# Patient Record
Sex: MALE | Race: WHITE | NOT HISPANIC OR LATINO | URBAN - METROPOLITAN AREA
[De-identification: names, ages, dates, MRNs, and addresses within clinical notes are randomized per-mention and may not be internally consistent; named-entity substitution may affect disease eponyms.]

---

## 2019-06-01 ENCOUNTER — OUTPATIENT (OUTPATIENT)
Dept: OUTPATIENT SERVICES | Facility: HOSPITAL | Age: 74
LOS: 1 days | End: 2019-06-01
Payer: MEDICARE

## 2019-06-01 DIAGNOSIS — Z93.1 GASTROSTOMY STATUS: Chronic | ICD-10-CM

## 2019-06-01 PROCEDURE — G9001: CPT

## 2019-06-21 ENCOUNTER — INPATIENT (INPATIENT)
Facility: HOSPITAL | Age: 74
LOS: 11 days | Discharge: HOME | DRG: 871 | End: 2019-07-03
Attending: INTERNAL MEDICINE | Admitting: INTERNAL MEDICINE
Payer: COMMERCIAL

## 2019-06-21 VITALS
HEART RATE: 80 BPM | OXYGEN SATURATION: 98 % | SYSTOLIC BLOOD PRESSURE: 120 MMHG | DIASTOLIC BLOOD PRESSURE: 76 MMHG | RESPIRATION RATE: 16 BRPM

## 2019-06-21 DIAGNOSIS — S72.401A UNSPECIFIED FRACTURE OF LOWER END OF RIGHT FEMUR, INITIAL ENCOUNTER FOR CLOSED FRACTURE: ICD-10-CM

## 2019-06-21 DIAGNOSIS — Z93.1 GASTROSTOMY STATUS: Chronic | ICD-10-CM

## 2019-06-21 LAB
ALBUMIN SERPL ELPH-MCNC: 3.7 G/DL — SIGNIFICANT CHANGE UP (ref 3.3–5)
ALP SERPL-CCNC: 120 U/L — SIGNIFICANT CHANGE UP (ref 40–120)
ALT FLD-CCNC: 19 U/L — SIGNIFICANT CHANGE UP (ref 10–45)
ANION GAP SERPL CALC-SCNC: 15 MMOL/L — SIGNIFICANT CHANGE UP (ref 5–17)
AST SERPL-CCNC: 53 U/L — HIGH (ref 10–40)
BASOPHILS # BLD AUTO: 0 K/UL — SIGNIFICANT CHANGE UP (ref 0–0.2)
BASOPHILS NFR BLD AUTO: 0.2 % — SIGNIFICANT CHANGE UP (ref 0–2)
BILIRUB SERPL-MCNC: 0.9 MG/DL — SIGNIFICANT CHANGE UP (ref 0.2–1.2)
BUN SERPL-MCNC: 18 MG/DL — SIGNIFICANT CHANGE UP (ref 7–23)
CALCIUM SERPL-MCNC: 9.4 MG/DL — SIGNIFICANT CHANGE UP (ref 8.4–10.5)
CHLORIDE SERPL-SCNC: 105 MMOL/L — SIGNIFICANT CHANGE UP (ref 96–108)
CO2 SERPL-SCNC: 21 MMOL/L — LOW (ref 22–31)
CREAT SERPL-MCNC: 0.54 MG/DL — SIGNIFICANT CHANGE UP (ref 0.5–1.3)
EOSINOPHIL # BLD AUTO: 0 K/UL — SIGNIFICANT CHANGE UP (ref 0–0.5)
EOSINOPHIL NFR BLD AUTO: 0.4 % — SIGNIFICANT CHANGE UP (ref 0–6)
GLUCOSE SERPL-MCNC: 173 MG/DL — HIGH (ref 70–99)
HCT VFR BLD CALC: 43.2 % — SIGNIFICANT CHANGE UP (ref 39–50)
HGB BLD-MCNC: 14.7 G/DL — SIGNIFICANT CHANGE UP (ref 13–17)
LYMPHOCYTES # BLD AUTO: 1.4 K/UL — SIGNIFICANT CHANGE UP (ref 1–3.3)
LYMPHOCYTES # BLD AUTO: 11.6 % — LOW (ref 13–44)
MCHC RBC-ENTMCNC: 33.3 PG — SIGNIFICANT CHANGE UP (ref 27–34)
MCHC RBC-ENTMCNC: 34 GM/DL — SIGNIFICANT CHANGE UP (ref 32–36)
MCV RBC AUTO: 97.8 FL — SIGNIFICANT CHANGE UP (ref 80–100)
MONOCYTES # BLD AUTO: 1.2 K/UL — HIGH (ref 0–0.9)
MONOCYTES NFR BLD AUTO: 9.8 % — SIGNIFICANT CHANGE UP (ref 2–14)
NEUTROPHILS # BLD AUTO: 9.6 K/UL — HIGH (ref 1.8–7.4)
NEUTROPHILS NFR BLD AUTO: 78 % — HIGH (ref 43–77)
PLATELET # BLD AUTO: 55 K/UL — LOW (ref 150–400)
POTASSIUM SERPL-MCNC: 5.6 MMOL/L — HIGH (ref 3.5–5.3)
POTASSIUM SERPL-SCNC: 5.6 MMOL/L — HIGH (ref 3.5–5.3)
PROT SERPL-MCNC: 7.4 G/DL — SIGNIFICANT CHANGE UP (ref 6–8.3)
RBC # BLD: 4.42 M/UL — SIGNIFICANT CHANGE UP (ref 4.2–5.8)
RBC # FLD: 12.2 % — SIGNIFICANT CHANGE UP (ref 10.3–14.5)
SODIUM SERPL-SCNC: 141 MMOL/L — SIGNIFICANT CHANGE UP (ref 135–145)
WBC # BLD: 12.4 K/UL — HIGH (ref 3.8–10.5)
WBC # FLD AUTO: 12.4 K/UL — HIGH (ref 3.8–10.5)

## 2019-06-21 PROCEDURE — 70450 CT HEAD/BRAIN W/O DYE: CPT | Mod: 26

## 2019-06-21 PROCEDURE — 99285 EMERGENCY DEPT VISIT HI MDM: CPT

## 2019-06-21 PROCEDURE — 72170 X-RAY EXAM OF PELVIS: CPT | Mod: 26

## 2019-06-21 PROCEDURE — 73552 X-RAY EXAM OF FEMUR 2/>: CPT | Mod: 26,RT

## 2019-06-21 PROCEDURE — 71045 X-RAY EXAM CHEST 1 VIEW: CPT | Mod: 26

## 2019-06-21 PROCEDURE — 73562 X-RAY EXAM OF KNEE 3: CPT | Mod: 26,RT

## 2019-06-21 PROCEDURE — 49465 FLUORO EXAM OF G/COLON TUBE: CPT

## 2019-06-21 RX ORDER — ACETAMINOPHEN 500 MG
1000 TABLET ORAL ONCE
Refills: 0 | Status: COMPLETED | OUTPATIENT
Start: 2019-06-21 | End: 2019-06-21

## 2019-06-21 RX ADMIN — Medication 1000 MILLIGRAM(S): at 22:54

## 2019-06-21 NOTE — ED PROVIDER NOTE - PROGRESS NOTE DETAILS
Tomasa PGY1- Cullen Moraes replaced tube in ED, will confirm with Gastrograffin study and d/c home, also will obtain radiographs or R thigh/knee for crepitus/swelling Haverty PGY1- pt required straight cath for UA. given pt inability to position himself, required supine positioning for catheretization, which wife was very upset about. concerned that he would get pna. explained that this positioning is temporary but necessary. Tomasa PGY1- ortho saw pt in ED for comminuted distal R femur fx, not surgical candidate, but knee immob placed for pain control, they will follow

## 2019-06-21 NOTE — ED PROVIDER NOTE - OBJECTIVE STATEMENT
73 yoM, PMHx of dementia, arthritis, GERD, BIB EMS with wife with report of feeding tube being pulled out around 15:30. No recent illness. No fever. Wife is concerned for R thigh swelling which is new. No trauma or fall but EMS was helping change the diaper and a crack in that region was heard.  Per wife Dr. Cullen Moraes will come to ED to replace tube.

## 2019-06-21 NOTE — ED PROVIDER NOTE - PHYSICAL EXAMINATION
PHYSICAL EXAM:  GENERAL: bed ridden, no distress, tracks with eyes, not agitated   HEAD:  Atraumatic, Normocephalic  EYES: EOMI, PERRLA, conjunctiva and sclera clear  ENMT: No tonsillar erythema, exudates, or enlargement; Moist mucous membranes, edentulous   NECK: Supple, No JVD  HEART: Regular rate and rhythm; No murmurs, rubs, or gallops  RESPIRATORY: CTA B/L, No W/R/R  ABDOMEN: Soft, Nontender, stoma with no tube in place presently no drainage/erythema   NEURO: non verbal, severe dementia, moving extremities at baseline per wife, tracks with eyes,   EXTREMITIES:  R thigh will swelling compared tor L, + crepitus, no wounds or ecchymosis, good distal pulses, BUE normal/atraumatic, pelvis stable  SKIN: warm, dry, normal color, no rash

## 2019-06-21 NOTE — ED ADULT NURSE NOTE - PSH
History of hand surgery    PEG adjustment, replacement, or removal    Status post insertion of percutaneous endoscopic gastrostomy (PEG) tube  PEG replacement on 02/29/16

## 2019-06-21 NOTE — ED PROVIDER NOTE - CARE PLAN
Principal Discharge DX:	Closed fracture of distal end of right femur, unspecified fracture morphology, initial encounter  Secondary Diagnosis:	PEG (percutaneous endoscopic gastrostomy) adjustment/replacement/removal

## 2019-06-21 NOTE — ED ADULT NURSE NOTE - OBJECTIVE STATEMENT
1925 pt 73ym alert baseline bib ems per wife at bedside found feeding tube out/vss/gi md at bedside replacing the tubing, pt able to tolerate procedure pending x ray/gc

## 2019-06-21 NOTE — ED PROVIDER NOTE - CLINICAL SUMMARY MEDICAL DECISION MAKING FREE TEXT BOX
Tomasa PGY1- demented elderly gentleman here with wife, feeding tube for 7 years, pulled out approx 4 hours, will replace, confirm proper positioning and d/c home see  MD note

## 2019-06-21 NOTE — ED ADULT NURSE NOTE - PMH
Acute renal failure (ARF)  ARF 1.5 YEAR AGO  Arthritis    Dementia    Dyspepsia    Dysphagia  peg  GERD (gastroesophageal reflux disease)    Pneumonia

## 2019-06-21 NOTE — CONSULT NOTE ADULT - SUBJECTIVE AND OBJECTIVE BOX
Chief Complaint:  Patient is a 73y old  Male who presents with a chief complaint of peg falling out    Allergies:  benzodiazepines (Other)  Haldol (Other)  No Known Allergies      Medications:      PMHX/PSHX:  Dyspepsia  GERD (gastroesophageal reflux disease)  Arthritis  Dysphagia  Acute renal failure (ARF)  Pneumonia  Dementia  Status post insertion of percutaneous endoscopic gastrostomy (PEG) tube  PEG adjustment, replacement, or removal  History of hand surgery      Family history:  No pertinent family history in first degree relatives  No pertinent family history in first degree relatives      Social History: no etoh no cigs no ivda     ROS:     General:  No wt loss, fevers, chills, night sweats, fatigue,   Eyes:  Good vision, no reported pain  ENT:  No sore throat, pain, runny nose, dysphagia  CV:  No pain, palpitations, hypo/hypertension  Resp:  No dyspnea, cough, tachypnea, wheezing  GI:  No pain, No nausea, No vomiting, No diarrhea, No constipation, No weight loss, No fever, No pruritis, No rectal bleeding, No tarry stools, No dysphagia,  :  No pain, bleeding, incontinence, nocturia  Muscle:  No pain, weakness  Neuro:  No weakness, tingling, memory problems  Psych:  No fatigue, insomnia, mood problems, depression  Endocrine:  No polyuria, polydipsia, cold/heat intolerance  Heme:  No petechiae, ecchymosis, easy bruisability  Skin:  No rash, tattoos, scars, edema      PHYSICAL EXAM:   Vital Signs:  Vital Signs Last 24 Hrs  T(C): --  T(F): --  HR: 80 (21 Jun 2019 18:16) (80 - 80)  BP: 120/76 (21 Jun 2019 18:16) (120/76 - 120/76)  BP(mean): --  RR: 16 (21 Jun 2019 18:16) (16 - 16)  SpO2: 98% (21 Jun 2019 18:16) (98% - 98%)  Daily     Daily     GENERAL:  Appears stated age, well-groomed, well-nourished, no distress  HEENT:  NC/AT,  conjunctivae clear and pink, no thyromegaly, nodules, adenopathy, no JVD, sclera -anicteric  CHEST:  Full & symmetric excursion, no increased effort, breath sounds clear  HEART:  Regular rhythm, S1, S2, no murmur/rub/S3/S4, no abdominal bruit, no edema  ABDOMEN:  Soft, non-tender, non-distended, normoactive bowel sounds,  no masses ,no hepato-splenomegaly, no signs of chronic liver disease  EXTEREMITIES:  no cyanosis,clubbing or edema  SKIN:  No rash/erythema/ecchymoses/petechiae/wounds/abscess/warm/dry  NEURO:  Alert, oriented, no asterixis, no tremor, no encephalopathy    LABS:                    Imaging:

## 2019-06-21 NOTE — ED PROVIDER NOTE - ATTENDING CONTRIBUTION TO CARE
------------ATTENDING NOTE------------  pt w/ wife brought to ED for concerns of PEG tube displacement, easily replaced by GI on arrival (met pt in ED), complicated as physical exam w/ swelling to R distal femur (soft compartments, no open wounds, crepitus w/ axial loading, equal distal pulses) and radiographs w/ femur fx, limited as pt nonverbal and not appearing in pain/distress, will evaluate for additional injuries / acute processes, d/w Dr Base for admission (needing Ortho consult, optimize medical management, needs assessment, etc), low suspicion for PNA from CXR (no fevers, cough) -->  - Savage Felix MD   ------------------------------------------------

## 2019-06-22 DIAGNOSIS — R00.0 TACHYCARDIA, UNSPECIFIED: ICD-10-CM

## 2019-06-22 DIAGNOSIS — Z29.9 ENCOUNTER FOR PROPHYLACTIC MEASURES, UNSPECIFIED: ICD-10-CM

## 2019-06-22 DIAGNOSIS — S72.401A UNSPECIFIED FRACTURE OF LOWER END OF RIGHT FEMUR, INITIAL ENCOUNTER FOR CLOSED FRACTURE: ICD-10-CM

## 2019-06-22 DIAGNOSIS — J18.9 PNEUMONIA, UNSPECIFIED ORGANISM: ICD-10-CM

## 2019-06-22 DIAGNOSIS — F03.90 UNSPECIFIED DEMENTIA WITHOUT BEHAVIORAL DISTURBANCE: ICD-10-CM

## 2019-06-22 DIAGNOSIS — Z79.899 OTHER LONG TERM (CURRENT) DRUG THERAPY: ICD-10-CM

## 2019-06-22 DIAGNOSIS — Z43.1 ENCOUNTER FOR ATTENTION TO GASTROSTOMY: ICD-10-CM

## 2019-06-22 DIAGNOSIS — K21.9 GASTRO-ESOPHAGEAL REFLUX DISEASE WITHOUT ESOPHAGITIS: ICD-10-CM

## 2019-06-22 LAB
ALBUMIN SERPL ELPH-MCNC: 3.7 G/DL — SIGNIFICANT CHANGE UP (ref 3.3–5)
ALP SERPL-CCNC: 108 U/L — SIGNIFICANT CHANGE UP (ref 40–120)
ALT FLD-CCNC: 14 U/L — SIGNIFICANT CHANGE UP (ref 10–45)
ANION GAP SERPL CALC-SCNC: 14 MMOL/L — SIGNIFICANT CHANGE UP (ref 5–17)
ANION GAP SERPL CALC-SCNC: 15 MMOL/L — SIGNIFICANT CHANGE UP (ref 5–17)
APPEARANCE UR: CLEAR — SIGNIFICANT CHANGE UP
APTT BLD: 35.9 SEC — SIGNIFICANT CHANGE UP (ref 27.5–36.3)
AST SERPL-CCNC: 21 U/L — SIGNIFICANT CHANGE UP (ref 10–40)
BACTERIA # UR AUTO: NEGATIVE — SIGNIFICANT CHANGE UP
BASE EXCESS BLDV CALC-SCNC: -1.1 MMOL/L — SIGNIFICANT CHANGE UP (ref -2–2)
BASE EXCESS BLDV CALC-SCNC: -1.4 MMOL/L — SIGNIFICANT CHANGE UP (ref -2–2)
BASOPHILS # BLD AUTO: 0 K/UL — SIGNIFICANT CHANGE UP (ref 0–0.2)
BASOPHILS NFR BLD AUTO: 0.1 % — SIGNIFICANT CHANGE UP (ref 0–2)
BILIRUB SERPL-MCNC: 1.3 MG/DL — HIGH (ref 0.2–1.2)
BILIRUB UR-MCNC: NEGATIVE — SIGNIFICANT CHANGE UP
BLD GP AB SCN SERPL QL: NEGATIVE — SIGNIFICANT CHANGE UP
BUN SERPL-MCNC: 22 MG/DL — SIGNIFICANT CHANGE UP (ref 7–23)
BUN SERPL-MCNC: 27 MG/DL — HIGH (ref 7–23)
CA-I SERPL-SCNC: 1.11 MMOL/L — LOW (ref 1.12–1.3)
CA-I SERPL-SCNC: 1.19 MMOL/L — SIGNIFICANT CHANGE UP (ref 1.12–1.3)
CALCIUM SERPL-MCNC: 8.8 MG/DL — SIGNIFICANT CHANGE UP (ref 8.4–10.5)
CALCIUM SERPL-MCNC: 9.1 MG/DL — SIGNIFICANT CHANGE UP (ref 8.4–10.5)
CHLORIDE BLDV-SCNC: 113 MMOL/L — HIGH (ref 96–108)
CHLORIDE BLDV-SCNC: 117 MMOL/L — HIGH (ref 96–108)
CHLORIDE SERPL-SCNC: 106 MMOL/L — SIGNIFICANT CHANGE UP (ref 96–108)
CHLORIDE SERPL-SCNC: 108 MMOL/L — SIGNIFICANT CHANGE UP (ref 96–108)
CO2 BLDV-SCNC: 24 MMOL/L — SIGNIFICANT CHANGE UP (ref 22–30)
CO2 BLDV-SCNC: 25 MMOL/L — SIGNIFICANT CHANGE UP (ref 22–30)
CO2 SERPL-SCNC: 22 MMOL/L — SIGNIFICANT CHANGE UP (ref 22–31)
CO2 SERPL-SCNC: 22 MMOL/L — SIGNIFICANT CHANGE UP (ref 22–31)
COLOR SPEC: YELLOW — SIGNIFICANT CHANGE UP
CREAT SERPL-MCNC: 0.7 MG/DL — SIGNIFICANT CHANGE UP (ref 0.5–1.3)
CREAT SERPL-MCNC: 1.09 MG/DL — SIGNIFICANT CHANGE UP (ref 0.5–1.3)
CULTURE RESULTS: NO GROWTH — SIGNIFICANT CHANGE UP
DIFF PNL FLD: NEGATIVE — SIGNIFICANT CHANGE UP
EOSINOPHIL # BLD AUTO: 0 K/UL — SIGNIFICANT CHANGE UP (ref 0–0.5)
EOSINOPHIL NFR BLD AUTO: 0.4 % — SIGNIFICANT CHANGE UP (ref 0–6)
EPI CELLS # UR: 2 /HPF — SIGNIFICANT CHANGE UP
GAS PNL BLDV: 137 MMOL/L — SIGNIFICANT CHANGE UP (ref 135–145)
GAS PNL BLDV: 142 MMOL/L — SIGNIFICANT CHANGE UP (ref 135–145)
GAS PNL BLDV: SIGNIFICANT CHANGE UP
GLUCOSE BLDC GLUCOMTR-MCNC: 156 MG/DL — HIGH (ref 70–99)
GLUCOSE BLDC GLUCOMTR-MCNC: 178 MG/DL — HIGH (ref 70–99)
GLUCOSE BLDC GLUCOMTR-MCNC: 187 MG/DL — HIGH (ref 70–99)
GLUCOSE BLDC GLUCOMTR-MCNC: 215 MG/DL — HIGH (ref 70–99)
GLUCOSE BLDC GLUCOMTR-MCNC: 220 MG/DL — HIGH (ref 70–99)
GLUCOSE BLDV-MCNC: 176 MG/DL — HIGH (ref 70–99)
GLUCOSE BLDV-MCNC: 177 MG/DL — HIGH (ref 70–99)
GLUCOSE SERPL-MCNC: 177 MG/DL — HIGH (ref 70–99)
GLUCOSE SERPL-MCNC: 187 MG/DL — HIGH (ref 70–99)
GLUCOSE UR QL: NEGATIVE — SIGNIFICANT CHANGE UP
HCO3 BLDV-SCNC: 23 MMOL/L — SIGNIFICANT CHANGE UP (ref 21–29)
HCO3 BLDV-SCNC: 24 MMOL/L — SIGNIFICANT CHANGE UP (ref 21–29)
HCT VFR BLD CALC: 38.5 % — LOW (ref 39–50)
HCT VFR BLD CALC: 38.8 % — LOW (ref 39–50)
HCT VFR BLDA CALC: 37 % — LOW (ref 39–50)
HCT VFR BLDA CALC: 38 % — LOW (ref 39–50)
HGB BLD CALC-MCNC: 12 G/DL — LOW (ref 13–17)
HGB BLD CALC-MCNC: 12.4 G/DL — LOW (ref 13–17)
HGB BLD-MCNC: 13.1 G/DL — SIGNIFICANT CHANGE UP (ref 13–17)
HGB BLD-MCNC: 13.3 G/DL — SIGNIFICANT CHANGE UP (ref 13–17)
HYALINE CASTS # UR AUTO: 0 /LPF — SIGNIFICANT CHANGE UP (ref 0–7)
INR BLD: 1.07 RATIO — SIGNIFICANT CHANGE UP (ref 0.88–1.16)
KETONES UR-MCNC: NEGATIVE — SIGNIFICANT CHANGE UP
LACTATE BLDV-MCNC: 5.1 MMOL/L — CRITICAL HIGH (ref 0.7–2)
LACTATE BLDV-MCNC: 5.3 MMOL/L — CRITICAL HIGH (ref 0.7–2)
LACTATE SERPL-SCNC: 4 MMOL/L — CRITICAL HIGH (ref 0.7–2)
LACTATE SERPL-SCNC: 5.4 MMOL/L — CRITICAL HIGH (ref 0.7–2)
LEUKOCYTE ESTERASE UR-ACNC: ABNORMAL
LYMPHOCYTES # BLD AUTO: 1.7 K/UL — SIGNIFICANT CHANGE UP (ref 1–3.3)
LYMPHOCYTES # BLD AUTO: 13.8 % — SIGNIFICANT CHANGE UP (ref 13–44)
MAGNESIUM SERPL-MCNC: 2.1 MG/DL — SIGNIFICANT CHANGE UP (ref 1.6–2.6)
MAGNESIUM SERPL-MCNC: 2.1 MG/DL — SIGNIFICANT CHANGE UP (ref 1.6–2.6)
MCHC RBC-ENTMCNC: 33.3 PG — SIGNIFICANT CHANGE UP (ref 27–34)
MCHC RBC-ENTMCNC: 33.8 GM/DL — SIGNIFICANT CHANGE UP (ref 32–36)
MCHC RBC-ENTMCNC: 33.8 PG — SIGNIFICANT CHANGE UP (ref 27–34)
MCHC RBC-ENTMCNC: 34.5 GM/DL — SIGNIFICANT CHANGE UP (ref 32–36)
MCV RBC AUTO: 98.2 FL — SIGNIFICANT CHANGE UP (ref 80–100)
MCV RBC AUTO: 98.6 FL — SIGNIFICANT CHANGE UP (ref 80–100)
MONOCYTES # BLD AUTO: 1.6 K/UL — HIGH (ref 0–0.9)
MONOCYTES NFR BLD AUTO: 12.9 % — SIGNIFICANT CHANGE UP (ref 2–14)
NEUTROPHILS # BLD AUTO: 9.2 K/UL — HIGH (ref 1.8–7.4)
NEUTROPHILS NFR BLD AUTO: 72.8 % — SIGNIFICANT CHANGE UP (ref 43–77)
NITRITE UR-MCNC: NEGATIVE — SIGNIFICANT CHANGE UP
OTHER CELLS CSF MANUAL: 10 ML/DL — LOW (ref 18–22)
OTHER CELLS CSF MANUAL: 5 ML/DL — LOW (ref 18–22)
PCO2 BLDV: 40 MMHG — SIGNIFICANT CHANGE UP (ref 35–50)
PCO2 BLDV: 45 MMHG — SIGNIFICANT CHANGE UP (ref 35–50)
PH BLDV: 7.34 — LOW (ref 7.35–7.45)
PH BLDV: 7.39 — SIGNIFICANT CHANGE UP (ref 7.35–7.45)
PH UR: 7 — SIGNIFICANT CHANGE UP (ref 5–8)
PHOSPHATE SERPL-MCNC: 1.5 MG/DL — LOW (ref 2.5–4.5)
PHOSPHATE SERPL-MCNC: 2.6 MG/DL — SIGNIFICANT CHANGE UP (ref 2.5–4.5)
PLATELET # BLD AUTO: 178 K/UL — SIGNIFICANT CHANGE UP (ref 150–400)
PLATELET # BLD AUTO: 187 K/UL — SIGNIFICANT CHANGE UP (ref 150–400)
PO2 BLDV: 22 MMHG — LOW (ref 25–45)
PO2 BLDV: 33 MMHG — SIGNIFICANT CHANGE UP (ref 25–45)
POTASSIUM BLDV-SCNC: 3.9 MMOL/L — SIGNIFICANT CHANGE UP (ref 3.5–5.3)
POTASSIUM BLDV-SCNC: 3.9 MMOL/L — SIGNIFICANT CHANGE UP (ref 3.5–5.3)
POTASSIUM SERPL-MCNC: 4.1 MMOL/L — SIGNIFICANT CHANGE UP (ref 3.5–5.3)
POTASSIUM SERPL-MCNC: 4.4 MMOL/L — SIGNIFICANT CHANGE UP (ref 3.5–5.3)
POTASSIUM SERPL-SCNC: 4.1 MMOL/L — SIGNIFICANT CHANGE UP (ref 3.5–5.3)
POTASSIUM SERPL-SCNC: 4.4 MMOL/L — SIGNIFICANT CHANGE UP (ref 3.5–5.3)
PROT SERPL-MCNC: 6.9 G/DL — SIGNIFICANT CHANGE UP (ref 6–8.3)
PROT UR-MCNC: ABNORMAL
PROTHROM AB SERPL-ACNC: 12.2 SEC — SIGNIFICANT CHANGE UP (ref 10–12.9)
RBC # BLD: 3.93 M/UL — LOW (ref 4.2–5.8)
RBC # BLD: 3.94 M/UL — LOW (ref 4.2–5.8)
RBC # FLD: 12.2 % — SIGNIFICANT CHANGE UP (ref 10.3–14.5)
RBC # FLD: 12.3 % — SIGNIFICANT CHANGE UP (ref 10.3–14.5)
RBC CASTS # UR COMP ASSIST: 2 /HPF — SIGNIFICANT CHANGE UP (ref 0–4)
RH IG SCN BLD-IMP: POSITIVE — SIGNIFICANT CHANGE UP
SAO2 % BLDV: 29 % — LOW (ref 67–88)
SAO2 % BLDV: 60 % — LOW (ref 67–88)
SODIUM SERPL-SCNC: 142 MMOL/L — SIGNIFICANT CHANGE UP (ref 135–145)
SODIUM SERPL-SCNC: 145 MMOL/L — SIGNIFICANT CHANGE UP (ref 135–145)
SP GR SPEC: 1.02 — SIGNIFICANT CHANGE UP (ref 1.01–1.02)
SPECIMEN SOURCE: SIGNIFICANT CHANGE UP
UROBILINOGEN FLD QL: NEGATIVE — SIGNIFICANT CHANGE UP
WBC # BLD: 12.6 K/UL — HIGH (ref 3.8–10.5)
WBC # BLD: 9.5 K/UL — SIGNIFICANT CHANGE UP (ref 3.8–10.5)
WBC # FLD AUTO: 12.6 K/UL — HIGH (ref 3.8–10.5)
WBC # FLD AUTO: 9.5 K/UL — SIGNIFICANT CHANGE UP (ref 3.8–10.5)
WBC UR QL: 6 /HPF — HIGH (ref 0–5)

## 2019-06-22 PROCEDURE — 93010 ELECTROCARDIOGRAM REPORT: CPT

## 2019-06-22 PROCEDURE — 99223 1ST HOSP IP/OBS HIGH 75: CPT

## 2019-06-22 PROCEDURE — 71045 X-RAY EXAM CHEST 1 VIEW: CPT | Mod: 26

## 2019-06-22 PROCEDURE — 73562 X-RAY EXAM OF KNEE 3: CPT | Mod: 26,RT

## 2019-06-22 PROCEDURE — 93010 ELECTROCARDIOGRAM REPORT: CPT | Mod: 77

## 2019-06-22 RX ORDER — SODIUM CHLORIDE 9 MG/ML
1000 INJECTION INTRAMUSCULAR; INTRAVENOUS; SUBCUTANEOUS
Refills: 0 | Status: DISCONTINUED | OUTPATIENT
Start: 2019-06-22 | End: 2019-06-22

## 2019-06-22 RX ORDER — QUETIAPINE FUMARATE 200 MG/1
200 TABLET, FILM COATED ORAL DAILY
Refills: 0 | Status: DISCONTINUED | OUTPATIENT
Start: 2019-06-22 | End: 2019-06-27

## 2019-06-22 RX ORDER — ACETAMINOPHEN 500 MG
650 TABLET ORAL EVERY 6 HOURS
Refills: 0 | Status: DISCONTINUED | OUTPATIENT
Start: 2019-06-22 | End: 2019-06-24

## 2019-06-22 RX ORDER — VANCOMYCIN HCL 1 G
1000 VIAL (EA) INTRAVENOUS ONCE
Refills: 0 | Status: COMPLETED | OUTPATIENT
Start: 2019-06-22 | End: 2019-06-22

## 2019-06-22 RX ORDER — SODIUM CHLORIDE 9 MG/ML
1000 INJECTION INTRAMUSCULAR; INTRAVENOUS; SUBCUTANEOUS ONCE
Refills: 0 | Status: COMPLETED | OUTPATIENT
Start: 2019-06-22 | End: 2019-06-22

## 2019-06-22 RX ORDER — SUCRALFATE 1 G
1 TABLET ORAL
Refills: 0 | Status: DISCONTINUED | OUTPATIENT
Start: 2019-06-22 | End: 2019-07-03

## 2019-06-22 RX ORDER — HEPARIN SODIUM 5000 [USP'U]/ML
5000 INJECTION INTRAVENOUS; SUBCUTANEOUS EVERY 8 HOURS
Refills: 0 | Status: DISCONTINUED | OUTPATIENT
Start: 2019-06-22 | End: 2019-06-22

## 2019-06-22 RX ORDER — OLANZAPINE 15 MG/1
5 TABLET, FILM COATED ORAL DAILY
Refills: 0 | Status: DISCONTINUED | OUTPATIENT
Start: 2019-06-22 | End: 2019-07-03

## 2019-06-22 RX ORDER — PIPERACILLIN AND TAZOBACTAM 4; .5 G/20ML; G/20ML
3.38 INJECTION, POWDER, LYOPHILIZED, FOR SOLUTION INTRAVENOUS ONCE
Refills: 0 | Status: COMPLETED | OUTPATIENT
Start: 2019-06-22 | End: 2019-06-22

## 2019-06-22 RX ORDER — SODIUM CHLORIDE 9 MG/ML
1000 INJECTION INTRAMUSCULAR; INTRAVENOUS; SUBCUTANEOUS
Refills: 0 | Status: COMPLETED | OUTPATIENT
Start: 2019-06-22 | End: 2019-06-22

## 2019-06-22 RX ORDER — ACETAMINOPHEN 500 MG
650 TABLET ORAL EVERY 6 HOURS
Refills: 0 | Status: DISCONTINUED | OUTPATIENT
Start: 2019-06-22 | End: 2019-06-22

## 2019-06-22 RX ORDER — POTASSIUM PHOSPHATE, MONOBASIC POTASSIUM PHOSPHATE, DIBASIC 236; 224 MG/ML; MG/ML
15 INJECTION, SOLUTION INTRAVENOUS ONCE
Refills: 0 | Status: COMPLETED | OUTPATIENT
Start: 2019-06-22 | End: 2019-06-22

## 2019-06-22 RX ORDER — SODIUM CHLORIDE 9 MG/ML
1000 INJECTION, SOLUTION INTRAVENOUS
Refills: 0 | Status: DISCONTINUED | OUTPATIENT
Start: 2019-06-22 | End: 2019-06-27

## 2019-06-22 RX ORDER — HEPARIN SODIUM 5000 [USP'U]/ML
5000 INJECTION INTRAVENOUS; SUBCUTANEOUS EVERY 12 HOURS
Refills: 0 | Status: DISCONTINUED | OUTPATIENT
Start: 2019-06-22 | End: 2019-06-22

## 2019-06-22 RX ORDER — HEPARIN SODIUM 5000 [USP'U]/ML
5000 INJECTION INTRAVENOUS; SUBCUTANEOUS EVERY 8 HOURS
Refills: 0 | Status: DISCONTINUED | OUTPATIENT
Start: 2019-06-22 | End: 2019-07-03

## 2019-06-22 RX ORDER — SODIUM CHLORIDE 9 MG/ML
500 INJECTION INTRAMUSCULAR; INTRAVENOUS; SUBCUTANEOUS ONCE
Refills: 0 | Status: COMPLETED | OUTPATIENT
Start: 2019-06-22 | End: 2019-06-22

## 2019-06-22 RX ORDER — PIPERACILLIN AND TAZOBACTAM 4; .5 G/20ML; G/20ML
3.38 INJECTION, POWDER, LYOPHILIZED, FOR SOLUTION INTRAVENOUS EVERY 8 HOURS
Refills: 0 | Status: DISCONTINUED | OUTPATIENT
Start: 2019-06-22 | End: 2019-06-27

## 2019-06-22 RX ORDER — PANTOPRAZOLE SODIUM 20 MG/1
40 TABLET, DELAYED RELEASE ORAL DAILY
Refills: 0 | Status: DISCONTINUED | OUTPATIENT
Start: 2019-06-22 | End: 2019-07-03

## 2019-06-22 RX ORDER — VANCOMYCIN HCL 1 G
1000 VIAL (EA) INTRAVENOUS DAILY
Refills: 0 | Status: DISCONTINUED | OUTPATIENT
Start: 2019-06-22 | End: 2019-06-24

## 2019-06-22 RX ADMIN — Medication 650 MILLIGRAM(S): at 04:37

## 2019-06-22 RX ADMIN — Medication 650 MILLIGRAM(S): at 04:49

## 2019-06-22 RX ADMIN — Medication 1 GRAM(S): at 22:07

## 2019-06-22 RX ADMIN — PANTOPRAZOLE SODIUM 40 MILLIGRAM(S): 20 TABLET, DELAYED RELEASE ORAL at 09:03

## 2019-06-22 RX ADMIN — OLANZAPINE 5 MILLIGRAM(S): 15 TABLET, FILM COATED ORAL at 14:23

## 2019-06-22 RX ADMIN — HEPARIN SODIUM 5000 UNIT(S): 5000 INJECTION INTRAVENOUS; SUBCUTANEOUS at 14:21

## 2019-06-22 RX ADMIN — SODIUM CHLORIDE 60 MILLILITER(S): 9 INJECTION, SOLUTION INTRAVENOUS at 09:30

## 2019-06-22 RX ADMIN — SODIUM CHLORIDE 1000 MILLILITER(S): 9 INJECTION INTRAMUSCULAR; INTRAVENOUS; SUBCUTANEOUS at 14:19

## 2019-06-22 RX ADMIN — PIPERACILLIN AND TAZOBACTAM 200 GRAM(S): 4; .5 INJECTION, POWDER, LYOPHILIZED, FOR SOLUTION INTRAVENOUS at 14:19

## 2019-06-22 RX ADMIN — Medication 650 MILLIGRAM(S): at 21:15

## 2019-06-22 RX ADMIN — Medication 650 MILLIGRAM(S): at 22:41

## 2019-06-22 RX ADMIN — POTASSIUM PHOSPHATE, MONOBASIC POTASSIUM PHOSPHATE, DIBASIC 62.5 MILLIMOLE(S): 236; 224 INJECTION, SOLUTION INTRAVENOUS at 17:52

## 2019-06-22 RX ADMIN — QUETIAPINE FUMARATE 200 MILLIGRAM(S): 200 TABLET, FILM COATED ORAL at 22:07

## 2019-06-22 RX ADMIN — SODIUM CHLORIDE 1000 MILLILITER(S): 9 INJECTION INTRAMUSCULAR; INTRAVENOUS; SUBCUTANEOUS at 18:44

## 2019-06-22 RX ADMIN — Medication 250 MILLIGRAM(S): at 16:21

## 2019-06-22 RX ADMIN — SODIUM CHLORIDE 100 MILLILITER(S): 9 INJECTION INTRAMUSCULAR; INTRAVENOUS; SUBCUTANEOUS at 02:55

## 2019-06-22 RX ADMIN — SODIUM CHLORIDE 1000 MILLILITER(S): 9 INJECTION INTRAMUSCULAR; INTRAVENOUS; SUBCUTANEOUS at 16:21

## 2019-06-22 RX ADMIN — PIPERACILLIN AND TAZOBACTAM 25 GRAM(S): 4; .5 INJECTION, POWDER, LYOPHILIZED, FOR SOLUTION INTRAVENOUS at 22:57

## 2019-06-22 RX ADMIN — SODIUM CHLORIDE 500 MILLILITER(S): 9 INJECTION INTRAMUSCULAR; INTRAVENOUS; SUBCUTANEOUS at 01:44

## 2019-06-22 RX ADMIN — HEPARIN SODIUM 5000 UNIT(S): 5000 INJECTION INTRAVENOUS; SUBCUTANEOUS at 22:07

## 2019-06-22 NOTE — PROGRESS NOTE ADULT - ASSESSMENT
74 yo male  with PMH severe dementia () with PEG tube, arthritis, GERD present from home after PEG tube falling out and Right thigh swelling.       ·  Problem: Closed fracture of distal end of right femur, unspecified fracture morphology, initial encounter.   : Ortho team consulted and placed into knee immobilizer.   ortho f/u for further recs   Pain control prn.     ·  Problem: PEG (percutaneous endoscopic gastrostomy) adjustment/replacement/removal.  : GI team replaced PEG in ED  Prelim report of tube check  "Percutaneous tube overlies the stomach. Contrast   opacifies the tube, stomach, and proximal duodenum."   resume dfeeds   F/U GI recs.       ·  Problem: Tachycardia.  improved  Patient has not had intake of tube feeds or free water after PEG tube nor IV fluids  iv fluids         : Pneumonia.  Plan: CXR with reported Right mid lung opacity   s/p tx   Monitor symptoms.       ·  Problem: GERD (gastroesophageal reflux disease).    ppi      Problem: Dementia. Plan: Resume Seroquel       ·  Problem: Prophylactic measure.    : DVT SCDs for now as patient with thrombocytopenia       ·  Problem: Medication management.         discussed w/ wife at length 74 yo male  with PMH severe dementia () with PEG tube, arthritis, GERD present from home after PEG tube falling out and Right thigh swelling.       ·  Problem: Closed fracture of distal end of right femur, unspecified fracture morphology, initial encounter.   : Ortho team consulted and placed into knee immobilizer.   ortho f/u for further recs   Pain control prn.     ·  Problem: PEG (percutaneous endoscopic gastrostomy) adjustment/replacement/removal.  : GI team replaced PEG in ED  Prelim report of tube check  "Percutaneous tube overlies the stomach. Contrast   opacifies the tube, stomach, and proximal duodenum."   resume dfeeds   F/U GI recs.       ·  Problem: Tachycardia.  improved  Patient has not had intake of tube feeds or free water after PEG tube nor IV fluids  iv fluids         : Pneumonia.  Plan: CXR with reported Right mid lung opacity   s/p tx   Monitor symptoms.       ·  Problem: GERD (gastroesophageal reflux disease).    ppi      Problem: Dementia. Plan: Resume Seroquel       ·  Problem: Prophylactic measure.    : DVT SCD      ·  Problem: Medication management.         discussed w/ wife at length

## 2019-06-22 NOTE — CHART NOTE - NSCHARTNOTEFT_GEN_A_CORE
MAR RRT Note    Reason for RRT: Hypoxia and tachycardia  Time of RRT: 12:54    74 yo M PMHx severe dementia (AAOx0 s/p severe reaction to Haldol/Ativan) with PEG tube, arthritis, GERD present from home after PEG tube falling out. He was noted to have a comminuted/displaced distal femur fracture, which he was placed on knee immobilizer. RRT was called for concern of worsening tachycardia and hypoxia to 80s. He was placed on NRB and prior to RRT he was noted to have temperature of 103 and was given rectal tylenol. During RRT, his SpO2 improved to 100%, given concern of fever, vancomycin and zosyn were ordered. He was also given 1 L of NS MAR RRT Note    Reason for RRT: Hypoxia and tachycardia  Time of RRT: 12:54    72 yo M PMHx severe dementia (AAOx0 s/p severe reaction to Haldol/Ativan) with PEG tube, arthritis, GERD presented from home yesterday after PEG tube falling out. He was noted to have a comminuted/displaced distal femur fracture, which he was placed on knee immobilizer. RRT was called for concern of worsening tachycardia and hypoxia to 80s. He was placed on NRB and prior to RRT he was noted to have temperature of 103 and was given rectal tylenol. Given concern of fever, vancomycin and zosyn were ordered. He was also given 1 L of NS. As he was difficult to obtain access after multiple attempts, VBG was sent, notable for lactate of 5/1. CXR with L lower lobe opacity concerning for pneumonia. Additional access was obtained. His oxygenation improved with suctioning.     To do:  [ ] He will need a 2 hour reassessment for sepsis  [ ] Continue IV antibiotics, follow-up cultures  [ ] Please also obtain urine culture and urinalysis   [ ] Please follow-up sputum cultures  [ ] Please reassess for fever, if persistently elevated may need cooling blanket    Any Caceres MD  Internal Medicine, PGY-3  Pager [phone]

## 2019-06-22 NOTE — H&P ADULT - PROBLEM SELECTOR PLAN 3
Sinus tach on EKG  Patient has not had intake of tube feeds or free water after PEG tube nor IV fluids  Initiated IV fluid bolus in setting of tachycardia. Re-eval vitals s/p bolus  Continue maintenance fluids.

## 2019-06-22 NOTE — CONSULT NOTE ADULT - ASSESSMENT
Assessment  Sepsis due to LLL PNA with Hypoxia on 50% VM at this time, tachycardia and fever  PEG Dislodgment  Femur fracture  underlying severe dementia with dysphagia with contracturs     Plan  O2 to maintain sat > 88%, taper as tolerated  ABX as per ID  If clinically worsen will need CT chest  f/u cultures   ID f/u  Orhto f/u for femur fracture  PEG replaced - restart diet when able  aspiration precautions  d/w Wife bedside

## 2019-06-22 NOTE — CONSULT NOTE ADULT - SUBJECTIVE AND OBJECTIVE BOX
PULMONARY CONSULT  Location of Patient : [facility] [address] ([facility] [address])  Attending requesting Consult:Ken Hair  Chief Complaint : PEG dislogement  Reason For consult : PNA      Initial HPI on admission:  HPI:  73 year old male with severe dementia  with contractures bedbound, nonverbal dysphagia with PEG tube, arthritis, GERD present from home after PEG tube falling out and Right thigh swelling found to have Femur fracture.  Recent pna and finished course of levaquin after having cough and sob, where improved but now returned    wife states femur fracture not from fall but rather aides opened leg and fractured accidently.     currently pt has productive cough    PAST MEDICAL & SURGICAL HISTORY:  Dyspepsia  GERD (gastroesophageal reflux disease)  Arthritis  Dysphagia: peg  Acute renal failure (ARF): ARF 1.5 YEAR AGO  Pneumonia  Dementia  Status post insertion of percutaneous endoscopic gastrostomy (PEG) tube: PEG replacement on 16  PEG adjustment, replacement, or removal  History of hand surgery    Allergies    No Known Allergies    Intolerances    benzodiazepines (Other)  Haldol (Other)    FAMILY HISTORY:  FH: breast cancer: Mother    Social history: as per wife     Smoking: never     Drinking:never     Drug use:never    Review of Systems: Limited as pt non verbal      Medications:  MEDICATIONS  (STANDING):  dextrose 5% + sodium chloride 0.9%. 1000 milliLiter(s) (60 mL/Hr) IV Continuous <Continuous>  heparin  Injectable 5000 Unit(s) SubCutaneous every 8 hours  OLANZapine 5 milliGRAM(s) Oral daily  pantoprazole  Injectable 40 milliGRAM(s) IV Push daily  piperacillin/tazobactam IVPB.. 3.375 Gram(s) IV Intermittent every 8 hours  QUEtiapine 200 milliGRAM(s) Oral daily  sucralfate 1 Gram(s) Oral two times a day  vancomycin  IVPB 1000 milliGRAM(s) IV Intermittent daily    MEDICATIONS  (PRN):  acetaminophen  Suppository .. 650 milliGRAM(s) Rectal every 6 hours PRN Temp greater or equal to 38C (100.4F), Mild Pain (1 - 3), Moderate Pain (4 - 6)      Home Medications:  Last Order Reconciliation Date: Not Done  Levaquin 750 mg oral tablet: 1 tab(s) orally every 24 hours (19 @ 03:16)  Mylanta oral suspension: 10 milliliter(s) by gastrostomy tube 4 times a day, As Needed (19 @ 03:16)  OLANZapine 5 mg oral tablet: 1 tab(s) by gastrostomy tube once a day (19 @ 02:21)  omeprazole 40 mg oral delayed release capsule: 1 cap(s) by gastrostomy tube once a day (19 @ 03:16)  SEROquel 200 mg oral tablet: tab(s) by gastrostomy tube once a day (19 @ 03:16)  sucralfate 1 g oral tablet: 1 tab(s) by gastrostomy tube 2 times a day (19 @ 03:16)      LABS:                        13.3   9.5   )-----------( 187      ( 2019 13:23 )             38.5         145  |  108  |  27<H>  ----------------------------<  187<H>  4.1   |  22  |  1.09    Ca    9.1      2019 13:23  Phos  1.5       Mg     2.1         TPro  6.9  /  Alb  3.7  /  TBili  1.3<H>  /  DBili  x   /  AST  21  /  ALT  14  /  AlkPhos  108              PT/INR - ( 2019 06:33 )   PT: 12.2 sec;   INR: 1.07 ratio         PTT - ( 2019 06:33 )  PTT:35.9 sec  Urinalysis Basic - ( 2019 23:39 )    Color: Yellow / Appearance: Clear / S.024 / pH: x  Gluc: x / Ketone: Negative  / Bili: Negative / Urobili: Negative   Blood: x / Protein: 30 mg/dL / Nitrite: Negative   Leuk Esterase: Moderate / RBC: 2 /hpf / WBC 6 /HPF   Sq Epi: x / Non Sq Epi: 2 /hpf / Bacteria: Negative    POCT Blood Glucose.: 156 mg/dL (2019 12:53)      RADIOLOGY  CXR:    < from: Xray Chest 1 View-PORTABLE IMMEDIATE (19 @ 13:22) >  IMPRESSION:   Left lower lung opacity which may represent pneumonia in the correct   clinical context.    < end of copied text >      < from: Xray Femur 2 Views, Right (06.21.19 @ 20:40) >  IMPRESSION:     Acute comminuted distal right femur fracture involving the metadiaphysis and distal diaphysis with approximately one third shaft width's anterior displacement of the distal fragment. There is external rotation of the distal fragment. Evaluation for intra-articular extension is limited due   to obliquity on the radiographs.  No acute hip or displaced pelvic fractures.       < end of copied text >    CT:  < from: CT Head No Cont (19 @ 21:55) >  IMPRESSION:     No acute intracranial bleeding, mass effect,or shift.     Advanced central volume loss and extensive chronic microvascular ischemic   changes.      < end of copied text >    VITALS:  T(C): 37.9 (19 @ 15:37), Max: 39.4 (19 @ 12:39)  T(F): 100.3 (19 @ 15:37), Max: 103 (19 @ 12:39)  HR: 108 (19 @ 15:37) (80 - 167)  BP: 107/67 (19 @ 15:37) (107/67 - 137/77)  BP(mean): --  ABP: --  ABP(mean): --  RR: 20 (19 @ 15:37) (16 - 20)  SpO2: 90% (19 @ 15:37) (86% - 98%)  CVP(mm Hg): --  CVP(cm H2O): --    Ins and Outs     19 @ 07:01  -  19 @ 07:00  --------------------------------------------------------  IN: 600 mL / OUT: 0 mL / NET: 600 mL    19 @ 07:01  -  19 @ 17:55  --------------------------------------------------------  IN: 1800 mL / OUT: 0 mL / NET: 1800 mL        Height (cm): 167.64 (19 @ 00:20)  Weight (kg): 77.6 (19 @ 00:20)  BMI (kg/m2): 27.6 (19 @ 00:20)        I&O's Detail    2019 07:  -  2019 07:00  --------------------------------------------------------  IN:    IV PiggyBack: 100 mL    Sodium Chloride 0.9% IV Bolus: 500 mL  Total IN: 600 mL    OUT:  Total OUT: 0 mL    Total NET: 600 mL      2019 07:  -  2019 17:55  --------------------------------------------------------  IN:    IV PiggyBack: 600 mL    ns in tub fed  [id number]: 200 mL    Sodium Chloride 0.9% IV Bolus: 1000 mL  Total IN: 1800 mL    OUT:  Total OUT: 0 mL    Total NET: 1800 mL          Physical Examination:  GENERAL:               Alert, Not Oriented, No acute distress.  on VM 50%  HEENT:                    No JVD, dry MM, temporal waisting  PULM:                     Bilateral air entry, course b/l breath sounds, No Rales, Right sided Rhonchi, No Wheezing  CVS:                         S1, S2,  + Murmur  ABD:                        Soft, nondistended, nontender, normoactive bowel sounds, + peg + abd binder  EXT:                         No edema, nontender, No Cyanosis or Clubbing   NEURO:                  Alert, not oriented, not interactive, + contractures   PSYC:                      Calm, no Insight.

## 2019-06-22 NOTE — H&P ADULT - NSHPPHYSICALEXAM_GEN_ALL_CORE
Vital Signs Last 24 Hrs  T(C): 36.7 (22 Jun 2019 00:20), Max: 37.2 (21 Jun 2019 23:14)  T(F): 98 (22 Jun 2019 00:20), Max: 98.9 (21 Jun 2019 23:14)  HR: 133 (22 Jun 2019 00:20) (80 - 133)  BP: 121/67 (22 Jun 2019 00:20) (120/76 - 137/77)  BP(mean): --  RR: 20 (22 Jun 2019 00:20) (16 - 20)  SpO2: 96% (22 Jun 2019 00:20) (96% - 98%) Vital Signs Last 24 Hrs  T(C): 36.7 (22 Jun 2019 00:20), Max: 37.2 (21 Jun 2019 23:14)  T(F): 98 (22 Jun 2019 00:20), Max: 98.9 (21 Jun 2019 23:14)  HR: 133 (22 Jun 2019 00:20) (80 - 133)  BP: 121/67 (22 Jun 2019 00:20) (120/76 - 137/77)  BP(mean): --  RR: 20 (22 Jun 2019 00:20) (16 - 20)  SpO2: 96% (22 Jun 2019 00:20) (96% - 98%)      PHYSICAL EXAM:  GENERAL: NAD, well-groomed  HEAD:  Atraumatic, Normocephalic  EYES: EOMI, PERRLA, conjunctiva and sclera clear  ENMT: Dry mucous membranes  NECK: Supple, No JVD  NERVOUS SYSTEM:  Alert & Oriented X0, Unable to obtain full Neuro exam  CHEST/LUNG: Respirations nonlabored. Poor inspiratory effort: grossly CTAB, No appreciable rales, rhonchi, or wheezing  HEART: Tachycardia + S1 S2; No murmurs, rubs, or gallops  ABDOMEN: Soft, Nontender, +PEG tube no surrounding erythema ; Bowel sounds present  EXTREMITIES:  2+ radial and dorsalis pedis pulses. Right leg in immobilizer. No clubbing or cyanosis,   LYMPH: No lymphadenopathy noted  SKIN: Warm and dry No rashes or lesions

## 2019-06-22 NOTE — CONSULT NOTE ADULT - ASSESSMENT
A/P: This is a 73y Male with severe dementia, nonverbal, bedbound for the past 7 years, who presents to the [facility] ED for dislodged G tube and R distal thigh swelling s/p hearing a crack while changing diaper.  Patient found to have a comminuted/displaced distal femur fracture, no other fractures identified.    - Patient RLE placed in bulky rizvi knee immobilizer  - NWB RLE  - Pain control  - DVT ppx  - Patient is a poor surgical candidate due to baseline functional status  - Will discuss with attending surgeon Dr. Roman and advise if above plan changes A/P: This is a 73y Male with severe dementia, nonverbal, bedbound for the past 7 years, who presents to the [facility] ED for dislodged G tube and R distal thigh swelling s/p hearing a crack while changing diaper.  Patient found to have a comminuted/displaced distal femur fracture, no other fractures identified.    - Patient RLE placed in bulky rizvi knee immobilizer  - NWB RLE  - Pain control  - DVT ppx  - Patient is a poor surgical candidate due to baseline functional status  - FU with Dr. Roman in 1 week

## 2019-06-22 NOTE — PROGRESS NOTE ADULT - ASSESSMENT
peg tube    plan  s/p peg change  axr reviewed, PEG in correct place  feeds to be started as tolerated  daily peg care, monitor residuals    Advanced care planning was discussed with patient and family.  Advanced care planning forms were reviewed and discussed.  Risks, benefits and alternatives of gastroenterologic procedures were discussed in detail and all questions were answered.    30 minutes spent.

## 2019-06-22 NOTE — H&P ADULT - NSHPLABSRESULTS_GEN_ALL_CORE
Personally reviewed labs and noted in detail below. Mild leukocytosis.     Personally reviewed EKG Sinus Tach 139 bpm No ST segment changes    Reviewed imaging:    < from: Xray Femur 2 Views, Right (06.21.19 @ 20:40) >  ******PRELIMINARY REPORT******        INTERPRETATION:  Acute comminuted distal right femur fracture involving   the metadiaphysis with approximately one third shaft width's anterior   displacement of the distal fragment. There is external rotation of the   distal fragment. No acute hip or displaced pelvic fractures.  ******PRELIMINARY REPORT******    < end of copied text >    < from: Xray Chest 1 View AP/PA (06.21.19 @ 20:41) >  ******PRELIMINARY REPORT******        INTERPRETATION:  Small patchy opacity in the right mid lung, new compared   to 9/11/2016 radiograph.  ******PRELIMINARY REPORT******   < end of copied text >    < from: Xray Feeding Tube Check SI (06.21.19 @ 20:41) >  ******PRELIMINARY REPORT******        INTERPRETATION:  Percutaneous tube overlies the stomach. Contrast   opacifies the tube, stomach, and proximal duodenum.  ******PRELIMINARY REPORT******    < end of copied text >    < from: CT Head No Cont (06.21.19 @ 21:55) >  IMPRESSION:     No acute intracranial bleeding, mass effect,or shift.     Advanced central volume loss and extensive chronic microvascular ischemic   changes.  < end of copied text >

## 2019-06-22 NOTE — H&P ADULT - NSICDXPASTSURGICALHX_GEN_ALL_CORE_FT
PAST SURGICAL HISTORY:  History of hand surgery     PEG adjustment, replacement, or removal     Status post insertion of percutaneous endoscopic gastrostomy (PEG) tube PEG replacement on 02/29/16

## 2019-06-22 NOTE — H&P ADULT - PROBLEM SELECTOR PLAN 1
Spoke with Dr. Randolph of ED team: Ortho team consulted and placed into knee immobilizer.   Full Ortho consult pending- Primary day team to f/u  Pain control prn

## 2019-06-22 NOTE — H&P ADULT - PROBLEM SELECTOR PLAN 7
DVT ppx HSQ DVT SCDs for now as patient with thrombocytopenia (Appear chronic as it was 2016) Trend CBC

## 2019-06-22 NOTE — CONSULT NOTE ADULT - ASSESSMENT
74 yo M with PMH severe dementia (AAOx0 s/p severe reaction to Haldol/Ativan) with PEG tube, arthritis, GERD present from home after PEG tube falling out and Right thigh swelling.  Femur fracture  Fever, tachycardia, leukocytosis, AMS  CXR LLL ? pneumonia  UA negative  Overall, fever, leukocytosis, aspiration pneumonia, tachycardia, sepsis  - Vanco 1g q 24 (monitor trough)  - Zosyn 3.375g q 8  - F/U BCX, UCX  - If non-improving, check CT chest; consider CT Abd  - Aspiration precautions  - Ortho follow up for fracture  - Discussed with medicine NP    Roni Luis MD  Pager [phone]  After 5pm and on weekends call [phone]

## 2019-06-22 NOTE — H&P ADULT - ATTENDING COMMENTS
Dr. Ken Hair accepted patient's case from the ED and requested in house hospitalist team to complete admission. Patient was previously unknown to me. Patient was assigned to me by hospitalist in charge. My involvement in this case consisted only of the initial history, physical and management plan. Dr. Hair to assume care in AM of 6/22/19 and thereafter. Case discussed in detail with overnight medicine NP/PA Clarice [phone]

## 2019-06-22 NOTE — H&P ADULT - PROBLEM SELECTOR PLAN 2
GI team replaced PEG in ED  Prelim report of tube check  "Percutaneous tube overlies the stomach. Contrast   opacifies the tube, stomach, and proximal duodenum."   Contacted radiology and recommends lateral view of abdomen to confirm placement : order placed  NPO for now  F/U GI recs

## 2019-06-22 NOTE — CONSULT NOTE ADULT - COMMENTS
[  ] All other systems negative aside from above.  [X] ROS unobtainable because: Poor mental status, yelling intermittently

## 2019-06-22 NOTE — PROGRESS NOTE ADULT - SUBJECTIVE AND OBJECTIVE BOX
CHIEF COMPLAINT:Patient is a 73y old  Male who presents with a chief complaint of PEG tube fall out, Right thigh swelling (22 Jun 2019 02:50)    	        PAST MEDICAL & SURGICAL HISTORY:  Dyspepsia  GERD (gastroesophageal reflux disease)  Arthritis  Dysphagia: peg  Acute renal failure (ARF): ARF 1.5 YEAR AGO  Pneumonia  Dementia  Status post insertion of percutaneous endoscopic gastrostomy (PEG) tube: PEG replacement on 02/29/16  PEG adjustment, replacement, or removal  History of hand surgery          REVIEW OF SYSTEMS:  no apparent cp or sob  no abd pain   appears comfortable     Medications:  MEDICATIONS  (STANDING):  dextrose 5% + sodium chloride 0.9%. 1000 milliLiter(s) (60 mL/Hr) IV Continuous <Continuous>  heparin  Injectable 5000 Unit(s) SubCutaneous every 12 hours  OLANZapine 5 milliGRAM(s) Oral daily  pantoprazole  Injectable 40 milliGRAM(s) IV Push daily  QUEtiapine 200 milliGRAM(s) Oral daily  sucralfate 1 Gram(s) Oral two times a day    MEDICATIONS  (PRN):  acetaminophen  Suppository .. 650 milliGRAM(s) Rectal every 6 hours PRN Mild Pain (1 - 3), Moderate Pain (4 - 6)    	    PHYSICAL EXAM:  T(C): 36.7 (06-22-19 @ 04:49), Max: 37.2 (06-21-19 @ 23:14)  HR: 108 (06-22-19 @ 04:49) (80 - 133)  BP: 121/57 (06-22-19 @ 04:49) (120/58 - 137/77)  RR: 17 (06-22-19 @ 04:49) (16 - 20)  SpO2: 98% (06-22-19 @ 04:49) (96% - 98%)  Wt(kg): --  I&O's Summary    21 Jun 2019 07:01  -  22 Jun 2019 07:00  --------------------------------------------------------  IN: 600 mL / OUT: 0 mL / NET: 600 mL        Appearance: Normal	  HEENT:   Normal oral mucosa, PERRL, 	  Lymphatic: No lymphadenopathy  Cardiovascular: Normal S1 S2, No JVD,   Respiratory: Lungs clear to auscultation	  Gastrointestinal:  Soft, Non-tender, + BS	pegin place  Skin: No rashes, No ecchymoses, No cyanosis	  Neurologic: Non-focal  Extremities: r leg immobilized  Vascular: Peripheral pulses palpable     TELEMETRY: 	    ECG:  	  RADIOLOGY:  OTHER: 	  	  LABS:	 	    CARDIAC MARKERS:                                13.1   12.6  )-----------( 178      ( 22 Jun 2019 06:33 )             38.8     06-22    142  |  106  |  22  ----------------------------<  177<H>  4.4   |  22  |  0.70    Ca    8.8      22 Jun 2019 06:33  Phos  2.6     06-22  Mg     2.1     06-22    TPro  7.4  /  Alb  3.7  /  TBili  0.9  /  DBili  x   /  AST  53<H>  /  ALT  19  /  AlkPhos  120  06-21    proBNP:   Lipid Profile:   HgA1c:   TSH:

## 2019-06-22 NOTE — H&P ADULT - HISTORY OF PRESENT ILLNESS
74 yo man with PMH severe dementia (AAOx0 s/p severe reaction to Haldol/Ativan) with PEG tube, arthritis, GERD present from home after PEG tube falling out and Right thigh swelling. Per wife, states that aide was helping change diaper today and heard a crack in the Right leg. Notice swelling developing later on. Denies any recent fall or trauma. Wife states that patient recently had cough with productive quality and was started on Levaquin outpatient. Cough is improved. No perceived shortness of breath. No fevers, chills, or sweats. No appreciable abdominal pain perceived. No recent diarrhea or constipation. No change of urinary output. 74 yo man with PMH severe dementia (AAOx0 s/p severe reaction to Haldol/Ativan) with PEG tube, arthritis, GERD present from home after PEG tube falling out and Right thigh swelling. Per wife, states that aide was helping change patient's diaper today and heard a crack in the Right leg. Notice swelling developing later on. Denies any recent fall or trauma. Wife states that patient recently had cough with productive quality and was started on Levaquin outpatient. Cough is improved. No perceived shortness of breath. No fevers, chills, or sweats. No appreciable abdominal pain perceived. No recent diarrhea or constipation. No change of urinary output.

## 2019-06-22 NOTE — H&P ADULT - ASSESSMENT
72 yo man with PMH severe dementia (AAOx0 s/p severe reaction to Haldol/Ativan) with PEG tube, arthritis, GERD present from home after PEG tube falling out and Right thigh swelling.

## 2019-06-22 NOTE — CHART NOTE - NSCHARTNOTEFT_GEN_A_CORE
Patient seen and evaluated  Chief Complaint: sepsis evaluation     HPI/Reason for assessment:  74 yo M PMHx severe dementia (AAOx0 s/p severe reaction to Haldol/Ativan) with PEG tube, arthritis, GERD presented from home yesterday after PEG tube falling out. He was noted to have a comminuted/displaced distal femur fracture, which he was placed on knee immobilizer. RRT was called for concern of worsening tachycardia and hypoxia to 80s. He was placed on NRB and prior to RRT he was noted to have temperature of 103 and was given rectal tylenol. Given concern of fever, vancomycin and zosyn were ordered. He was also given 1 L of NS. As he was difficult to obtain access after multiple attempts, VBG was sent, notable for lactate of 5/1. CXR with L lower lobe opacity concerning for pneumonia. Additional access was obtained. His oxygenation improved with suctioning.     Revaluation due to concern for sepsis. Patient alert and awake at time of assessment.       Medications:   dextrose 5% + sodium chloride 0.9%. 1000 milliLiter(s) IV Continuous <Continuous>  heparin  Injectable 5000 Unit(s) SubCutaneous every 8 hours  OLANZapine 5 milliGRAM(s) Oral daily  pantoprazole  Injectable 40 milliGRAM(s) IV Push daily  piperacillin/tazobactam IVPB.. 3.375 Gram(s) IV Intermittent every 8 hours  QUEtiapine 200 milliGRAM(s) Oral daily  sucralfate 1 Gram(s) Oral two times a day  vancomycin  IVPB 1000 milliGRAM(s) IV Intermittent daily      Antibiotics:   piperacillin/tazobactam IVPB.. 3.375 Gram(s) IV Intermittent every 8 hours  vancomycin  IVPB 1000 milliGRAM(s) IV Intermittent daily      Allergies:  benzodiazepines (Other)  Haldol (Other)  No Known Allergies        Review of Systems:  Constitutional: [ ] Fever [ ] Chills [ ] Fatigue [ ] Weight change   HEENT: [ ] Blurred vision [ ] Eye Pain [ ] Headache [ ] Runny nose [ ] Sore Throat   Respiratory: [ ] Cough [ ] Wheezing [ ] Shortness of breath  Cardiovascular: [ ] Chest Pain [ ] Palpitations [ ] HICKEY [ ] PND [ ] Orthopnea  Gastrointestinal: [ ] Abdominal Pain [ ] Diarrhea [ ] Constipation [ ] Hemorrhoids [ ] Nausea [ ] Vomiting  Genitourinary: [ ] Nocturia [ ] Dysuria [ ] Incontinence  Extremities: [ ] Swelling [ ] Joint Pain  Neurologic: [ ] Focal deficit [ ] Paresthesias [ ] Syncope  Lymphatic: [ ] Swelling [ ] Lymphadenopathy   Skin: [ ] Rash [ ] Ecchymoses [ ] Wounds [ ] Lesions  [x]Unable to obtain 2/2 mental status, AxOx0 but awake, moans at times    PHYSICAL EXAM:   Appearance: [x] Normal [ ] NAD  Eyes: [ ] PERRL [ ] EOMI  HENT: [ ] Normal oral muscosa [x]NC/AT, wearing face mask, unable to assess oral mucosa  Cardiovascular: [x] S1 [x] S2 [ ] RRR [ ] No m/r/g [x]No edema [ ] JVP  Procedural Access Site: [ ] No hematoma [ ] Non-tender to palpation [ ] 2+ pulse [ ] No bruit [ ] No Ecchymosis  Respiratory: [ ] coarse BS on L  Gastrointestinal: [x] Soft [x] Non-tender [x] Non-distended [ ] BS+  Musculoskeletal: [ ] No clubbing [ ] No joint deformity   Neurologic: [ ] Non-focal  Lymphatic: [ ] No lymphadenopathy  Psychiatry: [x] AAOx0 [ ] Mood & affect appropriate  Skin: [ ] No rashes [ ] No ecchymoses [ ] No cyanosis    OBJECTIVE EVIDENCE:  T(C): 37.9 (06-22-19 @ 15:37), Max: 39.4 (06-22-19 @ 12:39)  HR: 108 (06-22-19 @ 15:37) (80 - 167)  BP: 107/67 (06-22-19 @ 15:37) (107/67 - 137/77)  RR: 20 (06-22-19 @ 15:37) (16 - 20)  SpO2: 90% (06-22-19 @ 15:37) (86% - 98%)  Wt(kg): --  Drug Dosing Weight  Height (cm): 167.64 (22 Jun 2019 00:20)  Weight (kg): 77.6 (22 Jun 2019 00:20)  BMI (kg/m2): 27.6 (22 Jun 2019 00:20)  BSA (m2): 1.87 (22 Jun 2019 00:20)  Daily Height in cm: 167.64 (22 Jun 2019 00:20)    Daily     Labs:                        13.3   9.5   )-----------( 187      ( 22 Jun 2019 13:23 )             38.5     06-22    145  |  108  |  27<H>  ----------------------------<  187<H>  4.1   |  22  |  1.09    Ca    9.1      22 Jun 2019 13:23  Phos  1.5     06-22  Mg     2.1     06-22    TPro  6.9  /  Alb  3.7  /  TBili  1.3<H>  /  DBili  x   /  AST  21  /  ALT  14  /  AlkPhos  108  06-22    Blood Gas Venous - Lactate: 5.3 mmoL/L <HH> (06-22 @ 15:10)  Blood Gas Venous - Lactate: 5.1 mmoL/L <HH> (06-22 @ 13:25)    PT/INR - ( 22 Jun 2019 06:33 )   PT: 12.2 sec;   INR: 1.07 ratio         PTT - ( 22 Jun 2019 06:33 )  PTT:35.9 sec        Microbiology:    DOES THIS PERSON MEET CRITERIA FOR SEPSIS? [ ]YES    [ ] NO                      If YES continue below    WERE BLOOD CULTURES DRAWN BEFORE ANTIBIOTICS?  [ ]YES     [ ] NO       TIME ORDERED:    WERE ANTIBIOTICS ORDRED? [ ]YES     [ ]NO                                                         TIME ORDERED:    WAS LACTATE ORDERED STAT [ ] YES      [ ]NO                                                      TIME ORDERED:        if lactate >2, please order repeat STAT lactate within 4 hours                     TIME ORDERED:    WAS 30CC/KG OF FLUID ADMINISTERED? []YES    []NO                                               if not, please clearly document clinical rationale here:    PLAN:       IF PATIENT MEETS FOLLOWING CRITERIA, PLEASE RE-ASSESS WITHIN 4 HOURS in RE-ASSESSMENT NOTE FOR SEVERE SEPSIS/SEPTIC SHOCK  •	New or increased oxygen requirement  •	Creatinine >2mg/dL  •	Bilirubin>2mg/dL  •	Platelet <100,00/mm3  •	INR >1.5, PTT>60  •	Lactate >2 Patient seen and evaluated  Chief Complaint: sepsis evaluation     HPI/Reason for assessment:  72 yo M PMHx severe dementia (AAOx0 s/p severe reaction to Haldol/Ativan) with PEG tube, arthritis, GERD presented from home yesterday after PEG tube falling out. He was noted to have a comminuted/displaced distal femur fracture, which he was placed on knee immobilizer. RRT was called for concern of worsening tachycardia and hypoxia to 80s. He was placed on NRB and prior to RRT he was noted to have temperature of 103 and was given rectal tylenol. Given concern of fever, vancomycin and zosyn were ordered. He was also given 1 L of NS. As he was difficult to obtain access after multiple attempts, VBG was sent, notable for lactate of 5/1. CXR with L lower lobe opacity concerning for pneumonia. Additional access was obtained. His oxygenation improved with suctioning.     Revaluation due to concern for sepsis. Patient alert and awake at time of assessment.       Medications:   dextrose 5% + sodium chloride 0.9%. 1000 milliLiter(s) IV Continuous <Continuous>  heparin  Injectable 5000 Unit(s) SubCutaneous every 8 hours  OLANZapine 5 milliGRAM(s) Oral daily  pantoprazole  Injectable 40 milliGRAM(s) IV Push daily  piperacillin/tazobactam IVPB.. 3.375 Gram(s) IV Intermittent every 8 hours  QUEtiapine 200 milliGRAM(s) Oral daily  sucralfate 1 Gram(s) Oral two times a day  vancomycin  IVPB 1000 milliGRAM(s) IV Intermittent daily      Antibiotics:   piperacillin/tazobactam IVPB.. 3.375 Gram(s) IV Intermittent every 8 hours  vancomycin  IVPB 1000 milliGRAM(s) IV Intermittent daily      Allergies:  benzodiazepines (Other)  Haldol (Other)  No Known Allergies        Review of Systems:  Constitutional: [ ] Fever [ ] Chills [ ] Fatigue [ ] Weight change   HEENT: [ ] Blurred vision [ ] Eye Pain [ ] Headache [ ] Runny nose [ ] Sore Throat   Respiratory: [ ] Cough [ ] Wheezing [ ] Shortness of breath  Cardiovascular: [ ] Chest Pain [ ] Palpitations [ ] HICKEY [ ] PND [ ] Orthopnea  Gastrointestinal: [ ] Abdominal Pain [ ] Diarrhea [ ] Constipation [ ] Hemorrhoids [ ] Nausea [ ] Vomiting  Genitourinary: [ ] Nocturia [ ] Dysuria [ ] Incontinence  Extremities: [ ] Swelling [ ] Joint Pain  Neurologic: [ ] Focal deficit [ ] Paresthesias [ ] Syncope  Lymphatic: [ ] Swelling [ ] Lymphadenopathy   Skin: [ ] Rash [ ] Ecchymoses [ ] Wounds [ ] Lesions  [x]Unable to obtain 2/2 mental status, AxOx0 but awake, moans at times    PHYSICAL EXAM:   Appearance: [x] Normal [ ] NAD  Eyes: [ ] PERRL [ ] EOMI  HENT: [ ] Normal oral muscosa [x]NC/AT, wearing face mask, unable to assess oral mucosa  Cardiovascular: [x] S1 [x] S2 [ ] RRR [ ] No m/r/g [x]No edema [ ] JVP  Procedural Access Site: [ ] No hematoma [ ] Non-tender to palpation [ ] 2+ pulse [ ] No bruit [ ] No Ecchymosis  Respiratory: [ ] coarse BS on L  Gastrointestinal: [x] Soft [x] Non-tender [x] Non-distended [ ] BS+  Musculoskeletal: [ ] No clubbing [ ] No joint deformity   Neurologic: [ ] Non-focal  Lymphatic: [ ] No lymphadenopathy  Psychiatry: [x] AAOx0 [ ] Mood & affect appropriate  Skin: [ ] No rashes [ ] No ecchymoses [ ] No cyanosis    Capillary refill improved, remains mildly delayed.    OBJECTIVE EVIDENCE:  T(C): 37.9 (06-22-19 @ 15:37), Max: 39.4 (06-22-19 @ 12:39)  HR: 108 (06-22-19 @ 15:37) (80 - 167)  BP: 107/67 (06-22-19 @ 15:37) (107/67 - 137/77)  RR: 20 (06-22-19 @ 15:37) (16 - 20)  SpO2: 90% (06-22-19 @ 15:37) (86% - 98%)  Wt(kg): --  Drug Dosing Weight  Height (cm): 167.64 (22 Jun 2019 00:20)  Weight (kg): 77.6 (22 Jun 2019 00:20)  BMI (kg/m2): 27.6 (22 Jun 2019 00:20)  BSA (m2): 1.87 (22 Jun 2019 00:20)  Daily Height in cm: 167.64 (22 Jun 2019 00:20)    Daily     Labs:                        13.3   9.5   )-----------( 187      ( 22 Jun 2019 13:23 )             38.5     06-22    145  |  108  |  27<H>  ----------------------------<  187<H>  4.1   |  22  |  1.09    Ca    9.1      22 Jun 2019 13:23  Phos  1.5     06-22  Mg     2.1     06-22    TPro  6.9  /  Alb  3.7  /  TBili  1.3<H>  /  DBili  x   /  AST  21  /  ALT  14  /  AlkPhos  108  06-22    Blood Gas Venous - Lactate: 5.3 mmoL/L <HH> (06-22 @ 15:10)  Blood Gas Venous - Lactate: 5.1 mmoL/L <HH> (06-22 @ 13:25)    PT/INR - ( 22 Jun 2019 06:33 )   PT: 12.2 sec;   INR: 1.07 ratio         PTT - ( 22 Jun 2019 06:33 )  PTT:35.9 sec        Microbiology:    DOES THIS PERSON MEET CRITERIA FOR SEPSIS? [x]YES    [ ] NO                      If YES continue below    WERE BLOOD CULTURES DRAWN BEFORE ANTIBIOTICS?  [ ]YES     [x] NO       TIME ORDERED:  - due to difficulty of obtaining access, both blood cultures and antibiotics were drawn and given at the same time during RRT    WERE ANTIBIOTICS ORDRED? [x]YES     [ ]NO                                                         TIME ORDERED:    WAS LACTATE ORDERED STAT [x] YES      [ ]NO                                                      TIME ORDERED:        if lactate >2, please order repeat STAT lactate within 4 hours                     TIME ORDERED:    WAS 30CC/KG OF FLUID ADMINISTERED? [x]YES    []NO - Received 2L of fluid                                              if not, please clearly document clinical rationale here:    PLAN:     72 yo M PMHx severe dementia (AAOx0 s/p severe reaction to Haldol/Ativan) with PEG tube, arthritis, GERD who presents yesterday with displaced PEG tube and femur fracture hospital course c/b severe sepsis and hypoxic respiratory failure 2/2 possible pneumonia.   - Please also obtain urinalysis and urine culture as part of infectious work-up  - Unclear if opacity in L lower lobe is resolving previous pneumonia or superimposed pneumonia  - Continue broad spectrum abx for now with vancomycin and zosyn  - Continue fluid resuscitation, lactate continues to uptrend (to 5.3), will need repeat lactate and additional fluids if remains elevated  - Continue suctioning of secretions and follow-up sputum culture (if able to be obtained)    Any Caceres MD  Internal Medicine, PGY-3  Pager [phone]      IF PATIENT MEETS FOLLOWING CRITERIA, PLEASE RE-ASSESS WITHIN 4 HOURS in RE-ASSESSMENT NOTE FOR SEVERE SEPSIS/SEPTIC SHOCK  •	New or increased oxygen requirement  •	Creatinine >2mg/dL  •	Bilirubin>2mg/dL  •	Platelet <100,00/mm3  •	INR >1.5, PTT>60  •	Lactate >2

## 2019-06-22 NOTE — CHART NOTE - NSCHARTNOTEFT_GEN_A_CORE
Pt s/p RRT. Followed up lactate is now 5.3. D/w Dr. Caceres (MAR). Will give another bolus NS x 1L and repeat lactate in 2 hours. ID consult appreciated. Continued Vanco/Zosyn. D/w Dr. Base. VS now stable.   Vital Signs Last 24 Hrs  T(C): 37.9 (22 Jun 2019 15:37), Max: 39.4 (22 Jun 2019 12:39)  T(F): 100.3 (22 Jun 2019 15:37), Max: 103 (22 Jun 2019 12:39)  HR: 108 (22 Jun 2019 15:37) (80 - 167)  BP: 107/67 (22 Jun 2019 15:37) (107/67 - 137/77)  BP(mean): --  RR: 20 (22 Jun 2019 15:37) (16 - 20)  SpO2: 90% (22 Jun 2019 15:37) (86% - 98%) Pt s/p RRT. Followed up lactate is now 5.3. D/w Dr. Caceres (MAR). Will give another bolus NS x 1L and repeat lactate in 2 hours. ID consult appreciated. Continued Vanco/Zosyn. D/w Dr. Base. VS now stable.   BP-107/67, , Temp 100.3.

## 2019-06-22 NOTE — PROGRESS NOTE ADULT - SUBJECTIVE AND OBJECTIVE BOX
INTERVAL HPI/OVERNIGHT EVENTS:    s/p PEG change  AXR reviewed, in correct place    MEDICATIONS  (STANDING):  dextrose 5% + sodium chloride 0.9%. 1000 milliLiter(s) (60 mL/Hr) IV Continuous <Continuous>  heparin  Injectable 5000 Unit(s) SubCutaneous every 8 hours  OLANZapine 5 milliGRAM(s) Oral daily  pantoprazole  Injectable 40 milliGRAM(s) IV Push daily  piperacillin/tazobactam IVPB. 3.375 Gram(s) IV Intermittent once  QUEtiapine 200 milliGRAM(s) Oral daily  sodium chloride 0.9% Bolus 1000 milliLiter(s) IV Bolus once  sucralfate 1 Gram(s) Oral two times a day  vancomycin  IVPB 1000 milliGRAM(s) IV Intermittent once    MEDICATIONS  (PRN):  acetaminophen  Suppository .. 650 milliGRAM(s) Rectal every 6 hours PRN Temp greater or equal to 38C (100.4F), Mild Pain (1 - 3), Moderate Pain (4 - 6)      Allergies    No Known Allergies    Intolerances    benzodiazepines (Other)  Haldol (Other)      Review of Systems:  unable to obtain     Vital Signs Last 24 Hrs  T(C): 39.4 (2019 12:53), Max: 39.4 (2019 12:39)  T(F): 103 (2019 12:53), Max: 103 (2019 12:39)  HR: 166 (2019 12:53) (80 - 167)  BP: 127/91 (2019 12:45) (120/58 - 137/77)  BP(mean): --  RR: 20 (2019 12:53) (16 - 20)  SpO2: 86% (2019 12:53) (86% - 98%)    PHYSICAL EXAM:    Constitutional: ill appearing  HEENT: EOMI, throat clear  Neck: No LAD, supple  Respiratory: CTA and P  Cardiovascular: S1 and S2, RRR, no M  Gastrointestinal: BS+, soft, NT/ND, neg HSM, + peg with abdominal binder c/d/i  Extremities: No peripheral edema, neg clubbing, cyanosis  Vascular: 2+ peripheral pulses  Neurological: A/O x 0, no focal deficits  Psychiatric: Normal mood, normal affect  Skin: No rashes      LABS:                        13.3   9.5   )-----------( 187      ( 2019 13:23 )             38.5     -    145  |  108  |  x   ----------------------------<  x   4.1   |  22  |  x     Ca    9.1      2019 13:23  Phos  2.6       Mg     2.1         TPro  x   /  Alb  x   /  TBili  1.3<H>  /  DBili  x   /  AST  x   /  ALT  x   /  AlkPhos  x       PT/INR - ( 2019 06:33 )   PT: 12.2 sec;   INR: 1.07 ratio         PTT - ( 2019 06:33 )  PTT:35.9 sec  Urinalysis Basic - ( 2019 23:39 )    Color: Yellow / Appearance: Clear / S.024 / pH: x  Gluc: x / Ketone: Negative  / Bili: Negative / Urobili: Negative   Blood: x / Protein: 30 mg/dL / Nitrite: Negative   Leuk Esterase: Moderate / RBC: 2 /hpf / WBC 6 /HPF   Sq Epi: x / Non Sq Epi: 2 /hpf / Bacteria: Negative        RADIOLOGY & ADDITIONAL TESTS:

## 2019-06-22 NOTE — CONSULT NOTE ADULT - SUBJECTIVE AND OBJECTIVE BOX
Orthopedic Surgery Consult Note    HPI:   74 yo man with PMH severe dementia (AAOx0, per wife this state is s/p severe reaction to Haldol/Ativan 7 years ago) with PEG tube, arthritis, GERD, presenting to [facility] ED from home for a dislodged PEG tube and R thigh swelling. Patient has a full time aide, and per wife, aide was helping change diaper today and heard a crack in the right leg while abducting his right leg. She then noticed swelling developing later on.  Wife denies any recent fall or trauma.  The patient has been bedbound for the past 7 years, nonverbal, noninteractive, though reacts to some stimuli.     Review of systems: As per HPI, otherwise negative.     PAST MEDICAL & SURGICAL HISTORY:  Dyspepsia  GERD (gastroesophageal reflux disease)  Arthritis  Dysphagia: peg  Acute renal failure (ARF): ARF 1.5 YEAR AGO  Pneumonia  Dementia  Status post insertion of percutaneous endoscopic gastrostomy (PEG) tube: PEG replacement on 02/29/16  PEG adjustment, replacement, or removal  History of hand surgery    Family History: FAMILY HISTORY:  FH: breast cancer: Mother      Medications: MEDICATIONS  (STANDING):  sodium chloride 0.9%. 1000 milliLiter(s) (100 mL/Hr) IV Continuous <Continuous>    MEDICATIONS  (PRN):      T(C): 36.7 (06-22-19 @ 00:20), Max: 37.2 (06-21-19 @ 23:14)  HR: 133 (06-22-19 @ 00:20) (80 - 133)  BP: 121/67 (06-22-19 @ 00:20) (120/76 - 137/77)  RR: 20 (06-22-19 @ 00:20) (16 - 20)  SpO2: 96% (06-22-19 @ 00:20) (96% - 98%)  Wt(kg): --                        14.7   12.4  )-----------( 55       ( 21 Jun 2019 21:49 )             43.2     06-21    141  |  105  |  18  ----------------------------<  173<H>  5.6<H>   |  21<L>  |  0.54    Ca    9.4      21 Jun 2019 21:49    TPro  7.4  /  Alb  3.7  /  TBili  0.9  /  DBili  x   /  AST  53<H>  /  ALT  19  /  AlkPhos  120  06-21      EXAM:  Gen: NAD, noninteractive  Resp: no increased WOB on RA  RLE:  Skin intact, moderate swelling about distal thigh, mild ecchymosis  Tender to palpation about distal femur  R knee bent to 90 degrees and abducted, resistant to attempted extension/adduction  Motor/sensory exam limited due to patient mental status  WWP distally  Does not appear tender to palpation over tibia/hip/ankle    Labs:                          14.7   12.4  )-----------( 55       ( 21 Jun 2019 21:49 )             43.2     06-21    141  |  105  |  18  ----------------------------<  173<H>  5.6<H>   |  21<L>  |  0.54    Ca    9.4      21 Jun 2019 21:49    TPro  7.4  /  Alb  3.7  /  TBili  0.9  /  DBili  x   /  AST  53<H>  /  ALT  19  /  AlkPhos  120  06-21        Imaging      < from: Xray Femur 2 Views, Right (06.21.19 @ 20:40) >  INTERPRETATION:  Acute comminuted distal right femur fracture involving   the metadiaphysis with approximately one third shaft width's anterior   displacement of the distal fragment. There is external rotation of the   distal fragment. No acute hip or displaced pelvic fractures.    < end of copied text >    < from: CT Head No Cont (06.21.19 @ 21:55) >  IMPRESSION:     No acute intracranial bleeding, mass effect,or shift.     Advanced central volume loss and extensive chronic microvascular ischemic   changes.    < end of copied text >

## 2019-06-22 NOTE — H&P ADULT - PROBLEM SELECTOR PLAN 4
Protonix IV CXR with reported Right mid lung opacity and currently on Day 6/7 of Levaquin via G-tube as outpatient  Continue Levaquin IV x1 to complete course inpatient pending G-tube confirmation  Monitor symptoms.

## 2019-06-22 NOTE — CHART NOTE - NSCHARTNOTEFT_GEN_A_CORE
Pt NPO. Awaiting xray results before tube feeds restarted. Discussed xray results s/p peg replacement with Dr. Greene (GI)  abd xray-  Percutaneous tube overlies the stomach. Contrast opacifies the tube,   stomach, and proximal duodenum.   -May use peg tube for meds/feeds as d/w GI.   Wife uses Jevity 4x a day 200cc bolus feeds.   Will restart feeds. Continue to monitor patient. Pt NPO. Awaiting xray results before tube feeds restarted. Discussed xray results s/p peg replacement with Dr. Greene (GI)  abd xray-  Percutaneous tube overlies the stomach. Contrast opacifies the tube,   stomach, and proximal duodenum.   -May use peg tube for meds/feeds as d/w GI.   Wife uses Jevity 4x a day 200cc bolus feeds.   Will restart feeds. Continue to monitor patient. D/w Dr. Hair.

## 2019-06-22 NOTE — CONSULT NOTE ADULT - SUBJECTIVE AND OBJECTIVE BOX
"HPI: 74 yo man with PMH severe dementia (AAOx0 s/p severe reaction to Haldol/Ativan) with PEG tube, arthritis, GERD present from home after PEG tube falling out and Right thigh swelling. Per wife, states that aide was helping change patient's diaper today and heard a crack in the Right leg. Notice swelling developing later on. Denies any recent fall or trauma. Wife states that patient recently had cough with productive quality and was started on Levaquin outpatient. Cough is improved. No perceived shortness of breath. No fevers, chills, or sweats. No appreciable abdominal pain perceived. No recent diarrhea or constipation. No change of urinary output. (2019 01:24)"    Above reviewed. Saw/spoke to patient. Family at bedside. Patient brought to hospital because patient was being moved and heard popping noise on moving patient. Patient found to have episode of fracture. PEG also dislodged at home. Patient was on ? levaquin as outpatient for cough. Here, patient was well but then earlier today had episode of high fever and tachycardia. RRT was called, and patient started on Vanco/Zosyn. ID called for further eval.    PAST MEDICAL & SURGICAL HISTORY:  Dyspepsia  GERD (gastroesophageal reflux disease)  Arthritis  Dysphagia: peg  Acute renal failure (ARF): ARF 1.5 YEAR AGO  Pneumonia  Dementia  Status post insertion of percutaneous endoscopic gastrostomy (PEG) tube: PEG replacement on 16  PEG adjustment, replacement, or removal  History of hand surgery    Allergies    No Known Allergies    Intolerances    benzodiazepines (Other)  Haldol (Other)    ANTIMICROBIALS:  vancomycin  IVPB 1000 once    OTHER MEDS:  acetaminophen  Suppository .. 650 milliGRAM(s) Rectal every 6 hours PRN  dextrose 5% + sodium chloride 0.9%. 1000 milliLiter(s) IV Continuous <Continuous>  heparin  Injectable 5000 Unit(s) SubCutaneous every 8 hours  OLANZapine 5 milliGRAM(s) Oral daily  pantoprazole  Injectable 40 milliGRAM(s) IV Push daily  potassium phosphate IVPB 15 milliMole(s) IV Intermittent once  QUEtiapine 200 milliGRAM(s) Oral daily  sodium chloride 0.9% Bolus 1000 milliLiter(s) IV Bolus once  sucralfate 1 Gram(s) Oral two times a day    SOCIAL HISTORY: No tobacco, no alcohol, no illicit drugs    FAMILY HISTORY:  FH: breast cancer: Mother    Drug Dosing Weight  Height (cm): 167.64 (2019 00:20)  Weight (kg): 77.6 (2019 00:20)  BMI (kg/m2): 27.6 (2019 00:20)  BSA (m2): 1.87 (2019 00:20)    PE:    Vital Signs Last 24 Hrs  T(C): 37.9 (2019 15:37), Max: 39.4 (2019 12:39)  T(F): 100.3 (2019 15:37), Max: 103 (2019 12:39)  HR: 108 (2019 15:37) (80 - 167)  BP: 107/67 (2019 15:37) (107/67 - 137/77)  RR: 20 (2019 15:37) (16 - 20)  SpO2: 90% (2019 15:37) (86% - 98%)    Gen: AOx0-1, patient poorly verbal, yells intermittently  CV: S1+S2 normal, tachycardic  Resp: Clear bilat, no resp distress, no crackles/wheezes  Abd: Soft, nontender, +BS, PEG  Ext: RLE brace in place  : No suprapubic tenderness  IV/Skin: No thrombophlebitis, no rash  Msk: No low back pain, no arthralgias, no joint swelling  Neuro: Cannot participate with exam    LABS:                        13.3   9.5   )-----------( 187      ( 2019 13:23 )             38.5     06-22    145  |  108  |  27<H>  ----------------------------<  187<H>  4.1   |  22  |  1.09    Ca    9.1      2019 13:23  Phos  1.5     06-22  Mg     2.1     06-22    TPro  6.9  /  Alb  3.7  /  TBili  1.3<H>  /  DBili  x   /  AST  21  /  ALT  14  /  AlkPhos  108  06-22    Urinalysis Basic - ( 2019 23:39 )    Color: Yellow / Appearance: Clear / S.024 / pH: x  Gluc: x / Ketone: Negative  / Bili: Negative / Urobili: Negative   Blood: x / Protein: 30 mg/dL / Nitrite: Negative   Leuk Esterase: Moderate / RBC: 2 /hpf / WBC 6 /HPF   Sq Epi: x / Non Sq Epi: 2 /hpf / Bacteria: Negative    MICROBIOLOGY:    No new available    RADIOLOGY:     XR:    IMPRESSION:     Acute comminuted distal right femur fracture involving the metadiaphysis   and distal diaphysis with approximately one third shaft width's anterior   displacement of the distal fragment. There is external rotation of the   distal fragment. Evaluation for intra-articular extension is limited due   to obliquity on the radiographs.  No acute hip or displaced pelvic   fractures.      XR:    FINDINGS:   Percutaneous tube overlies the stomach. Contrast opacifies the tube,   stomach, and proximal duodenum.   Bowel gas and stool are identified throughout the colon.  The visualized osseous structures are unremarkable for age.     IMPRESSION:     Percutaneous tube overlies the stomach. Contrast opacifies the tube,   stomach, and proximal duodenum.      CXR:    FINDINGS:   The heart size is normal.   Left lower lung opacity. There are no pleural effusions or pneumothorax.  Degenerative changes of the thoracic spine.    IMPRESSION:   Left lower lung opacity which may represent pneumonia in the correct   clinical context.

## 2019-06-22 NOTE — H&P ADULT - NSICDXPASTMEDICALHX_GEN_ALL_CORE_FT
PAST MEDICAL HISTORY:  Acute renal failure (ARF) ARF 1.5 YEAR AGO    Arthritis     Dementia     Dyspepsia     Dysphagia peg    GERD (gastroesophageal reflux disease)     Pneumonia

## 2019-06-23 LAB
ANION GAP SERPL CALC-SCNC: 11 MMOL/L — SIGNIFICANT CHANGE UP (ref 5–17)
BUN SERPL-MCNC: 24 MG/DL — HIGH (ref 7–23)
CALCIUM SERPL-MCNC: 8.3 MG/DL — LOW (ref 8.4–10.5)
CHLORIDE SERPL-SCNC: 113 MMOL/L — HIGH (ref 96–108)
CO2 SERPL-SCNC: 20 MMOL/L — LOW (ref 22–31)
CREAT SERPL-MCNC: 0.74 MG/DL — SIGNIFICANT CHANGE UP (ref 0.5–1.3)
GLUCOSE BLDC GLUCOMTR-MCNC: 130 MG/DL — HIGH (ref 70–99)
GLUCOSE SERPL-MCNC: 141 MG/DL — HIGH (ref 70–99)
HCT VFR BLD CALC: 26.9 % — LOW (ref 39–50)
HCT VFR BLD CALC: 27.8 % — LOW (ref 39–50)
HGB BLD-MCNC: 9 G/DL — LOW (ref 13–17)
HGB BLD-MCNC: 9.3 G/DL — LOW (ref 13–17)
LACTATE SERPL-SCNC: 2.3 MMOL/L — HIGH (ref 0.7–2)
LACTATE SERPL-SCNC: 2.7 MMOL/L — HIGH (ref 0.7–2)
MCHC RBC-ENTMCNC: 32.4 GM/DL — SIGNIFICANT CHANGE UP (ref 32–36)
MCHC RBC-ENTMCNC: 33.1 PG — SIGNIFICANT CHANGE UP (ref 27–34)
MCHC RBC-ENTMCNC: 34.7 GM/DL — SIGNIFICANT CHANGE UP (ref 32–36)
MCHC RBC-ENTMCNC: 35 PG — HIGH (ref 27–34)
MCV RBC AUTO: 101 FL — HIGH (ref 80–100)
MCV RBC AUTO: 102.2 FL — HIGH (ref 80–100)
PHOSPHATE SERPL-MCNC: 1.9 MG/DL — LOW (ref 2.5–4.5)
PLATELET # BLD AUTO: 122 K/UL — LOW (ref 150–400)
PLATELET # BLD AUTO: 132 K/UL — LOW (ref 150–400)
POTASSIUM SERPL-MCNC: 4.7 MMOL/L — SIGNIFICANT CHANGE UP (ref 3.5–5.3)
POTASSIUM SERPL-SCNC: 4.7 MMOL/L — SIGNIFICANT CHANGE UP (ref 3.5–5.3)
RBC # BLD: 2.67 M/UL — LOW (ref 4.2–5.8)
RBC # BLD: 2.72 M/UL — LOW (ref 4.2–5.8)
RBC # FLD: 12.4 % — SIGNIFICANT CHANGE UP (ref 10.3–14.5)
RBC # FLD: 13.4 % — SIGNIFICANT CHANGE UP (ref 10.3–14.5)
SODIUM SERPL-SCNC: 144 MMOL/L — SIGNIFICANT CHANGE UP (ref 135–145)
WBC # BLD: 18.22 K/UL — HIGH (ref 3.8–10.5)
WBC # BLD: 18.5 K/UL — HIGH (ref 3.8–10.5)
WBC # FLD AUTO: 18.22 K/UL — HIGH (ref 3.8–10.5)
WBC # FLD AUTO: 18.5 K/UL — HIGH (ref 3.8–10.5)

## 2019-06-23 PROCEDURE — 99232 SBSQ HOSP IP/OBS MODERATE 35: CPT

## 2019-06-23 RX ORDER — SODIUM CHLORIDE 9 MG/ML
500 INJECTION INTRAMUSCULAR; INTRAVENOUS; SUBCUTANEOUS ONCE
Refills: 0 | Status: COMPLETED | OUTPATIENT
Start: 2019-06-23 | End: 2019-06-23

## 2019-06-23 RX ORDER — SODIUM CHLORIDE 9 MG/ML
500 INJECTION INTRAMUSCULAR; INTRAVENOUS; SUBCUTANEOUS ONCE
Refills: 0 | Status: DISCONTINUED | OUTPATIENT
Start: 2019-06-23 | End: 2019-06-23

## 2019-06-23 RX ADMIN — HEPARIN SODIUM 5000 UNIT(S): 5000 INJECTION INTRAVENOUS; SUBCUTANEOUS at 21:50

## 2019-06-23 RX ADMIN — HEPARIN SODIUM 5000 UNIT(S): 5000 INJECTION INTRAVENOUS; SUBCUTANEOUS at 13:04

## 2019-06-23 RX ADMIN — Medication 250 MILLIGRAM(S): at 12:08

## 2019-06-23 RX ADMIN — QUETIAPINE FUMARATE 200 MILLIGRAM(S): 200 TABLET, FILM COATED ORAL at 20:34

## 2019-06-23 RX ADMIN — OLANZAPINE 5 MILLIGRAM(S): 15 TABLET, FILM COATED ORAL at 13:04

## 2019-06-23 RX ADMIN — Medication 1 GRAM(S): at 07:55

## 2019-06-23 RX ADMIN — SODIUM CHLORIDE 250 MILLILITER(S): 9 INJECTION INTRAMUSCULAR; INTRAVENOUS; SUBCUTANEOUS at 00:53

## 2019-06-23 RX ADMIN — Medication 650 MILLIGRAM(S): at 20:34

## 2019-06-23 RX ADMIN — PANTOPRAZOLE SODIUM 40 MILLIGRAM(S): 20 TABLET, DELAYED RELEASE ORAL at 12:08

## 2019-06-23 RX ADMIN — PIPERACILLIN AND TAZOBACTAM 25 GRAM(S): 4; .5 INJECTION, POWDER, LYOPHILIZED, FOR SOLUTION INTRAVENOUS at 14:28

## 2019-06-23 RX ADMIN — PIPERACILLIN AND TAZOBACTAM 25 GRAM(S): 4; .5 INJECTION, POWDER, LYOPHILIZED, FOR SOLUTION INTRAVENOUS at 06:21

## 2019-06-23 RX ADMIN — HEPARIN SODIUM 5000 UNIT(S): 5000 INJECTION INTRAVENOUS; SUBCUTANEOUS at 06:21

## 2019-06-23 RX ADMIN — Medication 650 MILLIGRAM(S): at 21:36

## 2019-06-23 RX ADMIN — PIPERACILLIN AND TAZOBACTAM 25 GRAM(S): 4; .5 INJECTION, POWDER, LYOPHILIZED, FOR SOLUTION INTRAVENOUS at 21:50

## 2019-06-23 RX ADMIN — Medication 1 GRAM(S): at 20:34

## 2019-06-23 NOTE — PROGRESS NOTE ADULT - SUBJECTIVE AND OBJECTIVE BOX
CC: F/U for Fever    Saw/spoke to patient. Fevers yesterday evening. No new complaints today but remains altered, ? at baseline. Wife at bedside.    Allergies  No Known Allergies    ANTIMICROBIALS:  piperacillin/tazobactam IVPB.. 3.375 every 8 hours  vancomycin  IVPB 1000 daily    PE:    Vital Signs Last 24 Hrs  T(C): 37.1 (2019 05:06), Max: 39.4 (2019 12:39)  T(F): 98.8 (2019 05:06), Max: 103 (2019 12:39)  HR: 89 (2019 05:06) (76 - 167)  BP: 105/66 (2019 05:06) (100/72 - 127/91)  RR: 18 (2019 05:06) (18 - 20)  SpO2: 97% (2019 05:06) (86% - 97%)    Gen: AOx1, NAD, poorly communciative  CV: S1+S2 normal, nontachycardic  Resp: Clear bilat, no resp distress, no crackles/wheezes  Abd: Soft, nontender, +BS, PEG  Ext: No LE edema, no wounds    LABS:                        13.3   9.5   )-----------( 187      ( 2019 13:23 )             38.5     -23    144  |  113<H>  |  24<H>  ----------------------------<  141<H>  4.7   |  20<L>  |  0.74    Ca    8.3<L>      2019 04:50  Phos  1.9       Mg     2.1         TPro  6.9  /  Alb  3.7  /  TBili  1.3<H>  /  DBili  x   /  AST  21  /  ALT  14  /  AlkPhos  108  -22    Urinalysis Basic - ( 2019 23:39 )    Color: Yellow / Appearance: Clear / S.024 / pH: x  Gluc: x / Ketone: Negative  / Bili: Negative / Urobili: Negative   Blood: x / Protein: 30 mg/dL / Nitrite: Negative   Leuk Esterase: Moderate / RBC: 2 /hpf / WBC 6 /HPF   Sq Epi: x / Non Sq Epi: 2 /hpf / Bacteria: Negative    MICROBIOLOGY:    .Urine  19   No growth     RADIOLOGY:     CXR:    IMPRESSION:   Left lower lung opacity which may represent pneumonia in the correct   clinical context.

## 2019-06-23 NOTE — PROGRESS NOTE ADULT - SUBJECTIVE AND OBJECTIVE BOX
Patient is a 73y old  Male who presents with a chief complaint of PEG tube fall out, Right thigh swelling (2019 08:04)    Coverage for Dr. Ken Hair   SUBJECTIVE / OVERNIGHT EVENTS: lethargic  Review of Systems  unobtainable     MEDICATIONS  (STANDING):  dextrose 5% + sodium chloride 0.9%. 1000 milliLiter(s) (60 mL/Hr) IV Continuous <Continuous>  heparin  Injectable 5000 Unit(s) SubCutaneous every 8 hours  OLANZapine 5 milliGRAM(s) Oral daily  pantoprazole  Injectable 40 milliGRAM(s) IV Push daily  piperacillin/tazobactam IVPB.. 3.375 Gram(s) IV Intermittent every 8 hours  QUEtiapine 200 milliGRAM(s) Oral daily  sucralfate 1 Gram(s) Oral two times a day  vancomycin  IVPB 1000 milliGRAM(s) IV Intermittent daily    MEDICATIONS  (PRN):  acetaminophen  Suppository .. 650 milliGRAM(s) Rectal every 6 hours PRN Temp greater or equal to 38C (100.4F), Mild Pain (1 - 3), Moderate Pain (4 - 6)      Vital Signs Last 24 Hrs  T(C): 36.6 (2019 12:03), Max: 39.4 (2019 12:39)  T(F): 97.8 (2019 12:03), Max: 103 (2019 12:39)  HR: 98 (2019 12:03) (76 - 167)  BP: 98/68 (2019 12:03) (98/68 - 127/91)  BP(mean): --  RR: 18 (2019 12:03) (18 - 20)  SpO2: 98% (2019 12:03) (86% - 98%)    PHYSICAL EXAM:  GENERAL: NAD  HEAD:  Atraumatic, Normocephalic  EYES: EOMI, PERRLA, conjunctiva and sclera clear  NECK: Supple, No JVD  CHEST/LUNG: Clear to auscultation bilaterally; No wheeze  HEART: Regular rate and rhythm; No murmurs, rubs, or gallops  ABDOMEN: Soft, Nontender, Nondistended; Bowel sounds present PEG  EXTREMITIES:  R leg in immobilizer  NEUROLOGY: non-focal  SKIN: No rashes or lesions    LABS:                        13.3   9.5   )-----------( 187      ( 2019 13:23 )             38.5     -    144  |  113<H>  |  24<H>  ----------------------------<  141<H>  4.7   |  20<L>  |  0.74    Ca    8.3<L>      2019 04:50  Phos  1.9       Mg     2.1         TPro  6.9  /  Alb  3.7  /  TBili  1.3<H>  /  DBili  x   /  AST  21  /  ALT  14  /  AlkPhos  108      PT/INR - ( 2019 06:33 )   PT: 12.2 sec;   INR: 1.07 ratio         PTT - ( 2019 06:33 )  PTT:35.9 sec      Urinalysis Basic - ( 2019 23:39 )    Color: Yellow / Appearance: Clear / S.024 / pH: x  Gluc: x / Ketone: Negative  / Bili: Negative / Urobili: Negative   Blood: x / Protein: 30 mg/dL / Nitrite: Negative   Leuk Esterase: Moderate / RBC: 2 /hpf / WBC 6 /HPF   Sq Epi: x / Non Sq Epi: 2 /hpf / Bacteria: Negative        Culture - Urine (collected 2019 01:26)  Source: .Urine  Final Report (2019 20:56):    No growth        RADIOLOGY & ADDITIONAL TESTS:    Imaging Personally Reviewed:    Consultant(s) Notes Reviewed:      Care Discussed with Consultants/Other Providers:

## 2019-06-23 NOTE — CONSULT NOTE ADULT - SUBJECTIVE AND OBJECTIVE BOX
CHIEF COMPLAINT:    HPI:    74 yo M PMHx severe dementia (AAOx0 s/p severe reaction to Haldol/Ativan) with PEG tube, arthritis, GERD presented from home yesterday after PEG tube falling out. He was noted to have a comminuted/displaced distal femur fracture, which he was placed on knee immobilizer. RRT was called for concern of worsening tachycardia and hypoxia to 80s. He was placed on NRB and prior to RRT he was noted to have temperature of 103 and was given rectal tylenol. Given concern of fever, vancomycin and zosyn were ordered. He was also given 1 L of NS. As he was difficult to obtain access after multiple attempts, VBG was sent, notable for lactate of 5/1. CXR with L lower lobe opacity concerning for pneumonia. Additional access was obtained. His oxygenation improved with suctioning.     Unable to obtain history from pt, resp status has improved      PAST MEDICAL & SURGICAL HISTORY:  Dyspepsia  GERD (gastroesophageal reflux disease)  Arthritis  Dysphagia: peg  Acute renal failure (ARF): ARF 1.5 YEAR AGO  Pneumonia  Dementia  Status post insertion of percutaneous endoscopic gastrostomy (PEG) tube: PEG replacement on 02/29/16  PEG adjustment, replacement, or removal  History of hand surgery          PREVIOUS DIAGNOSTIC TESTING:    [ ] Echocardiogram:  [ ]  Catheterization:  [ ] Stress Test:  	    MEDICATIONS:  MEDICATIONS  (STANDING):  dextrose 5% + sodium chloride 0.9%. 1000 milliLiter(s) (60 mL/Hr) IV Continuous <Continuous>  heparin  Injectable 5000 Unit(s) SubCutaneous every 8 hours  OLANZapine 5 milliGRAM(s) Oral daily  pantoprazole  Injectable 40 milliGRAM(s) IV Push daily  piperacillin/tazobactam IVPB.. 3.375 Gram(s) IV Intermittent every 8 hours  QUEtiapine 200 milliGRAM(s) Oral daily  sucralfate 1 Gram(s) Oral two times a day  vancomycin  IVPB 1000 milliGRAM(s) IV Intermittent daily      FAMILY HISTORY:  FH: breast cancer: Mother      SOCIAL HISTORY:    [ ] Non-smoker  [ ] Smoker  [ ] Alcohol    Allergies    No Known Allergies    Intolerances    benzodiazepines (Other)  Haldol (Other)  	    REVIEW OF SYSTEMS: unable    	    [ ] All others negative	  [ ] Unable to obtain    PHYSICAL EXAM:  T(C): 37.1 (06-23-19 @ 05:06), Max: 39.4 (06-22-19 @ 12:39)  HR: 89 (06-23-19 @ 05:06) (76 - 167)  BP: 105/66 (06-23-19 @ 05:06) (100/72 - 127/91)  RR: 18 (06-23-19 @ 05:06) (18 - 20)  SpO2: 97% (06-23-19 @ 05:06) (86% - 97%)  Wt(kg): --  I&O's Summary    22 Jun 2019 07:01  -  23 Jun 2019 07:00  --------------------------------------------------------  IN: 2700 mL / OUT: 0 mL / NET: 2700 mL        Appearance: Normal	  Psychiatry: A & O x 1  HEENT:   Normal oral mucosa, PERRL, EOMI	  Lymphatic: No lymphadenopathy  Cardiovascular: Normal S1 S2,RRR, + murmurs  Respiratory: coarse rhonchi	  Gastrointestinal:  Soft, Non-tender, + BS	  Skin: No rashes, No ecchymoses, No cyanosis	  Neurologic: Non-focal  Extremities: Normal range of motion, No clubbing, cyanosis or edema  Vascular: Peripheral pulses palpable 2+ bilaterally    TELEMETRY: 	    ECG:  	  RADIOLOGY:  OTHER: 	  	  LABS:	 	    CARDIAC MARKERS:                                  13.3   9.5   )-----------( 187      ( 22 Jun 2019 13:23 )             38.5     06-23    144  |  113<H>  |  24<H>  ----------------------------<  141<H>  4.7   |  20<L>  |  0.74    Ca    8.3<L>      23 Jun 2019 04:50  Phos  1.9     06-23  Mg     2.1     06-22    TPro  6.9  /  Alb  3.7  /  TBili  1.3<H>  /  DBili  x   /  AST  21  /  ALT  14  /  AlkPhos  108  06-22    PT/INR - ( 22 Jun 2019 06:33 )   PT: 12.2 sec;   INR: 1.07 ratio         PTT - ( 22 Jun 2019 06:33 )  PTT:35.9 sec  proBNP:   Lipid Profile:   HgA1c:   TSH:     ASSESSMENT/PLAN:

## 2019-06-23 NOTE — PROGRESS NOTE ADULT - ASSESSMENT
peg tube    plan  s/p peg change  axr reviewed, PEG in correct place  feeds started  daily peg care, monitor residuals

## 2019-06-23 NOTE — CONSULT NOTE ADULT - REASON FOR ADMISSION
PEG tube fall out, Right thigh swelling

## 2019-06-23 NOTE — PROGRESS NOTE ADULT - ASSESSMENT
74 yo M with PMH severe dementia (AAOx0 s/p severe reaction to Haldol/Ativan) with PEG tube, arthritis, GERD present from home after PEG tube falling out and Right thigh swelling.  Femur fracture  Fever, tachycardia, leukocytosis, AMS  CXR LLL ? pneumonia  UA negative  Still fevers overnight, improving this AM, leukocytosis improved, less tachy  Overall, fever, leukocytosis, aspiration pneumonia, tachycardia, sepsis  - Vanco 1g q 24 (monitor trough)  - Zosyn 3.375g q 8  - F/U BCX, UCX  - If non-improving, check CT chest; consider CT Abd  - Aspiration precautions  - Ortho follow up for fracture    Roni Luis MD  Pager [phone]  After 5pm and on weekends call [phone]

## 2019-06-23 NOTE — CONSULT NOTE ADULT - ASSESSMENT
1. Hypoxic Resp Failure  2. LLL PNA-Sepsis  3. Femur FX  4. Advanced dementia  5. Murmur  6. PEG dislodgement      -cont abx/O2 support per pulm  -low suspicion for CHF  -ECHO to eval murmur  -abx  -F/U cultures  -DVT ppx

## 2019-06-23 NOTE — PROGRESS NOTE ADULT - ASSESSMENT
74 yo male  with PMH severe dementia () with PEG tube, arthritis, GERD present from home after PEG tube falling out and Right thigh swelling.       ·  Problem: Closed fracture of distal end of right femur, unspecified fracture morphology, initial encounter.   : Ortho team consulted and placed into knee immobilizer.   ortho f/u for further recs   Pain control prn.     ·  Problem: PEG (percutaneous endoscopic gastrostomy) adjustment/replacement/removal.  : GI team replaced PEG in ED  Prelim report of tube check  "Percutaneous tube overlies the stomach. Contrast   opacifies the tube, stomach, and proximal duodenum."   resume dfeeds   F/U GI recs.       ·  Problem: Tachycardia.  improved  Patient has not had intake of tube feeds or free water after PEG tube nor IV fluids  iv fluids         : Pneumonia.  Plan: CXR with reported Right mid lung opacity   s/p tx   Monitor symptoms.       ·  Problem: GERD (gastroesophageal reflux disease).    ppi      Problem: Dementia. Plan: Resume Seroquel       ·  Problem: Prophylactic measure.    : DVT SCD      ·  Problem: Medication management.     continue present Rx  Bogdan Jackson MD pager [phone]

## 2019-06-23 NOTE — PROGRESS NOTE ADULT - SUBJECTIVE AND OBJECTIVE BOX
INTERVAL HPI/OVERNIGHT EVENTS:    pt seen and examined. Non- verbal  tolerating peg feeds as per RN  no leakage/drainage at peg site     MEDICATIONS  (STANDING):  dextrose 5% + sodium chloride 0.9%. 1000 milliLiter(s) (60 mL/Hr) IV Continuous <Continuous>  heparin  Injectable 5000 Unit(s) SubCutaneous every 8 hours  OLANZapine 5 milliGRAM(s) Oral daily  pantoprazole  Injectable 40 milliGRAM(s) IV Push daily  piperacillin/tazobactam IVPB.. 3.375 Gram(s) IV Intermittent every 8 hours  QUEtiapine 200 milliGRAM(s) Oral daily  sucralfate 1 Gram(s) Oral two times a day  vancomycin  IVPB 1000 milliGRAM(s) IV Intermittent daily    MEDICATIONS  (PRN):  acetaminophen  Suppository .. 650 milliGRAM(s) Rectal every 6 hours PRN Temp greater or equal to 38C (100.4F), Mild Pain (1 - 3), Moderate Pain (4 - 6)      Allergies    No Known Allergies    Intolerances    benzodiazepines (Other)  Haldol (Other)      Review of Systems: unable to obtain       Vital Signs Last 24 Hrs  T(C): 36.6 (2019 12:03), Max: 39.1 (2019 14:30)  T(F): 97.8 (2019 12:03), Max: 102.3 (2019 14:30)  HR: 98 (2019 12:03) (76 - 109)  BP: 98/68 (2019 12:03) (98/68 - 115/58)  BP(mean): --  RR: 18 (2019 12:03) (18 - 20)  SpO2: 98% (2019 12:03) (90% - 98%)    PHYSICAL EXAM:    Constitutional: ill appearing  HEENT: EOMI, throat clear  Neck: No LAD, supple  Respiratory: CTA and P  Cardiovascular: S1 and S2, RRR, no M  Gastrointestinal: BS+, soft, NT/ND, neg HSM, + peg with abdominal binder c/d/i  Extremities: No peripheral edema, neg clubbing, cyanosis  Vascular: 2+ peripheral pulses  Neurological: A/O x 0, no focal deficits  Psychiatric: Normal mood, normal affect  Skin: No rashes    LABS:                        13.3   9.5   )-----------( 187      ( 2019 13:23 )             38.5         144  |  113<H>  |  24<H>  ----------------------------<  141<H>  4.7   |  20<L>  |  0.74    Ca    8.3<L>      2019 04:50  Phos  1.9       Mg     2.1         TPro  6.9  /  Alb  3.7  /  TBili  1.3<H>  /  DBili  x   /  AST  21  /  ALT  14  /  AlkPhos  108      PT/INR - ( 2019 06:33 )   PT: 12.2 sec;   INR: 1.07 ratio         PTT - ( 2019 06:33 )  PTT:35.9 sec  Urinalysis Basic - ( 2019 23:39 )    Color: Yellow / Appearance: Clear / S.024 / pH: x  Gluc: x / Ketone: Negative  / Bili: Negative / Urobili: Negative   Blood: x / Protein: 30 mg/dL / Nitrite: Negative   Leuk Esterase: Moderate / RBC: 2 /hpf / WBC 6 /HPF   Sq Epi: x / Non Sq Epi: 2 /hpf / Bacteria: Negative        RADIOLOGY & ADDITIONAL TESTS:

## 2019-06-24 DIAGNOSIS — A41.9 SEPSIS, UNSPECIFIED ORGANISM: ICD-10-CM

## 2019-06-24 DIAGNOSIS — Z71.89 OTHER SPECIFIED COUNSELING: ICD-10-CM

## 2019-06-24 DIAGNOSIS — J69.0 PNEUMONITIS DUE TO INHALATION OF FOOD AND VOMIT: ICD-10-CM

## 2019-06-24 LAB
-  COAGULASE NEGATIVE STAPHYLOCOCCUS: SIGNIFICANT CHANGE UP
ANION GAP SERPL CALC-SCNC: 10 MMOL/L — SIGNIFICANT CHANGE UP (ref 5–17)
BUN SERPL-MCNC: 20 MG/DL — SIGNIFICANT CHANGE UP (ref 7–23)
CALCIUM SERPL-MCNC: 8.1 MG/DL — LOW (ref 8.4–10.5)
CHLORIDE SERPL-SCNC: 109 MMOL/L — HIGH (ref 96–108)
CO2 SERPL-SCNC: 24 MMOL/L — SIGNIFICANT CHANGE UP (ref 22–31)
CREAT SERPL-MCNC: 0.6 MG/DL — SIGNIFICANT CHANGE UP (ref 0.5–1.3)
CULTURE RESULTS: SIGNIFICANT CHANGE UP
GLUCOSE SERPL-MCNC: 210 MG/DL — HIGH (ref 70–99)
GRAM STN FLD: SIGNIFICANT CHANGE UP
HCT VFR BLD CALC: 23.4 % — LOW (ref 39–50)
HCV AB S/CO SERPL IA: 0.14 S/CO — SIGNIFICANT CHANGE UP (ref 0–0.99)
HCV AB SERPL-IMP: SIGNIFICANT CHANGE UP
HGB BLD-MCNC: 7.7 G/DL — LOW (ref 13–17)
LACTATE SERPL-SCNC: 2.1 MMOL/L — HIGH (ref 0.7–2)
MAGNESIUM SERPL-MCNC: 2 MG/DL — SIGNIFICANT CHANGE UP (ref 1.6–2.6)
MCHC RBC-ENTMCNC: 32.9 GM/DL — SIGNIFICANT CHANGE UP (ref 32–36)
MCHC RBC-ENTMCNC: 32.9 PG — SIGNIFICANT CHANGE UP (ref 27–34)
MCV RBC AUTO: 100 FL — SIGNIFICANT CHANGE UP (ref 80–100)
METHOD TYPE: SIGNIFICANT CHANGE UP
ORGANISM # SPEC MICROSCOPIC CNT: SIGNIFICANT CHANGE UP
ORGANISM # SPEC MICROSCOPIC CNT: SIGNIFICANT CHANGE UP
PHOSPHATE SERPL-MCNC: 1.1 MG/DL — LOW (ref 2.5–4.5)
PLATELET # BLD AUTO: 132 K/UL — LOW (ref 150–400)
POTASSIUM SERPL-MCNC: 3.8 MMOL/L — SIGNIFICANT CHANGE UP (ref 3.5–5.3)
POTASSIUM SERPL-SCNC: 3.8 MMOL/L — SIGNIFICANT CHANGE UP (ref 3.5–5.3)
RBC # BLD: 2.34 M/UL — LOW (ref 4.2–5.8)
RBC # FLD: 13.6 % — SIGNIFICANT CHANGE UP (ref 10.3–14.5)
SODIUM SERPL-SCNC: 143 MMOL/L — SIGNIFICANT CHANGE UP (ref 135–145)
SPECIMEN SOURCE: SIGNIFICANT CHANGE UP
VANCOMYCIN TROUGH SERPL-MCNC: 4.2 UG/ML — LOW (ref 10–20)
WBC # BLD: 13.16 K/UL — HIGH (ref 3.8–10.5)
WBC # FLD AUTO: 13.16 K/UL — HIGH (ref 3.8–10.5)

## 2019-06-24 PROCEDURE — 99232 SBSQ HOSP IP/OBS MODERATE 35: CPT

## 2019-06-24 PROCEDURE — 71260 CT THORAX DX C+: CPT | Mod: 26

## 2019-06-24 PROCEDURE — 74177 CT ABD & PELVIS W/CONTRAST: CPT | Mod: 26

## 2019-06-24 RX ORDER — ACETAMINOPHEN 500 MG
650 TABLET ORAL EVERY 6 HOURS
Refills: 0 | Status: DISCONTINUED | OUTPATIENT
Start: 2019-06-24 | End: 2019-06-27

## 2019-06-24 RX ORDER — POTASSIUM PHOSPHATE, MONOBASIC POTASSIUM PHOSPHATE, DIBASIC 236; 224 MG/ML; MG/ML
15 INJECTION, SOLUTION INTRAVENOUS ONCE
Refills: 0 | Status: COMPLETED | OUTPATIENT
Start: 2019-06-24 | End: 2019-06-24

## 2019-06-24 RX ORDER — IPRATROPIUM/ALBUTEROL SULFATE 18-103MCG
3 AEROSOL WITH ADAPTER (GRAM) INHALATION EVERY 6 HOURS
Refills: 0 | Status: DISCONTINUED | OUTPATIENT
Start: 2019-06-24 | End: 2019-07-03

## 2019-06-24 RX ORDER — POLYETHYLENE GLYCOL 3350 17 G/17G
17 POWDER, FOR SOLUTION ORAL
Refills: 0 | Status: DISCONTINUED | OUTPATIENT
Start: 2019-06-24 | End: 2019-07-03

## 2019-06-24 RX ORDER — VANCOMYCIN HCL 1 G
1250 VIAL (EA) INTRAVENOUS EVERY 24 HOURS
Refills: 0 | Status: DISCONTINUED | OUTPATIENT
Start: 2019-06-25 | End: 2019-06-25

## 2019-06-24 RX ORDER — LACTULOSE 10 G/15ML
10 SOLUTION ORAL ONCE
Refills: 0 | Status: COMPLETED | OUTPATIENT
Start: 2019-06-24 | End: 2019-06-24

## 2019-06-24 RX ADMIN — POLYETHYLENE GLYCOL 3350 17 GRAM(S): 17 POWDER, FOR SOLUTION ORAL at 16:42

## 2019-06-24 RX ADMIN — PIPERACILLIN AND TAZOBACTAM 25 GRAM(S): 4; .5 INJECTION, POWDER, LYOPHILIZED, FOR SOLUTION INTRAVENOUS at 23:09

## 2019-06-24 RX ADMIN — PIPERACILLIN AND TAZOBACTAM 25 GRAM(S): 4; .5 INJECTION, POWDER, LYOPHILIZED, FOR SOLUTION INTRAVENOUS at 14:36

## 2019-06-24 RX ADMIN — HEPARIN SODIUM 5000 UNIT(S): 5000 INJECTION INTRAVENOUS; SUBCUTANEOUS at 05:22

## 2019-06-24 RX ADMIN — HEPARIN SODIUM 5000 UNIT(S): 5000 INJECTION INTRAVENOUS; SUBCUTANEOUS at 21:53

## 2019-06-24 RX ADMIN — LACTULOSE 10 GRAM(S): 10 SOLUTION ORAL at 21:54

## 2019-06-24 RX ADMIN — Medication 650 MILLIGRAM(S): at 16:12

## 2019-06-24 RX ADMIN — PANTOPRAZOLE SODIUM 40 MILLIGRAM(S): 20 TABLET, DELAYED RELEASE ORAL at 11:46

## 2019-06-24 RX ADMIN — Medication 1 GRAM(S): at 21:52

## 2019-06-24 RX ADMIN — HEPARIN SODIUM 5000 UNIT(S): 5000 INJECTION INTRAVENOUS; SUBCUTANEOUS at 14:35

## 2019-06-24 RX ADMIN — QUETIAPINE FUMARATE 200 MILLIGRAM(S): 200 TABLET, FILM COATED ORAL at 21:53

## 2019-06-24 RX ADMIN — POTASSIUM PHOSPHATE, MONOBASIC POTASSIUM PHOSPHATE, DIBASIC 62.5 MILLIMOLE(S): 236; 224 INJECTION, SOLUTION INTRAVENOUS at 21:53

## 2019-06-24 RX ADMIN — Medication 650 MILLIGRAM(S): at 16:43

## 2019-06-24 RX ADMIN — PIPERACILLIN AND TAZOBACTAM 25 GRAM(S): 4; .5 INJECTION, POWDER, LYOPHILIZED, FOR SOLUTION INTRAVENOUS at 05:22

## 2019-06-24 RX ADMIN — OLANZAPINE 5 MILLIGRAM(S): 15 TABLET, FILM COATED ORAL at 12:57

## 2019-06-24 RX ADMIN — Medication 250 MILLIGRAM(S): at 11:47

## 2019-06-24 RX ADMIN — Medication 1 GRAM(S): at 08:04

## 2019-06-24 RX ADMIN — Medication 3 MILLILITER(S): at 16:42

## 2019-06-24 RX ADMIN — Medication 10 MILLIGRAM(S): at 16:12

## 2019-06-24 NOTE — PROGRESS NOTE ADULT - ASSESSMENT
74 yo M with PMH severe dementia (AAOx0 s/p severe reaction to Haldol/Ativan) with PEG tube, arthritis, GERD present from home after PEG tube falling out and Right thigh swelling.  Femur fracture  Fever, tachycardia, leukocytosis, AMS  CXR LLL ? pneumonia  UA negative  Fever curve improving, but low grade fever this afternoon; WBC remains elevated  BCX 1/4 CoNS, likely contam, repeat and monitor  Overall, fever, leukocytosis, aspiration pneumonia, tachycardia, sepsis  - Vanco 1250mg q 24 (monitor trough)  - Zosyn 3.375g q 8  - Repeat BCX x2  - With new fever, would check CT Chest, CT A/P  - Aspiration precautions    Roni Luis MD  Pager [phone]  After 5pm and on weekends call [phone]

## 2019-06-24 NOTE — PROGRESS NOTE ADULT - SUBJECTIVE AND OBJECTIVE BOX
Follow-up Pulm Progress Note    Non-verbal   Diaphoretic, tachycardic   38.2 in groin  Constipated per wife  Junky, non-productive cough   92% on RA    Medications:  MEDICATIONS  (STANDING):  bisacodyl Suppository 10 milliGRAM(s) Rectal once  dextrose 5% + sodium chloride 0.9%. 1000 milliLiter(s) (60 mL/Hr) IV Continuous <Continuous>  heparin  Injectable 5000 Unit(s) SubCutaneous every 8 hours  lactulose Syrup 10 Gram(s) Oral once  OLANZapine 5 milliGRAM(s) Oral daily  pantoprazole  Injectable 40 milliGRAM(s) IV Push daily  piperacillin/tazobactam IVPB.. 3.375 Gram(s) IV Intermittent every 8 hours  polyethylene glycol 3350 17 Gram(s) Oral two times a day  potassium phosphate IVPB 15 milliMole(s) IV Intermittent once  QUEtiapine 200 milliGRAM(s) Oral daily  sucralfate 1 Gram(s) Oral two times a day  vancomycin  IVPB 1000 milliGRAM(s) IV Intermittent daily    MEDICATIONS  (PRN):  acetaminophen  Suppository .. 650 milliGRAM(s) Rectal every 6 hours PRN Temp greater or equal to 38C (100.4F), Mild Pain (1 - 3), Moderate Pain (4 - 6)          Vital Signs Last 24 Hrs  T(C): 38.2 (24 Jun 2019 14:50), Max: 38.2 (24 Jun 2019 14:50)  T(F): 100.8 (24 Jun 2019 14:50), Max: 100.8 (24 Jun 2019 14:50)  HR: 115 (24 Jun 2019 14:48) (97 - 115)  BP: 105/71 (24 Jun 2019 14:48) (99/65 - 123/69)  BP(mean): --  RR: 18 (24 Jun 2019 14:48) (18 - 18)  SpO2: 94% (24 Jun 2019 14:48) (94% - 97%) on RA      VBG pH 7.34 06-22 @ 15:10    VBG pCO2 45 06-22 @ 15:10    VBG O2 sat 29 06-22 @ 15:10    VBG lactate 5.3 06-22 @ 15:10      06-23 @ 07:01  -  06-24 @ 07:00  --------------------------------------------------------  IN: 1600 mL / OUT: 0 mL / NET: 1600 mL          LABS:                        9.3    18.5  )-----------( 122      ( 23 Jun 2019 16:53 )             26.9     06-24    143  |  109<H>  |  20  ----------------------------<  210<H>  3.8   |  24  |  0.60    Ca    8.1<L>      24 Jun 2019 10:45  Phos  1.1     06-24  Mg     2.0     06-24            CAPILLARY BLOOD GLUCOSE      POCT Blood Glucose.: 130 mg/dL (23 Jun 2019 05:48)                        CULTURES: (if applicable)  Culture Results:   Growth in anaerobic bottle: Gram Positive Cocci in Clusters  "Due to technical problems, Proteus sp. will Not be reported as part of  the BCID panel until further notice"  ***Blood Panel PCR results on this specimen are available  approximately 3 hours after the Gram stain result.***  Gram stain, PCR, and/or culture results may not always  correspond due to difference in methodologies.  ************************************************************  This PCR assay was performed using [employer].  The following targets are tested for: Enterococcus,  vancomycin resistant enterococci, Listeria monocytogenes,  coagulase negative staphylococci, S. aureus,  methicillin resistant S. aureus, Streptococcus agalactiae  (Group B), S. pneumoniae, S. pyogenes (Group A),  Acinetobacter baumannii, Enterobacter cloacae, E. coli,  Klebsiella oxytoca, K. pneumoniae, Proteus sp.,  Serratia marcescens, Haemophilus influenzae,  Neisseria meningitidis, Pseudomonas aeruginosa, Candida  albicans, C. glabrata, C krusei, C parapsilosis,  C. tropicalis and the KPC resistance gene. (06-22 @ 22:14)  Culture Results:   No growth to date. (06-22 @ 15:14)  Culture Results:   No growth (06-22 @ 01:26)    Most recent blood culture -- 06-22 @ 22:14   Blood Culture PCR Blood Culture PCR .Blood 06-22 @ 22:14  Most recent blood culture -- 06-22 @ 15:14   -- -- .Blood 06-22 @ 15:14  Most recent blood culture -- 06-22 @ 01:26   -- -- .Urine 06-22 @ 01:26    Blood culture 06-22 @ 22:14  --    Growth in anaerobic bottle: Gram Positive Cocci in Clusters  PCR  Blood Culture PCR  Blood Culture PCR    Urine culture    -->      Physical Examination:  PULM: Rhonchi bilaterally   CVS: S1, S2 heard    RADIOLOGY REVIEWED  CXR: 6/22: LLL opacity

## 2019-06-24 NOTE — PROGRESS NOTE ADULT - SUBJECTIVE AND OBJECTIVE BOX
CHIEF COMPLAINT:Patient is a 73y old  Male who presents with a chief complaint of PEG tube fall out, Right thigh swelling (23 Jun 2019 13:13)    	        PAST MEDICAL & SURGICAL HISTORY:  Dyspepsia  GERD (gastroesophageal reflux disease)  Arthritis  Dysphagia: peg  Acute renal failure (ARF): ARF 1.5 YEAR AGO  Pneumonia  Dementia  Status post insertion of percutaneous endoscopic gastrostomy (PEG) tube: PEG replacement on 02/29/16  PEG adjustment, replacement, or removal  History of hand surgery          REVIEW OF SYSTEMS:  pt better  wife at bedside  no cp  or sob  no vomiting       Medications:  MEDICATIONS  (STANDING):  dextrose 5% + sodium chloride 0.9%. 1000 milliLiter(s) (60 mL/Hr) IV Continuous <Continuous>  heparin  Injectable 5000 Unit(s) SubCutaneous every 8 hours  OLANZapine 5 milliGRAM(s) Oral daily  pantoprazole  Injectable 40 milliGRAM(s) IV Push daily  piperacillin/tazobactam IVPB.. 3.375 Gram(s) IV Intermittent every 8 hours  QUEtiapine 200 milliGRAM(s) Oral daily  sucralfate 1 Gram(s) Oral two times a day  vancomycin  IVPB 1000 milliGRAM(s) IV Intermittent daily    MEDICATIONS  (PRN):  acetaminophen  Suppository .. 650 milliGRAM(s) Rectal every 6 hours PRN Temp greater or equal to 38C (100.4F), Mild Pain (1 - 3), Moderate Pain (4 - 6)    	    PHYSICAL EXAM:  T(C): 37 (06-24-19 @ 04:48), Max: 37.4 (06-23-19 @ 20:04)  HR: 102 (06-24-19 @ 04:48) (97 - 102)  BP: 99/65 (06-24-19 @ 04:48) (98/68 - 123/69)  RR: 18 (06-24-19 @ 04:48) (18 - 18)  SpO2: 97% (06-24-19 @ 04:48) (95% - 98%)  Wt(kg): --  I&O's Summary    23 Jun 2019 07:01  -  24 Jun 2019 07:00  --------------------------------------------------------  IN: 1600 mL / OUT: 0 mL / NET: 1600 mL    24 Jun 2019 07:01  -  24 Jun 2019 09:22  --------------------------------------------------------  IN: 250 mL / OUT: 0 mL / NET: 250 mL        Appearance: Normal	  HEENT:   Normal oral mucosa, PERRL,  Lymphatic: No lymphadenopathy  Cardiovascular: Normal S1 S2, No JVD, a  Respiratory: dec bs   Gastrointestinal:  Soft, Non-tender, + BS	peg  Skin: No rashes, No ecchymoses, No cyanosis	  Neurologic: Non-focal  Extremities: immobilizer in place     TELEMETRY: 	    ECG:  	  RADIOLOGY:  OTHER: 	  	  LABS:	 	    CARDIAC MARKERS:                                9.3    18.5  )-----------( 122      ( 23 Jun 2019 16:53 )             26.9     06-23    144  |  113<H>  |  24<H>  ----------------------------<  141<H>  4.7   |  20<L>  |  0.74    Ca    8.3<L>      23 Jun 2019 04:50  Phos  1.9     06-23  Mg     2.1     06-22    TPro  6.9  /  Alb  3.7  /  TBili  1.3<H>  /  DBili  x   /  AST  21  /  ALT  14  /  AlkPhos  108  06-22    proBNP:   Lipid Profile:   HgA1c:   TSH:

## 2019-06-24 NOTE — PROGRESS NOTE ADULT - ASSESSMENT
Sepsis due to LLL PNA  PEG Dislodgment  Femur fracture  underlying severe dementia with dysphagia with contracturs

## 2019-06-24 NOTE — PROVIDER CONTACT NOTE (CRITICAL VALUE NOTIFICATION) - RECOMMENDATIONS
Patient Aoxo non verbal came in with Closed right femur fracture and peg displaced got critical lab blood culture jasper on 6/22. Prilimanry result grwth on the anerobic bottle for gram + cocxi in [facility] and pcr results will be ready in 3 hours

## 2019-06-24 NOTE — CHART NOTE - NSCHARTNOTEFT_GEN_A_CORE
called by RN as pt has fever of 101.1 and pt's wife refusing CT abd/pelvis with oral contrast.  Pt seen and evaluated.  Pt seen with wife at bedside. Pt A & O X 0     PAST MEDICAL & SURGICAL HISTORY:  Dyspepsia  GERD (gastroesophageal reflux disease)  Arthritis  Dysphagia: peg  Acute renal failure (ARF): ARF 1.5 YEAR AGO  Pneumonia  Dementia  Status post insertion of percutaneous endoscopic gastrostomy (PEG) tube: PEG replacement on 02/29/16  PEG adjustment, replacement, or removal  History of hand surgery    Vital Signs Last 24 Hrs  T(C): 38.4 (24 Jun 2019 19:34), Max: 38.4 (24 Jun 2019 19:34)  T(F): 101.1 (24 Jun 2019 19:34), Max: 101.1 (24 Jun 2019 19:34)  HR: 95 (24 Jun 2019 19:34) (95 - 115)  BP: 103/75 (24 Jun 2019 19:34) (99/58 - 105/71)  BP(mean): --  RR: 18 (24 Jun 2019 19:34) (18 - 18)  SpO2: 93% (24 Jun 2019 19:34) (93% - 97%)    < from: Xray Chest 1 View-PORTABLE IMMEDIATE (06.22.19 @ 13:22) >    Left lower lung opacity which may represent pneumonia in the correct   clinical context.    < end of copied text >      Culture - Blood (collected 22 Jun 2019 22:14)  Source: .Blood  Gram Stain (24 Jun 2019 03:04):    Growth in anaerobic bottle: Gram Positive Cocci in Clusters  Preliminary Report (24 Jun 2019 03:05):    Growth in anaerobic bottle: Gram Positive Cocci in Clusters    "Due to technical problems, Proteus sp. will Not be reported as part of    the BCID panel until further notice"    ***Blood Panel PCR results on this specimen are available    approximately 3 hours after the Gram stain result.***    Gram stain, PCR, and/or culture results may not always    correspond due to difference in methodologies.    ************************************************************    This PCR assay was performed using [employer].    The following targets are tested for: Enterococcus,    vancomycin resistant enterococci, Listeria monocytogenes,    coagulase negative staphylococci, S. aureus,    methicillin resistant S. aureus, Streptococcus agalactiae    (Group B), S. pneumoniae, S. pyogenes (Group A),    Acinetobacter baumannii, Enterobacter cloacae, E. coli,    Klebsiella oxytoca, K. pneumoniae, Proteus sp.,    Serratia marcescens, Haemophilus influenzae,    Neisseria meningitidis, Pseudomonas aeruginosa, Candida    albicans, C. glabrata, C krusei, C parapsilosis,    C. tropicalis and the KPC resistance gene.  Organism: Blood Culture PCR (24 Jun 2019 06:01)  Organism: Blood Culture PCR (24 Jun 2019 06:01)    Culture - Blood (collected 22 Jun 2019 15:14)  Source: .Blood  Preliminary Report (23 Jun 2019 16:01):    No growth to date.    Culture - Urine (collected 22 Jun 2019 01:26)  Source: .Urine  Final Report (22 Jun 2019 20:56):    No growth    PE:  General: A & O X 0  CV: S1, S2, RRR  Pulm: CTA b/l  Abd: Soft, NT, ND, + BS X 4  Ext: no edema, + pedal pulses.         Event Summary:    74 yo M with PMH severe dementia (AAOx0 s/p severe reaction to Haldol/Ativan) with PEG tube, arthritis, GERD present from home after PEG tube falling out and Right Femur fracture now being treated for LLL pneumonia now with recurrent fevers    Fever   Leukocytosis 13.16 trending down, lactate 2.1  Likley secondary to pneumonia, ? aspiration  Continue vanco and zosyn  Repeat blood cultures from today 6/24  Antipyretic prn  Cooling measures   CT chest/abd/pelvis r/o source of infection, however wife refusing oral contrast as she is worried of aspiration. Explained to wife the need of oral contrast, however she declines CT abd with oral contrast as she is worried her  would aspirate the oral contrast. So will pursue with CT chest/abd/pelvis with IV contrast  F/u with primary team in AM    [place]  [id number]

## 2019-06-24 NOTE — PROVIDER CONTACT NOTE (CRITICAL VALUE NOTIFICATION) - ASSESSMENT
Patient Aoxo non verbal came in with Closed right femur fracture and peg displaced got critical lab blood culture jasper on 6/22. Prilimanry result grwth on the anerobic bottle for gram + cocxi in [facility] and pcr results will be ready in 3 hours

## 2019-06-24 NOTE — PROGRESS NOTE ADULT - PROBLEM SELECTOR PLAN 1
2nd aspiration PNA  -Still spiking temps on Vanco/Zosyn  -38.2 groin temp  -f/u CBC from today (still pending in core lab)  -Would check CT chest/abd/pelvis with contrast   -Re send blood cultures  -coag neg staph in blood culture likely contaminant  -ID f/u

## 2019-06-24 NOTE — CHART NOTE - NSCHARTNOTEFT_GEN_A_CORE
MEDICINE NP    SHARONA ALRKIN  73y Male    Patient is a 73y old  Male who presents with a chief complaint of PEG tube fall out, Right thigh swelling (23 Jun 2019 13:13)       > Event Summary:  Notified by RN, Patient with +BCX 6/22, GPC in clusters.  -Vital Signs :  T(C): 37  Max: 37.4 T(F): 98.6 Max: 99.4 HR: 102 BP: 99/65 RR: 18 SpO2: 97% (24 Jun 2019 04:48)     > HPI:  73 yoM, PMHx of Dementia, Arthritis, GERD, Dysphagia post PEG, ARF. BIB EMS with wife with report of feeding tube being pulled out, and Wife is concerned for R thigh swelling which is new. No trauma or fall.  Admitted with Rt. distal Femur Fx w/ knee immobilizer. no sx intervention; s/p PEG exchange in ED; and Sepsis / LLL PNA on Vancomycin and Zosyn IV.  Now with Bacteremia.  -C/w current ABX regimen   -Last BCX <48hrs, can repeat BCX as indicated  -Repeat Lactate and WBC downtrending, f/u AM Labs  -ID on board, f/u in AM  -Will endorse to day team      Culture - Blood (06.22.19 @ 22:14)    Gram Stain: Growth in anaerobic bottle: Gram Positive Cocci in Clusters    Specimen Source: .Blood    Culture Results:   Growth in anaerobic bottle: Gram Positive Cocci in Clusters    Culture - Blood (06.22.19 @ 15:14)    Specimen Source: .Blood    Culture Results: No growth to date.            ROMANA Verdin-BC  Medicine Department  #[id number] MEDICINE NP    TRAE SHARONA  73y Male    Patient is a 73y old  Male who presents with a chief complaint of PEG tube fall out, Right thigh swelling (23 Jun 2019 13:13)       > Event Summary:  Notified by RN, Patient with +BCX 6/22, GPC in clusters.  -Vital Signs :  T(C): 37  Max: 37.4 T(F): 98.6 Max: 99.4 HR: 102 BP: 99/65 RR: 18 SpO2: 97% (24 Jun 2019 04:48)     > HPI:  73 yoM, PMHx of Dementia, Arthritis, GERD, Dysphagia post PEG, ARF. BIB EMS with wife with report of feeding tube being pulled out, and Wife is concerned for R thigh swelling which is new. No trauma or fall.  Admitted with Rt. distal Femur Fx w/ knee immobilizer. no sx intervention; s/p PEG exchange in ED; and Sepsis / LLL PNA on Vancomycin and Zosyn IV.  Now with Bacteremia.  -C/w current ABX regimen   -Last BCX <48hrs, can repeat BCX as indicated  -Repeat Lactate and WBC downtrending, f/u AM Labs.  Can consider CT Chest/ A/P if not improving per ID.   -ID on board, f/u in AM  -Will endorse to day team      Culture - Blood (06.22.19 @ 22:14)    Gram Stain: Growth in anaerobic bottle: Gram Positive Cocci in Clusters    Specimen Source: .Blood    Culture Results:   Growth in anaerobic bottle: Gram Positive Cocci in Clusters    Culture - Blood (06.22.19 @ 15:14)    Specimen Source: .Blood    Culture Results: No growth to date.            ROMANA Verdin-BC  Medicine Department  #[id number]

## 2019-06-24 NOTE — PROGRESS NOTE ADULT - SUBJECTIVE AND OBJECTIVE BOX
CARDIOLOGY FOLLOW UP - Dr. Young    CC      PHYSICAL EXAM:  T(C): 38.2 (06-24-19 @ 14:50), Max: 38.2 (06-24-19 @ 14:50)  HR: 115 (06-24-19 @ 14:48) (97 - 115)  BP: 105/71 (06-24-19 @ 14:48) (99/65 - 123/69)  RR: 18 (06-24-19 @ 14:48) (18 - 18)  SpO2: 94% (06-24-19 @ 14:48) (94% - 97%)  Wt(kg): --  I&O's Summary    23 Jun 2019 07:01  -  24 Jun 2019 07:00  --------------------------------------------------------  IN: 1600 mL / OUT: 0 mL / NET: 1600 mL    24 Jun 2019 07:01  -  24 Jun 2019 15:18  --------------------------------------------------------  IN: 860 mL / OUT: 0 mL / NET: 860 mL        Appearance: Normal	  Cardiovascular: Normal S1 S2,RRR + murmur   Respiratory: Lungs clear to auscultation	  Gastrointestinal:  Soft, Non-tender, + BS	  Extremities: Normal range of motion, No clubbing, cyanosis or edema        MEDICATIONS  (STANDING):  ALBUTerol/ipratropium for Nebulization 3 milliLiter(s) Nebulizer every 6 hours  bisacodyl Suppository 10 milliGRAM(s) Rectal once  dextrose 5% + sodium chloride 0.9%. 1000 milliLiter(s) (60 mL/Hr) IV Continuous <Continuous>  heparin  Injectable 5000 Unit(s) SubCutaneous every 8 hours  lactulose Syrup 10 Gram(s) Oral once  OLANZapine 5 milliGRAM(s) Oral daily  pantoprazole  Injectable 40 milliGRAM(s) IV Push daily  piperacillin/tazobactam IVPB.. 3.375 Gram(s) IV Intermittent every 8 hours  polyethylene glycol 3350 17 Gram(s) Oral two times a day  potassium phosphate IVPB 15 milliMole(s) IV Intermittent once  QUEtiapine 200 milliGRAM(s) Oral daily  sucralfate 1 Gram(s) Oral two times a day      TELEMETRY: 	    ECG:  	  RADIOLOGY:   DIAGNOSTIC TESTING:  [ ] Echocardiogram:  [ ]  Catheterization:  [ ] Stress Test:    OTHER: 	    LABS:	 	                            9.3    18.5  )-----------( 122      ( 23 Jun 2019 16:53 )             26.9     06-24    143  |  109<H>  |  20  ----------------------------<  210<H>  3.8   |  24  |  0.60    Ca    8.1<L>      24 Jun 2019 10:45  Phos  1.1     06-24  Mg     2.0     06-24 CARDIOLOGY FOLLOW UP - Dr. Young    CC nonverbal  + bcx noted       PHYSICAL EXAM:  T(C): 38.2 (06-24-19 @ 14:50), Max: 38.2 (06-24-19 @ 14:50)  HR: 115 (06-24-19 @ 14:48) (97 - 115)  BP: 105/71 (06-24-19 @ 14:48) (99/65 - 123/69)  RR: 18 (06-24-19 @ 14:48) (18 - 18)  SpO2: 94% (06-24-19 @ 14:48) (94% - 97%)  Wt(kg): --  I&O's Summary    23 Jun 2019 07:01  -  24 Jun 2019 07:00  --------------------------------------------------------  IN: 1600 mL / OUT: 0 mL / NET: 1600 mL    24 Jun 2019 07:01  -  24 Jun 2019 15:18  --------------------------------------------------------  IN: 860 mL / OUT: 0 mL / NET: 860 mL        Appearance: Normal	  Cardiovascular: Normal S1 S2,RRR + murmur   Respiratory:  rhonchi 	  Gastrointestinal:  Soft, Non-tender, + BS	peg   Extremities: RLE immoblizer         MEDICATIONS  (STANDING):  ALBUTerol/ipratropium for Nebulization 3 milliLiter(s) Nebulizer every 6 hours  bisacodyl Suppository 10 milliGRAM(s) Rectal once  dextrose 5% + sodium chloride 0.9%. 1000 milliLiter(s) (60 mL/Hr) IV Continuous <Continuous>  heparin  Injectable 5000 Unit(s) SubCutaneous every 8 hours  lactulose Syrup 10 Gram(s) Oral once  OLANZapine 5 milliGRAM(s) Oral daily  pantoprazole  Injectable 40 milliGRAM(s) IV Push daily  piperacillin/tazobactam IVPB.. 3.375 Gram(s) IV Intermittent every 8 hours  polyethylene glycol 3350 17 Gram(s) Oral two times a day  potassium phosphate IVPB 15 milliMole(s) IV Intermittent once  QUEtiapine 200 milliGRAM(s) Oral daily  sucralfate 1 Gram(s) Oral two times a day      TELEMETRY: 	    ECG:  	  RADIOLOGY:   DIAGNOSTIC TESTING:  [ ] Echocardiogram:  [ ]  Catheterization:  [ ] Stress Test:    OTHER: 	    LABS:	 	                            9.3    18.5  )-----------( 122      ( 23 Jun 2019 16:53 )             26.9     06-24    143  |  109<H>  |  20  ----------------------------<  210<H>  3.8   |  24  |  0.60    Ca    8.1<L>      24 Jun 2019 10:45  Phos  1.1     06-24  Mg     2.0     06-24

## 2019-06-24 NOTE — PROGRESS NOTE ADULT - ASSESSMENT
peg tube    plan  s/p peg change  axr reviewed, PEG in correct place  feeds started  daily peg care, monitor residuals  Miralax 17 grams BID for constipation started 6/24

## 2019-06-24 NOTE — PROGRESS NOTE ADULT - ASSESSMENT
1. Hypoxic Resp Failure  2. LLL PNA-Sepsis  3. Femur FX  4. Advanced dementia  5. Murmur  6. PEG dislodgement      -cont abx/O2 support per pulm  -low suspicion for CHF  -ECHO to eval murmur  -abx  -F/U cultures  -DVT ppx 72 yo man with PMH severe dementia (AAOx0 s/p severe reaction to Haldol/Ativan) with PEG tube, arthritis, GERD presents with  PEG dislodgement, Femur FX, sepsis     1. Hypoxic Resp Failure  -likely in the setting of LLL PNA  -cont abx/O2 support per pulm  -low suspicion for CHF    2. Sepsis/ LLL Pna   -pending ct chest/ abd   -BC x1 positive   -ID, pulm f/u     3. Femur FX  - med f/u   -knee immobilizer, pain control, ortho f/u    4. Murmur  -ECHO to eval murmur    5.  PEG dislodgement  -gi f/u s/p peg change          -DVT ppx

## 2019-06-24 NOTE — PROGRESS NOTE ADULT - SUBJECTIVE AND OBJECTIVE BOX
INTERVAL HPI/OVERNIGHT EVENTS:    pt seen and examined. Non- verbal  tolerating peg feeds as per RN  no leakage/drainage at peg site     MEDICATIONS  (STANDING):  dextrose 5% + sodium chloride 0.9%. 1000 milliLiter(s) (60 mL/Hr) IV Continuous <Continuous>  heparin  Injectable 5000 Unit(s) SubCutaneous every 8 hours  OLANZapine 5 milliGRAM(s) Oral daily  pantoprazole  Injectable 40 milliGRAM(s) IV Push daily  piperacillin/tazobactam IVPB.. 3.375 Gram(s) IV Intermittent every 8 hours  polyethylene glycol 3350 17 Gram(s) Oral two times a day  QUEtiapine 200 milliGRAM(s) Oral daily  sucralfate 1 Gram(s) Oral two times a day  vancomycin  IVPB 1000 milliGRAM(s) IV Intermittent daily    MEDICATIONS  (PRN):  acetaminophen  Suppository .. 650 milliGRAM(s) Rectal every 6 hours PRN Temp greater or equal to 38C (100.4F), Mild Pain (1 - 3), Moderate Pain (4 - 6)      Allergies    No Known Allergies    Intolerances    benzodiazepines (Other)  Haldol (Other)      Review of Systems: unable to obtain     Vital Signs Last 24 Hrs  T(C): 37 (24 Jun 2019 04:48), Max: 37.4 (23 Jun 2019 20:04)  T(F): 98.6 (24 Jun 2019 04:48), Max: 99.4 (23 Jun 2019 20:04)  HR: 102 (24 Jun 2019 04:48) (97 - 102)  BP: 99/65 (24 Jun 2019 04:48) (99/65 - 123/69)  BP(mean): --  RR: 18 (24 Jun 2019 04:48) (18 - 18)  SpO2: 97% (24 Jun 2019 04:48) (95% - 97%)    PHYSICAL EXAM:    Constitutional: ill appearing  HEENT: EOMI, throat clear  Neck: No LAD, supple  Respiratory: CTA and P  Cardiovascular: S1 and S2, RRR, no M  Gastrointestinal: BS+, soft, NT/ND, neg HSM, + peg with abdominal binder c/d/i  Extremities: No peripheral edema, neg clubbing, cyanosis  Vascular: 2+ peripheral pulses  Neurological: A/O x 0, no focal deficits  Psychiatric: Normal mood, normal affect  Skin: No rashes      LABS:                        9.3    18.5  )-----------( 122      ( 23 Jun 2019 16:53 )             26.9     06-24    143  |  109<H>  |  20  ----------------------------<  210<H>  3.8   |  24  |  0.60    Ca    8.1<L>      24 Jun 2019 10:45  Phos  1.1     06-24  Mg     2.0     06-24    TPro  6.9  /  Alb  3.7  /  TBili  1.3<H>  /  DBili  x   /  AST  21  /  ALT  14  /  AlkPhos  108  06-22          RADIOLOGY & ADDITIONAL TESTS:

## 2019-06-24 NOTE — PROVIDER CONTACT NOTE (CRITICAL VALUE NOTIFICATION) - BACKGROUND
Patient Aoxo non verbal came in with Closed right femur fracture and peg displaced got critical lab blood culture jasper on 6/22. Prilimanry result grwth on the anerobic bottle for gram + cocxi in [facility] and pcr results will be ready in 3 hours

## 2019-06-24 NOTE — PROGRESS NOTE ADULT - SUBJECTIVE AND OBJECTIVE BOX
CC: F/U for Fever    Saw/spoke to patient. Fever. Patient unchanged, overall improved. Wife at bedside.    Allergies  No Known Allergies    ANTIMICROBIALS:  piperacillin/tazobactam IVPB.. 3.375 every 8 hours  vancomycin  IVPB 1000 daily    PE:    Vital Signs Last 24 Hrs  T(C): 38.2 (24 Jun 2019 14:50), Max: 38.2 (24 Jun 2019 14:50)  T(F): 100.8 (24 Jun 2019 14:50), Max: 100.8 (24 Jun 2019 14:50)  HR: 115 (24 Jun 2019 14:48) (97 - 115)  BP: 105/71 (24 Jun 2019 14:48) (99/65 - 123/69)  RR: 18 (24 Jun 2019 14:48) (18 - 18)  SpO2: 94% (24 Jun 2019 14:48) (94% - 97%)    Gen: AOx1, minimally verbal  CV: S1+S2 normal, tachycardic  Resp: Clear bilat, no resp distress, no crackles/wheezes  Abd: Soft, nontender, +BS, PEG  Ext: No LE edema, no wounds    LABS:                        9.3    18.5  )-----------( 122      ( 23 Jun 2019 16:53 )             26.9     06-24    143  |  109<H>  |  20  ----------------------------<  210<H>  3.8   |  24  |  0.60    Ca    8.1<L>      24 Jun 2019 10:45  Phos  1.1     06-24  Mg     2.0     06-24    MICROBIOLOGY:  Vancomycin Level, Trough: 4.2 ug/mL (06-24-19 @ 10:45)    .Blood  06-22-19   Growth in anaerobic bottle: Gram Positive Cocci in Clusters    .Blood  06-22-19   No growth to date.      .Urine  06-22-19   No growth     RADIOLOGY:    6/22 XR:    FINDINGS:   The heart size is normal.   Left lower lung opacity. There are no pleural effusions or pneumothorax.  Degenerative changes of the thoracic spine.    IMPRESSION:   Left lower lung opacity which may represent pneumonia in the correct   clinical context.

## 2019-06-24 NOTE — PROGRESS NOTE ADULT - ASSESSMENT
74 yo male  with PMH severe dementia () with PEG tube, arthritis, GERD present from home after PEG tube falling out and Right thigh swelling.       ·  Problem: Closed fracture of distal end of right femur, unspecified fracture morphology, initial encounter.   : Ortho team consulted and placed into knee immobilizer.   ortho f/u   Pain control prn.     ·  Problem: PEG (percutaneous endoscopic gastrostomy) adjustment/replacement/removal.  : GI team replaced PEG in ED  feeds to cont   F/U GI recs.       ·  Problem: Tachycardia.  improved         : Pneumonia./sepsis  iv abs  pulm f/u  id f/u         ·  Problem: GERD (gastroesophageal reflux disease).    ppi      Problem: Dementia.   c/w seroquel       ·  Problem: Prophylactic measure.    : DVT SCD      ·  Problem: Medication management.     continue present Rx    discussed w/ wife at length at bedside   Bogdan Jackson MD pager [phone] 72 yo male  with PMH severe dementia () with PEG tube, arthritis, GERD present from home after PEG tube falling out and Right thigh swelling.       ·  Problem: Closed fracture of distal end of right femur, unspecified fracture morphology, initial encounter.   : Ortho team consulted and placed into knee immobilizer.   ortho f/u   Pain control prn.     ·  Problem: PEG (percutaneous endoscopic gastrostomy) adjustment/replacement/removal.  : GI team replaced PEG in ED  feeds to cont   F/U GI recs.       ·  Problem: Tachycardia.  improved         : Pneumonia./sepsis  iv abs  pulm f/u  id f/u         ·  Problem: GERD (gastroesophageal reflux disease).    ppi      Problem: Dementia.   c/w seroquel       ·  Problem: Prophylactic measure.    : DVT SCD      ·  Problem: Medication management.     continue present Rx    discussed w/ wife at length at bedside

## 2019-06-25 LAB
ALBUMIN SERPL ELPH-MCNC: 2.5 G/DL — LOW (ref 3.3–5)
ALP SERPL-CCNC: 91 U/L — SIGNIFICANT CHANGE UP (ref 40–120)
ALT FLD-CCNC: 17 U/L — SIGNIFICANT CHANGE UP (ref 10–45)
ANION GAP SERPL CALC-SCNC: 11 MMOL/L — SIGNIFICANT CHANGE UP (ref 5–17)
AST SERPL-CCNC: 23 U/L — SIGNIFICANT CHANGE UP (ref 10–40)
BILIRUB SERPL-MCNC: 1 MG/DL — SIGNIFICANT CHANGE UP (ref 0.2–1.2)
BUN SERPL-MCNC: 14 MG/DL — SIGNIFICANT CHANGE UP (ref 7–23)
CALCIUM SERPL-MCNC: 7.8 MG/DL — LOW (ref 8.4–10.5)
CHLORIDE SERPL-SCNC: 106 MMOL/L — SIGNIFICANT CHANGE UP (ref 96–108)
CO2 SERPL-SCNC: 24 MMOL/L — SIGNIFICANT CHANGE UP (ref 22–31)
CREAT SERPL-MCNC: 0.56 MG/DL — SIGNIFICANT CHANGE UP (ref 0.5–1.3)
GLUCOSE SERPL-MCNC: 189 MG/DL — HIGH (ref 70–99)
GRAM STN FLD: SIGNIFICANT CHANGE UP
HCT VFR BLD CALC: 23.1 % — LOW (ref 39–50)
HGB BLD-MCNC: 7.7 G/DL — LOW (ref 13–17)
IRON SATN MFR SERPL: 16 UG/DL — LOW (ref 45–165)
IRON SATN MFR SERPL: 9 % — LOW (ref 16–55)
MCHC RBC-ENTMCNC: 32.8 PG — SIGNIFICANT CHANGE UP (ref 27–34)
MCHC RBC-ENTMCNC: 33.3 GM/DL — SIGNIFICANT CHANGE UP (ref 32–36)
MCV RBC AUTO: 98.3 FL — SIGNIFICANT CHANGE UP (ref 80–100)
PHOSPHATE SERPL-MCNC: 2.1 MG/DL — LOW (ref 2.5–4.5)
PLATELET # BLD AUTO: 143 K/UL — LOW (ref 150–400)
POTASSIUM SERPL-MCNC: 3.6 MMOL/L — SIGNIFICANT CHANGE UP (ref 3.5–5.3)
POTASSIUM SERPL-SCNC: 3.6 MMOL/L — SIGNIFICANT CHANGE UP (ref 3.5–5.3)
PROT SERPL-MCNC: 5.5 G/DL — LOW (ref 6–8.3)
RBC # BLD: 2.35 M/UL — LOW (ref 4.2–5.8)
RBC # FLD: 13.7 % — SIGNIFICANT CHANGE UP (ref 10.3–14.5)
SODIUM SERPL-SCNC: 141 MMOL/L — SIGNIFICANT CHANGE UP (ref 135–145)
SPECIMEN SOURCE: SIGNIFICANT CHANGE UP
TIBC SERPL-MCNC: 183 UG/DL — LOW (ref 220–430)
UIBC SERPL-MCNC: 167 UG/DL — SIGNIFICANT CHANGE UP (ref 110–370)
WBC # BLD: 10.07 K/UL — SIGNIFICANT CHANGE UP (ref 3.8–10.5)
WBC # FLD AUTO: 10.07 K/UL — SIGNIFICANT CHANGE UP (ref 3.8–10.5)

## 2019-06-25 PROCEDURE — 99232 SBSQ HOSP IP/OBS MODERATE 35: CPT

## 2019-06-25 RX ORDER — HYDROMORPHONE HYDROCHLORIDE 2 MG/ML
0.5 INJECTION INTRAMUSCULAR; INTRAVENOUS; SUBCUTANEOUS ONCE
Refills: 0 | Status: DISCONTINUED | OUTPATIENT
Start: 2019-06-25 | End: 2019-06-25

## 2019-06-25 RX ORDER — ACETAMINOPHEN 500 MG
650 TABLET ORAL EVERY 6 HOURS
Refills: 0 | Status: DISCONTINUED | OUTPATIENT
Start: 2019-06-25 | End: 2019-07-03

## 2019-06-25 RX ORDER — ACETAMINOPHEN 500 MG
1000 TABLET ORAL ONCE
Refills: 0 | Status: COMPLETED | OUTPATIENT
Start: 2019-06-25 | End: 2019-06-25

## 2019-06-25 RX ORDER — VANCOMYCIN HCL 1 G
1250 VIAL (EA) INTRAVENOUS EVERY 24 HOURS
Refills: 0 | Status: DISCONTINUED | OUTPATIENT
Start: 2019-06-26 | End: 2019-06-28

## 2019-06-25 RX ADMIN — Medication 3 MILLILITER(S): at 17:24

## 2019-06-25 RX ADMIN — PANTOPRAZOLE SODIUM 40 MILLIGRAM(S): 20 TABLET, DELAYED RELEASE ORAL at 13:00

## 2019-06-25 RX ADMIN — Medication 3 MILLILITER(S): at 06:02

## 2019-06-25 RX ADMIN — Medication 1 GRAM(S): at 07:38

## 2019-06-25 RX ADMIN — HYDROMORPHONE HYDROCHLORIDE 0.5 MILLIGRAM(S): 2 INJECTION INTRAMUSCULAR; INTRAVENOUS; SUBCUTANEOUS at 15:42

## 2019-06-25 RX ADMIN — PIPERACILLIN AND TAZOBACTAM 25 GRAM(S): 4; .5 INJECTION, POWDER, LYOPHILIZED, FOR SOLUTION INTRAVENOUS at 07:00

## 2019-06-25 RX ADMIN — PIPERACILLIN AND TAZOBACTAM 25 GRAM(S): 4; .5 INJECTION, POWDER, LYOPHILIZED, FOR SOLUTION INTRAVENOUS at 15:10

## 2019-06-25 RX ADMIN — Medication 1 GRAM(S): at 20:57

## 2019-06-25 RX ADMIN — Medication 3 MILLILITER(S): at 13:00

## 2019-06-25 RX ADMIN — Medication 3 MILLILITER(S): at 00:46

## 2019-06-25 RX ADMIN — HEPARIN SODIUM 5000 UNIT(S): 5000 INJECTION INTRAVENOUS; SUBCUTANEOUS at 13:00

## 2019-06-25 RX ADMIN — Medication 650 MILLIGRAM(S): at 13:10

## 2019-06-25 RX ADMIN — Medication 1000 MILLIGRAM(S): at 01:41

## 2019-06-25 RX ADMIN — HEPARIN SODIUM 5000 UNIT(S): 5000 INJECTION INTRAVENOUS; SUBCUTANEOUS at 06:01

## 2019-06-25 RX ADMIN — POLYETHYLENE GLYCOL 3350 17 GRAM(S): 17 POWDER, FOR SOLUTION ORAL at 17:24

## 2019-06-25 RX ADMIN — OLANZAPINE 5 MILLIGRAM(S): 15 TABLET, FILM COATED ORAL at 13:00

## 2019-06-25 RX ADMIN — Medication 650 MILLIGRAM(S): at 14:10

## 2019-06-25 RX ADMIN — HYDROMORPHONE HYDROCHLORIDE 0.5 MILLIGRAM(S): 2 INJECTION INTRAMUSCULAR; INTRAVENOUS; SUBCUTANEOUS at 15:44

## 2019-06-25 RX ADMIN — Medication 400 MILLIGRAM(S): at 00:46

## 2019-06-25 RX ADMIN — QUETIAPINE FUMARATE 200 MILLIGRAM(S): 200 TABLET, FILM COATED ORAL at 20:57

## 2019-06-25 RX ADMIN — PIPERACILLIN AND TAZOBACTAM 25 GRAM(S): 4; .5 INJECTION, POWDER, LYOPHILIZED, FOR SOLUTION INTRAVENOUS at 22:08

## 2019-06-25 RX ADMIN — HEPARIN SODIUM 5000 UNIT(S): 5000 INJECTION INTRAVENOUS; SUBCUTANEOUS at 22:08

## 2019-06-25 RX ADMIN — Medication 166.67 MILLIGRAM(S): at 13:00

## 2019-06-25 RX ADMIN — POLYETHYLENE GLYCOL 3350 17 GRAM(S): 17 POWDER, FOR SOLUTION ORAL at 06:01

## 2019-06-25 NOTE — PROVIDER CONTACT NOTE (CRITICAL VALUE NOTIFICATION) - SITUATION
Pt s/p RRT tachycardia and fever
Patient Aoxo non verbal came in with Closed right femur fracture and peg displaced got critical lab blood culture jasper on 6/22. Prilimanry result grwth on the anerobic bottle for gram + cocxi in [facility] and pcr results will be ready in 3 hours.
Pt RRT at this time for hypoxia and tachycardia
positive blood cultures from 6/24, preliminary growth gram positive cocci in clusters in aerobic bottle
pt lactate level 4.0

## 2019-06-25 NOTE — PROGRESS NOTE ADULT - ASSESSMENT
peg tube    plan  s/p peg change  axr reviewed, PEG in correct place  feeds started  daily peg care, monitor residuals  Miralax 17 grams BID for constipation started 6/24  dulcolax supp 10 mg daily added 6/25

## 2019-06-25 NOTE — CHART NOTE - NSCHARTNOTEFT_GEN_A_CORE
Critical value of blood cultures 6/24/18 with prelim results of G positive cocci in clusters in aerobic bottle. On Vancomycin and  Zosyn. ID  following. Will endorse to toma oliver.    Kathe Byrd NP, #[id number]

## 2019-06-25 NOTE — PROVIDER CONTACT NOTE (CRITICAL VALUE NOTIFICATION) - ACTION/TREATMENT ORDERED:
Redraw lactate in 2 hours.
DEBORAH Trejo aware, will talk to the MAR who was in charge of prior RRT
Dr. Caceres at bedside and aware, RRT in progress.
Patient Aoxo non verbal came in with Closed right femur fracture and peg displaced got critical lab blood culture jasper on 6/22. Prilimanry result grwth on the anerobic bottle for gram + cocxi in [facility] and pcr results will be ready in 3 hours. Patient is on Vancomycin and Zosyn as per NP will counitnue to monitor.
no new order at this time
no new order at this time

## 2019-06-25 NOTE — PROGRESS NOTE ADULT - SUBJECTIVE AND OBJECTIVE BOX
CHIEF COMPLAINT:Patient is a 73y old  Male who presents with a chief complaint of PEG tube fall out, Right thigh swelling (24 Jun 2019 15:18)    	        PAST MEDICAL & SURGICAL HISTORY:  Dyspepsia  GERD (gastroesophageal reflux disease)  Arthritis  Dysphagia: peg  Acute renal failure (ARF): ARF 1.5 YEAR AGO  Pneumonia  Dementia  Status post insertion of percutaneous endoscopic gastrostomy (PEG) tube: PEG replacement on 02/29/16  PEG adjustment, replacement, or removal  History of hand surgery          REVIEW OF SYSTEMS:  lethargic  arousable  no apparent cp  some cough  no vomiting     Medications:  MEDICATIONS  (STANDING):  ALBUTerol/ipratropium for Nebulization 3 milliLiter(s) Nebulizer every 6 hours  dextrose 5% + sodium chloride 0.9%. 1000 milliLiter(s) (60 mL/Hr) IV Continuous <Continuous>  heparin  Injectable 5000 Unit(s) SubCutaneous every 8 hours  OLANZapine 5 milliGRAM(s) Oral daily  pantoprazole  Injectable 40 milliGRAM(s) IV Push daily  piperacillin/tazobactam IVPB.. 3.375 Gram(s) IV Intermittent every 8 hours  polyethylene glycol 3350 17 Gram(s) Oral two times a day  QUEtiapine 200 milliGRAM(s) Oral daily  sucralfate 1 Gram(s) Oral two times a day  vancomycin  IVPB 1250 milliGRAM(s) IV Intermittent every 24 hours    MEDICATIONS  (PRN):  acetaminophen    Suspension .. 650 milliGRAM(s) Oral every 6 hours PRN Temp greater or equal to 38C (100.4F), Mild Pain (1 - 3), Moderate Pain (4 - 6)  acetaminophen  Suppository .. 650 milliGRAM(s) Rectal every 6 hours PRN Temp greater or equal to 38C (100.4F), Mild Pain (1 - 3), Moderate Pain (4 - 6)  bisacodyl Suppository 10 milliGRAM(s) Rectal daily PRN Constipation    	    PHYSICAL EXAM:  T(C): 37.6 (06-25-19 @ 07:40), Max: 38.8 (06-25-19 @ 00:18)  HR: 105 (06-25-19 @ 07:40) (92 - 115)  BP: 114/63 (06-25-19 @ 07:40) (99/58 - 144/60)  RR: 18 (06-25-19 @ 07:40) (18 - 18)  SpO2: 95% (06-25-19 @ 07:40) (92% - 97%)  Wt(kg): --  I&O's Summary    24 Jun 2019 07:01  -  25 Jun 2019 07:00  --------------------------------------------------------  IN: 1700 mL / OUT: 0 mL / NET: 1700 mL    25 Jun 2019 07:01  -  25 Jun 2019 13:06  --------------------------------------------------------  IN: 250 mL / OUT: 0 mL / NET: 250 mL          HEENT:   Normal oral mucosa,   Lymphatic: No lymphadenopathy  Cardiovascular: Normal S1 S2, No JVD,   Respiratory:b/l ronchi   Gastrointestinal:  Soft, Non-tender, + BS	  Skin: No rashes, No ecchymoses, No cyanosis	  Neurologic: Non-focal  Extremities: rlext immobilizer in place   Vascular: Peripheral pulses palpable     TELEMETRY: 	    ECG:  	  RADIOLOGY:  OTHER: 	  	  LABS:	 	    CARDIAC MARKERS:                                7.7    10.07 )-----------( 143      ( 25 Jun 2019 09:00 )             23.1     06-25    141  |  106  |  14  ----------------------------<  189<H>  3.6   |  24  |  0.56    Ca    7.8<L>      25 Jun 2019 11:37  Phos  2.1     06-25  Mg     2.0     06-24    TPro  5.5<L>  /  Alb  2.5<L>  /  TBili  1.0  /  DBili  x   /  AST  23  /  ALT  17  /  AlkPhos  91  06-25    proBNP:   Lipid Profile:   HgA1c:   TSH:

## 2019-06-25 NOTE — PROGRESS NOTE ADULT - ASSESSMENT
74 yo man with PMH severe dementia (AAOx0 s/p severe reaction to Haldol/Ativan) with PEG tube, arthritis, GERD present from home after PEG tube falling out and Right thigh swelling.     PEG replaced since admission.  Also noted to have right distal femur fracture folowed by ortho.  On IV abx now for pneumonia.    Hematology consulted for acute anemia noted over last 48 hours.  No report of melena or hematochezia. Had CT cap yesterday showing no evidence of RP bleed.      Acute anemia  --> Recommend Stool guaiac;  GI f/u to r/o GI bleed  -->iron studies, b12, folate levels to r/o deficiency  --> retic count to differentiate marrow suppression vs. acute blood loss or hemolysis  -->ldh, haptoglobin, FELIX to r/o hemolysis    Will follow with you.    Layo Eisenberg MD  Hematology/Oncology  Cell:  [phone]  Office Phone: [phone]  Office Fax:  [phone] [address] [place] [address]

## 2019-06-25 NOTE — PROGRESS NOTE ADULT - SUBJECTIVE AND OBJECTIVE BOX
CARDIOLOGY FOLLOW UP - Dr. Young    CC pt non-verbal  + temp. on iv abx         PHYSICAL EXAM:  T(C): 37.1 (06-25-19 @ 13:03), Max: 38.8 (06-25-19 @ 00:18)  HR: 103 (06-25-19 @ 13:03) (92 - 115)  BP: 136/76 (06-25-19 @ 13:03) (99/58 - 144/60)  RR: 20 (06-25-19 @ 13:03) (18 - 20)  SpO2: 98% (06-25-19 @ 13:03) (92% - 98%)  Wt(kg): --  I&O's Summary    24 Jun 2019 07:01  -  25 Jun 2019 07:00  --------------------------------------------------------  IN: 1700 mL / OUT: 0 mL / NET: 1700 mL    25 Jun 2019 07:01  -  25 Jun 2019 13:48  --------------------------------------------------------  IN: 250 mL / OUT: 0 mL / NET: 250 mL        Appearance: Normal	  Cardiovascular: Normal S1 S2,RRR  Respiratory: coarse   Gastrointestinal:  Soft, Non-tender, + BS	  Extremities: Normal range of motion, No clubbing, cyanosis or edema        MEDICATIONS  (STANDING):  ALBUTerol/ipratropium for Nebulization 3 milliLiter(s) Nebulizer every 6 hours  dextrose 5% + sodium chloride 0.9%. 1000 milliLiter(s) (60 mL/Hr) IV Continuous <Continuous>  heparin  Injectable 5000 Unit(s) SubCutaneous every 8 hours  OLANZapine 5 milliGRAM(s) Oral daily  pantoprazole  Injectable 40 milliGRAM(s) IV Push daily  piperacillin/tazobactam IVPB.. 3.375 Gram(s) IV Intermittent every 8 hours  polyethylene glycol 3350 17 Gram(s) Oral two times a day  QUEtiapine 200 milliGRAM(s) Oral daily  sucralfate 1 Gram(s) Oral two times a day  vancomycin  IVPB 1250 milliGRAM(s) IV Intermittent every 24 hours      TELEMETRY: 	    ECG:  	  RADIOLOGY:   < from: CT Chest w/ IV Cont (06.24.19 @ 21:27) >    EXAM:  CT ABDOMEN AND PELVIS IC                          EXAM:  CT CHEST IC                            PROCEDURE DATE:  06/24/2019      < from: CT Chest w/ IV Cont (06.24.19 @ 21:27) >  IMPRESSION:     Patchy opacities throughout the left lung concerning for pneumonia.   Mediastinal lymphadenopathy is likely reactive.    No intra-abdominal/pelvic source of infection is identified.        < end of copied text >      DIAGNOSTIC TESTING:  [ ] Echocardiogram:  [ ]  Catheterization:  [ ] Stress Test:    OTHER: 	    LABS:	 	                            7.7    10.07 )-----------( 143      ( 25 Jun 2019 09:00 )             23.1     06-25    141  |  106  |  14  ----------------------------<  189<H>  3.6   |  24  |  0.56    Ca    7.8<L>      25 Jun 2019 11:37  Phos  2.1     06-25  Mg     2.0     06-24    TPro  5.5<L>  /  Alb  2.5<L>  /  TBili  1.0  /  DBili  x   /  AST  23  /  ALT  17  /  AlkPhos  91  06-25

## 2019-06-25 NOTE — PROGRESS NOTE ADULT - SUBJECTIVE AND OBJECTIVE BOX
INTERVAL HPI/OVERNIGHT EVENTS:    pt seen and examined. wife at bedside. Non- verbal  tolerating peg feeds as per RN  no leakage/drainage from peg site   + bm yesterday    MEDICATIONS  (STANDING):  ALBUTerol/ipratropium for Nebulization 3 milliLiter(s) Nebulizer every 6 hours  dextrose 5% + sodium chloride 0.9%. 1000 milliLiter(s) (60 mL/Hr) IV Continuous <Continuous>  heparin  Injectable 5000 Unit(s) SubCutaneous every 8 hours  OLANZapine 5 milliGRAM(s) Oral daily  pantoprazole  Injectable 40 milliGRAM(s) IV Push daily  piperacillin/tazobactam IVPB.. 3.375 Gram(s) IV Intermittent every 8 hours  polyethylene glycol 3350 17 Gram(s) Oral two times a day  QUEtiapine 200 milliGRAM(s) Oral daily  sucralfate 1 Gram(s) Oral two times a day  vancomycin  IVPB 1250 milliGRAM(s) IV Intermittent every 24 hours    MEDICATIONS  (PRN):  acetaminophen    Suspension .. 650 milliGRAM(s) Oral every 6 hours PRN Temp greater or equal to 38C (100.4F), Mild Pain (1 - 3), Moderate Pain (4 - 6)  acetaminophen  Suppository .. 650 milliGRAM(s) Rectal every 6 hours PRN Temp greater or equal to 38C (100.4F), Mild Pain (1 - 3), Moderate Pain (4 - 6)  bisacodyl Suppository 10 milliGRAM(s) Rectal daily PRN Constipation      Allergies    No Known Allergies    Intolerances    benzodiazepines (Other)  Haldol (Other)    Review of Systems: unable to obtain     Vital Signs Last 24 Hrs  T(C): 37.1 (25 Jun 2019 13:03), Max: 38.8 (25 Jun 2019 00:18)  T(F): 98.8 (25 Jun 2019 13:03), Max: 101.9 (25 Jun 2019 00:18)  HR: 103 (25 Jun 2019 13:03) (92 - 115)  BP: 136/76 (25 Jun 2019 13:03) (99/58 - 144/60)  BP(mean): --  RR: 20 (25 Jun 2019 13:03) (18 - 20)  SpO2: 98% (25 Jun 2019 13:03) (92% - 98%)    PHYSICAL EXAM:    Constitutional: ill appearing  HEENT: EOMI, throat clear  Neck: No LAD, supple  Respiratory: CTA and P  Cardiovascular: S1 and S2, RRR, no M  Gastrointestinal: BS+, soft, NT/ND, neg HSM, + peg with abdominal binder c/d/i  Extremities: No peripheral edema, neg clubbing, cyanosis  Vascular: 2+ peripheral pulses  Neurological: A/O x 0, no focal deficits  Psychiatric: Normal mood, normal affect  Skin: No rashes    LABS:                        7.7    10.07 )-----------( 143      ( 25 Jun 2019 09:00 )             23.1     06-25    141  |  106  |  14  ----------------------------<  189<H>  3.6   |  24  |  0.56    Ca    7.8<L>      25 Jun 2019 11:37  Phos  2.1     06-25  Mg     2.0     06-24    TPro  5.5<L>  /  Alb  2.5<L>  /  TBili  1.0  /  DBili  x   /  AST  23  /  ALT  17  /  AlkPhos  91  06-25          RADIOLOGY & ADDITIONAL TESTS:

## 2019-06-25 NOTE — PROGRESS NOTE ADULT - SUBJECTIVE AND OBJECTIVE BOX
Follow-up Pulm Progress Note    Afebrile overnight  Eyes open, non-verbal   Still tachycardic. 120s  94% on 2L NC      Medications:  MEDICATIONS  (STANDING):  ALBUTerol/ipratropium for Nebulization 3 milliLiter(s) Nebulizer every 6 hours  dextrose 5% + sodium chloride 0.9%. 1000 milliLiter(s) (60 mL/Hr) IV Continuous <Continuous>  heparin  Injectable 5000 Unit(s) SubCutaneous every 8 hours  OLANZapine 5 milliGRAM(s) Oral daily  pantoprazole  Injectable 40 milliGRAM(s) IV Push daily  piperacillin/tazobactam IVPB.. 3.375 Gram(s) IV Intermittent every 8 hours  polyethylene glycol 3350 17 Gram(s) Oral two times a day  QUEtiapine 200 milliGRAM(s) Oral daily  sucralfate 1 Gram(s) Oral two times a day  vancomycin  IVPB 1250 milliGRAM(s) IV Intermittent every 24 hours    MEDICATIONS  (PRN):  acetaminophen    Suspension .. 650 milliGRAM(s) Oral every 6 hours PRN Temp greater or equal to 38C (100.4F), Mild Pain (1 - 3), Moderate Pain (4 - 6)  acetaminophen  Suppository .. 650 milliGRAM(s) Rectal every 6 hours PRN Temp greater or equal to 38C (100.4F), Mild Pain (1 - 3), Moderate Pain (4 - 6)  bisacodyl Suppository 10 milliGRAM(s) Rectal daily PRN Constipation          Vital Signs Last 24 Hrs  T(C): 37.1 (25 Jun 2019 13:03), Max: 38.8 (25 Jun 2019 00:18)  T(F): 98.8 (25 Jun 2019 13:03), Max: 101.9 (25 Jun 2019 00:18)  HR: 103 (25 Jun 2019 13:03) (92 - 115)  BP: 136/76 (25 Jun 2019 13:03) (99/58 - 144/60)  BP(mean): --  RR: 20 (25 Jun 2019 13:03) (18 - 20)  SpO2: 98% (25 Jun 2019 13:03) (92% - 98%) on 2L NC          06-24 @ 07:01  -  06-25 @ 07:00  --------------------------------------------------------  IN: 1700 mL / OUT: 0 mL / NET: 1700 mL          LABS:                        7.7    10.07 )-----------( 143      ( 25 Jun 2019 09:00 )             23.1     06-25    141  |  106  |  14  ----------------------------<  189<H>  3.6   |  24  |  0.56    Ca    7.8<L>      25 Jun 2019 11:37  Phos  2.1     06-25  Mg     2.0     06-24    TPro  5.5<L>  /  Alb  2.5<L>  /  TBili  1.0  /  DBili  x   /  AST  23  /  ALT  17  /  AlkPhos  91  06-25          CAPILLARY BLOOD GLUCOSE                            CULTURES: (if applicable)  Culture Results:   Growth in anaerobic bottle: Coag Negative Staphylococcus  Single set isolate, possible contaminant. Contact  Microbiology if susceptibility testing clinically  indicated.  "Due to technical problems, Proteus sp. will Not be reported as part of  theBCID panel until further notice"  ***Blood Panel PCR results on this specimen are available  approximately 3 hours after the Gram stain result.***  Gram stain, PCR, and/or culture results may not always  correspond due to difference in methodologies.  ************************************************************  This PCR assay was performed using [employer].  The following targets are tested for: Enterococcus,  vancomycin resistant enterococci, Listeria monocytogenes,  coagulase negative staphylococci, S. aureus,  methicillin resistant S. aureus, Streptococcus agalactiae  (Group B), S. pneumoniae, S. pyogenes (Group A),  Acinetobacter baumannii, Enterobacter cloacae, E. coli,  Klebsiella oxytoca, K. pneumoniae, Proteus sp.,  Serratia marcescens, Haemophilus influenzae,  Neisseria meningitidis, Pseudomonas aeruginosa, Candida  albicans, C. glabrata, C krusei, C parapsilosis,  C. tropicalis and the KPC resistance gene. (06-22 @ 22:14)  Culture Results:   No growth to date. (06-22 @ 15:14)  Culture Results:   No growth (06-22 @ 01:26)    Most recent blood culture -- 06-22 @ 22:14   Blood Culture PCR Blood Culture PCR .Blood 06-22 @ 22:14  Most recent blood culture -- 06-22 @ 15:14   -- -- .Blood 06-22 @ 15:14  Most recent blood culture -- 06-22 @ 01:26   -- -- .Urine 06-22 @ 01:26    Blood culture 06-22 @ 22:14  --    Growth in anaerobic bottle: Gram Positive Cocci in Clusters  PCR  Blood Culture PCR  Blood Culture PCR    Urine culture    -->      Physical Examination:  PULM: Rhonchi bilaterally   CVS: S1, S2 heard    RADIOLOGY REVIEWED  CT chest: < from: CT Chest w/ IV Cont (06.24.19 @ 21:27) >    CHEST:     LUNGS AND LARGE AIRWAYS: Patent central airways. Right basilar linear   type atelectasis. Patchy opacities throughout the left lung.  PLEURA: No pleural effusion.  VESSELS: Atherosclerotic changes.  HEART: Heart size is normal. No pericardial effusion.  MEDIASTINUM AND LENA: No axillary lymphadenopathy. An AP window lymph   node (2:52) measuring 1.3 x 1.1 cm. A left perihilar lymph node (3:83)   measuring 1.1 x 1.0 cm.  CHEST WALL AND LOWER NECK: Within normal limits.     < end of copied text >

## 2019-06-25 NOTE — PROGRESS NOTE ADULT - SUBJECTIVE AND OBJECTIVE BOX
CC: F/U for Fever    Saw/spoke to patient. No fevers, no chills. No new complaints. Unchanged.    Allergies  No Known Allergies    ANTIMICROBIALS:  piperacillin/tazobactam IVPB.. 3.375 every 8 hours  vancomycin  IVPB 1250 every 24 hours    PE:    Vital Signs Last 24 Hrs  T(C): 37.5 (25 Jun 2019 16:01), Max: 38.8 (25 Jun 2019 00:18)  T(F): 99.5 (25 Jun 2019 16:01), Max: 101.9 (25 Jun 2019 00:18)  HR: 105 (25 Jun 2019 16:01) (92 - 105)  BP: 104/62 (25 Jun 2019 16:01) (103/75 - 144/60)  RR: 18 (25 Jun 2019 16:01) (18 - 20)  SpO2: 94% (25 Jun 2019 16:01) (92% - 98%)    Gen: AOx0-1, nonverbal  CV: S1+S2 normal, nontachycardic  Resp: Clear bilat, no resp distress, no crackles/wheezes  Abd: Soft, nontender, +BS, PEG  Ext: No LE edema, no wounds    LABS:                        7.7    10.07 )-----------( 143      ( 25 Jun 2019 09:00 )             23.1     06-25    141  |  106  |  14  ----------------------------<  189<H>  3.6   |  24  |  0.56    Ca    7.8<L>      25 Jun 2019 11:37  Phos  2.1     06-25  Mg     2.0     06-24    TPro  5.5<L>  /  Alb  2.5<L>  /  TBili  1.0  /  DBili  x   /  AST  23  /  ALT  17  /  AlkPhos  91  06-25    MICROBIOLOGY:    .Blood  06-22-19   Growth in anaerobic bottle: Coag Negative Staphylococcus    Blood  06-22-19   No growth to date.      .Urine  06-22-19   No growth    RADIOLOGY:    6/24 CT:    IMPRESSION:     Patchy opacities throughout the left lung concerning for pneumonia.   Mediastinal lymphadenopathy is likely reactive.    No intra-abdominal/pelvic source of infection is identified.

## 2019-06-25 NOTE — PROVIDER CONTACT NOTE (CRITICAL VALUE NOTIFICATION) - TEST AND RESULT REPORTED:
Lactate 4.0
Lactate 5.1
Lactate 5.4
Prilimanry result grwth on the anerobic bottle for gram + cocxi in [facility] and pcr results will be ready in 3 hours.
positive blood cultures from 6/24, preliminary growth gram positive cocci in clusters in aerobic bottle
Lactate 5.3

## 2019-06-25 NOTE — PROGRESS NOTE ADULT - PROBLEM SELECTOR PLAN 1
2nd aspiration PNA  -Leukocytosis downtrending, afebrile   -No evidence of occult infection on CT C/A/P  -Still tachycardic ??pain related to femur fracture   -Would check CT chest/abd/pelvis with contrast   -f/u repeat blood cultures

## 2019-06-25 NOTE — PROGRESS NOTE ADULT - ASSESSMENT
72 yo male  with PMH severe dementia with PEG tube, arthritis, GERD present from home after PEG tube falling out and Right thigh swelling.       ·  Problem: Closed fracture of distal end of right femur, unspecified fracture morphology, initial encounter.   : Ortho team consulted and placed into knee immobilizer.   ortho f/u /discussed to return for any addit recs   Pain control prn.     ·  Problem: PEG (percutaneous endoscopic gastrostomy) adjustment/replacement/removal.  : GI team replaced PEG in ED  feeds to cont   F/U GI recs.   bowel regimen      ·  Problem: Tachycardia.  improved         : Pneumonia./sepsis.fevers   iv abs  pulm f/u noted  id f/u   cts noted         ·  Problem: GERD (gastroesophageal reflux disease).    ppi      Problem: Dementia.   supp care       ·  Problem: Prophylactic measure.    : DVT SCD      ·  Problem: Medication management.     continue present Rx    discussed w/ wife at length at bedside as well as daughter over phone

## 2019-06-25 NOTE — PROGRESS NOTE ADULT - ASSESSMENT
74 yo man with PMH severe dementia (AAOx0 s/p severe reaction to Haldol/Ativan) with PEG tube, arthritis, GERD presents with  PEG dislodgement, Femur FX, sepsis     1. Hypoxic Resp Failure  -likely in the setting of LLL PNA  -cont abx/O2 support per pulm  -low suspicion for CHF    2. Sepsis/ LLL Pna   -ct chest/ abd revealed pna left lung , mediastinal lymphadenopathy. no intra-abd / pelvic source of infection identified   -BC x1 positive , pending repeat bcx   -ID, pulm f/u     3. Femur FX  - med f/u   -knee immobilizer, pain control, ortho f/u    4. Murmur  -ECHO to eval murmur    5.  PEG dislodgement  -gi f/u s/p peg change          -DVT ppx

## 2019-06-25 NOTE — PROGRESS NOTE ADULT - ASSESSMENT
72 yo M with PMH severe dementia (AAOx0 s/p severe reaction to Haldol/Ativan) with PEG tube, arthritis, GERD present from home after PEG tube falling out and Right thigh swelling.  Femur fracture  Fever, tachycardia, leukocytosis, AMS  CXR LLL ? pneumonia  UA negative  BCX 1/4 CoNS, likely contam, F/U repeat and monitor  Fever overnight, but WBC decreasing today; likely slow improving asp pna  Overall, fever, leukocytosis, aspiration pneumonia, tachycardia, sepsis  - Vanco 1250mg q 24 (monitor trough)  - Zosyn 3.375g q 8  - F/U BCX  - Aspiration precautions    Roni Luis MD  Pager [phone]  After 5pm and on weekends call [phone]

## 2019-06-25 NOTE — PROGRESS NOTE ADULT - SUBJECTIVE AND OBJECTIVE BOX
72 yo man with PMH severe dementia (AAOx0 s/p severe reaction to Haldol/Ativan) with PEG tube, arthritis, GERD present from home after PEG tube falling out and Right thigh swelling.     PEG replaced since admission.  Also noted to have right distal femur fracture folowed by ortho.  On IV abx now for pneumonia.    Hematology consulted for acute anemia noted over last 48 hours.  No report of melena or hematochezia. Had CT cap yesterday showing no evidence of RP bleed.       Review of Systems:  Review of Systems: Unable to obtain full ROS as patient AAOx2	      Allergies and Intolerances:        Allergies:  	No Known Allergies:        Intolerances:  	Haldol: Drug, Other, restlessness, agitation        Patient History:    Past Medical, Past Surgical, and Family History:  PAST MEDICAL HISTORY:  Acute renal failure (ARF) ARF 1.5 YEAR AGO    Arthritis     Dementia     Dyspepsia     Dysphagia peg    GERD (gastroesophageal reflux disease)     Pneumonia.     PAST SURGICAL HISTORY:  History of hand surgery     PEG adjustment, replacement, or removal     Status post insertion of percutaneous endoscopic gastrostomy (PEG) tube PEG replacement on 02/29/16.     FAMILY HISTORY:  FH: breast cancer, Mother.     Social History:  Social History (marital status, living situation, occupation, tobacco use, alcohol and drug use, and sexual history):  No history of tobacco, alcohol or illicit drug use	     Tobacco Screening:  · Core Measure Site  	No	      acetaminophen    Suspension .. 650 milliGRAM(s) Oral every 6 hours PRN  acetaminophen  Suppository .. 650 milliGRAM(s) Rectal every 6 hours PRN  ALBUTerol/ipratropium for Nebulization 3 milliLiter(s) Nebulizer every 6 hours  bisacodyl Suppository 10 milliGRAM(s) Rectal daily PRN  dextrose 5% + sodium chloride 0.9%. 1000 milliLiter(s) IV Continuous <Continuous>  heparin  Injectable 5000 Unit(s) SubCutaneous every 8 hours  OLANZapine 5 milliGRAM(s) Oral daily  pantoprazole  Injectable 40 milliGRAM(s) IV Push daily  piperacillin/tazobactam IVPB.. 3.375 Gram(s) IV Intermittent every 8 hours  polyethylene glycol 3350 17 Gram(s) Oral two times a day  QUEtiapine 200 milliGRAM(s) Oral daily  sucralfate 1 Gram(s) Oral two times a day  vancomycin  IVPB 1250 milliGRAM(s) IV Intermittent every 24 hours      benzodiazepines (Other)  Haldol (Other)  No Known Allergies        T(C): 37.1 (06-25-19 @ 13:03), Max: 38.8 (06-25-19 @ 00:18)  HR: 103 (06-25-19 @ 13:03) (92 - 115)  BP: 136/76 (06-25-19 @ 13:03) (99/58 - 144/60)  RR: 20 (06-25-19 @ 13:03) (18 - 20)  SpO2: 98% (06-25-19 @ 13:03) (92% - 98%)  PHYSICAL EXAM    Gen:  laying in bed, nad  H:  anicteric, eomi  CV:  RRR, S1, S2  Lungs:  CTA b/l  Ab soft/nt/nd  Ext:  no edema;  no echymosis noted                          7.7    10.07 )-----------( 143      ( 25 Jun 2019 09:00 )             23.1                         7.7    13.16 )-----------( 132      ( 24 Jun 2019 15:07 )             23.4                         9.3    18.5  )-----------( 122      ( 23 Jun 2019 16:53 )             26.9       06-25    141  |  106  |  14  ----------------------------<  189<H>  3.6   |  24  |  0.56    Ca    7.8<L>      25 Jun 2019 11:37  Phos  2.1     06-25  Mg     2.0     06-24    TPro  5.5<L>  /  Alb  2.5<L>  /  TBili  1.0  /  DBili  x   /  AST  23  /  ALT  17  /  AlkPhos  91  06-25

## 2019-06-26 LAB
ANION GAP SERPL CALC-SCNC: 10 MMOL/L — SIGNIFICANT CHANGE UP (ref 5–17)
BLD GP AB SCN SERPL QL: NEGATIVE — SIGNIFICANT CHANGE UP
BUN SERPL-MCNC: 12 MG/DL — SIGNIFICANT CHANGE UP (ref 7–23)
CALCIUM SERPL-MCNC: 8.6 MG/DL — SIGNIFICANT CHANGE UP (ref 8.4–10.5)
CHLORIDE SERPL-SCNC: 104 MMOL/L — SIGNIFICANT CHANGE UP (ref 96–108)
CO2 SERPL-SCNC: 26 MMOL/L — SIGNIFICANT CHANGE UP (ref 22–31)
CREAT SERPL-MCNC: 0.56 MG/DL — SIGNIFICANT CHANGE UP (ref 0.5–1.3)
CULTURE RESULTS: SIGNIFICANT CHANGE UP
CULTURE RESULTS: SIGNIFICANT CHANGE UP
DAT POLY-SP REAG RBC QL: NEGATIVE — SIGNIFICANT CHANGE UP
FERRITIN SERPL-MCNC: 142 NG/ML — SIGNIFICANT CHANGE UP (ref 30–400)
FOLATE SERPL-MCNC: >20 NG/ML — SIGNIFICANT CHANGE UP
GLUCOSE SERPL-MCNC: 131 MG/DL — HIGH (ref 70–99)
HAPTOGLOB SERPL-MCNC: 263 MG/DL — HIGH (ref 34–200)
HCT VFR BLD CALC: 25.8 % — LOW (ref 39–50)
HGB BLD-MCNC: 8.3 G/DL — LOW (ref 13–17)
IRON SATN MFR SERPL: 10 % — LOW (ref 16–55)
IRON SATN MFR SERPL: 20 UG/DL — LOW (ref 45–165)
LDH SERPL L TO P-CCNC: 260 U/L — HIGH (ref 50–242)
MCHC RBC-ENTMCNC: 32.2 GM/DL — SIGNIFICANT CHANGE UP (ref 32–36)
MCHC RBC-ENTMCNC: 32.2 PG — SIGNIFICANT CHANGE UP (ref 27–34)
MCV RBC AUTO: 100 FL — SIGNIFICANT CHANGE UP (ref 80–100)
PLATELET # BLD AUTO: 179 K/UL — SIGNIFICANT CHANGE UP (ref 150–400)
POTASSIUM SERPL-MCNC: 3.6 MMOL/L — SIGNIFICANT CHANGE UP (ref 3.5–5.3)
POTASSIUM SERPL-SCNC: 3.6 MMOL/L — SIGNIFICANT CHANGE UP (ref 3.5–5.3)
RBC # BLD: 2.58 M/UL — LOW (ref 4.2–5.8)
RBC # BLD: 2.58 M/UL — LOW (ref 4.2–5.8)
RBC # FLD: 13.5 % — SIGNIFICANT CHANGE UP (ref 10.3–14.5)
RETICS #: 75.9 K/UL — SIGNIFICANT CHANGE UP (ref 25–125)
RETICS/RBC NFR: 2.9 % — HIGH (ref 0.5–2.5)
RH IG SCN BLD-IMP: POSITIVE — SIGNIFICANT CHANGE UP
SODIUM SERPL-SCNC: 140 MMOL/L — SIGNIFICANT CHANGE UP (ref 135–145)
SPECIMEN SOURCE: SIGNIFICANT CHANGE UP
TIBC SERPL-MCNC: 191 UG/DL — LOW (ref 220–430)
UIBC SERPL-MCNC: 171 UG/DL — SIGNIFICANT CHANGE UP (ref 110–370)
VIT B12 SERPL-MCNC: 1212 PG/ML — SIGNIFICANT CHANGE UP (ref 232–1245)
WBC # BLD: 10.49 K/UL — SIGNIFICANT CHANGE UP (ref 3.8–10.5)
WBC # FLD AUTO: 10.49 K/UL — SIGNIFICANT CHANGE UP (ref 3.8–10.5)

## 2019-06-26 PROCEDURE — 99232 SBSQ HOSP IP/OBS MODERATE 35: CPT

## 2019-06-26 RX ORDER — ACETAMINOPHEN 500 MG
1000 TABLET ORAL ONCE
Refills: 0 | Status: COMPLETED | OUTPATIENT
Start: 2019-06-26 | End: 2019-06-26

## 2019-06-26 RX ADMIN — PIPERACILLIN AND TAZOBACTAM 25 GRAM(S): 4; .5 INJECTION, POWDER, LYOPHILIZED, FOR SOLUTION INTRAVENOUS at 05:50

## 2019-06-26 RX ADMIN — PANTOPRAZOLE SODIUM 40 MILLIGRAM(S): 20 TABLET, DELAYED RELEASE ORAL at 11:41

## 2019-06-26 RX ADMIN — QUETIAPINE FUMARATE 200 MILLIGRAM(S): 200 TABLET, FILM COATED ORAL at 20:37

## 2019-06-26 RX ADMIN — Medication 3 MILLILITER(S): at 05:50

## 2019-06-26 RX ADMIN — Medication 3 MILLILITER(S): at 11:43

## 2019-06-26 RX ADMIN — Medication 650 MILLIGRAM(S): at 01:17

## 2019-06-26 RX ADMIN — HEPARIN SODIUM 5000 UNIT(S): 5000 INJECTION INTRAVENOUS; SUBCUTANEOUS at 13:31

## 2019-06-26 RX ADMIN — Medication 1 GRAM(S): at 08:50

## 2019-06-26 RX ADMIN — Medication 3 MILLILITER(S): at 17:15

## 2019-06-26 RX ADMIN — Medication 400 MILLIGRAM(S): at 21:56

## 2019-06-26 RX ADMIN — PIPERACILLIN AND TAZOBACTAM 25 GRAM(S): 4; .5 INJECTION, POWDER, LYOPHILIZED, FOR SOLUTION INTRAVENOUS at 13:32

## 2019-06-26 RX ADMIN — Medication 10 MILLIGRAM(S): at 11:41

## 2019-06-26 RX ADMIN — Medication 650 MILLIGRAM(S): at 02:08

## 2019-06-26 RX ADMIN — Medication 3 MILLILITER(S): at 00:32

## 2019-06-26 RX ADMIN — Medication 166.67 MILLIGRAM(S): at 11:40

## 2019-06-26 RX ADMIN — Medication 650 MILLIGRAM(S): at 18:55

## 2019-06-26 RX ADMIN — POLYETHYLENE GLYCOL 3350 17 GRAM(S): 17 POWDER, FOR SOLUTION ORAL at 17:14

## 2019-06-26 RX ADMIN — HEPARIN SODIUM 5000 UNIT(S): 5000 INJECTION INTRAVENOUS; SUBCUTANEOUS at 21:55

## 2019-06-26 RX ADMIN — POLYETHYLENE GLYCOL 3350 17 GRAM(S): 17 POWDER, FOR SOLUTION ORAL at 05:50

## 2019-06-26 RX ADMIN — Medication 650 MILLIGRAM(S): at 19:15

## 2019-06-26 RX ADMIN — Medication 1 GRAM(S): at 20:37

## 2019-06-26 RX ADMIN — HEPARIN SODIUM 5000 UNIT(S): 5000 INJECTION INTRAVENOUS; SUBCUTANEOUS at 05:50

## 2019-06-26 RX ADMIN — OLANZAPINE 5 MILLIGRAM(S): 15 TABLET, FILM COATED ORAL at 13:32

## 2019-06-26 RX ADMIN — Medication 1000 MILLIGRAM(S): at 22:30

## 2019-06-26 RX ADMIN — PIPERACILLIN AND TAZOBACTAM 25 GRAM(S): 4; .5 INJECTION, POWDER, LYOPHILIZED, FOR SOLUTION INTRAVENOUS at 21:58

## 2019-06-26 RX ADMIN — Medication 3 MILLILITER(S): at 23:32

## 2019-06-26 NOTE — DIETITIAN INITIAL EVALUATION ADULT. - OTHER INFO
Pt non-verbal, no family at bedside, pt with PEG feeds, admitted after tube fell out, replaced in ED. Spoke with RN, pt tolerating bolus feeds well with no acute GI distress. Pt noted with constipation previously, on bowel regimen, last BM 6/25. Pt admitted after PEG tube fell out. Per chart pt on bolus regimen of Jevity 1.2 200ml bolus 4x per day (8am, 1pm, 5pm  and 9pm). NKFA noted in chart.

## 2019-06-26 NOTE — DIETITIAN INITIAL EVALUATION ADULT. - DIET TYPE
Jevity 1.2 bolus feeds of 200ml q 4hrs (4 feeds per day 8am, 1pm, 5pm, 9pm--per home regimen)    Current regimen provides 800ml total volume, 960Kcal (12.4 Kcal/kg) and 44gm protein (.68gm protein/Kg IBW). Based on dosing weight.

## 2019-06-26 NOTE — DIETITIAN INITIAL EVALUATION ADULT. - ENTERAL
1)Consider increasing feeds to Jevity 1.2 @ 240ml bolus q4 hrs (4 feeds per day at 8am, 1pm, 5pm and 9pm. Regimen to provide 960ml total volume, 1152Kcal (~15Kcal/kg)  and 53gm protein (.82mg/kg) and 775ml free water. Based on dosing weight of 77.6Kg and protein per IBW

## 2019-06-26 NOTE — PROGRESS NOTE ADULT - ASSESSMENT
Sepsis due to LLL PNA  PEG Dislodgment  Femur fracture  underlying severe dementia with dysphagia with contracturs 12.4 18.69

## 2019-06-26 NOTE — PROGRESS NOTE ADULT - SUBJECTIVE AND OBJECTIVE BOX
CHIEF COMPLAINT:Patient is a 73y old  Male who presents with a chief complaint of PEG tube fall out, Right thigh swelling (25 Jun 2019 14:29)    	        PAST MEDICAL & SURGICAL HISTORY:  Dyspepsia  GERD (gastroesophageal reflux disease)  Arthritis  Dysphagia: peg  Acute renal failure   Pneumonia  Dementia  Status post insertion of percutaneous endoscopic gastrostomy (PEG) tube: PEG replacement on 02/29/16  PEG adjustment, replacement, or removal  History of hand surgery          REVIEW OF SYSTEMS:  more alert  no apparent cp  cough +   dec sob  no vomiting       Medications:  MEDICATIONS  (STANDING):  ALBUTerol/ipratropium for Nebulization 3 milliLiter(s) Nebulizer every 6 hours  bisacodyl Suppository 10 milliGRAM(s) Rectal once  dextrose 5% + sodium chloride 0.9%. 1000 milliLiter(s) (60 mL/Hr) IV Continuous <Continuous>  heparin  Injectable 5000 Unit(s) SubCutaneous every 8 hours  OLANZapine 5 milliGRAM(s) Oral daily  pantoprazole  Injectable 40 milliGRAM(s) IV Push daily  piperacillin/tazobactam IVPB.. 3.375 Gram(s) IV Intermittent every 8 hours  polyethylene glycol 3350 17 Gram(s) Oral two times a day  QUEtiapine 200 milliGRAM(s) Oral daily  sucralfate 1 Gram(s) Oral two times a day  vancomycin  IVPB 1250 milliGRAM(s) IV Intermittent every 24 hours    MEDICATIONS  (PRN):  acetaminophen    Suspension .. 650 milliGRAM(s) Oral every 6 hours PRN Temp greater or equal to 38C (100.4F), Mild Pain (1 - 3), Moderate Pain (4 - 6)  acetaminophen  Suppository .. 650 milliGRAM(s) Rectal every 6 hours PRN Temp greater or equal to 38C (100.4F), Mild Pain (1 - 3), Moderate Pain (4 - 6)  bisacodyl Suppository 10 milliGRAM(s) Rectal daily PRN Constipation    	    PHYSICAL EXAM:  T(C): 37.8 (06-26-19 @ 05:45), Max: 38.4 (06-26-19 @ 00:37)  HR: 106 (06-26-19 @ 05:45) (103 - 106)  BP: 112/70 (06-26-19 @ 05:45) (104/62 - 136/76)  RR: 18 (06-26-19 @ 05:45) (18 - 20)  SpO2: 97% (06-26-19 @ 05:45) (94% - 98%)  Wt(kg): --  I&O's Summary    25 Jun 2019 07:01  -  26 Jun 2019 07:00  --------------------------------------------------------  IN: 1240 mL / OUT: 1 mL / NET: 1239 mL    26 Jun 2019 07:01  -  26 Jun 2019 10:31  --------------------------------------------------------  IN: 200 mL / OUT: 0 mL / NET: 200 mL        HEENT:   Normal oral mucosa,  Lymphatic: No lymphadenopathy  Cardiovascular: Normal S1 S2, No JVD,  Respiratory:dec bs   Gastrointestinal:  Soft, Non-tender, + BS	peg     Neurologic: Non-focal  Extremities:r leg immobilizer     TELEMETRY: 	    ECG:  	  RADIOLOGY:  OTHER: 	  	  LABS:	 	    CARDIAC MARKERS:                                8.3    10.49 )-----------( 179      ( 26 Jun 2019 09:15 )             25.8     06-26    140  |  104  |  12  ----------------------------<  131<H>  3.6   |  26  |  0.56    Ca    8.6      26 Jun 2019 06:52  Phos  2.1     06-25  Mg     2.0     06-24    TPro  5.5<L>  /  Alb  2.5<L>  /  TBili  1.0  /  DBili  x   /  AST  23  /  ALT  17  /  AlkPhos  91  06-25    proBNP:   Lipid Profile:   HgA1c:   TSH:

## 2019-06-26 NOTE — DIETITIAN INITIAL EVALUATION ADULT. - PHYSICAL APPEARANCE
Ht: 66inches, Wt: 170.7lbs, BMI: 27.6kg/m2, IBW: 142lbs +/- 10%, %IBW: 120%  Edema: +1 right hand, +2 right leg, right/left foot, Skin: free of pressure injuries per nursing flow sheets/other (specify)/well nourished Weight history per RD notes noted as 9/13/2016: 141.5lbs, 8/22/2016: 137.6lbs 3/20/2016: 121lbs. Current dosing weight noted as 170.1lbs (6/22/2019). Pt noted with steady weight gain, will continue to monitor and clarify weight trend as able

## 2019-06-26 NOTE — PROGRESS NOTE ADULT - SUBJECTIVE AND OBJECTIVE BOX
Follow-up Pulm Progress Note    Non-verbal, awake and eyes open  93% on RA  +cough, less so  TMax 38.4    Medications:  MEDICATIONS  (STANDING):  ALBUTerol/ipratropium for Nebulization 3 milliLiter(s) Nebulizer every 6 hours  dextrose 5% + sodium chloride 0.9%. 1000 milliLiter(s) (60 mL/Hr) IV Continuous <Continuous>  heparin  Injectable 5000 Unit(s) SubCutaneous every 8 hours  OLANZapine 5 milliGRAM(s) Oral daily  pantoprazole  Injectable 40 milliGRAM(s) IV Push daily  piperacillin/tazobactam IVPB.. 3.375 Gram(s) IV Intermittent every 8 hours  polyethylene glycol 3350 17 Gram(s) Oral two times a day  QUEtiapine 200 milliGRAM(s) Oral daily  sucralfate 1 Gram(s) Oral two times a day  vancomycin  IVPB 1250 milliGRAM(s) IV Intermittent every 24 hours    MEDICATIONS  (PRN):  acetaminophen    Suspension .. 650 milliGRAM(s) Oral every 6 hours PRN Temp greater or equal to 38C (100.4F), Mild Pain (1 - 3), Moderate Pain (4 - 6)  acetaminophen  Suppository .. 650 milliGRAM(s) Rectal every 6 hours PRN Temp greater or equal to 38C (100.4F), Mild Pain (1 - 3), Moderate Pain (4 - 6)  bisacodyl Suppository 10 milliGRAM(s) Rectal daily PRN Constipation          Vital Signs Last 24 Hrs  T(C): 37.7 (26 Jun 2019 11:56), Max: 38.4 (26 Jun 2019 00:37)  T(F): 99.8 (26 Jun 2019 11:56), Max: 101.1 (26 Jun 2019 00:37)  HR: 111 (26 Jun 2019 11:56) (105 - 111)  BP: 101/59 (26 Jun 2019 11:56) (101/59 - 115/65)  BP(mean): --  RR: 18 (26 Jun 2019 11:56) (18 - 18)  SpO2: 94% (26 Jun 2019 11:56) (94% - 97%) on RA          06-25 @ 07:01  -  06-26 @ 07:00  --------------------------------------------------------  IN: 1240 mL / OUT: 1 mL / NET: 1239 mL          LABS:                        8.3    10.49 )-----------( 179      ( 26 Jun 2019 09:15 )             25.8     06-26    140  |  104  |  12  ----------------------------<  131<H>  3.6   |  26  |  0.56    Ca    8.6      26 Jun 2019 06:52  Phos  2.1     06-25    TPro  5.5<L>  /  Alb  2.5<L>  /  TBili  1.0  /  DBili  x   /  AST  23  /  ALT  17  /  AlkPhos  91  06-25          CAPILLARY BLOOD GLUCOSE                            CULTURES: (if applicable)  Culture Results:   Growth in aerobic bottle: Gram Positive Cocci in Clusters (06-24 @ 19:17)  Culture Results:   Growth in anaerobic bottle: Coag Negative Staphylococcus  Single set isolate, possible contaminant. Contact  Microbiology if susceptibility testing clinically  indicated.  "Due to technical problems, Proteus sp. will Not be reported as part of  theBCID panel until further notice"  ***Blood Panel PCR results on this specimen are available  approximately 3 hours after the Gram stain result.***  Gram stain, PCR, and/or culture results may not always  correspond due to difference in methodologies.  ************************************************************  This PCR assay was performed using [employer].  The following targets are tested for: Enterococcus,  vancomycin resistant enterococci, Listeria monocytogenes,  coagulase negative staphylococci, S. aureus,  methicillin resistant S. aureus, Streptococcus agalactiae  (Group B), S. pneumoniae, S. pyogenes (Group A),  Acinetobacter baumannii, Enterobacter cloacae, E. coli,  Klebsiella oxytoca, K. pneumoniae, Proteus sp.,  Serratia marcescens, Haemophilus influenzae,  Neisseria meningitidis, Pseudomonas aeruginosa, Candida  albicans, C. glabrata, C krusei, C parapsilosis,  C. tropicalis and the KPC resistance gene. (06-22 @ 22:14)  Culture Results:   No growth to date. (06-22 @ 15:14)  Culture Results:   No growth (06-22 @ 01:26)    Most recent blood culture -- 06-24 @ 19:17   -- -- .Blood 06-24 @ 19:17  Most recent blood culture -- 06-22 @ 22:14   Blood Culture PCR Blood Culture PCR .Blood 06-22 @ 22:14  Most recent blood culture -- 06-22 @ 15:14   -- -- .Blood 06-22 @ 15:14  Most recent blood culture -- 06-22 @ 01:26   -- -- .Urine 06-22 @ 01:26    Blood culture 06-24 @ 19:17  --    Growth in aerobic bottle: Gram Positive Cocci in Clusters  --  --  --    Urine culture    -->  Blood culture 06-22 @ 22:14  --    Growth in anaerobic bottle: Gram Positive Cocci in Clusters  PCR  Blood Culture PCR  Blood Culture PCR    Urine culture    -->      Physical Examination:  PULM: Clear to auscultation bilaterally, no significant sputum production  CVS: S1, S2 heard    RADIOLOGY REVIEWED  CT chest: < from: CT Chest w/ IV Cont (06.24.19 @ 21:27) >  CHEST:     LUNGS AND LARGE AIRWAYS: Patent central airways. Right basilar linear   type atelectasis. Patchy opacities throughout the left lung.  PLEURA: No pleural effusion.  VESSELS: Atherosclerotic changes.  HEART: Heart size is normal. No pericardial effusion.  MEDIASTINUM AND LENA: No axillary lymphadenopathy. An AP window lymph   node (2:52) measuring 1.3 x 1.1 cm. A left perihilar lymph node (3:83)   measuring 1.1 x 1.0 cm.  CHEST WALL AND LOWER NECK: Within normal limits.     < end of copied text >

## 2019-06-26 NOTE — PROGRESS NOTE ADULT - ASSESSMENT
74 yo M with PMH severe dementia (AAOx0 s/p severe reaction to Haldol/Ativan) with PEG tube, arthritis, GERD present from home after PEG tube falling out and Right thigh swelling.  Femur fracture  Fever, tachycardia, leukocytosis, AMS  CXR LLL ? pneumonia  UA negative  BCX 1/4 CoNS, likely contam; repeat BCX again positive--unclear significance, is on Vanco, no hardware to suggest foci  Still fever, mental status unchanged, poorly verbal  Overall, fever, leukocytosis, aspiration pneumonia, tachycardia, sepsis  - Vanco 1250mg q 24 (monitor trough)  - Zosyn 3.375g q 8  - F/U BCXs (repeat from today pending)  - Aspiration precautions    Roni Luis MD  Pager [phone]  After 5pm and on weekends call [phone]

## 2019-06-26 NOTE — PROGRESS NOTE ADULT - SUBJECTIVE AND OBJECTIVE BOX
INTERVAL HPI/OVERNIGHT EVENTS:    pt seen and examined. Non- verbal  tolerating peg feeds as per RN  no leakage/drainage from peg site   + bm yesterday    MEDICATIONS  (STANDING):  ALBUTerol/ipratropium for Nebulization 3 milliLiter(s) Nebulizer every 6 hours  dextrose 5% + sodium chloride 0.9%. 1000 milliLiter(s) (60 mL/Hr) IV Continuous <Continuous>  heparin  Injectable 5000 Unit(s) SubCutaneous every 8 hours  OLANZapine 5 milliGRAM(s) Oral daily  pantoprazole  Injectable 40 milliGRAM(s) IV Push daily  piperacillin/tazobactam IVPB.. 3.375 Gram(s) IV Intermittent every 8 hours  polyethylene glycol 3350 17 Gram(s) Oral two times a day  QUEtiapine 200 milliGRAM(s) Oral daily  sucralfate 1 Gram(s) Oral two times a day  vancomycin  IVPB 1250 milliGRAM(s) IV Intermittent every 24 hours    MEDICATIONS  (PRN):  acetaminophen    Suspension .. 650 milliGRAM(s) Oral every 6 hours PRN Temp greater or equal to 38C (100.4F), Mild Pain (1 - 3), Moderate Pain (4 - 6)  acetaminophen  Suppository .. 650 milliGRAM(s) Rectal every 6 hours PRN Temp greater or equal to 38C (100.4F), Mild Pain (1 - 3), Moderate Pain (4 - 6)  bisacodyl Suppository 10 milliGRAM(s) Rectal daily PRN Constipation      Allergies    No Known Allergies    Intolerances    benzodiazepines (Other)  Haldol (Other)    Review of Systems: unable to obtain     Vital Signs Last 24 Hrs  T(C): 37.7 (26 Jun 2019 11:56), Max: 38.4 (26 Jun 2019 00:37)  T(F): 99.8 (26 Jun 2019 11:56), Max: 101.1 (26 Jun 2019 00:37)  HR: 111 (26 Jun 2019 11:56) (103 - 111)  BP: 101/59 (26 Jun 2019 11:56) (101/59 - 136/76)  BP(mean): --  RR: 18 (26 Jun 2019 11:56) (18 - 20)  SpO2: 94% (26 Jun 2019 11:56) (94% - 98%)    PHYSICAL EXAM:    Constitutional: ill appearing  HEENT: EOMI, throat clear  Neck: No LAD, supple  Respiratory: CTA and P  Cardiovascular: S1 and S2, RRR, no M  Gastrointestinal: BS+, soft, NT/ND, neg HSM, + peg with abdominal binder c/d/i  Extremities: No peripheral edema, neg clubbing, cyanosis  Vascular: 2+ peripheral pulses  Neurological: A/O x 0, no focal deficits  Psychiatric: Normal mood, normal affect  Skin: No rashes    LABS:                        8.3    10.49 )-----------( 179      ( 26 Jun 2019 09:15 )             25.8     06-26    140  |  104  |  12  ----------------------------<  131<H>  3.6   |  26  |  0.56    Ca    8.6      26 Jun 2019 06:52  Phos  2.1     06-25    TPro  5.5<L>  /  Alb  2.5<L>  /  TBili  1.0  /  DBili  x   /  AST  23  /  ALT  17  /  AlkPhos  91  06-25          RADIOLOGY & ADDITIONAL TESTS:

## 2019-06-26 NOTE — PROGRESS NOTE ADULT - SUBJECTIVE AND OBJECTIVE BOX
CARDIOLOGY FOLLOW UP - Dr. Young    CC events noted, + temp and bcx     PHYSICAL EXAM:  T(C): 37.8 (06-26-19 @ 05:45), Max: 38.4 (06-26-19 @ 00:37)  HR: 106 (06-26-19 @ 05:45) (103 - 106)  BP: 112/70 (06-26-19 @ 05:45) (104/62 - 136/76)  RR: 18 (06-26-19 @ 05:45) (18 - 20)  SpO2: 97% (06-26-19 @ 05:45) (94% - 98%)  Wt(kg): --  I&O's Summary    25 Jun 2019 07:01  -  26 Jun 2019 07:00  --------------------------------------------------------  IN: 1240 mL / OUT: 1 mL / NET: 1239 mL    26 Jun 2019 07:01  -  26 Jun 2019 10:46  --------------------------------------------------------  IN: 200 mL / OUT: 0 mL / NET: 200 mL        Appearance: NAD 	  Cardiovascular: Normal S1 S2,RRR + murmur  Respiratory: diminished 	  Gastrointestinal:  Soft, Non-tender, + BS	  Extremities: Normal range of motion, No clubbing, cyanosis or edema        MEDICATIONS  (STANDING):  ALBUTerol/ipratropium for Nebulization 3 milliLiter(s) Nebulizer every 6 hours  bisacodyl Suppository 10 milliGRAM(s) Rectal once  dextrose 5% + sodium chloride 0.9%. 1000 milliLiter(s) (60 mL/Hr) IV Continuous <Continuous>  heparin  Injectable 5000 Unit(s) SubCutaneous every 8 hours  OLANZapine 5 milliGRAM(s) Oral daily  pantoprazole  Injectable 40 milliGRAM(s) IV Push daily  piperacillin/tazobactam IVPB.. 3.375 Gram(s) IV Intermittent every 8 hours  polyethylene glycol 3350 17 Gram(s) Oral two times a day  QUEtiapine 200 milliGRAM(s) Oral daily  sucralfate 1 Gram(s) Oral two times a day  vancomycin  IVPB 1250 milliGRAM(s) IV Intermittent every 24 hours      TELEMETRY: 	    ECG:  	  RADIOLOGY:   DIAGNOSTIC TESTING:  [ ] Echocardiogram:  [ ]  Catheterization:  [ ] Stress Test:    OTHER: 	    LABS:	 	                            8.3    10.49 )-----------( 179      ( 26 Jun 2019 09:15 )             25.8     06-26    140  |  104  |  12  ----------------------------<  131<H>  3.6   |  26  |  0.56    Ca    8.6      26 Jun 2019 06:52  Phos  2.1     06-25    TPro  5.5<L>  /  Alb  2.5<L>  /  TBili  1.0  /  DBili  x   /  AST  23  /  ALT  17  /  AlkPhos  91  06-25

## 2019-06-26 NOTE — PROGRESS NOTE ADULT - SUBJECTIVE AND OBJECTIVE BOX
Pt seen    MEDICATIONS  (STANDING):  ALBUTerol/ipratropium for Nebulization 3 milliLiter(s) Nebulizer every 6 hours  dextrose 5% + sodium chloride 0.9%. 1000 milliLiter(s) (60 mL/Hr) IV Continuous <Continuous>  heparin  Injectable 5000 Unit(s) SubCutaneous every 8 hours  OLANZapine 5 milliGRAM(s) Oral daily  pantoprazole  Injectable 40 milliGRAM(s) IV Push daily  piperacillin/tazobactam IVPB.. 3.375 Gram(s) IV Intermittent every 8 hours  polyethylene glycol 3350 17 Gram(s) Oral two times a day  QUEtiapine 200 milliGRAM(s) Oral daily  sucralfate 1 Gram(s) Oral two times a day  vancomycin  IVPB 1250 milliGRAM(s) IV Intermittent every 24 hours    MEDICATIONS  (PRN):  acetaminophen    Suspension .. 650 milliGRAM(s) Oral every 6 hours PRN Temp greater or equal to 38C (100.4F), Mild Pain (1 - 3), Moderate Pain (4 - 6)  acetaminophen  Suppository .. 650 milliGRAM(s) Rectal every 6 hours PRN Temp greater or equal to 38C (100.4F), Mild Pain (1 - 3), Moderate Pain (4 - 6)  bisacodyl Suppository 10 milliGRAM(s) Rectal daily PRN Constipation      ROS  UTO    Vital Signs Last 24 Hrs  T(C): 37.7 (26 Jun 2019 15:39), Max: 38.4 (26 Jun 2019 00:37)  T(F): 99.9 (26 Jun 2019 15:39), Max: 101.1 (26 Jun 2019 00:37)  HR: 104 (26 Jun 2019 15:39) (104 - 111)  BP: 113/75 (26 Jun 2019 15:39) (101/59 - 115/65)  BP(mean): --  RR: 18 (26 Jun 2019 15:39) (18 - 18)  SpO2: 95% (26 Jun 2019 15:39) (94% - 97%)    PE  NAD  confused, lethargic  abd benign  no edema  remainder of exam limited 2/2 participation and mental status                          8.3    10.49 )-----------( 179      ( 26 Jun 2019 09:15 )             25.8       06-26    140  |  104  |  12  ----------------------------<  131<H>  3.6   |  26  |  0.56    Ca    8.6      26 Jun 2019 06:52  Phos  2.1     06-25    TPro  5.5<L>  /  Alb  2.5<L>  /  TBili  1.0  /  DBili  x   /  AST  23  /  ALT  17  /  AlkPhos  91  06-25

## 2019-06-26 NOTE — PROGRESS NOTE ADULT - ASSESSMENT
74 yo man with PMH severe dementia (AAOx0 s/p severe reaction to Haldol/Ativan) with PEG tube, arthritis, GERD present from home after PEG tube falling out and Right thigh swelling.     PEG replaced since admission.  Also noted to have right distal femur fracture folowed by ortho.  On IV abx now for pneumonia.    Hematology consulted for acute anemia noted over last 48 hours.  No report of melena or hematochezia. Had CT cap yesterday showing no evidence of RP bleed.      Acute anemia  --> Recommend Stool guaiac;  GI f/u to r/o GI bleed  --> Fe, B12, folate adequate, hapto and FELIX neg  --> CBC stable and adequate, monitor for now    Plts nml today    Will follow, [phone]

## 2019-06-26 NOTE — PROGRESS NOTE ADULT - PROBLEM SELECTOR PLAN 1
2nd aspiration PNA  -Leukocytosis resolved, still low grade temps-38.4 overnight  -Blood cultures with GPC-clusters from 6/24. ID f/u   -No evidence of occult infection on CT C/A/P  -Still tachycardic ??pain related to femur fracture

## 2019-06-26 NOTE — PROGRESS NOTE ADULT - SUBJECTIVE AND OBJECTIVE BOX
CC: F/U for Fever    Saw/spoke to patient. Unchanged. Poorly verbal. No new events but still fever.    Allergies  No Known Allergies    ANTIMICROBIALS:  piperacillin/tazobactam IVPB.. 3.375 every 8 hours  vancomycin  IVPB 1250 every 24 hours    PE:    Vital Signs Last 24 Hrs  T(C): 37.7 (26 Jun 2019 15:39), Max: 38.4 (26 Jun 2019 00:37)  T(F): 99.9 (26 Jun 2019 15:39), Max: 101.1 (26 Jun 2019 00:37)  HR: 104 (26 Jun 2019 15:39) (104 - 111)  BP: 113/75 (26 Jun 2019 15:39) (101/59 - 115/65)  RR: 18 (26 Jun 2019 15:39) (18 - 18)  SpO2: 95% (26 Jun 2019 15:39) (94% - 97%)    Gen: AOx1, NAD, sleepy  CV: S1+S2 normal, nontachycardic  Resp: Clear bilat, no resp distress, no crackles/wheezes  Abd: Soft, nontender, +BS, peg  Ext: No LE edema, no wounds    LABS:                        8.3    10.49 )-----------( 179      ( 26 Jun 2019 09:15 )             25.8     06-26    140  |  104  |  12  ----------------------------<  131<H>  3.6   |  26  |  0.56    Ca    8.6      26 Jun 2019 06:52  Phos  2.1     06-25    TPro  5.5<L>  /  Alb  2.5<L>  /  TBili  1.0  /  DBili  x   /  AST  23  /  ALT  17  /  AlkPhos  91  06-25    MICROBIOLOGY:    .Blood  06-24-19   Growth in aerobic bottle: Gram Positive Cocci in Clusters  --    Growth in aerobic bottle: Gram Positive Cocci in Clusters    .Blood  06-22-19   Growth in anaerobic bottle: Coag Negative Staphylococcus    .Blood  06-22-19   No growth to date.     .Urine  06-22-19   No growth     RADIOLOGY:    6/24 CT:    IMPRESSION:     Patchy opacities throughout the left lung concerning for pneumonia.   Mediastinal lymphadenopathy is likely reactive.    No intra-abdominal/pelvic source of infection is identified.

## 2019-06-26 NOTE — PROGRESS NOTE ADULT - ASSESSMENT
74 yo male  with PMH severe dementia with PEG tube, arthritis, GERD present from home after PEG tube falling out and Right thigh swelling.       ·  Problem: Closed fracture of distal end of right femur, unspecified fracture morphology, initial encounter.   : Ortho team consulted and placed into knee immobilizer.   ortho f/u yesterday   discussed w/ pts wife   Pain control prn.     ·  Problem: PEG (percutaneous endoscopic gastrostomy) adjustment/replacement/removal.  : GI team replaced PEG in ED  feeds to cont   F/U GI recs.   bowel regimen      ·  Problem: Tachycardia.  improved         : Pneumonia./sepsis.fevers   iv abs  pulm f/u noted  id f/u noted  f/u culture /blood          ·  Problem: GERD (gastroesophageal reflux disease).    ppi      Problem: Dementia.   supp care       ·  Problem: Prophylactic measure.    : DVT SCD      ·  Problem: Medication management.     continue present Rx    discussed w/ wife at length at bedside

## 2019-06-26 NOTE — PROGRESS NOTE ADULT - ASSESSMENT
72 yo man with PMH severe dementia (AAOx0 s/p severe reaction to Haldol/Ativan) with PEG tube, arthritis, GERD presents with  PEG dislodgement, Femur FX, sepsis     1. Hypoxic Resp Failure  -likely in the setting of LLL PNA  -cont abx/O2 support per pulm  -low suspicion for CHF    2. Sepsis/ LLL Pna   -ct chest/ abd revealed pna left lung , mediastinal lymphadenopathy. no intra-abd / pelvic source of infection identified   - repeat bcx positive   -ID, pulm f/u     3. Femur FX  - med f/u   -knee immobilizer, pain control, ortho f/u    4. Murmur  -ECHO to eval murmur    5.  PEG dislodgement  -gi f/u s/p peg change          -DVT ppx

## 2019-06-26 NOTE — DIETITIAN INITIAL EVALUATION ADULT. - PROBLEM SELECTOR PLAN 4
CXR with reported Right mid lung opacity and currently on Day 6/7 of Levaquin via G-tube as outpatient  Continue Levaquin IV x1 to complete course inpatient pending G-tube confirmation  Monitor symptoms.

## 2019-06-27 LAB
ANION GAP SERPL CALC-SCNC: 13 MMOL/L — SIGNIFICANT CHANGE UP (ref 5–17)
BUN SERPL-MCNC: 15 MG/DL — SIGNIFICANT CHANGE UP (ref 7–23)
CALCIUM SERPL-MCNC: 8.6 MG/DL — SIGNIFICANT CHANGE UP (ref 8.4–10.5)
CHLORIDE SERPL-SCNC: 104 MMOL/L — SIGNIFICANT CHANGE UP (ref 96–108)
CO2 SERPL-SCNC: 25 MMOL/L — SIGNIFICANT CHANGE UP (ref 22–31)
CREAT SERPL-MCNC: 0.58 MG/DL — SIGNIFICANT CHANGE UP (ref 0.5–1.3)
CULTURE RESULTS: SIGNIFICANT CHANGE UP
GLUCOSE SERPL-MCNC: 149 MG/DL — HIGH (ref 70–99)
HCT VFR BLD CALC: 25.2 % — LOW (ref 39–50)
HGB BLD-MCNC: 8 G/DL — LOW (ref 13–17)
MAGNESIUM SERPL-MCNC: 2.1 MG/DL — SIGNIFICANT CHANGE UP (ref 1.6–2.6)
MCHC RBC-ENTMCNC: 31.7 GM/DL — LOW (ref 32–36)
MCHC RBC-ENTMCNC: 32.5 PG — SIGNIFICANT CHANGE UP (ref 27–34)
MCV RBC AUTO: 102.4 FL — HIGH (ref 80–100)
OB PNL STL: NEGATIVE — SIGNIFICANT CHANGE UP
PLATELET # BLD AUTO: 199 K/UL — SIGNIFICANT CHANGE UP (ref 150–400)
POTASSIUM SERPL-MCNC: 3.7 MMOL/L — SIGNIFICANT CHANGE UP (ref 3.5–5.3)
POTASSIUM SERPL-SCNC: 3.7 MMOL/L — SIGNIFICANT CHANGE UP (ref 3.5–5.3)
RBC # BLD: 2.46 M/UL — LOW (ref 4.2–5.8)
RBC # FLD: 13.5 % — SIGNIFICANT CHANGE UP (ref 10.3–14.5)
SODIUM SERPL-SCNC: 142 MMOL/L — SIGNIFICANT CHANGE UP (ref 135–145)
SPECIMEN SOURCE: SIGNIFICANT CHANGE UP
VANCOMYCIN TROUGH SERPL-MCNC: 6.2 UG/ML — LOW (ref 10–20)
WBC # BLD: 10.44 K/UL — SIGNIFICANT CHANGE UP (ref 3.8–10.5)
WBC # FLD AUTO: 10.44 K/UL — SIGNIFICANT CHANGE UP (ref 3.8–10.5)

## 2019-06-27 PROCEDURE — 99233 SBSQ HOSP IP/OBS HIGH 50: CPT

## 2019-06-27 PROCEDURE — 93010 ELECTROCARDIOGRAM REPORT: CPT

## 2019-06-27 RX ORDER — SODIUM CHLORIDE 9 MG/ML
250 INJECTION INTRAMUSCULAR; INTRAVENOUS; SUBCUTANEOUS ONCE
Refills: 0 | Status: COMPLETED | OUTPATIENT
Start: 2019-06-27 | End: 2019-06-27

## 2019-06-27 RX ORDER — QUETIAPINE FUMARATE 200 MG/1
100 TABLET, FILM COATED ORAL DAILY
Refills: 0 | Status: DISCONTINUED | OUTPATIENT
Start: 2019-06-27 | End: 2019-07-03

## 2019-06-27 RX ORDER — MEROPENEM 1 G/30ML
1000 INJECTION INTRAVENOUS EVERY 8 HOURS
Refills: 0 | Status: DISCONTINUED | OUTPATIENT
Start: 2019-06-27 | End: 2019-07-03

## 2019-06-27 RX ORDER — ACETAMINOPHEN 500 MG
1000 TABLET ORAL ONCE
Refills: 0 | Status: COMPLETED | OUTPATIENT
Start: 2019-06-27 | End: 2019-06-27

## 2019-06-27 RX ADMIN — MEROPENEM 100 MILLIGRAM(S): 1 INJECTION INTRAVENOUS at 12:38

## 2019-06-27 RX ADMIN — PIPERACILLIN AND TAZOBACTAM 25 GRAM(S): 4; .5 INJECTION, POWDER, LYOPHILIZED, FOR SOLUTION INTRAVENOUS at 05:01

## 2019-06-27 RX ADMIN — Medication 166.67 MILLIGRAM(S): at 11:17

## 2019-06-27 RX ADMIN — HEPARIN SODIUM 5000 UNIT(S): 5000 INJECTION INTRAVENOUS; SUBCUTANEOUS at 21:29

## 2019-06-27 RX ADMIN — Medication 1 GRAM(S): at 08:30

## 2019-06-27 RX ADMIN — Medication 3 MILLILITER(S): at 16:46

## 2019-06-27 RX ADMIN — Medication 650 MILLIGRAM(S): at 11:51

## 2019-06-27 RX ADMIN — Medication 3 MILLILITER(S): at 23:38

## 2019-06-27 RX ADMIN — Medication 1 GRAM(S): at 16:47

## 2019-06-27 RX ADMIN — Medication 3 MILLILITER(S): at 11:52

## 2019-06-27 RX ADMIN — QUETIAPINE FUMARATE 100 MILLIGRAM(S): 200 TABLET, FILM COATED ORAL at 15:46

## 2019-06-27 RX ADMIN — Medication 650 MILLIGRAM(S): at 16:00

## 2019-06-27 RX ADMIN — POLYETHYLENE GLYCOL 3350 17 GRAM(S): 17 POWDER, FOR SOLUTION ORAL at 05:01

## 2019-06-27 RX ADMIN — OLANZAPINE 5 MILLIGRAM(S): 15 TABLET, FILM COATED ORAL at 12:37

## 2019-06-27 RX ADMIN — SODIUM CHLORIDE 500 MILLILITER(S): 9 INJECTION INTRAMUSCULAR; INTRAVENOUS; SUBCUTANEOUS at 20:08

## 2019-06-27 RX ADMIN — MEROPENEM 100 MILLIGRAM(S): 1 INJECTION INTRAVENOUS at 21:27

## 2019-06-27 RX ADMIN — HEPARIN SODIUM 5000 UNIT(S): 5000 INJECTION INTRAVENOUS; SUBCUTANEOUS at 05:01

## 2019-06-27 RX ADMIN — Medication 650 MILLIGRAM(S): at 15:45

## 2019-06-27 RX ADMIN — HEPARIN SODIUM 5000 UNIT(S): 5000 INJECTION INTRAVENOUS; SUBCUTANEOUS at 12:38

## 2019-06-27 RX ADMIN — POLYETHYLENE GLYCOL 3350 17 GRAM(S): 17 POWDER, FOR SOLUTION ORAL at 16:46

## 2019-06-27 RX ADMIN — Medication 650 MILLIGRAM(S): at 10:09

## 2019-06-27 RX ADMIN — Medication 3 MILLILITER(S): at 05:01

## 2019-06-27 RX ADMIN — SODIUM CHLORIDE 1000 MILLILITER(S): 9 INJECTION INTRAMUSCULAR; INTRAVENOUS; SUBCUTANEOUS at 11:14

## 2019-06-27 RX ADMIN — PANTOPRAZOLE SODIUM 40 MILLIGRAM(S): 20 TABLET, DELAYED RELEASE ORAL at 11:52

## 2019-06-27 RX ADMIN — Medication 400 MILLIGRAM(S): at 20:33

## 2019-06-27 NOTE — PROGRESS NOTE ADULT - SUBJECTIVE AND OBJECTIVE BOX
CC: F/U for Fever    Saw/spoke to patient. Patient still with fevers. Mental status unchanged (still poor/nonverbal). No new events.    Allergies  No Known Allergies    ANTIMICROBIALS:  meropenem  IVPB 1000 every 8 hours  vancomycin  IVPB 1250 every 24 hours    PE:    Vital Signs Last 24 Hrs  T(C): 38.1 (27 Jun 2019 15:49), Max: 38.1 (27 Jun 2019 09:46)  T(F): 100.6 (27 Jun 2019 15:49), Max: 100.6 (27 Jun 2019 09:46)  HR: 115 (27 Jun 2019 15:49) (98 - 119)  BP: 133/77 (27 Jun 2019 15:49) (92/55 - 133/77)  RR: 25 (27 Jun 2019 15:49) (18 - 25)  SpO2: 93% (27 Jun 2019 15:49) (93% - 98%)    Gen: AOx0-1, poorly verbal, nonresponsive  CV: S1+S2 normal, nontachycardic  Resp: Clear bilat, no resp distress, no crackles/wheezes  Abd: Soft, nontender, +BS  Ext: No LE edema, no wounds    LABS:                        8.0    10.44 )-----------( 199      ( 27 Jun 2019 09:56 )             25.2     06-27    142  |  104  |  15  ----------------------------<  149<H>  3.7   |  25  |  0.58    Ca    8.6      27 Jun 2019 06:33  Mg     2.1     06-27    MICROBIOLOGY:  Vancomycin Level, Trough: 6.2 ug/mL (06-27-19 @ 10:30)    .Blood  06-24-19   Growth in aerobic bottle: Staphylococcus hominis "Susceptibilities not  performed"  --    Growth in aerobic bottle: Gram Positive Cocci in Clusters    .Blood  06-22-19   Growth in anaerobic bottle: Staphylococcus epidermidis "Susceptibilities  not performed"    .Blood  06-22-19   No growth at 5 days.      .Urine  06-22-19   No growth    (otherwise reviewed)    RADIOLOGY:    6/24 CT:    IMPRESSION:     Patchy opacities throughout the left lung concerning for pneumonia.   Mediastinal lymphadenopathy is likely reactive.    No intra-abdominal/pelvic source of infection is identified.

## 2019-06-27 NOTE — PROGRESS NOTE ADULT - ASSESSMENT
74 yo male  with PMH severe dementia with PEG tube, arthritis, GERD present from home after PEG tube falling out and Right thigh swelling.       ·  Problem: Closed fracture of distal end of right femur, unspecified fracture morphology, initial encounter.   : Ortho team consulted and placed into knee immobilizer.   ortho f/u noted  discussed w/ pts wife   Pain control prn.     ·  Problem: PEG (percutaneous endoscopic gastrostomy) adjustment/replacement/removal.  : GI team replaced PEG in ED  feeds to cont   F/U GI recs.   bowel regimen      ·  Problem: Tachycardia.  improved         : Pneumonia./sepsis.fevers   fever .low grade  iv abs  pulm f/u noted  id f/u noted  f/u culture /blood neg thus far          ·  Problem: GERD (gastroesophageal reflux disease).    ppi      Problem: Dementia.   supp care     anemia   likely multifactorial   f/u guauaics  heme g/i f/u noted       ·  Problem: Prophylactic measure.    : DVT SCD      ·  Problem: Medication management.     continue present Rx    discussed w/ wife at length over phone

## 2019-06-27 NOTE — PROGRESS NOTE ADULT - SUBJECTIVE AND OBJECTIVE BOX
CHIEF COMPLAINT:Patient is a 73y old  Male who presents with a chief complaint of PEG tube fall out, Right thigh swelling (27 Jun 2019 11:30)    	        PAST MEDICAL & SURGICAL HISTORY:  Dyspepsia  GERD (gastroesophageal reflux disease)  Arthritis  Dysphagia: peg  Acute renal failure (ARF): ARF 1.5 YEAR AGO  Pneumonia  Dementia  Status post insertion of percutaneous endoscopic gastrostomy (PEG) tube: PEG replacement on 02/29/16  PEG adjustment, replacement, or removal  History of hand surgery          REVIEW OF SYSTEMS:  sleepy   arousable  no apparent cp or sob  no vomiting       Medications:  MEDICATIONS  (STANDING):  ALBUTerol/ipratropium for Nebulization 3 milliLiter(s) Nebulizer every 6 hours  heparin  Injectable 5000 Unit(s) SubCutaneous every 8 hours  meropenem  IVPB 1000 milliGRAM(s) IV Intermittent every 8 hours  OLANZapine 5 milliGRAM(s) Oral daily  pantoprazole  Injectable 40 milliGRAM(s) IV Push daily  polyethylene glycol 3350 17 Gram(s) Oral two times a day  QUEtiapine 100 milliGRAM(s) Oral daily  sucralfate 1 Gram(s) Oral two times a day  vancomycin  IVPB 1250 milliGRAM(s) IV Intermittent every 24 hours    MEDICATIONS  (PRN):  acetaminophen    Suspension .. 650 milliGRAM(s) Oral every 6 hours PRN Temp greater or equal to 38C (100.4F), Mild Pain (1 - 3), Moderate Pain (4 - 6)  acetaminophen  Suppository .. 650 milliGRAM(s) Rectal every 6 hours PRN Temp greater or equal to 38C (100.4F), Mild Pain (1 - 3), Moderate Pain (4 - 6)  bisacodyl Suppository 10 milliGRAM(s) Rectal daily PRN Constipation    	    PHYSICAL EXAM:  T(C): 38.1 (06-27-19 @ 09:46), Max: 38.1 (06-27-19 @ 09:46)  HR: 119 (06-27-19 @ 09:46) (98 - 119)  BP: 92/55 (06-27-19 @ 09:46) (92/55 - 113/75)  RR: 18 (06-27-19 @ 09:46) (18 - 18)  SpO2: 94% (06-27-19 @ 09:46) (94% - 98%)  Wt(kg): --  I&O's Summary    26 Jun 2019 07:01  -  27 Jun 2019 07:00  --------------------------------------------------------  IN: 1420 mL / OUT: 0 mL / NET: 1420 mL        Appearance: Normal	  HEENT:   Normal oral mucosa, PERRL, EOMI	  Lymphatic: No lymphadenopathy  Cardiovascular: Normal S1 S2, No JVD,   Respiratory:dec bs     Gastrointestinal:  Soft, Non-tender, + BS	peg  Skin: No rashes, No ecchymoses, No cyanosis	  Neurologic: Non-focal  Extremities: dec rom   rlext immobilizer    TELEMETRY: 	    ECG:  	  RADIOLOGY:  OTHER: 	  	  LABS:	 	    CARDIAC MARKERS:                                8.0    10.44 )-----------( 199      ( 27 Jun 2019 09:56 )             25.2     06-27    142  |  104  |  15  ----------------------------<  149<H>  3.7   |  25  |  0.58    Ca    8.6      27 Jun 2019 06:33  Mg     2.1     06-27      proBNP:   Lipid Profile:   HgA1c:   TSH:

## 2019-06-27 NOTE — PROGRESS NOTE ADULT - SUBJECTIVE AND OBJECTIVE BOX
CARDIOLOGY FOLLOW UP - Dr. Young    CC no acute events   + fever     PHYSICAL EXAM:  T(C): 38.1 (06-27-19 @ 09:46), Max: 38.1 (06-27-19 @ 09:46)  HR: 119 (06-27-19 @ 09:46) (98 - 119)  BP: 92/55 (06-27-19 @ 09:46) (92/55 - 113/75)  RR: 18 (06-27-19 @ 09:46) (18 - 18)  SpO2: 94% (06-27-19 @ 09:46) (94% - 98%)  Wt(kg): --  I&O's Summary    26 Jun 2019 07:01  -  27 Jun 2019 07:00  --------------------------------------------------------  IN: 1420 mL / OUT: 0 mL / NET: 1420 mL        Appearance: NAD 	  Cardiovascular: Normal S1 S2,RRR, No JVD, No murmurs  Respiratory:diminished   Gastrointestinal:  Soft, Non-tender, + BS	  Extremities: Normal range of motion, No clubbing, cyanosis or edema        MEDICATIONS  (STANDING):  ALBUTerol/ipratropium for Nebulization 3 milliLiter(s) Nebulizer every 6 hours  dextrose 5% + sodium chloride 0.9%. 1000 milliLiter(s) (60 mL/Hr) IV Continuous <Continuous>  heparin  Injectable 5000 Unit(s) SubCutaneous every 8 hours  meropenem  IVPB 1000 milliGRAM(s) IV Intermittent every 8 hours  OLANZapine 5 milliGRAM(s) Oral daily  pantoprazole  Injectable 40 milliGRAM(s) IV Push daily  polyethylene glycol 3350 17 Gram(s) Oral two times a day  QUEtiapine 200 milliGRAM(s) Oral daily  sodium chloride 0.9% Bolus 250 milliLiter(s) IV Bolus once  sucralfate 1 Gram(s) Oral two times a day  vancomycin  IVPB 1250 milliGRAM(s) IV Intermittent every 24 hours      TELEMETRY: 	    ECG:  	  RADIOLOGY:   DIAGNOSTIC TESTING:  [ ] Echocardiogram:  [ ]  Catheterization:  [ ] Stress Test:    OTHER: 	    LABS:	 	                            8.0    10.44 )-----------( 199      ( 27 Jun 2019 09:56 )             25.2     06-27    142  |  104  |  15  ----------------------------<  149<H>  3.7   |  25  |  0.58    Ca    8.6      27 Jun 2019 06:33  Phos  2.1     06-25  Mg     2.1     06-27    TPro  5.5<L>  /  Alb  2.5<L>  /  TBili  1.0  /  DBili  x   /  AST  23  /  ALT  17  /  AlkPhos  91  06-25 CARDIOLOGY FOLLOW UP - Dr. Young    CC no acute events   + fever     PHYSICAL EXAM:  T(C): 38.1 (06-27-19 @ 09:46), Max: 38.1 (06-27-19 @ 09:46)  HR: 119 (06-27-19 @ 09:46) (98 - 119)  BP: 92/55 (06-27-19 @ 09:46) (92/55 - 113/75)  RR: 18 (06-27-19 @ 09:46) (18 - 18)  SpO2: 94% (06-27-19 @ 09:46) (94% - 98%)  Wt(kg): --  I&O's Summary    26 Jun 2019 07:01  -  27 Jun 2019 07:00  --------------------------------------------------------  IN: 1420 mL / OUT: 0 mL / NET: 1420 mL        Appearance: NAD 	  Cardiovascular: Normal S1 S2,RRR,+murmurs  Respiratory:diminished   Gastrointestinal:  Soft, Non-tender, + BS	  Extremities: Normal range of motion, No clubbing, cyanosis or edema        MEDICATIONS  (STANDING):  ALBUTerol/ipratropium for Nebulization 3 milliLiter(s) Nebulizer every 6 hours  dextrose 5% + sodium chloride 0.9%. 1000 milliLiter(s) (60 mL/Hr) IV Continuous <Continuous>  heparin  Injectable 5000 Unit(s) SubCutaneous every 8 hours  meropenem  IVPB 1000 milliGRAM(s) IV Intermittent every 8 hours  OLANZapine 5 milliGRAM(s) Oral daily  pantoprazole  Injectable 40 milliGRAM(s) IV Push daily  polyethylene glycol 3350 17 Gram(s) Oral two times a day  QUEtiapine 200 milliGRAM(s) Oral daily  sodium chloride 0.9% Bolus 250 milliLiter(s) IV Bolus once  sucralfate 1 Gram(s) Oral two times a day  vancomycin  IVPB 1250 milliGRAM(s) IV Intermittent every 24 hours      TELEMETRY: 	    ECG:  	  RADIOLOGY:   DIAGNOSTIC TESTING:  [ ] Echocardiogram:  [ ]  Catheterization:  [ ] Stress Test:    OTHER: 	    LABS:	 	                            8.0    10.44 )-----------( 199      ( 27 Jun 2019 09:56 )             25.2     06-27    142  |  104  |  15  ----------------------------<  149<H>  3.7   |  25  |  0.58    Ca    8.6      27 Jun 2019 06:33  Phos  2.1     06-25  Mg     2.1     06-27    TPro  5.5<L>  /  Alb  2.5<L>  /  TBili  1.0  /  DBili  x   /  AST  23  /  ALT  17  /  AlkPhos  91  06-25

## 2019-06-27 NOTE — PROVIDER CONTACT NOTE (OTHER) - BACKGROUND
Pt with severe dementia on PEG feeds,  + blood cx, awaiting results of 2nd blood cx. On zosyn & Vanco

## 2019-06-27 NOTE — PROGRESS NOTE ADULT - SUBJECTIVE AND OBJECTIVE BOX
Pt seen    MEDICATIONS  (STANDING):  ALBUTerol/ipratropium for Nebulization 3 milliLiter(s) Nebulizer every 6 hours  dextrose 5% + sodium chloride 0.9%. 1000 milliLiter(s) (60 mL/Hr) IV Continuous <Continuous>  heparin  Injectable 5000 Unit(s) SubCutaneous every 8 hours  meropenem  IVPB 1000 milliGRAM(s) IV Intermittent every 8 hours  OLANZapine 5 milliGRAM(s) Oral daily  pantoprazole  Injectable 40 milliGRAM(s) IV Push daily  polyethylene glycol 3350 17 Gram(s) Oral two times a day  QUEtiapine 200 milliGRAM(s) Oral daily  sucralfate 1 Gram(s) Oral two times a day  vancomycin  IVPB 1250 milliGRAM(s) IV Intermittent every 24 hours    MEDICATIONS  (PRN):  acetaminophen    Suspension .. 650 milliGRAM(s) Oral every 6 hours PRN Temp greater or equal to 38C (100.4F), Mild Pain (1 - 3), Moderate Pain (4 - 6)  acetaminophen  Suppository .. 650 milliGRAM(s) Rectal every 6 hours PRN Temp greater or equal to 38C (100.4F), Mild Pain (1 - 3), Moderate Pain (4 - 6)  bisacodyl Suppository 10 milliGRAM(s) Rectal daily PRN Constipation      ROS  UTO, not interactive    Vital Signs Last 24 Hrs  T(C): 38.1 (27 Jun 2019 09:46), Max: 38.1 (27 Jun 2019 09:46)  T(F): 100.6 (27 Jun 2019 09:46), Max: 100.6 (27 Jun 2019 09:46)  HR: 119 (27 Jun 2019 09:46) (98 - 119)  BP: 92/55 (27 Jun 2019 09:46) (92/55 - 113/75)  BP(mean): --  RR: 18 (27 Jun 2019 09:46) (18 - 18)  SpO2: 94% (27 Jun 2019 09:46) (94% - 98%)    PE  NAD  does not interact  RRR  CTAB ant chest  abd soft  no edema  remainder of exam limited 2/2 participation                          8.0    10.44 )-----------( 199      ( 27 Jun 2019 09:56 )             25.2       06-27    142  |  104  |  15  ----------------------------<  149<H>  3.7   |  25  |  0.58    Ca    8.6      27 Jun 2019 06:33  Phos  2.1     06-25  Mg     2.1     06-27    TPro  5.5<L>  /  Alb  2.5<L>  /  TBili  1.0  /  DBili  x   /  AST  23  /  ALT  17  /  AlkPhos  91  06-25

## 2019-06-27 NOTE — PROGRESS NOTE ADULT - PROBLEM SELECTOR PLAN 1
2nd aspiration PNA  -Leukocytosis resolved, still low grade temps-38.1  -Blood cultures with Staph hominis, likely contaminant.   -f/u repeat BC  -No evidence of occult infection on CT C/A/P  -Still tachycardic ??pain related to femur fracture  -Consider CTA chest to r/o PE if fever is persistent. High risk of VTE

## 2019-06-27 NOTE — PROGRESS NOTE ADULT - ASSESSMENT
74 yo man with PMH severe dementia (AAOx0 s/p severe reaction to Haldol/Ativan) with PEG tube, arthritis, GERD presents with  PEG dislodgement, Femur FX, sepsis     1. Hypoxic Resp Failure  -likely in the setting of LLL PNA  -cont abx/O2 support per pulm  -low suspicion for CHF    2. Sepsis/ LLL Pna   -ct chest/ abd revealed pna left lung , mediastinal lymphadenopathy. no intra-abd / pelvic source of infection identified   - repeat bcx positive   -ID, pulm f/u     3. Femur FX  - med f/u   -knee immobilizer, pain control, ortho f/u    4. Murmur  -pending ECHO to eval murmur    5.  PEG dislodgement  -gi f/u s/p peg change          -DVT ppx

## 2019-06-27 NOTE — PROGRESS NOTE ADULT - ASSESSMENT
74 yo M with PMH severe dementia (AAOx0 s/p severe reaction to Haldol/Ativan) with PEG tube, arthritis, GERD present from home after PEG tube falling out and Right thigh swelling.  Femur fracture  Fever, tachycardia, leukocytosis, AMS  CXR LLL ? pneumonia  UA negative  BCX 1/4 CoNS, likely contam; repeat BCX again positive--note that the species of bacteria are different, likely contam  Still fever, mental status unchanged, poorly verbal--change to Coral, most likely is due to persistent aspiration, but consider possibility of R organisms  Overall, fever, leukocytosis, aspiration pneumonia, tachycardia, sepsis  - Vanco 1250mg q 24 (monitor trough)  - Start Coral 1g q 8  - F/U BCXs  - Aspiration precautions  - Discussed with medicine NP    Roni Luis MD  Pager [phone]  After 5pm and on weekends call [phone]

## 2019-06-27 NOTE — PROGRESS NOTE ADULT - ASSESSMENT
74 yo man with PMH severe dementia (AAOx0 s/p severe reaction to Haldol/Ativan) with PEG tube, arthritis, GERD present from home after PEG tube falling out and Right thigh swelling.     PEG replaced since admission.  Also noted to have right distal femur fracture folowed by ortho.  On IV abx now for pneumonia.    Hematology consulted for acute anemia during this admission.  No report of melena or hematochezia. Had CT cap showing no evidence of RP bleed.  Haptoglobin and FELIX neg, no evidence of hemolysis either    Acute anemia -- acute fall but had remained stable so far otherwise  --> pls check FOBT  --> Fe, B12, folate adequate, hapto and FELIX neg  --> monitor for now    Plts nml         Will follow, [phone]

## 2019-06-28 ENCOUNTER — TRANSCRIPTION ENCOUNTER (OUTPATIENT)
Age: 74
End: 2019-06-28

## 2019-06-28 LAB
HCT VFR BLD CALC: 25 % — LOW (ref 39–50)
HGB BLD-MCNC: 8.2 G/DL — LOW (ref 13–17)
MCHC RBC-ENTMCNC: 32.8 GM/DL — SIGNIFICANT CHANGE UP (ref 32–36)
MCHC RBC-ENTMCNC: 32.9 PG — SIGNIFICANT CHANGE UP (ref 27–34)
MCV RBC AUTO: 100.4 FL — HIGH (ref 80–100)
PLATELET # BLD AUTO: 286 K/UL — SIGNIFICANT CHANGE UP (ref 150–400)
RBC # BLD: 2.49 M/UL — LOW (ref 4.2–5.8)
RBC # FLD: 13.8 % — SIGNIFICANT CHANGE UP (ref 10.3–14.5)
WBC # BLD: 11.8 K/UL — HIGH (ref 3.8–10.5)
WBC # FLD AUTO: 11.8 K/UL — HIGH (ref 3.8–10.5)

## 2019-06-28 PROCEDURE — 76700 US EXAM ABDOM COMPLETE: CPT | Mod: 26

## 2019-06-28 PROCEDURE — 99232 SBSQ HOSP IP/OBS MODERATE 35: CPT

## 2019-06-28 RX ADMIN — Medication 3 MILLILITER(S): at 13:15

## 2019-06-28 RX ADMIN — Medication 650 MILLIGRAM(S): at 20:56

## 2019-06-28 RX ADMIN — MEROPENEM 100 MILLIGRAM(S): 1 INJECTION INTRAVENOUS at 05:04

## 2019-06-28 RX ADMIN — Medication 650 MILLIGRAM(S): at 11:15

## 2019-06-28 RX ADMIN — HEPARIN SODIUM 5000 UNIT(S): 5000 INJECTION INTRAVENOUS; SUBCUTANEOUS at 21:43

## 2019-06-28 RX ADMIN — Medication 3 MILLILITER(S): at 05:02

## 2019-06-28 RX ADMIN — MEROPENEM 100 MILLIGRAM(S): 1 INJECTION INTRAVENOUS at 15:57

## 2019-06-28 RX ADMIN — MEROPENEM 100 MILLIGRAM(S): 1 INJECTION INTRAVENOUS at 21:44

## 2019-06-28 RX ADMIN — Medication 166.67 MILLIGRAM(S): at 13:15

## 2019-06-28 RX ADMIN — Medication 650 MILLIGRAM(S): at 21:26

## 2019-06-28 RX ADMIN — HEPARIN SODIUM 5000 UNIT(S): 5000 INJECTION INTRAVENOUS; SUBCUTANEOUS at 05:02

## 2019-06-28 RX ADMIN — Medication 650 MILLIGRAM(S): at 11:02

## 2019-06-28 RX ADMIN — QUETIAPINE FUMARATE 100 MILLIGRAM(S): 200 TABLET, FILM COATED ORAL at 21:44

## 2019-06-28 RX ADMIN — HEPARIN SODIUM 5000 UNIT(S): 5000 INJECTION INTRAVENOUS; SUBCUTANEOUS at 15:57

## 2019-06-28 RX ADMIN — POLYETHYLENE GLYCOL 3350 17 GRAM(S): 17 POWDER, FOR SOLUTION ORAL at 17:04

## 2019-06-28 RX ADMIN — OLANZAPINE 5 MILLIGRAM(S): 15 TABLET, FILM COATED ORAL at 17:05

## 2019-06-28 RX ADMIN — PANTOPRAZOLE SODIUM 40 MILLIGRAM(S): 20 TABLET, DELAYED RELEASE ORAL at 16:50

## 2019-06-28 RX ADMIN — Medication 1 GRAM(S): at 17:04

## 2019-06-28 RX ADMIN — Medication 3 MILLILITER(S): at 17:04

## 2019-06-28 RX ADMIN — POLYETHYLENE GLYCOL 3350 17 GRAM(S): 17 POWDER, FOR SOLUTION ORAL at 05:02

## 2019-06-28 RX ADMIN — Medication 1 GRAM(S): at 08:17

## 2019-06-28 NOTE — DISCHARGE NOTE PROVIDER - NSDCFUADDINST_GEN_ALL_CORE_FT
Follow up with Gastroenterology  Follow up with your Primary care doctor Follow up with your Gastroenterologist, Dr. Moraes, within 1 week.  Follow up with your Orthopedist, Dr. Roman, within 1 week.  Follow up with your Primary Care Doctor within 1 week.

## 2019-06-28 NOTE — PROGRESS NOTE ADULT - PROBLEM SELECTOR PLAN 1
2nd aspiration PNA  -Still with low grade temps  -Abx broadened to Meropenem yesterday, on Vanco   -Blood cultures with Staph hominis, likely contaminant.   -BC 6/26 negative  -No evidence of occult infection on CT C/A/P  -Still tachycardic and febrile, if he remains febrile on Meropenem would consider CTA chest to r/o PE. High risk of VTE  -f/u Abd US 2nd aspiration PNA  -Still with low grade temps  -Abx broadened to Meropenem yesterday, on Vanco   -Blood cultures with Staph hominis, likely contaminant.   -BC 6/26 negative  -No evidence of occult infection on CT C/A/P  -Still tachycardic and febrile, if he remains febrile on Meropenem would consider CTA chest to r/o PE. High risk of VTE  -Abd US normal 2nd aspiration PNA  -Still with low grade temps  -Abx broadened to Meropenem yesterday, on Vanco   -Blood cultures with Staph hominis, likely contaminant.   -BC 6/26 negative  -No evidence of occult infection on CT C/A/P  -Still tachycardic and febrile, if he remains febrile on Meropenem   -Abd US normal

## 2019-06-28 NOTE — DISCHARGE NOTE PROVIDER - NSDCCPCAREPLAN_GEN_ALL_CORE_FT
PRINCIPAL DISCHARGE DIAGNOSIS  Diagnosis: Sepsis due to pneumonia  Assessment and Plan of Treatment: Aspiration Pneumonia-      SECONDARY DISCHARGE DIAGNOSES  Diagnosis: Closed fracture of distal end of right femur, unspecified fracture morphology, initial encounter  Assessment and Plan of Treatment: Continue with Right knee immobilizer    Diagnosis: PEG (percutaneous endoscopic gastrostomy) adjustment/replacement/removal  Assessment and Plan of Treatment: Continue with tube feed PRINCIPAL DISCHARGE DIAGNOSIS  Diagnosis: Sepsis due to pneumonia  Assessment and Plan of Treatment: Aspiration. Received intravenous antibiotics      SECONDARY DISCHARGE DIAGNOSES  Diagnosis: Closed fracture of distal end of right femur, unspecified fracture morphology, initial encounter  Assessment and Plan of Treatment: Continue with Right knee immobilizer    Diagnosis: Ileus  Assessment and Plan of Treatment: Seen by Gastroenterology-----------    Diagnosis: PEG (percutaneous endoscopic gastrostomy) adjustment/replacement/removal  Assessment and Plan of Treatment: Continue with tube feed------------------ PRINCIPAL DISCHARGE DIAGNOSIS  Diagnosis: Sepsis due to pneumonia  Assessment and Plan of Treatment: Received Meropenem for 7 days  Call you Health care provider upon arrival home to make a one week follow up appointment.  If you develop fever, chills, malaise, or change in mental status call your Health Care Provider or go to the Emergency Department.  Nutrition is important, eat small frequent meals to help ensure you get adequate calories.  Do not stay in bed all day!  Increase your activity daily as tolerated.      SECONDARY DISCHARGE DIAGNOSES  Diagnosis: Closed fracture of distal end of right femur, unspecified fracture morphology, initial encounter  Assessment and Plan of Treatment: Continue with Right knee immobilizer  Follow up with Dr. Roman in 1 week    Diagnosis: PEG (percutaneous endoscopic gastrostomy) adjustment/replacement/removal  Assessment and Plan of Treatment: Continue Jevity 30ml /hr   Follow up with Dr. Moraes in 1 week PRINCIPAL DISCHARGE DIAGNOSIS  Diagnosis: Sepsis due to pneumonia  Assessment and Plan of Treatment: Received Meropenem for 7 days  Call you Health care provider upon arrival home to make a one week follow up appointment.  If you develop fever, chills, malaise, or change in mental status call your Health Care Provider or go to the Emergency Department.  Nutrition is important, eat small frequent meals to help ensure you get adequate calories.  Do not stay in bed all day!  Increase your activity daily as tolerated.      SECONDARY DISCHARGE DIAGNOSES  Diagnosis: Anemia  Assessment and Plan of Treatment: deu to chronic inflammation  Follow up with your Primary care doctor in 1 week    Diagnosis: Closed fracture of distal end of right femur, unspecified fracture morphology, initial encounter  Assessment and Plan of Treatment: Continue with Right knee immobilizer  Follow up with Dr. Roman in 1 week    Diagnosis: PEG (percutaneous endoscopic gastrostomy) adjustment/replacement/removal  Assessment and Plan of Treatment: Continue Jevity 30ml /hr   Follow up with Dr. Moraes in 1 week PRINCIPAL DISCHARGE DIAGNOSIS  Diagnosis: Sepsis due to pneumonia  Assessment and Plan of Treatment: Received Meropenem for 7 days  Call you Health care provider upon arrival home to make a one week follow up appointment.  If you develop fever, chills, malaise, or change in mental status call your Health Care Provider or go to the Emergency Department.  Nutrition is important, eat small frequent meals to help ensure you get adequate calories.  Do not stay in bed all day!  Increase your activity daily as tolerated.      SECONDARY DISCHARGE DIAGNOSES  Diagnosis: Anemia  Assessment and Plan of Treatment: deu to chronic inflammation.  Follow up with your Primary care doctor in 1 week    Diagnosis: Closed fracture of distal end of right femur, unspecified fracture morphology, initial encounter  Assessment and Plan of Treatment: Continue with Right knee immobilizer.  Take tylenol as needed for pain.  Follow up with Dr. Roman in 1 week.    Diagnosis: PEG (percutaneous endoscopic gastrostomy) adjustment/replacement/removal  Assessment and Plan of Treatment: Continue Jevity 30ml/hr 24 hours daily.  Follow up with Dr. Moraes in 1 week

## 2019-06-28 NOTE — PROGRESS NOTE ADULT - SUBJECTIVE AND OBJECTIVE BOX
Follow-up Pulm Progress Note    Non-verbal  94% on 2L NC  TMax 38.3 axillary       Medications:  MEDICATIONS  (STANDING):  ALBUTerol/ipratropium for Nebulization 3 milliLiter(s) Nebulizer every 6 hours  heparin  Injectable 5000 Unit(s) SubCutaneous every 8 hours  meropenem  IVPB 1000 milliGRAM(s) IV Intermittent every 8 hours  OLANZapine 5 milliGRAM(s) Oral daily  pantoprazole  Injectable 40 milliGRAM(s) IV Push daily  polyethylene glycol 3350 17 Gram(s) Oral two times a day  QUEtiapine 100 milliGRAM(s) Oral daily  sucralfate 1 Gram(s) Oral two times a day  vancomycin  IVPB 1250 milliGRAM(s) IV Intermittent every 24 hours    MEDICATIONS  (PRN):  acetaminophen  Suppository .. 650 milliGRAM(s) Rectal every 6 hours PRN Temp greater or equal to 38C (100.4F), Mild Pain (1 - 3), Moderate Pain (4 - 6)  bisacodyl Suppository 10 milliGRAM(s) Rectal daily PRN Constipation          Vital Signs Last 24 Hrs  T(C): 37.3 (28 Jun 2019 13:15), Max: 38.3 (27 Jun 2019 19:44)  T(F): 99.1 (28 Jun 2019 13:15), Max: 101 (27 Jun 2019 19:44)  HR: 96 (28 Jun 2019 13:15) (96 - 150)  BP: 104/69 (28 Jun 2019 13:15) (88/48 - 133/77)  BP(mean): --  RR: 18 (28 Jun 2019 13:15) (18 - 25)  SpO2: 94% (28 Jun 2019 13:15) (93% - 99%) on 2L NC          06-27 @ 07:01  -  06-28 @ 07:00  --------------------------------------------------------  IN: 1450 mL / OUT: 0 mL / NET: 1450 mL          LABS:                        8.2    11.80 )-----------( 286      ( 28 Jun 2019 08:35 )             25.0     06-27    142  |  104  |  15  ----------------------------<  149<H>  3.7   |  25  |  0.58    Ca    8.6      27 Jun 2019 06:33  Mg     2.1     06-27            CAPILLARY BLOOD GLUCOSE                            CULTURES: (if applicable)  Culture Results:   No growth to date. (06-26 @ 16:47)  Culture Results:   Growth in aerobic bottle: Staphylococcus hominis "Susceptibilities not  performed" (06-24 @ 19:17)  Culture Results:   Growth in anaerobic bottle: Staphylococcus epidermidis "Susceptibilities  not performed"  "Due to technical problems, Proteus sp. will Not be reported as part of  the BCID panel until further notice"  ***Blood Panel PCR results on this specimen areavailable  approximately 3 hours after the Gram stain result.***  Gram stain, PCR, and/or culture results may not always  correspond due to difference in methodologies.  ************************************************************  This PCR assay wasperformed using [employer].  The following targets are tested for: Enterococcus,  vancomycin resistant enterococci, Listeria monocytogenes,  coagulase negative staphylococci, S. aureus,  methicillin resistant S. aureus, Streptococcus agalactiae  (Group B), S. pneumoniae, S. pyogenes (Group A),  Acinetobacter baumannii, Enterobacter cloacae, E. coli,  Klebsiella oxytoca, K. pneumoniae, Proteus sp.,  Serratia marcescens, Haemophilus influenzae,  Neisseria meningitidis, Pseudomonas aeruginosa, Candida  albicans, C. glabrata, C krusei, C parapsilosis,  C. tropicalis and the KPC resistance gene. (06-22 @ 22:14)  Culture Results:   No growth at 5 days. (06-22 @ 15:14)  Culture Results:   No growth (06-22 @ 01:26)    Most recent blood culture -- 06-26 @ 16:47   -- -- .Blood 06-26 @ 16:47  Most recent blood culture -- 06-24 @ 19:17   -- -- .Blood 06-24 @ 19:17    Blood culture 06-24 @ 19:17  --    Growth in aerobic bottle: Gram Positive Cocci in Clusters  --  --  --    Urine culture    -->      Physical Examination:  PULM: Decreased BS bilaterally   CVS: S1, S2 heard    RADIOLOGY REVIEWED  CT chest: < from: CT Chest w/ IV Cont (06.24.19 @ 21:27) >  CHEST:     LUNGS AND LARGE AIRWAYS: Patent central airways. Right basilar linear   type atelectasis. Patchy opacities throughout the left lung.  PLEURA: No pleural effusion.  VESSELS: Atherosclerotic changes.  HEART: Heart size is normal. No pericardial effusion.  MEDIASTINUM AND LENA: No axillary lymphadenopathy. An AP window lymph   node (2:52) measuring 1.3 x 1.1 cm. A left perihilar lymph node (3:83)   measuring 1.1 x 1.0 cm.  CHEST WALL AND LOWER NECK: Within normal limits.     < end of copied text >

## 2019-06-28 NOTE — PROGRESS NOTE ADULT - SUBJECTIVE AND OBJECTIVE BOX
CARDIOLOGY FOLLOW UP - Dr. Young    CC events noted   nonverbal +  fever, hypotension       PHYSICAL EXAM:  T(C): 37.3 (06-28-19 @ 10:17), Max: 38.3 (06-27-19 @ 19:44)  HR: 104 (06-28-19 @ 10:17) (104 - 150)  BP: 117/61 (06-28-19 @ 10:17) (88/48 - 133/77)  RR: 18 (06-28-19 @ 10:17) (18 - 25)  SpO2: 95% (06-28-19 @ 10:17) (93% - 99%)  Wt(kg): --  I&O's Summary    27 Jun 2019 07:01  -  28 Jun 2019 07:00  --------------------------------------------------------  IN: 1450 mL / OUT: 0 mL / NET: 1450 mL    28 Jun 2019 07:01  -  28 Jun 2019 10:33  --------------------------------------------------------  IN: 0 mL / OUT: 0 mL / NET: 0 mL        Appearance: NAD 	  Cardiovascular: Normal S1 S2,RRR + murmur  Respiratory: rhonchi  Gastrointestinal:  Soft, Non-tender, + BS	  Extremities: Normal range of motion, No clubbing, cyanosis or edema        MEDICATIONS  (STANDING):  ALBUTerol/ipratropium for Nebulization 3 milliLiter(s) Nebulizer every 6 hours  heparin  Injectable 5000 Unit(s) SubCutaneous every 8 hours  meropenem  IVPB 1000 milliGRAM(s) IV Intermittent every 8 hours  OLANZapine 5 milliGRAM(s) Oral daily  pantoprazole  Injectable 40 milliGRAM(s) IV Push daily  polyethylene glycol 3350 17 Gram(s) Oral two times a day  QUEtiapine 100 milliGRAM(s) Oral daily  sucralfate 1 Gram(s) Oral two times a day  vancomycin  IVPB 1250 milliGRAM(s) IV Intermittent every 24 hours      TELEMETRY: 	    ECG:  	  RADIOLOGY:   DIAGNOSTIC TESTING:  [ ] Echocardiogram:  [ ]  Catheterization:  [ ] Stress Test:    OTHER: 	    LABS:	 	                            8.2    11.80 )-----------( 286      ( 28 Jun 2019 08:35 )             25.0     06-27    142  |  104  |  15  ----------------------------<  149<H>  3.7   |  25  |  0.58    Ca    8.6      27 Jun 2019 06:33  Mg     2.1     06-27

## 2019-06-28 NOTE — PROVIDER CONTACT NOTE (OTHER) - SITUATION
Pt found coughing & vomiting after wife bolus fed patient 200cc of Jevity 1.2.   (Pt receives bolus feeds of 200cc every 4hrs for total of 1200 daily. RN fed pt at 13:15 with residual of of10cc.)

## 2019-06-28 NOTE — PROGRESS NOTE ADULT - ASSESSMENT
74 yo M with PMH severe dementia (AAOx0 s/p severe reaction to Haldol/Ativan) with PEG tube, arthritis, GERD present from home after PEG tube falling out and Right thigh swelling.  Femur fracture  Fever, tachycardia, leukocytosis, AMS  CXR LLL ? pneumonia  UA negative  BCX 1/4 CoNS, likely contam; repeat BCX again positive--note that the species of bacteria are different, likely contam  Still fever, ? baseline mental status, WBC resolved--chronic aspiration? Continue rolando for now  Overall, fever, leukocytosis, aspiration pneumonia, tachycardia, sepsis  - Rolando 1g q 8  - Discontinue Vanco after dose today  - Aspiration precautions  - F/U BCXs    My colleagues will be covering this patient starting 6/29/19, please call [phone] with further questions/follow up.     Roni Luis MD  Pager [phone]  After 5pm and on weekends call [phone]

## 2019-06-28 NOTE — DISCHARGE NOTE PROVIDER - CARE PROVIDER_API CALL
Cullen Moraes ()  Internal Medicine  [address] [place] [address]  Phone: [phone]  Fax: [phone]  Follow Up Time:     Fabien Roman)  Orthopaedic Surgery  [address] [facility], [address]  [place] [address]  Phone: [phone]  Fax: [phone]  Follow Up Time:

## 2019-06-28 NOTE — CHART NOTE - NSCHARTNOTEFT_GEN_A_CORE
MEDICINE NP    Notified by RN patient with temperature 101. BP 91/62 manually, . Seen and examined patient at bedside. Patient is nonverbal, appears in pain. Wife at bedside.    VITAL SIGNS:  Vital Signs Last 24 Hrs  T(C): 38.2 (27 Jun 2019 21:23), Max: 38.3 (27 Jun 2019 19:44)  T(F): 100.7 (27 Jun 2019 21:23), Max: 101 (27 Jun 2019 19:44)  HR: 122 (27 Jun 2019 21:11) (106 - 150)  BP: 110/71 (27 Jun 2019 21:11) (88/48 - 133/77)  BP(mean): --  RR: 20 (27 Jun 2019 21:11) (18 - 25)  SpO2: 93% (27 Jun 2019 21:11) (93% - 99%)      LABORATORY:                          8.0    10.44 )-----------( 199      ( 27 Jun 2019 09:56 )             25.2       06-27    142  |  104  |  15  ----------------------------<  149<H>  3.7   |  25  |  0.58    Ca    8.6      27 Jun 2019 06:33  Mg     2.1     06-27    MICROBIOLOGY:   Culture - Blood (06.26.19 @ 16:47)    Specimen Source: .Blood    Culture Results: No growth to date.  Culture - Blood (06.24.19 @ 19:17)    Gram Stain:   Growth in aerobic bottle: Gram Positive Cocci in Clusters    Specimen Source: .Blood    Culture Results: Growth in aerobic bottle: Staphylococcus hominis "Susceptibilities not  performed"  Culture - Urine (06.22.19 @ 01:26)    Specimen Source: .Urine    Culture Results: No growth    RADIOLOGY:  6/24/19 CT chest: IMPRESSION:     Patchy opacities throughout the left lung concerning for pneumonia.   Mediastinal lymphadenopathy is likely reactive.    No intra-abdominal/pelvic source of infection is identified.    PHYSICAL EXAM:  Constitutional: nonverbal, mild distress 2/2 pain  Respiratory: clear lungs bilaterally. No wheezing, rhonchi, or crackles.  Cardiovascular: S1 S2. No murmurs.  Gastrointestinal: BS X4 active. soft. nontender.  Extremities/Vascular: +2 pulses bilaterally. No BLE edema.    ASSESSMENT/PLAN:   72 yo man with PMH severe dementia (AAOx0 s/p severe reaction to Haldol/Ativan) with PEG tube, arthritis, GERD present from home after PEG tube falling out and Right thigh swelling. Per wife, states that aide was helping change patient's diaper today and heard a crack in the Right leg. Notice swelling developing later on. Denies any recent fall or trauma. Wife states that patient recently had cough with productive quality and was started on Levaquin outpatient. Now p/w recurrent fever.    Recurrent fever in setting of LLL PNA  --Tylenol IV x1 for fever and pain  --c/w meropenem and vancomycin  --f/u BC. UC ordered  --per wife request, endorses Tylenol suppository more effective for pt than PEG form. D/cd.  --F/U primary team in     Yoon Gallegos, [facility]  [id number] MEDICINE NP    Notified by RN patient with temperature 101. BP 91/62 manually, . Seen and examined patient at bedside. Patient is nonverbal, appears in pain. Wife at bedside.    VITAL SIGNS:  Vital Signs Last 24 Hrs  T(C): 38.2 (27 Jun 2019 21:23), Max: 38.3 (27 Jun 2019 19:44)  T(F): 100.7 (27 Jun 2019 21:23), Max: 101 (27 Jun 2019 19:44)  HR: 122 (27 Jun 2019 21:11) (106 - 150)  BP: 110/71 (27 Jun 2019 21:11) (88/48 - 133/77)  BP(mean): --  RR: 20 (27 Jun 2019 21:11) (18 - 25)  SpO2: 93% (27 Jun 2019 21:11) (93% - 99%)      LABORATORY:                          8.0    10.44 )-----------( 199      ( 27 Jun 2019 09:56 )             25.2       06-27    142  |  104  |  15  ----------------------------<  149<H>  3.7   |  25  |  0.58    Ca    8.6      27 Jun 2019 06:33  Mg     2.1     06-27    MICROBIOLOGY:   Culture - Blood (06.26.19 @ 16:47)    Specimen Source: .Blood    Culture Results: No growth to date.  Culture - Blood (06.24.19 @ 19:17)    Gram Stain:   Growth in aerobic bottle: Gram Positive Cocci in Clusters    Specimen Source: .Blood    Culture Results: Growth in aerobic bottle: Staphylococcus hominis "Susceptibilities not  performed"  Culture - Urine (06.22.19 @ 01:26)    Specimen Source: .Urine    Culture Results: No growth    RADIOLOGY:  6/24/19 CT chest: IMPRESSION:     Patchy opacities throughout the left lung concerning for pneumonia.   Mediastinal lymphadenopathy is likely reactive.    No intra-abdominal/pelvic source of infection is identified.    PHYSICAL EXAM:  Constitutional: nonverbal, mild distress 2/2 pain  Respiratory: clear lungs bilaterally. No wheezing, rhonchi, or crackles.  Cardiovascular: S1 S2. No murmurs.  Gastrointestinal: BS X4 active. soft. nontender.  Extremities/Vascular: +2 pulses bilaterally. No BLE edema.    ASSESSMENT/PLAN:   72 yo man with PMH severe dementia (AAOx0 s/p severe reaction to Haldol/Ativan) with PEG tube, arthritis, GERD present from home after PEG tube falling out and Right thigh swelling. Per wife, states that aide was helping change patient's diaper today and heard a crack in the Right leg. Notice swelling developing later on. Denies any recent fall or trauma. Wife states that patient recently had cough with productive quality and was started on Levaquin outpatient. Now p/w recurrent fever.     Sinus Tachycardia 2/2 recurrent fever in setting of LLL PNA  --ST confirmed by EKG; patient with ST at baseline 100-140s.  --Tylenol IV x1 for fever and pain  --NS 250cc bolus x1   --c/w meropenem and vancomycin  --f/u BC. UC ordered  --per wife request, endorses Tylenol suppository more effective for pt than PEG form. D/cd.  --F/U primary team in     ROBYN Menard-BC  [id number]

## 2019-06-28 NOTE — PROGRESS NOTE ADULT - SUBJECTIVE AND OBJECTIVE BOX
CHIEF COMPLAINT:Patient is a 73y old  Male who presents with a chief complaint of PEG tube fall out, Right thigh swelling (27 Jun 2019 15:23)    	        PAST MEDICAL & SURGICAL HISTORY:  Dyspepsia  GERD (gastroesophageal reflux disease)  Arthritis  Dysphagia: peg  Acute renal failure (ARF): ARF 1.5 YEAR AGO  Pneumonia  Dementia  Status post insertion of percutaneous endoscopic gastrostomy (PEG) tube: PEG replacement on 02/29/16  PEG adjustment, replacement, or removal  History of hand surgery          REVIEW OF SYSTEMS:  awake  at baseline mental status   no apparent cp or sob  no vomiting    Medications:  MEDICATIONS  (STANDING):  ALBUTerol/ipratropium for Nebulization 3 milliLiter(s) Nebulizer every 6 hours  heparin  Injectable 5000 Unit(s) SubCutaneous every 8 hours  meropenem  IVPB 1000 milliGRAM(s) IV Intermittent every 8 hours  OLANZapine 5 milliGRAM(s) Oral daily  pantoprazole  Injectable 40 milliGRAM(s) IV Push daily  polyethylene glycol 3350 17 Gram(s) Oral two times a day  QUEtiapine 100 milliGRAM(s) Oral daily  sucralfate 1 Gram(s) Oral two times a day  vancomycin  IVPB 1250 milliGRAM(s) IV Intermittent every 24 hours    MEDICATIONS  (PRN):  acetaminophen  Suppository .. 650 milliGRAM(s) Rectal every 6 hours PRN Temp greater or equal to 38C (100.4F), Mild Pain (1 - 3), Moderate Pain (4 - 6)  bisacodyl Suppository 10 milliGRAM(s) Rectal daily PRN Constipation    	    PHYSICAL EXAM:  T(C): 37.8 (06-28-19 @ 04:42), Max: 38.3 (06-27-19 @ 19:44)  HR: 112 (06-28-19 @ 04:42) (108 - 150)  BP: 130/71 (06-28-19 @ 04:42) (88/48 - 133/77)  RR: 18 (06-28-19 @ 04:42) (18 - 25)  SpO2: 95% (06-28-19 @ 04:42) (93% - 99%)  Wt(kg): --  I&O's Summary    27 Jun 2019 07:01  -  28 Jun 2019 07:00  --------------------------------------------------------  IN: 1450 mL / OUT: 0 mL / NET: 1450 mL          HEENT:   Normal oral mucosa,   Lymphatic: No lymphadenopathy  Cardiovascular: Normal S1 S2, No JVD,   Respiratory: dec s   Gastrointestinal:  Soft, Non-tender, + BS	peg   Skin: No rashes, No ecchymoses, No cyanosis	  Neurologic: Non-focal  Extremities:r lext in immobilizer     TELEMETRY: 	    ECG:  	  RADIOLOGY:  OTHER: 	  	  LABS:	 	    CARDIAC MARKERS:                                8.2    11.80 )-----------( 286      ( 28 Jun 2019 08:35 )             25.0     06-27    142  |  104  |  15  ----------------------------<  149<H>  3.7   |  25  |  0.58    Ca    8.6      27 Jun 2019 06:33  Mg     2.1     06-27      proBNP:   Lipid Profile:   HgA1c:   TSH:

## 2019-06-28 NOTE — PROGRESS NOTE ADULT - ASSESSMENT
74 yo man with PMH severe dementia (AAOx0 s/p severe reaction to Haldol/Ativan) with PEG tube, arthritis, GERD presents with  PEG dislodgement, Femur FX, sepsis     1. Hypoxic Resp Failure  -likely in the setting of LLL PNA  -cont abx/O2 support per pulm  -low suspicion for CHF    2. Sepsis/ LLL Pna   -ct chest/ abd revealed pna left lung , mediastinal lymphadenopathy. no intra-abd / pelvic source of infection identified   - repeat bcx negative   -ID, pulm f/u     3. Femur FX  - med f/u   -knee immobilizer, pain control, ortho f/u    4. Murmur  -pending ECHO to eval murmur    5.  PEG dislodgement  -gi f/u s/p peg change    6. tachycardia   in the setting of pna  recommend r/o PE given fever is persistent. High risk of VTE.     -DVT ppx

## 2019-06-28 NOTE — PROGRESS NOTE ADULT - SUBJECTIVE AND OBJECTIVE BOX
CC: Fever    Saw/spoke to patient. Still low grade fever. No new complaints.    Allergies  No Known Allergies    ANTIMICROBIALS:  meropenem  IVPB 1000 every 8 hours  vancomycin  IVPB 1250 every 24 hours    PE:    Vital Signs Last 24 Hrs  T(C): 37.3 (28 Jun 2019 13:15), Max: 38.3 (27 Jun 2019 19:44)  T(F): 99.1 (28 Jun 2019 13:15), Max: 101 (27 Jun 2019 19:44)  HR: 96 (28 Jun 2019 13:15) (96 - 150)  BP: 104/69 (28 Jun 2019 13:15) (88/48 - 133/77)  RR: 18 (28 Jun 2019 13:15) (18 - 25)  SpO2: 94% (28 Jun 2019 13:15) (93% - 99%)    Gen: AOx0-1, NAD, nontoxic  CV: S1+S2 normal, nontachycardic  Resp: Clear bilat, no resp distress, no crackles/wheezes  Abd: Soft, nontender, +BS  Ext: No LE edema, no wounds    LABS:                        8.2    11.80 )-----------( 286      ( 28 Jun 2019 08:35 )             25.0     06-27    142  |  104  |  15  ----------------------------<  149<H>  3.7   |  25  |  0.58    Ca    8.6      27 Jun 2019 06:33  Mg     2.1     06-27    MICROBIOLOGY:    .Blood  06-26-19   No growth to date.    .Blood  06-24-19   Growth in aerobic bottle: Staphylococcus hominis "Susceptibilities not  performed"  --    Growth in aerobic bottle: Gram Positive Cocci in Clusters    .Blood  06-22-19   Growth in anaerobic bottle: Staphylococcus epidermidis "Susceptibilities    Blood  06-22-19   No growth at 5 days.     .Urine  06-22-19   No growth     (otherwise reviewed)    RADIOLOGY:    6/24 CT:    IMPRESSION:     Patchy opacities throughout the left lung concerning for pneumonia.   Mediastinal lymphadenopathy is likely reactive.    No intra-abdominal/pelvic source of infection is identified.

## 2019-06-28 NOTE — PROGRESS NOTE ADULT - SUBJECTIVE AND OBJECTIVE BOX
INTERVAL HPI/OVERNIGHT EVENTS:    + febrile to 101 overnight  pt seen and examined. Non- verbal  tolerating peg feeds as per RN  no leakage/drainage from peg site   + large brown bm this morning     MEDICATIONS  (STANDING):  ALBUTerol/ipratropium for Nebulization 3 milliLiter(s) Nebulizer every 6 hours  heparin  Injectable 5000 Unit(s) SubCutaneous every 8 hours  meropenem  IVPB 1000 milliGRAM(s) IV Intermittent every 8 hours  OLANZapine 5 milliGRAM(s) Oral daily  pantoprazole  Injectable 40 milliGRAM(s) IV Push daily  polyethylene glycol 3350 17 Gram(s) Oral two times a day  QUEtiapine 100 milliGRAM(s) Oral daily  sucralfate 1 Gram(s) Oral two times a day  vancomycin  IVPB 1250 milliGRAM(s) IV Intermittent every 24 hours    MEDICATIONS  (PRN):  acetaminophen  Suppository .. 650 milliGRAM(s) Rectal every 6 hours PRN Temp greater or equal to 38C (100.4F), Mild Pain (1 - 3), Moderate Pain (4 - 6)  bisacodyl Suppository 10 milliGRAM(s) Rectal daily PRN Constipation      Allergies    No Known Allergies    Intolerances    benzodiazepines (Other)  Haldol (Other)      Review of Systems: unable to obtain     Vital Signs Last 24 Hrs  T(C): 37.3 (28 Jun 2019 10:17), Max: 38.3 (27 Jun 2019 19:44)  T(F): 99.1 (28 Jun 2019 10:17), Max: 101 (27 Jun 2019 19:44)  HR: 104 (28 Jun 2019 10:17) (104 - 150)  BP: 117/61 (28 Jun 2019 10:17) (88/48 - 133/77)  BP(mean): --  RR: 18 (28 Jun 2019 10:17) (18 - 25)  SpO2: 95% (28 Jun 2019 10:17) (93% - 99%)    PHYSICAL EXAM:    Constitutional: ill appearing  HEENT: EOMI, throat clear  Neck: No LAD, supple  Respiratory: CTA and P  Cardiovascular: S1 and S2, RRR, no M  Gastrointestinal: BS+, soft, NT/ND, neg HSM, + peg with abdominal binder c/d/i  Extremities: No peripheral edema, neg clubbing, cyanosis  Vascular: 2+ peripheral pulses  Neurological: A/O x 0, no focal deficits  Psychiatric: Normal mood, normal affect  Skin: No rashes    LABS:                        8.2    11.80 )-----------( 286      ( 28 Jun 2019 08:35 )             25.0     06-27    142  |  104  |  15  ----------------------------<  149<H>  3.7   |  25  |  0.58    Ca    8.6      27 Jun 2019 06:33  Mg     2.1     06-27            RADIOLOGY & ADDITIONAL TESTS:

## 2019-06-28 NOTE — DISCHARGE NOTE PROVIDER - HOSPITAL COURSE
72 yo man with PMH severe dementia (AAOx0 s/p severe reaction to Haldol/Ativan) with PEG tube, arthritis, GERD present from home after PEG tube falling out and Right thigh swelling. Per wife, states that aide was helping change patient's diaper today and heard a crack in the Right leg. Notice swelling developing later on.     Closed fracture of distal end of right femur, unspecified fracture morphology, initial encounter.  Ortho team consulted and placed into knee immobilizer.     Ortho recommended NWB RLE. Pain control    Patient is a poor surgical candidate due to baseline functional status    Outpatien follow up with Dr. Roman 1 week after discharge.     GI team replaced PEG in ED. Restarted feeds.     Pt had RRT and stabilized for sepsis secondary to aspiration  Pneumonia.  ID consulted. Started Meropenem x 7 days. CT abd/pelvis/chest with no PE and opacities in the left upper lobe. Pt had some vomiting. Ileus was ruled out with Ct abdomen. No further vomiting. Tolerating feeds. Outpatient follow up with PMD, GI and Ortho. 72 yo man with PMH severe dementia (AAOx0 s/p severe reaction to Haldol/Ativan) with PEG tube, arthritis, GERD present from home after PEG tube falling out and Right thigh swelling. Per wife, states that aide was helping change patient's diaper today and heard a crack in the Right leg. Notice swelling developing later on.     Closed fracture of distal end of right femur, unspecified fracture morphology, initial encounter.      Ortho team consulted and placed into knee immobilizer.     Ortho recommended NWB RLE. Pain adequately controlled with Tylenol.  Patient is a poor surgical candidate due to baseline functional status. Outpatient follow up with Dr. Roman 1 week after discharge.     GI team replaced PEG in ED. Restarted feeds.    Pt had RRT and stabilized for sepsis secondary to aspiration  Pneumonia.  ID consulted. Given Meropenem x 7 days - now completed.  CT abd/pelvis/chest with no PE and opacities in the left upper lobe. Blood cultures x 2 bottle with s.epi and s.hominis - suspect procurement contaminant per ID - Repeat blood cultures no growth to date.  Pt had some vomiting. Ileus was ruled out with Ct abdomen.  Patient on bowel regimen for constipation.  No further vomiting.  Patient to remain on continuous feeds rather than bolus 2/2 aspiration risk - Jevity now at goal of 30mL/hr. Tolerating feeds.  Patient was hypoxic, now resolved - 93% on RA on the day of discharge.  Large hematoma in right thigh - HCT/HGB currently stable.  Outpatient follow up with PMD, GI and Ortho.

## 2019-06-28 NOTE — PROGRESS NOTE ADULT - ASSESSMENT
72 yo male  with PMH severe dementia with PEG tube, arthritis, GERD present from home after PEG tube falling out and Right thigh swelling.       ·  Problem: Closed fracture of distal end of right femur, unspecified fracture morphology, initial encounter.   : Ortho team consulted and placed into knee immobilizer.   ortho f/u noted  discussed w/ pts wife   Pain control prn.     ·  Problem: PEG (percutaneous endoscopic gastrostomy) adjustment/replacement/removal.  : GI team replaced PEG in ED  feeds to cont   F/U GI recs.   bowel regimen      ·  Problem: Tachycardia.  improved         : Pneumonia./sepsis.fevers   fever .low grade  iv abs noted   pulm f/u noted  id f/u noted  f/u culture /blood neg thus far  will check abd / ruq sono           ·  Problem: GERD (gastroesophageal reflux disease).    ppi      Problem: Dementia.   supp care     anemia   likely multifactorial   f/u guaics neg   no overt bleeding   likely accumulation of blood also in r thigh   heme g/i f/u noted       ·  Problem: Prophylactic measure.    : DVT SCD      ·  Problem: Medication management.     continue present Rx

## 2019-06-29 DIAGNOSIS — K56.7 ILEUS, UNSPECIFIED: ICD-10-CM

## 2019-06-29 LAB
ALBUMIN SERPL ELPH-MCNC: 2.5 G/DL — LOW (ref 3.3–5)
ALBUMIN SERPL ELPH-MCNC: 3 G/DL — LOW (ref 3.3–5)
ALP SERPL-CCNC: 101 U/L — SIGNIFICANT CHANGE UP (ref 40–120)
ALP SERPL-CCNC: 106 U/L — SIGNIFICANT CHANGE UP (ref 40–120)
ALT FLD-CCNC: 17 U/L — SIGNIFICANT CHANGE UP (ref 10–45)
ALT FLD-CCNC: 18 U/L — SIGNIFICANT CHANGE UP (ref 10–45)
ANION GAP SERPL CALC-SCNC: 13 MMOL/L — SIGNIFICANT CHANGE UP (ref 5–17)
ANION GAP SERPL CALC-SCNC: 14 MMOL/L — SIGNIFICANT CHANGE UP (ref 5–17)
AST SERPL-CCNC: 17 U/L — SIGNIFICANT CHANGE UP (ref 10–40)
AST SERPL-CCNC: 21 U/L — SIGNIFICANT CHANGE UP (ref 10–40)
BILIRUB SERPL-MCNC: 1.3 MG/DL — HIGH (ref 0.2–1.2)
BILIRUB SERPL-MCNC: 1.3 MG/DL — HIGH (ref 0.2–1.2)
BUN SERPL-MCNC: 20 MG/DL — SIGNIFICANT CHANGE UP (ref 7–23)
BUN SERPL-MCNC: 21 MG/DL — SIGNIFICANT CHANGE UP (ref 7–23)
CALCIUM SERPL-MCNC: 8.6 MG/DL — SIGNIFICANT CHANGE UP (ref 8.4–10.5)
CALCIUM SERPL-MCNC: 8.6 MG/DL — SIGNIFICANT CHANGE UP (ref 8.4–10.5)
CHLORIDE SERPL-SCNC: 102 MMOL/L — SIGNIFICANT CHANGE UP (ref 96–108)
CHLORIDE SERPL-SCNC: 103 MMOL/L — SIGNIFICANT CHANGE UP (ref 96–108)
CO2 SERPL-SCNC: 22 MMOL/L — SIGNIFICANT CHANGE UP (ref 22–31)
CO2 SERPL-SCNC: 25 MMOL/L — SIGNIFICANT CHANGE UP (ref 22–31)
CREAT SERPL-MCNC: 0.63 MG/DL — SIGNIFICANT CHANGE UP (ref 0.5–1.3)
CREAT SERPL-MCNC: 0.68 MG/DL — SIGNIFICANT CHANGE UP (ref 0.5–1.3)
CULTURE RESULTS: NO GROWTH — SIGNIFICANT CHANGE UP
D DIMER BLD IA.RAPID-MCNC: 2533 NG/ML DDU — HIGH
GAS PNL BLDA: SIGNIFICANT CHANGE UP
GLUCOSE BLDC GLUCOMTR-MCNC: 172 MG/DL — HIGH (ref 70–99)
GLUCOSE SERPL-MCNC: 151 MG/DL — HIGH (ref 70–99)
GLUCOSE SERPL-MCNC: 181 MG/DL — HIGH (ref 70–99)
HCT VFR BLD CALC: 24.7 % — LOW (ref 39–50)
HGB BLD-MCNC: 8.7 G/DL — LOW (ref 13–17)
MCHC RBC-ENTMCNC: 35.1 PG — HIGH (ref 27–34)
MCHC RBC-ENTMCNC: 35.2 GM/DL — SIGNIFICANT CHANGE UP (ref 32–36)
MCV RBC AUTO: 99.8 FL — SIGNIFICANT CHANGE UP (ref 80–100)
PLATELET # BLD AUTO: 328 K/UL — SIGNIFICANT CHANGE UP (ref 150–400)
POTASSIUM SERPL-MCNC: 3.8 MMOL/L — SIGNIFICANT CHANGE UP (ref 3.5–5.3)
POTASSIUM SERPL-MCNC: 3.9 MMOL/L — SIGNIFICANT CHANGE UP (ref 3.5–5.3)
POTASSIUM SERPL-SCNC: 3.8 MMOL/L — SIGNIFICANT CHANGE UP (ref 3.5–5.3)
POTASSIUM SERPL-SCNC: 3.9 MMOL/L — SIGNIFICANT CHANGE UP (ref 3.5–5.3)
PROCALCITONIN SERPL-MCNC: 55.58 NG/ML — HIGH (ref 0.02–0.1)
PROT SERPL-MCNC: 5.7 G/DL — LOW (ref 6–8.3)
PROT SERPL-MCNC: 6.3 G/DL — SIGNIFICANT CHANGE UP (ref 6–8.3)
RBC # BLD: 2.48 M/UL — LOW (ref 4.2–5.8)
RBC # FLD: 12.9 % — SIGNIFICANT CHANGE UP (ref 10.3–14.5)
SODIUM SERPL-SCNC: 139 MMOL/L — SIGNIFICANT CHANGE UP (ref 135–145)
SODIUM SERPL-SCNC: 140 MMOL/L — SIGNIFICANT CHANGE UP (ref 135–145)
SPECIMEN SOURCE: SIGNIFICANT CHANGE UP
WBC # BLD: 11.6 K/UL — HIGH (ref 3.8–10.5)
WBC # FLD AUTO: 11.6 K/UL — HIGH (ref 3.8–10.5)

## 2019-06-29 PROCEDURE — 71275 CT ANGIOGRAPHY CHEST: CPT | Mod: 26

## 2019-06-29 PROCEDURE — 99232 SBSQ HOSP IP/OBS MODERATE 35: CPT

## 2019-06-29 PROCEDURE — 93970 EXTREMITY STUDY: CPT | Mod: 26

## 2019-06-29 PROCEDURE — 74177 CT ABD & PELVIS W/CONTRAST: CPT | Mod: 26

## 2019-06-29 PROCEDURE — 71045 X-RAY EXAM CHEST 1 VIEW: CPT | Mod: 26

## 2019-06-29 PROCEDURE — 74018 RADEX ABDOMEN 1 VIEW: CPT | Mod: 26

## 2019-06-29 RX ORDER — ACETAMINOPHEN 500 MG
1000 TABLET ORAL ONCE
Refills: 0 | Status: COMPLETED | OUTPATIENT
Start: 2019-06-29 | End: 2019-06-29

## 2019-06-29 RX ORDER — IBUPROFEN 200 MG
600 TABLET ORAL ONCE
Refills: 0 | Status: COMPLETED | OUTPATIENT
Start: 2019-06-29 | End: 2019-06-29

## 2019-06-29 RX ADMIN — Medication 600 MILLIGRAM(S): at 02:03

## 2019-06-29 RX ADMIN — Medication 3 MILLILITER(S): at 00:00

## 2019-06-29 RX ADMIN — Medication 3 MILLILITER(S): at 06:11

## 2019-06-29 RX ADMIN — Medication 3 MILLILITER(S): at 17:48

## 2019-06-29 RX ADMIN — Medication 400 MILLIGRAM(S): at 06:11

## 2019-06-29 RX ADMIN — Medication 600 MILLIGRAM(S): at 01:33

## 2019-06-29 RX ADMIN — HEPARIN SODIUM 5000 UNIT(S): 5000 INJECTION INTRAVENOUS; SUBCUTANEOUS at 06:11

## 2019-06-29 RX ADMIN — HEPARIN SODIUM 5000 UNIT(S): 5000 INJECTION INTRAVENOUS; SUBCUTANEOUS at 13:58

## 2019-06-29 RX ADMIN — MEROPENEM 100 MILLIGRAM(S): 1 INJECTION INTRAVENOUS at 06:11

## 2019-06-29 RX ADMIN — Medication 650 MILLIGRAM(S): at 20:59

## 2019-06-29 RX ADMIN — MEROPENEM 100 MILLIGRAM(S): 1 INJECTION INTRAVENOUS at 21:14

## 2019-06-29 RX ADMIN — PANTOPRAZOLE SODIUM 40 MILLIGRAM(S): 20 TABLET, DELAYED RELEASE ORAL at 11:55

## 2019-06-29 RX ADMIN — MEROPENEM 100 MILLIGRAM(S): 1 INJECTION INTRAVENOUS at 13:59

## 2019-06-29 RX ADMIN — Medication 3 MILLILITER(S): at 11:55

## 2019-06-29 RX ADMIN — Medication 1000 MILLIGRAM(S): at 06:41

## 2019-06-29 RX ADMIN — HEPARIN SODIUM 5000 UNIT(S): 5000 INJECTION INTRAVENOUS; SUBCUTANEOUS at 21:14

## 2019-06-29 RX ADMIN — Medication 650 MILLIGRAM(S): at 20:29

## 2019-06-29 NOTE — PROGRESS NOTE ADULT - SUBJECTIVE AND OBJECTIVE BOX
Follow-up Pulm Progress Note    Episode of vomiting overnight  99% on 50% FM  97% on 4L NC  Non-verbal, sleeping but arousable     Medications:  MEDICATIONS  (STANDING):  ALBUTerol/ipratropium for Nebulization 3 milliLiter(s) Nebulizer every 6 hours  heparin  Injectable 5000 Unit(s) SubCutaneous every 8 hours  meropenem  IVPB 1000 milliGRAM(s) IV Intermittent every 8 hours  OLANZapine 5 milliGRAM(s) Oral daily  pantoprazole  Injectable 40 milliGRAM(s) IV Push daily  polyethylene glycol 3350 17 Gram(s) Oral two times a day  QUEtiapine 100 milliGRAM(s) Oral daily  sucralfate 1 Gram(s) Oral two times a day    MEDICATIONS  (PRN):  acetaminophen  Suppository .. 650 milliGRAM(s) Rectal every 6 hours PRN Temp greater or equal to 38C (100.4F), Mild Pain (1 - 3), Moderate Pain (4 - 6)  bisacodyl Suppository 10 milliGRAM(s) Rectal daily PRN Constipation          Vital Signs Last 24 Hrs  T(C): 36.7 (29 Jun 2019 10:41), Max: 38 (29 Jun 2019 01:10)  T(F): 98.1 (29 Jun 2019 10:41), Max: 100.4 (29 Jun 2019 01:10)  HR: 80 (29 Jun 2019 10:41) (80 - 122)  BP: 106/61 (29 Jun 2019 10:41) (96/57 - 124/60)  BP(mean): --  RR: 22 (29 Jun 2019 10:41) (18 - 22)  SpO2: 100% (29 Jun 2019 10:41) (93% - 100%) on 50% FM    ABG - ( 29 Jun 2019 03:04 )  pH, Arterial: 7.49  pH, Blood: x     /  pCO2: 36    /  pO2: 85    / HCO3: 26    / Base Excess: 3.4   /  SaO2: 98                    06-28 @ 07:01  -  06-29 @ 07:00  --------------------------------------------------------  IN: 240 mL / OUT: 0 mL / NET: 240 mL          LABS:                        8.7    11.6  )-----------( 328      ( 29 Jun 2019 03:31 )             24.7     06-29    140  |  102  |  21  ----------------------------<  151<H>  3.8   |  25  |  0.68    Ca    8.6      29 Jun 2019 07:11    TPro  6.3  /  Alb  3.0<L>  /  TBili  1.3<H>  /  DBili  x   /  AST  21  /  ALT  18  /  AlkPhos  106  06-29          CAPILLARY BLOOD GLUCOSE      POCT Blood Glucose.: 172 mg/dL (29 Jun 2019 03:44)        Procalcitonin, Serum: 55.58 ng/mL (06-29-19 @ 07:11)                  CULTURES: (if applicable)  Culture Results:   No growth to date. (06-26 @ 16:47)  Culture Results:   Growth in aerobic bottle: Staphylococcus hominis "Susceptibilities not  performed" (06-24 @ 19:17)  Culture Results:   Growth in anaerobic bottle: Staphylococcus epidermidis "Susceptibilities  not performed"  "Due to technical problems, Proteus sp. will Not be reported as part of  the BCID panel until further notice"  ***Blood Panel PCR results on this specimen areavailable  approximately 3 hours after the Gram stain result.***  Gram stain, PCR, and/or culture results may not always  correspond due to difference in methodologies.  ************************************************************  This PCR assay wasperformed using [employer].  The following targets are tested for: Enterococcus,  vancomycin resistant enterococci, Listeria monocytogenes,  coagulase negative staphylococci, S. aureus,  methicillin resistant S. aureus, Streptococcus agalactiae  (Group B), S. pneumoniae, S. pyogenes (Group A),  Acinetobacter baumannii, Enterobacter cloacae, E. coli,  Klebsiella oxytoca, K. pneumoniae, Proteus sp.,  Serratia marcescens, Haemophilus influenzae,  Neisseria meningitidis, Pseudomonas aeruginosa, Candida  albicans, C. glabrata, C krusei, C parapsilosis,  C. tropicalis and the KPC resistance gene. (06-22 @ 22:14)  Culture Results:   No growth at 5 days. (06-22 @ 15:14)    Most recent blood culture -- 06-26 @ 16:47   -- -- .Blood 06-26 @ 16:47  Most recent blood culture -- 06-24 @ 19:17   -- -- .Blood 06-24 @ 19:17    Blood culture 06-24 @ 19:17  --    Growth in aerobic bottle: Gram Positive Cocci in Clusters  --  --  --    Urine culture    -->      Physical Examination:  PULM: Rhonchi bilaterally   CVS: S1, S2 heard    RADIOLOGY REVIEWED  CTA chest: < from: CT Angio Chest w/ IV Cont (06.29.19 @ 07:56) >  CHEST:     LUNGS AND LARGE AIRWAYS: Patent central airways. Patchy left upper upper   lobe have increased when compared to previous exam. Patchy opacities in   both lower lobes represent atelectasis.  PLEURA: No pleural effusion.  VESSELS: No filling defects are noted within the main, right and left   main pulmonary arteries. Evaluationof the lobar, segmental and   subsegmental pulmonary artery branches is limited due to marked breathing   motion artifact.  HEART: Heart size is normal. No pericardial effusion.  MEDIASTINUM AND LENA: Few small lymph nodes are present in the   pretracheal space and the AP window.  CHEST WALL AND LOWER NECK: Within normal limits.    < end of copied text >

## 2019-06-29 NOTE — CHART NOTE - NSCHARTNOTEFT_GEN_A_CORE
MEDICINE PA    Notified by RN patient with temperature of 100.6'F. Seen and examined patient at bedside with presence of daughter. Patient making we Unable to assess ROS as pt non-verbal. As per RN, patient had large amount of BM today.     VITAL SIGNS:  T(C): 36.4 (06-30-19 @ 00:08), Max: 38.1 (06-29-19 @ 20:11)  HR: 107 (06-30-19 @ 00:08) (80 - 166)  BP: 114/56 (06-30-19 @ 00:08) (96/57 - 116/75)  RR: 24 (06-30-19 @ 00:08) (20 - 25)  SpO2: 99% (06-30-19 @ 00:08) (79% - 100%)  Wt(kg): --      LABORATORY:  Culture - Blood (06.26.19 @ 16:47)    Specimen Source: .Blood    Culture Results:   No growth to date.    Culture - Urine in AM (06.28.19 @ 10:45)    Specimen Source: .Urine    Culture Results:   No growth                          8.7    11.6  )-----------( 328      ( 29 Jun 2019 03:31 )             24.7       06-29    140  |  102  |  21  ----------------------------<  151<H>  3.8   |  25  |  0.68    Ca    8.6      29 Jun 2019 07:11    TPro  6.3  /  Alb  3.0<L>  /  TBili  1.3<H>  /  DBili  x   /  AST  21  /  ALT  18  /  AlkPhos  106  06-29        RADIOLOGY:  < from: Xray Abdomen 1 View PORTABLE -Urgent (06.29.19 @ 03:33) >    PROCEDURE DATE:  06/29/2019      ******PRELIMINARY REPORT******    ******PRELIMINARY REPORT******              INTERPRETATION:  new dilated loops of bowel within the right hemiabdomen.    CT of the abdomen and pelvis can be obstained for further evaluation.     < end of copied text >    < from: CT Abdomen and Pelvis w/ IV Cont (06.29.19 @ 08:04) >    IMPRESSION:     No pulmonary embolism within the main, right and left main pulmonary   arteries. Evaluation of the lobar, segmental and subsegmental pulmonary   artery branches is limited due to breathing motion artifact.    Opacities in the left upper lobe have increased when compared to previous   exam. Primary consideration is infection.    < end of copied text >  < from: Xray Chest 1 View- PORTABLE-Urgent (06.29.19 @ 01:42) >    INTERPRETATION:  CLINICAL INFORMATION: Fever.    EXAM: Frontal radiograph of the chest - done upright.    COMPARISON: Chest radiograph from 6/22/2019    IMPRESSION:  Patchy opacity in the left lower lung, consistent with pneumonia in the   proper clinical setting.     A 3 cm radiopaque foreign body overlies the left axilla. Correlate with   clinical exam.    Areas of linear atelectasis in the bilateral midlungs. No pleural   effusion or pneumothorax. The cardiac silhouette is unchanged. No acute   osseous findings.    Loop of air-filled colon seen interposed between the right hemidiaphragm   and the liver - this is a normal variant.    < end of copied text >      PHYSICAL EXAM:  Constitutional: AOx0, non-toxic appearance, appears sleepy  Neuro: PERRLA, grossly intact, no focal deficits  Respiratory: clear lungs bilaterally, tachypneic @ 26 without accessory muscle use, non-labored, +venti mask on   Cardiovascular: S1 S2, tachy   Gastrointestinal: soft, ND/NT, +BS in all quadrants. +PEG   Extremities/Vascular: +PP b/l LE, no BLE edema, warm to touch      ASSESSMENT/PLAN:   HPI:  74 yo man with PMH severe dementia (AAOx0 s/p severe reaction to Haldol/Ativan) with PEG tube, arthritis, GERD present from home after PEG tube falling out and Right thigh swelling. Per wife, states that aide was helping change patient's diaper today and heard a crack in the Right leg. Notice swelling developing later on. Denies any recent fall or trauma. Wife states that patient recently had cough with productive quality and was started on Levaquin outpatient. Cough is improved. No perceived shortness of breath. No fevers, chills, or sweats. No appreciable abdominal pain perceived. No recent diarrhea or constipation. No change of urinary output. (22 Jun 2019) Now with recurrent fever, likely 2/2 PNA.    # Fever       - Tylenol 650mg rectal and cooling measures PRN for pyrexia       - f/u BC on 6/29; awaiting for results       - CT C/A/P shows worsened PNA and ileus        - c/w current antimicrobial regimen       - ID on board, follow ID recs        - Continue close monitoring of clinical status and vital signs. HD stable.        - will endorse to primary team in AM    # Tachycardia, Tachypnea       - Baseline tachy, but worsened likely 2/2 fever       - DVT, PE ruled out        - O2 Sat improved to 98-99% with ventimask        - ABG w/ lytes and lactate ordered     RIN SamsonC MEDICINE PA    Notified by RN patient with temperature of 100.6'F. Seen and examined patient at bedside with presence of daughter. Patient making we Unable to assess ROS as pt non-verbal. As per RN, patient had large amount of BM today.     VITAL SIGNS:  T(C): 36.4 (06-30-19 @ 00:08), Max: 38.1 (06-29-19 @ 20:11)  HR: 107 (06-30-19 @ 00:08) (80 - 166)  BP: 114/56 (06-30-19 @ 00:08) (96/57 - 116/75)  RR: 24 (06-30-19 @ 00:08) (20 - 25)  SpO2: 99% (06-30-19 @ 00:08) (79% - 100%)  Wt(kg): --      LABORATORY:  Culture - Blood (06.26.19 @ 16:47)    Specimen Source: .Blood    Culture Results:   No growth to date.    Culture - Urine in AM (06.28.19 @ 10:45)    Specimen Source: .Urine    Culture Results:   No growth                          8.7    11.6  )-----------( 328      ( 29 Jun 2019 03:31 )             24.7       06-29    140  |  102  |  21  ----------------------------<  151<H>  3.8   |  25  |  0.68    Ca    8.6      29 Jun 2019 07:11    TPro  6.3  /  Alb  3.0<L>  /  TBili  1.3<H>  /  DBili  x   /  AST  21  /  ALT  18  /  AlkPhos  106  06-29        RADIOLOGY:  < from: Xray Abdomen 1 View PORTABLE -Urgent (06.29.19 @ 03:33) >    PROCEDURE DATE:  06/29/2019      ******PRELIMINARY REPORT******    ******PRELIMINARY REPORT******              INTERPRETATION:  new dilated loops of bowel within the right hemiabdomen.    CT of the abdomen and pelvis can be obstained for further evaluation.     < end of copied text >    < from: CT Abdomen and Pelvis w/ IV Cont (06.29.19 @ 08:04) >    IMPRESSION:     No pulmonary embolism within the main, right and left main pulmonary   arteries. Evaluation of the lobar, segmental and subsegmental pulmonary   artery branches is limited due to breathing motion artifact.    Opacities in the left upper lobe have increased when compared to previous   exam. Primary consideration is infection.    < end of copied text >  < from: Xray Chest 1 View- PORTABLE-Urgent (06.29.19 @ 01:42) >    INTERPRETATION:  CLINICAL INFORMATION: Fever.    EXAM: Frontal radiograph of the chest - done upright.    COMPARISON: Chest radiograph from 6/22/2019    IMPRESSION:  Patchy opacity in the left lower lung, consistent with pneumonia in the   proper clinical setting.     A 3 cm radiopaque foreign body overlies the left axilla. Correlate with   clinical exam.    Areas of linear atelectasis in the bilateral midlungs. No pleural   effusion or pneumothorax. The cardiac silhouette is unchanged. No acute   osseous findings.    Loop of air-filled colon seen interposed between the right hemidiaphragm   and the liver - this is a normal variant.    < end of copied text >      PHYSICAL EXAM:  Constitutional: AOx0, non-toxic appearance, appears sleepy  Neuro: PERRLA, grossly intact, no focal deficits  Respiratory: clear lungs bilaterally, tachypneic @ 26 without accessory muscle use, non-labored, +venti mask on   Cardiovascular: S1 S2, tachy   Gastrointestinal: soft, ND/NT, +BS in all quadrants. +PEG   Extremities/Vascular: +PP b/l LE, no BLE edema, warm to touch      ASSESSMENT/PLAN:   HPI:  74 yo man with PMH severe dementia (AAOx0 s/p severe reaction to Haldol/Ativan) with PEG tube, arthritis, GERD present from home after PEG tube falling out and Right thigh swelling. Per wife, states that aide was helping change patient's diaper today and heard a crack in the Right leg. Notice swelling developing later on. Denies any recent fall or trauma. Wife states that patient recently had cough with productive quality and was started on Levaquin outpatient. Cough is improved. No perceived shortness of breath. No fevers, chills, or sweats. No appreciable abdominal pain perceived. No recent diarrhea or constipation. No change of urinary output. (22 Jun 2019) Now with recurrent fever, likely 2/2 PNA.    # Fever       - Tylenol 650mg rectal and cooling measures PRN for pyrexia       - f/u BC on 6/29; awaiting for results       - CT C/A/P shows worsened PNA and ileus        - c/w current antimicrobial regimen       - ID on board, follow ID recs        - Continue close monitoring of clinical status and vital signs. HD stable.        - will endorse to primary team in AM    # Tachycardia, Tachypnea       - Baseline tachy, but worsened likely 2/2 fever       - DVT, PE ruled out        - O2 Sat improved to 98-99% with ventimask        - ABG w/ lytes and lactate ordered     Addendum @ 1:30 am       - ABG and lactate results noted. Pt occasionally use abd muscle. Abd soft and non-tender. +BS. O2 sat 97% w/ ventimask. Will give IV Tylenol r/o pain. Will continue to monitor closely overnight.    Stephanie Vee PA-C MEDICINE PA    Notified by RN patient with temperature of 100.6'F. Seen and examined patient at bedside with presence of daughter. Patient making we Unable to assess ROS as pt non-verbal. As per RN, patient had large amount of BM today.     VITAL SIGNS:  T(C): 36.4 (06-30-19 @ 00:08), Max: 38.1 (06-29-19 @ 20:11)  HR: 107 (06-30-19 @ 00:08) (80 - 166)  BP: 114/56 (06-30-19 @ 00:08) (96/57 - 116/75)  RR: 24 (06-30-19 @ 00:08) (20 - 25)  SpO2: 99% (06-30-19 @ 00:08) (79% - 100%)  Wt(kg): --      LABORATORY:  Culture - Blood (06.26.19 @ 16:47)    Specimen Source: .Blood    Culture Results:   No growth to date.    Culture - Urine in AM (06.28.19 @ 10:45)    Specimen Source: .Urine    Culture Results:   No growth                          8.7    11.6  )-----------( 328      ( 29 Jun 2019 03:31 )             24.7       06-29    140  |  102  |  21  ----------------------------<  151<H>  3.8   |  25  |  0.68    Ca    8.6      29 Jun 2019 07:11    TPro  6.3  /  Alb  3.0<L>  /  TBili  1.3<H>  /  DBili  x   /  AST  21  /  ALT  18  /  AlkPhos  106  06-29        RADIOLOGY:  < from: Xray Abdomen 1 View PORTABLE -Urgent (06.29.19 @ 03:33) >    PROCEDURE DATE:  06/29/2019      ******PRELIMINARY REPORT******    ******PRELIMINARY REPORT******              INTERPRETATION:  new dilated loops of bowel within the right hemiabdomen.    CT of the abdomen and pelvis can be obstained for further evaluation.     < end of copied text >    < from: CT Abdomen and Pelvis w/ IV Cont (06.29.19 @ 08:04) >    IMPRESSION:     No pulmonary embolism within the main, right and left main pulmonary   arteries. Evaluation of the lobar, segmental and subsegmental pulmonary   artery branches is limited due to breathing motion artifact.    Opacities in the left upper lobe have increased when compared to previous   exam. Primary consideration is infection.    < end of copied text >  < from: Xray Chest 1 View- PORTABLE-Urgent (06.29.19 @ 01:42) >    INTERPRETATION:  CLINICAL INFORMATION: Fever.    EXAM: Frontal radiograph of the chest - done upright.    COMPARISON: Chest radiograph from 6/22/2019    IMPRESSION:  Patchy opacity in the left lower lung, consistent with pneumonia in the   proper clinical setting.     A 3 cm radiopaque foreign body overlies the left axilla. Correlate with   clinical exam.    Areas of linear atelectasis in the bilateral midlungs. No pleural   effusion or pneumothorax. The cardiac silhouette is unchanged. No acute   osseous findings.    Loop of air-filled colon seen interposed between the right hemidiaphragm   and the liver - this is a normal variant.    < end of copied text >      PHYSICAL EXAM:  Constitutional: AOx0, non-toxic appearance, appears sleepy/lethargic  Neuro: PERRLA, grossly intact, no focal deficits  Respiratory: clear lungs bilaterally, tachypneic @ 26 without accessory muscle use, non-labored, +venti mask on   Cardiovascular: S1 S2, tachy   Gastrointestinal: soft, ND/NT, +BS in all quadrants. +PEG   Extremities/Vascular: +PP b/l LE, no BLE edema, warm to touch      ASSESSMENT/PLAN:   HPI:  72 yo man with PMH severe dementia (AAOx0 s/p severe reaction to Haldol/Ativan) with PEG tube, arthritis, GERD present from home after PEG tube falling out and Right thigh swelling. Per wife, states that aide was helping change patient's diaper today and heard a crack in the Right leg. Notice swelling developing later on. Denies any recent fall or trauma. Wife states that patient recently had cough with productive quality and was started on Levaquin outpatient. Cough is improved. No perceived shortness of breath. No fevers, chills, or sweats. No appreciable abdominal pain perceived. No recent diarrhea or constipation. No change of urinary output. (22 Jun 2019) Now with recurrent fever, likely 2/2 PNA.    # Fever       - Tylenol 650mg rectal and cooling measures PRN for pyrexia       - f/u BC on 6/29; awaiting for results       - CT C/A/P shows worsened PNA and ileus        - c/w current antimicrobial regimen       - ID on board, follow ID recs        - Continue close monitoring of clinical status and vital signs. HD stable.        - will endorse to primary team in AM    # Tachycardia, Tachypnea       - Baseline tachy, but worsened likely 2/2 fever       - DVT, PE ruled out        - O2 Sat improved to 98-99% with ventimask        - ABG w/ lytes and lactate ordered     Addendum @ 1:30 am       - ABG and lactate results noted. Pt occasionally use abd muscle. Abd soft and non-tender. +BS. O2 sat 97% w/ ventimask. Will give IV Tylenol r/o pain. Will continue to monitor closely overnight.    Stephanie Vee PA-C

## 2019-06-29 NOTE — PROGRESS NOTE ADULT - SUBJECTIVE AND OBJECTIVE BOX
INFECTIOUS DISEASES FOLLOW UP-- Kimberly Padilla  [phone]    This is a follow up note for this  73yMale with  Closed fracture of distal end of right femur      ROS:  CONSTITUTIONAL:  No fever,opens eyes but not speaking  wife at bedside reports that he vomited last night    Allergies    No Known Allergies    Intolerances    benzodiazepines (Other)  Haldol (Other)      ANTIBIOTICS/RELEVANT:  antimicrobials  meropenem  IVPB 1000 milliGRAM(s) IV Intermittent every 8 hours    immunologic:    OTHER:  acetaminophen  Suppository .. 650 milliGRAM(s) Rectal every 6 hours PRN  ALBUTerol/ipratropium for Nebulization 3 milliLiter(s) Nebulizer every 6 hours  bisacodyl Suppository 10 milliGRAM(s) Rectal daily PRN  heparin  Injectable 5000 Unit(s) SubCutaneous every 8 hours  OLANZapine 5 milliGRAM(s) Oral daily  pantoprazole  Injectable 40 milliGRAM(s) IV Push daily  polyethylene glycol 3350 17 Gram(s) Oral two times a day  QUEtiapine 100 milliGRAM(s) Oral daily  sucralfate 1 Gram(s) Oral two times a day      Objective:  Vital Signs Last 24 Hrs  T(C): 36.7 (29 Jun 2019 10:41), Max: 38 (29 Jun 2019 01:10)  T(F): 98.1 (29 Jun 2019 10:41), Max: 100.4 (29 Jun 2019 01:10)  HR: 80 (29 Jun 2019 10:41) (80 - 122)  BP: 106/61 (29 Jun 2019 10:41) (96/57 - 124/60)  BP(mean): --  RR: 22 (29 Jun 2019 10:41) (18 - 22)  SpO2: 100% (29 Jun 2019 10:41) (93% - 100%)    PHYSICAL EXAM:  Constitutional: sleepy, wearing face mask oxygen  Eyes:CANDY, anicteric  Ear/Nose/Throat: no oral lesions, 	  Respiratory: coarse BS anteriorly  Cardiovascular: S1S2 2/6 syst murmur left border  Gastrointestinal: distended and somewhat tender girdle holding PEG tube in place  Extremities: right lower leg in a brace, right thigh area with swelling and hematoma extending around to back of leg hip  No Lymphadenopathy  IV sites not inflammed.    LABS:                        8.7    11.6  )-----------( 328      ( 29 Jun 2019 03:31 )             24.7     06-29    140  |  102  |  21  ----------------------------<  151<H>  3.8   |  25  |  0.68    Ca    8.6      29 Jun 2019 07:11    TPro  6.3  /  Alb  3.0<L>  /  TBili  1.3<H>  /  DBili  x   /  AST  21  /  ALT  18  /  AlkPhos  106  06-29          MICROBIOLOGY:            RECENT CULTURES:  06-28 @ 10:45  .Urine  --  --  --    No growth  --  06-26 @ 16:47  .Blood  --  --  --    No growth to date.  --  06-24 @ 19:17  .Blood  --  --  --    Growth in aerobic bottle: Staphylococcus hominis "Susceptibilities not  performed"  --  06-22 @ 22:14  .Blood  Blood Culture PCR  Blood Culture PCR  PCR    Growth in anaerobic bottle: Staphylococcus epidermidis "Susceptibilities  not performed"  "Due to technical problems, Proteus sp. will Not be reported as part of  the BCID panel until further notice"  ***Blood Panel PCR results on this specimen areavailable  approximately 3 hours after the Gram stain result.***  Gram stain, PCR, and/or culture results may not always  correspond due to difference in methodologies.  ************************************************************  This PCR assay wasperformed using [employer].  The following targets are tested for: Enterococcus,  vancomycin resistant enterococci, Listeria monocytogenes,  coagulase negative staphylococci, S. aureus,  methicillin resistant S. aureus, Streptococcus agalactiae  (Group B), S. pneumoniae, S. pyogenes (Group A),  Acinetobacter baumannii, Enterobacter cloacae, E. coli,  Klebsiella oxytoca, K. pneumoniae, Proteus sp.,  Serratia marcescens, Haemophilus influenzae,  Neisseria meningitidis, Pseudomonas aeruginosa, Candida  albicans, C. glabrata, C krusei, C parapsilosis,  C. tropicalis and the KPC resistance gene.  --  06-22 @ 15:14  .Blood  --  --  --    No growth at 5 days.  --  pro-calcitonin 55  lactate    RADIOLOGY & ADDITIONAL STUDIES:    < from: CT Abdomen and Pelvis w/ IV Cont (06.29.19 @ 08:04) >  IMPRESSION:     No pulmonary embolism within the main, right and left main pulmonary   arteries. Evaluation of the lobar, segmental and subsegmental pulmonary   artery branches is limited due to breathing motion artifact.    Opacities in the left upper lobe have increased when compared to previous   exam. Primary consideration is infection.    < end of copied text >  < from: Xray Abdomen 1 View PORTABLE -Urgent (06.29.19 @ 03:33) >    INTERPRETATION:  new dilated loops of bowel within the right hemiabdomen.    CT of the abdomen and pelvis can be obstained for further evaluation.     d/w DESTINY Vee.    < end of copied text >

## 2019-06-29 NOTE — PROGRESS NOTE ADULT - SUBJECTIVE AND OBJECTIVE BOX
CHIEF COMPLAINT:Patient is a 73y old  Male who presents with a chief complaint of PEG tube fall out, Right thigh swelling (29 Jun 2019 07:29)    	        PAST MEDICAL & SURGICAL HISTORY:  Dyspepsia  GERD (gastroesophageal reflux disease)  Arthritis  Dysphagia: peg  Acute renal failure (ARF): ARF 1.5 YEAR AGO  Pneumonia  Dementia  Status post insertion of percutaneous endoscopic gastrostomy (PEG) tube: PEG replacement on 02/29/16  PEG adjustment, replacement, or removal  History of hand surgery          REVIEW OF SYSTEMS:  events noted  on f/m o2  lethargic       Medications:  MEDICATIONS  (STANDING):  ALBUTerol/ipratropium for Nebulization 3 milliLiter(s) Nebulizer every 6 hours  heparin  Injectable 5000 Unit(s) SubCutaneous every 8 hours  meropenem  IVPB 1000 milliGRAM(s) IV Intermittent every 8 hours  OLANZapine 5 milliGRAM(s) Oral daily  pantoprazole  Injectable 40 milliGRAM(s) IV Push daily  polyethylene glycol 3350 17 Gram(s) Oral two times a day  QUEtiapine 100 milliGRAM(s) Oral daily  sucralfate 1 Gram(s) Oral two times a day    MEDICATIONS  (PRN):  acetaminophen  Suppository .. 650 milliGRAM(s) Rectal every 6 hours PRN Temp greater or equal to 38C (100.4F), Mild Pain (1 - 3), Moderate Pain (4 - 6)  bisacodyl Suppository 10 milliGRAM(s) Rectal daily PRN Constipation    	    PHYSICAL EXAM:  T(C): 36.9 (06-29-19 @ 04:26), Max: 38 (06-29-19 @ 01:10)  HR: 90 (06-29-19 @ 04:26) (90 - 122)  BP: 105/72 (06-29-19 @ 04:26) (96/57 - 124/60)  RR: 22 (06-29-19 @ 04:26) (18 - 22)  SpO2: 99% (06-29-19 @ 04:26) (93% - 99%)  Wt(kg): --  I&O's Summary    28 Jun 2019 07:01  -  29 Jun 2019 07:00  --------------------------------------------------------  IN: 240 mL / OUT: 0 mL / NET: 240 mL        	  HEENT:   Normal oral mucosa,   Lymphatic: No lymphadenopathy  Cardiovascular: Normal S1 S2, No JVD, No murmurs,   Respiratory: dec bs    Gastrointestinal:  Soft,  mild distention   peg 	  Neurologic: Non-focal  Extremities: r leg immobilizer  Vascular: Peripheral pulses palpable     TELEMETRY: 	    ECG:  	  RADIOLOGY:  OTHER: 	  	  LABS:	 	    CARDIAC MARKERS:                                8.7    11.6  )-----------( 328      ( 29 Jun 2019 03:31 )             24.7     06-29    140  |  102  |  21  ----------------------------<  151<H>  3.8   |  25  |  0.68    Ca    8.6      29 Jun 2019 07:11    TPro  6.3  /  Alb  3.0<L>  /  TBili  1.3<H>  /  DBili  x   /  AST  21  /  ALT  18  /  AlkPhos  106  06-29    proBNP:   Lipid Profile:   HgA1c:   TSH:

## 2019-06-29 NOTE — PROGRESS NOTE ADULT - ASSESSMENT
74 yo M with PMH severe dementia (AAOx0 s/p severe reaction to Haldol/Ativan) with PEG tube, arthritis, GERD present from home after PEG tube falling out and Right thigh swelling.  Femur fracture  Fever, tachycardia, leukocytosis, AMS    1. abdominal distension and dilated loops of bowel on X-ray- r/o obstruction.  Patient with episodes of vomiting overnight and bilious drainage from PEG tube  Would ask Surgery to evaluate patient    2. large hematoma in right thigh- undergo ultrasound or CT of the area as this may require drainage  monitor HCT/HGB currently stable low 8 range    3. aspiration pneumonia from obstruction  Continue the Meropenem    4. blood cultures x 2 bottle with s.epi and s.hominis  may be a contaminant but also has a 2/6 systolic murmur  Would continue the Vancomycin  check trough level  check TTE  repeat blood cultures    Sam Padilla MD  [phone]  After 5pm/weekends [phone] 72 yo M with PMH severe dementia (AAOx0 s/p severe reaction to Haldol/Ativan) with PEG tube, arthritis, GERD present from home after PEG tube falling out and Right thigh swelling.  Femur fracture  Fever, tachycardia, leukocytosis, AMS    1. abdominal distension and dilated loops of bowel on X-ray- r/o obstruction.  Patient with episodes of vomiting overnight and bilious drainage from PEG tube  Would ask Surgery to evaluate patient    2. large hematoma in right thigh- undergo ultrasound or CT of the area as this may require drainage  monitor HCT/HGB currently stable low 8 range  orthopedics follow up- to consider drainage of hematoma  send material for culture  follow LE pulses    3. aspiration pneumonia from obstruction  Continue the Meropenem    4. blood cultures x 2 bottle with s.epi and s.hominis  may be a contaminant but also has a 2/6 systolic murmur  Would continue the Vancomycin  check trough level  check TTE  repeat blood cultures    Sam Padilla MD  [phone]  After 5pm/weekends [phone]

## 2019-06-29 NOTE — PROGRESS NOTE ADULT - SUBJECTIVE AND OBJECTIVE BOX
INTERVAL HPI/OVERNIGHT EVENTS:  a new overnight event likely aspiration worsening ileus on imaging and ct scan chest  Allergies    No Known Allergies    Intolerances    benzodiazepines (Other)  Haldol (Other)    General:  No wt loss, fevers, chills, night sweats, fatigue,   Eyes:  Good vision, no reported pain  ENT:  No sore throat, pain, runny nose, dysphagia  CV:  No pain, palpitations, hypo/hypertension  Resp:  No dyspnea, cough, tachypnea, wheezing  GI:  No pain, No nausea, No vomiting, No diarrhea, No constipation, No weight loss, No fever, No pruritis, No rectal bleeding, No tarry stools, No dysphagia,  :  No pain, bleeding, incontinence, nocturia  Muscle:  No pain, weakness  Neuro:  No weakness, tingling, memory problems  Psych:  No fatigue, insomnia, mood problems, depression  Endocrine:  No polyuria, polydipsia, cold/heat intolerance  Heme:  No petechiae, ecchymosis, easy bruisability  Skin:  No rash, tattoos, scars, edema      PHYSICAL EXAM:   Vital Signs:  Vital Signs Last 24 Hrs  T(C): 36.7 (29 Jun 2019 10:41), Max: 38 (29 Jun 2019 01:10)  T(F): 98.1 (29 Jun 2019 10:41), Max: 100.4 (29 Jun 2019 01:10)  HR: 80 (29 Jun 2019 10:41) (80 - 122)  BP: 106/61 (29 Jun 2019 10:41) (96/57 - 124/60)  BP(mean): --  RR: 22 (29 Jun 2019 10:41) (18 - 22)  SpO2: 100% (29 Jun 2019 10:41) (93% - 100%)  Daily     Daily I&O's Summary    28 Jun 2019 07:01  -  29 Jun 2019 07:00  --------------------------------------------------------  IN: 240 mL / OUT: 0 mL / NET: 240 mL        GENERAL:  Appears stated age, well-groomed, well-nourished, no distress  HEENT:  NC/AT,  conjunctivae clear and pink, no thyromegaly, nodules, adenopathy, no JVD, sclera -anicteric  CHEST:  Full & symmetric excursion, no increased effort, breath sounds clear  HEART:  Regular rhythm, S1, S2, no murmur/rub/S3/S4, no abdominal bruit, no edema  ABDOMEN:  Soft, non-tender, non-distended, normoactive bowel sounds,  no masses ,no hepato-splenomegaly, no signs of chronic liver disease  EXTEREMITIES:  no cyanosis,clubbing or edema  SKIN:  No rash/erythema/ecchymoses/petechiae/wounds/abscess/warm/dry  NEURO:  Alert, oriented, no asterixis, no tremor, no encephalopathy      LABS:                        8.7    11.6  )-----------( 328      ( 29 Jun 2019 03:31 )             24.7     06-29    140  |  102  |  21  ----------------------------<  151<H>  3.8   |  25  |  0.68    Ca    8.6      29 Jun 2019 07:11    TPro  6.3  /  Alb  3.0<L>  /  TBili  1.3<H>  /  DBili  x   /  AST  21  /  ALT  18  /  AlkPhos  106  06-29        amylase   lipase  RADIOLOGY & ADDITIONAL TESTS:

## 2019-06-29 NOTE — PROGRESS NOTE ADULT - ASSESSMENT
72 yo male  with PMH severe dementia with PEG tube, arthritis, GERD present from home after PEG tube falling out and Right thigh swelling.       ·  Problem: Closed fracture of distal end of right femur, unspecified fracture morphology, initial encounter.   : Ortho team consulted and placed into knee immobilizer.   ortho f/u noted  discussed w/ pts wife   Pain control prn.     ·  Problem: PEG (percutaneous endoscopic gastrostomy) adjustment/replacement/removal.  : GI team replaced PEG in ED  F/U GI recs.   npo  bowel regimen      ·  Problem: Tachycardia.    fvers recurent  likely aspir last pm  w/ vomitus         : Pneumonia./sepsis.fevers   fevers /recurrent   iv abs as per id  pulm f/u noted  id f/u note        ·  Problem: GERD (gastroesophageal reflux disease).    ppi      Problem: Dementia.   supp care     anemia   likely multifactorial   f/u guaics neg   no overt bleeding   likely accumulation of blood also in r thigh   heme g/i f/u noted       ·  Problem: Prophylactic measure.    : DVT SCD    cta today to r/p pe       ·  Pprognosis overall poor   discussed w/ wife at bedside     continue present Rx

## 2019-06-29 NOTE — CHART NOTE - NSCHARTNOTEFT_GEN_A_CORE
MEDICINE [facility]    Notified by RN patient with temperature of 100.5'F. Seen and examined patient at bedside. NAD. Unable to assess ROS due to mental status.     VITAL SIGNS:  T(C): 38 (06-29-19 @ 01:10), Max: 38 (06-29-19 @ 01:10)  HR: 122 (06-29-19 @ 01:10) (96 - 122)  BP: 116/75 (06-29-19 @ 01:10) (100/58 - 130/71)  RR: 20 (06-29-19 @ 01:10) (18 - 20)  SpO2: 93% (06-29-19 @ 01:10) (93% - 99%)  Wt(kg): --      LABORATORY:                          8.2    11.80 )-----------( 286      ( 28 Jun 2019 08:35 )             25.0       06-27    142  |  104  |  15  ----------------------------<  149<H>  3.7   |  25  |  0.58    Ca    8.6      27 Jun 2019 06:33  Mg     2.1     06-27      RADIOLOGY:  < from: Xray Chest 1 View- PORTABLE-Urgent (06.29.19 @ 01:42) >    EXAM:  XR CHEST PORTABLE URGENT 1V                            PROCEDURE DATE:  06/29/2019      ******PRELIMINARY REPORT******    ******PRELIMINARY REPORT******              INTERPRETATION:  worsening left mid lung opacity.   chronically dilated loops of bowel but question interval worsening.   Abdominal radiograph is recommended     < end of copied text >        PHYSICAL EXAM:  Constitutional: AOx0. NAD, non-toxic appearance  Neuro: PERRLA, grossly intact, no focal deficits  Respiratory: diminished BS bilaterally, left ?rhonchi, R side CTA, mouth breather, tachypneic, no accessory muscle use   Cardiovascular: S1 S2. No murmurs, tachy  Gastrointestinal: soft, ND, grimacing upon palpation LUQ/LLQ, +BS in all quadrants, +PEG  Extremities/Vascular: +PP b/l LE, no BLE edema, warm to touch      ASSESSMENT/PLAN:   HPI:  74 yo man with PMH severe dementia (AAOx0 s/p severe reaction to Haldol/Ativan) with PEG tube, arthritis, GERD present from home after PEG tube falling out and Right thigh swelling. Per wife, states that aide was helping change patient's diaper today and heard a crack in the Right leg. Notice swelling developing later on. Denies any recent fall or trauma. Wife states that patient recently had cough with productive quality and was started on Levaquin outpatient. Cough is improved. No perceived shortness of breath. No fevers, chills, or sweats. No appreciable abdominal pain perceived. No recent diarrhea or constipation. No change of urinary output. (22 Jun 2019 01:24) Now with recurrent fever.       # Fever       - Ibuprofen 600mg through PEG and cooling measures PRN for pyrexia       - BCx on 6/26; NGTD       - BCx x 2 ordered       - CXR ordered       - c/w current antimicrobial regimen       - ID on board, follow up ID recs        - c/w IV hydration with vitals check Q4hrs         - Continue close monitoring of clinical status and vital signs. HD stable.        - will Endorse to primary team in AM    # Tachypnea       - r/o PE       - ABG, D-dimer    # ? dilated loop       - AXR ordered       - GI eval in AM       - Feeding currently on hold for aspiration     Stephanie Vee PA-C MEDICINE [facility]    Notified by RN patient with temperature of 100.5'F. Seen and examined patient at bedside. NAD. Unable to assess ROS due to mental status.     VITAL SIGNS:  T(C): 38 (06-29-19 @ 01:10), Max: 38 (06-29-19 @ 01:10)  HR: 122 (06-29-19 @ 01:10) (96 - 122)  BP: 116/75 (06-29-19 @ 01:10) (100/58 - 130/71)  RR: 20 (06-29-19 @ 01:10) (18 - 20)  SpO2: 93% (06-29-19 @ 01:10) (93% - 99%)  Wt(kg): --      LABORATORY:                          8.2    11.80 )-----------( 286      ( 28 Jun 2019 08:35 )             25.0       06-27    142  |  104  |  15  ----------------------------<  149<H>  3.7   |  25  |  0.58    Ca    8.6      27 Jun 2019 06:33  Mg     2.1     06-27      < from: CT Chest w/ IV Cont (06.24.19 @ 21:27) >    IMPRESSION:     Patchy opacities throughout the left lung concerning for pneumonia.   Mediastinal lymphadenopathy is likely reactive.    No intra-abdominal/pelvic source of infection is identified.    < end of copied text >      RADIOLOGY:  < from: Xray Chest 1 View- PORTABLE-Urgent (06.29.19 @ 01:42) >    EXAM:  XR CHEST PORTABLE URGENT 1V                            PROCEDURE DATE:  06/29/2019      ******PRELIMINARY REPORT******    ******PRELIMINARY REPORT******              INTERPRETATION:  worsening left mid lung opacity.   chronically dilated loops of bowel but question interval worsening.   Abdominal radiograph is recommended     < end of copied text >        PHYSICAL EXAM:  Constitutional: AOx0. NAD, non-toxic appearance  Neuro: PERRLA, grossly intact, no focal deficits  Respiratory: diminished BS bilaterally, left ?rhonchi, R side CTA, mouth breather, tachypneic, no accessory muscle use   Cardiovascular: S1 S2. No murmurs, tachy  Gastrointestinal: soft, ND, grimacing upon palpation LUQ/LLQ, +BS in all quadrants, +PEG  Extremities/Vascular: +PP b/l LE, no BLE edema, warm to touch      ASSESSMENT/PLAN:   HPI:  72 yo man with PMH severe dementia (AAOx0 s/p severe reaction to Haldol/Ativan) with PEG tube, arthritis, GERD present from home after PEG tube falling out and Right thigh swelling. Per wife, states that aide was helping change patient's diaper today and heard a crack in the Right leg. Notice swelling developing later on. Denies any recent fall or trauma. Wife states that patient recently had cough with productive quality and was started on Levaquin outpatient. Cough is improved. No perceived shortness of breath. No fevers, chills, or sweats. No appreciable abdominal pain perceived. No recent diarrhea or constipation. No change of urinary output. (22 Jun 2019 01:24) Now with recurrent fever.       # Fever       - Ibuprofen 600mg through PEG and cooling measures PRN for pyrexia       - BCx on 6/26; NGTD       - BCx x 2 ordered       - CXR ordered       - c/w current antimicrobial regimen       - ID on board, follow up ID recs        - c/w IV hydration with vitals check Q4hrs         - Continue close monitoring of clinical status and vital signs. HD stable.        - will Endorse to primary team in AM    # Tachypnea / tachycardia        - Ventimask        - r/o PE       - stat ABG, D-dimer    Addendum @ 2:30 am       - RR improved upon re-assessment, currently RR @ 22, pending ABG, D-dimer result. NAD. HD stable       - Discussed w/ radiologist, CXR shows worsening LML PNA and ?interval worsening chronic dilated loops of bowel.        - Pt intermittently grimacing, ?+TTP in LUQ/LLQ    # ? dilated loop       - AXR ordered       - Feeding currently on hold for aspiration        - GI eval in AM    Stephanie Vee PA-C [facility]IC[facility]    Notified by RN patient with temperature of 100.5'F. Seen and examined patient at bedside. NAD. Unable to assess ROS due to mental status.     VITAL SIGNS:  T(C): 38 (06-29-19 @ 01:10), Max: 38 (06-29-19 @ 01:10)  HR: 122 (06-29-19 @ 01:10) (96 - 122)  BP: 116/75 (06-29-19 @ 01:10) (100/58 - 130/71)  RR: 20 (06-29-19 @ 01:10) (18 - 20)  SpO2: 93% (06-29-19 @ 01:10) (93% - 99%)  Wt(kg): --      LABORATORY:                          8.2    11.80 )-----------( 286      ( 28 Jun 2019 08:35 )             25.0       06-27    142  |  104  |  15  ----------------------------<  149<H>  3.7   |  25  |  0.58    Ca    8.6      27 Jun 2019 06:33  Mg     2.1     06-27      < from: CT Chest w/ IV Cont (06.24.19 @ 21:27) >    IMPRESSION:     Patchy opacities throughout the left lung concerning for pneumonia.   Mediastinal lymphadenopathy is likely reactive.    No intra-abdominal/pelvic source of infection is identified.    < end of copied text >      RADIOLOGY:  < from: Xray Chest 1 View- PORTABLE-Urgent (06.29.19 @ 01:42) >    EXAM:  XR CHEST PORTABLE URGENT 1V                            PROCEDURE DATE:  06/29/2019      ******PRELIMINARY REPORT******    ******PRELIMINARY REPORT******              INTERPRETATION:  worsening left mid lung opacity.   chronically dilated loops of bowel but question interval worsening.   Abdominal radiograph is recommended     < end of copied text >        PHYSICAL EXAM:  Constitutional: AOx0. NAD, non-toxic appearance  Neuro: PERRLA, grossly intact, no focal deficits  Respiratory: diminished BS bilaterally, left ?rhonchi, R side CTA, mouth breather, tachypneic, no accessory muscle use   Cardiovascular: S1 S2. No murmurs, tachy  Gastrointestinal: soft, ND, grimacing upon palpation LUQ/LLQ, +BS in all quadrants, +PEG  Extremities/Vascular: +PP b/l LE, no BLE edema, warm to touch      ASSESSMENT/PLAN:   HPI:  72 yo man with PMH severe dementia (AAOx0 s/p severe reaction to Haldol/Ativan) with PEG tube, arthritis, GERD present from home after PEG tube falling out and Right thigh swelling. Per wife, states that aide was helping change patient's diaper today and heard a crack in the Right leg. Notice swelling developing later on. Denies any recent fall or trauma. Wife states that patient recently had cough with productive quality and was started on Levaquin outpatient. Cough is improved. No perceived shortness of breath. No fevers, chills, or sweats. No appreciable abdominal pain perceived. No recent diarrhea or constipation. No change of urinary output. (22 Jun 2019 01:24) Now with recurrent fever.       # Fever       - Ibuprofen 600mg through PEG and cooling measures PRN for pyrexia       - BCx on 6/26; NGTD       - BCx x 2 ordered       - CXR ordered       - c/w current antimicrobial regimen       - ID on board, follow up ID recs        - Continue close monitoring of clinical status and vital signs. HD stable.        - will endorse to primary team in AM    # Tachypnea / tachycardia        - Ventimask        - r/o PE       - stat ABG w/ lytes, D-dimer    Addendum @ 2:30 am       - RR improved upon re-assessment, currently RR @ 22, pending ABG, D-dimer result. NAD. HD stable       - Discussed w/ radiologist, CXR shows worsening LML PNA and ?interval worsening chronic dilated loops of bowel.        - Pt intermittently grimacing, ?+TTP in LUQ/LLQ    # ? dilated loop       - AXR ordered       - Feeding currently on hold for aspiration        - GI eval in AM    Stephanie Vee PA-C MEDICINE [facility]    Notified by RN patient with temperature of 100.5'F. Seen and examined patient at bedside. NAD. Unable to assess ROS due to mental status.     VITAL SIGNS:  T(C): 38 (06-29-19 @ 01:10), Max: 38 (06-29-19 @ 01:10)  HR: 122 (06-29-19 @ 01:10) (96 - 122)  BP: 116/75 (06-29-19 @ 01:10) (100/58 - 130/71)  RR: 20 (06-29-19 @ 01:10) (18 - 20)  SpO2: 93% (06-29-19 @ 01:10) (93% - 99%)  Wt(kg): --      LABORATORY:                          8.2    11.80 )-----------( 286      ( 28 Jun 2019 08:35 )             25.0       06-27    142  |  104  |  15  ----------------------------<  149<H>  3.7   |  25  |  0.58    Ca    8.6      27 Jun 2019 06:33  Mg     2.1     06-27      < from: CT Chest w/ IV Cont (06.24.19 @ 21:27) >    IMPRESSION:     Patchy opacities throughout the left lung concerning for pneumonia.   Mediastinal lymphadenopathy is likely reactive.    No intra-abdominal/pelvic source of infection is identified.    < end of copied text >      RADIOLOGY:  < from: Xray Chest 1 View- PORTABLE-Urgent (06.29.19 @ 01:42) >    EXAM:  XR CHEST PORTABLE URGENT 1V                            PROCEDURE DATE:  06/29/2019      ******PRELIMINARY REPORT******    ******PRELIMINARY REPORT******              INTERPRETATION:  worsening left mid lung opacity.   chronically dilated loops of bowel but question interval worsening.   Abdominal radiograph is recommended     < end of copied text >        PHYSICAL EXAM:  Constitutional: AOx0. NAD, non-toxic appearance  Neuro: PERRLA, grossly intact, no focal deficits  Respiratory: diminished BS bilaterally, left ?rhonchi, R side CTA, mouth breather, tachypneic, RR@30, no accessory muscle use   Cardiovascular: S1 S2. No murmurs, tachy  Gastrointestinal: soft, ND, grimacing upon palpation LUQ/LLQ, +BS in all quadrants, +PEG  Extremities/Vascular: +PP b/l LE, no BLE edema, warm to touch      ASSESSMENT/PLAN:   HPI:  72 yo man with PMH severe dementia (AAOx0 s/p severe reaction to Haldol/Ativan) with PEG tube, arthritis, GERD present from home after PEG tube falling out and Right thigh swelling. Per wife, states that aide was helping change patient's diaper today and heard a crack in the Right leg. Notice swelling developing later on. Denies any recent fall or trauma. Wife states that patient recently had cough with productive quality and was started on Levaquin outpatient. Cough is improved. No perceived shortness of breath. No fevers, chills, or sweats. No appreciable abdominal pain perceived. No recent diarrhea or constipation. No change of urinary output. (22 Jun 2019 01:24) Now with recurrent fever.       # Fever       - Ibuprofen 600mg through PEG and cooling measures PRN for pyrexia       - BCx on 6/26; NGTD       - BCx x 2 ordered       - CXR ordered       - c/w current antimicrobial regimen       - ID on board, follow up ID recs        - Continue close monitoring of clinical status and vital signs. HD stable.        - will endorse to primary team in AM    # Tachypnea / tachycardia        - Ventimask        - r/o PE       - stat ABG w/ lytes, D-dimer    Addendum @ 2:30 am       - RR improved upon re-assessment, currently RR @ 22, pending ABG, D-dimer result. NAD. HD stable       - Discussed w/ radiologist, CXR shows worsening LML PNA and ?interval worsening chronic dilated loops of bowel.        - Pt intermittently grimacing, ?+TTP in LUQ/LLQ    # ? dilated loop       - AXR ordered       - Feeding currently on hold for aspiration        - GI eval in AM    Stephanie Vee PA-C MEDICINE [facility]    Notified by RN patient with temperature of 100.5'F. Seen and examined patient at bedside. NAD. Unable to assess ROS due to mental status.     VITAL SIGNS:  T(C): 38 (06-29-19 @ 01:10), Max: 38 (06-29-19 @ 01:10)  HR: 122 (06-29-19 @ 01:10) (96 - 122)  BP: 116/75 (06-29-19 @ 01:10) (100/58 - 130/71)  RR: 20 (06-29-19 @ 01:10) (18 - 20)  SpO2: 93% (06-29-19 @ 01:10) (93% - 99%)  Wt(kg): --      LABORATORY:                          8.2    11.80 )-----------( 286      ( 28 Jun 2019 08:35 )             25.0       06-27    142  |  104  |  15  ----------------------------<  149<H>  3.7   |  25  |  0.58    Ca    8.6      27 Jun 2019 06:33  Mg     2.1     06-27      < from: CT Chest w/ IV Cont (06.24.19 @ 21:27) >    IMPRESSION:     Patchy opacities throughout the left lung concerning for pneumonia.   Mediastinal lymphadenopathy is likely reactive.    No intra-abdominal/pelvic source of infection is identified.    < end of copied text >      RADIOLOGY:  < from: Xray Chest 1 View- PORTABLE-Urgent (06.29.19 @ 01:42) >    EXAM:  XR CHEST PORTABLE URGENT 1V                            PROCEDURE DATE:  06/29/2019      ******PRELIMINARY REPORT******    ******PRELIMINARY REPORT******              INTERPRETATION:  worsening left mid lung opacity.   chronically dilated loops of bowel but question interval worsening.   Abdominal radiograph is recommended     < end of copied text >        PHYSICAL EXAM:  Constitutional: AOx0. NAD, non-toxic appearance  Neuro: PERRLA, grossly intact, no focal deficits  Respiratory: diminished BS bilaterally, left ?rhonchi, R side CTA, mouth breather, tachypneic, RR@30, no accessory muscle use   Cardiovascular: S1 S2. No murmurs, tachy  Gastrointestinal: soft, ND, grimacing upon palpation LUQ/LLQ, +BS in all quadrants, +PEG  Extremities/Vascular: +PP b/l LE, no BLE edema, warm to touch      ASSESSMENT/PLAN:   HPI:  72 yo man with PMH severe dementia (AAOx0 s/p severe reaction to Haldol/Ativan) with PEG tube, arthritis, GERD present from home after PEG tube falling out and Right thigh swelling. Per wife, states that aide was helping change patient's diaper today and heard a crack in the Right leg. Notice swelling developing later on. Denies any recent fall or trauma. Wife states that patient recently had cough with productive quality and was started on Levaquin outpatient. Cough is improved. No perceived shortness of breath. No fevers, chills, or sweats. No appreciable abdominal pain perceived. No recent diarrhea or constipation. No change of urinary output. (22 Jun 2019 01:24) Now with recurrent fever.       # Fever       - Ibuprofen 600mg through PEG and cooling measures PRN for pyrexia       - BCx on 6/26; NGTD       - BCx x 2 ordered       - CXR ordered       - c/w current antimicrobial regimen       - ID on board, follow up ID recs        - Continue close monitoring of clinical status and vital signs. HD stable.        - will endorse to primary team in AM    # Tachypnea / tachycardia        - Ventimask        - r/o PE       - stat ABG w/ lytes, D-dimer    Addendum @ 2:30 am       - RR improved upon re-assessment, currently RR @ 22, pending ABG, D-dimer result. NAD. HD stable       - Discussed w/ radiologist, CXR shows worsening LML PNA and ?interval worsening chronic dilated loops of bowel.        - Pt intermittently grimacing, ?+TTP in LUQ/LLQ    # ? dilated loop       - AXR ordered       - Feeding currently on hold for aspiration        - GI eval in AM    Addendum @ 400 am       - D-dimer 2533 noted       - CTA w/ contrast ordered to r/o PE       - LE US to r/o DVT     Stephanie Vee PA-C MEDICINE PA    Notified by RN patient with temperature of 100.5'F. Seen and examined patient at bedside. NAD. Unable to assess ROS due to mental status.     VITAL SIGNS:  T(C): 38 (06-29-19 @ 01:10), Max: 38 (06-29-19 @ 01:10)  HR: 122 (06-29-19 @ 01:10) (96 - 122)  BP: 116/75 (06-29-19 @ 01:10) (100/58 - 130/71)  RR: 20 (06-29-19 @ 01:10) (18 - 20)  SpO2: 93% (06-29-19 @ 01:10) (93% - 99%)  Wt(kg): --      LABORATORY:                          8.2    11.80 )-----------( 286      ( 28 Jun 2019 08:35 )             25.0       06-27    142  |  104  |  15  ----------------------------<  149<H>  3.7   |  25  |  0.58    Ca    8.6      27 Jun 2019 06:33  Mg     2.1     06-27      < from: CT Chest w/ IV Cont (06.24.19 @ 21:27) >    IMPRESSION:     Patchy opacities throughout the left lung concerning for pneumonia.   Mediastinal lymphadenopathy is likely reactive.    No intra-abdominal/pelvic source of infection is identified.    < end of copied text >      RADIOLOGY:  < from: Xray Chest 1 View- PORTABLE-Urgent (06.29.19 @ 01:42) >    EXAM:  XR CHEST PORTABLE URGENT 1V                            PROCEDURE DATE:  06/29/2019      ******PRELIMINARY REPORT******    ******PRELIMINARY REPORT******              INTERPRETATION:  worsening left mid lung opacity.   chronically dilated loops of bowel but question interval worsening.   Abdominal radiograph is recommended     < end of copied text >    < from: Xray Abdomen 1 View PORTABLE -Urgent (06.29.19 @ 03:33) >      INTERPRETATION:  new dilated loops of bowel within the right hemiabdomen.    CT of the abdomen and pelvis can be obtained for further evaluation.     < end of copied text >        PHYSICAL EXAM:  Constitutional: AOx0. NAD, non-toxic appearance  Neuro: PERRLA, grossly intact, no focal deficits  Respiratory: diminished BS bilaterally, left ?rhonchi, R side CTA, mouth breather, tachypneic, RR@30, no accessory muscle use   Cardiovascular: S1 S2. No murmurs, tachy  Gastrointestinal: soft, ND, grimacing upon palpation LUQ/LLQ, +BS in all quadrants, +PEG  Extremities/Vascular: +PP b/l LE, no BLE edema, warm to touch      ASSESSMENT/PLAN:   HPI:  72 yo man with PMH severe dementia (AAOx0 s/p severe reaction to Haldol/Ativan) with PEG tube, arthritis, GERD present from home after PEG tube falling out and Right thigh swelling. Per wife, states that aide was helping change patient's diaper today and heard a crack in the Right leg. Notice swelling developing later on. Denies any recent fall or trauma. Wife states that patient recently had cough with productive quality and was started on Levaquin outpatient. Cough is improved. No perceived shortness of breath. No fevers, chills, or sweats. No appreciable abdominal pain perceived. No recent diarrhea or constipation. No change of urinary output. (22 Jun 2019 01:24) Now with recurrent fever.       # Fever       - Ibuprofen 600mg through PEG and cooling measures PRN for pyrexia       - BCx on 6/26; NGTD       - BCx x 2 ordered       - CXR ordered       - c/w current antimicrobial regimen       - ID on board, follow up ID recs        - Continue close monitoring of clinical status and vital signs. HD stable.        - will endorse to primary team in AM    # Tachypnea / tachycardia        - Ventimask        - r/o PE       - stat ABG w/ lytes, D-dimer    Addendum @ 2:30 am       - RR improved upon re-assessment, currently RR @ 22, pending ABG, D-dimer result. NAD. HD stable       - Discussed w/ radiologist, CXR shows worsening LML PNA and ?interval worsening chronic dilated loops of bowel.        - Pt intermittently grimacing, ?+TTP in LUQ/LLQ    # ? dilated loop       - AXR ordered       - Feeding currently on hold for aspiration        - GI eval in AM    Addendum @ 400 am       - D-dimer 2533 noted       - CTA w/ contrast ordered to r/o PE - protocoled       - LE US to r/o DVT    Addendum @ 5:20 am       - Received a call from radiologist re: new dilated loops of bowel on AXR requiring CT A/P w/ contrast for further evaluation (Pt already received contrast for CTA)       - CT A/P with contrast ordered       Stephanie Vee PA-C MEDICINE PA    Notified by RN patient with temperature of 100.5'F. Seen and examined patient at bedside. NAD. Unable to assess ROS due to mental status.     VITAL SIGNS:  T(C): 38 (06-29-19 @ 01:10), Max: 38 (06-29-19 @ 01:10)  HR: 122 (06-29-19 @ 01:10) (96 - 122)  BP: 116/75 (06-29-19 @ 01:10) (100/58 - 130/71)  RR: 20 (06-29-19 @ 01:10) (18 - 20)  SpO2: 93% (06-29-19 @ 01:10) (93% - 99%)  Wt(kg): --      LABORATORY:                          8.2    11.80 )-----------( 286      ( 28 Jun 2019 08:35 )             25.0       06-27    142  |  104  |  15  ----------------------------<  149<H>  3.7   |  25  |  0.58    Ca    8.6      27 Jun 2019 06:33  Mg     2.1     06-27      < from: CT Chest w/ IV Cont (06.24.19 @ 21:27) >    IMPRESSION:     Patchy opacities throughout the left lung concerning for pneumonia.   Mediastinal lymphadenopathy is likely reactive.    No intra-abdominal/pelvic source of infection is identified.    < end of copied text >      RADIOLOGY:  < from: Xray Chest 1 View- PORTABLE-Urgent (06.29.19 @ 01:42) >    EXAM:  XR CHEST PORTABLE URGENT 1V                            PROCEDURE DATE:  06/29/2019      ******PRELIMINARY REPORT******    ******PRELIMINARY REPORT******              INTERPRETATION:  worsening left mid lung opacity.   chronically dilated loops of bowel but question interval worsening.   Abdominal radiograph is recommended     < end of copied text >    < from: Xray Abdomen 1 View PORTABLE -Urgent (06.29.19 @ 03:33) >      INTERPRETATION:  new dilated loops of bowel within the right hemiabdomen.    CT of the abdomen and pelvis can be obtained for further evaluation.     < end of copied text >        PHYSICAL EXAM:  Constitutional: AOx0. NAD, non-toxic appearance  Neuro: PERRLA, grossly intact, no focal deficits  Respiratory: diminished BS bilaterally, left ?rhonchi, R side CTA, mouth breather, tachypneic, RR@30, no accessory muscle use   Cardiovascular: S1 S2. No murmurs, tachy  Gastrointestinal: soft, ND, grimacing upon palpation LUQ/LLQ, +BS in all quadrants, +PEG  Extremities/Vascular: +PP b/l LE, no BLE edema, warm to touch      ASSESSMENT/PLAN:   HPI:  72 yo man with PMH severe dementia (AAOx0 s/p severe reaction to Haldol/Ativan) with PEG tube, arthritis, GERD present from home after PEG tube falling out and Right thigh swelling. Per wife, states that aide was helping change patient's diaper today and heard a crack in the Right leg. Notice swelling developing later on. Denies any recent fall or trauma. Wife states that patient recently had cough with productive quality and was started on Levaquin outpatient. Cough is improved. No perceived shortness of breath. No fevers, chills, or sweats. No appreciable abdominal pain perceived. No recent diarrhea or constipation. No change of urinary output. (22 Jun 2019 01:24) Now with recurrent fever.       # Fever       - Ibuprofen 600mg through PEG and cooling measures PRN for pyrexia       - BCx on 6/26; NGTD       - BCx x 2 ordered       - CXR ordered       - c/w current antimicrobial regimen       - ID on board, follow up ID recs        - Continue close monitoring of clinical status and vital signs. HD stable.        - will endorse to primary team in AM    # Tachypnea / tachycardia        - Ventimask        - r/o PE       - stat ABG w/ lytes, D-dimer    Addendum @ 2:30 am       - RR improved upon re-assessment, currently RR @ 22, pending ABG, D-dimer result. NAD. HD stable       - Discussed w/ radiologist, CXR shows worsening LML PNA and ?interval worsening chronic dilated loops of bowel.        - Pt intermittently grimacing, ?+TTP in LUQ/LLQ    # ? dilated loop       - AXR ordered       - Feeding currently on hold for aspiration        - GI eval in AM    Addendum @ 400 am       - D-dimer 2533 noted       - CTA w/ contrast ordered to r/o PE - protocoled       - LE US to r/o DVT    Addendum @ 5:20 am       - Received a call from radiologist re: new dilated loops of bowel on AXR requiring CT A/P w/ contrast for further evaluation (Pt already received contrast for CTA)       - CT A/P with contrast ordered      Addendum @ 6:30 am       - Called Dr. Hair to update the events, unable to reach at this time. Left VM. Waiting callback. Pt HD stable. Will continue to closely monitor and endorse to primary team in AM.    Stephanie Vee PA-C

## 2019-06-29 NOTE — PROVIDER CONTACT NOTE (OTHER) - SITUATION
Pt has temp of 100.6 HR: 166 on pulse ox and is tachypneic with an O2 sat of 75% on 4 L nasal cannula

## 2019-06-29 NOTE — PROGRESS NOTE ADULT - SUBJECTIVE AND OBJECTIVE BOX
CARDIOLOGY FOLLOW UP - Dr. Young    CC events noted  + fever, elevated d-dimer, Tachypnea / tachycardia, remains on venti- mask         PHYSICAL EXAM:  T(C): 36.9 (06-29-19 @ 04:26), Max: 38 (06-29-19 @ 01:10)  HR: 90 (06-29-19 @ 04:26) (90 - 122)  BP: 105/72 (06-29-19 @ 04:26) (96/57 - 124/60)  RR: 22 (06-29-19 @ 04:26) (18 - 22)  SpO2: 99% (06-29-19 @ 04:26) (93% - 99%)  Wt(kg): --  I&O's Summary    28 Jun 2019 07:01  -  29 Jun 2019 07:00  --------------------------------------------------------  IN: 0 mL / OUT: 0 mL / NET: 0 mL        Appearance: NAD, on venti mask 	  Cardiovascular: Normal S1 S2,RRR + murmur  Respiratory: rhonchi  Gastrointestinal:  Soft, Non-tender, + BS	  Extremities: no edema        MEDICATIONS  (STANDING):  ALBUTerol/ipratropium for Nebulization 3 milliLiter(s) Nebulizer every 6 hours  heparin  Injectable 5000 Unit(s) SubCutaneous every 8 hours  meropenem  IVPB 1000 milliGRAM(s) IV Intermittent every 8 hours  OLANZapine 5 milliGRAM(s) Oral daily  pantoprazole  Injectable 40 milliGRAM(s) IV Push daily  polyethylene glycol 3350 17 Gram(s) Oral two times a day  QUEtiapine 100 milliGRAM(s) Oral daily  sucralfate 1 Gram(s) Oral two times a day      TELEMETRY: 	    ECG:  	  RADIOLOGY:    < from: Xray Chest 1 View- PORTABLE-Urgent (06.29.19 @ 01:42) >    PROCEDURE DATE:  06/29/2019      ******PRELIMINARY REPORT******    ******PRELIMINARY REPORT******              INTERPRETATION:  worsening left mid lung opacity.   chronically dilated loops of bowel but question interval worsening.   Abdominal radiograph is recommended     d/w PA Vee    -------------------------------------------------------------------------------------------------------------------------   < from: US Abdomen Complete (06.28.19 @ 13:42) >  IMPRESSION:     Normal abdominal ultrasound. Normal gallbladder. Pancreas obscured by   bowel gas.        -------------------------------------------------------------------------------------------------------------------------      DIAGNOSTIC TESTING:  [ ] Echocardiogram:  [ ]  Catheterization:  [ ] Stress Test:    OTHER: 	            LABS:	   	  D-Dimer Assay, Quantitative: 2533: D-Dimer result less than 230 ng/mL DDU correlates with the absence  of thrombosis in a patient with a low and moderate       pre-test probability of thrombosis.  1 DDU is approximately equal to  2 ng/mL FEU (previous units). ng/mL DDU (06.29.19 @ 03:31)                              8.7    11.6  )-----------( 328      ( 29 Jun 2019 03:31 )             24.7     06-29    139  |  103  |  20  ----------------------------<  181<H>  3.9   |  22  |  0.63    Ca    8.6      29 Jun 2019 03:31    TPro  5.7<L>  /  Alb  2.5<L>  /  TBili  1.3<H>  /  DBili  x   /  AST  17  /  ALT  17  /  AlkPhos  101  06-29

## 2019-06-29 NOTE — PROVIDER CONTACT NOTE (MEDICATION) - ACTION/TREATMENT ORDERED:
As per provider NP, order pending for tynenol As per provider DESTINY Vee, order pending for ibuprofen

## 2019-06-29 NOTE — PROGRESS NOTE ADULT - ASSESSMENT
peg tube    plan  s/p peg change  axr reviewed, PEG in correct place  feeds held for ileus  repeat axr  iv abs and ivf  daily peg care, monitor residuals  Miralax 17 grams BID for constipation started 6/24  dulcolax supp 10 mg daily added 6/25

## 2019-06-29 NOTE — PROGRESS NOTE ADULT - ASSESSMENT
72 yo man with PMH severe dementia (AAOx0 s/p severe reaction to Haldol/Ativan) with PEG tube, arthritis, GERD presents with  PEG dislodgement, Femur FX, sepsis     1. Hypoxic Resp Failure  -likely in the setting of LLL PNA  -cont abx/O2 support per pulm  -low suspicion for CHF  -d-dimer noted, f/u official CTA chest results, r/o PE    2. Sepsis/ LLL Pna   -ct chest/ abd revealed pna left lung , mediastinal lymphadenopathy. no intra-abd / pelvic source of infection identified   - f/u bcx negative   - f/u repeat chest xray , pending ct abd/pelvis   -ID, pulm f/u     3. Femur FX  - med f/u   -knee immobilizer, pain control, ortho f/u    4. Murmur  -pending ECHO to eval murmur    5.  PEG dislodgement  -gi f/u s/p peg change    6. tachycardia   likely in the setting of pna  f/u CTA chest r/o PE given fever is persistent. High risk of VTE.   pending dopplers LE r/o dvt    -DVT ppx

## 2019-06-29 NOTE — PROGRESS NOTE ADULT - PROBLEM SELECTOR PLAN 1
Worsened SHEILA PNA on CT chest overnight, likely 2nd worsened aspiration   -c/w Vanco/Meropenem per ID recs  -HOB>30 at all times  -Would change TF to con't, bolus feeds will increase aspiration risk   -Pending GI consult G--J tube conversion?   -Aspiration precautions  -Advanced dementia  -Poor overall prognosis

## 2019-06-30 LAB
ANION GAP SERPL CALC-SCNC: 9 MMOL/L — SIGNIFICANT CHANGE UP (ref 5–17)
BUN SERPL-MCNC: 20 MG/DL — SIGNIFICANT CHANGE UP (ref 7–23)
CALCIUM SERPL-MCNC: 8.5 MG/DL — SIGNIFICANT CHANGE UP (ref 8.4–10.5)
CHLORIDE SERPL-SCNC: 107 MMOL/L — SIGNIFICANT CHANGE UP (ref 96–108)
CO2 SERPL-SCNC: 27 MMOL/L — SIGNIFICANT CHANGE UP (ref 22–31)
CREAT SERPL-MCNC: 0.57 MG/DL — SIGNIFICANT CHANGE UP (ref 0.5–1.3)
GAS PNL BLDA: SIGNIFICANT CHANGE UP
GLUCOSE BLDC GLUCOMTR-MCNC: 120 MG/DL — HIGH (ref 70–99)
GLUCOSE SERPL-MCNC: 140 MG/DL — HIGH (ref 70–99)
HCT VFR BLD CALC: 24.9 % — LOW (ref 39–50)
HGB BLD-MCNC: 8.1 G/DL — LOW (ref 13–17)
MAGNESIUM SERPL-MCNC: 2.5 MG/DL — SIGNIFICANT CHANGE UP (ref 1.6–2.6)
MCHC RBC-ENTMCNC: 32.5 GM/DL — SIGNIFICANT CHANGE UP (ref 32–36)
MCHC RBC-ENTMCNC: 33.1 PG — SIGNIFICANT CHANGE UP (ref 27–34)
MCV RBC AUTO: 101.6 FL — HIGH (ref 80–100)
PHOSPHATE SERPL-MCNC: 2.6 MG/DL — SIGNIFICANT CHANGE UP (ref 2.5–4.5)
PLATELET # BLD AUTO: 407 K/UL — HIGH (ref 150–400)
POTASSIUM SERPL-MCNC: 4.1 MMOL/L — SIGNIFICANT CHANGE UP (ref 3.5–5.3)
POTASSIUM SERPL-SCNC: 4.1 MMOL/L — SIGNIFICANT CHANGE UP (ref 3.5–5.3)
RBC # BLD: 2.45 M/UL — LOW (ref 4.2–5.8)
RBC # FLD: 14.1 % — SIGNIFICANT CHANGE UP (ref 10.3–14.5)
SODIUM SERPL-SCNC: 143 MMOL/L — SIGNIFICANT CHANGE UP (ref 135–145)
WBC # BLD: 8.65 K/UL — SIGNIFICANT CHANGE UP (ref 3.8–10.5)
WBC # FLD AUTO: 8.65 K/UL — SIGNIFICANT CHANGE UP (ref 3.8–10.5)

## 2019-06-30 PROCEDURE — 74018 RADEX ABDOMEN 1 VIEW: CPT | Mod: 26

## 2019-06-30 RX ORDER — SODIUM CHLORIDE 9 MG/ML
1000 INJECTION, SOLUTION INTRAVENOUS
Refills: 0 | Status: DISCONTINUED | OUTPATIENT
Start: 2019-06-30 | End: 2019-06-30

## 2019-06-30 RX ORDER — ACETAMINOPHEN 500 MG
1000 TABLET ORAL ONCE
Refills: 0 | Status: COMPLETED | OUTPATIENT
Start: 2019-06-30 | End: 2019-06-30

## 2019-06-30 RX ADMIN — Medication 3 MILLILITER(S): at 00:11

## 2019-06-30 RX ADMIN — Medication 650 MILLIGRAM(S): at 21:57

## 2019-06-30 RX ADMIN — Medication 400 MILLIGRAM(S): at 01:57

## 2019-06-30 RX ADMIN — Medication 3 MILLILITER(S): at 19:04

## 2019-06-30 RX ADMIN — HEPARIN SODIUM 5000 UNIT(S): 5000 INJECTION INTRAVENOUS; SUBCUTANEOUS at 21:58

## 2019-06-30 RX ADMIN — Medication 3 MILLILITER(S): at 12:32

## 2019-06-30 RX ADMIN — Medication 3 MILLILITER(S): at 05:50

## 2019-06-30 RX ADMIN — QUETIAPINE FUMARATE 100 MILLIGRAM(S): 200 TABLET, FILM COATED ORAL at 21:58

## 2019-06-30 RX ADMIN — Medication 1 GRAM(S): at 19:05

## 2019-06-30 RX ADMIN — Medication 1000 MILLIGRAM(S): at 02:47

## 2019-06-30 RX ADMIN — MEROPENEM 100 MILLIGRAM(S): 1 INJECTION INTRAVENOUS at 05:49

## 2019-06-30 RX ADMIN — HEPARIN SODIUM 5000 UNIT(S): 5000 INJECTION INTRAVENOUS; SUBCUTANEOUS at 05:50

## 2019-06-30 RX ADMIN — PANTOPRAZOLE SODIUM 40 MILLIGRAM(S): 20 TABLET, DELAYED RELEASE ORAL at 12:32

## 2019-06-30 RX ADMIN — HEPARIN SODIUM 5000 UNIT(S): 5000 INJECTION INTRAVENOUS; SUBCUTANEOUS at 14:49

## 2019-06-30 RX ADMIN — MEROPENEM 100 MILLIGRAM(S): 1 INJECTION INTRAVENOUS at 14:49

## 2019-06-30 RX ADMIN — SODIUM CHLORIDE 60 MILLILITER(S): 9 INJECTION, SOLUTION INTRAVENOUS at 01:58

## 2019-06-30 RX ADMIN — MEROPENEM 100 MILLIGRAM(S): 1 INJECTION INTRAVENOUS at 22:58

## 2019-06-30 NOTE — PROGRESS NOTE ADULT - ASSESSMENT
74 yo man with PMH severe dementia (AAOx0 s/p severe reaction to Haldol/Ativan) with PEG tube, arthritis, GERD presents with  PEG dislodgement, Femur FX, sepsis     1. Hypoxic Resp Failure  -likely in the setting of LLL PNA  -cont abx/O2 support per pulm  -low suspicion for CHF  -d-dimer noted, no PE    2. Sepsis/ LLL Pna   -ct chest/ abd revealed pna left lung , mediastinal lymphadenopathy. no intra-abd / pelvic source of infection identified   - f/u bcx negative   - f/u repeat chest xray , pending ct abd/pelvis   -ID, pulm f/u     3. Femur FX  - med f/u   -knee immobilizer, pain control, ortho f/u    4. Murmur  -pending ECHO to eval murmur    5.  PEG dislodgement  -gi f/u s/p peg change    6. tachycardia   likely in the setting of pna  f/u CTA chest r/o PE given fever is persistent. High risk of VTE.   pending dopplers LE r/o dvt    -DVT ppx

## 2019-06-30 NOTE — PROGRESS NOTE ADULT - ASSESSMENT
peg tube    plan  s/p peg change  axr reviewed, PEG in correct place  feeds held for ileus now can start at 10cc/hr  repeat axr if distended  iv abs and ivf  daily peg care, monitor residuals  Miralax 17 grams BID for constipation started 6/24  dulcolax supp 10 mg daily added 6/25

## 2019-06-30 NOTE — PROGRESS NOTE ADULT - SUBJECTIVE AND OBJECTIVE BOX
s:  pt non verbal    acetaminophen  Suppository .. 650 milliGRAM(s) Rectal every 6 hours PRN  ALBUTerol/ipratropium for Nebulization 3 milliLiter(s) Nebulizer every 6 hours  bisacodyl Suppository 10 milliGRAM(s) Rectal daily PRN  dextrose 5% + lactated ringers. 1000 milliLiter(s) IV Continuous <Continuous>  heparin  Injectable 5000 Unit(s) SubCutaneous every 8 hours  meropenem  IVPB 1000 milliGRAM(s) IV Intermittent every 8 hours  OLANZapine 5 milliGRAM(s) Oral daily  pantoprazole  Injectable 40 milliGRAM(s) IV Push daily  polyethylene glycol 3350 17 Gram(s) Oral two times a day  QUEtiapine 100 milliGRAM(s) Oral daily  sucralfate 1 Gram(s) Oral two times a day      benzodiazepines (Other)  Haldol (Other)  No Known Allergies      ROS unable to obtain  T(C): 37.1 (06-30-19 @ 04:42), Max: 38.1 (06-29-19 @ 20:11)  HR: 96 (06-30-19 @ 04:42) (96 - 166)  BP: 106/70 (06-30-19 @ 04:42) (106/70 - 116/51)  RR: 24 (06-30-19 @ 04:42) (22 - 25)  SpO2: 100% (06-30-19 @ 04:42) (79% - 100%)  PHYSICAL EXAM    Gen:  laying in bed, nad  H:  anicteric, eomi  CV:  RRR, S1, S2  Lungs:  CTA b/l  Ab soft/nt/nd  Ext:  no edema                          8.1    8.65  )-----------( 407      ( 30 Jun 2019 09:18 )             24.9                         8.7    11.6  )-----------( 328      ( 29 Jun 2019 03:31 )             24.7                         8.2    11.80 )-----------( 286      ( 28 Jun 2019 08:35 )             25.0     06-30    143  |  107  |  20  ----------------------------<  140<H>  4.1   |  27  |  0.57    Ca    8.5      30 Jun 2019 06:52  Phos  2.6     06-30  Mg     2.5     06-30    TPro  6.3  /  Alb  3.0<L>  /  TBili  1.3<H>  /  DBili  x   /  AST  21  /  ALT  18  /  AlkPhos  106  06-29

## 2019-06-30 NOTE — PROGRESS NOTE ADULT - SUBJECTIVE AND OBJECTIVE BOX
CHIEF COMPLAINT:Patient is a 73y old  Male who presents with a chief complaint of PEG tube fall out, Right thigh swelling (29 Jun 2019 14:00)    	        PAST MEDICAL & SURGICAL HISTORY:  Dyspepsia  GERD (gastroesophageal reflux disease)  Arthritis  Dysphagia: peg  Acute renal failure (ARF): ARF 1.5 YEAR AGO  Pneumonia  Dementia  Status post insertion of percutaneous endoscopic gastrostomy (PEG) tube: PEG replacement on 02/29/16  PEG adjustment, replacement, or removal  History of hand surgery          REVIEW OF SYSTEMS:  CONSTITUTIONAL: No fever, weight loss, or fatigue  EYES: No eye pain, visual disturbances, or discharge  NECK: No pain or stiffness  RESPIRATORY: No cough, wheezing, chills or hemoptysis; No Shortness of Breath  CARDIOVASCULAR: No chest pain, palpitations, passing out, dizziness, or leg swelling  GASTROINTESTINAL: No abdominal or epigastric pain. No nausea, vomiting, or hematemesis; No diarrhea or constipation. No melena or hematochezia.  GENITOURINARY: No dysuria, frequency, hematuria, or incontinence  NEUROLOGICAL: No headaches, memory loss, loss of strength, numbness, or tremors  SKIN: No itching, burning, rashes, or lesions   LYMPH Nodes: No enlarged glands  ENDOCRINE: No heat or cold intolerance; No hair loss  MUSCULOSKELETAL: No joint pain or swelling; No muscle, back, or extremity pain    Medications:  MEDICATIONS  (STANDING):  ALBUTerol/ipratropium for Nebulization 3 milliLiter(s) Nebulizer every 6 hours  dextrose 5% + lactated ringers. 1000 milliLiter(s) (60 mL/Hr) IV Continuous <Continuous>  heparin  Injectable 5000 Unit(s) SubCutaneous every 8 hours  meropenem  IVPB 1000 milliGRAM(s) IV Intermittent every 8 hours  OLANZapine 5 milliGRAM(s) Oral daily  pantoprazole  Injectable 40 milliGRAM(s) IV Push daily  polyethylene glycol 3350 17 Gram(s) Oral two times a day  QUEtiapine 100 milliGRAM(s) Oral daily  sucralfate 1 Gram(s) Oral two times a day    MEDICATIONS  (PRN):  acetaminophen  Suppository .. 650 milliGRAM(s) Rectal every 6 hours PRN Temp greater or equal to 38C (100.4F), Mild Pain (1 - 3), Moderate Pain (4 - 6)  bisacodyl Suppository 10 milliGRAM(s) Rectal daily PRN Constipation    	    PHYSICAL EXAM:  T(C): 37.1 (06-30-19 @ 04:42), Max: 38.1 (06-29-19 @ 20:11)  HR: 96 (06-30-19 @ 04:42) (80 - 166)  BP: 106/70 (06-30-19 @ 04:42) (106/61 - 116/51)  RR: 24 (06-30-19 @ 04:42) (22 - 25)  SpO2: 100% (06-30-19 @ 04:42) (79% - 100%)  Wt(kg): --  I&O's Summary    29 Jun 2019 07:01  -  30 Jun 2019 07:00  --------------------------------------------------------  IN: 610 mL / OUT: 0 mL / NET: 610 mL        Appearance: Normal	  HEENT:   Normal oral mucosa, PERRL, EOMI	  Lymphatic: No lymphadenopathy  Cardiovascular: Normal S1 S2, No JVD, No murmurs, No edema  Respiratory: Lungs clear to auscultation	  Psychiatry: A & O x 3, Mood & affect appropriate  Gastrointestinal:  Soft, Non-tender, + BS	  Skin: No rashes, No ecchymoses, No cyanosis	  Neurologic: Non-focal  Extremities: Normal range of motion, No clubbing, cyanosis or edema  Vascular: Peripheral pulses palpable 2+ bilaterally    TELEMETRY: 	    ECG:  	  RADIOLOGY:  OTHER: 	  	  LABS:	 	    CARDIAC MARKERS:                                8.7    11.6  )-----------( 328      ( 29 Jun 2019 03:31 )             24.7     06-30    143  |  107  |  20  ----------------------------<  140<H>  4.1   |  27  |  0.57    Ca    8.5      30 Jun 2019 06:52  Phos  2.6     06-30  Mg     2.5     06-30    TPro  6.3  /  Alb  3.0<L>  /  TBili  1.3<H>  /  DBili  x   /  AST  21  /  ALT  18  /  AlkPhos  106  06-29    proBNP:   Lipid Profile:   HgA1c:   TSH: CHIEF COMPLAINT:Patient is a 73y old  Male who presents with a chief complaint of PEG tube fall out, Right thigh swelling (29 Jun 2019 14:00)    	        PAST MEDICAL & SURGICAL HISTORY:  Dyspepsia  GERD (gastroesophageal reflux disease)  Arthritis  Dysphagia: peg  Acute renal failure (ARF): ARF 1.5 YEAR AGO  Pneumonia  Dementia  Status post insertion of percutaneous endoscopic gastrostomy (PEG) tube: PEG replacement on 02/29/16  PEG adjustment, replacement, or removal  History of hand surgery      pt awake  nonverbal  no apparent cp or sob  no vomiting    Medications:  MEDICATIONS  (STANDING):  ALBUTerol/ipratropium for Nebulization 3 milliLiter(s) Nebulizer every 6 hours  dextrose 5% + lactated ringers. 1000 milliLiter(s) (60 mL/Hr) IV Continuous <Continuous>  heparin  Injectable 5000 Unit(s) SubCutaneous every 8 hours  meropenem  IVPB 1000 milliGRAM(s) IV Intermittent every 8 hours  OLANZapine 5 milliGRAM(s) Oral daily  pantoprazole  Injectable 40 milliGRAM(s) IV Push daily  polyethylene glycol 3350 17 Gram(s) Oral two times a day  QUEtiapine 100 milliGRAM(s) Oral daily  sucralfate 1 Gram(s) Oral two times a day    MEDICATIONS  (PRN):  acetaminophen  Suppository .. 650 milliGRAM(s) Rectal every 6 hours PRN Temp greater or equal to 38C (100.4F), Mild Pain (1 - 3), Moderate Pain (4 - 6)  bisacodyl Suppository 10 milliGRAM(s) Rectal daily PRN Constipation    	    PHYSICAL EXAM:  T(C): 37.1 (06-30-19 @ 04:42), Max: 38.1 (06-29-19 @ 20:11)  HR: 96 (06-30-19 @ 04:42) (80 - 166)  BP: 106/70 (06-30-19 @ 04:42) (106/61 - 116/51)  RR: 24 (06-30-19 @ 04:42) (22 - 25)  SpO2: 100% (06-30-19 @ 04:42) (79% - 100%)  Wt(kg): --  I&O's Summary    29 Jun 2019 07:01  -  30 Jun 2019 07:00  --------------------------------------------------------  IN: 610 mL / OUT: 0 mL / NET: 610 mL          HEENT:   Normal oral mucosa	  Lymphatic: No lymphadenopathy  Cardiovascular: Normal S1 S2, No JVD, No murmurs,   Respiratory: dec bs   Gastrointestinal:  Soft, Non-tender, + BS	peg  Extremities: r l ext immobilizer    TELEMETRY: 	    ECG:  	  RADIOLOGY:  OTHER: 	  	  LABS:	 	    CARDIAC MARKERS:                                8.7    11.6  )-----------( 328      ( 29 Jun 2019 03:31 )             24.7     06-30    143  |  107  |  20  ----------------------------<  140<H>  4.1   |  27  |  0.57    Ca    8.5      30 Jun 2019 06:52  Phos  2.6     06-30  Mg     2.5     06-30    TPro  6.3  /  Alb  3.0<L>  /  TBili  1.3<H>  /  DBili  x   /  AST  21  /  ALT  18  /  AlkPhos  106  06-29    proBNP:   Lipid Profile:   HgA1c:   TSH:

## 2019-06-30 NOTE — PROGRESS NOTE ADULT - ASSESSMENT
72 yo male  with PMH severe dementia with PEG tube, arthritis, GERD present from home after PEG tube falling out and Right thigh swelling.       ·  Problem: Closed fracture of distal end of right femur, unspecified fracture morphology, initial encounter.   : Ortho team consulted and placed into knee immobilizer.   ortho f/u noted  discussed w/ pts wife   Pain control prn.     ·  Problem: PEG (percutaneous endoscopic gastrostomy) adjustment/replacement/removal.  : GI team replaced PEG in ED  F/U GI recs.   npo  bowel regimen  resume feeds as per gi       ·  Problem: Tachycardia.    fevers improved   likely aspir l         : Pneumonia./sepsis.fevers   fevers /recurrent   iv abs as per id  pulm f/u noted  id f/u note        ·  Problem: GERD (gastroesophageal reflux disease).    ppi      Problem: Dementia.   supp care     anemia   likely multifactorial   f/u guaics neg   no overt bleeding   likely accumulation of blood also in r thigh contributing to anemia   heme g/i f/u noted       ·  Problem: Prophylactic measure.    : DVT SCD    cta today to r/p pe       ·  Pprognosis overall poor   discussed w/ wife at bedside     continue present Rx

## 2019-06-30 NOTE — PROGRESS NOTE ADULT - SUBJECTIVE AND OBJECTIVE BOX
INTERVAL HPI/OVERNIGHT EVENTS:  No new overnight event.  No N/V/D. axr and ct no ileus minimal outpt     Allergies    No Known Allergies    Intolerances    benzodiazepines (Other)  Haldol (Other)        General:  No wt loss, fevers, chills, night sweats, fatigue,   Eyes:  Good vision, no reported pain  ENT:  No sore throat, pain, runny nose, dysphagia  CV:  No pain, palpitations, hypo/hypertension  Resp:  No dyspnea, cough, tachypnea, wheezing  GI:  No pain, No nausea, No vomiting, No diarrhea, No constipation, No weight loss, No fever, No pruritis, No rectal bleeding, No tarry stools, No dysphagia,  :  No pain, bleeding, incontinence, nocturia  Muscle:  No pain, weakness  Neuro:  No weakness, tingling, memory problems  Psych:  No fatigue, insomnia, mood problems, depression  Endocrine:  No polyuria, polydipsia, cold/heat intolerance  Heme:  No petechiae, ecchymosis, easy bruisability  Skin:  No rash, tattoos, scars, edema      PHYSICAL EXAM:   Vital Signs:  Vital Signs Last 24 Hrs  T(C): 37.1 (30 Jun 2019 11:12), Max: 38.1 (29 Jun 2019 20:11)  T(F): 98.8 (30 Jun 2019 11:12), Max: 100.6 (29 Jun 2019 20:11)  HR: 104 (30 Jun 2019 11:12) (96 - 166)  BP: 115/70 (30 Jun 2019 11:12) (106/70 - 116/51)  BP(mean): --  RR: 22 (30 Jun 2019 11:12) (22 - 25)  SpO2: 99% (30 Jun 2019 11:12) (79% - 100%)  Daily     Daily I&O's Summary    29 Jun 2019 07:01  -  30 Jun 2019 07:00  --------------------------------------------------------  IN: 610 mL / OUT: 0 mL / NET: 610 mL    30 Jun 2019 07:01  -  30 Jun 2019 14:34  --------------------------------------------------------  IN: 320 mL / OUT: 0 mL / NET: 320 mL        GENERAL:  Appears stated age, well-groomed, well-nourished, no distress  HEENT:  NC/AT,  conjunctivae clear and pink, no thyromegaly, nodules, adenopathy, no JVD, sclera -anicteric  CHEST:  Full & symmetric excursion, no increased effort, breath sounds clear  HEART:  Regular rhythm, S1, S2, no murmur/rub/S3/S4, no abdominal bruit, no edema  ABDOMEN:  Soft, non-tender, non-distended, normoactive bowel sounds,  no masses ,no hepato-splenomegaly, no signs of chronic liver disease peg in place  EXTEREMITIES:  no cyanosis,clubbing or edema  SKIN:  No rash/erythema/ecchymoses/petechiae/wounds/abscess/warm/dry  NEURO:  Alert, oriented, no asterixis, no tremor, no encephalopathy      LABS:                        8.1    8.65  )-----------( 407      ( 30 Jun 2019 09:18 )             24.9     06-30    143  |  107  |  20  ----------------------------<  140<H>  4.1   |  27  |  0.57    Ca    8.5      30 Jun 2019 06:52  Phos  2.6     06-30  Mg     2.5     06-30    TPro  6.3  /  Alb  3.0<L>  /  TBili  1.3<H>  /  DBili  x   /  AST  21  /  ALT  18  /  AlkPhos  106  06-29        amylase   lipase  RADIOLOGY & ADDITIONAL TESTS:

## 2019-06-30 NOTE — PROGRESS NOTE ADULT - SUBJECTIVE AND OBJECTIVE BOX
CARDIOLOGY FOLLOW UP NOTE - DR. MULLEN    Subjective:    no chest pain, sob, palpitations    PHYSICAL EXAM:  T(C): 37.1 (06-30-19 @ 11:12), Max: 38.1 (06-29-19 @ 20:11)  HR: 104 (06-30-19 @ 11:12) (96 - 166)  BP: 115/70 (06-30-19 @ 11:12) (106/70 - 116/51)  RR: 22 (06-30-19 @ 11:12) (22 - 25)  SpO2: 99% (06-30-19 @ 11:12) (79% - 100%)  Wt(kg): --  I&O's Summary    29 Jun 2019 07:01  -  30 Jun 2019 07:00  --------------------------------------------------------  IN: 610 mL / OUT: 0 mL / NET: 610 mL      Daily     Daily     Appearance: Normal	  Cardiovascular: Normal S1 S2,RRR, No JVD, No murmurs  Respiratory: Lungs clear to auscultation	  Gastrointestinal:  Soft, Non-tender, + BS	  Extremities: Normal range of motion, No clubbing, cyanosis or edema      Home Medications:  Levaquin 750 mg oral tablet: 1 tab(s) orally every 24 hours (22 Jun 2019 03:16)  Mylanta oral suspension: 10 milliliter(s) by gastrostomy tube 4 times a day, As Needed (22 Jun 2019 03:16)  OLANZapine 5 mg oral tablet: 1 tab(s) by gastrostomy tube once a day (22 Jun 2019 02:21)  omeprazole 40 mg oral delayed release capsule: 1 cap(s) by gastrostomy tube once a day (22 Jun 2019 03:16)  SEROquel 200 mg oral tablet: tab(s) by gastrostomy tube once a day (22 Jun 2019 03:16)  sucralfate 1 g oral tablet: 1 tab(s) by gastrostomy tube 2 times a day (22 Jun 2019 03:16)      MEDICATIONS  (STANDING):  ALBUTerol/ipratropium for Nebulization 3 milliLiter(s) Nebulizer every 6 hours  dextrose 5% + lactated ringers. 1000 milliLiter(s) (60 mL/Hr) IV Continuous <Continuous>  heparin  Injectable 5000 Unit(s) SubCutaneous every 8 hours  meropenem  IVPB 1000 milliGRAM(s) IV Intermittent every 8 hours  OLANZapine 5 milliGRAM(s) Oral daily  pantoprazole  Injectable 40 milliGRAM(s) IV Push daily  polyethylene glycol 3350 17 Gram(s) Oral two times a day  QUEtiapine 100 milliGRAM(s) Oral daily  sucralfate 1 Gram(s) Oral two times a day      TELEMETRY: 	    ECG:  	  RADIOLOGY:   DIAGNOSTIC TESTING:  [ ] Echocardiogram:  [ ] Catheterization:  [ ] Stress Test:    OTHER: 	    LABS:	 	    CARDIAC MARKERS:                                8.1    8.65  )-----------( 407      ( 30 Jun 2019 09:18 )             24.9     06-30    143  |  107  |  20  ----------------------------<  140<H>  4.1   |  27  |  0.57    Ca    8.5      30 Jun 2019 06:52  Phos  2.6     06-30  Mg     2.5     06-30    TPro  6.3  /  Alb  3.0<L>  /  TBili  1.3<H>  /  DBili  x   /  AST  21  /  ALT  18  /  AlkPhos  106  06-29    proBNP:     Lipid Profile:   HgA1c:

## 2019-06-30 NOTE — CHART NOTE - NSCHARTNOTEFT_GEN_A_CORE
CT abdomen/pelvis done. Reviewed results with surgery. No ileus was seen on CT. Surgery will not consult at this time. Peg without any drainage. Abdomen soft/non-tender.   Discussed above with Dr. Moraes. Will restart feeds at this time. Jevity 1.2 @ 10cc/hr. Continue to monitor pt.   IMPRESSION:     No pulmonary embolism within the main, right and left main pulmonary   arteries. Evaluation of the lobar, segmental and subsegmental pulmonary   artery branches is limited due to breathing motion artifact.    Opacities in the left upper lobe have increased when compared to previous   exam. Primary consideration is infection. CT abdomen/pelvis done. Reviewed results with surgery. No ileus was seen on CT. Surgery will not consult at this time. Peg without any drainage. Abdomen soft/non-tender.   Discussed above with Dr. Moraes. Will restart feeds at this time. Jevity 1.2 @ 10cc/hr. Continue to monitor pt. D/w Dr. Hair.   IMPRESSION:     No pulmonary embolism within the main, right and left main pulmonary   arteries. Evaluation of the lobar, segmental and subsegmental pulmonary   artery branches is limited due to breathing motion artifact.    Opacities in the left upper lobe have increased when compared to previous   exam. Primary consideration is infection.

## 2019-06-30 NOTE — PROGRESS NOTE ADULT - ASSESSMENT
2 yo man with PMH severe dementia (AAOx0 s/p severe reaction to Haldol/Ativan) with PEG tube, arthritis, GERD present from home after PEG tube falling out and Right thigh swelling.     PEG replaced since admission.  Also noted to have right distal femur fracture folowed by ortho.  On IV abx now for pneumonia.    Hematology consulted for acute anemia during this admission.  No report of melena or hematochezia. Had CT cap showing no evidence of RP bleed.  Haptoglobin and FELIX neg, no evidence of hemolysis either    Acute anemia -- acute fall but had remained stable so far otherwise  --> FOBT negative  --> Fe, B12, folate adequate, hapto and FELIX neg  --> monitor for now  --> may have had blood loss into thigh   --> daily CBC    Layo Eisenberg MD  Hematology/Oncology  Cell:  [phone]  Office Phone: [phone]  Office Fax:  [phone] [address] [id number]

## 2019-07-01 LAB
ANION GAP SERPL CALC-SCNC: 12 MMOL/L — SIGNIFICANT CHANGE UP (ref 5–17)
BUN SERPL-MCNC: 17 MG/DL — SIGNIFICANT CHANGE UP (ref 7–23)
CALCIUM SERPL-MCNC: 8.6 MG/DL — SIGNIFICANT CHANGE UP (ref 8.4–10.5)
CHLORIDE SERPL-SCNC: 105 MMOL/L — SIGNIFICANT CHANGE UP (ref 96–108)
CO2 SERPL-SCNC: 25 MMOL/L — SIGNIFICANT CHANGE UP (ref 22–31)
CREAT SERPL-MCNC: 0.58 MG/DL — SIGNIFICANT CHANGE UP (ref 0.5–1.3)
CULTURE RESULTS: SIGNIFICANT CHANGE UP
GLUCOSE SERPL-MCNC: 118 MG/DL — HIGH (ref 70–99)
HCT VFR BLD CALC: 27.2 % — LOW (ref 39–50)
HGB BLD-MCNC: 8.5 G/DL — LOW (ref 13–17)
MCHC RBC-ENTMCNC: 31.3 GM/DL — LOW (ref 32–36)
MCHC RBC-ENTMCNC: 32.2 PG — SIGNIFICANT CHANGE UP (ref 27–34)
MCV RBC AUTO: 103 FL — HIGH (ref 80–100)
PLATELET # BLD AUTO: 460 K/UL — HIGH (ref 150–400)
POTASSIUM SERPL-MCNC: 3.6 MMOL/L — SIGNIFICANT CHANGE UP (ref 3.5–5.3)
POTASSIUM SERPL-SCNC: 3.6 MMOL/L — SIGNIFICANT CHANGE UP (ref 3.5–5.3)
RBC # BLD: 2.64 M/UL — LOW (ref 4.2–5.8)
RBC # FLD: 14.6 % — HIGH (ref 10.3–14.5)
SODIUM SERPL-SCNC: 142 MMOL/L — SIGNIFICANT CHANGE UP (ref 135–145)
SPECIMEN SOURCE: SIGNIFICANT CHANGE UP
WBC # BLD: 10.11 K/UL — SIGNIFICANT CHANGE UP (ref 3.8–10.5)
WBC # FLD AUTO: 10.11 K/UL — SIGNIFICANT CHANGE UP (ref 3.8–10.5)

## 2019-07-01 PROCEDURE — 99232 SBSQ HOSP IP/OBS MODERATE 35: CPT

## 2019-07-01 RX ADMIN — HEPARIN SODIUM 5000 UNIT(S): 5000 INJECTION INTRAVENOUS; SUBCUTANEOUS at 06:16

## 2019-07-01 RX ADMIN — OLANZAPINE 5 MILLIGRAM(S): 15 TABLET, FILM COATED ORAL at 14:12

## 2019-07-01 RX ADMIN — Medication 3 MILLILITER(S): at 00:06

## 2019-07-01 RX ADMIN — MEROPENEM 100 MILLIGRAM(S): 1 INJECTION INTRAVENOUS at 22:13

## 2019-07-01 RX ADMIN — POLYETHYLENE GLYCOL 3350 17 GRAM(S): 17 POWDER, FOR SOLUTION ORAL at 18:22

## 2019-07-01 RX ADMIN — Medication 650 MILLIGRAM(S): at 22:13

## 2019-07-01 RX ADMIN — QUETIAPINE FUMARATE 100 MILLIGRAM(S): 200 TABLET, FILM COATED ORAL at 22:14

## 2019-07-01 RX ADMIN — Medication 3 MILLILITER(S): at 11:20

## 2019-07-01 RX ADMIN — Medication 3 MILLILITER(S): at 18:22

## 2019-07-01 RX ADMIN — MEROPENEM 100 MILLIGRAM(S): 1 INJECTION INTRAVENOUS at 14:11

## 2019-07-01 RX ADMIN — Medication 1 GRAM(S): at 22:14

## 2019-07-01 RX ADMIN — PANTOPRAZOLE SODIUM 40 MILLIGRAM(S): 20 TABLET, DELAYED RELEASE ORAL at 11:20

## 2019-07-01 RX ADMIN — HEPARIN SODIUM 5000 UNIT(S): 5000 INJECTION INTRAVENOUS; SUBCUTANEOUS at 22:14

## 2019-07-01 RX ADMIN — MEROPENEM 100 MILLIGRAM(S): 1 INJECTION INTRAVENOUS at 06:16

## 2019-07-01 RX ADMIN — HEPARIN SODIUM 5000 UNIT(S): 5000 INJECTION INTRAVENOUS; SUBCUTANEOUS at 14:12

## 2019-07-01 RX ADMIN — Medication 1 GRAM(S): at 11:20

## 2019-07-01 RX ADMIN — Medication 650 MILLIGRAM(S): at 22:43

## 2019-07-01 RX ADMIN — Medication 3 MILLILITER(S): at 06:15

## 2019-07-01 NOTE — PROGRESS NOTE ADULT - SUBJECTIVE AND OBJECTIVE BOX
INTERVAL HPI/OVERNIGHT EVENTS:    pt seen and examined. Non- verbal  tolerating peg feeds as per RN  no leakage/drainage from peg site   +bm overnight    MEDICATIONS  (STANDING):  ALBUTerol/ipratropium for Nebulization 3 milliLiter(s) Nebulizer every 6 hours  heparin  Injectable 5000 Unit(s) SubCutaneous every 8 hours  meropenem  IVPB 1000 milliGRAM(s) IV Intermittent every 8 hours  OLANZapine 5 milliGRAM(s) Oral daily  pantoprazole  Injectable 40 milliGRAM(s) IV Push daily  polyethylene glycol 3350 17 Gram(s) Oral two times a day  QUEtiapine 100 milliGRAM(s) Oral daily  sucralfate 1 Gram(s) Oral two times a day    MEDICATIONS  (PRN):  acetaminophen  Suppository .. 650 milliGRAM(s) Rectal every 6 hours PRN Temp greater or equal to 38C (100.4F), Mild Pain (1 - 3), Moderate Pain (4 - 6)  bisacodyl Suppository 10 milliGRAM(s) Rectal daily PRN Constipation      Allergies    No Known Allergies    Intolerances    benzodiazepines (Other)  Haldol (Other)      Review of Systems: unable to obtain     Vital Signs Last 24 Hrs  T(C): 37.4 (01 Jul 2019 12:21), Max: 37.9 (30 Jun 2019 20:44)  T(F): 99.4 (01 Jul 2019 12:21), Max: 100.3 (30 Jun 2019 20:44)  HR: 111 (01 Jul 2019 12:21) (105 - 113)  BP: 111/68 (01 Jul 2019 12:21) (108/65 - 129/73)  BP(mean): --  RR: 18 (01 Jul 2019 12:21) (18 - 24)  SpO2: 96% (01 Jul 2019 12:21) (91% - 98%)    PHYSICAL EXAM:    Constitutional: ill appearing  HEENT: EOMI, throat clear  Neck: No LAD, supple  Respiratory: CTA and P  Cardiovascular: S1 and S2, RRR, no M  Gastrointestinal: BS+, soft, NT/ND, neg HSM, + peg with abdominal binder c/d/i  Extremities: No peripheral edema, neg clubbing, cyanosis  Vascular: 2+ peripheral pulses  Neurological: A/O x 0, no focal deficits  Psychiatric: Normal mood, normal affect  Skin: No rashes    LABS:                        8.5    10.11 )-----------( 460      ( 01 Jul 2019 09:37 )             27.2     07-01    142  |  105  |  17  ----------------------------<  118<H>  3.6   |  25  |  0.58    Ca    8.6      01 Jul 2019 07:01  Phos  2.6     06-30  Mg     2.5     06-30            RADIOLOGY & ADDITIONAL TESTS:

## 2019-07-01 NOTE — PROGRESS NOTE ADULT - ASSESSMENT
72 yo man with PMH severe dementia (AAOx0 s/p severe reaction to Haldol/Ativan) with PEG tube, arthritis, GERD presents with  PEG dislodgement, Femur FX, sepsis     1. Hypoxic Resp Failure  -likely in the setting of LLL PNA  -cont abx/O2 support per pulm  -low suspicion for CHF  -d-dimer noted, no PE    2. Sepsis/ LLL Pna   -ct chest/ abd revealed pna left lung , mediastinal lymphadenopathy. no intra-abd / pelvic source of infection identified   - repeat bcx negative   - CTA chest negative for PE, + increased opacities in the SHEILA   -ID, pulm f/u     3. Femur FX  - med f/u   -knee immobilizer, pain control, ortho f/u    4. Murmur  -pending ECHO to eval murmur    5.  PEG dislodgement  -gi f/u s/p peg change    6. tachycardia   likely in the setting of pna  LE dopplers, negative for dvt   CTA chest negative for PE, + increased opacities in the SHEILA     -DVT ppx 74 yo man with PMH severe dementia (AAOx0 s/p severe reaction to Haldol/Ativan) with PEG tube, arthritis, GERD presents with  PEG dislodgement, Femur FX, sepsis     1. Hypoxic Resp Failure  -likely in the setting of LLL PNA  -cont abx/O2 support per pulm  -d-dimer noted, no PE    2. Sepsis/ LLL Pna   -ct chest/ abd revealed pna left lung , mediastinal lymphadenopathy. no intra-abd / pelvic source of infection identified   - repeat bcx negative   -CTA chest negative for PE, + increased opacities in the SHEILA   -ID, pulm f/u     3. Femur FX  - med f/u   -knee immobilizer, pain control, ortho f/u    4. Murmur  -pending ECHO to eval murmur    5.  PEG dislodgement  -gi f/u s/p peg change    6. tachycardia   likely in the setting of pna  LE dopplers, negative for dvt   CTA chest negative for PE, + increased opacities in the SHEILA     -DVT ppx

## 2019-07-01 NOTE — PROGRESS NOTE ADULT - SUBJECTIVE AND OBJECTIVE BOX
Follow-up Pulm Progress Note    Awake, non-verbal   92% on 2L NC    Medications:  MEDICATIONS  (STANDING):  ALBUTerol/ipratropium for Nebulization 3 milliLiter(s) Nebulizer every 6 hours  heparin  Injectable 5000 Unit(s) SubCutaneous every 8 hours  meropenem  IVPB 1000 milliGRAM(s) IV Intermittent every 8 hours  OLANZapine 5 milliGRAM(s) Oral daily  pantoprazole  Injectable 40 milliGRAM(s) IV Push daily  polyethylene glycol 3350 17 Gram(s) Oral two times a day  QUEtiapine 100 milliGRAM(s) Oral daily  sucralfate 1 Gram(s) Oral two times a day    MEDICATIONS  (PRN):  acetaminophen  Suppository .. 650 milliGRAM(s) Rectal every 6 hours PRN Temp greater or equal to 38C (100.4F), Mild Pain (1 - 3), Moderate Pain (4 - 6)  bisacodyl Suppository 10 milliGRAM(s) Rectal daily PRN Constipation          Vital Signs Last 24 Hrs  T(C): 37.4 (01 Jul 2019 12:21), Max: 37.9 (30 Jun 2019 20:44)  T(F): 99.4 (01 Jul 2019 12:21), Max: 100.3 (30 Jun 2019 20:44)  HR: 111 (01 Jul 2019 12:21) (105 - 113)  BP: 111/68 (01 Jul 2019 12:21) (108/65 - 129/73)  BP(mean): --  RR: 18 (01 Jul 2019 12:21) (18 - 24)  SpO2: 96% (01 Jul 2019 12:21) (91% - 98%) on 2L NC    ABG - ( 30 Jun 2019 01:10 )  pH, Arterial: 7.47  pH, Blood: x     /  pCO2: 37    /  pO2: 59    / HCO3: 27    / Base Excess: 3.6   /  SaO2: 92                    06-30 @ 07:01  -  07-01 @ 07:00  --------------------------------------------------------  IN: 740 mL / OUT: 0 mL / NET: 740 mL          LABS:                        8.5    10.11 )-----------( 460      ( 01 Jul 2019 09:37 )             27.2     07-01    142  |  105  |  17  ----------------------------<  118<H>  3.6   |  25  |  0.58    Ca    8.6      01 Jul 2019 07:01  Phos  2.6     06-30  Mg     2.5     06-30            CAPILLARY BLOOD GLUCOSE      POCT Blood Glucose.: 120 mg/dL (30 Jun 2019 00:27)        Procalcitonin, Serum: 55.58 ng/mL (06-29-19 @ 07:11)                  CULTURES: (if applicable)  Culture Results:   No growth to date. (06-29 @ 16:42)  Culture Results:   No growth to date. (06-29 @ 10:06)  Culture Results:   No growth (06-28 @ 10:45)  Culture Results:   No growth to date. (06-26 @ 16:47)  Culture Results:   Growth in aerobic bottle: Staphylococcus hominis "Susceptibilities not  performed" (06-24 @ 19:17)    Most recent blood culture -- 06-29 @ 16:42   -- -- .Blood 06-29 @ 16:42  Most recent blood culture -- 06-29 @ 10:06   -- -- .Blood 06-29 @ 10:06  Most recent blood culture -- 06-28 @ 10:45   -- -- .Urine 06-28 @ 10:45  Most recent blood culture -- 06-26 @ 16:47   -- -- .Blood 06-26 @ 16:47        Physical Examination:  PULM: Rhonchi bilaterally   CVS: S1, S2 heard    RADIOLOGY REVIEWED  CT chest: < from: CT Angio Chest w/ IV Cont (06.29.19 @ 07:56) >  CHEST:     LUNGS AND LARGE AIRWAYS: Patent central airways. Patchy left upper upper   lobe have increased when compared to previous exam. Patchy opacities in   both lower lobes represent atelectasis.  PLEURA: No pleural effusion.  VESSELS: No filling defects are noted within the main, right and left   main pulmonary arteries. Evaluationof the lobar, segmental and   subsegmental pulmonary artery branches is limited due to marked breathing   motion artifact.  HEART: Heart size is normal. No pericardial effusion.  MEDIASTINUM AND LENA: Few small lymph nodes are present in the   pretracheal space and the AP window.  CHEST WALL AND LOWER NECK: Within normal limits.    < end of copied text >

## 2019-07-01 NOTE — PROGRESS NOTE ADULT - ASSESSMENT
72 yo male  with PMH severe dementia with PEG tube, arthritis, GERD present from home after PEG tube falling out and Right thigh swelling.       ·  Problem: Closed fracture of distal end of right femur, unspecified fracture morphology, initial encounter.   : Ortho team consulted and placed into knee immobilizer.   ortho f/u noted  Pain control prn.     ·  Problem: PEG (percutaneous endoscopic gastrostomy) adjustment/replacement/removal.  : GI team replaced PEG in ED  F/U GI recs.   npo  bowel regimen  resumed  feeds as per gi       ·  Problem: Tachycardia.    fevers   likely aspiration         : Pneumonia./sepsis.fevers   fevers /recurrent   iv abs as per id  pulm f/u noted  id f/u         ·  Problem: GERD (gastroesophageal reflux disease).    ppi      Problem: Dementia.   supp care     anemia   likely multifactorial   f/u guaics neg   no overt bleeding   likely accumulation of blood also in r thigh contributing to anemia   heme g/i f/u noted       ·  Problem: Prophylactic measure.    : DVT SCD        · prognosis overall poor       continue present Rx

## 2019-07-01 NOTE — PROGRESS NOTE ADULT - SUBJECTIVE AND OBJECTIVE BOX
CC: Patient is a 73y old  Male who presents with a chief complaint of PEG tube fall out, Right thigh swelling (01 Jul 2019 13:01)    ID following for fever    Interval History/ROS: Patient with Tm 100.3F overnight. Has no complaints. WBC WNL. Remains with RLE bandaged and braced. PEG in place. on NR.    Rest of ROS negative.    Allergies  No Known Allergies    ANTIMICROBIALS:  meropenem  IVPB 1000 every 8 hours    OTHER MEDS:  acetaminophen  Suppository .. 650 milliGRAM(s) Rectal every 6 hours PRN  ALBUTerol/ipratropium for Nebulization 3 milliLiter(s) Nebulizer every 6 hours  bisacodyl Suppository 10 milliGRAM(s) Rectal daily PRN  heparin  Injectable 5000 Unit(s) SubCutaneous every 8 hours  OLANZapine 5 milliGRAM(s) Oral daily  pantoprazole  Injectable 40 milliGRAM(s) IV Push daily  polyethylene glycol 3350 17 Gram(s) Oral two times a day  QUEtiapine 100 milliGRAM(s) Oral daily  sucralfate 1 Gram(s) Oral two times a day    PE:    Vital Signs Last 24 Hrs  T(C): 37.4 (01 Jul 2019 12:21), Max: 37.9 (30 Jun 2019 20:44)  T(F): 99.4 (01 Jul 2019 12:21), Max: 100.3 (30 Jun 2019 20:44)  HR: 111 (01 Jul 2019 12:21) (105 - 113)  BP: 111/68 (01 Jul 2019 12:21) (108/65 - 129/73)  BP(mean): --  RR: 18 (01 Jul 2019 12:21) (18 - 24)  SpO2: 96% (01 Jul 2019 12:21) (91% - 98%)    Gen: Awake, NAD  CV: S1+S2 normal, no murmurs  Resp: Clear bilat, no resp distress, on NR  Abd: Soft, nontender, +BS, PEG intact  Ext: RLE bandaged and braced  : No Fleming  IV/Skin: No thrombophlebitis  Neuro: no focal deficits    LABS:                          8.5    10.11 )-----------( 460      ( 01 Jul 2019 09:37 )             27.2       07-01    142  |  105  |  17  ----------------------------<  118<H>  3.6   |  25  |  0.58    Ca    8.6      01 Jul 2019 07:01  Phos  2.6     06-30  Mg     2.5     06-30            MICROBIOLOGY:  v  .Blood  06-29-19   No growth to date.  --  --      .Blood  06-29-19   No growth to date.  --  --      .Urine  06-28-19   No growth  --  --      .Blood  06-26-19   No growth to date.  --  --      .Blood  06-24-19   Growth in aerobic bottle: Staphylococcus hominis "Susceptibilities not  performed"  --    Growth in aerobic bottle: Gram Positive Cocci in Clusters      .Blood  06-22-19   Growth in anaerobic bottle: Staphylococcus epidermidis "Susceptibilities  not performed"  "Due to technical problems, Proteus sp. will Not be reported as part of  the BCID panel until further notice"  ***Blood Panel PCR results on this specimen areavailable  approximately 3 hours after the Gram stain result.***  Gram stain, PCR, and/or culture results may not always  correspond due to difference in methodologies.  ************************************************************  This PCR assay wasperformed using [employer].  The following targets are tested for: Enterococcus,  vancomycin resistant enterococci, Listeria monocytogenes,  coagulase negative staphylococci, S. aureus,  methicillin resistant S. aureus, Streptococcus agalactiae  (Group B), S. pneumoniae, S. pyogenes (Group A),  Acinetobacter baumannii, Enterobacter cloacae, E. coli,  Klebsiella oxytoca, K. pneumoniae, Proteus sp.,  Serratia marcescens, Haemophilus influenzae,  Neisseria meningitidis, Pseudomonas aeruginosa, Candida  albicans, C. glabrata, C krusei, C parapsilosis,  C. tropicalis and the KPC resistance gene.  --  Blood Culture PCR      .Blood  06-22-19   No growth at 5 days.  --  --      .Urine  06-22-19   No growth  --  --    RADIOLOGY:    < from: Xray Abdomen 1 View PORTABLE -Routine (06.30.19 @ 08:36) >  FINDINGS/  IMPRESSION:    Nonobstructive bowel gas pattern with a few air-filled distended loops of   small bowel large bowel.  Contrast seen in a distended rectum.  No acute osseous findings.    < end of copied text >

## 2019-07-01 NOTE — PROGRESS NOTE ADULT - ASSESSMENT
1. Anemia     -- H/H stable after initial fall  -- Iron studies c/w anemia of chronic inflammation  -- no obv bleed  -- B12/Folate Normal  -- No hemolysis  -- Transfuse PRN Hgb < 7    Colt Dimas MD  [phone]

## 2019-07-01 NOTE — PROGRESS NOTE ADULT - SUBJECTIVE AND OBJECTIVE BOX
CHIEF COMPLAINT:Patient is a 73y old  Male who presents with a chief complaint of PEG tube fall out, Right thigh swelling (30 Jun 2019 14:34)    	        PAST MEDICAL & SURGICAL HISTORY:  Dyspepsia  GERD (gastroesophageal reflux disease)  Arthritis  Dysphagia: peg  Acute renal failure (ARF): ARF 1.5 YEAR AGO  Pneumonia  Dementia  Status post insertion of percutaneous endoscopic gastrostomy (PEG) tube: PEG replacement on 02/29/16  PEG adjustment, replacement, or removal  History of hand surgery          REVIEW OF SYSTEMS:  more alert  no apparent cp   breathing stable   no vomiting  low grade temp last pm    Medications:  MEDICATIONS  (STANDING):  ALBUTerol/ipratropium for Nebulization 3 milliLiter(s) Nebulizer every 6 hours  heparin  Injectable 5000 Unit(s) SubCutaneous every 8 hours  meropenem  IVPB 1000 milliGRAM(s) IV Intermittent every 8 hours  OLANZapine 5 milliGRAM(s) Oral daily  pantoprazole  Injectable 40 milliGRAM(s) IV Push daily  polyethylene glycol 3350 17 Gram(s) Oral two times a day  QUEtiapine 100 milliGRAM(s) Oral daily  sucralfate 1 Gram(s) Oral two times a day    MEDICATIONS  (PRN):  acetaminophen  Suppository .. 650 milliGRAM(s) Rectal every 6 hours PRN Temp greater or equal to 38C (100.4F), Mild Pain (1 - 3), Moderate Pain (4 - 6)  bisacodyl Suppository 10 milliGRAM(s) Rectal daily PRN Constipation    	    PHYSICAL EXAM:  T(C): 36.5 (07-01-19 @ 04:38), Max: 37.9 (06-30-19 @ 20:44)  HR: 108 (07-01-19 @ 04:38) (104 - 113)  BP: 125/84 (07-01-19 @ 04:38) (108/65 - 129/73)  RR: 24 (07-01-19 @ 04:38) (22 - 24)  SpO2: 97% (07-01-19 @ 04:38) (91% - 99%)  Wt(kg): --  I&O's Summary    30 Jun 2019 07:01  -  01 Jul 2019 07:00  --------------------------------------------------------  IN: 740 mL / OUT: 0 mL / NET: 740 mL          HEENT:   Normal oral mucosa, 	  Lymphatic: No lymphadenopathy  Cardiovascular: Normal S1 S2, No JVD, No murmurs,  Respiratory: dec bs 	  Gastrointestinal:  Soft, Non-tender, + BS	peg  Neurologic: unable to fully assess  Extremities: r lext immobilizer  TELEMETRY: 	    ECG:  	  RADIOLOGY:  OTHER: 	  	  LABS:	 	    CARDIAC MARKERS:                                8.1    8.65  )-----------( 407      ( 30 Jun 2019 09:18 )             24.9     07-01    142  |  105  |  17  ----------------------------<  118<H>  3.6   |  25  |  0.58    Ca    8.6      01 Jul 2019 07:01  Phos  2.6     06-30  Mg     2.5     06-30      proBNP:   Lipid Profile:   HgA1c:   TSH:

## 2019-07-01 NOTE — PROGRESS NOTE ADULT - ASSESSMENT
72 yo M with PMH severe dementia (AAOx0 s/p severe reaction to Haldol/Ativan) with PEG tube, arthritis, GERD present from home after PEG tube falling out and Right thigh swelling.  Femur fracture  Fever, tachycardia, leukocytosis, AMS    1. abdominal distension and dilated loops of bowel on X-ray  CT with Gastrostomy tube in place  Continue to monitor  GI following     2. Large hematoma in right thigh - consider imaging of that area  Monitor HCT/HGB currently stable low 8 range  Orthopedics follow up- to consider drainage of hematoma  Send material for culture  Follow LE pulses    3. Aspiration pneumonia from obstruction  Continue the Meropenem    4. blood cultures x 2 bottle with s.epi and s.hominis  may be a contaminant but also has a 2/6 systolic murmur  Would continue the Vancomycin  check trough level  check TTE  repeat blood cultures no growth to date    Jw Alberts MD  Pager [phone]  After 5pm/weekends call [phone] 72 yo M with PMH severe dementia (AAOx0 s/p severe reaction to Haldol/Ativan) with PEG tube, arthritis, GERD present from home after PEG tube falling out and Right thigh swelling.  Femur fracture  Fever, tachycardia, leukocytosis, AMS    1. abdominal distension and dilated loops of bowel on X-ray  CT with Gastrostomy tube in place  Continue to monitor  GI following     2. Large hematoma in right thigh -   If febrile, consider imaging of that area  Monitor HCT/HGB currently stable low 8 range  Orthopedics follow up- to consider drainage of hematoma  and Send material for culture  Follow LE pulses    3. Aspiration pneumonia from obstruction  Continue the Meropenem    4. blood cultures x 2 bottle with s.epi and s.hominis  may be a contaminant but also has a 2/6 systolic murmur  Would continue the Vancomycin  check trough level  check TTE  repeat blood cultures no growth to date    Jw Alberts MD  Pager [phone]  After 5pm/weekends call [phone]

## 2019-07-01 NOTE — PROGRESS NOTE ADULT - SUBJECTIVE AND OBJECTIVE BOX
Pt is seen and examined  pt is   lying in bed   pt seems comfortable not interactive      PAST MEDICAL & SURGICAL HISTORY:  Dyspepsia  GERD (gastroesophageal reflux disease)  Arthritis  Dysphagia: peg  Acute renal failure (ARF): ARF 1.5 YEAR AGO  Pneumonia  Dementia  Status post insertion of percutaneous endoscopic gastrostomy (PEG) tube: PEG replacement on 02/29/16  PEG adjustment, replacement, or removal  History of hand surgery      ROS:  Negative except for:    MEDICATIONS  (STANDING):  ALBUTerol/ipratropium for Nebulization 3 milliLiter(s) Nebulizer every 6 hours  heparin  Injectable 5000 Unit(s) SubCutaneous every 8 hours  meropenem  IVPB 1000 milliGRAM(s) IV Intermittent every 8 hours  OLANZapine 5 milliGRAM(s) Oral daily  pantoprazole  Injectable 40 milliGRAM(s) IV Push daily  polyethylene glycol 3350 17 Gram(s) Oral two times a day  QUEtiapine 100 milliGRAM(s) Oral daily  sucralfate 1 Gram(s) Oral two times a day    MEDICATIONS  (PRN):  acetaminophen  Suppository .. 650 milliGRAM(s) Rectal every 6 hours PRN Temp greater or equal to 38C (100.4F), Mild Pain (1 - 3), Moderate Pain (4 - 6)  bisacodyl Suppository 10 milliGRAM(s) Rectal daily PRN Constipation      Allergies    No Known Allergies    Intolerances    benzodiazepines (Other)  Haldol (Other)      Vital Signs Last 24 Hrs  T(C): 36.5 (01 Jul 2019 04:38), Max: 37.9 (30 Jun 2019 20:44)  T(F): 97.7 (01 Jul 2019 04:38), Max: 100.3 (30 Jun 2019 20:44)  HR: 108 (01 Jul 2019 04:38) (104 - 113)  BP: 125/84 (01 Jul 2019 04:38) (108/65 - 129/73)  BP(mean): --  RR: 24 (01 Jul 2019 04:38) (22 - 24)  SpO2: 97% (01 Jul 2019 04:38) (91% - 99%)    PHYSICAL EXAM  General: Chronically Ill Male adult in NAD  HEENT: clear oropharynx, anicteric sclera, pink conjunctiva  Neck: supple  CV: normal S1/S2 with no murmur rubs or gallops  Lungs: scattered ronchi  Abdomen: soft non-tender non-distended + Bs, + PEG  Ext: no clubbing cyanosis or edema     LABS:                          8.5    10.11 )-----------( 460      ( 01 Jul 2019 09:37 )             27.2     Serial CBC's  07-01 @ 09:37  Hct-27.2 / Hgb-8.5 / Plat-460 / RBC-2.64 / WBC-10.11          Serial CBC's  06-30 @ 09:18  Hct-24.9 / Hgb-8.1 / Plat-407 / RBC-2.45 / WBC-8.65            07-01    142  |  105  |  17  ----------------------------<  118<H>  3.6   |  25  |  0.58    Ca    8.6      01 Jul 2019 07:01  Phos  2.6     06-30  Mg     2.5     06-30            WBC Count: 10.11 K/uL (07-01 @ 09:37)  Hemoglobin: 8.5 g/dL (07-01 @ 09:37)            RADIOLOGY & ADDITIONAL STUDIES:

## 2019-07-01 NOTE — PROGRESS NOTE ADULT - PROBLEM SELECTOR PLAN 1
Worsened SHEILA PNA on CT chest overnight, likely 2nd worsened aspiration   -Fevers improved, leukocytosis resolved  -c/w Meropenem per ID recs  -HOB>30 at all times  -Low rate TF  -Aspiration precautions  -Advanced dementia  -Poor overall prognosis

## 2019-07-01 NOTE — PROGRESS NOTE ADULT - SUBJECTIVE AND OBJECTIVE BOX
CARDIOLOGY FOLLOW UP - Dr. Young    CC no acute events        PHYSICAL EXAM:  T(C): 36.5 (07-01-19 @ 04:38), Max: 37.9 (06-30-19 @ 20:44)  HR: 108 (07-01-19 @ 04:38) (105 - 113)  BP: 125/84 (07-01-19 @ 04:38) (108/65 - 129/73)  RR: 24 (07-01-19 @ 04:38) (22 - 24)  SpO2: 97% (07-01-19 @ 04:38) (91% - 98%)  Wt(kg): --  I&O's Summary    30 Jun 2019 07:01  -  01 Jul 2019 07:00  --------------------------------------------------------  IN: 740 mL / OUT: 0 mL / NET: 740 mL    01 Jul 2019 07:01  -  01 Jul 2019 11:58  --------------------------------------------------------  IN: 0 mL / OUT: 0 mL / NET: 0 mL        Appearance: NAD 	  Cardiovascular: Normal S1 S2 RRR + murmur   Respiratory: rhonchi   Gastrointestinal:  Soft, Non-tender, + BS	  Extremities: Normal range of motion, No clubbing, cyanosis or edema        MEDICATIONS  (STANDING):  ALBUTerol/ipratropium for Nebulization 3 milliLiter(s) Nebulizer every 6 hours  heparin  Injectable 5000 Unit(s) SubCutaneous every 8 hours  meropenem  IVPB 1000 milliGRAM(s) IV Intermittent every 8 hours  OLANZapine 5 milliGRAM(s) Oral daily  pantoprazole  Injectable 40 milliGRAM(s) IV Push daily  polyethylene glycol 3350 17 Gram(s) Oral two times a day  QUEtiapine 100 milliGRAM(s) Oral daily  sucralfate 1 Gram(s) Oral two times a day      TELEMETRY: 	    ECG:  	  RADIOLOGY:     < from: CT Angio Chest w/ IV Cont (06.29.19 @ 07:56) >  EXAM:  CT ABDOMEN AND PELVIS IC                          EXAM:  CT ANGIO CHEST (W)AW IC                            PROCEDURE DATE:  06/29/2019        IMPRESSION:     No pulmonary embolism within the main, right and left main pulmonary   arteries. Evaluation of the lobar, segmental and subsegmental pulmonary   artery branches is limited due to breathing motion artifact.    Opacities in the left upper lobe have increased when compared to previous   exam. Primary consideration is infection.          < end of copied text >    DIAGNOSTIC TESTING:  [ ] Echocardiogram:  [ ]  Catheterization:  [ ] Stress Test:    OTHER: 	    < from: [facility] Duplex Lower Ext Vein Scan, Bilat (06.29.19 @ 09:44) >  IMPRESSION:     No evidence of deep venous thrombosis in either lower extremity.      < end of copied text >    LABS:	 	                            8.5    10.11 )-----------( 460      ( 01 Jul 2019 09:37 )             27.2     07-01    142  |  105  |  17  ----------------------------<  118<H>  3.6   |  25  |  0.58    Ca    8.6      01 Jul 2019 07:01  Phos  2.6     06-30  Mg     2.5     06-30

## 2019-07-02 LAB
ANION GAP SERPL CALC-SCNC: 9 MMOL/L — SIGNIFICANT CHANGE UP (ref 5–17)
BUN SERPL-MCNC: 18 MG/DL — SIGNIFICANT CHANGE UP (ref 7–23)
CALCIUM SERPL-MCNC: 8.6 MG/DL — SIGNIFICANT CHANGE UP (ref 8.4–10.5)
CHLORIDE SERPL-SCNC: 109 MMOL/L — HIGH (ref 96–108)
CO2 SERPL-SCNC: 27 MMOL/L — SIGNIFICANT CHANGE UP (ref 22–31)
CREAT SERPL-MCNC: 0.58 MG/DL — SIGNIFICANT CHANGE UP (ref 0.5–1.3)
GLUCOSE SERPL-MCNC: 123 MG/DL — HIGH (ref 70–99)
HCT VFR BLD CALC: 27.6 % — LOW (ref 39–50)
HGB BLD-MCNC: 8.5 G/DL — LOW (ref 13–17)
MAGNESIUM SERPL-MCNC: 2.4 MG/DL — SIGNIFICANT CHANGE UP (ref 1.6–2.6)
MCHC RBC-ENTMCNC: 30.8 GM/DL — LOW (ref 32–36)
MCHC RBC-ENTMCNC: 32.1 PG — SIGNIFICANT CHANGE UP (ref 27–34)
MCV RBC AUTO: 104.2 FL — HIGH (ref 80–100)
PLATELET # BLD AUTO: 469 K/UL — HIGH (ref 150–400)
POTASSIUM SERPL-MCNC: 3.9 MMOL/L — SIGNIFICANT CHANGE UP (ref 3.5–5.3)
POTASSIUM SERPL-SCNC: 3.9 MMOL/L — SIGNIFICANT CHANGE UP (ref 3.5–5.3)
RBC # BLD: 2.65 M/UL — LOW (ref 4.2–5.8)
RBC # FLD: 15.7 % — HIGH (ref 10.3–14.5)
SODIUM SERPL-SCNC: 145 MMOL/L — SIGNIFICANT CHANGE UP (ref 135–145)
WBC # BLD: 9.5 K/UL — SIGNIFICANT CHANGE UP (ref 3.8–10.5)
WBC # FLD AUTO: 9.5 K/UL — SIGNIFICANT CHANGE UP (ref 3.8–10.5)

## 2019-07-02 PROCEDURE — 93306 TTE W/DOPPLER COMPLETE: CPT | Mod: 26

## 2019-07-02 PROCEDURE — 99232 SBSQ HOSP IP/OBS MODERATE 35: CPT

## 2019-07-02 RX ORDER — ACETYLCYSTEINE 200 MG/ML
3 VIAL (ML) MISCELLANEOUS EVERY 6 HOURS
Refills: 0 | Status: DISCONTINUED | OUTPATIENT
Start: 2019-07-02 | End: 2019-07-03

## 2019-07-02 RX ADMIN — Medication 3 MILLILITER(S): at 01:36

## 2019-07-02 RX ADMIN — MEROPENEM 100 MILLIGRAM(S): 1 INJECTION INTRAVENOUS at 21:53

## 2019-07-02 RX ADMIN — POLYETHYLENE GLYCOL 3350 17 GRAM(S): 17 POWDER, FOR SOLUTION ORAL at 18:10

## 2019-07-02 RX ADMIN — OLANZAPINE 5 MILLIGRAM(S): 15 TABLET, FILM COATED ORAL at 12:26

## 2019-07-02 RX ADMIN — HEPARIN SODIUM 5000 UNIT(S): 5000 INJECTION INTRAVENOUS; SUBCUTANEOUS at 05:16

## 2019-07-02 RX ADMIN — Medication 1 GRAM(S): at 12:26

## 2019-07-02 RX ADMIN — PANTOPRAZOLE SODIUM 40 MILLIGRAM(S): 20 TABLET, DELAYED RELEASE ORAL at 12:26

## 2019-07-02 RX ADMIN — Medication 3 MILLILITER(S): at 18:11

## 2019-07-02 RX ADMIN — Medication 1 GRAM(S): at 21:53

## 2019-07-02 RX ADMIN — POLYETHYLENE GLYCOL 3350 17 GRAM(S): 17 POWDER, FOR SOLUTION ORAL at 05:16

## 2019-07-02 RX ADMIN — MEROPENEM 100 MILLIGRAM(S): 1 INJECTION INTRAVENOUS at 14:55

## 2019-07-02 RX ADMIN — MEROPENEM 100 MILLIGRAM(S): 1 INJECTION INTRAVENOUS at 05:16

## 2019-07-02 RX ADMIN — HEPARIN SODIUM 5000 UNIT(S): 5000 INJECTION INTRAVENOUS; SUBCUTANEOUS at 14:55

## 2019-07-02 RX ADMIN — Medication 3 MILLILITER(S): at 12:26

## 2019-07-02 RX ADMIN — HEPARIN SODIUM 5000 UNIT(S): 5000 INJECTION INTRAVENOUS; SUBCUTANEOUS at 21:53

## 2019-07-02 RX ADMIN — Medication 3 MILLILITER(S): at 05:17

## 2019-07-02 RX ADMIN — QUETIAPINE FUMARATE 100 MILLIGRAM(S): 200 TABLET, FILM COATED ORAL at 21:53

## 2019-07-02 NOTE — PROGRESS NOTE ADULT - PROBLEM SELECTOR PLAN 1
Worsened SHEILA PNA on CT chest, likely 2nd worsened aspiration   -Fevers improved, leukocytosis resolved  -Pending TTE to r/o IE  -c/w Meropenem per ID recs  -HOB>30 at all times  -Low rate TF  -Aspiration precautions  -Advanced dementia  -Poor overall prognosis SHEILA PNA on CT chest, likely 2nd worsened aspiration   -Fevers improved, leukocytosis resolved  -Pending TTE to r/o IE  -c/w Meropenem per ID recs  -HOB>30 at all times  -Low rate TF  -Aspiration precautions  -Advanced dementia  -Poor overall prognosis

## 2019-07-02 NOTE — PROGRESS NOTE ADULT - SUBJECTIVE AND OBJECTIVE BOX
Follow-up Pulm Progress Note    Awake, non-verbal  90% on 3L NC  Wet, junky cough     Medications:  MEDICATIONS  (STANDING):  ALBUTerol/ipratropium for Nebulization 3 milliLiter(s) Nebulizer every 6 hours  heparin  Injectable 5000 Unit(s) SubCutaneous every 8 hours  meropenem  IVPB 1000 milliGRAM(s) IV Intermittent every 8 hours  OLANZapine 5 milliGRAM(s) Oral daily  pantoprazole  Injectable 40 milliGRAM(s) IV Push daily  polyethylene glycol 3350 17 Gram(s) Oral two times a day  QUEtiapine 100 milliGRAM(s) Oral daily  sucralfate 1 Gram(s) Oral two times a day    MEDICATIONS  (PRN):  acetaminophen  Suppository .. 650 milliGRAM(s) Rectal every 6 hours PRN Temp greater or equal to 38C (100.4F), Mild Pain (1 - 3), Moderate Pain (4 - 6)  bisacodyl Suppository 10 milliGRAM(s) Rectal daily PRN Constipation          Vital Signs Last 24 Hrs  T(C): 36.9 (02 Jul 2019 09:58), Max: 37.6 (01 Jul 2019 20:03)  T(F): 98.4 (02 Jul 2019 09:58), Max: 99.7 (01 Jul 2019 20:03)  HR: 100 (02 Jul 2019 09:58) (100 - 109)  BP: 112/69 (02 Jul 2019 09:58) (107/65 - 117/66)  BP(mean): --  RR: 20 (02 Jul 2019 09:58) (18 - 22)  SpO2: 95% (02 Jul 2019 09:58) (92% - 95%) on 3L NC          07-01 @ 07:01  -  07-02 @ 07:00  --------------------------------------------------------  IN: 620 mL / OUT: 0 mL / NET: 620 mL          LABS:                        8.5    9.50  )-----------( 469      ( 02 Jul 2019 10:11 )             27.6     07-02    145  |  109<H>  |  18  ----------------------------<  123<H>  3.9   |  27  |  0.58    Ca    8.6      02 Jul 2019 07:37  Mg     2.4     07-02            CAPILLARY BLOOD GLUCOSE                            CULTURES: (if applicable)  Culture Results:   No growth to date. (06-29 @ 16:42)  Culture Results:   No growth to date. (06-29 @ 10:06)  Culture Results:   No growth (06-28 @ 10:45)  Culture Results:   No growth at 5 days. (06-26 @ 16:47)    Most recent blood culture -- 06-29 @ 16:42   -- -- .Blood 06-29 @ 16:42  Most recent blood culture -- 06-29 @ 10:06   -- -- .Blood 06-29 @ 10:06  Most recent blood culture -- 06-28 @ 10:45   -- -- .Urine 06-28 @ 10:45        Physical Examination:  PULM: Rhonchi improved   CVS: S1, S2 heard    RADIOLOGY REVIEWED  CT chest: < from: CT Angio Chest w/ IV Cont (06.29.19 @ 07:56) >    CHEST:     LUNGS AND LARGE AIRWAYS: Patent central airways. Patchy left upper upper   lobe have increased when compared to previous exam. Patchy opacities in   both lower lobes represent atelectasis.  PLEURA: No pleural effusion.  VESSELS: No filling defects are noted within the main, right and left   main pulmonary arteries. Evaluationof the lobar, segmental and   subsegmental pulmonary artery branches is limited due to marked breathing   motion artifact.  HEART: Heart size is normal. No pericardial effusion.  MEDIASTINUM AND LENA: Few small lymph nodes are present in the   pretracheal space and the AP window.  CHEST WALL AND LOWER NECK: Within normal limits.    < end of copied text >

## 2019-07-02 NOTE — PROGRESS NOTE ADULT - SUBJECTIVE AND OBJECTIVE BOX
CC: Patient is a 73y old  Male who presents with a chief complaint of PEG tube fall out, Right thigh swelling (02 Jul 2019 10:39)    ID following for fever    Interval History/ROS: Patient afebrile overnight. WBC WNL. Remains with RLE bandaged and braced. Hematoma to Right thigh. PEG in place.     Rest of ROS negative.    Allergies  No Known Allergies    ANTIMICROBIALS:  meropenem  IVPB 1000 every 8 hours    OTHER MEDS:  acetaminophen  Suppository .. 650 milliGRAM(s) Rectal every 6 hours PRN  ALBUTerol/ipratropium for Nebulization 3 milliLiter(s) Nebulizer every 6 hours  bisacodyl Suppository 10 milliGRAM(s) Rectal daily PRN  heparin  Injectable 5000 Unit(s) SubCutaneous every 8 hours  OLANZapine 5 milliGRAM(s) Oral daily  pantoprazole  Injectable 40 milliGRAM(s) IV Push daily  polyethylene glycol 3350 17 Gram(s) Oral two times a day  QUEtiapine 100 milliGRAM(s) Oral daily  sucralfate 1 Gram(s) Oral two times a day    PE:    Vital Signs Last 24 Hrs  T(C): 36.9 (02 Jul 2019 09:58), Max: 37.6 (01 Jul 2019 20:03)  T(F): 98.4 (02 Jul 2019 09:58), Max: 99.7 (01 Jul 2019 20:03)  HR: 100 (02 Jul 2019 09:58) (100 - 111)  BP: 112/69 (02 Jul 2019 09:58) (107/65 - 117/66)  BP(mean): --  RR: 20 (02 Jul 2019 09:58) (18 - 22)  SpO2: 95% (02 Jul 2019 09:58) (92% - 96%)    Gen: Awake, NAD  CV: S1+S2 normal, no murmurs  Resp: Clear bilat, no resp distress  Abd: Soft, nontender, +BS, PEG intact  Ext: RLE bandaged and braced  : No Fleming  IV/Skin: No thrombophlebitis  Neuro: no focal deficits    LABS:                          8.5    9.50  )-----------( 469      ( 02 Jul 2019 10:11 )             27.6       07-02    145  |  109<H>  |  18  ----------------------------<  123<H>  3.9   |  27  |  0.58    Ca    8.6      02 Jul 2019 07:37  Mg     2.4     07-02    MICROBIOLOGY:  v  .Blood  06-29-19   No growth to date.  --  --      .Blood  06-29-19   No growth to date.  --  --      .Urine  06-28-19   No growth  --  --      .Blood  06-26-19   No growth at 5 days.  --  --      .Blood  06-24-19   Growth in aerobic bottle: Staphylococcus hominis "Susceptibilities not  performed"  --    Growth in aerobic bottle: Gram Positive Cocci in Clusters      .Blood  06-22-19   Growth in anaerobic bottle: Staphylococcus epidermidis "Susceptibilities  not performed"  "Due to technical problems, Proteus sp. will Not be reported as part of  the BCID panel until further notice"  ***Blood Panel PCR results on this specimen areavailable  approximately 3 hours after the Gram stain result.***  Gram stain, PCR, and/or culture results may not always  correspond due to difference in methodologies.  ************************************************************  This PCR assay wasperformed using [employer].  The following targets are tested for: Enterococcus,  vancomycin resistant enterococci, Listeria monocytogenes,  coagulase negative staphylococci, S. aureus,  methicillin resistant S. aureus, Streptococcus agalactiae  (Group B), S. pneumoniae, S. pyogenes (Group A),  Acinetobacter baumannii, Enterobacter cloacae, E. coli,  Klebsiella oxytoca, K. pneumoniae, Proteus sp.,  Serratia marcescens, Haemophilus influenzae,  Neisseria meningitidis, Pseudomonas aeruginosa, Candida  albicans, C. glabrata, C krusei, C parapsilosis,  C. tropicalis and the KPC resistance gene.  --  Blood Culture PCR    .Blood  06-22-19   No growth at 5 days.  --  --    .Urine  06-22-19   No growth  --  --    RADIOLOGY:    < from: Xray Abdomen 1 View PORTABLE -Routine (06.30.19 @ 08:36) >  FINDINGS/  IMPRESSION:    Nonobstructive bowel gas pattern with a few air-filled distended loops of   small bowel large bowel.  Contrast seen in a distended rectum.  No acute osseous findings.      < end of copied text >

## 2019-07-02 NOTE — PROGRESS NOTE ADULT - ASSESSMENT
peg tube    plan  s/p peg change  axr reviewed, PEG in correct place  feeds held for ileus now can start at 10cc/hr, to be increased to goal of 30 cc/hour  repeat axr if distended  iv abs and ivf  daily peg care, monitor residuals  Miralax 17 grams BID for constipation started 6/24  dulcolax supp 10 mg daily added 6/25

## 2019-07-02 NOTE — PROGRESS NOTE ADULT - SUBJECTIVE AND OBJECTIVE BOX
CHIEF COMPLAINT:Patient is a 73y old  Male who presents with a chief complaint of PEG tube fall out, Right thigh swelling (02 Jul 2019 11:16)    	        PAST MEDICAL & SURGICAL HISTORY:  Dyspepsia  GERD (gastroesophageal reflux disease)  Arthritis  Dysphagia: peg  Acute renal failure (ARF): ARF 1.5 YEAR AGO  Pneumonia  Dementia  Status post insertion of percutaneous endoscopic gastrostomy (PEG) tube: PEG replacement on 02/29/16  PEG adjustment, replacement, or removal  History of hand surgery          REVIEW OF SYSTEMS:  no apparent cp or sob  mental status same at baseline   no fever     Medications:  MEDICATIONS  (STANDING):  ALBUTerol/ipratropium for Nebulization 3 milliLiter(s) Nebulizer every 6 hours  heparin  Injectable 5000 Unit(s) SubCutaneous every 8 hours  meropenem  IVPB 1000 milliGRAM(s) IV Intermittent every 8 hours  OLANZapine 5 milliGRAM(s) Oral daily  pantoprazole  Injectable 40 milliGRAM(s) IV Push daily  polyethylene glycol 3350 17 Gram(s) Oral two times a day  QUEtiapine 100 milliGRAM(s) Oral daily  sucralfate 1 Gram(s) Oral two times a day    MEDICATIONS  (PRN):  acetaminophen  Suppository .. 650 milliGRAM(s) Rectal every 6 hours PRN Temp greater or equal to 38C (100.4F), Mild Pain (1 - 3), Moderate Pain (4 - 6)  bisacodyl Suppository 10 milliGRAM(s) Rectal daily PRN Constipation    	    PHYSICAL EXAM:  T(C): 36.9 (07-02-19 @ 09:58), Max: 37.6 (07-01-19 @ 20:03)  HR: 100 (07-02-19 @ 09:58) (100 - 111)  BP: 112/69 (07-02-19 @ 09:58) (107/65 - 117/66)  RR: 20 (07-02-19 @ 09:58) (18 - 22)  SpO2: 95% (07-02-19 @ 09:58) (92% - 96%)  Wt(kg): --  I&O's Summary    01 Jul 2019 07:01  -  02 Jul 2019 07:00  --------------------------------------------------------  IN: 620 mL / OUT: 0 mL / NET: 620 mL        Appearance: Normal	  HEENT:   Normal oral mucosa,     Cardiovascular: Normal S1 S2, No JVD,  Respiratory: dec bs   Gastrointestinal:  Soft, Non-tender, + BS		  Neurologic: unable to assess  Extremities: dec rom   r lext immobilizer    TELEMETRY: 	    ECG:  	  RADIOLOGY:  OTHER: 	  	  LABS:	 	    CARDIAC MARKERS:                                8.5    9.50  )-----------( 469      ( 02 Jul 2019 10:11 )             27.6     07-02    145  |  109<H>  |  18  ----------------------------<  123<H>  3.9   |  27  |  0.58    Ca    8.6      02 Jul 2019 07:37  Mg     2.4     07-02      proBNP:   Lipid Profile:   HgA1c:   TSH:

## 2019-07-02 NOTE — PROGRESS NOTE ADULT - SUBJECTIVE AND OBJECTIVE BOX
INTERVAL HPI/OVERNIGHT EVENTS:    pt seen and examined. Non- verbal  tolerating peg feeds as per RN  no leakage/drainage from peg site     MEDICATIONS  (STANDING):  ALBUTerol/ipratropium for Nebulization 3 milliLiter(s) Nebulizer every 6 hours  heparin  Injectable 5000 Unit(s) SubCutaneous every 8 hours  meropenem  IVPB 1000 milliGRAM(s) IV Intermittent every 8 hours  OLANZapine 5 milliGRAM(s) Oral daily  pantoprazole  Injectable 40 milliGRAM(s) IV Push daily  polyethylene glycol 3350 17 Gram(s) Oral two times a day  QUEtiapine 100 milliGRAM(s) Oral daily  sucralfate 1 Gram(s) Oral two times a day    MEDICATIONS  (PRN):  acetaminophen  Suppository .. 650 milliGRAM(s) Rectal every 6 hours PRN Temp greater or equal to 38C (100.4F), Mild Pain (1 - 3), Moderate Pain (4 - 6)  bisacodyl Suppository 10 milliGRAM(s) Rectal daily PRN Constipation      Allergies    No Known Allergies    Intolerances    benzodiazepines (Other)  Haldol (Other)      Review of Systems: unable to obtain   Vital Signs Last 24 Hrs  T(C): 36.9 (02 Jul 2019 09:58), Max: 37.6 (01 Jul 2019 20:03)  T(F): 98.4 (02 Jul 2019 09:58), Max: 99.7 (01 Jul 2019 20:03)  HR: 100 (02 Jul 2019 09:58) (100 - 109)  BP: 112/69 (02 Jul 2019 09:58) (107/65 - 117/66)  BP(mean): --  RR: 20 (02 Jul 2019 09:58) (18 - 22)  SpO2: 95% (02 Jul 2019 09:58) (92% - 95%)    PHYSICAL EXAM:    Constitutional: ill appearing  HEENT: EOMI, throat clear  Neck: No LAD, supple  Respiratory: CTA and P  Cardiovascular: S1 and S2, RRR, no M  Gastrointestinal: BS+, soft, NT/ND, neg HSM, + peg with abdominal binder c/d/i  Extremities: No peripheral edema, neg clubbing, cyanosis  Vascular: 2+ peripheral pulses  Neurological: A/O x 0, no focal deficits  Psychiatric: Normal mood, normal affect  Skin: No rashes        LABS:                        8.5    9.50  )-----------( 469      ( 02 Jul 2019 10:11 )             27.6     07-02    145  |  109<H>  |  18  ----------------------------<  123<H>  3.9   |  27  |  0.58    Ca    8.6      02 Jul 2019 07:37  Mg     2.4     07-02            RADIOLOGY & ADDITIONAL TESTS:

## 2019-07-02 NOTE — PROGRESS NOTE ADULT - ASSESSMENT
72 yo male  with PMH severe dementia with PEG tube, arthritis, GERD present from home after PEG tube falling out and Right thigh swelling.       ·  Problem: Closed fracture of distal end of right femur, unspecified fracture morphology, initial encounter.   : Ortho team consulted and placed into knee immobilizer.   ortho f/u noted  Pain control prn.     ·  Problem: PEG (percutaneous endoscopic gastrostomy) adjustment/replacement/removal.  : GI team replaced PEG in ED  F/U GI recs.   npo  bowel regimen  resumed  feeds as per gi       ·  Problem: Tachycardia.    fevers resolved  likely aspiration         : Pneumonia./sepsis.fevers   fevers /recurrent   iv abs as per id til tomorrow  pulm f/u noted  id f/u         ·  Problem: GERD (gastroesophageal reflux disease).    ppi      Problem: Dementia.   supp care     anemia   likely multifactorial   f/u guaics neg   no overt bleeding   likely accumulation of blood also in r thigh contributing to anemia   heme g/i f/u noted       ·  Problem: Prophylactic measure.    : DVT SCD        d/c planning in am on feeds w/ pump as per gi         continue present Rx

## 2019-07-02 NOTE — PROGRESS NOTE ADULT - ASSESSMENT
74 yo man with PMH severe dementia (AAOx0 s/p severe reaction to Haldol/Ativan) with PEG tube, arthritis, GERD presents with  PEG dislodgement, Femur FX, sepsis     1. Hypoxic Resp Failure  -likely in the setting of LLL PNA  -cont abx/O2 support per pulm  -d-dimer noted, no PE    2. Sepsis/ LLL Pna   -ct chest/ abd revealed pna left lung , mediastinal lymphadenopathy. no intra-abd / pelvic source of infection identified   - repeat bcx negative   -CTA chest negative for PE, + increased opacities in the SHEILA   -ID, pulm f/u     3. Femur FX  - med f/u   -knee immobilizer, pain control, ortho f/u    4. Murmur  -pending ECHO to eval murmur    5.  PEG dislodgement  -gi f/u s/p peg change    6. tachycardia   likely in the setting of pna  LE dopplers, negative for dvt   CTA chest negative for PE, + increased opacities in the SHEILA     -DVT ppx

## 2019-07-02 NOTE — PROGRESS NOTE ADULT - ASSESSMENT
74 yo M with PMH severe dementia (AAOx0 s/p severe reaction to Haldol/Ativan) with PEG tube, arthritis, GERD present from home after PEG tube falling out and Right thigh swelling.  Femur fracture  Fever, tachycardia, leukocytosis, AMS    1. abdominal distension and dilated loops of bowel on X-ray  CT with Gastrostomy tube in place  Continue to monitor  GI following     2. Large hematoma in right thigh -   Monitor HCT/HGB currently stable     3. Aspiration pneumonia from obstruction  Continue the Meropenem to complete 7-days on 7/3/2019    4. Blood cultures x 2 bottle with s.epi and s.hominis  may be a contaminant but also has a 2/6 systolic murmur  TTE  repeat blood cultures no growth to date    Jw Alberts MD  Pager [phone]  After 5pm/weekends call [phone]

## 2019-07-02 NOTE — PROGRESS NOTE ADULT - SUBJECTIVE AND OBJECTIVE BOX
CARDIOLOGY FOLLOW UP - Dr. Young    CC no acute events       PHYSICAL EXAM:  T(C): 36.9 (07-02-19 @ 09:58), Max: 37.6 (07-01-19 @ 20:03)  HR: 100 (07-02-19 @ 09:58) (100 - 111)  BP: 112/69 (07-02-19 @ 09:58) (107/65 - 117/66)  RR: 20 (07-02-19 @ 09:58) (18 - 22)  SpO2: 95% (07-02-19 @ 09:58) (92% - 96%)  Wt(kg): --  I&O's Summary    01 Jul 2019 07:01  -  02 Jul 2019 07:00  --------------------------------------------------------  IN: 620 mL / OUT: 0 mL / NET: 620 mL        Appearance: NAD 	  Cardiovascular: Normal S1 S2,RRR + murmur  Respiratory: rhonchi   Gastrointestinal:  Soft, Non-tender, + BS	  Extremities: No r edema        MEDICATIONS  (STANDING):  ALBUTerol/ipratropium for Nebulization 3 milliLiter(s) Nebulizer every 6 hours  heparin  Injectable 5000 Unit(s) SubCutaneous every 8 hours  meropenem  IVPB 1000 milliGRAM(s) IV Intermittent every 8 hours  OLANZapine 5 milliGRAM(s) Oral daily  pantoprazole  Injectable 40 milliGRAM(s) IV Push daily  polyethylene glycol 3350 17 Gram(s) Oral two times a day  QUEtiapine 100 milliGRAM(s) Oral daily  sucralfate 1 Gram(s) Oral two times a day      TELEMETRY: 	    ECG:  	  RADIOLOGY:   DIAGNOSTIC TESTING:  [ ] Echocardiogram:  [ ]  Catheterization:  [ ] Stress Test:    OTHER: 	    LABS:	 	                            8.5    9.50  )-----------( 469      ( 02 Jul 2019 10:11 )             27.6     07-02    145  |  109<H>  |  18  ----------------------------<  123<H>  3.9   |  27  |  0.58    Ca    8.6      02 Jul 2019 07:37  Mg     2.4     07-02

## 2019-07-03 ENCOUNTER — TRANSCRIPTION ENCOUNTER (OUTPATIENT)
Age: 74
End: 2019-07-03

## 2019-07-03 VITALS — TEMPERATURE: 101 F

## 2019-07-03 LAB
ANION GAP SERPL CALC-SCNC: 11 MMOL/L — SIGNIFICANT CHANGE UP (ref 5–17)
BUN SERPL-MCNC: 17 MG/DL — SIGNIFICANT CHANGE UP (ref 7–23)
CALCIUM SERPL-MCNC: 8.9 MG/DL — SIGNIFICANT CHANGE UP (ref 8.4–10.5)
CHLORIDE SERPL-SCNC: 107 MMOL/L — SIGNIFICANT CHANGE UP (ref 96–108)
CO2 SERPL-SCNC: 26 MMOL/L — SIGNIFICANT CHANGE UP (ref 22–31)
CREAT SERPL-MCNC: 0.52 MG/DL — SIGNIFICANT CHANGE UP (ref 0.5–1.3)
GLUCOSE SERPL-MCNC: 147 MG/DL — HIGH (ref 70–99)
HCT VFR BLD CALC: 27.6 % — LOW (ref 39–50)
HGB BLD-MCNC: 9.5 G/DL — LOW (ref 13–17)
MCHC RBC-ENTMCNC: 34.4 GM/DL — SIGNIFICANT CHANGE UP (ref 32–36)
MCHC RBC-ENTMCNC: 35.3 PG — HIGH (ref 27–34)
MCV RBC AUTO: 103 FL — HIGH (ref 80–100)
PLATELET # BLD AUTO: 506 K/UL — HIGH (ref 150–400)
POTASSIUM SERPL-MCNC: 4.3 MMOL/L — SIGNIFICANT CHANGE UP (ref 3.5–5.3)
POTASSIUM SERPL-SCNC: 4.3 MMOL/L — SIGNIFICANT CHANGE UP (ref 3.5–5.3)
RBC # BLD: 2.69 M/UL — LOW (ref 4.2–5.8)
RBC # FLD: 14.5 % — SIGNIFICANT CHANGE UP (ref 10.3–14.5)
SODIUM SERPL-SCNC: 144 MMOL/L — SIGNIFICANT CHANGE UP (ref 135–145)
WBC # BLD: 9.3 K/UL — SIGNIFICANT CHANGE UP (ref 3.8–10.5)
WBC # FLD AUTO: 9.3 K/UL — SIGNIFICANT CHANGE UP (ref 3.8–10.5)

## 2019-07-03 PROCEDURE — 85045 AUTOMATED RETICULOCYTE COUNT: CPT

## 2019-07-03 PROCEDURE — 93970 EXTREMITY STUDY: CPT

## 2019-07-03 PROCEDURE — 86803 HEPATITIS C AB TEST: CPT

## 2019-07-03 PROCEDURE — 82330 ASSAY OF CALCIUM: CPT

## 2019-07-03 PROCEDURE — 82746 ASSAY OF FOLIC ACID SERUM: CPT

## 2019-07-03 PROCEDURE — 82947 ASSAY GLUCOSE BLOOD QUANT: CPT

## 2019-07-03 PROCEDURE — 94640 AIRWAY INHALATION TREATMENT: CPT

## 2019-07-03 PROCEDURE — 73562 X-RAY EXAM OF KNEE 3: CPT

## 2019-07-03 PROCEDURE — 86901 BLOOD TYPING SEROLOGIC RH(D): CPT

## 2019-07-03 PROCEDURE — 82435 ASSAY OF BLOOD CHLORIDE: CPT

## 2019-07-03 PROCEDURE — 93005 ELECTROCARDIOGRAM TRACING: CPT

## 2019-07-03 PROCEDURE — 93306 TTE W/DOPPLER COMPLETE: CPT

## 2019-07-03 PROCEDURE — 74018 RADEX ABDOMEN 1 VIEW: CPT

## 2019-07-03 PROCEDURE — 85027 COMPLETE CBC AUTOMATED: CPT

## 2019-07-03 PROCEDURE — 85379 FIBRIN DEGRADATION QUANT: CPT

## 2019-07-03 PROCEDURE — 87150 DNA/RNA AMPLIFIED PROBE: CPT

## 2019-07-03 PROCEDURE — 82803 BLOOD GASES ANY COMBINATION: CPT

## 2019-07-03 PROCEDURE — 36600 WITHDRAWAL OF ARTERIAL BLOOD: CPT

## 2019-07-03 PROCEDURE — 83010 ASSAY OF HAPTOGLOBIN QUANT: CPT

## 2019-07-03 PROCEDURE — 71045 X-RAY EXAM CHEST 1 VIEW: CPT

## 2019-07-03 PROCEDURE — 82728 ASSAY OF FERRITIN: CPT

## 2019-07-03 PROCEDURE — 86880 COOMBS TEST DIRECT: CPT

## 2019-07-03 PROCEDURE — 85610 PROTHROMBIN TIME: CPT

## 2019-07-03 PROCEDURE — 83605 ASSAY OF LACTIC ACID: CPT

## 2019-07-03 PROCEDURE — 83540 ASSAY OF IRON: CPT

## 2019-07-03 PROCEDURE — 49465 FLUORO EXAM OF G/COLON TUBE: CPT

## 2019-07-03 PROCEDURE — 83615 LACTATE (LD) (LDH) ENZYME: CPT

## 2019-07-03 PROCEDURE — 86900 BLOOD TYPING SEROLOGIC ABO: CPT

## 2019-07-03 PROCEDURE — 71275 CT ANGIOGRAPHY CHEST: CPT

## 2019-07-03 PROCEDURE — 80053 COMPREHEN METABOLIC PANEL: CPT

## 2019-07-03 PROCEDURE — 86850 RBC ANTIBODY SCREEN: CPT

## 2019-07-03 PROCEDURE — 76700 US EXAM ABDOM COMPLETE: CPT

## 2019-07-03 PROCEDURE — 73552 X-RAY EXAM OF FEMUR 2/>: CPT

## 2019-07-03 PROCEDURE — 85730 THROMBOPLASTIN TIME PARTIAL: CPT

## 2019-07-03 PROCEDURE — 84145 PROCALCITONIN (PCT): CPT

## 2019-07-03 PROCEDURE — 80202 ASSAY OF VANCOMYCIN: CPT

## 2019-07-03 PROCEDURE — 81001 URINALYSIS AUTO W/SCOPE: CPT

## 2019-07-03 PROCEDURE — 82272 OCCULT BLD FECES 1-3 TESTS: CPT

## 2019-07-03 PROCEDURE — 72170 X-RAY EXAM OF PELVIS: CPT

## 2019-07-03 PROCEDURE — 71260 CT THORAX DX C+: CPT

## 2019-07-03 PROCEDURE — 70450 CT HEAD/BRAIN W/O DYE: CPT

## 2019-07-03 PROCEDURE — 82607 VITAMIN B-12: CPT

## 2019-07-03 PROCEDURE — 83550 IRON BINDING TEST: CPT

## 2019-07-03 PROCEDURE — 74177 CT ABD & PELVIS W/CONTRAST: CPT

## 2019-07-03 PROCEDURE — 84100 ASSAY OF PHOSPHORUS: CPT

## 2019-07-03 PROCEDURE — 83735 ASSAY OF MAGNESIUM: CPT

## 2019-07-03 PROCEDURE — 99232 SBSQ HOSP IP/OBS MODERATE 35: CPT

## 2019-07-03 PROCEDURE — 82962 GLUCOSE BLOOD TEST: CPT

## 2019-07-03 PROCEDURE — 85014 HEMATOCRIT: CPT

## 2019-07-03 PROCEDURE — 84132 ASSAY OF SERUM POTASSIUM: CPT

## 2019-07-03 PROCEDURE — 80048 BASIC METABOLIC PNL TOTAL CA: CPT

## 2019-07-03 PROCEDURE — 99285 EMERGENCY DEPT VISIT HI MDM: CPT

## 2019-07-03 PROCEDURE — 87040 BLOOD CULTURE FOR BACTERIA: CPT

## 2019-07-03 PROCEDURE — 87086 URINE CULTURE/COLONY COUNT: CPT

## 2019-07-03 PROCEDURE — 84295 ASSAY OF SERUM SODIUM: CPT

## 2019-07-03 RX ORDER — QUETIAPINE FUMARATE 200 MG/1
1 TABLET, FILM COATED ORAL
Qty: 0 | Refills: 0 | DISCHARGE
Start: 2019-07-03

## 2019-07-03 RX ORDER — POLYETHYLENE GLYCOL 3350 17 G/17G
17 POWDER, FOR SOLUTION ORAL
Qty: 0 | Refills: 0 | DISCHARGE
Start: 2019-07-03

## 2019-07-03 RX ORDER — OLANZAPINE 15 MG/1
1 TABLET, FILM COATED ORAL
Qty: 0 | Refills: 0 | DISCHARGE
Start: 2019-07-03

## 2019-07-03 RX ADMIN — HEPARIN SODIUM 5000 UNIT(S): 5000 INJECTION INTRAVENOUS; SUBCUTANEOUS at 06:22

## 2019-07-03 RX ADMIN — Medication 3 MILLILITER(S): at 02:47

## 2019-07-03 RX ADMIN — Medication 3 MILLILITER(S): at 08:14

## 2019-07-03 RX ADMIN — MEROPENEM 100 MILLIGRAM(S): 1 INJECTION INTRAVENOUS at 06:21

## 2019-07-03 RX ADMIN — OLANZAPINE 5 MILLIGRAM(S): 15 TABLET, FILM COATED ORAL at 12:01

## 2019-07-03 RX ADMIN — Medication 1 GRAM(S): at 11:59

## 2019-07-03 RX ADMIN — HEPARIN SODIUM 5000 UNIT(S): 5000 INJECTION INTRAVENOUS; SUBCUTANEOUS at 15:01

## 2019-07-03 RX ADMIN — POLYETHYLENE GLYCOL 3350 17 GRAM(S): 17 POWDER, FOR SOLUTION ORAL at 06:22

## 2019-07-03 RX ADMIN — PANTOPRAZOLE SODIUM 40 MILLIGRAM(S): 20 TABLET, DELAYED RELEASE ORAL at 12:02

## 2019-07-03 RX ADMIN — Medication 3 MILLILITER(S): at 02:48

## 2019-07-03 RX ADMIN — Medication 10 MILLIGRAM(S): at 07:38

## 2019-07-03 RX ADMIN — Medication 3 MILLILITER(S): at 08:15

## 2019-07-03 RX ADMIN — MEROPENEM 100 MILLIGRAM(S): 1 INJECTION INTRAVENOUS at 15:00

## 2019-07-03 RX ADMIN — Medication 3 MILLILITER(S): at 15:01

## 2019-07-03 NOTE — PROGRESS NOTE ADULT - SUBJECTIVE AND OBJECTIVE BOX
INTERVAL HPI/OVERNIGHT EVENTS:    pt seen and examined. Non- verbal  tolerating peg feeds as per RN  no leakage/drainage from peg site   feeds now running at 30 cc/hour     MEDICATIONS  (STANDING):  acetylcysteine 20%  Inhalation 3 milliLiter(s) Inhalation every 6 hours  ALBUTerol/ipratropium for Nebulization 3 milliLiter(s) Nebulizer every 6 hours  heparin  Injectable 5000 Unit(s) SubCutaneous every 8 hours  meropenem  IVPB 1000 milliGRAM(s) IV Intermittent every 8 hours  OLANZapine 5 milliGRAM(s) Oral daily  pantoprazole  Injectable 40 milliGRAM(s) IV Push daily  polyethylene glycol 3350 17 Gram(s) Oral two times a day  QUEtiapine 100 milliGRAM(s) Oral daily  sucralfate 1 Gram(s) Oral two times a day    MEDICATIONS  (PRN):  acetaminophen  Suppository .. 650 milliGRAM(s) Rectal every 6 hours PRN Temp greater or equal to 38C (100.4F), Mild Pain (1 - 3), Moderate Pain (4 - 6)  bisacodyl Suppository 10 milliGRAM(s) Rectal daily PRN Constipation      Allergies    No Known Allergies    Intolerances    benzodiazepines (Other)  Haldol (Other)      Review of Systems:  unable to obtain       Vital Signs Last 24 Hrs  T(C): 37.3 (03 Jul 2019 06:14), Max: 37.3 (03 Jul 2019 06:14)  T(F): 99.1 (03 Jul 2019 06:14), Max: 99.1 (03 Jul 2019 06:14)  HR: 114 (03 Jul 2019 06:14) (100 - 114)  BP: 109/50 (03 Jul 2019 06:14) (109/50 - 122/77)  BP(mean): --  RR: 20 (03 Jul 2019 06:14) (20 - 20)  SpO2: 93% (03 Jul 2019 06:14) (84% - 94%)    PHYSICAL EXAM:    Constitutional: ill appearing  HEENT: EOMI, throat clear  Neck: No LAD, supple  Respiratory: CTA and P  Cardiovascular: S1 and S2, RRR, no M  Gastrointestinal: BS+, soft, NT/ND, neg HSM, + peg with abdominal binder c/d/i  Extremities: No peripheral edema, neg clubbing, cyanosis  Vascular: 2+ peripheral pulses  Neurological: A/O x 0, no focal deficits  Psychiatric: Normal mood, normal affect  Skin: No rashes        LABS:                        9.5    9.3   )-----------( 506      ( 03 Jul 2019 06:52 )             27.6     07-03    144  |  107  |  17  ----------------------------<  147<H>  4.3   |  26  |  0.52    Ca    8.9      03 Jul 2019 06:52  Mg     2.4     07-02            RADIOLOGY & ADDITIONAL TESTS:

## 2019-07-03 NOTE — PROGRESS NOTE ADULT - PROBLEM SELECTOR PLAN 2
-Vanco/Meropenem per ID recs  -HOB>30  -Aspiration precautions  -Advanced dementia
-Vanco/Zosyn per ID recs  -HOB>30  -Aspiration precautions  -Advanced dementia
2nd aspiration PNA  -Still with low grade temps, fever curve improving   -Blood cultures with Staph hominis, likely contaminant.   -BC 6/26 negative  -No evidence of occult infection on CT A/P  -Abd US normal  -No central PE
Dilated bowel loops   -Having BM  -Low rate TF
Dilated bowel loops   -Having BM  -Low rate TF, advancing as tolerated
Dilated bowel loops   -Having BM  -Low rate TF, advancing as tolerated
-Vanco/Zosyn per ID recs  -HOB>30  -Aspiration precautions  -Advanced dementia

## 2019-07-03 NOTE — PROGRESS NOTE ADULT - SUBJECTIVE AND OBJECTIVE BOX
CHIEF COMPLAINT:Patient is a 73y old  Male who presents with a chief complaint of PEG tube fall out, Right thigh swelling (03 Jul 2019 10:13)    	        PAST MEDICAL & SURGICAL HISTORY:  Dyspepsia  GERD (gastroesophageal reflux disease)  Arthritis  Dysphagia: peg  Acute renal failure (ARF): ARF 1.5 YEAR AGO  Pneumonia  Dementia  Status post insertion of percutaneous endoscopic gastrostomy (PEG) tube: PEG replacement on 02/29/16  PEG adjustment, replacement, or removal  History of hand surgery          REVIEW OF SYSTEMS:  no new events   Medications:  MEDICATIONS  (STANDING):  acetylcysteine 20%  Inhalation 3 milliLiter(s) Inhalation every 6 hours  ALBUTerol/ipratropium for Nebulization 3 milliLiter(s) Nebulizer every 6 hours  heparin  Injectable 5000 Unit(s) SubCutaneous every 8 hours  meropenem  IVPB 1000 milliGRAM(s) IV Intermittent every 8 hours  OLANZapine 5 milliGRAM(s) Oral daily  pantoprazole  Injectable 40 milliGRAM(s) IV Push daily  polyethylene glycol 3350 17 Gram(s) Oral two times a day  QUEtiapine 100 milliGRAM(s) Oral daily  sucralfate 1 Gram(s) Oral two times a day    MEDICATIONS  (PRN):  acetaminophen  Suppository .. 650 milliGRAM(s) Rectal every 6 hours PRN Temp greater or equal to 38C (100.4F), Mild Pain (1 - 3), Moderate Pain (4 - 6)  bisacodyl Suppository 10 milliGRAM(s) Rectal daily PRN Constipation    	    PHYSICAL EXAM:  T(C): 37.3 (07-03-19 @ 06:14), Max: 37.3 (07-03-19 @ 06:14)  HR: 114 (07-03-19 @ 06:14) (100 - 114)  BP: 109/50 (07-03-19 @ 06:14) (109/50 - 122/77)  RR: 20 (07-03-19 @ 06:14) (20 - 20)  SpO2: 93% (07-03-19 @ 06:14) (84% - 94%)  Wt(kg): --  I&O's Summary    02 Jul 2019 07:01  -  03 Jul 2019 07:00  --------------------------------------------------------  IN: 800 mL / OUT: 0 mL / NET: 800 mL    03 Jul 2019 07:01  -  03 Jul 2019 11:01  --------------------------------------------------------  IN: 0 mL / OUT: 0 mL / NET: 0 mL        Appearance: Normal	  HEENT:   Normal oral mucosa,   Lymphatic: No lymphadenopathy  Cardiovascular: Normal S1 S2, No JVD,   Respiratory: dec bs   Gastrointestinal:  Soft, Non-tender, + BS	peg  Skin: No rashes, No ecchymoses, No cyanosis	  Neurologic: Non-focal  Extremities: r lext immobilizer       TELEMETRY: 	    ECG:  	  RADIOLOGY:  OTHER: 	  	  LABS:	 	    CARDIAC MARKERS:                                9.5    9.3   )-----------( 506      ( 03 Jul 2019 06:52 )             27.6     07-03    144  |  107  |  17  ----------------------------<  147<H>  4.3   |  26  |  0.52    Ca    8.9      03 Jul 2019 06:52  Mg     2.4     07-02      proBNP:   Lipid Profile:   HgA1c:   TSH:

## 2019-07-03 NOTE — PROGRESS NOTE ADULT - ASSESSMENT
peg tube    plan  s/p peg change  axr reviewed, PEG in correct place  feed running at 30 cc/hour  repeat axr if distended  iv abs and ivf  daily peg care, monitor residuals  Miralax 17 grams BID for constipation started 6/24  dulcolax supp 10 mg daily added 6/25

## 2019-07-03 NOTE — CHART NOTE - NSCHARTNOTESELECT_GEN_ALL_CORE
Fever/Event Note
-  Hypoxia on Room Air/Event Note
Event Note
Event Note/positive bcx
MAR RRT Note
MAR Sepsis Reassessment
fever 101/Event Note

## 2019-07-03 NOTE — PROGRESS NOTE ADULT - PROBLEM SELECTOR PROBLEM 1
Aspiration pneumonia
Sepsis

## 2019-07-03 NOTE — PROGRESS NOTE ADULT - SUBJECTIVE AND OBJECTIVE BOX
Follow-up Pulm Progress Note    Awake, non-verbal   93% on RA    Medications:  MEDICATIONS  (STANDING):  acetylcysteine 20%  Inhalation 3 milliLiter(s) Inhalation every 6 hours  ALBUTerol/ipratropium for Nebulization 3 milliLiter(s) Nebulizer every 6 hours  heparin  Injectable 5000 Unit(s) SubCutaneous every 8 hours  meropenem  IVPB 1000 milliGRAM(s) IV Intermittent every 8 hours  OLANZapine 5 milliGRAM(s) Oral daily  pantoprazole  Injectable 40 milliGRAM(s) IV Push daily  polyethylene glycol 3350 17 Gram(s) Oral two times a day  QUEtiapine 100 milliGRAM(s) Oral daily  sucralfate 1 Gram(s) Oral two times a day    MEDICATIONS  (PRN):  acetaminophen  Suppository .. 650 milliGRAM(s) Rectal every 6 hours PRN Temp greater or equal to 38C (100.4F), Mild Pain (1 - 3), Moderate Pain (4 - 6)  bisacodyl Suppository 10 milliGRAM(s) Rectal daily PRN Constipation          Vital Signs Last 24 Hrs  T(C): 37.3 (03 Jul 2019 06:14), Max: 37.3 (03 Jul 2019 06:14)  T(F): 99.1 (03 Jul 2019 06:14), Max: 99.1 (03 Jul 2019 06:14)  HR: 114 (03 Jul 2019 06:14) (100 - 114)  BP: 109/50 (03 Jul 2019 06:14) (109/50 - 122/77)  BP(mean): --  RR: 20 (03 Jul 2019 06:14) (20 - 20)   SpO2: 93% (03 Jul 2019 06:14) (84% - 94%) on RA          07-02 @ 07:01  -  07-03 @ 07:00  --------------------------------------------------------  IN: 800 mL / OUT: 0 mL / NET: 800 mL          LABS:                        9.5    9.3   )-----------( 506      ( 03 Jul 2019 06:52 )             27.6     07-03    144  |  107  |  17  ----------------------------<  147<H>  4.3   |  26  |  0.52    Ca    8.9      03 Jul 2019 06:52  Mg     2.4     07-02            CAPILLARY BLOOD GLUCOSE                            CULTURES: (if applicable)  Culture Results:   No growth to date. (06-29 @ 16:42)  Culture Results:   No growth to date. (06-29 @ 10:06)  Culture Results:   No growth (06-28 @ 10:45)  Culture Results:   No growth at 5 days. (06-26 @ 16:47)    Most recent blood culture -- 06-29 @ 16:42   -- -- .Blood 06-29 @ 16:42  Most recent blood culture -- 06-29 @ 10:06   -- -- .Blood 06-29 @ 10:06        Physical Examination:  PULM: Rhonchi much improved   CVS: S1, S2 heard    RADIOLOGY REVIEWED  CT chest: < from: CT Angio Chest w/ IV Cont (06.29.19 @ 07:56) >  CHEST:     LUNGS AND LARGE AIRWAYS: Patent central airways. Patchy left upper upper   lobe have increased when compared to previous exam. Patchy opacities in   both lower lobes represent atelectasis.  PLEURA: No pleural effusion.  VESSELS: No filling defects are noted within the main, right and left   main pulmonary arteries. Evaluationof the lobar, segmental and   subsegmental pulmonary artery branches is limited due to marked breathing   motion artifact.  HEART: Heart size is normal. No pericardial effusion.  MEDIASTINUM AND LENA: Few small lymph nodes are present in the   pretracheal space and the AP window.  CHEST WALL AND LOWER NECK: Within normal limits.    < end of copied text >

## 2019-07-03 NOTE — CHART NOTE - NSCHARTNOTEFT_GEN_A_CORE
MEDICINE PA NOTE     EVENT SUMMARY   Notified by RN for axillary temperature of 100 and rectal of 100.5 D/w Dr. Hair; recommended MEDICINE PA NOTE     EVENT SUMMARY   Notified by RN for axillary temperature of 100 and rectal of 100.5 D/w Dr. Laxmi earl to discharge pt home with home O2. Rn made aware. D/c home with home O2    Ligia CODY  #[id number]

## 2019-07-03 NOTE — PROGRESS NOTE ADULT - SUBJECTIVE AND OBJECTIVE BOX
CARDIOLOGY FOLLOW UP - Dr. Young    CC no acute events       PHYSICAL EXAM:  T(C): 37.3 (07-03-19 @ 06:14), Max: 37.3 (07-03-19 @ 06:14)  HR: 114 (07-03-19 @ 06:14) (100 - 114)  BP: 109/50 (07-03-19 @ 06:14) (109/50 - 122/77)  RR: 20 (07-03-19 @ 06:14) (20 - 20)  SpO2: 93% (07-03-19 @ 06:14) (84% - 94%)  Wt(kg): --  I&O's Summary    02 Jul 2019 07:01  -  03 Jul 2019 07:00  --------------------------------------------------------  IN: 800 mL / OUT: 0 mL / NET: 800 mL    03 Jul 2019 07:01  -  03 Jul 2019 13:05  --------------------------------------------------------  IN: 0 mL / OUT: 0 mL / NET: 0 mL        Appearance: NAD 	  Cardiovascular: Normal S1 S2,RRR,+ murmur  Respiratory: Lungs clear to auscultation	  Gastrointestinal:  Soft, Non-tender, + BS	  Extremities: Normal range of motion, No clubbing, cyanosis or edema        MEDICATIONS  (STANDING):  ALBUTerol/ipratropium for Nebulization 3 milliLiter(s) Nebulizer every 6 hours  heparin  Injectable 5000 Unit(s) SubCutaneous every 8 hours  meropenem  IVPB 1000 milliGRAM(s) IV Intermittent every 8 hours  OLANZapine 5 milliGRAM(s) Oral daily  pantoprazole  Injectable 40 milliGRAM(s) IV Push daily  polyethylene glycol 3350 17 Gram(s) Oral two times a day  QUEtiapine 100 milliGRAM(s) Oral daily  sucralfate 1 Gram(s) Oral two times a day      TELEMETRY: 	    ECG:  	  RADIOLOGY:   DIAGNOSTIC TESTING:  [ ] Echocardiogram:  < from: Transthoracic Echocardiogram (07.02.19 @ 15:44) >  EF (Visual Estimate): 65-70 %  Doppler Peak Velocity (m/sec): AoV=2.3  ------------------------------------------------------------------------  Observations:  Mitral Valve: Normal mitral valve. Minimal mitral  regurgitation.  Aortic Valve/Aorta: Calcified trileaflet aortic valve with  decreased opening. Peak transaorticvalve gradient equals  21 mm Hg, mean transaortic valve gradient equals 12 mm Hg,  estimated aortic valve area equals 1.3 sqcm (by continuity  equation), aortic valve velocity time integral equals 42  cm, consistent with moderate aortic stenosis. Minimal  aortic regurgitation.  Peak left ventricular outflow tract  gradient equals 6 mm Hg, mean gradient is equal to 3 mm Hg,  LVOT velocity time integral equals 22 cm.  Aortic Root: 3 cm.  LVOT diameter: 1.8 cm.  Left Atrium: LA volume index = 12 cc/m2.  Left Ventricle: Hyperdynamic left ventricular systolic  function. Normal left ventricular internal dimensions and  wall thicknesses. Normal diastolic function  Right Heart: Normal right atrium. Normal right ventricular  size and function. Normal tricuspid valve. Mild tricuspid  regurgitation. Pulmonic valve not well visualized. Mild  pulmonic regurgitation.  Pericardium/Pleura: Normal pericardium with no pericardial  effusion.  Hemodynamic: Estimated right atrial pressure is 8 mm Hg.  Estimated right ventricular systolic pressure equals 33 mm  Hg, assuming right atrial pressure equals 8 mm Hg,  consistent with normal pulmonary pressures.  ------------------------------------------------------------------------  Conclusions:  1. Calcified trileaflet aortic valve with decreased  opening. Peak transaortic valve gradient equals 21 mm Hg,  mean transaortic valve gradient equals 12 mm Hg, estimated  aortic valve area equals 1.3 sqcm (by continuity equation),  aortic valve velocity time integral equals 42 cm,  consistent with moderate aortic stenosis. Minimal aortic  regurgitation.  2. Normal left ventricular internal dimensions and wall  thicknesses.  3. Hyperdynamic left ventricular systolic function.  4. Normal right ventricular size and function.  5. Estimated right ventricular systolic pressure equals 33  mm Hg, assuming right atrial pressure equals 8 mm Hg,  consistent with normal pulmonary pressures.  *** No previous Echo exam.  ------------------------------------------------------------------------  Confirmed on  7/2/2019 - 19:44:47 by Tj Albright M.D.  ---    < end of copied text >    [ ]  Catheterization:  [ ] Stress Test:    OTHER: 	    LABS:	 	                            9.5    9.3   )-----------( 506      ( 03 Jul 2019 06:52 )             27.6     07-03    144  |  107  |  17  ----------------------------<  147<H>  4.3   |  26  |  0.52    Ca    8.9      03 Jul 2019 06:52  Mg     2.4     07-02

## 2019-07-03 NOTE — PROVIDER CONTACT NOTE (OTHER) - ACTION/TREATMENT ORDERED:
NP made aware. Tylenol given with comfort measures in place. Will continue to monitor.
As per provider DESTINY Vee, place pt on venturi mask on FiO2 of 50%. Reassess VS after admin of Tylenol.
NP made aware
Take rectal temp, notify provider of results
Tylenol 1gm x1 dose, EKG, and continue to monitor patient and follow up with vital signs
as per NP Tylenol will be ordered, cooling measures applied
as per NP give PRN Tylenol order, cooling measures also applied
as per NP, apply cooling measures, repeat temp after pt returns from CT
as per PA give PRN order of acetaminophen
As per provider DESTINY Vee, CTA pending. Order pending for venturi mask for O2 delivery of FiO2 of 40%, continue to monitor pt.

## 2019-07-03 NOTE — PROGRESS NOTE ADULT - ASSESSMENT
74 yo man with PMH severe dementia (AAOx0 s/p severe reaction to Haldol/Ativan) with PEG tube, arthritis, GERD presents with  PEG dislodgement, Femur FX, sepsis     1. Hypoxic Resp Failure  -likely in the setting of LLL PNA  -cont abx/O2 support per pulm  -d-dimer noted, no PE    2. Sepsis/ LLL Pna   -ct chest/ abd revealed pna left lung , mediastinal lymphadenopathy. no intra-abd / pelvic source of infection identified   - repeat bcx negative   -CTA chest negative for PE, + increased opacities in the SHEILA   -ID, pulm f/u     3. Femur FX  - med f/u   -knee immobilizer, pain control, ortho f/u    4. Mod AS   -ECHO revealed mod AS, hyperdynamic LV fx EF 65- 70 %  - medically manage    5.  PEG dislodgement  -gi f/u s/p peg change    6. tachycardia   likely in the setting of pna  LE dopplers, negative for dvt   CTA chest negative for PE, + increased opacities in the SHIELA     -DVT ppx

## 2019-07-03 NOTE — PROGRESS NOTE ADULT - ASSESSMENT
72 yo male  with PMH severe dementia with PEG tube, arthritis, GERD present from home after PEG tube falling out and Right thigh swelling.       ·  Problem: Closed fracture of distal end of right femur, unspecified fracture morphology, initial encounter.   : Ortho team consulted and placed into knee immobilizer.   ortho f/u noted  Pain control prn.     ·  Problem: PEG (percutaneous endoscopic gastrostomy) adjustment/replacement/removal.  : GI team replaced PEG in ED  F/U GI recs.   npo  bowel regimen  resumed  feeds as per gi       ·  Problem: Tachycardia.    fevers resolved  likely aspiration         : Pneumonia./sepsis.fevers   fevers /recurrent   abs over today   pulm f/u noted  id f/u         ·  Problem: GERD (gastroesophageal reflux disease).    ppi      Problem: Dementia.   supp care     anemia   likely multifactorial   f/u guaics neg   no overt bleeding   likely accumulation of blood also in r thigh contributing to anemia   heme g/i f/u noted       ·  Problem: Prophylactic measure.    : DVT SCD          d/c planning home   discussed w/ wife

## 2019-07-03 NOTE — DISCHARGE NOTE NURSING/CASE MANAGEMENT/SOCIAL WORK - NSDCDPATPORTLINK_GEN_ALL_CORE
You can access the [facility], offered by [facility], by registering with the following website: [email or web address]

## 2019-07-03 NOTE — PROGRESS NOTE ADULT - ASSESSMENT
74 yo M with PMH severe dementia (AAOx0 s/p severe reaction to Haldol/Ativan) with PEG tube, arthritis, GERD present from home after PEG tube falling out and Right thigh swelling.  Femur fracture  Fever, tachycardia, leukocytosis, AMS    1. abdominal distension and dilated loops of bowel on X-ray  CT with Gastrostomy tube in place  XR with Nonobstructive bowel gas pattern with a few air-filled distended loops of small bowel large bowel  Continue to monitor    2. Large hematoma in right thigh -   Monitor HCT/HGB currently stable     3. Aspiration pneumonia from obstruction  Continue the Meropenem to complete today a 7-day course    4. Blood cultures x 2 bottle with s.epi and s.hominis  suspect procurement contaminant   Repeat blood cultures no growth to date    Please call with questions.    Jw Alberts MD  Pager [phone]  After 5pm/weekends call [phone]

## 2019-07-03 NOTE — CHART NOTE - NSCHARTNOTEFT_GEN_A_CORE
Called by RN and informed that pulse ox measurement by EMS is 83% on Room Air with .  Patient has been off O2 since midday and was not hypoxic on earlier measurements.  Patient now on 3lpm with O2 sat 93% and HR 95 - patient otherwise clinically unchanged.  Spoke with [facility], O2 is being delivery is scheduled between 4pm and 8pm.  Spoke with Pulmonary NP, no objection to discharge today with O2.  Will await confirmation of O2 delivery before discharge as d/w Dr. Hair.  EMS informed - requested to be called once O2 delivery has been confirmed.  RN and Unit secretary informed of the same.      Melanie Ballesteros NP  [phone] Called by RN and informed that pulse ox measurement by EMS is 83% on Room Air with .  Patient has been off O2 since midday and was not hypoxic on earlier measurements.  Patient now on 3lpm with O2 sat 93% and HR 95 - patient otherwise clinically unchanged.  Spoke with [facility], O2 is being delivery is scheduled between 4pm and 8pm.  Spoke with Pulmonary NP, no objection to discharge today with O2.  Will await confirmation of O2 delivery before discharge as d/w Dr. Hair.  EMS informed - requested to be called once O2 delivery has been confirmed.  RN and Unit secretary informed of the same.      Melanie Ballesteros NP  [phone]

## 2019-07-03 NOTE — PROVIDER CONTACT NOTE (OTHER) - RECOMMENDATIONS
Give tylenol. Provide comfort measures
Continue plan of care
Hold remainder of food for the day & monitor residual. suction prn. Possible Chest x-ray
Place pt on venturi mask, administer 650mg of tynenol and reassess VSS
Pt received Tube feedings at 1800, spouse refused pt to lower HOB at this time. Will give tylenol IV
cooling measures
cooling measures
cooling measures applied
cooling measures applied
Maintain HOB at 45 degrees, increase pt O2 saturation by different mode of O2 delivery. Continue to monitor pt, reassess VSS.

## 2019-07-03 NOTE — PROGRESS NOTE ADULT - PROVIDER SPECIALTY LIST ADULT
Cardiology
Gastroenterology
Heme/Onc
Infectious Disease
Internal Medicine
Pulmonology
Cardiology
Gastroenterology
Infectious Disease
Internal Medicine
Internal Medicine
Gastroenterology
Infectious Disease

## 2019-07-03 NOTE — PROGRESS NOTE ADULT - REASON FOR ADMISSION
PEG tube fall out, Right thigh swelling

## 2019-07-03 NOTE — PROVIDER CONTACT NOTE (OTHER) - ASSESSMENT
Patient alert, nonverbal
Pt alert and oriented x 0, Pt on tube feeds. Skin warm to touch.
Pt is tachypneic and using accessory muscles, T: 100.6 HR: 166 RR: 25 O2: 75%
Pt was suctioned. Wife called GI-Dr. Moraes & was instructed to hold all food for the remainder of the evening until GI evaluates patient in AM.
pt AOx0, in no acute distress, /65, , on 2L 02, nonverbal indicators of pain not present
pt non verbal in no acute distress, /60, HR 92, nonverbal indicators of pain not present
pt non verbal, in no acute distress, nonverbal indicators of pain not present
pt nonverbal, in no acute distress, nonverbal indicators of pain or discomfort not present
Pt is tachypneic with O2 sat of 94 on 2L nasal cannula. Pt does not present as cyanotic, BP: 100/71 T: 97.4 HR: 94

## 2019-07-03 NOTE — PROGRESS NOTE ADULT - SUBJECTIVE AND OBJECTIVE BOX
CC: Patient is a 73y old  Male who presents with a chief complaint of PEG tube fall out, Right thigh swelling (03 Jul 2019 11:01)    ID following for fever    Interval History/ROS: Patient remains on NC. No fevers, Normal WBC.    Rest of ROS negative.    Allergies  No Known Allergies    ANTIMICROBIALS:  meropenem  IVPB 1000 every 8 hours    OTHER MEDS:  acetaminophen  Suppository .. 650 milliGRAM(s) Rectal every 6 hours PRN  acetylcysteine 20%  Inhalation 3 milliLiter(s) Inhalation every 6 hours  ALBUTerol/ipratropium for Nebulization 3 milliLiter(s) Nebulizer every 6 hours  bisacodyl Suppository 10 milliGRAM(s) Rectal daily PRN  heparin  Injectable 5000 Unit(s) SubCutaneous every 8 hours  OLANZapine 5 milliGRAM(s) Oral daily  pantoprazole  Injectable 40 milliGRAM(s) IV Push daily  polyethylene glycol 3350 17 Gram(s) Oral two times a day  QUEtiapine 100 milliGRAM(s) Oral daily  sucralfate 1 Gram(s) Oral two times a day    PE:    Vital Signs Last 24 Hrs  T(C): 37.3 (03 Jul 2019 06:14), Max: 37.3 (03 Jul 2019 06:14)  T(F): 99.1 (03 Jul 2019 06:14), Max: 99.1 (03 Jul 2019 06:14)  HR: 114 (03 Jul 2019 06:14) (100 - 114)  BP: 109/50 (03 Jul 2019 06:14) (109/50 - 122/77)  BP(mean): --  RR: 20 (03 Jul 2019 06:14) (20 - 20)  SpO2: 93% (03 Jul 2019 06:14) (84% - 94%)    Gen: Awake, NAD  CV: S1+S2 normal, no murmurs  Resp: Clear bilat, no resp distress  Abd: Soft, nontender, +BS, PEG intact  Ext: RLE bandaged and braced, has hematoma  : No Fleming  IV/Skin: No thrombophlebitis  Neuro: no focal deficits      LABS:                          9.5    9.3   )-----------( 506      ( 03 Jul 2019 06:52 )             27.6       07-03    144  |  107  |  17  ----------------------------<  147<H>  4.3   |  26  |  0.52    Ca    8.9      03 Jul 2019 06:52  Mg     2.4     07-02    MICROBIOLOGY:  v  .Blood  06-29-19   No growth to date.  --  --      .Blood  06-29-19   No growth to date.  --  --      .Urine  06-28-19   No growth  --  --      .Blood  06-26-19   No growth at 5 days.  --  --      .Blood  06-24-19   Growth in aerobic bottle: Staphylococcus hominis "Susceptibilities not  performed"  --    Growth in aerobic bottle: Gram Positive Cocci in Clusters      .Blood  06-22-19   Growth in anaerobic bottle: Staphylococcus epidermidis "Susceptibilities  not performed"  "Due to technical problems, Proteus sp. will Not be reported as part of  the BCID panel until further notice"  ***Blood Panel PCR results on this specimen areavailable  approximately 3 hours after the Gram stain result.***  Gram stain, PCR, and/or culture results may not always  correspond due to difference in methodologies.  ************************************************************  This PCR assay wasperformed using [employer].  The following targets are tested for: Enterococcus,  vancomycin resistant enterococci, Listeria monocytogenes,  coagulase negative staphylococci, S. aureus,  methicillin resistant S. aureus, Streptococcus agalactiae  (Group B), S. pneumoniae, S. pyogenes (Group A),  Acinetobacter baumannii, Enterobacter cloacae, E. coli,  Klebsiella oxytoca, K. pneumoniae, Proteus sp.,  Serratia marcescens, Haemophilus influenzae,  Neisseria meningitidis, Pseudomonas aeruginosa, Candida  albicans, C. glabrata, C krusei, C parapsilosis,  C. tropicalis and the KPC resistance gene.  --  Blood Culture PCR    .Blood  06-22-19   No growth at 5 days.  --  --      .Urine  06-22-19   No growth  --  --    RADIOLOGY:    < from: Xray Abdomen 1 View PORTABLE -Routine (06.30.19 @ 08:36) >  FINDINGS/  IMPRESSION:    Nonobstructive bowel gas pattern with a few air-filled distended loops of   small bowel large bowel.  Contrast seen in a distended rectum.  No acute osseous findings.    < end of copied text >

## 2019-07-03 NOTE — PROGRESS NOTE ADULT - ATTENDING COMMENTS
Jw Alberts MD  Pager [phone]  After 5pm/weekends call [phone]
Agree with above NP note.  cv stable  await echo
Agree with above NP note.  cv stable  await echo for lv fxn, valve eval   cont abx per med
Agree with above NP note.  cv stable  await echo for lv fxn, valve eval   cont abx per med
Agree with above NP note.  cv stable  await echo to eval lv/valve function
Agree with above NP note.  cv stable  echo mod AS, nl LVEF
Agree with above NP note.  cv stable  iv abx  f/u blood cx  await echo
Agree with above NP note.  cv stable  await echo for lv fxn, valve eval   cont abx per med
Agree with above NP note.  cv stable  await echo to eval lv/valve function
Agree with above NP note.  cv stable  iv abx  f/u blood cx  await echo
Advanced care planning was discussed with patient and family.  Advanced care planning forms were reviewed and discussed.  Risks, benefits and alternatives of gastroenterologic procedures were discussed in detail and all questions were answered.    30 minutes spent.
Hypoxia likely secondary to continued shunt from atel/pna
overall poor prognosis

## 2019-07-03 NOTE — PROGRESS NOTE ADULT - PROBLEM SELECTOR PROBLEM 2
Aspiration pneumonia
Ileus
Sepsis
Aspiration pneumonia

## 2019-07-04 LAB
CULTURE RESULTS: SIGNIFICANT CHANGE UP
CULTURE RESULTS: SIGNIFICANT CHANGE UP
SPECIMEN SOURCE: SIGNIFICANT CHANGE UP
SPECIMEN SOURCE: SIGNIFICANT CHANGE UP

## 2019-09-21 NOTE — PROVIDER CONTACT NOTE (OTHER) - BACKGROUND
[Dear  ___] : Dear  [unfilled], [Consult Letter:] : I had the pleasure of evaluating your patient, [unfilled]. [Consult Closing:] : Thank you very much for allowing me to participate in the care of this patient.  If you have any questions, please do not hesitate to contact me. [Please see my note below.] : Please see my note below. Wife returned to bedside & fed 5pm dose unobserved. Stated that she never checked for residual volume & was never told to do so by doctors for the "last 7 years" [Sincerely,] : Sincerely, [FreeTextEntry2] : Mark Cerda MD\kelley Sorenson

## 2023-11-18 ENCOUNTER — INPATIENT (INPATIENT)
Facility: HOSPITAL | Age: 78
LOS: 25 days | Discharge: HOME HEALTH | DRG: 871 | End: 2023-12-14
Attending: INTERNAL MEDICINE | Admitting: INTERNAL MEDICINE
Payer: MEDICARE

## 2023-11-18 VITALS
HEART RATE: 108 BPM | OXYGEN SATURATION: 96 % | RESPIRATION RATE: 20 BRPM | SYSTOLIC BLOOD PRESSURE: 105 MMHG | TEMPERATURE: 99 F | DIASTOLIC BLOOD PRESSURE: 69 MMHG | WEIGHT: 110.01 LBS | HEIGHT: 67 IN

## 2023-11-18 DIAGNOSIS — Z93.1 GASTROSTOMY STATUS: Chronic | ICD-10-CM

## 2023-11-18 LAB
ALBUMIN SERPL ELPH-MCNC: 2.5 G/DL — LOW (ref 3.3–5)
ALBUMIN SERPL ELPH-MCNC: 2.5 G/DL — LOW (ref 3.3–5)
ALP SERPL-CCNC: 128 U/L — HIGH (ref 40–120)
ALP SERPL-CCNC: 128 U/L — HIGH (ref 40–120)
ALT FLD-CCNC: 23 U/L — SIGNIFICANT CHANGE UP (ref 10–45)
ALT FLD-CCNC: 23 U/L — SIGNIFICANT CHANGE UP (ref 10–45)
ANION GAP SERPL CALC-SCNC: 10 MMOL/L — SIGNIFICANT CHANGE UP (ref 5–17)
ANION GAP SERPL CALC-SCNC: 10 MMOL/L — SIGNIFICANT CHANGE UP (ref 5–17)
APPEARANCE UR: CLEAR — SIGNIFICANT CHANGE UP
APPEARANCE UR: CLEAR — SIGNIFICANT CHANGE UP
AST SERPL-CCNC: 57 U/L — HIGH (ref 10–40)
AST SERPL-CCNC: 57 U/L — HIGH (ref 10–40)
BACTERIA # UR AUTO: NEGATIVE /HPF — SIGNIFICANT CHANGE UP
BACTERIA # UR AUTO: NEGATIVE /HPF — SIGNIFICANT CHANGE UP
BASOPHILS # BLD AUTO: 0.03 K/UL — SIGNIFICANT CHANGE UP (ref 0–0.2)
BASOPHILS # BLD AUTO: 0.03 K/UL — SIGNIFICANT CHANGE UP (ref 0–0.2)
BASOPHILS NFR BLD AUTO: 0.2 % — SIGNIFICANT CHANGE UP (ref 0–2)
BASOPHILS NFR BLD AUTO: 0.2 % — SIGNIFICANT CHANGE UP (ref 0–2)
BILIRUB SERPL-MCNC: 0.8 MG/DL — SIGNIFICANT CHANGE UP (ref 0.2–1.2)
BILIRUB SERPL-MCNC: 0.8 MG/DL — SIGNIFICANT CHANGE UP (ref 0.2–1.2)
BILIRUB UR-MCNC: NEGATIVE — SIGNIFICANT CHANGE UP
BILIRUB UR-MCNC: NEGATIVE — SIGNIFICANT CHANGE UP
BUN SERPL-MCNC: 28 MG/DL — HIGH (ref 7–23)
BUN SERPL-MCNC: 28 MG/DL — HIGH (ref 7–23)
CALCIUM SERPL-MCNC: 8.7 MG/DL — SIGNIFICANT CHANGE UP (ref 8.4–10.5)
CALCIUM SERPL-MCNC: 8.7 MG/DL — SIGNIFICANT CHANGE UP (ref 8.4–10.5)
CAST: 1 /LPF — SIGNIFICANT CHANGE UP (ref 0–4)
CAST: 1 /LPF — SIGNIFICANT CHANGE UP (ref 0–4)
CHLORIDE SERPL-SCNC: 102 MMOL/L — SIGNIFICANT CHANGE UP (ref 96–108)
CHLORIDE SERPL-SCNC: 102 MMOL/L — SIGNIFICANT CHANGE UP (ref 96–108)
CO2 SERPL-SCNC: 25 MMOL/L — SIGNIFICANT CHANGE UP (ref 22–31)
CO2 SERPL-SCNC: 25 MMOL/L — SIGNIFICANT CHANGE UP (ref 22–31)
COLOR SPEC: SIGNIFICANT CHANGE UP
COLOR SPEC: SIGNIFICANT CHANGE UP
CREAT SERPL-MCNC: 0.39 MG/DL — LOW (ref 0.5–1.3)
CREAT SERPL-MCNC: 0.39 MG/DL — LOW (ref 0.5–1.3)
CRP SERPL-MCNC: 158 MG/L — HIGH (ref 0–4)
CRP SERPL-MCNC: 158 MG/L — HIGH (ref 0–4)
DIFF PNL FLD: NEGATIVE — SIGNIFICANT CHANGE UP
DIFF PNL FLD: NEGATIVE — SIGNIFICANT CHANGE UP
EGFR: 113 ML/MIN/1.73M2 — SIGNIFICANT CHANGE UP
EGFR: 113 ML/MIN/1.73M2 — SIGNIFICANT CHANGE UP
EOSINOPHIL # BLD AUTO: 0.04 K/UL — SIGNIFICANT CHANGE UP (ref 0–0.5)
EOSINOPHIL # BLD AUTO: 0.04 K/UL — SIGNIFICANT CHANGE UP (ref 0–0.5)
EOSINOPHIL NFR BLD AUTO: 0.3 % — SIGNIFICANT CHANGE UP (ref 0–6)
EOSINOPHIL NFR BLD AUTO: 0.3 % — SIGNIFICANT CHANGE UP (ref 0–6)
GLUCOSE SERPL-MCNC: 127 MG/DL — HIGH (ref 70–99)
GLUCOSE SERPL-MCNC: 127 MG/DL — HIGH (ref 70–99)
GLUCOSE UR QL: NEGATIVE MG/DL — SIGNIFICANT CHANGE UP
GLUCOSE UR QL: NEGATIVE MG/DL — SIGNIFICANT CHANGE UP
HCT VFR BLD CALC: 35.3 % — LOW (ref 39–50)
HCT VFR BLD CALC: 35.3 % — LOW (ref 39–50)
HGB BLD-MCNC: 11.2 G/DL — LOW (ref 13–17)
HGB BLD-MCNC: 11.2 G/DL — LOW (ref 13–17)
IMM GRANULOCYTES NFR BLD AUTO: 0.7 % — SIGNIFICANT CHANGE UP (ref 0–0.9)
IMM GRANULOCYTES NFR BLD AUTO: 0.7 % — SIGNIFICANT CHANGE UP (ref 0–0.9)
KETONES UR-MCNC: ABNORMAL MG/DL
KETONES UR-MCNC: ABNORMAL MG/DL
LEUKOCYTE ESTERASE UR-ACNC: ABNORMAL
LEUKOCYTE ESTERASE UR-ACNC: ABNORMAL
LYMPHOCYTES # BLD AUTO: 1.35 K/UL — SIGNIFICANT CHANGE UP (ref 1–3.3)
LYMPHOCYTES # BLD AUTO: 1.35 K/UL — SIGNIFICANT CHANGE UP (ref 1–3.3)
LYMPHOCYTES # BLD AUTO: 10 % — LOW (ref 13–44)
LYMPHOCYTES # BLD AUTO: 10 % — LOW (ref 13–44)
MCHC RBC-ENTMCNC: 31.5 PG — SIGNIFICANT CHANGE UP (ref 27–34)
MCHC RBC-ENTMCNC: 31.5 PG — SIGNIFICANT CHANGE UP (ref 27–34)
MCHC RBC-ENTMCNC: 31.7 GM/DL — LOW (ref 32–36)
MCHC RBC-ENTMCNC: 31.7 GM/DL — LOW (ref 32–36)
MCV RBC AUTO: 99.2 FL — SIGNIFICANT CHANGE UP (ref 80–100)
MCV RBC AUTO: 99.2 FL — SIGNIFICANT CHANGE UP (ref 80–100)
MONOCYTES # BLD AUTO: 1.01 K/UL — HIGH (ref 0–0.9)
MONOCYTES # BLD AUTO: 1.01 K/UL — HIGH (ref 0–0.9)
MONOCYTES NFR BLD AUTO: 7.4 % — SIGNIFICANT CHANGE UP (ref 2–14)
MONOCYTES NFR BLD AUTO: 7.4 % — SIGNIFICANT CHANGE UP (ref 2–14)
NEUTROPHILS # BLD AUTO: 11.03 K/UL — HIGH (ref 1.8–7.4)
NEUTROPHILS # BLD AUTO: 11.03 K/UL — HIGH (ref 1.8–7.4)
NEUTROPHILS NFR BLD AUTO: 81.4 % — HIGH (ref 43–77)
NEUTROPHILS NFR BLD AUTO: 81.4 % — HIGH (ref 43–77)
NITRITE UR-MCNC: NEGATIVE — SIGNIFICANT CHANGE UP
NITRITE UR-MCNC: NEGATIVE — SIGNIFICANT CHANGE UP
NRBC # BLD: 0 /100 WBCS — SIGNIFICANT CHANGE UP (ref 0–0)
NRBC # BLD: 0 /100 WBCS — SIGNIFICANT CHANGE UP (ref 0–0)
PH UR: 6.5 — SIGNIFICANT CHANGE UP (ref 5–8)
PH UR: 6.5 — SIGNIFICANT CHANGE UP (ref 5–8)
PLATELET # BLD AUTO: 232 K/UL — SIGNIFICANT CHANGE UP (ref 150–400)
PLATELET # BLD AUTO: 232 K/UL — SIGNIFICANT CHANGE UP (ref 150–400)
POTASSIUM SERPL-MCNC: 5.7 MMOL/L — HIGH (ref 3.5–5.3)
POTASSIUM SERPL-MCNC: 5.7 MMOL/L — HIGH (ref 3.5–5.3)
POTASSIUM SERPL-SCNC: 5.7 MMOL/L — HIGH (ref 3.5–5.3)
POTASSIUM SERPL-SCNC: 5.7 MMOL/L — HIGH (ref 3.5–5.3)
PROT SERPL-MCNC: 7.2 G/DL — SIGNIFICANT CHANGE UP (ref 6–8.3)
PROT SERPL-MCNC: 7.2 G/DL — SIGNIFICANT CHANGE UP (ref 6–8.3)
PROT UR-MCNC: SIGNIFICANT CHANGE UP MG/DL
PROT UR-MCNC: SIGNIFICANT CHANGE UP MG/DL
RAPID RVP RESULT: SIGNIFICANT CHANGE UP
RAPID RVP RESULT: SIGNIFICANT CHANGE UP
RBC # BLD: 3.56 M/UL — LOW (ref 4.2–5.8)
RBC # BLD: 3.56 M/UL — LOW (ref 4.2–5.8)
RBC # FLD: 13.2 % — SIGNIFICANT CHANGE UP (ref 10.3–14.5)
RBC # FLD: 13.2 % — SIGNIFICANT CHANGE UP (ref 10.3–14.5)
RBC CASTS # UR COMP ASSIST: 6 /HPF — HIGH (ref 0–4)
RBC CASTS # UR COMP ASSIST: 6 /HPF — HIGH (ref 0–4)
REVIEW: SIGNIFICANT CHANGE UP
REVIEW: SIGNIFICANT CHANGE UP
SARS-COV-2 RNA SPEC QL NAA+PROBE: SIGNIFICANT CHANGE UP
SARS-COV-2 RNA SPEC QL NAA+PROBE: SIGNIFICANT CHANGE UP
SODIUM SERPL-SCNC: 137 MMOL/L — SIGNIFICANT CHANGE UP (ref 135–145)
SODIUM SERPL-SCNC: 137 MMOL/L — SIGNIFICANT CHANGE UP (ref 135–145)
SP GR SPEC: 1.02 — SIGNIFICANT CHANGE UP (ref 1–1.03)
SP GR SPEC: 1.02 — SIGNIFICANT CHANGE UP (ref 1–1.03)
SQUAMOUS # UR AUTO: 2 /HPF — SIGNIFICANT CHANGE UP (ref 0–5)
SQUAMOUS # UR AUTO: 2 /HPF — SIGNIFICANT CHANGE UP (ref 0–5)
UROBILINOGEN FLD QL: 0.2 MG/DL — SIGNIFICANT CHANGE UP (ref 0.2–1)
UROBILINOGEN FLD QL: 0.2 MG/DL — SIGNIFICANT CHANGE UP (ref 0.2–1)
WBC # BLD: 13.56 K/UL — HIGH (ref 3.8–10.5)
WBC # BLD: 13.56 K/UL — HIGH (ref 3.8–10.5)
WBC # FLD AUTO: 13.56 K/UL — HIGH (ref 3.8–10.5)
WBC # FLD AUTO: 13.56 K/UL — HIGH (ref 3.8–10.5)
WBC UR QL: 3 /HPF — SIGNIFICANT CHANGE UP (ref 0–5)
WBC UR QL: 3 /HPF — SIGNIFICANT CHANGE UP (ref 0–5)

## 2023-11-18 PROCEDURE — 99285 EMERGENCY DEPT VISIT HI MDM: CPT

## 2023-11-18 PROCEDURE — 74177 CT ABD & PELVIS W/CONTRAST: CPT | Mod: 26,MA

## 2023-11-18 RX ORDER — PIPERACILLIN AND TAZOBACTAM 4; .5 G/20ML; G/20ML
3.38 INJECTION, POWDER, LYOPHILIZED, FOR SOLUTION INTRAVENOUS ONCE
Refills: 0 | Status: COMPLETED | OUTPATIENT
Start: 2023-11-18 | End: 2023-11-18

## 2023-11-18 RX ORDER — SODIUM CHLORIDE 9 MG/ML
500 INJECTION INTRAMUSCULAR; INTRAVENOUS; SUBCUTANEOUS ONCE
Refills: 0 | Status: COMPLETED | OUTPATIENT
Start: 2023-11-18 | End: 2023-11-18

## 2023-11-18 RX ORDER — VANCOMYCIN HCL 1 G
1000 VIAL (EA) INTRAVENOUS ONCE
Refills: 0 | Status: COMPLETED | OUTPATIENT
Start: 2023-11-18 | End: 2023-11-18

## 2023-11-18 RX ORDER — ACETAMINOPHEN 500 MG
750 TABLET ORAL ONCE
Refills: 0 | Status: COMPLETED | OUTPATIENT
Start: 2023-11-18 | End: 2023-11-18

## 2023-11-18 RX ORDER — QUETIAPINE FUMARATE 200 MG/1
100 TABLET, FILM COATED ORAL ONCE
Refills: 0 | Status: COMPLETED | OUTPATIENT
Start: 2023-11-18 | End: 2023-11-18

## 2023-11-18 RX ADMIN — Medication 250 MILLIGRAM(S): at 22:32

## 2023-11-18 RX ADMIN — Medication 300 MILLIGRAM(S): at 22:04

## 2023-11-18 RX ADMIN — PIPERACILLIN AND TAZOBACTAM 200 GRAM(S): 4; .5 INJECTION, POWDER, LYOPHILIZED, FOR SOLUTION INTRAVENOUS at 19:51

## 2023-11-18 RX ADMIN — QUETIAPINE FUMARATE 100 MILLIGRAM(S): 200 TABLET, FILM COATED ORAL at 22:33

## 2023-11-18 NOTE — ED PROVIDER NOTE - CARE PLAN
Principal Discharge DX:	OM (osteomyelitis)  Secondary Diagnosis:	Pneumonia  Secondary Diagnosis:	Fever   1

## 2023-11-18 NOTE — ED PROVIDER NOTE - CLINICAL SUMMARY MEDICAL DECISION MAKING FREE TEXT BOX
77 yo M hx of dementia nonverbal presents with large sacral decubitus ulcer with fevers - pt febrile rectally in ER, tachycardic. Will do sepsis labs, CT AP, surg cs, abx and tba. Will also evaluate alternative sources of infection - UA, CXR, RVP.

## 2023-11-18 NOTE — ED ADULT NURSE REASSESSMENT NOTE - NS ED NURSE REASSESS COMMENT FT1
Pt incontinent x2. Pt wife states she uses a condom catheter for him at night and he always wears a diaper. RN offered a condom catheter now, to which wife agreed. Condom catheter placed on pt, connected to drainage bag to gravity. Pt's wife instructed to call RN for any assistance needed.

## 2023-11-18 NOTE — ED PROVIDER NOTE - PHYSICAL EXAMINATION
GENERAL: no acute distress, non-toxic appearing  HEAD: normocephalic, atraumatic  HEENT: normal conjunctiva, oral mucosa moist, neck supple  CARDIAC: regular rate and rhythm, normal S1 and S2,  no appreciable murmurs  PULM: clear to ascultation bilaterally, no crackles, rales, rhonchi, or wheezing  GI: abdomen nondistended, soft, no guarding or rebound tenderness  - R buttock: large sacral unstageable decubitus ulcer with surrounding erythema  - L buttock: ~2 inch stage 2 ulcer   NEURO: alert and oriented x 3, normal speech, PERRLA, EOMI, no focal motor or sensory deficits, nonantalgic gait  MSK: no visible deformities, no peripheral edema, calf tenderness/redness/swelling  SKIN: no visible rashes, dry, well-perfused  PSYCH: appropriate mood and affect

## 2023-11-18 NOTE — ED PROVIDER NOTE - NS ED MD TWO NIGHTS YN
Time called:  9:12 PM    Telemetry tech name:   Kacy    Current Location of the Patient:  Emergency Department    Inpatient Room # Assigned:  306    Patient Information Verfified:  Yes    Correct Telemetry Box Verfified:  Yes    Cardiac Rhythm Verified:  Yes     Yes

## 2023-11-18 NOTE — ED ADULT NURSE REASSESSMENT NOTE - NS ED NURSE REASSESS COMMENT FT1
As per MD order, pt ok to receive meds through peg tube. Pt seated upright, wife at bedside assisting RN administer medication. Wife states "I give him meds through here every night, can I help you?" Pt received a total of 55 mL of fluid and medication thru peg tube. Pt tolerated medication administration. Pt remains in upright position. Pt repositioned and turned for comfort. Pt resting comfortably in stretcher with appropriate side rails raised. Pt pending dispo

## 2023-11-18 NOTE — ED PROVIDER NOTE - PROGRESS NOTE DETAILS
Discussed with radiology for read. imaging sent to the rads attending.  -Bismark Larson PGY-2 Attending Masom:  called radiology, they state attending is aware, awaiting final read. Attending Yifan:  I attempted to teams contactd the attending Attending Yifan:  called via teams and left a message. Attending Yifan:  I attempted to teams contact the attending via teams message Attending Yifan:  pt had ct resulted, possible osteomyelitis, pna, pt satting well trx w/ vanc/zosyn, TBA

## 2023-11-18 NOTE — ED PROVIDER NOTE - ATTENDING CONTRIBUTION TO CARE
Patient is brought in by his wife for fever with noted ulcer on his buttock.  He has been on amoxicillin as well as doxycycline.  The patient has a unstageable ulcer which may extend to the muscular layer in the buttock on the right side.  There is a small ulcer on the left side.  Code sepsis initiated.  IV fluids.  IV antibiotics.  CT scan to evaluate the depth of the ulcer there is an abscess.  CBC CMP reassess the patient.

## 2023-11-18 NOTE — ED ADULT NURSE NOTE - OBJECTIVE STATEMENT
79 y/o male BIBA with c/o fevers. Patient bedbound and non verbal at baseline. PMH multiple strokes, PEG tube. As per wife patient has chronic buttok wounds. Wife has been changing the dressings at home. Today she noticed fever and gave 650mg of rectal tylenol around 2:30pm today. Patient has yeast appearing rash to the groin and an unstagable wound to right buttock with slough. No foul odor noticed. Patient febrile rectally. Patient straight catheterized as per orders. 2 RN's at the bedside. Sterility maintained. Patient tolerated well. 50cc of dark yellow urine noted.

## 2023-11-18 NOTE — ED PROVIDER NOTE - OBJECTIVE STATEMENT
79 yo M PMHx of severe dementia, PEG tube presents with ulcer that has worsened over last month. Patient was seen by house calls doctor 2 weeks ago, placed on antibiotics but started mounting fevers in the last few days. It was noticed by his GI doctor (Dr. Moraes) that his ulcer was getting worse, so he sent him in. Patient is nonverbal at baseline. Wife has noticed no new cough, runny nose.

## 2023-11-19 DIAGNOSIS — M86.9 OSTEOMYELITIS, UNSPECIFIED: ICD-10-CM

## 2023-11-19 LAB
ALBUMIN SERPL ELPH-MCNC: 2.5 G/DL — LOW (ref 3.3–5)
ALBUMIN SERPL ELPH-MCNC: 2.5 G/DL — LOW (ref 3.3–5)
ALP SERPL-CCNC: 124 U/L — HIGH (ref 40–120)
ALP SERPL-CCNC: 124 U/L — HIGH (ref 40–120)
ALT FLD-CCNC: 17 U/L — SIGNIFICANT CHANGE UP (ref 10–45)
ALT FLD-CCNC: 17 U/L — SIGNIFICANT CHANGE UP (ref 10–45)
AST SERPL-CCNC: 18 U/L — SIGNIFICANT CHANGE UP (ref 10–40)
AST SERPL-CCNC: 18 U/L — SIGNIFICANT CHANGE UP (ref 10–40)
BILIRUB SERPL-MCNC: 0.8 MG/DL — SIGNIFICANT CHANGE UP (ref 0.2–1.2)
BILIRUB SERPL-MCNC: 0.8 MG/DL — SIGNIFICANT CHANGE UP (ref 0.2–1.2)
BUN SERPL-MCNC: 20 MG/DL — SIGNIFICANT CHANGE UP (ref 7–23)
BUN SERPL-MCNC: 20 MG/DL — SIGNIFICANT CHANGE UP (ref 7–23)
CALCIUM SERPL-MCNC: 8.3 MG/DL — LOW (ref 8.4–10.5)
CALCIUM SERPL-MCNC: 8.3 MG/DL — LOW (ref 8.4–10.5)
CHLORIDE SERPL-SCNC: 105 MMOL/L — SIGNIFICANT CHANGE UP (ref 96–108)
CHLORIDE SERPL-SCNC: 105 MMOL/L — SIGNIFICANT CHANGE UP (ref 96–108)
CO2 SERPL-SCNC: 27 MMOL/L — SIGNIFICANT CHANGE UP (ref 22–31)
CO2 SERPL-SCNC: 27 MMOL/L — SIGNIFICANT CHANGE UP (ref 22–31)
CREAT SERPL-MCNC: 0.46 MG/DL — LOW (ref 0.5–1.3)
CREAT SERPL-MCNC: 0.46 MG/DL — LOW (ref 0.5–1.3)
CULTURE RESULTS: NO GROWTH — SIGNIFICANT CHANGE UP
CULTURE RESULTS: NO GROWTH — SIGNIFICANT CHANGE UP
EGFR: 108 ML/MIN/1.73M2 — SIGNIFICANT CHANGE UP
EGFR: 108 ML/MIN/1.73M2 — SIGNIFICANT CHANGE UP
GLUCOSE SERPL-MCNC: 141 MG/DL — HIGH (ref 70–99)
GLUCOSE SERPL-MCNC: 141 MG/DL — HIGH (ref 70–99)
HCT VFR BLD CALC: 34.8 % — LOW (ref 39–50)
HCT VFR BLD CALC: 34.8 % — LOW (ref 39–50)
HGB BLD-MCNC: 11.2 G/DL — LOW (ref 13–17)
HGB BLD-MCNC: 11.2 G/DL — LOW (ref 13–17)
MCHC RBC-ENTMCNC: 31.5 PG — SIGNIFICANT CHANGE UP (ref 27–34)
MCHC RBC-ENTMCNC: 31.5 PG — SIGNIFICANT CHANGE UP (ref 27–34)
MCHC RBC-ENTMCNC: 32.2 GM/DL — SIGNIFICANT CHANGE UP (ref 32–36)
MCHC RBC-ENTMCNC: 32.2 GM/DL — SIGNIFICANT CHANGE UP (ref 32–36)
MCV RBC AUTO: 97.8 FL — SIGNIFICANT CHANGE UP (ref 80–100)
MCV RBC AUTO: 97.8 FL — SIGNIFICANT CHANGE UP (ref 80–100)
NRBC # BLD: 0 /100 WBCS — SIGNIFICANT CHANGE UP (ref 0–0)
NRBC # BLD: 0 /100 WBCS — SIGNIFICANT CHANGE UP (ref 0–0)
PLATELET # BLD AUTO: 236 K/UL — SIGNIFICANT CHANGE UP (ref 150–400)
PLATELET # BLD AUTO: 236 K/UL — SIGNIFICANT CHANGE UP (ref 150–400)
POTASSIUM SERPL-MCNC: 3.9 MMOL/L — SIGNIFICANT CHANGE UP (ref 3.5–5.3)
POTASSIUM SERPL-MCNC: 3.9 MMOL/L — SIGNIFICANT CHANGE UP (ref 3.5–5.3)
POTASSIUM SERPL-SCNC: 3.9 MMOL/L — SIGNIFICANT CHANGE UP (ref 3.5–5.3)
POTASSIUM SERPL-SCNC: 3.9 MMOL/L — SIGNIFICANT CHANGE UP (ref 3.5–5.3)
PROT SERPL-MCNC: 6.5 G/DL — SIGNIFICANT CHANGE UP (ref 6–8.3)
PROT SERPL-MCNC: 6.5 G/DL — SIGNIFICANT CHANGE UP (ref 6–8.3)
RBC # BLD: 3.56 M/UL — LOW (ref 4.2–5.8)
RBC # BLD: 3.56 M/UL — LOW (ref 4.2–5.8)
RBC # FLD: 12.8 % — SIGNIFICANT CHANGE UP (ref 10.3–14.5)
RBC # FLD: 12.8 % — SIGNIFICANT CHANGE UP (ref 10.3–14.5)
SODIUM SERPL-SCNC: 140 MMOL/L — SIGNIFICANT CHANGE UP (ref 135–145)
SODIUM SERPL-SCNC: 140 MMOL/L — SIGNIFICANT CHANGE UP (ref 135–145)
SPECIMEN SOURCE: SIGNIFICANT CHANGE UP
SPECIMEN SOURCE: SIGNIFICANT CHANGE UP
WBC # BLD: 6.33 K/UL — SIGNIFICANT CHANGE UP (ref 3.8–10.5)
WBC # BLD: 6.33 K/UL — SIGNIFICANT CHANGE UP (ref 3.8–10.5)
WBC # FLD AUTO: 6.33 K/UL — SIGNIFICANT CHANGE UP (ref 3.8–10.5)
WBC # FLD AUTO: 6.33 K/UL — SIGNIFICANT CHANGE UP (ref 3.8–10.5)

## 2023-11-19 RX ORDER — PIPERACILLIN AND TAZOBACTAM 4; .5 G/20ML; G/20ML
3.38 INJECTION, POWDER, LYOPHILIZED, FOR SOLUTION INTRAVENOUS ONCE
Refills: 0 | Status: COMPLETED | OUTPATIENT
Start: 2023-11-20 | End: 2023-11-19

## 2023-11-19 RX ORDER — QUETIAPINE FUMARATE 200 MG/1
100 TABLET, FILM COATED ORAL DAILY
Refills: 0 | Status: DISCONTINUED | OUTPATIENT
Start: 2023-11-19 | End: 2023-11-20

## 2023-11-19 RX ORDER — VANCOMYCIN HCL 1 G
500 VIAL (EA) INTRAVENOUS EVERY 12 HOURS
Refills: 0 | Status: DISCONTINUED | OUTPATIENT
Start: 2023-11-19 | End: 2023-11-20

## 2023-11-19 RX ORDER — SUCRALFATE 1 G
1 TABLET ORAL
Qty: 0 | Refills: 0 | DISCHARGE

## 2023-11-19 RX ORDER — OLANZAPINE 15 MG/1
5 TABLET, FILM COATED ORAL DAILY
Refills: 0 | Status: DISCONTINUED | OUTPATIENT
Start: 2023-11-19 | End: 2023-11-20

## 2023-11-19 RX ORDER — PANTOPRAZOLE SODIUM 20 MG/1
40 TABLET, DELAYED RELEASE ORAL
Refills: 0 | Status: DISCONTINUED | OUTPATIENT
Start: 2023-11-19 | End: 2023-12-04

## 2023-11-19 RX ORDER — SODIUM HYPOCHLORITE 0.125 %
0 SOLUTION, NON-ORAL MISCELLANEOUS
Refills: 0 | DISCHARGE

## 2023-11-19 RX ORDER — PIPERACILLIN AND TAZOBACTAM 4; .5 G/20ML; G/20ML
3.38 INJECTION, POWDER, LYOPHILIZED, FOR SOLUTION INTRAVENOUS ONCE
Refills: 0 | Status: COMPLETED | OUTPATIENT
Start: 2023-11-19 | End: 2023-11-19

## 2023-11-19 RX ORDER — ENOXAPARIN SODIUM 100 MG/ML
40 INJECTION SUBCUTANEOUS EVERY 24 HOURS
Refills: 0 | Status: DISCONTINUED | OUTPATIENT
Start: 2023-11-19 | End: 2023-12-04

## 2023-11-19 RX ORDER — PIPERACILLIN AND TAZOBACTAM 4; .5 G/20ML; G/20ML
3.38 INJECTION, POWDER, LYOPHILIZED, FOR SOLUTION INTRAVENOUS EVERY 8 HOURS
Refills: 0 | Status: DISCONTINUED | OUTPATIENT
Start: 2023-11-20 | End: 2023-11-26

## 2023-11-19 RX ORDER — ACETAMINOPHEN 500 MG
750 TABLET ORAL EVERY 6 HOURS
Refills: 0 | Status: DISCONTINUED | OUTPATIENT
Start: 2023-11-19 | End: 2023-11-28

## 2023-11-19 RX ORDER — OMEPRAZOLE 10 MG/1
40 CAPSULE, DELAYED RELEASE ORAL
Qty: 0 | Refills: 0 | DISCHARGE

## 2023-11-19 RX ORDER — ACETAMINOPHEN 500 MG
750 TABLET ORAL EVERY 6 HOURS
Refills: 0 | Status: DISCONTINUED | OUTPATIENT
Start: 2023-11-19 | End: 2023-11-19

## 2023-11-19 RX ORDER — SUCRALFATE 1 G
1 TABLET ORAL
Refills: 0 | Status: DISCONTINUED | OUTPATIENT
Start: 2023-11-19 | End: 2023-12-03

## 2023-11-19 RX ADMIN — PIPERACILLIN AND TAZOBACTAM 200 GRAM(S): 4; .5 INJECTION, POWDER, LYOPHILIZED, FOR SOLUTION INTRAVENOUS at 07:53

## 2023-11-19 RX ADMIN — PIPERACILLIN AND TAZOBACTAM 25 GRAM(S): 4; .5 INJECTION, POWDER, LYOPHILIZED, FOR SOLUTION INTRAVENOUS at 11:53

## 2023-11-19 RX ADMIN — Medication 1 GRAM(S): at 23:48

## 2023-11-19 RX ADMIN — Medication 1 GRAM(S): at 18:08

## 2023-11-19 RX ADMIN — Medication 750 MILLIGRAM(S): at 22:21

## 2023-11-19 RX ADMIN — Medication 100 MILLIGRAM(S): at 17:24

## 2023-11-19 RX ADMIN — QUETIAPINE FUMARATE 100 MILLIGRAM(S): 200 TABLET, FILM COATED ORAL at 22:21

## 2023-11-19 RX ADMIN — ENOXAPARIN SODIUM 40 MILLIGRAM(S): 100 INJECTION SUBCUTANEOUS at 23:46

## 2023-11-19 RX ADMIN — Medication 750 MILLIGRAM(S): at 23:21

## 2023-11-19 RX ADMIN — PIPERACILLIN AND TAZOBACTAM 25 GRAM(S): 4; .5 INJECTION, POWDER, LYOPHILIZED, FOR SOLUTION INTRAVENOUS at 23:45

## 2023-11-19 RX ADMIN — SODIUM CHLORIDE 500 MILLILITER(S): 9 INJECTION INTRAMUSCULAR; INTRAVENOUS; SUBCUTANEOUS at 00:54

## 2023-11-19 RX ADMIN — PIPERACILLIN AND TAZOBACTAM 25 GRAM(S): 4; .5 INJECTION, POWDER, LYOPHILIZED, FOR SOLUTION INTRAVENOUS at 19:26

## 2023-11-19 NOTE — PATIENT PROFILE ADULT - NSPROPTRIGHTSUPPORTPERSON_GEN_A_NUR
This is an ongoing problem. Patient and I discussed her depression last week on Tuesday. She is here for follow-up. She is taking Prozac 10 mg one by mouth daily.  
information could not be obtained

## 2023-11-19 NOTE — PATIENT PROFILE ADULT - FALL HARM RISK - FACTORS
Impaired gait/Other medical problems/Poor balance/Weakness/Other Impaired gait/Other medical problems/Weakness/Other

## 2023-11-19 NOTE — H&P ADULT - ASSESSMENT
79 yo male h/o dementia, non verbal. contracted, s/p peg. here with sepsis likely 2/2 sacral decub and aspiration pna    sepsis  sacral decub  pna  cont empiric abx zosyn/vanco  f/u cult  wound care eval    dementia  cont home meds  supportive care    dysphagia s/p peg  cont home peg feeds    dvt ppx      d/w wife bedside    Advanced care planning was discussed with patient and family.  Advanced care planning forms were reviewed and discussed as appropriate.  Differential diagnosis and plan of care discussed with patient after the evaluation.   Pain assessed and judicious use of narcotics when appropriate was discussed.  Importance of Fall prevention discussed.  Counseling on Smoking and Alcohol cessation was offered when appropriate.  Counseling on Diet, exercise, and medication compliance was done.       Approx 75 minutes spent.

## 2023-11-19 NOTE — PATIENT PROFILE ADULT - FALL HARM RISK - HARM RISK INTERVENTIONS

## 2023-11-19 NOTE — PATIENT PROFILE ADULT - STATED REASON FOR ADMISSION
pt febrile at home, wife concerned pt's buttocks wound is infected. Sepsis + Worsening pressure ulcer

## 2023-11-19 NOTE — H&P ADULT - NSHPPHYSICALEXAM_GEN_ALL_CORE
Vital Signs Last 24 Hrs  T(C): 36.9 (19 Nov 2023 06:53), Max: 38.1 (18 Nov 2023 18:35)  T(F): 98.4 (19 Nov 2023 06:53), Max: 100.6 (18 Nov 2023 18:35)  HR: 106 (19 Nov 2023 06:53) (92 - 108)  BP: 103/53 (19 Nov 2023 06:53) (103/53 - 118/72)  BP(mean): 72 (19 Nov 2023 04:51) (72 - 72)  RR: 18 (19 Nov 2023 06:53) (16 - 23)  SpO2: 97% (19 Nov 2023 06:53) (94% - 97%)    Parameters below as of 19 Nov 2023 06:53  Patient On (Oxygen Delivery Method): nasal cannula  O2 Flow (L/min): 5        PHYSICAL EXAM:  GENERAL: NAD, well-developed.   HEAD:  Atraumatic, Normocephalic  EYES: EOMI, PERRLA, conjunctiva and sclera clear  NECK: Supple, No JVD  CHEST/LUNG: Clear to auscultation bilaterally; No wheeze  HEART: Regular rate and rhythm; No murmurs, rubs, or gallops  ABDOMEN: Soft, Nontender, Nondistended; Bowel sounds present +peg  EXTREMITIES:  2+ Peripheral Pulses, contracted  PSYCH: AAOx0  NEUROLOGY: non-verbal  SKIN: No rashes or lesions

## 2023-11-20 LAB
ALBUMIN SERPL ELPH-MCNC: 2.3 G/DL — LOW (ref 3.3–5)
ALBUMIN SERPL ELPH-MCNC: 2.3 G/DL — LOW (ref 3.3–5)
ALBUMIN SERPL ELPH-MCNC: 2.6 G/DL — LOW (ref 3.3–5)
ALBUMIN SERPL ELPH-MCNC: 2.6 G/DL — LOW (ref 3.3–5)
ALP SERPL-CCNC: 134 U/L — HIGH (ref 40–120)
ALP SERPL-CCNC: 134 U/L — HIGH (ref 40–120)
ALP SERPL-CCNC: 140 U/L — HIGH (ref 40–120)
ALP SERPL-CCNC: 140 U/L — HIGH (ref 40–120)
ALT FLD-CCNC: 22 U/L — SIGNIFICANT CHANGE UP (ref 10–45)
ALT FLD-CCNC: 22 U/L — SIGNIFICANT CHANGE UP (ref 10–45)
ALT FLD-CCNC: 23 U/L — SIGNIFICANT CHANGE UP (ref 10–45)
ALT FLD-CCNC: 23 U/L — SIGNIFICANT CHANGE UP (ref 10–45)
ANION GAP SERPL CALC-SCNC: 10 MMOL/L — SIGNIFICANT CHANGE UP (ref 5–17)
AST SERPL-CCNC: 24 U/L — SIGNIFICANT CHANGE UP (ref 10–40)
AST SERPL-CCNC: 24 U/L — SIGNIFICANT CHANGE UP (ref 10–40)
AST SERPL-CCNC: 28 U/L — SIGNIFICANT CHANGE UP (ref 10–40)
AST SERPL-CCNC: 28 U/L — SIGNIFICANT CHANGE UP (ref 10–40)
BASOPHILS # BLD AUTO: 0.02 K/UL — SIGNIFICANT CHANGE UP (ref 0–0.2)
BASOPHILS # BLD AUTO: 0.02 K/UL — SIGNIFICANT CHANGE UP (ref 0–0.2)
BASOPHILS NFR BLD AUTO: 0.3 % — SIGNIFICANT CHANGE UP (ref 0–2)
BASOPHILS NFR BLD AUTO: 0.3 % — SIGNIFICANT CHANGE UP (ref 0–2)
BILIRUB SERPL-MCNC: 0.5 MG/DL — SIGNIFICANT CHANGE UP (ref 0.2–1.2)
BLD GP AB SCN SERPL QL: NEGATIVE — SIGNIFICANT CHANGE UP
BLD GP AB SCN SERPL QL: NEGATIVE — SIGNIFICANT CHANGE UP
BUN SERPL-MCNC: 16 MG/DL — SIGNIFICANT CHANGE UP (ref 7–23)
BUN SERPL-MCNC: 16 MG/DL — SIGNIFICANT CHANGE UP (ref 7–23)
BUN SERPL-MCNC: 18 MG/DL — SIGNIFICANT CHANGE UP (ref 7–23)
BUN SERPL-MCNC: 18 MG/DL — SIGNIFICANT CHANGE UP (ref 7–23)
CALCIUM SERPL-MCNC: 7.9 MG/DL — LOW (ref 8.4–10.5)
CALCIUM SERPL-MCNC: 7.9 MG/DL — LOW (ref 8.4–10.5)
CALCIUM SERPL-MCNC: 8.4 MG/DL — SIGNIFICANT CHANGE UP (ref 8.4–10.5)
CALCIUM SERPL-MCNC: 8.4 MG/DL — SIGNIFICANT CHANGE UP (ref 8.4–10.5)
CHLORIDE SERPL-SCNC: 107 MMOL/L — SIGNIFICANT CHANGE UP (ref 96–108)
CHLORIDE SERPL-SCNC: 107 MMOL/L — SIGNIFICANT CHANGE UP (ref 96–108)
CHLORIDE SERPL-SCNC: 109 MMOL/L — HIGH (ref 96–108)
CHLORIDE SERPL-SCNC: 109 MMOL/L — HIGH (ref 96–108)
CO2 SERPL-SCNC: 23 MMOL/L — SIGNIFICANT CHANGE UP (ref 22–31)
CO2 SERPL-SCNC: 23 MMOL/L — SIGNIFICANT CHANGE UP (ref 22–31)
CO2 SERPL-SCNC: 24 MMOL/L — SIGNIFICANT CHANGE UP (ref 22–31)
CO2 SERPL-SCNC: 24 MMOL/L — SIGNIFICANT CHANGE UP (ref 22–31)
CREAT SERPL-MCNC: 0.39 MG/DL — LOW (ref 0.5–1.3)
CREAT SERPL-MCNC: 0.39 MG/DL — LOW (ref 0.5–1.3)
CREAT SERPL-MCNC: 0.54 MG/DL — SIGNIFICANT CHANGE UP (ref 0.5–1.3)
CREAT SERPL-MCNC: 0.54 MG/DL — SIGNIFICANT CHANGE UP (ref 0.5–1.3)
EGFR: 103 ML/MIN/1.73M2 — SIGNIFICANT CHANGE UP
EGFR: 103 ML/MIN/1.73M2 — SIGNIFICANT CHANGE UP
EGFR: 113 ML/MIN/1.73M2 — SIGNIFICANT CHANGE UP
EGFR: 113 ML/MIN/1.73M2 — SIGNIFICANT CHANGE UP
EOSINOPHIL # BLD AUTO: 0.12 K/UL — SIGNIFICANT CHANGE UP (ref 0–0.5)
EOSINOPHIL # BLD AUTO: 0.12 K/UL — SIGNIFICANT CHANGE UP (ref 0–0.5)
EOSINOPHIL NFR BLD AUTO: 1.8 % — SIGNIFICANT CHANGE UP (ref 0–6)
EOSINOPHIL NFR BLD AUTO: 1.8 % — SIGNIFICANT CHANGE UP (ref 0–6)
GAS PNL BLDA: SIGNIFICANT CHANGE UP
GAS PNL BLDA: SIGNIFICANT CHANGE UP
GLUCOSE BLDC GLUCOMTR-MCNC: 127 MG/DL — HIGH (ref 70–99)
GLUCOSE BLDC GLUCOMTR-MCNC: 127 MG/DL — HIGH (ref 70–99)
GLUCOSE SERPL-MCNC: 118 MG/DL — HIGH (ref 70–99)
GLUCOSE SERPL-MCNC: 118 MG/DL — HIGH (ref 70–99)
GLUCOSE SERPL-MCNC: 144 MG/DL — HIGH (ref 70–99)
GLUCOSE SERPL-MCNC: 144 MG/DL — HIGH (ref 70–99)
HCT VFR BLD CALC: 32.6 % — LOW (ref 39–50)
HCT VFR BLD CALC: 32.6 % — LOW (ref 39–50)
HCT VFR BLD CALC: 34.7 % — LOW (ref 39–50)
HCT VFR BLD CALC: 34.7 % — LOW (ref 39–50)
HGB BLD-MCNC: 10.8 G/DL — LOW (ref 13–17)
HGB BLD-MCNC: 10.8 G/DL — LOW (ref 13–17)
HGB BLD-MCNC: 11.2 G/DL — LOW (ref 13–17)
HGB BLD-MCNC: 11.2 G/DL — LOW (ref 13–17)
IMM GRANULOCYTES NFR BLD AUTO: 0.2 % — SIGNIFICANT CHANGE UP (ref 0–0.9)
IMM GRANULOCYTES NFR BLD AUTO: 0.2 % — SIGNIFICANT CHANGE UP (ref 0–0.9)
LYMPHOCYTES # BLD AUTO: 1.38 K/UL — SIGNIFICANT CHANGE UP (ref 1–3.3)
LYMPHOCYTES # BLD AUTO: 1.38 K/UL — SIGNIFICANT CHANGE UP (ref 1–3.3)
LYMPHOCYTES # BLD AUTO: 21.1 % — SIGNIFICANT CHANGE UP (ref 13–44)
LYMPHOCYTES # BLD AUTO: 21.1 % — SIGNIFICANT CHANGE UP (ref 13–44)
MAGNESIUM SERPL-MCNC: 2.2 MG/DL — SIGNIFICANT CHANGE UP (ref 1.6–2.6)
MAGNESIUM SERPL-MCNC: 2.2 MG/DL — SIGNIFICANT CHANGE UP (ref 1.6–2.6)
MCHC RBC-ENTMCNC: 31.5 PG — SIGNIFICANT CHANGE UP (ref 27–34)
MCHC RBC-ENTMCNC: 31.5 PG — SIGNIFICANT CHANGE UP (ref 27–34)
MCHC RBC-ENTMCNC: 31.6 PG — SIGNIFICANT CHANGE UP (ref 27–34)
MCHC RBC-ENTMCNC: 31.6 PG — SIGNIFICANT CHANGE UP (ref 27–34)
MCHC RBC-ENTMCNC: 32.3 GM/DL — SIGNIFICANT CHANGE UP (ref 32–36)
MCHC RBC-ENTMCNC: 32.3 GM/DL — SIGNIFICANT CHANGE UP (ref 32–36)
MCHC RBC-ENTMCNC: 33.1 GM/DL — SIGNIFICANT CHANGE UP (ref 32–36)
MCHC RBC-ENTMCNC: 33.1 GM/DL — SIGNIFICANT CHANGE UP (ref 32–36)
MCV RBC AUTO: 95.3 FL — SIGNIFICANT CHANGE UP (ref 80–100)
MCV RBC AUTO: 95.3 FL — SIGNIFICANT CHANGE UP (ref 80–100)
MCV RBC AUTO: 97.5 FL — SIGNIFICANT CHANGE UP (ref 80–100)
MCV RBC AUTO: 97.5 FL — SIGNIFICANT CHANGE UP (ref 80–100)
MONOCYTES # BLD AUTO: 0.77 K/UL — SIGNIFICANT CHANGE UP (ref 0–0.9)
MONOCYTES # BLD AUTO: 0.77 K/UL — SIGNIFICANT CHANGE UP (ref 0–0.9)
MONOCYTES NFR BLD AUTO: 11.8 % — SIGNIFICANT CHANGE UP (ref 2–14)
MONOCYTES NFR BLD AUTO: 11.8 % — SIGNIFICANT CHANGE UP (ref 2–14)
NEUTROPHILS # BLD AUTO: 4.23 K/UL — SIGNIFICANT CHANGE UP (ref 1.8–7.4)
NEUTROPHILS # BLD AUTO: 4.23 K/UL — SIGNIFICANT CHANGE UP (ref 1.8–7.4)
NEUTROPHILS NFR BLD AUTO: 64.8 % — SIGNIFICANT CHANGE UP (ref 43–77)
NEUTROPHILS NFR BLD AUTO: 64.8 % — SIGNIFICANT CHANGE UP (ref 43–77)
NRBC # BLD: 0 /100 WBCS — SIGNIFICANT CHANGE UP (ref 0–0)
PHOSPHATE SERPL-MCNC: 2.6 MG/DL — SIGNIFICANT CHANGE UP (ref 2.5–4.5)
PHOSPHATE SERPL-MCNC: 2.6 MG/DL — SIGNIFICANT CHANGE UP (ref 2.5–4.5)
PLATELET # BLD AUTO: 241 K/UL — SIGNIFICANT CHANGE UP (ref 150–400)
PLATELET # BLD AUTO: 241 K/UL — SIGNIFICANT CHANGE UP (ref 150–400)
PLATELET # BLD AUTO: 242 K/UL — SIGNIFICANT CHANGE UP (ref 150–400)
PLATELET # BLD AUTO: 242 K/UL — SIGNIFICANT CHANGE UP (ref 150–400)
POTASSIUM SERPL-MCNC: 3.3 MMOL/L — LOW (ref 3.5–5.3)
POTASSIUM SERPL-MCNC: 3.3 MMOL/L — LOW (ref 3.5–5.3)
POTASSIUM SERPL-MCNC: 3.6 MMOL/L — SIGNIFICANT CHANGE UP (ref 3.5–5.3)
POTASSIUM SERPL-MCNC: 3.6 MMOL/L — SIGNIFICANT CHANGE UP (ref 3.5–5.3)
POTASSIUM SERPL-SCNC: 3.3 MMOL/L — LOW (ref 3.5–5.3)
POTASSIUM SERPL-SCNC: 3.3 MMOL/L — LOW (ref 3.5–5.3)
POTASSIUM SERPL-SCNC: 3.6 MMOL/L — SIGNIFICANT CHANGE UP (ref 3.5–5.3)
POTASSIUM SERPL-SCNC: 3.6 MMOL/L — SIGNIFICANT CHANGE UP (ref 3.5–5.3)
PROT SERPL-MCNC: 6.1 G/DL — SIGNIFICANT CHANGE UP (ref 6–8.3)
PROT SERPL-MCNC: 6.1 G/DL — SIGNIFICANT CHANGE UP (ref 6–8.3)
PROT SERPL-MCNC: 6.6 G/DL — SIGNIFICANT CHANGE UP (ref 6–8.3)
PROT SERPL-MCNC: 6.6 G/DL — SIGNIFICANT CHANGE UP (ref 6–8.3)
RBC # BLD: 3.42 M/UL — LOW (ref 4.2–5.8)
RBC # BLD: 3.42 M/UL — LOW (ref 4.2–5.8)
RBC # BLD: 3.56 M/UL — LOW (ref 4.2–5.8)
RBC # BLD: 3.56 M/UL — LOW (ref 4.2–5.8)
RBC # FLD: 13.1 % — SIGNIFICANT CHANGE UP (ref 10.3–14.5)
RH IG SCN BLD-IMP: POSITIVE — SIGNIFICANT CHANGE UP
RH IG SCN BLD-IMP: POSITIVE — SIGNIFICANT CHANGE UP
SODIUM SERPL-SCNC: 140 MMOL/L — SIGNIFICANT CHANGE UP (ref 135–145)
SODIUM SERPL-SCNC: 140 MMOL/L — SIGNIFICANT CHANGE UP (ref 135–145)
SODIUM SERPL-SCNC: 143 MMOL/L — SIGNIFICANT CHANGE UP (ref 135–145)
SODIUM SERPL-SCNC: 143 MMOL/L — SIGNIFICANT CHANGE UP (ref 135–145)
WBC # BLD: 5.39 K/UL — SIGNIFICANT CHANGE UP (ref 3.8–10.5)
WBC # BLD: 5.39 K/UL — SIGNIFICANT CHANGE UP (ref 3.8–10.5)
WBC # BLD: 6.53 K/UL — SIGNIFICANT CHANGE UP (ref 3.8–10.5)
WBC # BLD: 6.53 K/UL — SIGNIFICANT CHANGE UP (ref 3.8–10.5)
WBC # FLD AUTO: 5.39 K/UL — SIGNIFICANT CHANGE UP (ref 3.8–10.5)
WBC # FLD AUTO: 5.39 K/UL — SIGNIFICANT CHANGE UP (ref 3.8–10.5)
WBC # FLD AUTO: 6.53 K/UL — SIGNIFICANT CHANGE UP (ref 3.8–10.5)
WBC # FLD AUTO: 6.53 K/UL — SIGNIFICANT CHANGE UP (ref 3.8–10.5)

## 2023-11-20 PROCEDURE — 99222 1ST HOSP IP/OBS MODERATE 55: CPT

## 2023-11-20 RX ORDER — SODIUM HYPOCHLORITE 0.125 %
1 SOLUTION, NON-ORAL MISCELLANEOUS
Refills: 0 | Status: DISCONTINUED | OUTPATIENT
Start: 2023-11-20 | End: 2023-12-06

## 2023-11-20 RX ORDER — SODIUM CHLORIDE 9 MG/ML
3 INJECTION INTRAMUSCULAR; INTRAVENOUS; SUBCUTANEOUS ONCE
Refills: 0 | Status: COMPLETED | OUTPATIENT
Start: 2023-11-20 | End: 2023-11-20

## 2023-11-20 RX ORDER — SODIUM HYPOCHLORITE 0.125 %
1 SOLUTION, NON-ORAL MISCELLANEOUS DAILY
Refills: 0 | Status: DISCONTINUED | OUTPATIENT
Start: 2023-11-20 | End: 2023-11-21

## 2023-11-20 RX ORDER — VANCOMYCIN HCL 1 G
500 VIAL (EA) INTRAVENOUS EVERY 12 HOURS
Refills: 0 | Status: DISCONTINUED | OUTPATIENT
Start: 2023-11-21 | End: 2023-11-22

## 2023-11-20 RX ORDER — VANCOMYCIN HCL 1 G
500 VIAL (EA) INTRAVENOUS ONCE
Refills: 0 | Status: COMPLETED | OUTPATIENT
Start: 2023-11-20 | End: 2023-11-20

## 2023-11-20 RX ORDER — CHLORHEXIDINE GLUCONATE 213 G/1000ML
1 SOLUTION TOPICAL
Refills: 0 | Status: DISCONTINUED | OUTPATIENT
Start: 2023-11-20 | End: 2023-12-14

## 2023-11-20 RX ORDER — QUETIAPINE FUMARATE 200 MG/1
100 TABLET, FILM COATED ORAL
Refills: 0 | Status: DISCONTINUED | OUTPATIENT
Start: 2023-11-20 | End: 2023-12-04

## 2023-11-20 RX ORDER — NYSTATIN CREAM 100000 [USP'U]/G
1 CREAM TOPICAL ONCE
Refills: 0 | Status: COMPLETED | OUTPATIENT
Start: 2023-11-20 | End: 2023-11-20

## 2023-11-20 RX ORDER — OLANZAPINE 15 MG/1
5 TABLET, FILM COATED ORAL
Refills: 0 | Status: DISCONTINUED | OUTPATIENT
Start: 2023-11-20 | End: 2023-12-04

## 2023-11-20 RX ORDER — VANCOMYCIN HCL 1 G
VIAL (EA) INTRAVENOUS
Refills: 0 | Status: DISCONTINUED | OUTPATIENT
Start: 2023-11-20 | End: 2023-11-22

## 2023-11-20 RX ORDER — POLYETHYLENE GLYCOL 3350 17 G/17G
17 POWDER, FOR SOLUTION ORAL DAILY
Refills: 0 | Status: COMPLETED | OUTPATIENT
Start: 2023-11-20 | End: 2023-11-23

## 2023-11-20 RX ORDER — SENNA PLUS 8.6 MG/1
2 TABLET ORAL AT BEDTIME
Refills: 0 | Status: COMPLETED | OUTPATIENT
Start: 2023-11-20 | End: 2023-11-22

## 2023-11-20 RX ADMIN — Medication 1 GRAM(S): at 19:00

## 2023-11-20 RX ADMIN — QUETIAPINE FUMARATE 100 MILLIGRAM(S): 200 TABLET, FILM COATED ORAL at 20:54

## 2023-11-20 RX ADMIN — Medication 100 MILLIGRAM(S): at 23:58

## 2023-11-20 RX ADMIN — ENOXAPARIN SODIUM 40 MILLIGRAM(S): 100 INJECTION SUBCUTANEOUS at 23:59

## 2023-11-20 RX ADMIN — Medication 750 MILLIGRAM(S): at 13:46

## 2023-11-20 RX ADMIN — PIPERACILLIN AND TAZOBACTAM 25 GRAM(S): 4; .5 INJECTION, POWDER, LYOPHILIZED, FOR SOLUTION INTRAVENOUS at 13:48

## 2023-11-20 RX ADMIN — Medication 100 MILLIGRAM(S): at 05:04

## 2023-11-20 RX ADMIN — Medication 1 GRAM(S): at 05:05

## 2023-11-20 RX ADMIN — Medication 750 MILLIGRAM(S): at 14:45

## 2023-11-20 RX ADMIN — SENNA PLUS 2 TABLET(S): 8.6 TABLET ORAL at 21:00

## 2023-11-20 RX ADMIN — PANTOPRAZOLE SODIUM 40 MILLIGRAM(S): 20 TABLET, DELAYED RELEASE ORAL at 05:04

## 2023-11-20 RX ADMIN — SODIUM CHLORIDE 3 MILLILITER(S): 9 INJECTION INTRAMUSCULAR; INTRAVENOUS; SUBCUTANEOUS at 23:59

## 2023-11-20 RX ADMIN — NYSTATIN CREAM 1 APPLICATION(S): 100000 CREAM TOPICAL at 21:12

## 2023-11-20 RX ADMIN — PIPERACILLIN AND TAZOBACTAM 25 GRAM(S): 4; .5 INJECTION, POWDER, LYOPHILIZED, FOR SOLUTION INTRAVENOUS at 05:04

## 2023-11-20 RX ADMIN — Medication 1 APPLICATION(S): at 19:38

## 2023-11-20 RX ADMIN — Medication 1 GRAM(S): at 13:47

## 2023-11-20 RX ADMIN — Medication 600 MILLIGRAM(S): at 21:06

## 2023-11-20 RX ADMIN — PIPERACILLIN AND TAZOBACTAM 25 GRAM(S): 4; .5 INJECTION, POWDER, LYOPHILIZED, FOR SOLUTION INTRAVENOUS at 21:00

## 2023-11-20 NOTE — CONSULT NOTE ADULT - SUBJECTIVE AND OBJECTIVE BOX
Patient is a 78y old  Male who presents with a chief complaint of Osteomyelitis    HPI:  77 yo M PMHx of severe dementia, PEG tube presents with ulcer that has worsened over last month. Patient was seen by house calls doctor 2 weeks ago, placed on antibiotics but started mounting fevers in the last few days. It was noticed by his GI doctor (Dr. Moraes) that his ulcer was getting worse, so he sent him in. Patient is nonverbal at baseline. Wife has noticed no new cough, runny nose. (19 Nov 2023 13:02)    ER: no documented fevers; WBC 13.5, ; Was started on Vancomycin and Zosyn      CT abdomen/Pelvis    Extensive decubitus ulcer involving the right posterior gluteal and   pelvic region with extension to bone. There is bony erosion of the   ischium compatible with osteomyelitis.    Mild bladder wall thickening and a few foci of intraluminal air,   correlate for recent instrumentation and/or infection.    Left lower lobe consolidation along with tree-in-bud/nodular opacities in   the left upper lobe concerning for pneumonia.    Ni significant prior culture data on chart review    prior hospital charts reviewed [ x ]  primary team notes reviewed [  x]  other consultant notes reviewed [ x ]    PAST MEDICAL & SURGICAL HISTORY:  Dementia      Pneumonia      Acute renal failure (ARF)  ARF 1.5 YEAR AGO      Dysphagia  peg      Arthritis      GERD (gastroesophageal reflux disease)      Dyspepsia      History of hand surgery      PEG adjustment, replacement, or removal      Status post insertion of percutaneous endoscopic gastrostomy (PEG) tube  PEG replacement on 02/29/16          Allergies  No Known Allergies    ANTIMICROBIALS (past 90 days)  MEDICATIONS  (STANDING):  piperacillin/tazobactam IVPB.   200 mL/Hr IV Intermittent (11-19-23 @ 07:53)    piperacillin/tazobactam IVPB.-   25 mL/Hr IV Intermittent (11-19-23 @ 11:53)    piperacillin/tazobactam IVPB.-   25 mL/Hr IV Intermittent (11-19-23 @ 23:45)    piperacillin/tazobactam IVPB.-   25 mL/Hr IV Intermittent (11-19-23 @ 19:26)    piperacillin/tazobactam IVPB..   25 mL/Hr IV Intermittent (11-20-23 @ 05:04)    piperacillin/tazobactam IVPB...   200 mL/Hr IV Intermittent (11-18-23 @ 19:51)    vancomycin  IVPB   100 mL/Hr IV Intermittent (11-20-23 @ 05:04)   100 mL/Hr IV Intermittent (11-19-23 @ 17:24)    vancomycin  IVPB.   250 mL/Hr IV Intermittent (11-18-23 @ 22:32)        piperacillin/tazobactam IVPB.. 3.375 every 8 hours  vancomycin  IVPB 500 every 12 hours    MEDICATIONS  (STANDING):  acetaminophen   Oral Liquid .. 750 every 6 hours PRN  enoxaparin Injectable 40 every 24 hours  OLANZapine 5 daily  pantoprazole    Tablet 40 before breakfast  QUEtiapine 100 daily  sucralfate 1 four times a day    SOCIAL HISTORY:  non verbal, bed bound, at home with wife    FAMILY HISTORY:  FH: breast cancer  Mother      REVIEW OF SYSTEMS  [  ] ROS unobtainable because:    [  ] All other systems negative except as noted below:	    Constitutional:  [ ] fever [ ] chills  [ ] weight loss  [ ] weakness  Skin:  [ ] rash [ ] phlebitis	  Eyes: [ ] icterus [ ] pain  [ ] discharge	  ENMT: [ ] sore throat  [ ] thrush [ ] ulcers [ ] exudates  Respiratory: [ ] dyspnea [ ] hemoptysis [ ] cough [ ] sputum	  Cardiovascular:  [ ] chest pain [ ] palpitations [ ] edema	  Gastrointestinal:  [ ] nausea [ ] vomiting [ ] diarrhea [ ] constipation [ ] pain	  Genitourinary:  [ ] dysuria [ ] frequency [ ] hematuria [ ] discharge [ ] flank pain  [ ] incontinence  Musculoskeletal:  [ ] myalgias [ ] arthralgias [ ] arthritis  [ ] back pain  Neurological:  [ ] headache [ ] seizures  [ ] confusion/altered mental status  Psychiatric:  [ ] anxiety [ ] depression	  Hematology/Lymphatics:  [ ] lymphadenopathy  Endocrine:  [ ] adrenal [ ] thyroid  Allergic/Immunologic:	 [ ] transplant [ ] seasonal    Vital Signs Last 24 Hrs  T(F): 98 (11-20-23 @ 12:28), Max: 100.6 (11-18-23 @ 18:35)  Vital Signs Last 24 Hrs  HR: 85 (11-20-23 @ 12:28) (78 - 110)  BP: 101/61 (11-20-23 @ 12:28) (97/59 - 112/66)  RR: 18 (11-20-23 @ 12:28)  SpO2: 95% (11-20-23 @ 12:28) (94% - 100%)  Wt(kg): --    PHYSICAL EXAM:  Constitutional: non-toxic, no distress  HEAD/EYES: anicteric, no conjunctival injection  ENT:  supple, no thrush  Cardiovascular:   normal S1, S2, no murmur, no edema  Respiratory:  clear BS bilaterally, no wheezes, no rales  GI:  soft, non-tender, normal bowel sounds  :  no cosby, no CVA tenderness  Musculoskeletal:  no synovitis, normal ROM  Neurologic: awake and alert, normal strength, no focal findings  Skin:  no rash, no erythema, no phlebitis  Heme/Onc: no lymphadenopathy   Psychiatric:  awake, alert, appropriate mood                            11.2   5.39  )-----------( 242      ( 20 Nov 2023 07:21 )             34.7   11-20    140  |  107  |  18  ----------------------------<  144<H>  3.3<L>   |  23  |  0.54    Ca    8.4      20 Nov 2023 07:21    TPro  6.6  /  Alb  2.6<L>  /  TBili  0.5  /  DBili  x   /  AST  24  /  ALT  23  /  AlkPhos  134<H>  11-20    Urinalysis Basic - ( 20 Nov 2023 07:21 )    Color: x / Appearance: x / SG: x / pH: x  Gluc: 144 mg/dL / Ketone: x  / Bili: x / Urobili: x   Blood: x / Protein: x / Nitrite: x   Leuk Esterase: x / RBC: x / WBC x   Sq Epi: x / Non Sq Epi: x / Bacteria: x    MICROBIOLOGY:  Culture - Urine (collected 18 Nov 2023 18:40)  Source: Clean Catch Clean Catch (Midstream)  Final Report (19 Nov 2023 16:45):    No growth    Culture - Blood (collected 18 Nov 2023 18:15)  Source: .Blood Blood-Peripheral  Preliminary Report (19 Nov 2023 23:02):    No growth at 24 hours    Culture - Blood (collected 18 Nov 2023 18:10)  Source: .Blood Blood-Peripheral  Preliminary Report (19 Nov 2023 23:02):    No growth at 24 hours              Rapid RVP Result: NotDetec (11-18 @ 19:20)      RADIOLOGY:  imaging below personally reviewed and agree with findings    < from: CT Abdomen and Pelvis w/ IV Cont (11.18.23 @ 19:41) >    ACC: [id number] EXAM:  CT ABDOMEN AND PELVIS IC   ORDERED BY: BENITO   JENNIFERISIDRO     PROCEDURE DATE:  11/18/2023          INTERPRETATION:  CLINICAL INFORMATION: Evaluate sacral ulcer.    COMPARISON: CT abdomen and pelvis 6/29/2019.    CONTRAST/COMPLICATIONS:  IV Contrast: Omnipaque 350  90 cc administered   10 cc discarded  Oral Contrast: NONE  Complications: None reported at time of study completion    PROCEDURE:  CT of the Abdomen and Pelvis was performed.  Sagittal and coronal reformats were performed.    FINDINGS:  LOWER CHEST: Aortic valvular calcifications. Left lower lobe   consolidation. Tree-in-bud/nodular opacities in the left upper lobe.    LIVER: Within normal limits.  BILE DUCTS: Normal caliber.  GALLBLADDER: Cholelithiasis.  SPLEEN: Within normal limits.  PANCREAS: Atrophic.  ADRENALS: Within normal limits.  KIDNEYS/URETERS: Subcentimeter foci in the right kidney which are too   small to characterize. No hydronephrosis.    BLADDER: Mild bladder wall thickening with a few fociof free air in the   bladder lumen, evaluate for recent instrumentation and/or infection.  REPRODUCTIVE ORGANS: Normal size prostate.    BOWEL: No bowel obstruction. Appendix is not visualized. Percutaneous   gastrostomy tube in the stomach.  PERITONEUM: Small volume ascites.  VESSELS: Atherosclerotic changes.  RETROPERITONEUM/LYMPH NODES: No lymphadenopathy.  ABDOMINAL WALL: Extensive decubitus ulcer involving the right posterior   pelvis and gluteal region with extension down to bone and erosion of the   ischium (3-157).  BONES: Erosion of the right ischium secondary to extensive decubitus   ulcer. Degenerative changes.    IMPRESSION:    Extensive decubitus ulcer involving the right posterior gluteal and   pelvic region with extension to bone. There is bony erosion of the   ischium compatible with osteomyelitis.    Mild bladder wall thickening and a few foci of intraluminal air,   correlate for recent instrumentation and/or infection.    Left lower lobe consolidation along with tree-in-bud/nodular opacities in   the left upper lobe concerning for pneumonia.      < end of copied text >   Patient is a 78y old  Male who presents with a chief complaint of Osteomyelitis    HPI:  77 yo M PMHx of severe dementia, s/p PEG tube, GERD, arthritis presents with ulcer that has worsened over last month. Patient was seen by wound doctor 2 weeks ago, placed on antibiotics ( Doxycycline and Cefdinir) but started mounting fevers in the last few days. Patient is nonverbal at baseline and has been bedbound for last 12 years. Wife is the care provider. Other than fevers and worsening wound ROS other wise negative. He is able to bring up phlegm with help and has not been coughing more than usual. Has oxygen at home but has not needed it.     ER: no documented fevers; WBC 13.5, ; Was started on Vancomycin and Zosyn      CT abdomen/Pelvis    Extensive decubitus ulcer involving the right posterior gluteal and   pelvic region with extension to bone. There is bony erosion of the   ischium compatible with osteomyelitis.    Mild bladder wall thickening and a few foci of intraluminal air,   correlate for recent instrumentation and/or infection.    Left lower lobe consolidation along with tree-in-bud/nodular opacities in   the left upper lobe concerning for pneumonia.    No significant prior culture data on chart review    prior hospital charts reviewed [ x ]  primary team notes reviewed [  x]  other consultant notes reviewed [ x ]    PAST MEDICAL & SURGICAL HISTORY:  Dementia      Pneumonia      Acute renal failure (ARF)  ARF 1.5 YEAR AGO      Dysphagia  peg      Arthritis      GERD (gastroesophageal reflux disease)      Dyspepsia      History of hand surgery      PEG adjustment, replacement, or removal      Status post insertion of percutaneous endoscopic gastrostomy (PEG) tube  PEG replacement on 02/29/16        Allergies  No Known Allergies    ANTIMICROBIALS (past 90 days)  MEDICATIONS  (STANDING):  piperacillin/tazobactam IVPB.   200 mL/Hr IV Intermittent (11-19-23 @ 07:53)    piperacillin/tazobactam IVPB.-   25 mL/Hr IV Intermittent (11-19-23 @ 11:53)    piperacillin/tazobactam IVPB.-   25 mL/Hr IV Intermittent (11-19-23 @ 23:45)    piperacillin/tazobactam IVPB.-   25 mL/Hr IV Intermittent (11-19-23 @ 19:26)    piperacillin/tazobactam IVPB..   25 mL/Hr IV Intermittent (11-20-23 @ 05:04)    piperacillin/tazobactam IVPB...   200 mL/Hr IV Intermittent (11-18-23 @ 19:51)    vancomycin  IVPB   100 mL/Hr IV Intermittent (11-20-23 @ 05:04)   100 mL/Hr IV Intermittent (11-19-23 @ 17:24)    vancomycin  IVPB.   250 mL/Hr IV Intermittent (11-18-23 @ 22:32)        piperacillin/tazobactam IVPB.. 3.375 every 8 hours  vancomycin  IVPB 500 every 12 hours    MEDICATIONS  (STANDING):  acetaminophen   Oral Liquid .. 750 every 6 hours PRN  enoxaparin Injectable 40 every 24 hours  OLANZapine 5 daily  pantoprazole    Tablet 40 before breakfast  QUEtiapine 100 daily  sucralfate 1 four times a day    SOCIAL HISTORY:  non verbal, bed bound, at home with wife    FAMILY HISTORY:  FH: breast cancer  Mother      REVIEW OF SYSTEMS  [  ] ROS unobtainable because:    [ x ] All other systems negative except as noted below:	    Constitutional:  [ ] fever [ ] chills  [ ] weight loss  [ ] weakness  Skin:  [ ] rash [ ] phlebitis	  Eyes: [ ] icterus [ ] pain  [ ] discharge	  ENMT: [ ] sore throat  [ ] thrush [ ] ulcers [ ] exudates  Respiratory: [ ] dyspnea [ ] hemoptysis [ ] cough [ ] sputum	  Cardiovascular:  [ ] chest pain [ ] palpitations [ ] edema	  Gastrointestinal:  [ ] nausea [ ] vomiting [ ] diarrhea [ ] constipation [ ] pain	  Genitourinary:  [ ] dysuria [ ] frequency [ ] hematuria [ ] discharge [ ] flank pain  [ ] incontinence  Musculoskeletal:  [ ] myalgias [ ] arthralgias [ ] arthritis  [ ] back pain  Neurological:  [ ] headache [ ] seizures  [ ] confusion/altered mental status  Psychiatric:  [ ] anxiety [ ] depression	  Hematology/Lymphatics:  [ ] lymphadenopathy  Endocrine:  [ ] adrenal [ ] thyroid  Allergic/Immunologic:	 [ ] transplant [ ] seasonal    Vital Signs Last 24 Hrs  T(F): 98 (11-20-23 @ 12:28), Max: 100.6 (11-18-23 @ 18:35)  Vital Signs Last 24 Hrs  HR: 85 (11-20-23 @ 12:28) (78 - 110)  BP: 101/61 (11-20-23 @ 12:28) (97/59 - 112/66)  RR: 18 (11-20-23 @ 12:28)  SpO2: 95% (11-20-23 @ 12:28) (94% - 100%)  Wt(kg): --    PHYSICAL EXAM:    General: Patient in NAD  HEENT: NCAT, EOMI, PERRL, no oral lesions  CV: S1+S2, no m/r/g appreciated   Lungs: No respiratory distress, CTAB  Abd: Soft, nontender, PEG in place, no gross infection at peg site  : No suprapubic tenderness  Neuro: non verbal , contracted  Ext: No cyanosis, no edema  Skin: No rash, no phlebitis, stage IV sacral decubitus with no gross infection                              11.2   5.39  )-----------( 242      ( 20 Nov 2023 07:21 )             34.7   11-20    140  |  107  |  18  ----------------------------<  144<H>  3.3<L>   |  23  |  0.54    Ca    8.4      20 Nov 2023 07:21    TPro  6.6  /  Alb  2.6<L>  /  TBili  0.5  /  DBili  x   /  AST  24  /  ALT  23  /  AlkPhos  134<H>  11-20    Urinalysis Basic - ( 20 Nov 2023 07:21 )    Color: x / Appearance: x / SG: x / pH: x  Gluc: 144 mg/dL / Ketone: x  / Bili: x / Urobili: x   Blood: x / Protein: x / Nitrite: x   Leuk Esterase: x / RBC: x / WBC x   Sq Epi: x / Non Sq Epi: x / Bacteria: x    MICROBIOLOGY:  Culture - Urine (collected 18 Nov 2023 18:40)  Source: Clean Catch Clean Catch (Midstream)  Final Report (19 Nov 2023 16:45):    No growth    Culture - Blood (collected 18 Nov 2023 18:15)  Source: .Blood Blood-Peripheral  Preliminary Report (19 Nov 2023 23:02):    No growth at 24 hours    Culture - Blood (collected 18 Nov 2023 18:10)  Source: .Blood Blood-Peripheral  Preliminary Report (19 Nov 2023 23:02):    No growth at 24 hours              Rapid RVP Result: NotDetec (11-18 @ 19:20)      RADIOLOGY:  imaging below personally reviewed and agree with findings    < from: CT Abdomen and Pelvis w/ IV Cont (11.18.23 @ 19:41) >    ACC: [id number] EXAM:  CT ABDOMEN AND PELVIS IC   ORDERED BY: BENITO   JENNIFERISIDRO     PROCEDURE DATE:  11/18/2023          INTERPRETATION:  CLINICAL INFORMATION: Evaluate sacral ulcer.    COMPARISON: CT abdomen and pelvis 6/29/2019.    CONTRAST/COMPLICATIONS:  IV Contrast: Omnipaque 350  90 cc administered   10 cc discarded  Oral Contrast: NONE  Complications: None reported at time of study completion    PROCEDURE:  CT of the Abdomen and Pelvis was performed.  Sagittal and coronal reformats were performed.    FINDINGS:  LOWER CHEST: Aortic valvular calcifications. Left lower lobe   consolidation. Tree-in-bud/nodular opacities in the left upper lobe.    LIVER: Within normal limits.  BILE DUCTS: Normal caliber.  GALLBLADDER: Cholelithiasis.  SPLEEN: Within normal limits.  PANCREAS: Atrophic.  ADRENALS: Within normal limits.  KIDNEYS/URETERS: Subcentimeter foci in the right kidney which are too   small to characterize. No hydronephrosis.    BLADDER: Mild bladder wall thickening with a few fociof free air in the   bladder lumen, evaluate for recent instrumentation and/or infection.  REPRODUCTIVE ORGANS: Normal size prostate.    BOWEL: No bowel obstruction. Appendix is not visualized. Percutaneous   gastrostomy tube in the stomach.  PERITONEUM: Small volume ascites.  VESSELS: Atherosclerotic changes.  RETROPERITONEUM/LYMPH NODES: No lymphadenopathy.  ABDOMINAL WALL: Extensive decubitus ulcer involving the right posterior   pelvis and gluteal region with extension down to bone and erosion of the   ischium (3-157).  BONES: Erosion of the right ischium secondary to extensive decubitus   ulcer. Degenerative changes.    IMPRESSION:    Extensive decubitus ulcer involving the right posterior gluteal and   pelvic region with extension to bone. There is bony erosion of the   ischium compatible with osteomyelitis.    Mild bladder wall thickening and a few foci of intraluminal air,   correlate for recent instrumentation and/or infection.    Left lower lobe consolidation along with tree-in-bud/nodular opacities in   the left upper lobe concerning for pneumonia.      < end of copied text >

## 2023-11-20 NOTE — CONSULT NOTE ADULT - ATTENDING COMMENTS
77 yo M PMHx of severe dementia, s/p PEG tube, GERD, arthritis presents with ulcer that has worsened over last month  No fever, has leukocytosis  Brought in for ? fevers and worsening wound; chronically bed bound  UA neg, UCX neg  RVP neg  CT with sacral decubitus ulcers, OM erosion, pneumonia, blader wall thickening  BCX NGTD  Possible pneumonia  Overall, Abnormal finding on imaging, pneumonia  - Zosyn 3.375g q 8  - DC Vanco  - Wound care eval to sacral wound  - Uncertain benefit to prolonged treatment course for sacral wound  - Check MRSA PCR  - O2 status improving    Roni Luis MD  Contact on TEAMS messaging from 9am - 5pm  From 5pm-9am, on weekends, or if no response call [phone]    I was physically present for the key portions of the evaluation and management service provided. I saw and examined the patient. I agree with the above history, physical, and plan except for any discrepancies which I have documented in “Attending Statements.” Please refer to “Attending Statements” for final plan.

## 2023-11-20 NOTE — RAPID RESPONSE TEAM SUMMARY - NSOTHERINTERVENTIONSRRT_GEN_ALL_CORE
Goals of care were discussed with family and primary team at bedside, family would like to make patient DNR and discussing DNI.

## 2023-11-20 NOTE — PROGRESS NOTE ADULT - SUBJECTIVE AND OBJECTIVE BOX
SHARONA LARKIN  78y Male  MRN:[id number]    Patient is a 78y old  Male who presents with a chief complaint of sepsis      HPI:  77 yo M PMHx of severe dementia, PEG tube presents with ulcer that has worsened over last month. Patient was seen by house calls doctor 2 weeks ago, placed on antibiotics but started mounting fevers in the last few days. It was noticed by his GI doctor (Dr. Moraes) that his ulcer was getting worse, so he sent him in. Patient is nonverbal at baseline. Wife has noticed no new cough, runny nose. (19 Nov 2023 13:02)      Patient seen and evaluated at bedside. No acute events overnight except as noted.    Interval HPI: no acute events o/n     PAST MEDICAL & SURGICAL HISTORY:  Dementia      Pneumonia      Acute renal failure (ARF)  ARF 1.5 YEAR AGO      Dysphagia  peg      Arthritis      GERD (gastroesophageal reflux disease)      Dyspepsia      History of hand surgery      PEG adjustment, replacement, or removal      Status post insertion of percutaneous endoscopic gastrostomy (PEG) tube  PEG replacement on 02/29/16          REVIEW OF SYSTEMS:  as per hpi     VITALS:  Vital Signs Last 24 Hrs  T(C): 36.6 (20 Nov 2023 08:31), Max: 37.4 (19 Nov 2023 20:44)  T(F): 97.9 (20 Nov 2023 08:31), Max: 99.3 (19 Nov 2023 20:44)  HR: 78 (20 Nov 2023 08:31) (78 - 110)  BP: 112/66 (20 Nov 2023 08:31) (97/59 - 112/66)  BP(mean): --  RR: 18 (20 Nov 2023 08:31) (18 - 18)  SpO2: 100% (20 Nov 2023 08:31) (94% - 100%)    Parameters below as of 20 Nov 2023 08:31  Patient On (Oxygen Delivery Method): nasal cannula  O2 Flow (L/min): 3    CAPILLARY BLOOD GLUCOSE        I&O's Summary    19 Nov 2023 07:01  -  20 Nov 2023 07:00  --------------------------------------------------------  IN: 980 mL / OUT: 0 mL / NET: 980 mL        PHYSICAL EXAM:  GENERAL: NAD,    HEAD:  Atraumatic, Normocephalic  EYES: EOMI, PERRLA, conjunctiva and sclera clear  NECK: Supple, No JVD  CHEST/LUNG: Clear to auscultation bilaterally; No wheeze  HEART: S1, S2; No murmurs, rubs, or gallops  ABDOMEN: Soft, Nontender, Nondistended; Bowel sounds present +peg  EXTREMITIES:  2+ Peripheral Pulses, contracted   PSYCH: Normal affect  NEUROLOGY: non verbal  SKIN: No rashes or lesions    Consultant(s) Notes Reviewed:  [x ] YES  [ ] NO  Care Discussed with Consultants/Other Providers [ x] YES  [ ] NO    MEDS:  MEDICATIONS  (STANDING):  enoxaparin Injectable 40 milliGRAM(s) SubCutaneous every 24 hours  OLANZapine 5 milliGRAM(s) Oral daily  pantoprazole    Tablet 40 milliGRAM(s) Oral before breakfast  piperacillin/tazobactam IVPB.. 3.375 Gram(s) IV Intermittent every 8 hours  QUEtiapine 100 milliGRAM(s) Oral daily  sucralfate 1 Gram(s) Oral four times a day  vancomycin  IVPB 500 milliGRAM(s) IV Intermittent every 12 hours    MEDICATIONS  (PRN):  acetaminophen   Oral Liquid .. 750 milliGRAM(s) Oral every 6 hours PRN Mild Pain (1 - 3)    ALLERGIES:  Haldol (Other)  No Known Allergies  benzodiazepines (Other)      LABS:                        11.2   5.39  )-----------( 242      ( 20 Nov 2023 07:21 )             34.7     11-20    140  |  107  |  18  ----------------------------<  144<H>  3.3<L>   |  23  |  0.54    Ca    8.4      20 Nov 2023 07:21    TPro  6.6  /  Alb  2.6<L>  /  TBili  0.5  /  DBili  x   /  AST  24  /  ALT  23  /  AlkPhos  134<H>  11-20          LIVER FUNCTIONS - ( 20 Nov 2023 07:21 )  Alb: 2.6 g/dL / Pro: 6.6 g/dL / ALK PHOS: 134 U/L / ALT: 23 U/L / AST: 24 U/L / GGT: x           Urinalysis Basic - ( 20 Nov 2023 07:21 )    Color: x / Appearance: x / SG: x / pH: x  Gluc: 144 mg/dL / Ketone: x  / Bili: x / Urobili: x   Blood: x / Protein: x / Nitrite: x   Leuk Esterase: x / RBC: x / WBC x   Sq Epi: x / Non Sq Epi: x / Bacteria: x       cultures: Culture Results:   No growth (11-18 @ 18:40)  Culture Results:   No growth at 24 hours (11-18 @ 18:15)  Culture Results:   No growth at 24 hours (11-18 @ 18:10)     < from: CT Abdomen and Pelvis w/ IV Cont (11.18.23 @ 19:41) >    IMPRESSION:    Extensive decubitus ulcer involving the right posterior gluteal and   pelvic region with extension to bone. There is bony erosion of the   ischium compatible with osteomyelitis.    Mild bladder wall thickening and a few foci of intraluminal air,   correlate for recent instrumentation and/or infection.    Left lower lobe consolidation along with tree-in-bud/nodular opacities in   the left upper lobe concerning for pneumonia.    --- End of Report ---        < end of copied text >

## 2023-11-20 NOTE — DIETITIAN INITIAL EVALUATION ADULT - PERTINENT LABORATORY DATA
11-20    140  |  107  |  18  ----------------------------<  144<H>  3.3<L>   |  23  |  0.54    Ca    8.4      20 Nov 2023 07:21    TPro  6.6  /  Alb  2.6<L>  /  TBili  0.5  /  DBili  x   /  AST  24  /  ALT  23  /  AlkPhos  134<H>  11-20

## 2023-11-20 NOTE — CONSULT NOTE ADULT - ASSESSMENT
sacral decub  PEG tube  sepsis    CT noted  id consult pending   wound care f/u  f/u cultures   cont tube feeds as tolerated   aspiration precautions   will follow

## 2023-11-20 NOTE — DIETITIAN INITIAL EVALUATION ADULT - PERTINENT MEDS FT
MEDICATIONS  (STANDING):  enoxaparin Injectable 40 milliGRAM(s) SubCutaneous every 24 hours  OLANZapine 5 milliGRAM(s) Oral daily  pantoprazole    Tablet 40 milliGRAM(s) Oral before breakfast  piperacillin/tazobactam IVPB.. 3.375 Gram(s) IV Intermittent every 8 hours  QUEtiapine 100 milliGRAM(s) Oral daily  sucralfate 1 Gram(s) Oral four times a day  vancomycin  IVPB 500 milliGRAM(s) IV Intermittent every 12 hours    MEDICATIONS  (PRN):  acetaminophen   Oral Liquid .. 750 milliGRAM(s) Oral every 6 hours PRN Mild Pain (1 - 3)

## 2023-11-20 NOTE — DIETITIAN INITIAL EVALUATION ADULT - NS FNS DIET ORDER
Diet, NPO with Tube Feed:   Tube Feeding Modality: Gastrostomy  Jevity 1.5 David (JEVITY1.5RTH)  Total Volume for 24 Hours (mL): 960  Bolus  Total Volume of Bolus (mL):  240  Total # of Feeds: 4  Tube Feed Frequency: Every 6 hours   Tube Feed Start Time: 15:00  Bolus Feed Rate (mL per Hour): 240   Bolus Feed Duration (in Hours): 24 (11-19-23 @ 14:34)

## 2023-11-20 NOTE — DIETITIAN INITIAL EVALUATION ADULT - REASON INDICATOR FOR ASSESSMENT
Nutrition Consult for Tube Feeding / pressure injury stage 2 or > / MST score 2 or >.   Source: Pt's wife at bedside, Electronic Medical Record, team. Pt with severe dementia/inappropriate to interview.   Chart reviewed, events noted.

## 2023-11-20 NOTE — DIETITIAN INITIAL EVALUATION ADULT - ENERGY INTAKE
In house, pt ordered for Jevity 1.5 @ 240 ml 4x/day. Current EN regimen provides: 960 ml formula, 1440 kcal/day (21 kcal/kg), and 61 g pro/day (0.9 g pro/kg) based on IBW (67 kg). See recommendations below.

## 2023-11-20 NOTE — DIETITIAN INITIAL EVALUATION ADULT - PHYSCIAL ASSESSMENT
Unable to perform Nutrition Focused Physical Assessment as pt asleep at time of visit; visual findings noted.

## 2023-11-20 NOTE — RAPID RESPONSE TEAM SUMMARY - NSADDTLFINDINGSRRT_GEN_ALL_CORE
13-Jun-2023 03:30
13-Jun-2023 08:34
Patient satting high 80s to low 90s on NRB, upgraded to high flow nasal cannula with improvement in oxygen to 100%. Patients EKR revealed afib of 106. Feeds were discontinued. MRSA swab, VBG, CBC, BMP, Mg, phos and blood cultures were sent. Vancomycin was restarted.  Primary team to follow up labs, discuss bolus vs continuous feeds given aspiration risk, nutrition consult and de-escalation of antibiotics. His blood pressure remained stable, last BP taken was 121/77. 
13-Jun-2023 03:39

## 2023-11-20 NOTE — DIETITIAN INITIAL EVALUATION ADULT - NSFNSGIIOFT_GEN_A_CORE
11-19-23 @ 07:01  -  11-20-23 @ 07:00  --------------------------------------------------------  OUT:  Total OUT: 0 mL    Total NET: 780 mL    No current N/V/D/C reported; per chart, last BM 11/17. No current bowel regimen in place.

## 2023-11-20 NOTE — CONSULT NOTE ADULT - SUBJECTIVE AND OBJECTIVE BOX
Wound SURGERY CONSULT NOTE    HPI:  77 yo M PMHx of severe dementia, PEG tube presents with ulcer that has worsened over last month. Patient was seen by house calls doctor 2 weeks ago, placed on antibiotics but started mounting fevers in the last few days. It was noticed by his GI doctor (Dr. Moraes) that his ulcer was getting worse, so he sent him in. Patient is nonverbal at baseline. Wife has noticed no new cough, runny nose. (19 Nov 2023 13:02)      Wound consult requested by team to assist w/ management of  right ischium pressure injury. Spouse at bedside, daughter on phone. Patient is contracted and unable to c/o pain, drainage, odor, or increased edema. Offloading and pericare initiated upon admission as pt Increasingly sedentary 2/2 to illness. Pt is Incontinent of urine & stool.   No h/o bites, scratches, falls, trauma. All questions asked and answered to family's understanding and satisfaction.    Current Diet: Diet, NPO with Tube Feed:   Tube Feeding Modality: Gastrostomy  Jevity 1.5 David (JEVITY1.5RTH)  Total Volume for 24 Hours (mL): 960  Bolus  Total Volume of Bolus (mL):  240  Total # of Feeds: 4  Tube Feed Frequency: Every 6 hours   Tube Feed Start Time: 15:00  Bolus Feed Rate (mL per Hour): 240   Bolus Feed Duration (in Hours): 24 (11-19-23 @ 14:34)      PAST MEDICAL & SURGICAL HISTORY:  Dementia      Pneumonia      Acute renal failure (ARF)  ARF 1.5 YEAR AGO      Dysphagia  peg      Arthritis      GERD (gastroesophageal reflux disease)      Dyspepsia      History of hand surgery      PEG adjustment, replacement, or removal      Status post insertion of percutaneous endoscopic gastrostomy (PEG) tube  PEG replacement on 02/29/16    REVIEW OF SYSTEMS: Pt unable to offer      MEDICATIONS  (STANDING):  chlorhexidine 2% Cloths 1 Application(s) Topical <User Schedule>  enoxaparin Injectable 40 milliGRAM(s) SubCutaneous every 24 hours  OLANZapine 5 milliGRAM(s) Oral daily  pantoprazole    Tablet 40 milliGRAM(s) Oral before breakfast  piperacillin/tazobactam IVPB.. 3.375 Gram(s) IV Intermittent every 8 hours  QUEtiapine 100 milliGRAM(s) Oral daily  sucralfate 1 Gram(s) Oral four times a day    MEDICATIONS  (PRN):  acetaminophen   Oral Liquid .. 750 milliGRAM(s) Oral every 6 hours PRN Mild Pain (1 - 3)      Allergies    No Known Allergies    Intolerances    Haldol (Other)  benzodiazepines (Other)      SOCIAL HISTORY:      FAMILY HISTORY:  FH: breast cancer  Mother    PHYSICAL EXAM:  Vital Signs Last 24 Hrs  T(C): 36.7 (20 Nov 2023 12:28), Max: 37.4 (19 Nov 2023 20:44)  T(F): 98 (20 Nov 2023 12:28), Max: 99.3 (19 Nov 2023 20:44)  HR: 85 (20 Nov 2023 12:28) (78 - 110)  BP: 101/61 (20 Nov 2023 12:28) (97/59 - 112/66)  BP(mean): --  RR: 18 (20 Nov 2023 12:28) (18 - 18)  SpO2: 95% (20 Nov 2023 12:28) (94% - 100%)    Parameters below as of 20 Nov 2023 12:28  Patient On (Oxygen Delivery Method): nasal cannula  O2 Flow (L/min): 3      NAD, cachectic/ thin, frail,  Versa Care P500      HEENT:  Sclera clear, mucosa moist, throat clear  Respiratory: NC 3 liters, nonlabored w/ equal chest rise  Gastrointestinal:  (+)PEG  : (+) condom cath  Neurology:  nonverbal, can not follow commands  Psych: difficult to assess  Musculoskeletal:  limited stiff / contracted  Vascular: BLLE equally warm/  no cyanosis, clubbing, edema nor acute ischemia                BLLE DP pulses palpable    Skin:  thin, dry, pale, frail           Right buttock Full thickness tissue loss with exposed muscle and palpable bone: 50% brown/tan slough 7cm x 5.5cm x 1.5cm tunneling at 9:00 O'clock - 12:00  5cm draining brown/tan purulent fluid. Periwound 2.5cm circumferentially erythema intact soft tissue  no induration, no fluctuance, no crepitus, blistering, warmth, induration, fluctuance, nor crepitus.            Left hip 4cm x 2cm blanchable erythema            Left sacral region 4cm x 3cm   Purplish localized area of discolored skin without epithelial lifting     LABS/ CULTURES/ RADIOLOGY:                        11.2   5.39  )-----------( 242      ( 20 Nov 2023 07:21 )             34.7       140  |  107  |  18  ----------------------------<  144      [11-20-23 @ 07:21]  3.3   |  23  |  0.54        Ca     8.4     [11-20-23 @ 07:21]    TPro  6.6  /  Alb  2.6  /  TBili  0.5  /  DBili  x   /  AST  24  /  ALT  23  /  AlkPhos  134  [11-20-23 @ 07:21]                                   Wound SURGERY CONSULT NOTE    HPI:  77 yo M PMHx of severe dementia, PEG tube presents with ulcer that has worsened over last month. Patient was seen by house calls doctor 2 weeks ago, placed on antibiotics but started mounting fevers in the last few days. It was noticed by his GI doctor (Dr. Moraes) that his ulcer was getting worse, so he sent him in. Patient is nonverbal at baseline. Wife has noticed no new cough, runny nose. (19 Nov 2023 13:02)      Wound consult requested by team to assist w/ management of  right ischium pressure injury. Spouse at bedside, daughter on phone. Patient is contracted and unable to c/o pain, drainage, odor, or increased edema. Offloading and pericare initiated upon admission as pt Increasingly sedentary 2/2 to illness. Pt is Incontinent of urine & stool.   No h/o bites, scratches, falls, trauma. All questions asked and answered to family's understanding and satisfaction.    Current Diet: Diet, NPO with Tube Feed:   Tube Feeding Modality: Gastrostomy  Jevity 1.5 David (JEVITY1.5RTH)  Total Volume for 24 Hours (mL): 960  Bolus  Total Volume of Bolus (mL):  240  Total # of Feeds: 4  Tube Feed Frequency: Every 6 hours   Tube Feed Start Time: 15:00  Bolus Feed Rate (mL per Hour): 240   Bolus Feed Duration (in Hours): 24 (11-19-23 @ 14:34)      PAST MEDICAL & SURGICAL HISTORY:  Dementia      Pneumonia      Acute renal failure (ARF)  ARF 1.5 YEAR AGO      Dysphagia  peg      Arthritis      GERD (gastroesophageal reflux disease)      Dyspepsia      History of hand surgery      PEG adjustment, replacement, or removal      Status post insertion of percutaneous endoscopic gastrostomy (PEG) tube  PEG replacement on 02/29/16    REVIEW OF SYSTEMS: Pt unable to offer      MEDICATIONS  (STANDING):  chlorhexidine 2% Cloths 1 Application(s) Topical <User Schedule>  enoxaparin Injectable 40 milliGRAM(s) SubCutaneous every 24 hours  OLANZapine 5 milliGRAM(s) Oral daily  pantoprazole    Tablet 40 milliGRAM(s) Oral before breakfast  piperacillin/tazobactam IVPB.. 3.375 Gram(s) IV Intermittent every 8 hours  QUEtiapine 100 milliGRAM(s) Oral daily  sucralfate 1 Gram(s) Oral four times a day    MEDICATIONS  (PRN):  acetaminophen   Oral Liquid .. 750 milliGRAM(s) Oral every 6 hours PRN Mild Pain (1 - 3)      Allergies    No Known Allergies    Intolerances    Haldol (Other)  benzodiazepines (Other)      SOCIAL HISTORY:      FAMILY HISTORY:  FH: breast cancer  Mother    PHYSICAL EXAM:  Vital Signs Last 24 Hrs  T(C): 36.7 (20 Nov 2023 12:28), Max: 37.4 (19 Nov 2023 20:44)  T(F): 98 (20 Nov 2023 12:28), Max: 99.3 (19 Nov 2023 20:44)  HR: 85 (20 Nov 2023 12:28) (78 - 110)  BP: 101/61 (20 Nov 2023 12:28) (97/59 - 112/66)  BP(mean): --  RR: 18 (20 Nov 2023 12:28) (18 - 18)  SpO2: 95% (20 Nov 2023 12:28) (94% - 100%)    Parameters below as of 20 Nov 2023 12:28  Patient On (Oxygen Delivery Method): nasal cannula  O2 Flow (L/min): 3      NAD, cachectic/ thin, frail,  Versa Care P500      HEENT:  Sclera clear, mucosa moist, throat clear  Respiratory: NC 3 liters, nonlabored w/ equal chest rise  Gastrointestinal:  (+)PEG, fungal dermatitis around PEG (seen by Ostomy RN--stoma crusting)  : (+) condom cath  Neurology:  nonverbal, can not follow commands  Psych: difficult to assess  Musculoskeletal:  limited stiff / contracted  Vascular: BLLE equally warm/  no cyanosis, clubbing, edema nor acute ischemia                BLLE DP pulses palpable    Skin:  thin, dry, pale, frail           Right buttock Full thickness tissue loss with exposed muscle and palpable bone: 50% brown/tan slough 7cm x 5.5cm x 1.5cm tunneling at 9:00 O'clock - 12:00  5cm draining brown/tan purulent fluid. Periwound 2.5cm circumferentially erythema intact soft tissue  no induration, no fluctuance, no crepitus, blistering, warmth, induration, fluctuance, nor crepitus.            Left hip 4cm x 2cm blanchable erythema            Left sacral region 4cm x 3cm   Purplish localized area of discolored skin without epithelial lifting             Left ischium 2x2cm purple maroon discoloration of skin w/o epitheial lifting    LABS/ CULTURES/ RADIOLOGY:                        11.2   5.39  )-----------( 242      ( 20 Nov 2023 07:21 )             34.7       140  |  107  |  18  ----------------------------<  144      [11-20-23 @ 07:21]  3.3   |  23  |  0.54        Ca     8.4     [11-20-23 @ 07:21]    TPro  6.6  /  Alb  2.6  /  TBili  0.5  /  DBili  x   /  AST  24  /  ALT  23  /  AlkPhos  134  [11-20-23 @ 07:21]

## 2023-11-20 NOTE — DIETITIAN NUTRITION RISK NOTIFICATION - TREATMENT: THE FOLLOWING DIET HAS BEEN RECOMMENDED
Diet, NPO with Tube Feed:   Tube Feeding Modality: Gastrostomy  Jevity 1.5 David (JEVITY1.5RTH)  Total Volume for 24 Hours (mL): 960  Bolus  Total Volume of Bolus (mL):  240  Total # of Feeds: 4  Tube Feed Frequency: Every 6 hours   Tube Feed Start Time: 15:00  Bolus Feed Rate (mL per Hour): 240   Bolus Feed Duration (in Hours): 24 (11-19-23 @ 14:34) [Active]

## 2023-11-20 NOTE — PROGRESS NOTE ADULT - ASSESSMENT
79 yo male h/o dementia, non verbal. contracted, s/p peg. here with sepsis likely 2/2 sacral decub and aspiration pna    sepsis  sacral decub  pna - possible aspiration   cont empiric abx zosyn/vanco  f/u cult  wound care eval f/u  id consult pending     dementia  cont home meds  supportive care    dysphagia s/p peg  cont home peg feeds    nutrition eval for tube feeds    dvt ppx      d/w wife bedside    Advanced care planning was discussed with patient and family.  Advanced care planning forms were reviewed and discussed as appropriate.  Differential diagnosis and plan of care discussed with patient after the evaluation.   Pain assessed and judicious use of narcotics when appropriate was discussed.  Importance of Fall prevention discussed.  Counseling on Smoking and Alcohol cessation was offered when appropriate.  Counseling on Diet, exercise, and medication compliance was done.       Approx 75 minutes spent.

## 2023-11-20 NOTE — DIETITIAN INITIAL EVALUATION ADULT - ADD RECOMMEND
1) Recommend Multivitamin and vitamin C daily to promote wound healing pending no medical contraindications.  2) Continue to monitor EN tolerance, weight, labs, skin, GI status, and diet.  3) Nutrition care plan to remain consistent with pt GOC.  4) Malnutrition sticker placed in chart.

## 2023-11-20 NOTE — DIETITIAN INITIAL EVALUATION ADULT - ORAL INTAKE PTA/DIET HISTORY
- Information obtained from pt's wife at bedside, some discrepancies noted from obtained information - Pt's wife first reports pt receiving bolus feeds PTA of Jevity 1.5, reports pt receiving 160 ml Jevity 1.5 q6hrs (providing 640 ml formula, 960 kcal/day, 40 g pro/day); Pt's wife next reports continuous feeds likely ~50 mlr/hr of Jevity 1.5 x 24 hrs (provides 1200 ml formula, 1800 kcal/day, 76 g pro/day). - ? discrepancies of regimen PTA.   - No known food allergies or intolerances noted per patient profile.   - No micronutrient supplementation at home per outpatient medications list.

## 2023-11-20 NOTE — DIETITIAN INITIAL EVALUATION ADULT - OTHER INFO
- UBW: ~135 pounds per pt's wife.   - Dosing wt: 110 pounds (11/18)  	- Possible 25 lb (18%) wt loss x unknown time frame indicated.   - Wt hx per chart: No new wts available per chart at this time.  - Wt hx per [facility] in pounds: 171 (6/22/2019).   - RD to continue to monitor weight trends as able.   - Nutritionally Pertinent Meds in-house: Abx.   - Nutritionally Pertinent Labs: low K; high BG.

## 2023-11-20 NOTE — CHART NOTE - NSCHARTNOTEFT_GEN_A_CORE
Medicine ACP Event Note  Date of Service: 11-20-23 @ 22:36    Subjective: Notified by RN patient's oxygen dropped to low 80's on nonrebreather mask. Patient seen and examined at bedside. RRT was called.  Patient is non verbal. All other VSS. Family members at bedside. Family members do not want intubation or cpr.       Objective Findings:  T(C): 37.3 (11-20-23 @ 16:23), Max: 37.3 (11-20-23 @ 00:12)  HR: 93 (11-20-23 @ 20:32) (78 - 108)  BP: 111/74 (11-20-23 @ 20:32) (98/58 - 112/66)  RR: 18 (11-20-23 @ 20:32) (18 - 18)  SpO2: 100% (11-20-23 @ 20:32) (92% - 100%)  Wt(kg): --  Daily     Daily       Physical Exam:  Gen: NAD; Patient contracted in bed  HEENT: EOMI, Normocephalic/ atraumatic  CV: RRR, normal S1 + S2, no m/r/g  Lungs:  Normal respiratory effort; clear to auscultation bilaterally  Abd: Has PEG, soft, non-tender; bowel sounds present  Ext: No edema; warm and well perfused    Telemetry:     Laboratory Data:                        10.8   6.53  )-----------( 241      ( 20 Nov 2023 22:01 )             32.6     11-20    143  |  109<H>  |  16  ----------------------------<  118<H>  3.6   |  24  |  0.39<L>    Ca    7.9<L>      20 Nov 2023 22:01  Phos  2.6     11-20  Mg     2.2     11-20    TPro  6.1  /  Alb  2.3<L>  /  TBili  0.5  /  DBili  x   /  AST  28  /  ALT  22  /  AlkPhos  140<H>  11-20              Inpatient Medications:  MEDICATIONS  (STANDING):  chlorhexidine 2% Cloths 1 Application(s) Topical <User Schedule>  Dakins Solution - 1/2 Strength 1 Application(s) Topical daily  Dakins Solution - 1/4 Strength 1 Application(s) Topical two times a day  enoxaparin Injectable 40 milliGRAM(s) SubCutaneous every 24 hours  guaiFENesin  milliGRAM(s) Oral every 12 hours  OLANZapine 5 milliGRAM(s) Oral <User Schedule>  pantoprazole    Tablet 40 milliGRAM(s) Oral before breakfast  piperacillin/tazobactam IVPB.. 3.375 Gram(s) IV Intermittent every 8 hours  polyethylene glycol 3350 17 Gram(s) Oral daily  QUEtiapine 100 milliGRAM(s) Oral <User Schedule>  senna 2 Tablet(s) Oral at bedtime  sodium chloride 0.9% for Nebulization 3 milliLiter(s) Nebulizer once  sucralfate 1 Gram(s) Oral four times a day  vancomycin  IVPB 500 milliGRAM(s) IV Intermittent once  vancomycin  IVPB          Assessment/Plan of Care: 79 yo male h/o dementia, non verbal. contracted, s/p peg. here with sepsis likely 2/2 sacral decub and aspiration pna.    # Hypoxia  - RRT called ( see RRT note)  - STAT labs sent, will follow up   - EKG STAT  - Restarted vancomycin  - BC x2 STAT  - Type & Screen STAT  - ABG STAT  - Transitioned patient to High Flow by RT  - Discussed plan with patient's wife and daughter who understands and agrees   - Molst form signed by wife, Patient now DNR/DNI  - Notified Internist Dr. To who agrees with plan  - Will stop tube feeds overnight as per Internist, likely causing asp pna  - Consider MICU consult if necessary   - Will monitor patient closely overnight  - Will endorse to day team in AM           Jamari Cutler PA-C  Medicine ACP

## 2023-11-20 NOTE — CONSULT NOTE ADULT - SUBJECTIVE AND OBJECTIVE BOX
[facility]  Nathaniel Barron PA-C  [address] [place] [address]  [phone]      Chief Complaint:  Patient is a 78y old  Male who presents with a chief complaint of Osteomyelitis     (20 Nov 2023 11:10)      HPI: 79 yo M PMHx of severe dementia, PEG tube presents with ulcer that has worsened over last month. Patient was seen by house calls doctor 2 weeks ago, placed on antibiotics but started mounting fevers in the last few days. It was noticed by his GI doctor (Dr. Moraes) that his ulcer was getting worse, so he sent him in. Patient is nonverbal at baseline. Wife has noticed no new cough, runny nose.    Allergies:  Haldol (Other)  No Known Allergies  benzodiazepines (Other)      Medications:  acetaminophen   Oral Liquid .. 750 milliGRAM(s) Oral every 6 hours PRN  enoxaparin Injectable 40 milliGRAM(s) SubCutaneous every 24 hours  OLANZapine 5 milliGRAM(s) Oral daily  pantoprazole    Tablet 40 milliGRAM(s) Oral before breakfast  piperacillin/tazobactam IVPB.. 3.375 Gram(s) IV Intermittent every 8 hours  QUEtiapine 100 milliGRAM(s) Oral daily  sucralfate 1 Gram(s) Oral four times a day  vancomycin  IVPB 500 milliGRAM(s) IV Intermittent every 12 hours      PMHX/PSHX:  Dementia    Pneumonia    Acute renal failure (ARF)    Dysphagia    Arthritis    GERD (gastroesophageal reflux disease)    Dyspepsia    History of hand surgery    PEG adjustment, replacement, or removal    Status post insertion of percutaneous endoscopic gastrostomy (PEG) tube        Family history:  No pertinent family history in first degree relatives    No pertinent family history in first degree relatives    FH: breast cancer        Social History:     ROS:     unable to obtain       PHYSICAL EXAM:   Vital Signs:  Vital Signs Last 24 Hrs  T(C): 36.6 (20 Nov 2023 08:31), Max: 37.4 (19 Nov 2023 20:44)  T(F): 97.9 (20 Nov 2023 08:31), Max: 99.3 (19 Nov 2023 20:44)  HR: 78 (20 Nov 2023 08:31) (78 - 110)  BP: 112/66 (20 Nov 2023 08:31) (97/59 - 112/66)  BP(mean): --  RR: 18 (20 Nov 2023 08:31) (18 - 18)  SpO2: 100% (20 Nov 2023 08:31) (94% - 100%)    Parameters below as of 20 Nov 2023 08:31  Patient On (Oxygen Delivery Method): nasal cannula  O2 Flow (L/min): 3    Daily     Daily     nad    confused  frail  non toxic  soft, nt, +PEG  no edema      LABS:                        11.2   5.39  )-----------( 242      ( 20 Nov 2023 07:21 )             34.7     11-20    140  |  107  |  18  ----------------------------<  144<H>  3.3<L>   |  23  |  0.54    Ca    8.4      20 Nov 2023 07:21    TPro  6.6  /  Alb  2.6<L>  /  TBili  0.5  /  DBili  x   /  AST  24  /  ALT  23  /  AlkPhos  134<H>  11-20    LIVER FUNCTIONS - ( 20 Nov 2023 07:21 )  Alb: 2.6 g/dL / Pro: 6.6 g/dL / ALK PHOS: 134 U/L / ALT: 23 U/L / AST: 24 U/L / GGT: x             Urinalysis Basic - ( 20 Nov 2023 07:21 )    Color: x / Appearance: x / SG: x / pH: x  Gluc: 144 mg/dL / Ketone: x  / Bili: x / Urobili: x   Blood: x / Protein: x / Nitrite: x   Leuk Esterase: x / RBC: x / WBC x   Sq Epi: x / Non Sq Epi: x / Bacteria: x          Imaging:

## 2023-11-20 NOTE — CONSULT NOTE ADULT - ASSESSMENT
Patient to be seen today. 77 yo M PMHx of severe dementia, s/p PEG tube, GERD, arthritis presents with ulcer that has worsened over last month. Patient was seen by wound doctor 2 weeks ago, placed on antibiotics ( Doxycycline and Cefdinir) but started mounting fevers in the last few days. Patient is nonverbal at baseline and has been bedbound for last 12 years. Wife is the care provider. Other than fevers and worsening wound ROS other wise negative. He is able to bring up phlegm with help and has not been coughing more than usual. Has oxygen at home but has not needed it.     ER: no documented fevers; WBC 13.5, ; Was started on Vancomycin and Zosyn    CT abdomen/Pelvis    Extensive decubitus ulcer involving the right posterior gluteal and   pelvic region with extension to bone. There is bony erosion of the   ischium compatible with osteomyelitis.    Mild bladder wall thickening and a few foci of intraluminal air,   correlate for recent instrumentation and/or infection.    Left lower lobe consolidation along with tree-in-bud/nodular opacities in   the left upper lobe concerning for pneumonia.    No significant prior culture data on chart review    Bcx 11/18 X2 NGTD    UA Negative, Ucx NG    # Left lower Lobe pneumonia  # Stage IV sacral decubitus ulcer with ? underlying osteomyelitis  - afebrile and overall stable vitals  - Possible aspiration pneumonia; will discuss antibiotic deescalation  - Check MRSA PCR  - Wound evaluation; obtain cultures if concern for infection    Incomplete note        All recommendations are tentative pending Attending Attestation.    Micha Dumont MD, PGY-4  ID Fellow  [employer] Teams Preferred  After 5pm/weekends call [phone]     77 yo M PMHx of severe dementia, s/p PEG tube, GERD, arthritis presents with ulcer that has worsened over last month. Patient was seen by wound doctor 2 weeks ago, placed on antibiotics ( Doxycycline and Cefdinir) but started mounting fevers in the last few days. Patient is nonverbal at baseline and has been bedbound for last 12 years. Wife is the care provider. Other than fevers and worsening wound ROS other wise negative. He is able to bring up phlegm with help and has not been coughing more than usual. Has oxygen at home but has not needed it.     ER: no documented fevers; WBC 13.5, ; Was started on Vancomycin and Zosyn    CT abdomen/Pelvis    Extensive decubitus ulcer involving the right posterior gluteal and   pelvic region with extension to bone. There is bony erosion of the   ischium compatible with osteomyelitis.    Mild bladder wall thickening and a few foci of intraluminal air,   correlate for recent instrumentation and/or infection.    Left lower lobe consolidation along with tree-in-bud/nodular opacities in   the left upper lobe concerning for pneumonia.    No significant prior culture data on chart review    Bcx 11/18 X2 NGTD    UA Negative, Ucx NG    # Left lower Lobe pneumonia  # Stage IV sacral decubitus ulcer with ? underlying osteomyelitis  - afebrile and overall stable vitals  - risk of aspiration pneumonia/CT consistent with pneumonia; minimal respiratory symptoms  - Check MRSA PCR  - Wound evaluation; obtain cultures if concern for infection  - Stop vancomycin, Continue Zosyn for now  - Antibiotic duration/treatement of sacral osteo will depend on further management of sacral decubitus ulcer    Discussed with attending and Primary service    All recommendations are tentative pending Attending Attestation.    Micha Dumont MD, PGY-4  ID Fellow  [employer] Teams Preferred  After 5pm/weekends call [phone]

## 2023-11-20 NOTE — DIETITIAN INITIAL EVALUATION ADULT - OTHER CALCULATIONS
Fluid needs deferred to team.  Estimated needs using IBW in setting of BMI <19 with consideration for Malnutrition factor, unstageable pressure injury.

## 2023-11-20 NOTE — DIETITIAN INITIAL EVALUATION ADULT - SIGNS/SYMPTOMS
BMI <19, severe muscle/fat wasting observed unstageable pressure injury/ulcer BMI <19, inadequate energy intake x admission, severe muscle/fat wasting observed

## 2023-11-20 NOTE — DIETITIAN INITIAL EVALUATION ADULT - ENTERAL
? EN regimen PTA, recommendations for both bolus and continuous EN regimens below PRN.  - Recommend providing Jevity 1.5 @ 240 ml 5x/day. Regimen at goal will provide 1200 ml formula, 1800 kcal/day (26 kcal/kg), 76 g pro/day (1.1 g pro/kg) based on IBW (67 kg).   	-- If CONTINUOUS EN Regimen Warranted: Recommend providing Jevity 1.5 @ goal rate 50 ml/hr x 24 hrs. Regimen at goal will provide 1200 ml formula, 1800 kcal/day (26 kcal/kg), 76 g pro/day (1.1 g pro/kg) based on IBW (67 kg).

## 2023-11-20 NOTE — CONSULT NOTE ADULT - ASSESSMENT
A/P:79 yo M PMHx of severe dementia, PEG tube presents with ulcer that has worsened over last month. Patient was seen by house calls doctor 2 weeks ago, placed on antibiotics but started mounting fevers in the last few days. It was noticed by his GI doctor (Dr. Moraes) that his ulcer was getting worse, so he sent him in.    Wound Consult requested to assist w/ management of  - Stage 4 pressure injury right buttock  - Deep tissue pressure injury sacral region  - Blanchable erythema Left hip    Recommendations:   -Stage 4 pressure injury      Pack with Dakins BID and PRN soiling      Envella bed  -Deep tissue injury paint with Cavilon and offload/ protect with foam  - left hip       paint with Cavilon and off load/ protect with foam  -   Continue turning and positioning w/ offloading assistive devices as per protocol  -   Buttocks/ Sacrum Lili/  BID Cavilon and prn soiling   -   Continue w/ attends under pads and Pericare w/ condom cath maintenance as per protocol  This pressure injury is community acquired YES  At risk for altered tissue perfusion YES  Impaired perfusion of peripheral tissue YES  Moisturize intact skin w/ SWEEN cream BID  Nutrition Consult for optimization in pt w/ Increased nutritional needs  -   Encourage high quality protein, carline/ MVI & Vit C to promote wound healing  Pt will need Group 2 mattress on hospital bed upon discharge home  Care as per medicine, will follow w/ you and remain available as requested  Upon discharge f/u as outpatient at Wound Center [address] [place] [address] [phone]  Seen w/ attng &  D/w  RN  Thank you for this consult  Radha CONTRERAS-BC, CWON, [facility] [phone]  Nights/ Weekends/ Holidays please call:  General Surgery Consult pager ([phone]) for emergencies     A/P:77 yo M PMHx of severe dementia, PEG tube presents with ulcer that has worsened over last month. Patient was seen by house calls doctor 2 weeks ago, placed on antibiotics but started mounting fevers in the last few days. It was noticed by his GI doctor (Dr. Moraes) that his ulcer was getting worse, so he sent him in.    Wound Consult requested to assist w/ management of  - Stage 4 pressure injury right buttock  - Deep tissue pressure injury sacral region  - Deep tissue pressure injury of left ischium  - Blanchable erythema Left hip    Recommendations:   -Stage 4 pressure injury      Pack with Dakins BID and PRN soiling      Envella bed  -Deep tissue injury paint with Cavilon and offload/ protect with foam  - left hip       paint with Cavilon and off load/ protect with foam  -   Continue turning and positioning w/ offloading assistive devices as per protocol  -   Buttocks/ Sacrum Lili/  BID Cavilon and prn soiling   -   Continue w/ attends under pads and Pericare w/ condom cath maintenance as per protocol  This pressure injury is community acquired YES  At risk for altered tissue perfusion YES  Impaired perfusion of peripheral tissue YES  Moisturize intact skin w/ SWEEN cream BID  Nutrition Consult for optimization in pt w/ Increased nutritional needs  -   Encourage high quality protein, carline/ MVI & Vit C to promote wound healing  Pt will need Group 2 mattress on hospital bed upon discharge home  Care as per medicine, will follow w/ you and remain available as requested  Upon discharge f/u as outpatient at Wound [facility] [address] [facility] [phone]  Seen w/ attng &  D/w  RN  Thank you for this consult  Radha CONTRERAS-BC, CWON, [facility] [phone]  Nights/ Weekends/ Holidays please call:  General Surgery Consult pager ([phone]) for emergencies

## 2023-11-21 DIAGNOSIS — J96.01 ACUTE RESPIRATORY FAILURE WITH HYPOXIA: ICD-10-CM

## 2023-11-21 DIAGNOSIS — F03.90 UNSPECIFIED DEMENTIA, UNSPECIFIED SEVERITY, WITHOUT BEHAVIORAL DISTURBANCE, PSYCHOTIC DISTURBANCE, MOOD DISTURBANCE, AND ANXIETY: ICD-10-CM

## 2023-11-21 DIAGNOSIS — J18.9 PNEUMONIA, UNSPECIFIED ORGANISM: ICD-10-CM

## 2023-11-21 DIAGNOSIS — L89.159 PRESSURE ULCER OF SACRAL REGION, UNSPECIFIED STAGE: ICD-10-CM

## 2023-11-21 DIAGNOSIS — R13.10 DYSPHAGIA, UNSPECIFIED: ICD-10-CM

## 2023-11-21 LAB
HCT VFR BLD CALC: 34 % — LOW (ref 39–50)
HCT VFR BLD CALC: 34 % — LOW (ref 39–50)
HGB BLD-MCNC: 11.2 G/DL — LOW (ref 13–17)
HGB BLD-MCNC: 11.2 G/DL — LOW (ref 13–17)
MCHC RBC-ENTMCNC: 31.4 PG — SIGNIFICANT CHANGE UP (ref 27–34)
MCHC RBC-ENTMCNC: 31.4 PG — SIGNIFICANT CHANGE UP (ref 27–34)
MCHC RBC-ENTMCNC: 32.9 GM/DL — SIGNIFICANT CHANGE UP (ref 32–36)
MCHC RBC-ENTMCNC: 32.9 GM/DL — SIGNIFICANT CHANGE UP (ref 32–36)
MCV RBC AUTO: 95.2 FL — SIGNIFICANT CHANGE UP (ref 80–100)
MCV RBC AUTO: 95.2 FL — SIGNIFICANT CHANGE UP (ref 80–100)
MRSA PCR RESULT.: SIGNIFICANT CHANGE UP
MRSA PCR RESULT.: SIGNIFICANT CHANGE UP
NRBC # BLD: 0 /100 WBCS — SIGNIFICANT CHANGE UP (ref 0–0)
NRBC # BLD: 0 /100 WBCS — SIGNIFICANT CHANGE UP (ref 0–0)
PLATELET # BLD AUTO: 244 K/UL — SIGNIFICANT CHANGE UP (ref 150–400)
PLATELET # BLD AUTO: 244 K/UL — SIGNIFICANT CHANGE UP (ref 150–400)
RBC # BLD: 3.57 M/UL — LOW (ref 4.2–5.8)
RBC # BLD: 3.57 M/UL — LOW (ref 4.2–5.8)
RBC # FLD: 13.1 % — SIGNIFICANT CHANGE UP (ref 10.3–14.5)
RBC # FLD: 13.1 % — SIGNIFICANT CHANGE UP (ref 10.3–14.5)
S AUREUS DNA NOSE QL NAA+PROBE: SIGNIFICANT CHANGE UP
S AUREUS DNA NOSE QL NAA+PROBE: SIGNIFICANT CHANGE UP
WBC # BLD: 6.31 K/UL — SIGNIFICANT CHANGE UP (ref 3.8–10.5)
WBC # BLD: 6.31 K/UL — SIGNIFICANT CHANGE UP (ref 3.8–10.5)
WBC # FLD AUTO: 6.31 K/UL — SIGNIFICANT CHANGE UP (ref 3.8–10.5)
WBC # FLD AUTO: 6.31 K/UL — SIGNIFICANT CHANGE UP (ref 3.8–10.5)

## 2023-11-21 PROCEDURE — 93970 EXTREMITY STUDY: CPT | Mod: 26

## 2023-11-21 PROCEDURE — 99232 SBSQ HOSP IP/OBS MODERATE 35: CPT

## 2023-11-21 RX ORDER — ACETAMINOPHEN 500 MG
750 TABLET ORAL ONCE
Refills: 0 | Status: COMPLETED | OUTPATIENT
Start: 2023-11-21 | End: 2023-11-28

## 2023-11-21 RX ORDER — SODIUM CHLORIDE 9 MG/ML
250 INJECTION INTRAMUSCULAR; INTRAVENOUS; SUBCUTANEOUS ONCE
Refills: 0 | Status: COMPLETED | OUTPATIENT
Start: 2023-11-21 | End: 2023-11-21

## 2023-11-21 RX ORDER — ACETYLCYSTEINE 200 MG/ML
3 VIAL (ML) MISCELLANEOUS EVERY 6 HOURS
Refills: 0 | Status: COMPLETED | OUTPATIENT
Start: 2023-11-21 | End: 2023-11-24

## 2023-11-21 RX ORDER — ACETAMINOPHEN 500 MG
750 TABLET ORAL ONCE
Refills: 0 | Status: COMPLETED | OUTPATIENT
Start: 2023-11-21 | End: 2023-11-21

## 2023-11-21 RX ORDER — IPRATROPIUM/ALBUTEROL SULFATE 18-103MCG
3 AEROSOL WITH ADAPTER (GRAM) INHALATION ONCE
Refills: 0 | Status: COMPLETED | OUTPATIENT
Start: 2023-11-21 | End: 2023-11-21

## 2023-11-21 RX ORDER — IPRATROPIUM/ALBUTEROL SULFATE 18-103MCG
3 AEROSOL WITH ADAPTER (GRAM) INHALATION EVERY 6 HOURS
Refills: 0 | Status: DISCONTINUED | OUTPATIENT
Start: 2023-11-21 | End: 2023-11-22

## 2023-11-21 RX ADMIN — Medication 600 MILLIGRAM(S): at 06:08

## 2023-11-21 RX ADMIN — PIPERACILLIN AND TAZOBACTAM 25 GRAM(S): 4; .5 INJECTION, POWDER, LYOPHILIZED, FOR SOLUTION INTRAVENOUS at 06:07

## 2023-11-21 RX ADMIN — PIPERACILLIN AND TAZOBACTAM 25 GRAM(S): 4; .5 INJECTION, POWDER, LYOPHILIZED, FOR SOLUTION INTRAVENOUS at 14:46

## 2023-11-21 RX ADMIN — Medication 1 GRAM(S): at 06:08

## 2023-11-21 RX ADMIN — PIPERACILLIN AND TAZOBACTAM 25 GRAM(S): 4; .5 INJECTION, POWDER, LYOPHILIZED, FOR SOLUTION INTRAVENOUS at 21:46

## 2023-11-21 RX ADMIN — Medication 1 APPLICATION(S): at 06:09

## 2023-11-21 RX ADMIN — ENOXAPARIN SODIUM 40 MILLIGRAM(S): 100 INJECTION SUBCUTANEOUS at 23:51

## 2023-11-21 RX ADMIN — Medication 1 GRAM(S): at 17:22

## 2023-11-21 RX ADMIN — Medication 3 MILLILITER(S): at 23:29

## 2023-11-21 RX ADMIN — Medication 1 APPLICATION(S): at 17:23

## 2023-11-21 RX ADMIN — SODIUM CHLORIDE 250 MILLILITER(S): 9 INJECTION INTRAMUSCULAR; INTRAVENOUS; SUBCUTANEOUS at 23:51

## 2023-11-21 RX ADMIN — Medication 100 MILLIGRAM(S): at 12:15

## 2023-11-21 RX ADMIN — Medication 1 GRAM(S): at 12:14

## 2023-11-21 RX ADMIN — CHLORHEXIDINE GLUCONATE 1 APPLICATION(S): 213 SOLUTION TOPICAL at 06:12

## 2023-11-21 RX ADMIN — Medication 600 MILLIGRAM(S): at 17:22

## 2023-11-21 RX ADMIN — Medication 300 MILLIGRAM(S): at 04:58

## 2023-11-21 RX ADMIN — Medication 3 MILLILITER(S): at 04:35

## 2023-11-21 RX ADMIN — Medication 750 MILLIGRAM(S): at 23:21

## 2023-11-21 RX ADMIN — Medication 3 MILLILITER(S): at 17:23

## 2023-11-21 RX ADMIN — Medication 1 GRAM(S): at 23:25

## 2023-11-21 RX ADMIN — QUETIAPINE FUMARATE 100 MILLIGRAM(S): 200 TABLET, FILM COATED ORAL at 21:46

## 2023-11-21 RX ADMIN — Medication 3 MILLILITER(S): at 17:22

## 2023-11-21 RX ADMIN — PANTOPRAZOLE SODIUM 40 MILLIGRAM(S): 20 TABLET, DELAYED RELEASE ORAL at 06:09

## 2023-11-21 RX ADMIN — SENNA PLUS 2 TABLET(S): 8.6 TABLET ORAL at 21:45

## 2023-11-21 NOTE — PROGRESS NOTE ADULT - ASSESSMENT
sacral decub  PEG tube  sepsis    CT noted  ID following  wound care f/u  f/u cultures   cont tube feeds as tolerated   aspiration precautions   will follow

## 2023-11-21 NOTE — PROGRESS NOTE ADULT - ASSESSMENT
79 yo male h/o dementia, non verbal. contracted, s/p peg. here with sepsis likely 2/2 sacral decub and aspiration pna    sepsis  sacral decub  pna - possible aspiration   cont empiric abx zosyn/vanco  f/u cult  wound care eval f/u  id consult noted  pulm consulted  cont supp o2 via high flow. taper down as able     dementia  cont home meds  supportive care    dysphagia s/p peg  feeds as per gi/nutrition          dvt ppx      d/w wife bedside  DNR      Advanced care planning was discussed with patient and family.  Advanced care planning forms were reviewed and discussed as appropriate.  Differential diagnosis and plan of care discussed with patient after the evaluation.   Pain assessed and judicious use of narcotics when appropriate was discussed.  Importance of Fall prevention discussed.  Counseling on Smoking and Alcohol cessation was offered when appropriate.  Counseling on Diet, exercise, and medication compliance was done.       Approx 75 minutes spent.

## 2023-11-21 NOTE — CONSULT NOTE ADULT - ASSESSMENT
79 y/o M with PMH of dementia (primarily bedbound - has been nonverbal for the past year per wife at bedside), dysphagia s/p PEG, sacral ulcer. Presents with fevers, recommended by GI Dr. Moraes to come to ER as sacral decubiti reportedly getting worse. Afebrile on triage with O2 sats 96% on room air. Labs notable for leukocytosis. RVP/COVID negative. CT A/P with extensive decubitus ulcer involving the right posterior gluteal and pelvic region with extension to bone, bony erosion of the ischium compatible with osteomyelitis. Also with LLL consolidation. Course c/b RRT 11/20 for hypoxia, now requiring HFNC. Pulmonary called to consult for hypoxia.

## 2023-11-21 NOTE — PROVIDER CONTACT NOTE (OTHER) - ACTION/TREATMENT ORDERED:
As per MD Jamari Cutler, RRT called for hypoxia. Labs drawn, ABG, cultures, EKG done, Vanco started again, and pt on high flow nasal cannula now satting 94% w no distress noted.

## 2023-11-21 NOTE — PROGRESS NOTE ADULT - SUBJECTIVE AND OBJECTIVE BOX
CC: F/U for Aspiration    Saw/spoke to patient. Episode fevers, no chills. No new complaints. RRT with hypoxia yesterday.    Allergies  No Known Allergies    ANTIMICROBIALS:  piperacillin/tazobactam IVPB.. 3.375 every 8 hours  vancomycin  IVPB 500 every 12 hours  vancomycin  IVPB      PE:    Vital Signs Last 24 Hrs  T(C): 37.3 (21 Nov 2023 09:30), Max: 38.1 (21 Nov 2023 04:09)  T(F): 99.1 (21 Nov 2023 09:30), Max: 100.6 (21 Nov 2023 04:09)  HR: 87 (21 Nov 2023 10:00) (80 - 120)  BP: 118/75 (21 Nov 2023 08:19) (101/65 - 122/83)  RR: 18 (21 Nov 2023 11:11) (18 - 20)  SpO2: 97% (21 Nov 2023 11:11) (87% - 100%)    Gen: AOx0-1, NAD, non-toxic  CV: Nontachycardic  Resp: Breathing comfortably, bipap  Abd: Soft, nontender  IV/Skin: No thrombophlebitis    LABS:                        11.2   6.31  )-----------( 244      ( 21 Nov 2023 06:34 )             34.0     11-20    143  |  109<H>  |  16  ----------------------------<  118<H>  3.6   |  24  |  0.39<L>    Ca    7.9<L>      20 Nov 2023 22:01  Phos  2.6     11-20  Mg     2.2     11-20    TPro  6.1  /  Alb  2.3<L>  /  TBili  0.5  /  DBili  x   /  AST  28  /  ALT  22  /  AlkPhos  140<H>  11-20    Urinalysis Basic - ( 20 Nov 2023 22:01 )    Color: x / Appearance: x / SG: x / pH: x  Gluc: 118 mg/dL / Ketone: x  / Bili: x / Urobili: x   Blood: x / Protein: x / Nitrite: x   Leuk Esterase: x / RBC: x / WBC x   Sq Epi: x / Non Sq Epi: x / Bacteria: x    MICROBIOLOGY:    Clean Catch Clean Catch (Midstream)  11-18-23   No growth  --  --    .Blood Blood-Peripheral  11-18-23   No growth at 48 Hours  --  --    .Blood Blood-Peripheral  11-18-23   No growth at 48 Hours  --  --    Rapid RVP Result: NotDetec (11-18 @ 19:20)    (otherwise reviewed)    RADIOLOGY:    11/18 CT:      IMPRESSION:    Extensive decubitus ulcer involving the right posterior gluteal and   pelvic region with extension to bone. There is bony erosion of the   ischium compatible with osteomyelitis.    Mild bladder wall thickening and a few foci of intraluminal air,   correlate for recent instrumentation and/or infection.    Left lower lobe consolidation along with tree-in-bud/nodular opacities in   the left upper lobe concerning for pneumonia.

## 2023-11-21 NOTE — PROVIDER CONTACT NOTE (OTHER) - ACTION/TREATMENT ORDERED:
Respiratory coming to assess O2 settings, NP Sauveur to assess pt at bedside. Respiratory at bedside to assess O2 settings, Respiratory placed pt back on bipap, O2 sat @ 100%. NP Sauveur to assess pt at bedside.

## 2023-11-21 NOTE — PROVIDER CONTACT NOTE (OTHER) - ACTION/TREATMENT ORDERED:
As per MD Jamari Cutler, respiratory to bedside, pt put on bi-pap, neb treatment given as ord & IV tylenol given as ord. Vitals reassessed @ 0630 on 11/21 & provider made aware.

## 2023-11-21 NOTE — CONSULT NOTE ADULT - NS ATTEND AMEND GEN_ALL_CORE FT
Pt seen and examined with ACP.  Assessment and plan reviewed and discussed.  Agree with above.    Status of wounds and treatment recommendations d/w  pt's wife at bedside and daughter (via wife's phone).  All questions answered.   Wife and daughter expressed understanding.     I spent 55  minutes face to face w/ this pt of which more than 50% of the time was spent counseling & coordinating care of this pt.
pt seen and examined:: contracted non verbal pt on peg tube:  yesterday became hypoxic and ended up on bipap and then high flow and now recently changed to bipap:  he also has infected sacral ulcers:  : he is on bipap at his time:  can try to switch to high flow as tolerated:  he is not acidotic:   his overall condition looks poor:  he has peg tube already: overall pneumonia on ct chest with resp failure: pretty hypoxic hence cta was ordered: :  dw np

## 2023-11-21 NOTE — CONSULT NOTE ADULT - PROBLEM SELECTOR RECOMMENDATION 2
CT A/P with LLL consolidation & SHEILA TIB opacities, likely PNA  -F/u CTA chest to further assess lung parenchyma  -ABX per ID. CT A/P with LLL consolidation & SHEILA TIB opacities, likely PNA  -F/u CTA chest to further assess lung parenchyma  -RVP negative  -F/u MRSA PCR  -F/u BC  -Check urine legionella  -ABX per ID.

## 2023-11-21 NOTE — PROGRESS NOTE ADULT - SUBJECTIVE AND OBJECTIVE BOX
SHARONA LARKIN  78y Male  MRN:[id number]    Patient is a 78y old  Male who presents with a chief complaint of sepsis      HPI:  77 yo M PMHx of severe dementia, PEG tube presents with ulcer that has worsened over last month. Patient was seen by house calls doctor 2 weeks ago, placed on antibiotics but started mounting fevers in the last few days. It was noticed by his GI doctor (Dr. Moraes) that his ulcer was getting worse, so he sent him in. Patient is nonverbal at baseline. Wife has noticed no new cough, runny nose. (19 Nov 2023 13:02)      Patient seen and evaluated at bedside. No acute events overnight except as noted.    Interval HPI: RRT o/n for hypoxia. +fever. now on high flow     PAST MEDICAL & SURGICAL HISTORY:  Dementia      Pneumonia      Acute renal failure (ARF)  ARF 1.5 YEAR AGO      Dysphagia  peg      Arthritis      GERD (gastroesophageal reflux disease)      Dyspepsia      History of hand surgery      PEG adjustment, replacement, or removal      Status post insertion of percutaneous endoscopic gastrostomy (PEG) tube  PEG replacement on 02/29/16          REVIEW OF SYSTEMS:  as per hpi     VITALS:   Vital Signs Last 24 Hrs  T(C): 37.3 (21 Nov 2023 09:30), Max: 38.1 (21 Nov 2023 04:09)  T(F): 99.1 (21 Nov 2023 09:30), Max: 100.6 (21 Nov 2023 04:09)  HR: 87 (21 Nov 2023 10:00) (80 - 120)  BP: 118/75 (21 Nov 2023 08:19) (101/61 - 122/83)  BP(mean): --  RR: 18 (21 Nov 2023 11:11) (18 - 20)  SpO2: 97% (21 Nov 2023 11:11) (87% - 100%)    Parameters below as of 21 Nov 2023 11:11  Patient On (Oxygen Delivery Method): mask, nonrebreather          PHYSICAL EXAM:  GENERAL: NAD,    HEAD:  Atraumatic, Normocephalic  EYES: EOMI, PERRLA, conjunctiva and sclera clear  NECK: Supple, No JVD  CHEST/LUNG: Clear to auscultation bilaterally; No wheeze  HEART: S1, S2; No murmurs, rubs, or gallops  ABDOMEN: Soft, Nontender, Nondistended; Bowel sounds present +peg  EXTREMITIES:  2+ Peripheral Pulses, contracted   PSYCH: Normal affect  NEUROLOGY: non verbal  SKIN: No rashes or lesions    Consultant(s) Notes Reviewed:  [x ] YES  [ ] NO  Care Discussed with Consultants/Other Providers [ x] YES  [ ] NO    MEDS:   MEDICATIONS  (STANDING):  chlorhexidine 2% Cloths 1 Application(s) Topical <User Schedule>  Dakins Solution - 1/2 Strength 1 Application(s) Topical daily  Dakins Solution - 1/4 Strength 1 Application(s) Topical two times a day  enoxaparin Injectable 40 milliGRAM(s) SubCutaneous every 24 hours  guaiFENesin  milliGRAM(s) Oral every 12 hours  OLANZapine 5 milliGRAM(s) Oral <User Schedule>  pantoprazole    Tablet 40 milliGRAM(s) Oral before breakfast  piperacillin/tazobactam IVPB.. 3.375 Gram(s) IV Intermittent every 8 hours  polyethylene glycol 3350 17 Gram(s) Oral daily  QUEtiapine 100 milliGRAM(s) Oral <User Schedule>  senna 2 Tablet(s) Oral at bedtime  sucralfate 1 Gram(s) Oral four times a day  vancomycin  IVPB 500 milliGRAM(s) IV Intermittent every 12 hours  vancomycin  IVPB        MEDICATIONS  (PRN):  acetaminophen   Oral Liquid .. 750 milliGRAM(s) Oral every 6 hours PRN Mild Pain (1 - 3)      ALLERGIES:  Haldol (Other)  No Known Allergies  benzodiazepines (Other)      LABS:                                               11.2   6.31  )-----------( 244      ( 21 Nov 2023 06:34 )             34.0   11-20    143  |  109<H>  |  16  ----------------------------<  118<H>  3.6   |  24  |  0.39<L>    Ca    7.9<L>      20 Nov 2023 22:01  Phos  2.6     11-20  Mg     2.2     11-20    TPro  6.1  /  Alb  2.3<L>  /  TBili  0.5  /  DBili  x   /  AST  28  /  ALT  22  /  AlkPhos  140<H>  11-20       < from: CT Abdomen and Pelvis w/ IV Cont (11.18.23 @ 19:41) >    IMPRESSION:    Extensive decubitus ulcer involving the right posterior gluteal and   pelvic region with extension to bone. There is bony erosion of the   ischium compatible with osteomyelitis.    Mild bladder wall thickening and a few foci of intraluminal air,   correlate for recent instrumentation and/or infection.    Left lower lobe consolidation along with tree-in-bud/nodular opacities in   the left upper lobe concerning for pneumonia.    --- End of Report ---        < end of copied text >

## 2023-11-21 NOTE — PROGRESS NOTE ADULT - ASSESSMENT
79 yo M PMHx of severe dementia, s/p PEG tube, GERD, arthritis presents with ulcer that has worsened over last month  No fever, has leukocytosis  Brought in for ? fevers and worsening wound; chronically bed bound  UA neg, UCX neg  RVP neg  CT with sacral decubitus ulcers, OM erosion, pneumonia, blader wall thickening  BCX NGTD  Possible pneumonia  Had RRT 11/20, vancomycin readded; suspect due to re-aspiration but unclear  Overall, Abnormal finding on imaging, pneumonia  - Zosyn 3.375g q 8  - Vanco 500mg q 12 (monitor levels)--likely discontinue shortly  - Check sputum culture if producing sputum  - Wound care eval to sacral wound  - Uncertain benefit to prolonged treatment course for sacral wound  - O2 status improving    Roni Luis MD  Contact on TEAMS messaging from 9am - 5pm  From 5pm-9am, on weekends, or if no response call [phone]

## 2023-11-21 NOTE — PROGRESS NOTE ADULT - SUBJECTIVE AND OBJECTIVE BOX
[facility]  Nathaniel Barron PA-C  [address] [place] [address]  [phone]      INTERVAL HPI/OVERNIGHT EVENTS:  pt seen and examined, events noted  s/p RRT for hypoxia, on HFNC  febrile ON    MEDICATIONS  (STANDING):  chlorhexidine 2% Cloths 1 Application(s) Topical <User Schedule>  Dakins Solution - 1/2 Strength 1 Application(s) Topical daily  Dakins Solution - 1/4 Strength 1 Application(s) Topical two times a day  enoxaparin Injectable 40 milliGRAM(s) SubCutaneous every 24 hours  guaiFENesin  milliGRAM(s) Oral every 12 hours  OLANZapine 5 milliGRAM(s) Oral <User Schedule>  pantoprazole    Tablet 40 milliGRAM(s) Oral before breakfast  piperacillin/tazobactam IVPB.. 3.375 Gram(s) IV Intermittent every 8 hours  polyethylene glycol 3350 17 Gram(s) Oral daily  QUEtiapine 100 milliGRAM(s) Oral <User Schedule>  senna 2 Tablet(s) Oral at bedtime  sucralfate 1 Gram(s) Oral four times a day  vancomycin  IVPB 500 milliGRAM(s) IV Intermittent every 12 hours  vancomycin  IVPB        MEDICATIONS  (PRN):  acetaminophen   Oral Liquid .. 750 milliGRAM(s) Oral every 6 hours PRN Mild Pain (1 - 3)      Allergies    No Known Allergies    Intolerances    Haldol (Other)  benzodiazepines (Other)      ROS:   unable to obtain       PHYSICAL EXAM:   Vital Signs:  Vital Signs Last 24 Hrs  T(C): 37.3 (21 Nov 2023 09:30), Max: 38.1 (21 Nov 2023 04:09)  T(F): 99.1 (21 Nov 2023 09:30), Max: 100.6 (21 Nov 2023 04:09)  HR: 87 (21 Nov 2023 10:00) (80 - 120)  BP: 118/75 (21 Nov 2023 08:19) (101/61 - 122/83)  BP(mean): --  RR: 18 (21 Nov 2023 10:00) (18 - 20)  SpO2: 98% (21 Nov 2023 10:00) (87% - 100%)    Parameters below as of 21 Nov 2023 10:00  Patient On (Oxygen Delivery Method): nasal cannula, high flow  O2 Flow (L/min): 50  O2 Concentration (%): 80  Daily     Daily     nad    confused  frail  non toxic  soft, nt, +PEG  no edema        LABS:                        11.2   6.31  )-----------( 244      ( 21 Nov 2023 06:34 )             34.0   11-20    143  |  109<H>  |  16  ----------------------------<  118<H>  3.6   |  24  |  0.39<L>    Ca    7.9<L>      20 Nov 2023 22:01  Phos  2.6     11-20  Mg     2.2     11-20    TPro  6.1  /  Alb  2.3<L>  /  TBili  0.5  /  DBili  x   /  AST  28  /  ALT  22  /  AlkPhos  140<H>  11-20      Urinalysis Basic - ( 20 Nov 2023 22:01 )    Color: x / Appearance: x / SG: x / pH: x  Gluc: 118 mg/dL / Ketone: x  / Bili: x / Urobili: x   Blood: x / Protein: x / Nitrite: x   Leuk Esterase: x / RBC: x / WBC x   Sq Epi: x / Non Sq Epi: x / Bacteria: x        RADIOLOGY & ADDITIONAL TESTS:

## 2023-11-22 LAB
ALBUMIN SERPL ELPH-MCNC: 2.5 G/DL — LOW (ref 3.3–5)
ALBUMIN SERPL ELPH-MCNC: 2.5 G/DL — LOW (ref 3.3–5)
ALP SERPL-CCNC: 134 U/L — HIGH (ref 40–120)
ALP SERPL-CCNC: 134 U/L — HIGH (ref 40–120)
ALT FLD-CCNC: 21 U/L — SIGNIFICANT CHANGE UP (ref 10–45)
ALT FLD-CCNC: 21 U/L — SIGNIFICANT CHANGE UP (ref 10–45)
ANION GAP SERPL CALC-SCNC: 9 MMOL/L — SIGNIFICANT CHANGE UP (ref 5–17)
ANION GAP SERPL CALC-SCNC: 9 MMOL/L — SIGNIFICANT CHANGE UP (ref 5–17)
AST SERPL-CCNC: 19 U/L — SIGNIFICANT CHANGE UP (ref 10–40)
AST SERPL-CCNC: 19 U/L — SIGNIFICANT CHANGE UP (ref 10–40)
BASE EXCESS BLDA CALC-SCNC: 1.5 MMOL/L — SIGNIFICANT CHANGE UP (ref -2–3)
BASE EXCESS BLDA CALC-SCNC: 1.5 MMOL/L — SIGNIFICANT CHANGE UP (ref -2–3)
BILIRUB SERPL-MCNC: 1 MG/DL — SIGNIFICANT CHANGE UP (ref 0.2–1.2)
BILIRUB SERPL-MCNC: 1 MG/DL — SIGNIFICANT CHANGE UP (ref 0.2–1.2)
BUN SERPL-MCNC: 11 MG/DL — SIGNIFICANT CHANGE UP (ref 7–23)
BUN SERPL-MCNC: 11 MG/DL — SIGNIFICANT CHANGE UP (ref 7–23)
CALCIUM SERPL-MCNC: 8.2 MG/DL — LOW (ref 8.4–10.5)
CALCIUM SERPL-MCNC: 8.2 MG/DL — LOW (ref 8.4–10.5)
CHLORIDE SERPL-SCNC: 106 MMOL/L — SIGNIFICANT CHANGE UP (ref 96–108)
CHLORIDE SERPL-SCNC: 106 MMOL/L — SIGNIFICANT CHANGE UP (ref 96–108)
CO2 BLDA-SCNC: 26 MMOL/L — HIGH (ref 19–24)
CO2 BLDA-SCNC: 26 MMOL/L — HIGH (ref 19–24)
CO2 SERPL-SCNC: 23 MMOL/L — SIGNIFICANT CHANGE UP (ref 22–31)
CO2 SERPL-SCNC: 23 MMOL/L — SIGNIFICANT CHANGE UP (ref 22–31)
CREAT SERPL-MCNC: 0.43 MG/DL — LOW (ref 0.5–1.3)
CREAT SERPL-MCNC: 0.43 MG/DL — LOW (ref 0.5–1.3)
EGFR: 110 ML/MIN/1.73M2 — SIGNIFICANT CHANGE UP
EGFR: 110 ML/MIN/1.73M2 — SIGNIFICANT CHANGE UP
GLUCOSE SERPL-MCNC: 121 MG/DL — HIGH (ref 70–99)
GLUCOSE SERPL-MCNC: 121 MG/DL — HIGH (ref 70–99)
HCO3 BLDA-SCNC: 25 MMOL/L — SIGNIFICANT CHANGE UP (ref 21–28)
HCO3 BLDA-SCNC: 25 MMOL/L — SIGNIFICANT CHANGE UP (ref 21–28)
HCT VFR BLD CALC: 38.5 % — LOW (ref 39–50)
HCT VFR BLD CALC: 38.5 % — LOW (ref 39–50)
HGB BLD-MCNC: 11.9 G/DL — LOW (ref 13–17)
HGB BLD-MCNC: 11.9 G/DL — LOW (ref 13–17)
HOROWITZ INDEX BLDA+IHG-RTO: 100 — SIGNIFICANT CHANGE UP
HOROWITZ INDEX BLDA+IHG-RTO: 100 — SIGNIFICANT CHANGE UP
LEGIONELLA AG UR QL: NEGATIVE — SIGNIFICANT CHANGE UP
LEGIONELLA AG UR QL: NEGATIVE — SIGNIFICANT CHANGE UP
MCHC RBC-ENTMCNC: 30.1 PG — SIGNIFICANT CHANGE UP (ref 27–34)
MCHC RBC-ENTMCNC: 30.1 PG — SIGNIFICANT CHANGE UP (ref 27–34)
MCHC RBC-ENTMCNC: 30.9 GM/DL — LOW (ref 32–36)
MCHC RBC-ENTMCNC: 30.9 GM/DL — LOW (ref 32–36)
MCV RBC AUTO: 97.5 FL — SIGNIFICANT CHANGE UP (ref 80–100)
MCV RBC AUTO: 97.5 FL — SIGNIFICANT CHANGE UP (ref 80–100)
NRBC # BLD: 0 /100 WBCS — SIGNIFICANT CHANGE UP (ref 0–0)
NRBC # BLD: 0 /100 WBCS — SIGNIFICANT CHANGE UP (ref 0–0)
PCO2 BLDA: 34 MMHG — LOW (ref 35–48)
PCO2 BLDA: 34 MMHG — LOW (ref 35–48)
PH BLDA: 7.47 — HIGH (ref 7.35–7.45)
PH BLDA: 7.47 — HIGH (ref 7.35–7.45)
PLATELET # BLD AUTO: 251 K/UL — SIGNIFICANT CHANGE UP (ref 150–400)
PLATELET # BLD AUTO: 251 K/UL — SIGNIFICANT CHANGE UP (ref 150–400)
PO2 BLDA: 102 MMHG — SIGNIFICANT CHANGE UP (ref 83–108)
PO2 BLDA: 102 MMHG — SIGNIFICANT CHANGE UP (ref 83–108)
POTASSIUM SERPL-MCNC: 3.6 MMOL/L — SIGNIFICANT CHANGE UP (ref 3.5–5.3)
POTASSIUM SERPL-MCNC: 3.6 MMOL/L — SIGNIFICANT CHANGE UP (ref 3.5–5.3)
POTASSIUM SERPL-SCNC: 3.6 MMOL/L — SIGNIFICANT CHANGE UP (ref 3.5–5.3)
POTASSIUM SERPL-SCNC: 3.6 MMOL/L — SIGNIFICANT CHANGE UP (ref 3.5–5.3)
PROT SERPL-MCNC: 6.3 G/DL — SIGNIFICANT CHANGE UP (ref 6–8.3)
PROT SERPL-MCNC: 6.3 G/DL — SIGNIFICANT CHANGE UP (ref 6–8.3)
RBC # BLD: 3.95 M/UL — LOW (ref 4.2–5.8)
RBC # BLD: 3.95 M/UL — LOW (ref 4.2–5.8)
RBC # FLD: 13.1 % — SIGNIFICANT CHANGE UP (ref 10.3–14.5)
RBC # FLD: 13.1 % — SIGNIFICANT CHANGE UP (ref 10.3–14.5)
SAO2 % BLDA: 99.5 % — HIGH (ref 94–98)
SAO2 % BLDA: 99.5 % — HIGH (ref 94–98)
SODIUM SERPL-SCNC: 138 MMOL/L — SIGNIFICANT CHANGE UP (ref 135–145)
SODIUM SERPL-SCNC: 138 MMOL/L — SIGNIFICANT CHANGE UP (ref 135–145)
VANCOMYCIN TROUGH SERPL-MCNC: 7.7 UG/ML — LOW (ref 10–20)
VANCOMYCIN TROUGH SERPL-MCNC: 7.7 UG/ML — LOW (ref 10–20)
WBC # BLD: 8.19 K/UL — SIGNIFICANT CHANGE UP (ref 3.8–10.5)
WBC # BLD: 8.19 K/UL — SIGNIFICANT CHANGE UP (ref 3.8–10.5)
WBC # FLD AUTO: 8.19 K/UL — SIGNIFICANT CHANGE UP (ref 3.8–10.5)
WBC # FLD AUTO: 8.19 K/UL — SIGNIFICANT CHANGE UP (ref 3.8–10.5)

## 2023-11-22 PROCEDURE — 99232 SBSQ HOSP IP/OBS MODERATE 35: CPT

## 2023-11-22 RX ORDER — IPRATROPIUM BROMIDE 0.2 MG/ML
500 SOLUTION, NON-ORAL INHALATION EVERY 6 HOURS
Refills: 0 | Status: DISCONTINUED | OUTPATIENT
Start: 2023-11-22 | End: 2023-12-14

## 2023-11-22 RX ORDER — LEVALBUTEROL 1.25 MG/.5ML
0.63 SOLUTION, CONCENTRATE RESPIRATORY (INHALATION) EVERY 6 HOURS
Refills: 0 | Status: DISCONTINUED | OUTPATIENT
Start: 2023-11-22 | End: 2023-12-14

## 2023-11-22 RX ADMIN — Medication 600 MILLIGRAM(S): at 05:44

## 2023-11-22 RX ADMIN — LEVALBUTEROL 0.63 MILLIGRAM(S): 1.25 SOLUTION, CONCENTRATE RESPIRATORY (INHALATION) at 17:34

## 2023-11-22 RX ADMIN — LEVALBUTEROL 0.63 MILLIGRAM(S): 1.25 SOLUTION, CONCENTRATE RESPIRATORY (INHALATION) at 12:14

## 2023-11-22 RX ADMIN — Medication 3 MILLILITER(S): at 17:33

## 2023-11-22 RX ADMIN — Medication 500 MICROGRAM(S): at 17:34

## 2023-11-22 RX ADMIN — Medication 1 GRAM(S): at 23:11

## 2023-11-22 RX ADMIN — QUETIAPINE FUMARATE 100 MILLIGRAM(S): 200 TABLET, FILM COATED ORAL at 21:30

## 2023-11-22 RX ADMIN — LEVALBUTEROL 0.63 MILLIGRAM(S): 1.25 SOLUTION, CONCENTRATE RESPIRATORY (INHALATION) at 23:11

## 2023-11-22 RX ADMIN — Medication 1 GRAM(S): at 05:44

## 2023-11-22 RX ADMIN — SENNA PLUS 2 TABLET(S): 8.6 TABLET ORAL at 21:30

## 2023-11-22 RX ADMIN — Medication 1 APPLICATION(S): at 05:46

## 2023-11-22 RX ADMIN — OLANZAPINE 5 MILLIGRAM(S): 15 TABLET, FILM COATED ORAL at 21:32

## 2023-11-22 RX ADMIN — Medication 3 MILLILITER(S): at 05:45

## 2023-11-22 RX ADMIN — CHLORHEXIDINE GLUCONATE 1 APPLICATION(S): 213 SOLUTION TOPICAL at 05:46

## 2023-11-22 RX ADMIN — PANTOPRAZOLE SODIUM 40 MILLIGRAM(S): 20 TABLET, DELAYED RELEASE ORAL at 05:44

## 2023-11-22 RX ADMIN — Medication 500 MICROGRAM(S): at 23:10

## 2023-11-22 RX ADMIN — PIPERACILLIN AND TAZOBACTAM 25 GRAM(S): 4; .5 INJECTION, POWDER, LYOPHILIZED, FOR SOLUTION INTRAVENOUS at 13:15

## 2023-11-22 RX ADMIN — POLYETHYLENE GLYCOL 3350 17 GRAM(S): 17 POWDER, FOR SOLUTION ORAL at 12:09

## 2023-11-22 RX ADMIN — Medication 100 MILLIGRAM(S): at 02:43

## 2023-11-22 RX ADMIN — Medication 500 MICROGRAM(S): at 06:28

## 2023-11-22 RX ADMIN — Medication 600 MILLIGRAM(S): at 17:34

## 2023-11-22 RX ADMIN — PIPERACILLIN AND TAZOBACTAM 25 GRAM(S): 4; .5 INJECTION, POWDER, LYOPHILIZED, FOR SOLUTION INTRAVENOUS at 21:31

## 2023-11-22 RX ADMIN — Medication 750 MILLIGRAM(S): at 00:21

## 2023-11-22 RX ADMIN — PIPERACILLIN AND TAZOBACTAM 25 GRAM(S): 4; .5 INJECTION, POWDER, LYOPHILIZED, FOR SOLUTION INTRAVENOUS at 05:45

## 2023-11-22 RX ADMIN — Medication 1 APPLICATION(S): at 17:35

## 2023-11-22 RX ADMIN — Medication 500 MICROGRAM(S): at 12:14

## 2023-11-22 RX ADMIN — ENOXAPARIN SODIUM 40 MILLIGRAM(S): 100 INJECTION SUBCUTANEOUS at 23:11

## 2023-11-22 RX ADMIN — Medication 1 GRAM(S): at 17:33

## 2023-11-22 RX ADMIN — Medication 1 GRAM(S): at 12:09

## 2023-11-22 NOTE — PROGRESS NOTE ADULT - SUBJECTIVE AND OBJECTIVE BOX
[facility]  Nathaniel Barron PA-C  [address] [place] [address]  [phone]      INTERVAL HPI/OVERNIGHT EVENTS:  pt seen and examined, events noted  remains on HFNC    MEDICATIONS  (STANDING):  chlorhexidine 2% Cloths 1 Application(s) Topical <User Schedule>  Dakins Solution - 1/2 Strength 1 Application(s) Topical daily  Dakins Solution - 1/4 Strength 1 Application(s) Topical two times a day  enoxaparin Injectable 40 milliGRAM(s) SubCutaneous every 24 hours  guaiFENesin  milliGRAM(s) Oral every 12 hours  OLANZapine 5 milliGRAM(s) Oral <User Schedule>  pantoprazole    Tablet 40 milliGRAM(s) Oral before breakfast  piperacillin/tazobactam IVPB.. 3.375 Gram(s) IV Intermittent every 8 hours  polyethylene glycol 3350 17 Gram(s) Oral daily  QUEtiapine 100 milliGRAM(s) Oral <User Schedule>  senna 2 Tablet(s) Oral at bedtime  sucralfate 1 Gram(s) Oral four times a day  vancomycin  IVPB 500 milliGRAM(s) IV Intermittent every 12 hours  vancomycin  IVPB        MEDICATIONS  (PRN):  acetaminophen   Oral Liquid .. 750 milliGRAM(s) Oral every 6 hours PRN Mild Pain (1 - 3)      Allergies    No Known Allergies    Intolerances    Haldol (Other)  benzodiazepines (Other)      ROS:   unable to obtain       PHYSICAL EXAM:   Vital Signs Last 24 Hrs  T(C): 37.1 (22 Nov 2023 11:58), Max: 38.4 (21 Nov 2023 23:00)  T(F): 98.7 (22 Nov 2023 11:58), Max: 101.2 (21 Nov 2023 23:00)  HR: 102 (22 Nov 2023 11:58) (96 - 127)  BP: 126/82 (22 Nov 2023 11:58) (95/63 - 126/82)  BP(mean): --  RR: 22 (22 Nov 2023 11:58) (18 - 30)  SpO2: 100% (22 Nov 2023 11:58) (93% - 100%)    Parameters below as of 22 Nov 2023 09:27  Patient On (Oxygen Delivery Method): mask, nonrebreather        Daily     Daily     nad    confused  frail  non toxic  soft, nt, +PEG  no edema        LABS:                        11.9   8.19  )-----------( 251      ( 22 Nov 2023 07:21 )             38.5   11-22    138  |  106  |  11  ----------------------------<  121<H>  3.6   |  23  |  0.43<L>    Ca    8.2<L>      22 Nov 2023 07:16  Phos  2.6     11-20  Mg     2.2     11-20    TPro  6.3  /  Alb  2.5<L>  /  TBili  1.0  /  DBili  x   /  AST  19  /  ALT  21  /  AlkPhos  134<H>  11-22      Urinalysis Basic - ( 20 Nov 2023 22:01 )    Color: x / Appearance: x / SG: x / pH: x  Gluc: 118 mg/dL / Ketone: x  / Bili: x / Urobili: x   Blood: x / Protein: x / Nitrite: x   Leuk Esterase: x / RBC: x / WBC x   Sq Epi: x / Non Sq Epi: x / Bacteria: x        RADIOLOGY & ADDITIONAL TESTS:

## 2023-11-22 NOTE — PROGRESS NOTE ADULT - ASSESSMENT
79 yo M PMHx of severe dementia, s/p PEG tube, GERD, arthritis presents with ulcer that has worsened over last month  No fever, has leukocytosis  Brought in for ? fevers and worsening wound; chronically bed bound  UA neg, UCX neg  RVP neg  CT with sacral decubitus ulcers, OM erosion, pneumonia, blader wall thickening  BCX NGTD  Possible pneumonia  Had RRT 11/20, vancomycin readded; suspect due to re-aspiration but unclear  Further fevers overnight noted, patient generally unchanged, still hypoxic  Overall, Abnormal finding on imaging, pneumonia  - Zosyn 3.375g q 8  - DC Vanco  - Check sputum culture if producing sputum  - Wound care eval to sacral wound  - Uncertain benefit to prolonged treatment course for sacral wound  - O2 status improving    Roni Luis MD  Contact on TEAMS messaging from 9am - 5pm  From 5pm-9am, on weekends, or if no response call [phone]

## 2023-11-22 NOTE — PROGRESS NOTE ADULT - SUBJECTIVE AND OBJECTIVE BOX
SHARONA LARKIN  78y Male  MRN:[id number]    Patient is a 78y old  Male who presents with a chief complaint of sepsis      HPI:  79 yo M PMHx of severe dementia, PEG tube presents with ulcer that has worsened over last month. Patient was seen by house calls doctor 2 weeks ago, placed on antibiotics but started mounting fevers in the last few days. It was noticed by his GI doctor (Dr. Moraes) that his ulcer was getting worse, so he sent him in. Patient is nonverbal at baseline. Wife has noticed no new cough, runny nose. (19 Nov 2023 13:02)      Patient seen and evaluated at bedside. No acute events overnight except as noted.    Interval HPI: remains on high flow.     PAST MEDICAL & SURGICAL HISTORY:  Dementia      Pneumonia      Acute renal failure (ARF)  ARF 1.5 YEAR AGO      Dysphagia  peg      Arthritis      GERD (gastroesophageal reflux disease)      Dyspepsia      History of hand surgery      PEG adjustment, replacement, or removal      Status post insertion of percutaneous endoscopic gastrostomy (PEG) tube  PEG replacement on 02/29/16          REVIEW OF SYSTEMS:  as per hpi     VITALS:   Vital Signs Last 24 Hrs  T(C): 36.7 (22 Nov 2023 09:01), Max: 38.4 (21 Nov 2023 23:00)  T(F): 98 (22 Nov 2023 09:01), Max: 101.2 (21 Nov 2023 23:00)  HR: 98 (22 Nov 2023 10:50) (96 - 127)  BP: 109/73 (22 Nov 2023 09:01) (95/63 - 113/71)  BP(mean): --  RR: 30 (22 Nov 2023 09:27) (18 - 30)  SpO2: 100% (22 Nov 2023 10:50) (93% - 100%)    Parameters below as of 22 Nov 2023 09:27  Patient On (Oxygen Delivery Method): mask, nonrebreather          PHYSICAL EXAM:  GENERAL: NAD,    HEAD:  Atraumatic, Normocephalic  EYES: EOMI, PERRLA, conjunctiva and sclera clear  NECK: Supple, No JVD  CHEST/LUNG: Clear to auscultation bilaterally; No wheeze  HEART: S1, S2; No murmurs, rubs, or gallops  ABDOMEN: Soft, Nontender, Nondistended; Bowel sounds present +peg  EXTREMITIES:  2+ Peripheral Pulses, contracted   PSYCH: Normal affect  NEUROLOGY: non verbal  SKIN: No rashes or lesions    Consultant(s) Notes Reviewed:  [x ] YES  [ ] NO  Care Discussed with Consultants/Other Providers [ x] YES  [ ] NO    MEDS:   MEDICATIONS  (STANDING):  acetaminophen   Oral Liquid .. 750 milliGRAM(s) Enteral Tube once  acetylcysteine 20%  Inhalation 3 milliLiter(s) Inhalation every 6 hours  chlorhexidine 2% Cloths 1 Application(s) Topical <User Schedule>  Dakins Solution - 1/4 Strength 1 Application(s) Topical two times a day  enoxaparin Injectable 40 milliGRAM(s) SubCutaneous every 24 hours  guaiFENesin  milliGRAM(s) Oral every 12 hours  ipratropium    for Nebulization 500 MICROGram(s) Nebulizer every 6 hours  levalbuterol Inhalation 0.63 milliGRAM(s) Inhalation every 6 hours  OLANZapine 5 milliGRAM(s) Oral <User Schedule>  pantoprazole    Tablet 40 milliGRAM(s) Oral before breakfast  piperacillin/tazobactam IVPB.. 3.375 Gram(s) IV Intermittent every 8 hours  polyethylene glycol 3350 17 Gram(s) Oral daily  QUEtiapine 100 milliGRAM(s) Oral <User Schedule>  senna 2 Tablet(s) Oral at bedtime  sucralfate 1 Gram(s) Oral four times a day    MEDICATIONS  (PRN):  acetaminophen   Oral Liquid .. 750 milliGRAM(s) Oral every 6 hours PRN Mild Pain (1 - 3)      ALLERGIES:  Haldol (Other)  No Known Allergies  benzodiazepines (Other)      LABS:                                                        11.9   8.19  )-----------( 251      ( 22 Nov 2023 07:21 )             38.5   11-22    138  |  106  |  11  ----------------------------<  121<H>  3.6   |  23  |  0.43<L>    Ca    8.2<L>      22 Nov 2023 07:16  Phos  2.6     11-20  Mg     2.2     11-20    TPro  6.3  /  Alb  2.5<L>  /  TBili  1.0  /  DBili  x   /  AST  19  /  ALT  21  /  AlkPhos  134<H>  11-22         < from: CT Abdomen and Pelvis w/ IV Cont (11.18.23 @ 19:41) >    IMPRESSION:    Extensive decubitus ulcer involving the right posterior gluteal and   pelvic region with extension to bone. There is bony erosion of the   ischium compatible with osteomyelitis.    Mild bladder wall thickening and a few foci of intraluminal air,   correlate for recent instrumentation and/or infection.    Left lower lobe consolidation along with tree-in-bud/nodular opacities in   the left upper lobe concerning for pneumonia.    --- End of Report ---        < end of copied text >

## 2023-11-22 NOTE — PROGRESS NOTE ADULT - ASSESSMENT
77 y/o M with PMH of dementia (primarily bedbound - has been nonverbal for the past year per wife at bedside), dysphagia s/p PEG, sacral ulcer. Presents with fevers, recommended by GI Dr. Moraes to come to ER as sacral decubiti reportedly getting worse. Afebrile on triage with O2 sats 96% on room air. Labs notable for leukocytosis. RVP/COVID negative. CT A/P with extensive decubitus ulcer involving the right posterior gluteal and pelvic region with extension to bone, bony erosion of the ischium compatible with osteomyelitis. Also with LLL consolidation. Course c/b RRT 11/20 for hypoxia, now requiring HFNC. Pulmonary called to consult for hypoxia.

## 2023-11-22 NOTE — PROGRESS NOTE ADULT - ASSESSMENT
79 yo male h/o dementia, non verbal. contracted, s/p peg. here with sepsis likely 2/2 sacral decub and aspiration pna    sepsis  sacral decub  pna - possible aspiration   cont abx as per id  f/u cult  wound care f/u  id consult noted  pulm consulted  cont supp o2 via high flow. taper down as able   f/u CTA r/o PE    dementia  cont home meds  supportive care    dysphagia s/p peg  feeds as per gi/nutrition          dvt ppx      d/w wife bedside  DNR      Advanced care planning was discussed with patient and family.  Advanced care planning forms were reviewed and discussed as appropriate.  Differential diagnosis and plan of care discussed with patient after the evaluation.   Pain assessed and judicious use of narcotics when appropriate was discussed.  Importance of Fall prevention discussed.  Counseling on Smoking and Alcohol cessation was offered when appropriate.  Counseling on Diet, exercise, and medication compliance was done.       Approx 75 minutes spent.

## 2023-11-22 NOTE — PROGRESS NOTE ADULT - PROBLEM SELECTOR PLAN 1
S/p RRT 11/20 for hypoxia requiring HFNC  -Increase in O2 requirements 11/21 from HFNC 50L/80% to 100% Bipap. Now on HFNC 50L/100%  -Suspect aspiration event, family at bedside reports multiple aspiration events in the Past. Lower suspicion for PE, but favor ruling out given bedbound status.  -Check CTA chest to r/o PE, evaluate lung parenchyma when patient able to tolerate transport to CT. Can transport on 100% NRB  -CT A/P with LLL consolidation, SHEILA tree in bud opacities likely PNA. Continue ABX per ID  -LE duplex negative for DVT but limited study due to patient being contracted   -Duoneb q6h  -Mucomyst 20% q6h x3 days (give with Duoneb)  -Chest PT q6h  -ABG with no CO2 retention. Can continue with Bipap qHS and PRN for increased WOB. Keep sats >90% with O2 PRN when off Bipap (currently HFNC 50L/100%). Wean O2 as tolerated S/p RRT 11/20 for hypoxia requiring HFNC  -Increase in O2 requirements 11/21 from HFNC 50L/80% to 100% Bipap. Now on HFNC 50L/100%  -Suspect aspiration event, family at bedside reports multiple aspiration events in the Past. Lower suspicion for PE, but favor ruling out given bedbound status.  -Check CTA chest to r/o PE, evaluate lung parenchyma when patient able to tolerate transport to CT. Can transport on 100% NRB  -CT A/P with LLL consolidation, SHEILA tree in bud opacities likely PNA. Continue ABX per ID  -LE duplex negative for DVT but limited study due to patient being contracted   -Duoneb q6h  -Mucomyst 20% q6h x3 days (give with Duoneb)  -Chest PT q6h  -ABG with no CO2 retention. Can continue with Bipap qHS and PRN for increased WOB. Keep sats >90% with O2 PRN when off Bipap (currently HFNC 50L/100%). Wean O2 as tolerated  -D/w RN - tolerated transport to CT on 100% NRB, maintained sats with no respiratory distress. Scan unable to be completed due to IV malfunction. F/u CTA once new IV placed.

## 2023-11-22 NOTE — PROGRESS NOTE ADULT - SUBJECTIVE AND OBJECTIVE BOX
Date of Service: 11-22-23 @ 11:23    Patient is a 78y old  Male who presents with a chief complaint of Osteomyelitis     (20 Nov 2023 11:10)    Any change in ROS:   O2 sats 97% on HFNC 50L/100%  Lethargic  Daughter at bedside    MEDICATIONS  (STANDING):  acetaminophen   Oral Liquid .. 750 milliGRAM(s) Enteral Tube once  acetylcysteine 20%  Inhalation 3 milliLiter(s) Inhalation every 6 hours  chlorhexidine 2% Cloths 1 Application(s) Topical <User Schedule>  Dakins Solution - 1/4 Strength 1 Application(s) Topical two times a day  enoxaparin Injectable 40 milliGRAM(s) SubCutaneous every 24 hours  guaiFENesin  milliGRAM(s) Oral every 12 hours  ipratropium    for Nebulization 500 MICROGram(s) Nebulizer every 6 hours  levalbuterol Inhalation 0.63 milliGRAM(s) Inhalation every 6 hours  OLANZapine 5 milliGRAM(s) Oral <User Schedule>  pantoprazole    Tablet 40 milliGRAM(s) Oral before breakfast  piperacillin/tazobactam IVPB.. 3.375 Gram(s) IV Intermittent every 8 hours  polyethylene glycol 3350 17 Gram(s) Oral daily  QUEtiapine 100 milliGRAM(s) Oral <User Schedule>  senna 2 Tablet(s) Oral at bedtime  sucralfate 1 Gram(s) Oral four times a day    MEDICATIONS  (PRN):  acetaminophen   Oral Liquid .. 750 milliGRAM(s) Oral every 6 hours PRN Mild Pain (1 - 3)    Vital Signs Last 24 Hrs  T(C): 36.7 (22 Nov 2023 09:01), Max: 38.4 (21 Nov 2023 23:00)  T(F): 98 (22 Nov 2023 09:01), Max: 101.2 (21 Nov 2023 23:00)  HR: 99 (22 Nov 2023 09:01) (96 - 127)  BP: 109/73 (22 Nov 2023 09:01) (95/63 - 113/71)  BP(mean): --  RR: 30 (22 Nov 2023 09:27) (18 - 30)  SpO2: 95% (22 Nov 2023 09:27) (93% - 100%)    Parameters below as of 22 Nov 2023 09:27  Patient On (Oxygen Delivery Method): mask, nonrebreather        I&O's Summary    21 Nov 2023 07:01  -  22 Nov 2023 07:00  --------------------------------------------------------  IN: 550 mL / OUT: 830 mL / NET: -280 mL          Physical Exam:   GENERAL: NAD  HEENT: CANDY  ENMT: No tonsillar erythema, exudates, or enlargement  NECK: Supple, No JVD  CHEST/LUNG: b/l rhonchi  CVS: Regular rate and rhythm  GI: Soft, Nontender, Nondistended, +PEG  NERVOUS SYSTEM:  Lethargic   EXTREMITIES:  2+ Peripheral Pulses, No clubbing, cyanosis, or edema  SKIN: No rashes or lesions  PSYCH: Appropriate    Labs:  ABG - ( 22 Nov 2023 05:57 )  pH, Arterial: 7.47  pH, Blood: x     /  pCO2: 34    /  pO2: 102   / HCO3: 25    / Base Excess: 1.5   /  SaO2: 99.5            28                            11.9   8.19  )-----------( 251      ( 22 Nov 2023 07:21 )             38.5                         11.2   6.31  )-----------( 244      ( 21 Nov 2023 06:34 )             34.0                         10.8   6.53  )-----------( 241      ( 20 Nov 2023 22:01 )             32.6                         11.2   5.39  )-----------( 242      ( 20 Nov 2023 07:21 )             34.7                         11.2   6.33  )-----------( 236      ( 19 Nov 2023 09:10 )             34.8                         11.2   13.56 )-----------( 232      ( 18 Nov 2023 18:40 )             35.3     11-22    138  |  106  |  11  ----------------------------<  121<H>  3.6   |  23  |  0.43<L>  11-20    143  |  109<H>  |  16  ----------------------------<  118<H>  3.6   |  24  |  0.39<L>  11-20    140  |  107  |  18  ----------------------------<  144<H>  3.3<L>   |  23  |  0.54  11-19    140  |  105  |  20  ----------------------------<  141<H>  3.9   |  27  |  0.46<L>  11-18    137  |  102  |  28<H>  ----------------------------<  127<H>  5.7<H>   |  25  |  0.39<L>    Ca    8.2<L>      22 Nov 2023 07:16  Ca    7.9<L>      20 Nov 2023 22:01  Phos  2.6     11-20  Mg     2.2     11-20    TPro  6.3  /  Alb  2.5<L>  /  TBili  1.0  /  DBili  x   /  AST  19  /  ALT  21  /  AlkPhos  134<H>  11-22  TPro  6.1  /  Alb  2.3<L>  /  TBili  0.5  /  DBili  x   /  AST  28  /  ALT  22  /  AlkPhos  140<H>  11-20  TPro  6.6  /  Alb  2.6<L>  /  TBili  0.5  /  DBili  x   /  AST  24  /  ALT  23  /  AlkPhos  134<H>  11-20  TPro  6.5  /  Alb  2.5<L>  /  TBili  0.8  /  DBili  x   /  AST  18  /  ALT  17  /  AlkPhos  124<H>  11-19  TPro  7.2  /  Alb  2.5<L>  /  TBili  0.8  /  DBili  x   /  AST  57<H>  /  ALT  23  /  AlkPhos  128<H>  11-18      LIVER FUNCTIONS - ( 22 Nov 2023 07:16 )  Alb: 2.5 g/dL / Pro: 6.3 g/dL / ALK PHOS: 134 U/L / ALT: 21 U/L / AST: 19 U/L / GGT: x             Urinalysis Basic - ( 22 Nov 2023 07:16 )    Color: x / Appearance: x / SG: x / pH: x  Gluc: 121 mg/dL / Ketone: x  / Bili: x / Urobili: x   Blood: x / Protein: x / Nitrite: x   Leuk Esterase: x / RBC: x / WBC x   Sq Epi: x / Non Sq Epi: x / Bacteria: x    RECENT CULTURES:  11-20 @ 22:02 .Blood Blood-Peripheral     No growth at 24 hours    11-18 @ 18:40 Clean Catch Clean Catch (Midstream)       No growth    11-18 @ 18:15 .Blood Blood-Peripheral        No growth at 72 Hours    11-18 @ 18:10 .Blood Blood-Peripheral       No growth at 72 Hours    Studies  < from: CT Abdomen and Pelvis w/ IV Cont (11.18.23 @ 19:41) >    FINDINGS:  LOWER CHEST: Aortic valvular calcifications. Left lower lobe   consolidation. Tree-in-bud/nodular opacities in the left upper lobe.    LIVER: Within normal limits.  BILE DUCTS: Normal caliber.  GALLBLADDER: Cholelithiasis.  SPLEEN: Within normal limits.  PANCREAS: Atrophic.  ADRENALS: Within normal limits.  KIDNEYS/URETERS: Subcentimeter foci in the right kidney which are too   small to characterize. No hydronephrosis.    BLADDER: Mild bladder wall thickening with a few fociof free air in the   bladder lumen, evaluate for recent instrumentation and/or infection.  REPRODUCTIVE ORGANS: Normal size prostate.    BOWEL: No bowel obstruction. Appendix is not visualized. Percutaneous   gastrostomy tube in the stomach.  PERITONEUM: Small volume ascites.  VESSELS: Atherosclerotic changes.  RETROPERITONEUM/LYMPH NODES: No lymphadenopathy.  ABDOMINAL WALL: Extensive decubitus ulcer involving the right posterior   pelvis and gluteal region with extension down to bone and erosion of the   ischium (3-157).  BONES: Erosion of the right ischium secondary to extensive decubitus   ulcer. Degenerative changes.    IMPRESSION:    Extensive decubitus ulcer involving the right posterior gluteal and   pelvic region with extension to bone. There is bony erosion of the   ischium compatible with osteomyelitis.    Mild bladder wall thickening and a few foci of intraluminal air,   correlate for recent instrumentation and/or infection.    Left lower lobe consolidation along with tree-in-bud/nodular opacities in   the left upper lobe concerning for pneumonia.    --- End of Report ---    < end of copied text >    < from: [facility] Duplex Lower Ext Vein Scan, Bilat (11.21.23 @ 17:25) >    FINDINGS:    RIGHT:  Normal compressibility of the RIGHT common femoral, femoral and popliteal   veins.  Doppler examination shows normal spontaneous and phasic flow.  The patient's legs are contracted; the RIGHT calf veins could not be   visualized.    LEFT:  Normal compressibility of the LEFT common femoral, femoral and popliteal   veins.  Doppler examination shows normal spontaneous and phasic flow.  The patient's legs are contracted; the LEFT calf veins could not be   visualized.    IMPRESSION:  No evidence of above-the-knee deep venous thrombosis in either lower   extremity.  The patient's legs are contracted; the calf veins could not be visualized.        < end of copied text >               Date of Service: 11-22-23 @ 11:23    Patient is a 78y old  Male who presents with a chief complaint of Osteomyelitis     (20 Nov 2023 11:10)    Any change in ROS:   O2 sats 97% on HFNC 50L/100%  Lethargic  Daughter at bedside    D/w RN - tolerated transport to CT on 100% NRB, maintained sats with no respiratory distress. Scan unable to be completed due to IV malfunction.     MEDICATIONS  (STANDING):  acetaminophen   Oral Liquid .. 750 milliGRAM(s) Enteral Tube once  acetylcysteine 20%  Inhalation 3 milliLiter(s) Inhalation every 6 hours  chlorhexidine 2% Cloths 1 Application(s) Topical <User Schedule>  Dakins Solution - 1/4 Strength 1 Application(s) Topical two times a day  enoxaparin Injectable 40 milliGRAM(s) SubCutaneous every 24 hours  guaiFENesin  milliGRAM(s) Oral every 12 hours  ipratropium    for Nebulization 500 MICROGram(s) Nebulizer every 6 hours  levalbuterol Inhalation 0.63 milliGRAM(s) Inhalation every 6 hours  OLANZapine 5 milliGRAM(s) Oral <User Schedule>  pantoprazole    Tablet 40 milliGRAM(s) Oral before breakfast  piperacillin/tazobactam IVPB.. 3.375 Gram(s) IV Intermittent every 8 hours  polyethylene glycol 3350 17 Gram(s) Oral daily  QUEtiapine 100 milliGRAM(s) Oral <User Schedule>  senna 2 Tablet(s) Oral at bedtime  sucralfate 1 Gram(s) Oral four times a day    MEDICATIONS  (PRN):  acetaminophen   Oral Liquid .. 750 milliGRAM(s) Oral every 6 hours PRN Mild Pain (1 - 3)    Vital Signs Last 24 Hrs  T(C): 36.7 (22 Nov 2023 09:01), Max: 38.4 (21 Nov 2023 23:00)  T(F): 98 (22 Nov 2023 09:01), Max: 101.2 (21 Nov 2023 23:00)  HR: 99 (22 Nov 2023 09:01) (96 - 127)  BP: 109/73 (22 Nov 2023 09:01) (95/63 - 113/71)  BP(mean): --  RR: 30 (22 Nov 2023 09:27) (18 - 30)  SpO2: 95% (22 Nov 2023 09:27) (93% - 100%)    Parameters below as of 22 Nov 2023 09:27  Patient On (Oxygen Delivery Method): mask, nonrebreather        I&O's Summary    21 Nov 2023 07:01  -  22 Nov 2023 07:00  --------------------------------------------------------  IN: 550 mL / OUT: 830 mL / NET: -280 mL          Physical Exam:   GENERAL: NAD  HEENT: CANDY  ENMT: No tonsillar erythema, exudates, or enlargement  NECK: Supple, No JVD  CHEST/LUNG: b/l rhonchi  CVS: Regular rate and rhythm  GI: Soft, Nontender, Nondistended, +PEG  NERVOUS SYSTEM:  Lethargic   EXTREMITIES:  2+ Peripheral Pulses, No clubbing, cyanosis, or edema  SKIN: No rashes or lesions  PSYCH: Appropriate    Labs:  ABG - ( 22 Nov 2023 05:57 )  pH, Arterial: 7.47  pH, Blood: x     /  pCO2: 34    /  pO2: 102   / HCO3: 25    / Base Excess: 1.5   /  SaO2: 99.5            28                            11.9   8.19  )-----------( 251      ( 22 Nov 2023 07:21 )             38.5                         11.2   6.31  )-----------( 244      ( 21 Nov 2023 06:34 )             34.0                         10.8   6.53  )-----------( 241      ( 20 Nov 2023 22:01 )             32.6                         11.2   5.39  )-----------( 242      ( 20 Nov 2023 07:21 )             34.7                         11.2   6.33  )-----------( 236      ( 19 Nov 2023 09:10 )             34.8                         11.2   13.56 )-----------( 232      ( 18 Nov 2023 18:40 )             35.3     11-22    138  |  106  |  11  ----------------------------<  121<H>  3.6   |  23  |  0.43<L>  11-20    143  |  109<H>  |  16  ----------------------------<  118<H>  3.6   |  24  |  0.39<L>  11-20    140  |  107  |  18  ----------------------------<  144<H>  3.3<L>   |  23  |  0.54  11-19    140  |  105  |  20  ----------------------------<  141<H>  3.9   |  27  |  0.46<L>  11-18    137  |  102  |  28<H>  ----------------------------<  127<H>  5.7<H>   |  25  |  0.39<L>    Ca    8.2<L>      22 Nov 2023 07:16  Ca    7.9<L>      20 Nov 2023 22:01  Phos  2.6     11-20  Mg     2.2     11-20    TPro  6.3  /  Alb  2.5<L>  /  TBili  1.0  /  DBili  x   /  AST  19  /  ALT  21  /  AlkPhos  134<H>  11-22  TPro  6.1  /  Alb  2.3<L>  /  TBili  0.5  /  DBili  x   /  AST  28  /  ALT  22  /  AlkPhos  140<H>  11-20  TPro  6.6  /  Alb  2.6<L>  /  TBili  0.5  /  DBili  x   /  AST  24  /  ALT  23  /  AlkPhos  134<H>  11-20  TPro  6.5  /  Alb  2.5<L>  /  TBili  0.8  /  DBili  x   /  AST  18  /  ALT  17  /  AlkPhos  124<H>  11-19  TPro  7.2  /  Alb  2.5<L>  /  TBili  0.8  /  DBili  x   /  AST  57<H>  /  ALT  23  /  AlkPhos  128<H>  11-18      LIVER FUNCTIONS - ( 22 Nov 2023 07:16 )  Alb: 2.5 g/dL / Pro: 6.3 g/dL / ALK PHOS: 134 U/L / ALT: 21 U/L / AST: 19 U/L / GGT: x             Urinalysis Basic - ( 22 Nov 2023 07:16 )    Color: x / Appearance: x / SG: x / pH: x  Gluc: 121 mg/dL / Ketone: x  / Bili: x / Urobili: x   Blood: x / Protein: x / Nitrite: x   Leuk Esterase: x / RBC: x / WBC x   Sq Epi: x / Non Sq Epi: x / Bacteria: x    RECENT CULTURES:  11-20 @ 22:02 .Blood Blood-Peripheral     No growth at 24 hours    11-18 @ 18:40 Clean Catch Clean Catch (Midstream)       No growth    11-18 @ 18:15 .Blood Blood-Peripheral        No growth at 72 Hours    11-18 @ 18:10 .Blood Blood-Peripheral       No growth at 72 Hours    Studies  < from: CT Abdomen and Pelvis w/ IV Cont (11.18.23 @ 19:41) >    FINDINGS:  LOWER CHEST: Aortic valvular calcifications. Left lower lobe   consolidation. Tree-in-bud/nodular opacities in the left upper lobe.    LIVER: Within normal limits.  BILE DUCTS: Normal caliber.  GALLBLADDER: Cholelithiasis.  SPLEEN: Within normal limits.  PANCREAS: Atrophic.  ADRENALS: Within normal limits.  KIDNEYS/URETERS: Subcentimeter foci in the right kidney which are too   small to characterize. No hydronephrosis.    BLADDER: Mild bladder wall thickening with a few fociof free air in the   bladder lumen, evaluate for recent instrumentation and/or infection.  REPRODUCTIVE ORGANS: Normal size prostate.    BOWEL: No bowel obstruction. Appendix is not visualized. Percutaneous   gastrostomy tube in the stomach.  PERITONEUM: Small volume ascites.  VESSELS: Atherosclerotic changes.  RETROPERITONEUM/LYMPH NODES: No lymphadenopathy.  ABDOMINAL WALL: Extensive decubitus ulcer involving the right posterior   pelvis and gluteal region with extension down to bone and erosion of the   ischium (3-157).  BONES: Erosion of the right ischium secondary to extensive decubitus   ulcer. Degenerative changes.    IMPRESSION:    Extensive decubitus ulcer involving the right posterior gluteal and   pelvic region with extension to bone. There is bony erosion of the   ischium compatible with osteomyelitis.    Mild bladder wall thickening and a few foci of intraluminal air,   correlate for recent instrumentation and/or infection.    Left lower lobe consolidation along with tree-in-bud/nodular opacities in   the left upper lobe concerning for pneumonia.    --- End of Report ---    < end of copied text >    < from: [facility] Duplex Lower Ext Vein Scan, Bilat (11.21.23 @ 17:25) >    FINDINGS:    RIGHT:  Normal compressibility of the RIGHT common femoral, femoral and popliteal   veins.  Doppler examination shows normal spontaneous and phasic flow.  The patient's legs are contracted; the RIGHT calf veins could not be   visualized.    LEFT:  Normal compressibility of the LEFT common femoral, femoral and popliteal   veins.  Doppler examination shows normal spontaneous and phasic flow.  The patient's legs are contracted; the LEFT calf veins could not be   visualized.    IMPRESSION:  No evidence of above-the-knee deep venous thrombosis in either lower   extremity.  The patient's legs are contracted; the calf veins could not be visualized.        < end of copied text >

## 2023-11-22 NOTE — PROGRESS NOTE ADULT - PROBLEM SELECTOR PLAN 2
CT A/P with LLL consolidation & SHEILA TIB opacities  -F/u CTA chest to further assess lung parenchyma  -RVP negative  -MRSA PCR negative  -BC with NGTD  -Urine legionella negative  -ABX per ID.

## 2023-11-22 NOTE — PROGRESS NOTE ADULT - SUBJECTIVE AND OBJECTIVE BOX
CC: F/U for Fever    Saw/spoke to patient. Patient had fever overnight. Attempted to have CT done but IV was nonfunctioning.    Allergies  No Known Allergies    ANTIMICROBIALS:  piperacillin/tazobactam IVPB.. 3.375 every 8 hours  vancomycin  IVPB 500 every 12 hours  vancomycin  IVPB      PE:    Vital Signs Last 24 Hrs  T(C): 36.7 (22 Nov 2023 09:01), Max: 38.4 (21 Nov 2023 23:00)  T(F): 98 (22 Nov 2023 09:01), Max: 101.2 (21 Nov 2023 23:00)  HR: 99 (22 Nov 2023 09:01) (96 - 127)  BP: 109/73 (22 Nov 2023 09:01) (95/63 - 113/71)  RR: 30 (22 Nov 2023 09:27) (18 - 30)  SpO2: 95% (22 Nov 2023 09:27) (93% - 100%)    Gen: AOx3, NAD, non-toxic  CV: Nontachycardic  Resp: Breathing comfortably, facemask O2  Abd: Soft, nontender  IV/Skin: No thrombophlebitis    LABS:                        11.9   8.19  )-----------( 251      ( 22 Nov 2023 07:21 )             38.5     11-22    138  |  106  |  11  ----------------------------<  121<H>  3.6   |  23  |  0.43<L>    Ca    8.2<L>      22 Nov 2023 07:16  Phos  2.6     11-20  Mg     2.2     11-20    TPro  6.3  /  Alb  2.5<L>  /  TBili  1.0  /  DBili  x   /  AST  19  /  ALT  21  /  AlkPhos  134<H>  11-22    Urinalysis Basic - ( 22 Nov 2023 07:16 )    Color: x / Appearance: x / SG: x / pH: x  Gluc: 121 mg/dL / Ketone: x  / Bili: x / Urobili: x   Blood: x / Protein: x / Nitrite: x   Leuk Esterase: x / RBC: x / WBC x   Sq Epi: x / Non Sq Epi: x / Bacteria: x    MICROBIOLOGY:  Vancomycin Level, Trough: 7.7 ug/mL (11-22-23 @ 09:05)    .Blood Blood-Peripheral  11-20-23   No growth at 24 hours  --  --    Clean Catch Clean Catch (Midstream)  11-18-23   No growth  --  --    .Blood Blood-Peripheral  11-18-23   No growth at 72 Hours  --  --    .Blood Blood-Peripheral  11-18-23   No growth at 72 Hours  --  --    Rapid RVP Result: NotDetec (11-18 @ 19:20)    RADIOLOGY:    11/21 US:    IMPRESSION:  No evidence of above-the-knee deep venous thrombosis in either lower   extremity.  The patient's legs are contracted; the calf veins could not be visualized

## 2023-11-22 NOTE — CHART NOTE - NSCHARTNOTEFT_GEN_A_CORE
Notified by RN that pt 's HR is 120's. Pt seen at the bedside. T 101.2, BP 95/63. Blood cultures were done on 11/20. Tylenol,  ml bolus with improvement. Continue antibiotics. Pt is due to have CTA. Will follow up. Pt's wife at the bedside.     Kathe Byrd NP, #[id number]

## 2023-11-22 NOTE — PROGRESS NOTE ADULT - PROBLEM SELECTOR PLAN 3
CT A/P with extensive decubitus ulcer involving the right posterior gluteal and pelvic region with extension to bone, bony erosion of the ischium compatible with osteomyelitis  -ABX per ID.

## 2023-11-23 LAB
CULTURE RESULTS: SIGNIFICANT CHANGE UP
D DIMER BLD IA.RAPID-MCNC: 748 NG/ML DDU — HIGH
D DIMER BLD IA.RAPID-MCNC: 748 NG/ML DDU — HIGH
ERYTHROCYTE [SEDIMENTATION RATE] IN BLOOD: 62 MM/HR — HIGH (ref 0–20)
ERYTHROCYTE [SEDIMENTATION RATE] IN BLOOD: 62 MM/HR — HIGH (ref 0–20)
SPECIMEN SOURCE: SIGNIFICANT CHANGE UP

## 2023-11-23 PROCEDURE — 99232 SBSQ HOSP IP/OBS MODERATE 35: CPT

## 2023-11-23 PROCEDURE — 93010 ELECTROCARDIOGRAM REPORT: CPT

## 2023-11-23 RX ADMIN — Medication 3 MILLILITER(S): at 05:55

## 2023-11-23 RX ADMIN — PANTOPRAZOLE SODIUM 40 MILLIGRAM(S): 20 TABLET, DELAYED RELEASE ORAL at 05:54

## 2023-11-23 RX ADMIN — Medication 3 MILLILITER(S): at 11:47

## 2023-11-23 RX ADMIN — PIPERACILLIN AND TAZOBACTAM 25 GRAM(S): 4; .5 INJECTION, POWDER, LYOPHILIZED, FOR SOLUTION INTRAVENOUS at 05:51

## 2023-11-23 RX ADMIN — PIPERACILLIN AND TAZOBACTAM 25 GRAM(S): 4; .5 INJECTION, POWDER, LYOPHILIZED, FOR SOLUTION INTRAVENOUS at 22:00

## 2023-11-23 RX ADMIN — Medication 1 GRAM(S): at 17:24

## 2023-11-23 RX ADMIN — LEVALBUTEROL 0.63 MILLIGRAM(S): 1.25 SOLUTION, CONCENTRATE RESPIRATORY (INHALATION) at 17:25

## 2023-11-23 RX ADMIN — Medication 500 MICROGRAM(S): at 11:48

## 2023-11-23 RX ADMIN — Medication 1 GRAM(S): at 11:47

## 2023-11-23 RX ADMIN — OLANZAPINE 5 MILLIGRAM(S): 15 TABLET, FILM COATED ORAL at 21:59

## 2023-11-23 RX ADMIN — Medication 1 APPLICATION(S): at 17:26

## 2023-11-23 RX ADMIN — Medication 1 APPLICATION(S): at 05:53

## 2023-11-23 RX ADMIN — LEVALBUTEROL 0.63 MILLIGRAM(S): 1.25 SOLUTION, CONCENTRATE RESPIRATORY (INHALATION) at 12:24

## 2023-11-23 RX ADMIN — Medication 1 GRAM(S): at 05:54

## 2023-11-23 RX ADMIN — Medication 3 MILLILITER(S): at 17:25

## 2023-11-23 RX ADMIN — PIPERACILLIN AND TAZOBACTAM 25 GRAM(S): 4; .5 INJECTION, POWDER, LYOPHILIZED, FOR SOLUTION INTRAVENOUS at 13:55

## 2023-11-23 RX ADMIN — Medication 500 MICROGRAM(S): at 05:55

## 2023-11-23 RX ADMIN — Medication 750 MILLIGRAM(S): at 16:25

## 2023-11-23 RX ADMIN — QUETIAPINE FUMARATE 100 MILLIGRAM(S): 200 TABLET, FILM COATED ORAL at 21:59

## 2023-11-23 RX ADMIN — POLYETHYLENE GLYCOL 3350 17 GRAM(S): 17 POWDER, FOR SOLUTION ORAL at 11:48

## 2023-11-23 RX ADMIN — Medication 500 MICROGRAM(S): at 17:25

## 2023-11-23 RX ADMIN — CHLORHEXIDINE GLUCONATE 1 APPLICATION(S): 213 SOLUTION TOPICAL at 06:30

## 2023-11-23 NOTE — PROGRESS NOTE ADULT - ASSESSMENT
77 yo male h/o dementia, non verbal. contracted, s/p peg. here with sepsis likely 2/2 sacral decub and aspiration pna    sepsis  respiratory failure   sacral decub  pna - possible aspiration   cont abx as per id  f/u cult  wound care f/u  id consult noted  pulm consulted  cont supp o2 via high flow. taper down as able   f/u CTA r/o PE - now cancelled as resp status improved     dementia  cont home meds  supportive care    dysphagia s/p peg  feeds as per gi/nutrition          dvt ppx      d/w wife bedside  DNR      Advanced care planning was discussed with patient and family.  Advanced care planning forms were reviewed and discussed as appropriate.  Differential diagnosis and plan of care discussed with patient after the evaluation.   Pain assessed and judicious use of narcotics when appropriate was discussed.  Importance of Fall prevention discussed.  Counseling on Smoking and Alcohol cessation was offered when appropriate.  Counseling on Diet, exercise, and medication compliance was done.       Approx 75 minutes spent.

## 2023-11-23 NOTE — PROGRESS NOTE ADULT - ASSESSMENT
79 yo M PMHx of severe dementia, s/p PEG tube, GERD, arthritis presents with ulcer that has worsened over last month  No fever, has leukocytosis  Brought in for ? fevers and worsening wound; chronically bed bound  UA neg, UCX neg  RVP neg  CT with sacral decubitus ulcers, OM erosion, pneumonia, blader wall thickening  BCX NGTD  Possible pneumonia  Had RRT 11/20, vancomycin readded; suspect due to re-aspiration but unclear  Further fevers overnight noted, patient generally unchanged, still hypoxic, HFNC  Overall, Abnormal finding on imaging, pneumonia  - Zosyn 3.375g q 8  - If not improving would re-attempt to Check CT (with contrast if able to tolerate)  - Check sputum culture if producing sputum  - Wound care eval to sacral wound  - Uncertain benefit to prolonged treatment course for sacral wound  - O2 status improving    Guarded/GOC per team    Roni Luis MD  Contact on TEAMS messaging from 9am - 5pm  From 5pm-9am, on weekends, or if no response call [phone]

## 2023-11-23 NOTE — PROGRESS NOTE ADULT - SUBJECTIVE AND OBJECTIVE BOX
[facility]  Nathaniel Barron PA-C  [address] [place] [address]  [phone]      INTERVAL HPI/OVERNIGHT EVENTS:  pt seen and examined, no new events    MEDICATIONS  (STANDING):  chlorhexidine 2% Cloths 1 Application(s) Topical <User Schedule>  Dakins Solution - 1/2 Strength 1 Application(s) Topical daily  Dakins Solution - 1/4 Strength 1 Application(s) Topical two times a day  enoxaparin Injectable 40 milliGRAM(s) SubCutaneous every 24 hours  guaiFENesin  milliGRAM(s) Oral every 12 hours  OLANZapine 5 milliGRAM(s) Oral <User Schedule>  pantoprazole    Tablet 40 milliGRAM(s) Oral before breakfast  piperacillin/tazobactam IVPB.. 3.375 Gram(s) IV Intermittent every 8 hours  polyethylene glycol 3350 17 Gram(s) Oral daily  QUEtiapine 100 milliGRAM(s) Oral <User Schedule>  senna 2 Tablet(s) Oral at bedtime  sucralfate 1 Gram(s) Oral four times a day  vancomycin  IVPB 500 milliGRAM(s) IV Intermittent every 12 hours  vancomycin  IVPB        MEDICATIONS  (PRN):  acetaminophen   Oral Liquid .. 750 milliGRAM(s) Oral every 6 hours PRN Mild Pain (1 - 3)      Allergies    No Known Allergies    Intolerances    Haldol (Other)  benzodiazepines (Other)      ROS:   unable to obtain       PHYSICAL EXAM:   Vital Signs Last 24 Hrs  T(C): 36.9 (23 Nov 2023 04:21), Max: 37.2 (22 Nov 2023 21:44)  T(F): 98.4 (23 Nov 2023 04:21), Max: 99 (22 Nov 2023 21:44)  HR: 104 (23 Nov 2023 04:21) (96 - 112)  BP: 111/76 (23 Nov 2023 04:21) (111/76 - 128/74)  BP(mean): --  RR: 22 (23 Nov 2023 04:21) (20 - 22)  SpO2: 100% (23 Nov 2023 04:21) (100% - 100%)    Parameters below as of 23 Nov 2023 04:21  Patient On (Oxygen Delivery Method): nasal cannula, high flow          Daily     Daily     nad  confused  frail  non toxic  soft, nt, +PEG  no edema        LABS:                                   11.9   8.19  )-----------( 251      ( 22 Nov 2023 07:21 )             38.5   11-22    138  |  106  |  11  ----------------------------<  121<H>  3.6   |  23  |  0.43<L>    Ca    8.2<L>      22 Nov 2023 07:16    TPro  6.3  /  Alb  2.5<L>  /  TBili  1.0  /  DBili  x   /  AST  19  /  ALT  21  /  AlkPhos  134<H>  11-22      Urinalysis Basic - ( 20 Nov 2023 22:01 )    Color: x / Appearance: x / SG: x / pH: x  Gluc: 118 mg/dL / Ketone: x  / Bili: x / Urobili: x   Blood: x / Protein: x / Nitrite: x   Leuk Esterase: x / RBC: x / WBC x   Sq Epi: x / Non Sq Epi: x / Bacteria: x        RADIOLOGY & ADDITIONAL TESTS:

## 2023-11-23 NOTE — PROGRESS NOTE ADULT - SUBJECTIVE AND OBJECTIVE BOX
CC: F/U for PNA    Saw/spoke to patient. No fevers, no chills. No new complaints.    Allergies  No Known Allergies    ANTIMICROBIALS:  piperacillin/tazobactam IVPB.. 3.375 every 8 hours    PE:    Vital Signs Last 24 Hrs  T(C): 36.9 (23 Nov 2023 04:21), Max: 37.2 (22 Nov 2023 21:44)  T(F): 98.4 (23 Nov 2023 04:21), Max: 99 (22 Nov 2023 21:44)  HR: 104 (23 Nov 2023 04:21) (96 - 112)  BP: 111/76 (23 Nov 2023 04:21) (111/76 - 128/74)  RR: 22 (23 Nov 2023 04:21) (20 - 22)  SpO2: 100% (23 Nov 2023 04:21) (100% - 100%)    Gen: AOx0-1, fatigued  CV: Nontachycardic  Resp: Breathing comfortably, HFNC  Abd: Soft, nontender  IV/Skin: No thrombophlebitis    LABS:                        11.9   8.19  )-----------( 251      ( 22 Nov 2023 07:21 )             38.5     11-22    138  |  106  |  11  ----------------------------<  121<H>  3.6   |  23  |  0.43<L>    Ca    8.2<L>      22 Nov 2023 07:16    TPro  6.3  /  Alb  2.5<L>  /  TBili  1.0  /  DBili  x   /  AST  19  /  ALT  21  /  AlkPhos  134<H>  11-22    Urinalysis Basic - ( 22 Nov 2023 07:16 )    Color: x / Appearance: x / SG: x / pH: x  Gluc: 121 mg/dL / Ketone: x  / Bili: x / Urobili: x   Blood: x / Protein: x / Nitrite: x   Leuk Esterase: x / RBC: x / WBC x   Sq Epi: x / Non Sq Epi: x / Bacteria: x    MICROBIOLOGY:    .Blood Blood-Peripheral  11-20-23   No growth at 48 Hours  --  --    Clean Catch Clean Catch (Midstream)  11-18-23   No growth  --  --    .Blood Blood-Peripheral  11-18-23   No growth at 4 days  --  --    .Blood Blood-Peripheral  11-18-23   No growth at 4 days  --  --    Rapid RVP Result: NotDetec (11-18 @ 19:20)    RADIOLOGY:    11/18 CT    IMPRESSION:    Extensive decubitus ulcer involving the right posterior gluteal and   pelvic region with extension to bone. There is bony erosion of the   ischium compatible with osteomyelitis.    Mild bladder wall thickening and a few foci of intraluminal air,   correlate for recent instrumentation and/or infection.    Left lower lobe consolidation along with tree-in-bud/nodular opacities in   the left upper lobe concerning for pneumonia.

## 2023-11-23 NOTE — PROVIDER CONTACT NOTE (OTHER) - ACTION/TREATMENT ORDERED:
DESTINY Bernal made aware, no interventions at this time DESTINY Bernal made aware, ordered EKG-reading sinus tachy DESTINY Bernal made aware, ordered EKG-reading sinus tachy, PA also ordered IVPB tylenol and dulcolax

## 2023-11-23 NOTE — PROGRESS NOTE ADULT - SUBJECTIVE AND OBJECTIVE BOX
Date of Service: 11-23-23 @ 13:27    Patient is a 78y old  Male who presents with a chief complaint of Osteomyelitis     (20 Nov 2023 11:10)      Any change in ROS:  wife at bedside:  on 45%:  on 45 L;  he is alert and awake:     MEDICATIONS  (STANDING):  acetaminophen   Oral Liquid .. 750 milliGRAM(s) Enteral Tube once  acetylcysteine 20%  Inhalation 3 milliLiter(s) Inhalation every 6 hours  chlorhexidine 2% Cloths 1 Application(s) Topical <User Schedule>  Dakins Solution - 1/4 Strength 1 Application(s) Topical two times a day  enoxaparin Injectable 40 milliGRAM(s) SubCutaneous every 24 hours  ipratropium    for Nebulization 500 MICROGram(s) Nebulizer every 6 hours  levalbuterol Inhalation 0.63 milliGRAM(s) Inhalation every 6 hours  OLANZapine 5 milliGRAM(s) Oral <User Schedule>  pantoprazole    Tablet 40 milliGRAM(s) Oral before breakfast  piperacillin/tazobactam IVPB.. 3.375 Gram(s) IV Intermittent every 8 hours  QUEtiapine 100 milliGRAM(s) Oral <User Schedule>  sucralfate 1 Gram(s) Oral four times a day    MEDICATIONS  (PRN):  acetaminophen   Oral Liquid .. 750 milliGRAM(s) Oral every 6 hours PRN Mild Pain (1 - 3)    Vital Signs Last 24 Hrs  T(C): 36.7 (23 Nov 2023 12:50), Max: 37.2 (22 Nov 2023 21:44)  T(F): 98.1 (23 Nov 2023 12:50), Max: 99 (22 Nov 2023 21:44)  HR: 105 (23 Nov 2023 12:50) (96 - 112)  BP: 113/82 (23 Nov 2023 12:50) (111/76 - 128/74)  BP(mean): --  RR: 20 (23 Nov 2023 12:50) (20 - 22)  SpO2: 100% (23 Nov 2023 12:50) (95% - 100%)    Parameters below as of 23 Nov 2023 04:21  Patient On (Oxygen Delivery Method): nasal cannula, high flow        I&O's Summary    22 Nov 2023 07:01  -  23 Nov 2023 07:00  --------------------------------------------------------  IN: 0 mL / OUT: 1700 mL / NET: -1700 mL          Physical Exam:   GENERAL: contracted  HEENT: CANDY/   Atraumatic, Normocephalic  ENMT: No tonsillar erythema, exudates, or enlargement; Moist mucous membranes, Good dentition, No lesions  NECK: Supple, No JVD, Normal thyroid  CHEST/LUNG: Clear to auscultaion, ; No rales, rhonchi, wheezing, or rubs  CVS: Regular rate and rhythm; No murmurs, rubs, or gallops  GI: : Soft, Nontender, Nondistended; Bowel sounds present  NERVOUS SYSTEM:  Alert & awake  EXTREMITIES: - edema  LYMPH: No lymphadenopathy noted  SKIN: No rashes or lesions  ENDOCRINOLOGY: No Thyromegaly  PSYCH: caplm   Labs:  ABG - ( 22 Nov 2023 05:57 )  pH, Arterial: 7.47  pH, Blood: x     /  pCO2: 34    /  pO2: 102   / HCO3: 25    / Base Excess: 1.5   /  SaO2: 99.5            28                            11.9   8.19  )-----------( 251      ( 22 Nov 2023 07:21 )             38.5                         11.2   6.31  )-----------( 244      ( 21 Nov 2023 06:34 )             34.0                         10.8   6.53  )-----------( 241      ( 20 Nov 2023 22:01 )             32.6                         11.2   5.39  )-----------( 242      ( 20 Nov 2023 07:21 )             34.7     11-22    138  |  106  |  11  ----------------------------<  121<H>  3.6   |  23  |  0.43<L>  11-20    143  |  109<H>  |  16  ----------------------------<  118<H>  3.6   |  24  |  0.39<L>  11-20    140  |  107  |  18  ----------------------------<  144<H>  3.3<L>   |  23  |  0.54    Ca    8.2<L>      22 Nov 2023 07:16    TPro  6.3  /  Alb  2.5<L>  /  TBili  1.0  /  DBili  x   /  AST  19  /  ALT  21  /  AlkPhos  134<H>  11-22  TPro  6.1  /  Alb  2.3<L>  /  TBili  0.5  /  DBili  x   /  AST  28  /  ALT  22  /  AlkPhos  140<H>  11-20  TPro  6.6  /  Alb  2.6<L>  /  TBili  0.5  /  DBili  x   /  AST  24  /  ALT  23  /  AlkPhos  134<H>  11-20    CAPILLARY BLOOD GLUCOSE          LIVER FUNCTIONS - ( 22 Nov 2023 07:16 )  Alb: 2.5 g/dL / Pro: 6.3 g/dL / ALK PHOS: 134 U/L / ALT: 21 U/L / AST: 19 U/L / GGT: x             Urinalysis Basic - ( 22 Nov 2023 07:16 )    Color: x / Appearance: x / SG: x / pH: x  Gluc: 121 mg/dL / Ketone: x  / Bili: x / Urobili: x   Blood: x / Protein: x / Nitrite: x   Leuk Esterase: x / RBC: x / WBC x   Sq Epi: x / Non Sq Epi: x / Bacteria: x            RECENT CULTURES:  11-20 @ 22:02 .Blood Blood-Peripheral       < from: [facility] Duplex Lower Ext Vein Scan, Bilat (11.21.23 @ 17:25) >  RIGHT:  Normal compressibility of the RIGHT common femoral, femoral and popliteal   veins.  Doppler examination shows normal spontaneous and phasic flow.  The patient's legs are contracted; the RIGHT calf veins could not be   visualized.    LEFT:  Normal compressibility of the LEFT common femoral, femoral and popliteal   veins.  Doppler examination shows normal spontaneous and phasic flow.  The patient's legs are contracted; the LEFT calf veins could not be   visualized.    IMPRESSION:  No evidence of above-the-knee deep venous thrombosis in either lower   extremity.  The patient's legs are contracted; the calf veins could not be visualized.        --- End of Report ---            AMRIT RIDDLE MD; Attending Radiologist  This document has been electronically signed. Nov 21 2023  5:56PM    < end of copied text >           No growth at 48 Hours    11-18 @ 18:40 Clean Catch Clean Catch (Midstream)                No growth    11-18 @ 18:15 .Blood Blood-Peripheral                No growth at 4 days    11-18 @ 18:10 .Blood Blood-Peripheral                No growth at 4 days          RESPIRATORY CULTURES:          Studies  Chest X-RAY  CT SCAN Chest   Venous Dopplers: LE:   CT Abdomen  Others

## 2023-11-23 NOTE — PROGRESS NOTE ADULT - PROBLEM SELECTOR PLAN 1
S/p RRT 11/20 for hypoxia requiring HFNC  -Increase in O2 requirements 11/21 from HFNC 50L/80% to 100% Bipap. Now on HFNC 50L/100%  -Suspect aspiration event, family at bedside reports multiple aspiration events in the Past. Lower suspicion for PE, but favor ruling out given bedbound status.  -Check CTA chest to r/o PE, evaluate lung parenchyma when patient able to tolerate transport to CT. Can transport on 100% NRB  -CT A/P with LLL consolidation, SHEILA tree in bud opacities likely PNA. Continue ABX per ID  -LE duplex negative for DVT but limited study due to patient being contracted   -Duoneb q6h  -Mucomyst 20% q6h x3 days (give with Duoneb)  -Chest PT q6h  -ABG with no CO2 retention. Can continue with Bipap qHS and PRN for increased WOB. Keep sats >90% with O2 PRN when off Bipap (currently HFNC 50L/100%). Wean O2 as tolerated  -D/w RN - tolerated transport to CT on 100% NRB, maintained sats with no respiratory distress. Scan unable to be completed due to IV malfunction. F/u CTA once new IV placed.  -ct chest still not done: it is cancelled:  [id number] his oxygenation has improved significantly:  will check d dimer today: if high  will do cta reattempt

## 2023-11-23 NOTE — PROGRESS NOTE ADULT - SUBJECTIVE AND OBJECTIVE BOX
SHARONA LARKIN  78y Male  MRN:[id number]    Patient is a 78y old  Male who presents with a chief complaint of sepsis      HPI:  79 yo M PMHx of severe dementia, PEG tube presents with ulcer that has worsened over last month. Patient was seen by house calls doctor 2 weeks ago, placed on antibiotics but started mounting fevers in the last few days. It was noticed by his GI doctor (Dr. Moraes) that his ulcer was getting worse, so he sent him in. Patient is nonverbal at baseline. Wife has noticed no new cough, runny nose. (19 Nov 2023 13:02)      Patient seen and evaluated at bedside. No acute events overnight except as noted.    Interval HPI: no acute events o/n     PAST MEDICAL & SURGICAL HISTORY:  Dementia      Pneumonia      Acute renal failure (ARF)  ARF 1.5 YEAR AGO      Dysphagia  peg      Arthritis      GERD (gastroesophageal reflux disease)      Dyspepsia      History of hand surgery      PEG adjustment, replacement, or removal      Status post insertion of percutaneous endoscopic gastrostomy (PEG) tube  PEG replacement on 02/29/16          REVIEW OF SYSTEMS:  as per hpi     VITALS:   Vital Signs Last 24 Hrs  T(C): 36.7 (23 Nov 2023 12:50), Max: 37.2 (22 Nov 2023 21:44)  T(F): 98.1 (23 Nov 2023 12:50), Max: 99 (22 Nov 2023 21:44)  HR: 105 (23 Nov 2023 12:50) (96 - 112)  BP: 113/82 (23 Nov 2023 12:50) (111/76 - 128/74)  BP(mean): --  RR: 20 (23 Nov 2023 12:50) (20 - 22)  SpO2: 100% (23 Nov 2023 12:50) (95% - 100%)    Parameters below as of 23 Nov 2023 04:21  Patient On (Oxygen Delivery Method): nasal cannula, high flow            PHYSICAL EXAM:  GENERAL: NAD,    HEAD:  Atraumatic, Normocephalic  EYES: EOMI, PERRLA, conjunctiva and sclera clear  NECK: Supple, No JVD  CHEST/LUNG: Clear to auscultation bilaterally; No wheeze  HEART: S1, S2; No murmurs, rubs, or gallops  ABDOMEN: Soft, Nontender, Nondistended; Bowel sounds present +peg  EXTREMITIES:  2+ Peripheral Pulses, contracted   PSYCH: Normal affect  NEUROLOGY: non verbal  SKIN: No rashes or lesions    Consultant(s) Notes Reviewed:  [x ] YES  [ ] NO  Care Discussed with Consultants/Other Providers [ x] YES  [ ] NO    MEDS:   MEDICATIONS  (STANDING):  acetaminophen   Oral Liquid .. 750 milliGRAM(s) Enteral Tube once  acetylcysteine 20%  Inhalation 3 milliLiter(s) Inhalation every 6 hours  chlorhexidine 2% Cloths 1 Application(s) Topical <User Schedule>  Dakins Solution - 1/4 Strength 1 Application(s) Topical two times a day  enoxaparin Injectable 40 milliGRAM(s) SubCutaneous every 24 hours  ipratropium    for Nebulization 500 MICROGram(s) Nebulizer every 6 hours  levalbuterol Inhalation 0.63 milliGRAM(s) Inhalation every 6 hours  OLANZapine 5 milliGRAM(s) Oral <User Schedule>  pantoprazole    Tablet 40 milliGRAM(s) Oral before breakfast  piperacillin/tazobactam IVPB.. 3.375 Gram(s) IV Intermittent every 8 hours  QUEtiapine 100 milliGRAM(s) Oral <User Schedule>  sucralfate 1 Gram(s) Oral four times a day    MEDICATIONS  (PRN):  acetaminophen   Oral Liquid .. 750 milliGRAM(s) Oral every 6 hours PRN Mild Pain (1 - 3)        ALLERGIES:  Haldol (Other)  No Known Allergies  benzodiazepines (Other)      LABS:                                                                  11.9 8.19  )-----------( 251      ( 22 Nov 2023 07:21 )             38.5   11-22    138  |  106  |  11  ----------------------------<  121<H>  3.6   |  23  |  0.43<L>    Ca    8.2<L>      22 Nov 2023 07:16    TPro  6.3  /  Alb  2.5<L>  /  TBili  1.0  /  DBili  x   /  AST  19  /  ALT  21  /  AlkPhos  134<H>  11-22         < from: CT Abdomen and Pelvis w/ IV Cont (11.18.23 @ 19:41) >    IMPRESSION:    Extensive decubitus ulcer involving the right posterior gluteal and   pelvic region with extension to bone. There is bony erosion of the   ischium compatible with osteomyelitis.    Mild bladder wall thickening and a few foci of intraluminal air,   correlate for recent instrumentation and/or infection.    Left lower lobe consolidation along with tree-in-bud/nodular opacities in   the left upper lobe concerning for pneumonia.    --- End of Report ---        < end of copied text >

## 2023-11-24 LAB
ANION GAP SERPL CALC-SCNC: 7 MMOL/L — SIGNIFICANT CHANGE UP (ref 5–17)
ANION GAP SERPL CALC-SCNC: 7 MMOL/L — SIGNIFICANT CHANGE UP (ref 5–17)
BUN SERPL-MCNC: 14 MG/DL — SIGNIFICANT CHANGE UP (ref 7–23)
BUN SERPL-MCNC: 14 MG/DL — SIGNIFICANT CHANGE UP (ref 7–23)
CALCIUM SERPL-MCNC: 8.5 MG/DL — SIGNIFICANT CHANGE UP (ref 8.4–10.5)
CALCIUM SERPL-MCNC: 8.5 MG/DL — SIGNIFICANT CHANGE UP (ref 8.4–10.5)
CHLORIDE SERPL-SCNC: 108 MMOL/L — SIGNIFICANT CHANGE UP (ref 96–108)
CHLORIDE SERPL-SCNC: 108 MMOL/L — SIGNIFICANT CHANGE UP (ref 96–108)
CO2 SERPL-SCNC: 27 MMOL/L — SIGNIFICANT CHANGE UP (ref 22–31)
CO2 SERPL-SCNC: 27 MMOL/L — SIGNIFICANT CHANGE UP (ref 22–31)
CREAT SERPL-MCNC: 0.41 MG/DL — LOW (ref 0.5–1.3)
CREAT SERPL-MCNC: 0.41 MG/DL — LOW (ref 0.5–1.3)
EGFR: 112 ML/MIN/1.73M2 — SIGNIFICANT CHANGE UP
EGFR: 112 ML/MIN/1.73M2 — SIGNIFICANT CHANGE UP
GLUCOSE SERPL-MCNC: 145 MG/DL — HIGH (ref 70–99)
GLUCOSE SERPL-MCNC: 145 MG/DL — HIGH (ref 70–99)
HCT VFR BLD CALC: 36.8 % — LOW (ref 39–50)
HCT VFR BLD CALC: 36.8 % — LOW (ref 39–50)
HGB BLD-MCNC: 11.6 G/DL — LOW (ref 13–17)
HGB BLD-MCNC: 11.6 G/DL — LOW (ref 13–17)
MCHC RBC-ENTMCNC: 31.2 PG — SIGNIFICANT CHANGE UP (ref 27–34)
MCHC RBC-ENTMCNC: 31.2 PG — SIGNIFICANT CHANGE UP (ref 27–34)
MCHC RBC-ENTMCNC: 31.5 GM/DL — LOW (ref 32–36)
MCHC RBC-ENTMCNC: 31.5 GM/DL — LOW (ref 32–36)
MCV RBC AUTO: 98.9 FL — SIGNIFICANT CHANGE UP (ref 80–100)
MCV RBC AUTO: 98.9 FL — SIGNIFICANT CHANGE UP (ref 80–100)
NRBC # BLD: 0 /100 WBCS — SIGNIFICANT CHANGE UP (ref 0–0)
NRBC # BLD: 0 /100 WBCS — SIGNIFICANT CHANGE UP (ref 0–0)
PLATELET # BLD AUTO: 236 K/UL — SIGNIFICANT CHANGE UP (ref 150–400)
PLATELET # BLD AUTO: 236 K/UL — SIGNIFICANT CHANGE UP (ref 150–400)
POTASSIUM SERPL-MCNC: 3.3 MMOL/L — LOW (ref 3.5–5.3)
POTASSIUM SERPL-MCNC: 3.3 MMOL/L — LOW (ref 3.5–5.3)
POTASSIUM SERPL-SCNC: 3.3 MMOL/L — LOW (ref 3.5–5.3)
POTASSIUM SERPL-SCNC: 3.3 MMOL/L — LOW (ref 3.5–5.3)
RBC # BLD: 3.72 M/UL — LOW (ref 4.2–5.8)
RBC # BLD: 3.72 M/UL — LOW (ref 4.2–5.8)
RBC # FLD: 13.4 % — SIGNIFICANT CHANGE UP (ref 10.3–14.5)
RBC # FLD: 13.4 % — SIGNIFICANT CHANGE UP (ref 10.3–14.5)
SODIUM SERPL-SCNC: 142 MMOL/L — SIGNIFICANT CHANGE UP (ref 135–145)
SODIUM SERPL-SCNC: 142 MMOL/L — SIGNIFICANT CHANGE UP (ref 135–145)
WBC # BLD: 7.7 K/UL — SIGNIFICANT CHANGE UP (ref 3.8–10.5)
WBC # BLD: 7.7 K/UL — SIGNIFICANT CHANGE UP (ref 3.8–10.5)
WBC # FLD AUTO: 7.7 K/UL — SIGNIFICANT CHANGE UP (ref 3.8–10.5)
WBC # FLD AUTO: 7.7 K/UL — SIGNIFICANT CHANGE UP (ref 3.8–10.5)

## 2023-11-24 PROCEDURE — 93010 ELECTROCARDIOGRAM REPORT: CPT

## 2023-11-24 PROCEDURE — 99232 SBSQ HOSP IP/OBS MODERATE 35: CPT

## 2023-11-24 RX ORDER — POTASSIUM CHLORIDE 20 MEQ
10 PACKET (EA) ORAL
Refills: 0 | Status: COMPLETED | OUTPATIENT
Start: 2023-11-24 | End: 2023-11-25

## 2023-11-24 RX ORDER — MORPHINE SULFATE 50 MG/1
1 CAPSULE, EXTENDED RELEASE ORAL ONCE
Refills: 0 | Status: DISCONTINUED | OUTPATIENT
Start: 2023-11-24 | End: 2023-11-24

## 2023-11-24 RX ORDER — ACETAMINOPHEN 500 MG
750 TABLET ORAL ONCE
Refills: 0 | Status: COMPLETED | OUTPATIENT
Start: 2023-11-24 | End: 2023-11-24

## 2023-11-24 RX ORDER — ACETYLCYSTEINE 200 MG/ML
3 VIAL (ML) MISCELLANEOUS EVERY 6 HOURS
Refills: 0 | Status: COMPLETED | OUTPATIENT
Start: 2023-11-24 | End: 2023-11-27

## 2023-11-24 RX ADMIN — Medication 500 MICROGRAM(S): at 17:09

## 2023-11-24 RX ADMIN — PIPERACILLIN AND TAZOBACTAM 25 GRAM(S): 4; .5 INJECTION, POWDER, LYOPHILIZED, FOR SOLUTION INTRAVENOUS at 13:28

## 2023-11-24 RX ADMIN — Medication 3 MILLILITER(S): at 05:58

## 2023-11-24 RX ADMIN — Medication 1 GRAM(S): at 00:27

## 2023-11-24 RX ADMIN — Medication 300 MILLIGRAM(S): at 01:05

## 2023-11-24 RX ADMIN — ENOXAPARIN SODIUM 40 MILLIGRAM(S): 100 INJECTION SUBCUTANEOUS at 21:27

## 2023-11-24 RX ADMIN — LEVALBUTEROL 0.63 MILLIGRAM(S): 1.25 SOLUTION, CONCENTRATE RESPIRATORY (INHALATION) at 23:40

## 2023-11-24 RX ADMIN — Medication 1 APPLICATION(S): at 17:09

## 2023-11-24 RX ADMIN — Medication 3 MILLILITER(S): at 17:08

## 2023-11-24 RX ADMIN — MORPHINE SULFATE 1 MILLIGRAM(S): 50 CAPSULE, EXTENDED RELEASE ORAL at 22:39

## 2023-11-24 RX ADMIN — LEVALBUTEROL 0.63 MILLIGRAM(S): 1.25 SOLUTION, CONCENTRATE RESPIRATORY (INHALATION) at 17:09

## 2023-11-24 RX ADMIN — Medication 3 MILLILITER(S): at 00:28

## 2023-11-24 RX ADMIN — LEVALBUTEROL 0.63 MILLIGRAM(S): 1.25 SOLUTION, CONCENTRATE RESPIRATORY (INHALATION) at 05:58

## 2023-11-24 RX ADMIN — Medication 1 GRAM(S): at 05:59

## 2023-11-24 RX ADMIN — QUETIAPINE FUMARATE 100 MILLIGRAM(S): 200 TABLET, FILM COATED ORAL at 20:46

## 2023-11-24 RX ADMIN — Medication 500 MICROGRAM(S): at 00:28

## 2023-11-24 RX ADMIN — Medication 750 MILLIGRAM(S): at 14:46

## 2023-11-24 RX ADMIN — Medication 1 GRAM(S): at 23:35

## 2023-11-24 RX ADMIN — Medication 1 GRAM(S): at 11:22

## 2023-11-24 RX ADMIN — Medication 10 MILLIGRAM(S): at 00:27

## 2023-11-24 RX ADMIN — Medication 1 APPLICATION(S): at 05:59

## 2023-11-24 RX ADMIN — ENOXAPARIN SODIUM 40 MILLIGRAM(S): 100 INJECTION SUBCUTANEOUS at 00:27

## 2023-11-24 RX ADMIN — PIPERACILLIN AND TAZOBACTAM 25 GRAM(S): 4; .5 INJECTION, POWDER, LYOPHILIZED, FOR SOLUTION INTRAVENOUS at 21:28

## 2023-11-24 RX ADMIN — Medication 3 MILLILITER(S): at 11:22

## 2023-11-24 RX ADMIN — PIPERACILLIN AND TAZOBACTAM 25 GRAM(S): 4; .5 INJECTION, POWDER, LYOPHILIZED, FOR SOLUTION INTRAVENOUS at 05:58

## 2023-11-24 RX ADMIN — PANTOPRAZOLE SODIUM 40 MILLIGRAM(S): 20 TABLET, DELAYED RELEASE ORAL at 05:59

## 2023-11-24 RX ADMIN — Medication 500 MICROGRAM(S): at 05:58

## 2023-11-24 RX ADMIN — Medication 3 MILLILITER(S): at 23:26

## 2023-11-24 RX ADMIN — Medication 500 MICROGRAM(S): at 11:22

## 2023-11-24 RX ADMIN — Medication 1 GRAM(S): at 17:08

## 2023-11-24 RX ADMIN — Medication 750 MILLIGRAM(S): at 02:00

## 2023-11-24 RX ADMIN — LEVALBUTEROL 0.63 MILLIGRAM(S): 1.25 SOLUTION, CONCENTRATE RESPIRATORY (INHALATION) at 00:45

## 2023-11-24 RX ADMIN — MORPHINE SULFATE 1 MILLIGRAM(S): 50 CAPSULE, EXTENDED RELEASE ORAL at 23:00

## 2023-11-24 RX ADMIN — Medication 750 MILLIGRAM(S): at 20:51

## 2023-11-24 RX ADMIN — LEVALBUTEROL 0.63 MILLIGRAM(S): 1.25 SOLUTION, CONCENTRATE RESPIRATORY (INHALATION) at 11:22

## 2023-11-24 RX ADMIN — Medication 750 MILLIGRAM(S): at 15:46

## 2023-11-24 NOTE — PROGRESS NOTE ADULT - SUBJECTIVE AND OBJECTIVE BOX
Date of Service: 11-24-23 @ 12:07    Patient is a 78y old  Male who presents with a chief complaint of Osteomyelitis     (20 Nov 2023 11:10)      Any change in ROS:   Lethargic, unable to participate in ROS  Breathing nonlabored  Seen with RT at bedside - lowering HFNC to 45/45%, reports was on 70% early this AM    MEDICATIONS  (STANDING):  acetaminophen   Oral Liquid .. 750 milliGRAM(s) Enteral Tube once  chlorhexidine 2% Cloths 1 Application(s) Topical <User Schedule>  Dakins Solution - 1/4 Strength 1 Application(s) Topical two times a day  enoxaparin Injectable 40 milliGRAM(s) SubCutaneous every 24 hours  ipratropium    for Nebulization 500 MICROGram(s) Nebulizer every 6 hours  levalbuterol Inhalation 0.63 milliGRAM(s) Inhalation every 6 hours  OLANZapine 5 milliGRAM(s) Oral <User Schedule>  pantoprazole    Tablet 40 milliGRAM(s) Oral before breakfast  piperacillin/tazobactam IVPB.. 3.375 Gram(s) IV Intermittent every 8 hours  QUEtiapine 100 milliGRAM(s) Oral <User Schedule>  sucralfate 1 Gram(s) Oral four times a day    MEDICATIONS  (PRN):  acetaminophen   Oral Liquid .. 750 milliGRAM(s) Oral every 6 hours PRN Mild Pain (1 - 3)    Vital Signs Last 24 Hrs  T(C): 36.8 (24 Nov 2023 06:00), Max: 37.6 (23 Nov 2023 15:58)  T(F): 98.3 (24 Nov 2023 06:00), Max: 99.6 (23 Nov 2023 15:58)  HR: 90 (24 Nov 2023 09:58) (89 - 128)  BP: 102/62 (24 Nov 2023 06:00) (102/62 - 113/82)  BP(mean): --  RR: 20 (24 Nov 2023 09:58) (20 - 20)  SpO2: 99% (24 Nov 2023 09:58) (93% - 100%)    Parameters below as of 24 Nov 2023 09:58  Patient On (Oxygen Delivery Method): nasal cannula, high flow  O2 Flow (L/min): 60  O2 Concentration (%): 70    I&O's Summary    23 Nov 2023 07:01 - 24 Nov 2023 07:00  --------------------------------------------------------  IN: 0 mL / OUT: 0 mL / NET: 0 mL          Physical Exam:   GENERAL: NAD  HEENT: CANDY  ENMT: No tonsillar erythema, exudates, or enlargemen  NECK: Supple, No JVD  CHEST/LUNG: b/l rhonchi  CVS: Regular rate and rhythm  GI: : Soft, Nontender, Nondistended  NERVOUS SYSTEM:  Lethargic   EXTREMITIES:  2+ Peripheral Pulses, No clubbing, cyanosis, or edema  SKIN: No rashes or lesions  PSYCH: Appropriate    Labs:  28                            11.6   7.70  )-----------( 236      ( 24 Nov 2023 07:00 )             36.8                         11.9   8.19  )-----------( 251      ( 22 Nov 2023 07:21 )             38.5                         11.2   6.31  )-----------( 244      ( 21 Nov 2023 06:34 )             34.0                         10.8   6.53  )-----------( 241      ( 20 Nov 2023 22:01 )             32.6     11-24    142  |  108  |  14  ----------------------------<  145<H>  3.3<L>   |  27  |  0.41<L>  11-22    138  |  106  |  11  ----------------------------<  121<H>  3.6   |  23  |  0.43<L>  11-20    143  |  109<H>  |  16  ----------------------------<  118<H>  3.6   |  24  |  0.39<L>    Ca    8.5      24 Nov 2023 06:59    TPro  6.3  /  Alb  2.5<L>  /  TBili  1.0  /  DBili  x   /  AST  19  /  ALT  21  /  AlkPhos  134<H>  11-22  TPro  6.1  /  Alb  2.3<L>  /  TBili  0.5  /  DBili  x   /  AST  28  /  ALT  22  /  AlkPhos  140<H>  11-20    Urinalysis Basic - ( 24 Nov 2023 06:59 )    Color: x / Appearance: x / SG: x / pH: x  Gluc: 145 mg/dL / Ketone: x  / Bili: x / Urobili: x   Blood: x / Protein: x / Nitrite: x   Leuk Esterase: x / RBC: x / WBC x   Sq Epi: x / Non Sq Epi: x / Bacteria: x      D-Dimer Assay, Quantitative: 748 ng/mL DDU (11-23 @ 13:52)        RECENT CULTURES:  11-20 @ 22:02 .Blood Blood-Peripheral       No growth at 72 Hours    11-18 @ 18:40 Clean Catch Clean Catch (Midstream)       No growth    11-18 @ 18:15 .Blood Blood-Peripheral     No growth at 5 days    11-18 @ 18:10 .Blood Blood-Peripheral       No growth at 5 days    Studies  < from: CT Abdomen and Pelvis w/ IV Cont (11.18.23 @ 19:41) >    FINDINGS:  LOWER CHEST: Aortic valvular calcifications. Left lower lobe   consolidation. Tree-in-bud/nodular opacities in the left upper lobe.    LIVER: Within normal limits.  BILE DUCTS: Normal caliber.  GALLBLADDER: Cholelithiasis.  SPLEEN: Within normal limits.  PANCREAS: Atrophic.  ADRENALS: Within normal limits.  KIDNEYS/URETERS: Subcentimeter foci in the right kidney which are too   small to characterize. No hydronephrosis.    BLADDER: Mild bladder wall thickening with a few fociof free air in the   bladder lumen, evaluate for recent instrumentation and/or infection.  REPRODUCTIVE ORGANS: Normal size prostate.    BOWEL: No bowel obstruction. Appendix is not visualized. Percutaneous   gastrostomy tube in the stomach.  PERITONEUM: Small volume ascites.  VESSELS: Atherosclerotic changes.  RETROPERITONEUM/LYMPH NODES: No lymphadenopathy.  ABDOMINAL WALL: Extensive decubitus ulcer involving the right posterior   pelvis and gluteal region with extension down to bone and erosion of the   ischium (3-157).  BONES: Erosion of the right ischium secondary to extensive decubitus   ulcer. Degenerative changes.    IMPRESSION:    Extensive decubitus ulcer involving the right posterior gluteal and   pelvic region with extension to bone. There is bony erosion of the   ischium compatible with osteomyelitis.    Mild bladder wall thickening and a few foci of intraluminal air,   correlate for recent instrumentation and/or infection.    Left lower lobe consolidation along with tree-in-bud/nodular opacities in   the left upper lobe concerning for pneumonia.    --- End of Report ---    < end of copied text >    < from: [facility] Duplex Lower Ext Vein Scan, Bilat (11.21.23 @ 17:25) >    FINDINGS:    RIGHT:  Normal compressibility of the RIGHT common femoral, femoral and popliteal   veins.  Doppler examination shows normal spontaneous and phasic flow.  The patient's legs are contracted; the RIGHT calf veins could not be   visualized.    LEFT:  Normal compressibility of the LEFT common femoral, femoral and popliteal   veins.  Doppler examination shows normal spontaneous and phasic flow.  The patient's legs are contracted; the LEFT calf veins could not be   visualized.    IMPRESSION:  No evidence of above-the-knee deep venous thrombosis in either lower   extremity.  The patient's legs are contracted; the calf veins could not be visualized.

## 2023-11-24 NOTE — PROVIDER CONTACT NOTE (OTHER) - ACTION/TREATMENT ORDERED:
As per MD Roscoe Gracia, EKG performed, 0.5mg IV push morphine adm as ord, and IVPB potassium ord to be given @ 0100, 0200, and 0300 on 11/25 to replete low potassium. nursing care ongoing.

## 2023-11-24 NOTE — PROGRESS NOTE ADULT - SUBJECTIVE AND OBJECTIVE BOX
CC: F/U for PNA    Saw/spoke to patient. More awake today. Still on HFNC.    Allergies  No Known Allergies    ANTIMICROBIALS:  piperacillin/tazobactam IVPB.. 3.375 every 8 hours    PE:    Vital Signs Last 24 Hrs  T(C): 36.3 (24 Nov 2023 11:51), Max: 37.6 (23 Nov 2023 15:58)  T(F): 97.3 (24 Nov 2023 11:51), Max: 99.6 (23 Nov 2023 15:58)  HR: 86 (24 Nov 2023 11:51) (86 - 128)  BP: 112/72 (24 Nov 2023 11:51) (102/62 - 112/72)  RR: 20 (24 Nov 2023 11:51) (20 - 20)  SpO2: 100% (24 Nov 2023 11:51) (93% - 100%)    Gen: AOx3, NAD, non-toxic  CV: Nontachycardic  Resp: Breathing comfortably, RA  Abd: Soft, nontender  IV/Skin: No thrombophlebitis    LABS:                        11.6   7.70  )-----------( 236      ( 24 Nov 2023 07:00 )             36.8     11-24    142  |  108  |  14  ----------------------------<  145<H>  3.3<L>   |  27  |  0.41<L>    Ca    8.5      24 Nov 2023 06:59    Urinalysis Basic - ( 24 Nov 2023 06:59 )    Color: x / Appearance: x / SG: x / pH: x  Gluc: 145 mg/dL / Ketone: x  / Bili: x / Urobili: x   Blood: x / Protein: x / Nitrite: x   Leuk Esterase: x / RBC: x / WBC x   Sq Epi: x / Non Sq Epi: x / Bacteria: x    MICROBIOLOGY:    .Blood Blood-Peripheral  11-20-23   No growth at 72 Hours  --  --    Clean Catch Clean Catch (Midstream)  11-18-23   No growth  --  --    .Blood Blood-Peripheral  11-18-23   No growth at 5 days  --  --    .Blood Blood-Peripheral  11-18-23   No growth at 5 days  --  --    Rapid RVP Result: NotDetec (11-18 @ 19:20)    RADIOLOGY:    11/18 CT:    IMPRESSION:    Extensive decubitus ulcer involving the right posterior gluteal and   pelvic region with extension to bone. There is bony erosion of the   ischium compatible with osteomyelitis.    Mild bladder wall thickening and a few foci of intraluminal air,   correlate for recent instrumentation and/or infection.    Left lower lobe consolidation along with tree-in-bud/nodular opacities in   the left upper lobe concerning for pneumonia.

## 2023-11-24 NOTE — PROGRESS NOTE ADULT - PROBLEM SELECTOR PLAN 1
S/p RRT 11/20 for hypoxia requiring HFNC  -Increase in O2 requirements 11/21 from HFNC 50L/80% to 100% Bipap. Weaned from Bipap to HFNC 50L/100%  -Suspect aspiration event, family at bedside reports multiple aspiration events in the Past. Lower suspicion for PE, but favor ruling out given bedbound status.  -CT A/P with LLL consolidation, SHEILA tree in bud opacities likely PNA. Continue ABX per ID  -LE duplex negative for DVT but limited study due to patient being contracted   -Duoneb q6h  -Mucomyst 20% q6h x3 more days (give with Duoneb)  -Chest PT q6h  -ABG with no CO2 retention  -Was tolerating HFNC 45L/45% yesterday. Increasing O2 requirements overnight per flowsheet to 60L/100%, wore Bipap overnight. No clear desaturations noted in chart. Weaned back to 45L/45% this AM by RT and patient appearing to be tolerating so far  -Suggest continue HFNC 45L/45%, wean O2 as tolerated. Bipap qHS PRN for increased WOB. Keep sats >90%  -D-dimer noted. F/u CTA chest. Can transport to CT on 100% NRB.

## 2023-11-24 NOTE — CHART NOTE - NSCHARTNOTEFT_GEN_A_CORE
Nutrition Follow Up Note  Patient seen for: nutrition follow-up     Chart reviewed, events noted.     Source: [] Patient       [x] EMR        [x] RN        [x] Family at bedside -wife at bedside     [] Other:    -If unable to interview patient: [] Trach/Vent/BiPAP  [] Disoriented/confused/inappropriate to interview    Diet Order:   Diet, NPO with Tube Feed:   Tube Feeding Modality: Gastrostomy  Jevity 1.5 David (JEVITY1.5RTH)  Total Volume for 24 Hours (mL): 1200  Continuous  Starting Tube Feed Rate {mL per Hour}: 30  Increase Tube Feed Rate by (mL): 10     Every 6 hours  Until Goal Tube Feed Rate (mL per Hour): 50  Tube Feed Duration (in Hours): 24  Tube Feed Start Time: 14:00  Supplement Feeding Modality:  Gastrostomy (23)    - Is current order appropriate/adequate? [x] Yes  []  No:     - Nutrition-related concerns:  -RRT  for hypoxia, suspected to be due to re-aspiration but unclear per chart. On antibiotics.   -pt previously on bolus feeds for Jevity 1.5 @ 240ml 5x daily was tolerating well, pt now switched to continuous feeds on  per wife request, wife reports pt on continuous feeds at home.   -tube feeds observed running at goal rate of 50ml/hr, RN denies any issues, tolerating well so far.   - on high flow nasal cannula     GI:  Last BM ___.   Bowel Regimen? [] Yes   [x] No      Weights:   49.9Kg ()--dosing  no new weights to assess    Nutritionally Pertinent MEDICATIONS  (STANDING):  pantoprazole    Tablet  piperacillin/tazobactam IVPB..  sucralfate    Pertinent Labs:  @ 06:59: Na 142, BUN 14, Cr 0.41<L>, <H>, K+ 3.3<L>, Phos --, Mg --, Alk Phos --, ALT/SGPT --, AST/SGOT --, HbA1c --      Skin per nursing documentation: right buttocks unstageable, left buttocks stage 2   Edema: none     Estimated Needs:   [x] no change since previous assessment  [] recalculated: based on IBW of 67.1 kg  Calories: 25-30Kcal/k-2013Kcal   Protein: 1.1-1.3gm/k.81-87.23gm   fluids deferred to team      Previous Nutrition Diagnosis:   1) Underweight  Malnutrition (Severe, chronic)  2) Increased Nutrient Needs  Nutrition Diagnosis is: [x] ongoing  [] resolved [] not applicable     New Nutrition Diagnosis: [x] Not applicable    Nutrition Care Plan:  [x] In Progress  [] Achieved  [] Not applicable    Nutrition Interventions:     Education Provided:       [] Yes:  [x] No:        Recommendations:         [x] Continue Jevity 1.5 @ goal rate 50 ml/hr x 24 hrs. Regimen at goal will provide 1200 ml formula, 1800 kcal/day (26 kcal/kg), 76 g pro/day (1.1 g pro/kg) based on IBW (67 kg).     [x] Recommend addition of multivitamin, ascorbic acid as feasible to help with wound healing       RD remains available upon request and will follow up per protocol  Chitra Jalloh RD, CDN, Available on Teams Nutrition Follow Up Note  Patient seen for: nutrition follow-up     Chart reviewed, events noted.     Source: [] Patient       [x] EMR        [x] RN        [x] Family at bedside -wife at bedside     [] Other:    -If unable to interview patient: [] Trach/Vent/BiPAP  [] Disoriented/confused/inappropriate to interview    Diet Order:   Diet, NPO with Tube Feed:   Tube Feeding Modality: Gastrostomy  Jevity 1.5 David (JEVITY1.5RTH)  Total Volume for 24 Hours (mL): 1200  Continuous  Starting Tube Feed Rate {mL per Hour}: 30  Increase Tube Feed Rate by (mL): 10     Every 6 hours  Until Goal Tube Feed Rate (mL per Hour): 50  Tube Feed Duration (in Hours): 24  Tube Feed Start Time: 14:00  Supplement Feeding Modality:  Gastrostomy (23)    - Is current order appropriate/adequate? [x] Yes  []  No:     - Nutrition-related concerns:  -RRT  for hypoxia, suspected to be due to re-aspiration but unclear per chart. On antibiotics.   -pt previously on bolus feeds for Jevity 1.5 @ 240ml 5x daily was tolerating well, pt now switched to continuous feeds on  per wife request, wife reports pt on continuous feeds at home.   -tube feeds observed running at goal rate of 50ml/hr, RN denies any issues, tolerating well so far.   - on high flow nasal cannula currently, required Bipap overnight     GI:  Last BM ___.   Bowel Regimen? [] Yes   [x] No      Weights:   49.9Kg ()--dosing  no new weights to assess    Nutritionally Pertinent MEDICATIONS  (STANDING):  pantoprazole    Tablet  piperacillin/tazobactam IVPB..  sucralfate    Pertinent Labs:  @ 06:59: Na 142, BUN 14, Cr 0.41<L>, <H>, K+ 3.3<L>, Phos --, Mg --, Alk Phos --, ALT/SGPT --, AST/SGOT --, HbA1c --      Skin per nursing documentation: right buttocks unstageable, left buttocks stage 2   Edema: none     Estimated Needs:   [x] no change since previous assessment  [] recalculated: based on IBW of 67.1 kg  Calories: 25-30Kcal/k-2013Kcal   Protein: 1.1-1.3gm/k.81-87.23gm   fluids deferred to team      Previous Nutrition Diagnosis:   1) Underweight  Malnutrition (Severe, chronic)  2) Increased Nutrient Needs  Nutrition Diagnosis is: [x] ongoing  [] resolved [] not applicable     New Nutrition Diagnosis: [x] Not applicable    Nutrition Care Plan:  [x] In Progress  [] Achieved  [] Not applicable    Nutrition Interventions:     Education Provided:       [] Yes:  [x] No:        Recommendations:         [x] Continue Jevity 1.5 @ goal rate 50 ml/hr x 24 hrs. Regimen at goal will provide 1200 ml formula, 1800 kcal/day (26 kcal/kg), 76 g pro/day (1.1 g pro/kg) based on IBW (67 kg).     [x] Recommend addition of multivitamin, ascorbic acid as feasible to help with wound healing       RD remains available upon request and will follow up per protocol  Chitra Jalloh RD, CDN, Available on Teams

## 2023-11-24 NOTE — PROGRESS NOTE ADULT - SUBJECTIVE AND OBJECTIVE BOX
[facility]  Nathaniel Barron PA-C  [address] [place] [address]  [phone]      INTERVAL HPI/OVERNIGHT EVENTS:  pt seen and examined, no new events  tube feeds changed to continuous     MEDICATIONS  (STANDING):  chlorhexidine 2% Cloths 1 Application(s) Topical <User Schedule>  Dakins Solution - 1/2 Strength 1 Application(s) Topical daily  Dakins Solution - 1/4 Strength 1 Application(s) Topical two times a day  enoxaparin Injectable 40 milliGRAM(s) SubCutaneous every 24 hours  guaiFENesin  milliGRAM(s) Oral every 12 hours  OLANZapine 5 milliGRAM(s) Oral <User Schedule>  pantoprazole    Tablet 40 milliGRAM(s) Oral before breakfast  piperacillin/tazobactam IVPB.. 3.375 Gram(s) IV Intermittent every 8 hours  polyethylene glycol 3350 17 Gram(s) Oral daily  QUEtiapine 100 milliGRAM(s) Oral <User Schedule>  senna 2 Tablet(s) Oral at bedtime  sucralfate 1 Gram(s) Oral four times a day  vancomycin  IVPB 500 milliGRAM(s) IV Intermittent every 12 hours  vancomycin  IVPB        MEDICATIONS  (PRN):  acetaminophen   Oral Liquid .. 750 milliGRAM(s) Oral every 6 hours PRN Mild Pain (1 - 3)      Allergies    No Known Allergies    Intolerances    Haldol (Other)  benzodiazepines (Other)      ROS:   unable to obtain       PHYSICAL EXAM:   Vital Signs Last 24 Hrs  T(C): 36.8 (24 Nov 2023 06:00), Max: 37.6 (23 Nov 2023 15:58)  T(F): 98.3 (24 Nov 2023 06:00), Max: 99.6 (23 Nov 2023 15:58)  HR: 90 (24 Nov 2023 09:58) (89 - 128)  BP: 102/62 (24 Nov 2023 06:00) (102/62 - 113/82)  BP(mean): --  RR: 20 (24 Nov 2023 09:58) (20 - 20)  SpO2: 99% (24 Nov 2023 09:58) (93% - 100%)    Parameters below as of 24 Nov 2023 09:58  Patient On (Oxygen Delivery Method): nasal cannula, high flow  O2 Flow (L/min): 60  O2 Concentration (%): 70      Daily     Daily     nad  confused  frail  non toxic  soft, nt, +PEG  no edema        LABS:                        11.6   7.70  )-----------( 236      ( 24 Nov 2023 07:00 )             36.8   11-24    142  |  108  |  14  ----------------------------<  145<H>  3.3<L>   |  27  |  0.41<L>    Ca    8.5      24 Nov 2023 06:59      Urinalysis Basic - ( 20 Nov 2023 22:01 )    Color: x / Appearance: x / SG: x / pH: x  Gluc: 118 mg/dL / Ketone: x  / Bili: x / Urobili: x   Blood: x / Protein: x / Nitrite: x   Leuk Esterase: x / RBC: x / WBC x   Sq Epi: x / Non Sq Epi: x / Bacteria: x        RADIOLOGY & ADDITIONAL TESTS:

## 2023-11-24 NOTE — PROGRESS NOTE ADULT - ASSESSMENT
79 yo M PMHx of severe dementia, s/p PEG tube, GERD, arthritis presents with ulcer that has worsened over last month  No fever, has leukocytosis  Brought in for ? fevers and worsening wound; chronically bed bound  UA neg, UCX neg  RVP neg  CT with sacral decubitus ulcers, OM erosion, pneumonia, blader wall thickening  BCX NGTD  Possible pneumonia  Had RRT 11/20, vancomycin readded; suspect due to re-aspiration but unclear  Further fevers overnight noted, patient generally unchanged, still hypoxic, HFNC  Overall, Abnormal finding on imaging, pneumonia  - Zosyn 3.375g q 8, 7 days then discontinue  - If not improving would re-attempt to Check CT (with contrast if able to tolerate)  - Check sputum culture if producing sputum  - Wound care eval to sacral wound  - Uncertain benefit to prolonged treatment course for sacral wound  - O2 status improving    Guarded/GOC per team    Roni Luis MD  Contact on TEAMS messaging from 9am - 5pm  From 5pm-9am, on weekends, or if no response call [phone]

## 2023-11-24 NOTE — PROGRESS NOTE ADULT - SUBJECTIVE AND OBJECTIVE BOX
SHARONA LARKIN  78y Male  MRN:[id number]    Patient is a 78y old  Male who presents with a chief complaint of sepsis      HPI:  79 yo M PMHx of severe dementia, PEG tube presents with ulcer that has worsened over last month. Patient was seen by house calls doctor 2 weeks ago, placed on antibiotics but started mounting fevers in the last few days. It was noticed by his GI doctor (Dr. Moraes) that his ulcer was getting worse, so he sent him in. Patient is nonverbal at baseline. Wife has noticed no new cough, runny nose. (19 Nov 2023 13:02)      Patient seen and evaluated at bedside. No acute events overnight except as noted.    Interval HPI: no acute events o/n     PAST MEDICAL & SURGICAL HISTORY:  Dementia      Pneumonia      Acute renal failure (ARF)  ARF 1.5 YEAR AGO      Dysphagia  peg      Arthritis      GERD (gastroesophageal reflux disease)      Dyspepsia      History of hand surgery      PEG adjustment, replacement, or removal      Status post insertion of percutaneous endoscopic gastrostomy (PEG) tube  PEG replacement on 02/29/16          REVIEW OF SYSTEMS:  as per hpi     VITALS:   Vital Signs Last 24 Hrs  T(C): 36.3 (24 Nov 2023 11:51), Max: 37.6 (23 Nov 2023 15:58)  T(F): 97.3 (24 Nov 2023 11:51), Max: 99.6 (23 Nov 2023 15:58)  HR: 86 (24 Nov 2023 11:51) (86 - 128)  BP: 112/72 (24 Nov 2023 11:51) (102/62 - 112/72)  BP(mean): --  RR: 20 (24 Nov 2023 11:51) (20 - 20)  SpO2: 100% (24 Nov 2023 11:51) (93% - 100%)    Parameters below as of 24 Nov 2023 09:58  Patient On (Oxygen Delivery Method): nasal cannula, high flow  O2 Flow (L/min): 60  O2 Concentration (%): 70    PHYSICAL EXAM:  GENERAL: NAD,  comfortable   HEAD:  Atraumatic, Normocephalic  EYES: EOMI, PERRLA, conjunctiva and sclera clear  NECK: Supple, No JVD  CHEST/LUNG: Clear to auscultation bilaterally; No wheeze  HEART: S1, S2; No murmurs, rubs, or gallops  ABDOMEN: Soft, Nontender, Nondistended; Bowel sounds present +peg  EXTREMITIES:  2+ Peripheral Pulses, contracted   PSYCH: Normal affect  NEUROLOGY: non verbal  SKIN: No rashes or lesions    Consultant(s) Notes Reviewed:  [x ] YES  [ ] NO  Care Discussed with Consultants/Other Providers [ x] YES  [ ] NO    MEDS:   MEDICATIONS  (STANDING):  acetaminophen   Oral Liquid .. 750 milliGRAM(s) Enteral Tube once  acetylcysteine 20%  Inhalation 3 milliLiter(s) Inhalation every 6 hours  chlorhexidine 2% Cloths 1 Application(s) Topical <User Schedule>  Dakins Solution - 1/4 Strength 1 Application(s) Topical two times a day  enoxaparin Injectable 40 milliGRAM(s) SubCutaneous every 24 hours  ipratropium    for Nebulization 500 MICROGram(s) Nebulizer every 6 hours  levalbuterol Inhalation 0.63 milliGRAM(s) Inhalation every 6 hours  OLANZapine 5 milliGRAM(s) Oral <User Schedule>  pantoprazole    Tablet 40 milliGRAM(s) Oral before breakfast  piperacillin/tazobactam IVPB.. 3.375 Gram(s) IV Intermittent every 8 hours  QUEtiapine 100 milliGRAM(s) Oral <User Schedule>  sucralfate 1 Gram(s) Oral four times a day    MEDICATIONS  (PRN):  acetaminophen   Oral Liquid .. 750 milliGRAM(s) Oral every 6 hours PRN Mild Pain (1 - 3)        ALLERGIES:  Haldol (Other)  No Known Allergies  benzodiazepines (Other)      LABS:                                                          11.6   7.70  )-----------( 236      ( 24 Nov 2023 07:00 )             36.8   11-24    142  |  108  |  14  ----------------------------<  145<H>  3.3<L>   |  27  |  0.41<L>    Ca    8.5      24 Nov 2023 06:59           < from: CT Abdomen and Pelvis w/ IV Cont (11.18.23 @ 19:41) >    IMPRESSION:    Extensive decubitus ulcer involving the right posterior gluteal and   pelvic region with extension to bone. There is bony erosion of the   ischium compatible with osteomyelitis.    Mild bladder wall thickening and a few foci of intraluminal air,   correlate for recent instrumentation and/or infection.    Left lower lobe consolidation along with tree-in-bud/nodular opacities in   the left upper lobe concerning for pneumonia.    --- End of Report ---        < end of copied text >

## 2023-11-24 NOTE — PROGRESS NOTE ADULT - ASSESSMENT
79 yo male h/o dementia, non verbal. contracted, s/p peg. here with sepsis likely 2/2 sacral decub and aspiration pna    sepsis  respiratory failure   sacral decub  pna - possible aspiration   cont abx as per id  f/u cult  wound care f/u  id consult noted  pulm consulted  cont supp o2 via high flow. taper down as able   f/u CTA chest as ordered    dementia  cont home meds  supportive care    dysphagia s/p peg  feeds as per gi/nutrition          dvt ppx      d/w wife bedside  DNR      Advanced care planning was discussed with patient and family.  Advanced care planning forms were reviewed and discussed as appropriate.  Differential diagnosis and plan of care discussed with patient after the evaluation.   Pain assessed and judicious use of narcotics when appropriate was discussed.  Importance of Fall prevention discussed.  Counseling on Smoking and Alcohol cessation was offered when appropriate.  Counseling on Diet, exercise, and medication compliance was done.       Approx 75 minutes spent.

## 2023-11-25 PROCEDURE — 71045 X-RAY EXAM CHEST 1 VIEW: CPT | Mod: 26

## 2023-11-25 RX ORDER — SENNA PLUS 8.6 MG/1
10 TABLET ORAL AT BEDTIME
Refills: 0 | Status: DISCONTINUED | OUTPATIENT
Start: 2023-11-25 | End: 2023-12-04

## 2023-11-25 RX ORDER — POTASSIUM CHLORIDE 20 MEQ
40 PACKET (EA) ORAL ONCE
Refills: 0 | Status: COMPLETED | OUTPATIENT
Start: 2023-11-25 | End: 2023-11-26

## 2023-11-25 RX ORDER — SODIUM CHLORIDE 9 MG/ML
250 INJECTION INTRAMUSCULAR; INTRAVENOUS; SUBCUTANEOUS ONCE
Refills: 0 | Status: COMPLETED | OUTPATIENT
Start: 2023-11-25 | End: 2023-11-25

## 2023-11-25 RX ADMIN — Medication 1 GRAM(S): at 17:38

## 2023-11-25 RX ADMIN — PIPERACILLIN AND TAZOBACTAM 25 GRAM(S): 4; .5 INJECTION, POWDER, LYOPHILIZED, FOR SOLUTION INTRAVENOUS at 05:05

## 2023-11-25 RX ADMIN — SODIUM CHLORIDE 250 MILLILITER(S): 9 INJECTION INTRAMUSCULAR; INTRAVENOUS; SUBCUTANEOUS at 01:24

## 2023-11-25 RX ADMIN — Medication 1 GRAM(S): at 05:06

## 2023-11-25 RX ADMIN — Medication 3 MILLILITER(S): at 05:05

## 2023-11-25 RX ADMIN — QUETIAPINE FUMARATE 100 MILLIGRAM(S): 200 TABLET, FILM COATED ORAL at 21:29

## 2023-11-25 RX ADMIN — LEVALBUTEROL 0.63 MILLIGRAM(S): 1.25 SOLUTION, CONCENTRATE RESPIRATORY (INHALATION) at 11:21

## 2023-11-25 RX ADMIN — Medication 100 MILLIEQUIVALENT(S): at 01:24

## 2023-11-25 RX ADMIN — Medication 750 MILLIGRAM(S): at 11:27

## 2023-11-25 RX ADMIN — Medication 500 MICROGRAM(S): at 11:26

## 2023-11-25 RX ADMIN — CHLORHEXIDINE GLUCONATE 1 APPLICATION(S): 213 SOLUTION TOPICAL at 05:07

## 2023-11-25 RX ADMIN — PANTOPRAZOLE SODIUM 40 MILLIGRAM(S): 20 TABLET, DELAYED RELEASE ORAL at 05:06

## 2023-11-25 RX ADMIN — Medication 3 MILLILITER(S): at 11:21

## 2023-11-25 RX ADMIN — Medication 100 MILLIEQUIVALENT(S): at 02:30

## 2023-11-25 RX ADMIN — Medication 1 GRAM(S): at 11:21

## 2023-11-25 RX ADMIN — ENOXAPARIN SODIUM 40 MILLIGRAM(S): 100 INJECTION SUBCUTANEOUS at 21:30

## 2023-11-25 RX ADMIN — SENNA PLUS 10 MILLILITER(S): 8.6 TABLET ORAL at 21:28

## 2023-11-25 RX ADMIN — Medication 500 MICROGRAM(S): at 05:06

## 2023-11-25 RX ADMIN — Medication 1 APPLICATION(S): at 05:08

## 2023-11-25 RX ADMIN — Medication 750 MILLIGRAM(S): at 12:27

## 2023-11-25 RX ADMIN — Medication 500 MICROGRAM(S): at 17:38

## 2023-11-25 RX ADMIN — Medication 100 MILLIEQUIVALENT(S): at 03:35

## 2023-11-25 RX ADMIN — LEVALBUTEROL 0.63 MILLIGRAM(S): 1.25 SOLUTION, CONCENTRATE RESPIRATORY (INHALATION) at 05:05

## 2023-11-25 RX ADMIN — PIPERACILLIN AND TAZOBACTAM 25 GRAM(S): 4; .5 INJECTION, POWDER, LYOPHILIZED, FOR SOLUTION INTRAVENOUS at 21:29

## 2023-11-25 RX ADMIN — Medication 3 MILLILITER(S): at 17:39

## 2023-11-25 RX ADMIN — LEVALBUTEROL 0.63 MILLIGRAM(S): 1.25 SOLUTION, CONCENTRATE RESPIRATORY (INHALATION) at 17:39

## 2023-11-25 RX ADMIN — Medication 10 MILLIGRAM(S): at 13:48

## 2023-11-25 RX ADMIN — Medication 1 APPLICATION(S): at 05:07

## 2023-11-25 RX ADMIN — PIPERACILLIN AND TAZOBACTAM 25 GRAM(S): 4; .5 INJECTION, POWDER, LYOPHILIZED, FOR SOLUTION INTRAVENOUS at 13:49

## 2023-11-25 RX ADMIN — Medication 750 MILLIGRAM(S): at 05:06

## 2023-11-25 NOTE — PROGRESS NOTE ADULT - PROBLEM SELECTOR PLAN 1
S/p RRT 11/20 for hypoxia requiring HFNC  -Increase in O2 requirements 11/21 from HFNC 50L/80% to 100% Bipap. Weaned from Bipap to HFNC 50L/100%  -Suspect aspiration event, family at bedside reports multiple aspiration events in the Past. Lower suspicion for PE, but favor ruling out given bedbound status.  -CT A/P with LLL consolidation, SHEILA tree in bud opacities likely PNA. Continue ABX per ID  -LE duplex negative for DVT but limited study due to patient being contracted   -Duoneb q6h  -Mucomyst 20% q6h x3 more days (give with Duoneb)  -Chest PT q6h  -ABG with no CO2 retention  -Was tolerating HFNC 45L/45% yesterday. Increasing O2 requirements overnight per flowsheet to 60L/100%, wore Bipap overnight. No clear desaturations noted in chart. Weaned back to 45L/45% this AM by RT and patient appearing to be tolerating so far  -Suggest continue HFNC 45L/45%, wean O2 as tolerated. Bipap qHS PRN for increased WOB. Keep sats >90%  -D-dimer noted. F/u CTA chest. Can transport to CT on 100% NRB.  -however ct chest could not be done  ; on 100% high flow:  would do chest xra  today : dopplers are negative

## 2023-11-25 NOTE — PROGRESS NOTE ADULT - SUBJECTIVE AND OBJECTIVE BOX
SHARONA LARKIN  78y Male  MRN:[id number]    Patient is a 78y old  Male who presents with a chief complaint of sepsis      HPI:  79 yo M PMHx of severe dementia, PEG tube presents with ulcer that has worsened over last month. Patient was seen by house calls doctor 2 weeks ago, placed on antibiotics but started mounting fevers in the last few days. It was noticed by his GI doctor (Dr. Moraes) that his ulcer was getting worse, so he sent him in. Patient is nonverbal at baseline. Wife has noticed no new cough, runny nose. (19 Nov 2023 13:02)      Patient seen and evaluated at bedside. No acute events overnight except as noted.    Interval HPI: no acute events o/n     PAST MEDICAL & SURGICAL HISTORY:  Dementia      Pneumonia      Acute renal failure (ARF)  ARF 1.5 YEAR AGO      Dysphagia  peg      Arthritis      GERD (gastroesophageal reflux disease)      Dyspepsia      History of hand surgery      PEG adjustment, replacement, or removal      Status post insertion of percutaneous endoscopic gastrostomy (PEG) tube  PEG replacement on 02/29/16          REVIEW OF SYSTEMS:  as per hpi     VITALS:   Vital Signs Last 24 Hrs  T(C): 36.6 (25 Nov 2023 16:55), Max: 37.6 (25 Nov 2023 04:50)  T(F): 97.9 (25 Nov 2023 16:55), Max: 99.6 (25 Nov 2023 04:50)  HR: 90 (25 Nov 2023 18:15) (88 - 132)  BP: 106/69 (25 Nov 2023 16:55) (97/63 - 144/66)  BP(mean): --  RR: 20 (25 Nov 2023 16:55) (20 - 24)  SpO2: 99% (25 Nov 2023 18:15) (91% - 100%)    Parameters below as of 25 Nov 2023 16:55  Patient On (Oxygen Delivery Method): nasal cannula, high flow        PHYSICAL EXAM:  GENERAL: NAD,  comfortable   HEAD:  Atraumatic, Normocephalic  EYES: EOMI, PERRLA, conjunctiva and sclera clear  NECK: Supple, No JVD  CHEST/LUNG: Clear to auscultation bilaterally; No wheeze  HEART: S1, S2; No murmurs, rubs, or gallops  ABDOMEN: Soft, Nontender, Nondistended; Bowel sounds present +peg  EXTREMITIES:  2+ Peripheral Pulses, contracted   PSYCH: Normal affect  NEUROLOGY: non verbal  SKIN: No rashes or lesions    Consultant(s) Notes Reviewed:  [x ] YES  [ ] NO  Care Discussed with Consultants/Other Providers [ x] YES  [ ] NO    MEDS:   MEDICATIONS  (STANDING):  acetaminophen   Oral Liquid .. 750 milliGRAM(s) Enteral Tube once  acetylcysteine 20%  Inhalation 3 milliLiter(s) Inhalation every 6 hours  chlorhexidine 2% Cloths 1 Application(s) Topical <User Schedule>  Dakins Solution - 1/4 Strength 1 Application(s) Topical two times a day  enoxaparin Injectable 40 milliGRAM(s) SubCutaneous every 24 hours  ipratropium    for Nebulization 500 MICROGram(s) Nebulizer every 6 hours  levalbuterol Inhalation 0.63 milliGRAM(s) Inhalation every 6 hours  OLANZapine 5 milliGRAM(s) Oral <User Schedule>  pantoprazole    Tablet 40 milliGRAM(s) Oral before breakfast  piperacillin/tazobactam IVPB.. 3.375 Gram(s) IV Intermittent every 8 hours  potassium chloride   Solution 40 milliEquivalent(s) Enteral Tube once  QUEtiapine 100 milliGRAM(s) Oral <User Schedule>  senna Syrup 10 milliLiter(s) Oral at bedtime  sucralfate 1 Gram(s) Oral four times a day    MEDICATIONS  (PRN):  acetaminophen   Oral Liquid .. 750 milliGRAM(s) Oral every 6 hours PRN Mild Pain (1 - 3)        ALLERGIES:  Haldol (Other)  No Known Allergies  benzodiazepines (Other)      LABS:                                                      11.6   7.70  )-----------( 236      ( 24 Nov 2023 07:00 )             36.8   11-24    142  |  108  |  14  ----------------------------<  145<H>  3.3<L>   |  27  |  0.41<L>    Ca    8.5      24 Nov 2023 06:59               < from: CT Abdomen and Pelvis w/ IV Cont (11.18.23 @ 19:41) >    IMPRESSION:    Extensive decubitus ulcer involving the right posterior gluteal and   pelvic region with extension to bone. There is bony erosion of the   ischium compatible with osteomyelitis.    Mild bladder wall thickening and a few foci of intraluminal air,   correlate for recent instrumentation and/or infection.    Left lower lobe consolidation along with tree-in-bud/nodular opacities in   the left upper lobe concerning for pneumonia.    --- End of Report ---        < end of copied text >

## 2023-11-25 NOTE — PROGRESS NOTE ADULT - SUBJECTIVE AND OBJECTIVE BOX
Date of Service: 11-25-23 @ 13:35    Patient is a 78y old  Male who presents with a chief complaint of Osteomyelitis     (20 Nov 2023 11:10)      Any change in ROS: wife at bedside:  events noted:  pt ended up on 100% high  flow;  though charted as 90%:  he is awake and alert:  wife at bedside:       MEDICATIONS  (STANDING):  acetaminophen   Oral Liquid .. 750 milliGRAM(s) Enteral Tube once  acetylcysteine 20%  Inhalation 3 milliLiter(s) Inhalation every 6 hours  bisacodyl Suppository 10 milliGRAM(s) Rectal once  chlorhexidine 2% Cloths 1 Application(s) Topical <User Schedule>  Dakins Solution - 1/4 Strength 1 Application(s) Topical two times a day  enoxaparin Injectable 40 milliGRAM(s) SubCutaneous every 24 hours  ipratropium    for Nebulization 500 MICROGram(s) Nebulizer every 6 hours  levalbuterol Inhalation 0.63 milliGRAM(s) Inhalation every 6 hours  OLANZapine 5 milliGRAM(s) Oral <User Schedule>  pantoprazole    Tablet 40 milliGRAM(s) Oral before breakfast  piperacillin/tazobactam IVPB.. 3.375 Gram(s) IV Intermittent every 8 hours  QUEtiapine 100 milliGRAM(s) Oral <User Schedule>  senna Syrup 10 milliLiter(s) Oral at bedtime  sucralfate 1 Gram(s) Oral four times a day    MEDICATIONS  (PRN):  acetaminophen   Oral Liquid .. 750 milliGRAM(s) Oral every 6 hours PRN Mild Pain (1 - 3)    Vital Signs Last 24 Hrs  T(C): 36.5 (25 Nov 2023 12:05), Max: 37.6 (25 Nov 2023 04:50)  T(F): 97.7 (25 Nov 2023 12:05), Max: 99.6 (25 Nov 2023 04:50)  HR: 88 (25 Nov 2023 12:05) (88 - 140)  BP: 99/59 (25 Nov 2023 12:05) (95/57 - 144/66)  BP(mean): --  RR: 20 (25 Nov 2023 12:05) (20 - 24)  SpO2: 97% (25 Nov 2023 12:05) (91% - 100%)    Parameters below as of 25 Nov 2023 12:05  Patient On (Oxygen Delivery Method): nasal cannula, high flow        I&O's Summary    24 Nov 2023 07:01  -  25 Nov 2023 07:00  --------------------------------------------------------  IN: 1350 mL / OUT: 500 mL / NET: 850 mL          Physical Exam:   GENERAL: NAD, well-groomed, well-developed  HEENT: CANDY/   Atraumatic, Normocephalic  ENMT: No tonsillar erythema, exudates, or enlargement; Moist mucous membranes, Good dentition, No lesions  NECK: Supple, No JVD, Normal thyroid  CHEST/LUNG: Clear to auscultaion- difficult to examine  CVS: Regular rate and rhythm; No murmurs, rubs, or gallops  GI: : Soft, Nontender, Nondistended; Bowel sounds present  NERVOUS SYSTEM:  Alert & awake:  on 100% high flow   EXTREMITIES:  -edema : contracted   LYMPH: No lymphadenopathy noted  SKIN: No rashes or lesions  ENDOCRINOLOGY: No Thyromegaly  PSYCH: calm     Labs:  28                            11.6   7.70  )-----------( 236      ( 24 Nov 2023 07:00 )             36.8                         11.9   8.19  )-----------( 251      ( 22 Nov 2023 07:21 )             38.5     11-24    142  |  108  |  14  ----------------------------<  145<H>  3.3<L>   |  27  |  0.41<L>  11-22    138  |  106  |  11  ----------------------------<  121<H>  3.6   |  23  |  0.43<L>    Ca    8.5      24 Nov 2023 06:59    TPro  6.3  /  Alb  2.5<L>  /  TBili  1.0  /  DBili  x   /  AST  19  /  ALT  21  /  AlkPhos  134<H>  11-22    CAPILLARY BLOOD GLUCOSE              Urinalysis Basic - ( 24 Nov 2023 06:59 )    Color: x / Appearance: x / SG: x / pH: x  Gluc: 145 mg/dL / Ketone: x  / Bili: x / Urobili: x   Blood: x / Protein: x / Nitrite: x   Leuk Esterase: x / RBC: x / WBC x   Sq Epi: x / Non Sq Epi: x / Bacteria: x      D-Dimer Assay, Quantitative: 748 ng/mL DDU (11-23 @ 13:52)        RECENT CULTURES:  11-20 @ 22:02 .Blood Blood-Peripheral            rad< from: [facility] Duplex Lower Ext Vein Scan, Bilat (11.21.23 @ 17:25) >    TECHNIQUE: Duplex sonography of the BILATERAL LOWER extremity veins with   color and spectral Doppler, with and without compression.    FINDINGS:    RIGHT:  Normal compressibility of the RIGHT common femoral, femoral and popliteal   veins.  Doppler examination shows normal spontaneous and phasic flow.  The patient's legs are contracted; the RIGHT calf veins could not be   visualized.    LEFT:  Normal compressibility of the LEFT common femoral, femoral and popliteal   veins.  Doppler examination shows normal spontaneous and phasic flow.  The patient's legs are contracted; the LEFT calf veins could not be   visualized.    IMPRESSION:  No evidence of above-the-knee deep venous thrombosis in either lower   extremity.  The patient's legs are contracted; the calf veins could not be visualized.        --- End of Report ---            AMRIT RIDDLE MD; Attending Radiologist  This document has been electronically signed. Nov 21 2023  5:56PM    < end of copied text >      No growth at 4 days    11-18 @ 18:40 Clean Catch Clean Catch (Midstream)                No growth    11-18 @ 18:15 .Blood Blood-Peripheral                No growth at 5 days    11-18 @ 18:10 .Blood Blood-Peripheral                No growth at 5 days          RESPIRATORY CULTURES:          Studies  Chest X-RAY  CT SCAN Chest   Venous Dopplers: LE:   CT Abdomen  Others

## 2023-11-25 NOTE — PROGRESS NOTE ADULT - PROBLEM SELECTOR PLAN 2
CT A/P with LLL consolidation & SHEILA TIB opacities  -F/u CTA chest to further assess lung parenchyma  -RVP negative  -MRSA PCR negative  -BC with NGTD  -Urine legionella negative  -ABX per ID : on zosyn -11/19----

## 2023-11-25 NOTE — PROGRESS NOTE ADULT - SUBJECTIVE AND OBJECTIVE BOX
[facility]  Nathaniel Barron PA-C  [address] [place] [address]  [phone]      INTERVAL HPI/OVERNIGHT EVENTS:  pt seen and examined, no new events  remains on HFNC    MEDICATIONS  (STANDING):  chlorhexidine 2% Cloths 1 Application(s) Topical <User Schedule>  Dakins Solution - 1/2 Strength 1 Application(s) Topical daily  Dakins Solution - 1/4 Strength 1 Application(s) Topical two times a day  enoxaparin Injectable 40 milliGRAM(s) SubCutaneous every 24 hours  guaiFENesin  milliGRAM(s) Oral every 12 hours  OLANZapine 5 milliGRAM(s) Oral <User Schedule>  pantoprazole    Tablet 40 milliGRAM(s) Oral before breakfast  piperacillin/tazobactam IVPB.. 3.375 Gram(s) IV Intermittent every 8 hours  polyethylene glycol 3350 17 Gram(s) Oral daily  QUEtiapine 100 milliGRAM(s) Oral <User Schedule>  senna 2 Tablet(s) Oral at bedtime  sucralfate 1 Gram(s) Oral four times a day  vancomycin  IVPB 500 milliGRAM(s) IV Intermittent every 12 hours  vancomycin  IVPB        MEDICATIONS  (PRN):  acetaminophen   Oral Liquid .. 750 milliGRAM(s) Oral every 6 hours PRN Mild Pain (1 - 3)      Allergies    No Known Allergies    Intolerances    Haldol (Other)  benzodiazepines (Other)      ROS:   unable to obtain       PHYSICAL EXAM:   Vital Signs Last 24 Hrs  T(C): 36.6 (25 Nov 2023 08:08), Max: 37.6 (25 Nov 2023 04:50)  T(F): 97.8 (25 Nov 2023 08:08), Max: 99.6 (25 Nov 2023 04:50)  HR: 121 (25 Nov 2023 08:08) (86 - 140)  BP: 113/68 (25 Nov 2023 08:08) (95/57 - 144/66)  BP(mean): --  RR: 20 (25 Nov 2023 08:08) (20 - 24)  SpO2: 96% (25 Nov 2023 08:08) (91% - 100%)    Parameters below as of 25 Nov 2023 08:08  Patient On (Oxygen Delivery Method): nasal cannula, high flow        Daily     Daily     nad  confused  frail  non toxic  soft, nt, +PEG  no edema        LABS:                                   11.6   7.70  )-----------( 236      ( 24 Nov 2023 07:00 )             36.8   11-24    142  |  108  |  14  ----------------------------<  145<H>  3.3<L>   |  27  |  0.41<L>    Ca    8.5      24 Nov 2023 06:59      Urinalysis Basic - ( 20 Nov 2023 22:01 )    Color: x / Appearance: x / SG: x / pH: x  Gluc: 118 mg/dL / Ketone: x  / Bili: x / Urobili: x   Blood: x / Protein: x / Nitrite: x   Leuk Esterase: x / RBC: x / WBC x   Sq Epi: x / Non Sq Epi: x / Bacteria: x        RADIOLOGY & ADDITIONAL TESTS:

## 2023-11-25 NOTE — CHART NOTE - NSCHARTNOTEFT_GEN_A_CORE
EVENT: TEAMS by RN that patient's "L pupil is dilated and not really reacting to light". no other symptoms  pt seen, chart reviewed  pt's spouse in recliner chair at bedside, verbalized she has not noticed any changes in pt. Spoke with RN she confirmed having the patient in the past as late as last night; she acknowledged she did not assess the pupils last night so she is unsure of pt's baseline.  Pt with hx of severe dementia, nonverbal, contracted, admitted for sepsis likely due to aspiration PNA    On eye exam  pt keeps his eyes closed, cannot follow command to spontaneously open (likely baseline with severe dementia)  his pupils are round and reacts b/l to light though sluggish. L pupil is 4mm, and R 3mm- baseline is unknown. Pt lacks capacity of participate in partial or full neuro exam  I suspect that given his hx, his pupils are at baseline. will monitor and have am team f/u.    Vital Signs Last 24 Hrs  T(C): 36.6 (25 Nov 2023 16:55), Max: 37.6 (25 Nov 2023 04:50)  T(F): 97.9 (25 Nov 2023 16:55), Max: 99.6 (25 Nov 2023 04:50)  HR: 90 (25 Nov 2023 18:15) (88 - 132)  BP: 106/69 (25 Nov 2023 16:55) (99/59 - 144/66)  BP(mean): --  RR: 20 (25 Nov 2023 16:55) (20 - 24)  SpO2: 99% (25 Nov 2023 18:15) (95% - 100%)    Parameters below as of 25 Nov 2023 16:55  Patient On (Oxygen Delivery Method): nasal cannula, high flow EVENT: TEAMS by RN that patient's "L pupil is dilated and not really reacting to light can you come take a look at him" no other symptoms  pt seen, chart reviewed  pt's spouse in recliner chair at bedside, verbalized she has not noticed any changes in pt. Spoke with RN she confirmed having the patient in the past as late as last night; she acknowledged she did not assess the pupils last night so she is unsure of pt's baseline.  Pt with hx of severe dementia, nonverbal, contracted, admitted for sepsis likely due to aspiration PNA    On eye exam  pt keeps his eyes closed, cannot follow command to spontaneously open (likely baseline with severe dementia)  his pupils are round and reacts b/l to light though sluggish. L pupil is 4mm, and R 3mm- baseline is unknown. Pt lacks capacity of participate in partial or full neuro exam  I suspect that given his hx, his pupils are at baseline given that another RN on the unit confirmed that when she had the patient previously the pupils "were uneven". Primary informed undersigned that her colleague did relay this information to her.  No need for further work-up.     Vital Signs Last 24 Hrs  T(C): 36.6 (25 Nov 2023 16:55), Max: 37.6 (25 Nov 2023 04:50)  T(F): 97.9 (25 Nov 2023 16:55), Max: 99.6 (25 Nov 2023 04:50)  HR: 90 (25 Nov 2023 18:15) (88 - 132)  BP: 106/69 (25 Nov 2023 16:55) (99/59 - 144/66)  BP(mean): --  RR: 20 (25 Nov 2023 16:55) (20 - 24)  SpO2: 99% (25 Nov 2023 18:15) (95% - 100%)    Parameters below as of 25 Nov 2023 16:55  Patient On (Oxygen Delivery Method): nasal cannula, high flow

## 2023-11-26 DIAGNOSIS — R00.0 TACHYCARDIA, UNSPECIFIED: ICD-10-CM

## 2023-11-26 LAB
ANION GAP SERPL CALC-SCNC: 8 MMOL/L — SIGNIFICANT CHANGE UP (ref 5–17)
ANION GAP SERPL CALC-SCNC: 8 MMOL/L — SIGNIFICANT CHANGE UP (ref 5–17)
BUN SERPL-MCNC: 18 MG/DL — SIGNIFICANT CHANGE UP (ref 7–23)
BUN SERPL-MCNC: 18 MG/DL — SIGNIFICANT CHANGE UP (ref 7–23)
CALCIUM SERPL-MCNC: 8.3 MG/DL — LOW (ref 8.4–10.5)
CALCIUM SERPL-MCNC: 8.3 MG/DL — LOW (ref 8.4–10.5)
CHLORIDE SERPL-SCNC: 109 MMOL/L — HIGH (ref 96–108)
CHLORIDE SERPL-SCNC: 109 MMOL/L — HIGH (ref 96–108)
CO2 SERPL-SCNC: 24 MMOL/L — SIGNIFICANT CHANGE UP (ref 22–31)
CO2 SERPL-SCNC: 24 MMOL/L — SIGNIFICANT CHANGE UP (ref 22–31)
CREAT SERPL-MCNC: 0.46 MG/DL — LOW (ref 0.5–1.3)
CREAT SERPL-MCNC: 0.46 MG/DL — LOW (ref 0.5–1.3)
CULTURE RESULTS: SIGNIFICANT CHANGE UP
EGFR: 108 ML/MIN/1.73M2 — SIGNIFICANT CHANGE UP
EGFR: 108 ML/MIN/1.73M2 — SIGNIFICANT CHANGE UP
GLUCOSE BLDC GLUCOMTR-MCNC: 160 MG/DL — HIGH (ref 70–99)
GLUCOSE BLDC GLUCOMTR-MCNC: 160 MG/DL — HIGH (ref 70–99)
GLUCOSE SERPL-MCNC: 162 MG/DL — HIGH (ref 70–99)
GLUCOSE SERPL-MCNC: 162 MG/DL — HIGH (ref 70–99)
HCT VFR BLD CALC: 33.2 % — LOW (ref 39–50)
HCT VFR BLD CALC: 33.2 % — LOW (ref 39–50)
HGB BLD-MCNC: 10.6 G/DL — LOW (ref 13–17)
HGB BLD-MCNC: 10.6 G/DL — LOW (ref 13–17)
MAGNESIUM SERPL-MCNC: 2.1 MG/DL — SIGNIFICANT CHANGE UP (ref 1.6–2.6)
MAGNESIUM SERPL-MCNC: 2.1 MG/DL — SIGNIFICANT CHANGE UP (ref 1.6–2.6)
MCHC RBC-ENTMCNC: 31.3 PG — SIGNIFICANT CHANGE UP (ref 27–34)
MCHC RBC-ENTMCNC: 31.3 PG — SIGNIFICANT CHANGE UP (ref 27–34)
MCHC RBC-ENTMCNC: 31.9 GM/DL — LOW (ref 32–36)
MCHC RBC-ENTMCNC: 31.9 GM/DL — LOW (ref 32–36)
MCV RBC AUTO: 97.9 FL — SIGNIFICANT CHANGE UP (ref 80–100)
MCV RBC AUTO: 97.9 FL — SIGNIFICANT CHANGE UP (ref 80–100)
NRBC # BLD: 0 /100 WBCS — SIGNIFICANT CHANGE UP (ref 0–0)
NRBC # BLD: 0 /100 WBCS — SIGNIFICANT CHANGE UP (ref 0–0)
PLATELET # BLD AUTO: 202 K/UL — SIGNIFICANT CHANGE UP (ref 150–400)
PLATELET # BLD AUTO: 202 K/UL — SIGNIFICANT CHANGE UP (ref 150–400)
POTASSIUM SERPL-MCNC: 4.3 MMOL/L — SIGNIFICANT CHANGE UP (ref 3.5–5.3)
POTASSIUM SERPL-MCNC: 4.3 MMOL/L — SIGNIFICANT CHANGE UP (ref 3.5–5.3)
POTASSIUM SERPL-SCNC: 4.3 MMOL/L — SIGNIFICANT CHANGE UP (ref 3.5–5.3)
POTASSIUM SERPL-SCNC: 4.3 MMOL/L — SIGNIFICANT CHANGE UP (ref 3.5–5.3)
RBC # BLD: 3.39 M/UL — LOW (ref 4.2–5.8)
RBC # BLD: 3.39 M/UL — LOW (ref 4.2–5.8)
RBC # FLD: 14.1 % — SIGNIFICANT CHANGE UP (ref 10.3–14.5)
RBC # FLD: 14.1 % — SIGNIFICANT CHANGE UP (ref 10.3–14.5)
SODIUM SERPL-SCNC: 141 MMOL/L — SIGNIFICANT CHANGE UP (ref 135–145)
SODIUM SERPL-SCNC: 141 MMOL/L — SIGNIFICANT CHANGE UP (ref 135–145)
SPECIMEN SOURCE: SIGNIFICANT CHANGE UP
WBC # BLD: 16.79 K/UL — HIGH (ref 3.8–10.5)
WBC # BLD: 16.79 K/UL — HIGH (ref 3.8–10.5)
WBC # FLD AUTO: 16.79 K/UL — HIGH (ref 3.8–10.5)
WBC # FLD AUTO: 16.79 K/UL — HIGH (ref 3.8–10.5)

## 2023-11-26 PROCEDURE — 99232 SBSQ HOSP IP/OBS MODERATE 35: CPT

## 2023-11-26 RX ORDER — KETOROLAC TROMETHAMINE 30 MG/ML
15 SYRINGE (ML) INJECTION ONCE
Refills: 0 | Status: DISCONTINUED | OUTPATIENT
Start: 2023-11-26 | End: 2023-11-26

## 2023-11-26 RX ORDER — MEROPENEM 1 G/30ML
1000 INJECTION INTRAVENOUS EVERY 8 HOURS
Refills: 0 | Status: COMPLETED | OUTPATIENT
Start: 2023-11-26 | End: 2023-12-03

## 2023-11-26 RX ORDER — VANCOMYCIN HCL 1 G
500 VIAL (EA) INTRAVENOUS ONCE
Refills: 0 | Status: COMPLETED | OUTPATIENT
Start: 2023-11-26 | End: 2023-11-26

## 2023-11-26 RX ADMIN — Medication 750 MILLIGRAM(S): at 05:16

## 2023-11-26 RX ADMIN — Medication 1 GRAM(S): at 00:37

## 2023-11-26 RX ADMIN — SENNA PLUS 10 MILLILITER(S): 8.6 TABLET ORAL at 21:24

## 2023-11-26 RX ADMIN — Medication 750 MILLIGRAM(S): at 22:24

## 2023-11-26 RX ADMIN — LEVALBUTEROL 0.63 MILLIGRAM(S): 1.25 SOLUTION, CONCENTRATE RESPIRATORY (INHALATION) at 17:52

## 2023-11-26 RX ADMIN — LEVALBUTEROL 0.63 MILLIGRAM(S): 1.25 SOLUTION, CONCENTRATE RESPIRATORY (INHALATION) at 23:38

## 2023-11-26 RX ADMIN — Medication 3 MILLILITER(S): at 23:38

## 2023-11-26 RX ADMIN — Medication 1 GRAM(S): at 17:48

## 2023-11-26 RX ADMIN — Medication 1 APPLICATION(S): at 06:56

## 2023-11-26 RX ADMIN — MEROPENEM 100 MILLIGRAM(S): 1 INJECTION INTRAVENOUS at 15:49

## 2023-11-26 RX ADMIN — Medication 3 MILLILITER(S): at 06:58

## 2023-11-26 RX ADMIN — ENOXAPARIN SODIUM 40 MILLIGRAM(S): 100 INJECTION SUBCUTANEOUS at 21:28

## 2023-11-26 RX ADMIN — LEVALBUTEROL 0.63 MILLIGRAM(S): 1.25 SOLUTION, CONCENTRATE RESPIRATORY (INHALATION) at 00:35

## 2023-11-26 RX ADMIN — LEVALBUTEROL 0.63 MILLIGRAM(S): 1.25 SOLUTION, CONCENTRATE RESPIRATORY (INHALATION) at 06:59

## 2023-11-26 RX ADMIN — PANTOPRAZOLE SODIUM 40 MILLIGRAM(S): 20 TABLET, DELAYED RELEASE ORAL at 06:58

## 2023-11-26 RX ADMIN — Medication 500 MICROGRAM(S): at 11:29

## 2023-11-26 RX ADMIN — Medication 1 GRAM(S): at 11:28

## 2023-11-26 RX ADMIN — Medication 500 MICROGRAM(S): at 17:51

## 2023-11-26 RX ADMIN — Medication 100 MILLIGRAM(S): at 06:10

## 2023-11-26 RX ADMIN — MEROPENEM 100 MILLIGRAM(S): 1 INJECTION INTRAVENOUS at 21:25

## 2023-11-26 RX ADMIN — Medication 3 MILLILITER(S): at 11:29

## 2023-11-26 RX ADMIN — CHLORHEXIDINE GLUCONATE 1 APPLICATION(S): 213 SOLUTION TOPICAL at 06:56

## 2023-11-26 RX ADMIN — Medication 1 GRAM(S): at 06:57

## 2023-11-26 RX ADMIN — Medication 1 GRAM(S): at 23:38

## 2023-11-26 RX ADMIN — Medication 3 MILLILITER(S): at 00:34

## 2023-11-26 RX ADMIN — Medication 15 MILLIGRAM(S): at 10:11

## 2023-11-26 RX ADMIN — PIPERACILLIN AND TAZOBACTAM 25 GRAM(S): 4; .5 INJECTION, POWDER, LYOPHILIZED, FOR SOLUTION INTRAVENOUS at 06:59

## 2023-11-26 RX ADMIN — Medication 500 MICROGRAM(S): at 23:38

## 2023-11-26 RX ADMIN — Medication 15 MILLIGRAM(S): at 11:11

## 2023-11-26 RX ADMIN — Medication 500 MICROGRAM(S): at 00:37

## 2023-11-26 RX ADMIN — Medication 500 MICROGRAM(S): at 06:58

## 2023-11-26 RX ADMIN — Medication 1 APPLICATION(S): at 17:53

## 2023-11-26 RX ADMIN — Medication 3 MILLILITER(S): at 17:52

## 2023-11-26 RX ADMIN — QUETIAPINE FUMARATE 100 MILLIGRAM(S): 200 TABLET, FILM COATED ORAL at 21:25

## 2023-11-26 RX ADMIN — Medication 40 MILLIEQUIVALENT(S): at 00:34

## 2023-11-26 RX ADMIN — LEVALBUTEROL 0.63 MILLIGRAM(S): 1.25 SOLUTION, CONCENTRATE RESPIRATORY (INHALATION) at 11:30

## 2023-11-26 RX ADMIN — Medication 750 MILLIGRAM(S): at 21:24

## 2023-11-26 NOTE — PROGRESS NOTE ADULT - SUBJECTIVE AND OBJECTIVE BOX
Date of Service: 11-26-23 @ 13:33    Patient is a 78y old  Male who presents with a chief complaint of Osteomyelitis     (20 Nov 2023 11:10)      Any change in ROS: on 905 high low:  he is sleepy today  ; wife is not at bedside:       MEDICATIONS  (STANDING):  acetaminophen   Oral Liquid .. 750 milliGRAM(s) Enteral Tube once  acetylcysteine 20%  Inhalation 3 milliLiter(s) Inhalation every 6 hours  chlorhexidine 2% Cloths 1 Application(s) Topical <User Schedule>  Dakins Solution - 1/4 Strength 1 Application(s) Topical two times a day  enoxaparin Injectable 40 milliGRAM(s) SubCutaneous every 24 hours  ipratropium    for Nebulization 500 MICROGram(s) Nebulizer every 6 hours  levalbuterol Inhalation 0.63 milliGRAM(s) Inhalation every 6 hours  OLANZapine 5 milliGRAM(s) Oral <User Schedule>  pantoprazole    Tablet 40 milliGRAM(s) Oral before breakfast  piperacillin/tazobactam IVPB.. 3.375 Gram(s) IV Intermittent every 8 hours  QUEtiapine 100 milliGRAM(s) Oral <User Schedule>  senna Syrup 10 milliLiter(s) Oral at bedtime  sucralfate 1 Gram(s) Oral four times a day    MEDICATIONS  (PRN):  acetaminophen   Oral Liquid .. 750 milliGRAM(s) Oral every 6 hours PRN Mild Pain (1 - 3)    Vital Signs Last 24 Hrs  T(C): 36.7 (26 Nov 2023 11:30), Max: 39.1 (26 Nov 2023 05:28)  T(F): 98 (26 Nov 2023 11:30), Max: 102.3 (26 Nov 2023 05:28)  HR: 86 (26 Nov 2023 11:30) (86 - 166)  BP: 107/66 (26 Nov 2023 09:29) (95/60 - 109/68)  BP(mean): --  RR: 20 (26 Nov 2023 09:29) (20 - 28)  SpO2: 99% (26 Nov 2023 09:29) (89% - 100%)    Parameters below as of 26 Nov 2023 09:29  Patient On (Oxygen Delivery Method): nasal cannula, high flow        I&O's Summary    25 Nov 2023 07:01  -  26 Nov 2023 07:00  --------------------------------------------------------  IN: 700 mL / OUT: 0 mL / NET: 700 mL          Physical Exam:   GENERAL: NAD, well-groomed, well-developed  HEENT: CANDY/   Atraumatic, Normocephalic  ENMT: No tonsillar erythema, exudates, or enlargement; Moist mucous membranes, Good dentition, No lesions  NECK: Supple, No JVD, Normal thyroid  CHEST/LUNG: Clear to auscultaion- imited exam:  no wheezing  CVS: Regular rate and rhythm; No murmurs, rubs, or gallops  GI: : Soft, Nontender, Nondistended; Bowel sounds present  NERVOUS SYSTEM: on high flow has fever   EXTREMITIES:  --r edema  LYMPH: No lymphadenopathy noted  SKIN: No rashes or lesions  ENDOCRINOLOGY: No Thyromegaly  PSYCHcalm    Labs:                              10.6   16.79 )-----------( 202      ( 26 Nov 2023 12:34 )             33.2                         11.6   7.70  )-----------( 236      ( 24 Nov 2023 07:00 )             36.8     11-26    141  |  109<H>  |  18  ----------------------------<  162<H>  4.3   |  24  |  0.46<L>  11-24    142  |  108  |  14  ----------------------------<  145<H>  3.3<L>   |  27  |  0.41<L>    Ca    8.3<L>      26 Nov 2023 12:34  Mg     2.1     11-26      CAPILLARY BLOOD GLUCOSE      POCT Blood Glucose.: 160 mg/dL (26 Nov 2023 08:56)          Urinalysis Basic - ( 26 Nov 2023 12:34 )    Color: x / Appearance: x / SG: x / pH: x  Gluc: 162 mg/dL / Ketone: x  / Bili: x / Urobili: x   Blood: x / Protein: x / Nitrite: x   Leuk Esterase: x / RBC: x / WBC x   Sq Epi: x / Non Sq Epi: x / Bacteria: x      D-Dimer Assay, Quantitative: 748 ng/mL DDU (11-23 @ 13:52)        RECENT CULTURES:  11-20 @ 22:02 .Blood Blood-Peripheral            rad< from: Xray Chest 1 View- PORTABLE-Urgent (Xray Chest 1 View- PORTABLE-Urgent .) (11.25.23 @ 15:44) >    ACC: [id number] EXAM:  XR CHEST PORTABLE URGENT 1V   ORDERED BY:  HOSEA PRINCE     PROCEDURE DATE:  11/25/2023          INTERPRETATION:  CLINICAL INDICATION: Hypoxia.    EXAM: Frontal radiograph of the chest.    COMPARISON: Chest radiograph from6/29/2019.    FINDINGS:  Evaluation limited by patient rotation. Left base incompletely imaged.  Hazy ill-defined patchy opacity in the left lower lung, seen on prior   imaging.  Clear remaining visualized lungs. Sharp right CP angle. No pneumothorax.  The heart is normal in size  The visualized osseous structures demonstrate no acute pathology.    IMPRESSION:  Patchy opacity in left lower lung, seen on prior imaging, possible   infection/pneumonia.    --- End of Report ---          PRAAG LYNCH MD; Resident Radiologist  This document has been electronically signed.  MARTI LEE MD; Attending Radiologist  This document has been electronically signed. Nov 26 2023 10:47AM    < end of copied text >      No growth at 5 days          RESPIRATORY CULTURES:          Studies  Chest X-RAY  CT SCAN Chest   Venous Dopplers: LE:   CT Abdomen  Others

## 2023-11-26 NOTE — PROGRESS NOTE ADULT - PROBLEM SELECTOR PLAN 1
S/p RRT 11/20 for hypoxia requiring HFNC  -Increase in O2 requirements 11/21 from HFNC 50L/80% to 100% Bipap. Weaned from Bipap to HFNC 50L/100%  -Suspect aspiration event, family at bedside reports multiple aspiration events in the Past. Lower suspicion for PE, but favor ruling out given bedbound status.  -CT A/P with LLL consolidation, SHEILA tree in bud opacities likely PNA. Continue ABX per ID  -LE duplex negative for DVT but limited study due to patient being contracted   -Duoneb q6h  -Mucomyst 20% q6h x3 more days (give with Duoneb)  -Chest PT q6h  -ABG with no CO2 retention  -Was tolerating HFNC 45L/45% yesterday. Increasing O2 requirements overnight per flowsheet to 60L/100%, wore Bipap overnight. No clear desaturations noted in chart. Weaned back to 45L/45% this AM by RT and patient appearing to be tolerating so far  -Suggest continue HFNC 45L/45%, wean O2 as tolerated. Bipap qHS PRN for increased WOB. Keep sats >90%  -D-dimer noted. F/u CTA chest. Can transport to CT on 100% NRB.  -however ct chest could not be done  ; on 100% high flow:  would do chest xra  today : dopplers are negative  -cxr on 25th to me with some left lower infiltrate:  that does not explain his highfio2 requirement : his dopplers were negative few days ago

## 2023-11-26 NOTE — PROGRESS NOTE ADULT - ASSESSMENT
77 yo M PMHx of severe dementia, s/p PEG tube, GERD, arthritis presents with ulcer that has worsened over last month  No fever, has leukocytosis  Brought in for ? fevers and worsening wound; chronically bed bound  UA neg, UCX neg  RVP neg  CT with sacral decubitus ulcers, OM erosion, pneumonia, blader wall thickening  BCX NGTD  Possible pneumonia  Had RRT 11/20, vancomycin readded; suspect due to re-aspiration but unclear  Further fevers overnight noted, patient generally unchanged, still hypoxic, HFNC  Overall, Abnormal finding on imaging, pneumonia  - Started on meropenem by pulmonary, no objection from ID perspective  - Given lack of improvement would check CT Chest (defer to team if plan to rule out PE)  - Check sputum culture if producing sputum  - Wound care eval to sacral wound  - Uncertain benefit to prolonged treatment course for sacral wound  - O2 status improving    Guarded/GOC per team    Roni Luis MD  Contact on TEAMS messaging from 9am - 5pm  From 5pm-9am, on weekends, or if no response call [phone]

## 2023-11-26 NOTE — CONSULT NOTE ADULT - ASSESSMENT
79 yo M PMHx of severe dementia, PEG tube presents with ulcer that has worsened over last month. Patient was seen by house calls doctor 2 weeks ago, placed on antibiotics but started mounting fevers in the last few days. It was noticed by his GI doctor (Dr. Moraes) that his ulcer was getting worse, so he sent him in. Patient is nonverbal at baseline. Wife has noticed no new cough, runny nose.  heart rate "sustained at 130"

## 2023-11-26 NOTE — PROGRESS NOTE ADULT - PROBLEM SELECTOR PLAN 2
CT A/P with LLL consolidation & SHEILA TIB opacities  -F/u CTA chest to further assess lung parenchyma  -RVP negative  -MRSA PCR negative  -BC with NGTD  -Urine legionella negative  -ABX per ID : on zosyn -11/19----  -he is still spiking fever despite getting many days of zosyn : zoltand vanco yesterday  : will change to meropenem

## 2023-11-26 NOTE — PROVIDER CONTACT NOTE (OTHER) - ACTION/TREATMENT ORDERED:
As per DESTINY Morrow, no further actions needed @ this time. Will reassess temp for improvement. Nursing care ongoing.

## 2023-11-26 NOTE — CHART NOTE - NSCHARTNOTEFT_GEN_A_CORE
Follow up fever, tachycardia, hypoxia (please refer to chart note at 05:47)  pt seen, now doing much better, HR is now normalized 90's, afebrile. He is s/p vanco iv  wife remains at bedside  continue to monitor    Vital Signs Last 24 Hrs  T(C): 37.6 (26 Nov 2023 06:22), Max: 39.1 (26 Nov 2023 05:28)  T(F): 99.7 (26 Nov 2023 06:22), Max: 102.3 (26 Nov 2023 05:28)  HR: 108 (26 Nov 2023 06:22) (88 - 151)  BP: 95/64 (26 Nov 2023 06:22) (95/60 - 113/68)  RR: 22 (26 Nov 2023 06:22) (20 - 28)  SpO2: 98% (26 Nov 2023 06:22) (89% - 100%)    Parameters below as of 26 Nov 2023 06:22  Patient On (Oxygen Delivery Method): nasal cannula, high flow

## 2023-11-26 NOTE — PROGRESS NOTE ADULT - SUBJECTIVE AND OBJECTIVE BOX
[facility]  Nathaniel Barron PA-C  [address] [place] [address]  [phone]      INTERVAL HPI/OVERNIGHT EVENTS:  pt seen and examined, events noted  febrile, tachycardiac and hypotensive overnight     MEDICATIONS  (STANDING):  chlorhexidine 2% Cloths 1 Application(s) Topical <User Schedule>  Dakins Solution - 1/2 Strength 1 Application(s) Topical daily  Dakins Solution - 1/4 Strength 1 Application(s) Topical two times a day  enoxaparin Injectable 40 milliGRAM(s) SubCutaneous every 24 hours  guaiFENesin  milliGRAM(s) Oral every 12 hours  OLANZapine 5 milliGRAM(s) Oral <User Schedule>  pantoprazole    Tablet 40 milliGRAM(s) Oral before breakfast  piperacillin/tazobactam IVPB.. 3.375 Gram(s) IV Intermittent every 8 hours  polyethylene glycol 3350 17 Gram(s) Oral daily  QUEtiapine 100 milliGRAM(s) Oral <User Schedule>  senna 2 Tablet(s) Oral at bedtime  sucralfate 1 Gram(s) Oral four times a day  vancomycin  IVPB 500 milliGRAM(s) IV Intermittent every 12 hours  vancomycin  IVPB        MEDICATIONS  (PRN):  acetaminophen   Oral Liquid .. 750 milliGRAM(s) Oral every 6 hours PRN Mild Pain (1 - 3)      Allergies    No Known Allergies    Intolerances    Haldol (Other)  benzodiazepines (Other)      ROS:   unable to obtain       PHYSICAL EXAM:   Vital Signs Last 24 Hrs  T(C): 38 (26 Nov 2023 09:48), Max: 39.1 (26 Nov 2023 05:28)  T(F): 100.4 (26 Nov 2023 09:48), Max: 102.3 (26 Nov 2023 05:28)  HR: 90 (26 Nov 2023 09:34) (88 - 166)  BP: 107/66 (26 Nov 2023 09:29) (95/60 - 109/68)  BP(mean): --  RR: 20 (26 Nov 2023 09:29) (20 - 28)  SpO2: 99% (26 Nov 2023 09:29) (89% - 100%)    Parameters below as of 26 Nov 2023 09:29  Patient On (Oxygen Delivery Method): nasal cannula, high flow            Daily     Daily     nad  confused  frail  non toxic  soft, nt, +PEG  no edema        LABS:    Urinalysis Basic - ( 20 Nov 2023 22:01 )    Color: x / Appearance: x / SG: x / pH: x  Gluc: 118 mg/dL / Ketone: x  / Bili: x / Urobili: x   Blood: x / Protein: x / Nitrite: x   Leuk Esterase: x / RBC: x / WBC x   Sq Epi: x / Non Sq Epi: x / Bacteria: x        RADIOLOGY & ADDITIONAL TESTS:

## 2023-11-26 NOTE — PROGRESS NOTE ADULT - SUBJECTIVE AND OBJECTIVE BOX
CC: F/U for PNA    Saw/spoke to patient. Unchanged. STill on HFNC    Allergies  No Known Allergies    ANTIMICROBIALS:      PE:    Vital Signs Last 24 Hrs  T(C): 36.7 (26 Nov 2023 11:30), Max: 39.1 (26 Nov 2023 05:28)  T(F): 98 (26 Nov 2023 11:30), Max: 102.3 (26 Nov 2023 05:28)  HR: 86 (26 Nov 2023 11:30) (86 - 166)  BP: 107/66 (26 Nov 2023 09:29) (95/60 - 109/68)  RR: 20 (26 Nov 2023 09:29) (20 - 28)  SpO2: 99% (26 Nov 2023 09:29) (89% - 100%)    Gen: AOx3, NAD, non-toxic  CV: Nontachycardic  Resp: Breathing comfortably, HFNC  Abd: Soft, nontender  IV/Skin: No thrombophlebitis    LABS:                        10.6   16.79 )-----------( 202      ( 26 Nov 2023 12:34 )             33.2     11-26    141  |  109<H>  |  18  ----------------------------<  162<H>  4.3   |  24  |  0.46<L>    Ca    8.3<L>      26 Nov 2023 12:34  Mg     2.1     11-26    Urinalysis Basic - ( 26 Nov 2023 12:34 )    Color: x / Appearance: x / SG: x / pH: x  Gluc: 162 mg/dL / Ketone: x  / Bili: x / Urobili: x   Blood: x / Protein: x / Nitrite: x   Leuk Esterase: x / RBC: x / WBC x   Sq Epi: x / Non Sq Epi: x / Bacteria: x    MICROBIOLOGY:    .Blood Blood-Peripheral  11-20-23   No growth at 5 days  --  --    Clean Catch Clean Catch (Midstream)  11-18-23   No growth  --  --    .Blood Blood-Peripheral  11-18-23   No growth at 5 days  --  --    .Blood Blood-Peripheral  11-18-23   No growth at 5 days  --  --    RADIOLOGY:    11/25     IMPRESSION:  Patchy opacity in left lower lung, seen on prior imaging, possible   infection/pneumonia.

## 2023-11-26 NOTE — PROGRESS NOTE ADULT - SUBJECTIVE AND OBJECTIVE BOX
SHARONA LARKIN  78y Male  MRN:[id number]    Patient is a 78y old  Male who presents with a chief complaint of sepsis      HPI:  79 yo M PMHx of severe dementia, PEG tube presents with ulcer that has worsened over last month. Patient was seen by house calls doctor 2 weeks ago, placed on antibiotics but started mounting fevers in the last few days. It was noticed by his GI doctor (Dr. Moraes) that his ulcer was getting worse, so he sent him in. Patient is nonverbal at baseline. Wife has noticed no new cough, runny nose. (19 Nov 2023 13:02)      Patient seen and evaluated at bedside. No acute events overnight except as noted.    Interval HPI: febrile o/n    PAST MEDICAL & SURGICAL HISTORY:  Dementia      Pneumonia      Acute renal failure (ARF)  ARF 1.5 YEAR AGO      Dysphagia  peg      Arthritis      GERD (gastroesophageal reflux disease)      Dyspepsia      History of hand surgery      PEG adjustment, replacement, or removal      Status post insertion of percutaneous endoscopic gastrostomy (PEG) tube  PEG replacement on 02/29/16          REVIEW OF SYSTEMS:  as per hpi     VITALS:   Vital Signs Last 24 Hrs  T(C): 36.7 (26 Nov 2023 11:30), Max: 39.1 (26 Nov 2023 05:28)  T(F): 98 (26 Nov 2023 11:30), Max: 102.3 (26 Nov 2023 05:28)  HR: 86 (26 Nov 2023 11:30) (86 - 166)  BP: 107/66 (26 Nov 2023 09:29) (95/60 - 109/68)  BP(mean): --  RR: 20 (26 Nov 2023 09:29) (20 - 28)  SpO2: 99% (26 Nov 2023 09:29) (89% - 100%)    Parameters below as of 26 Nov 2023 09:29  Patient On (Oxygen Delivery Method): nasal cannula, high flow          PHYSICAL EXAM:  GENERAL: NAD,  comfortable   HEAD:  Atraumatic, Normocephalic  EYES: EOMI, PERRLA, conjunctiva and sclera clear  NECK: Supple, No JVD  CHEST/LUNG: Clear to auscultation bilaterally; No wheeze  HEART: S1, S2; No murmurs, rubs, or gallops  ABDOMEN: Soft, Nontender, Nondistended; Bowel sounds present +peg  EXTREMITIES:  2+ Peripheral Pulses, contracted   PSYCH: Normal affect  NEUROLOGY: non verbal  SKIN: +sacral decub    Consultant(s) Notes Reviewed:  [x ] YES  [ ] NO  Care Discussed with Consultants/Other Providers [ x] YES  [ ] NO    MEDS:   MEDICATIONS  (STANDING):  acetaminophen   Oral Liquid .. 750 milliGRAM(s) Enteral Tube once  acetylcysteine 20%  Inhalation 3 milliLiter(s) Inhalation every 6 hours  chlorhexidine 2% Cloths 1 Application(s) Topical <User Schedule>  Dakins Solution - 1/4 Strength 1 Application(s) Topical two times a day  enoxaparin Injectable 40 milliGRAM(s) SubCutaneous every 24 hours  ipratropium    for Nebulization 500 MICROGram(s) Nebulizer every 6 hours  levalbuterol Inhalation 0.63 milliGRAM(s) Inhalation every 6 hours  OLANZapine 5 milliGRAM(s) Oral <User Schedule>  pantoprazole    Tablet 40 milliGRAM(s) Oral before breakfast  piperacillin/tazobactam IVPB.. 3.375 Gram(s) IV Intermittent every 8 hours  QUEtiapine 100 milliGRAM(s) Oral <User Schedule>  senna Syrup 10 milliLiter(s) Oral at bedtime  sucralfate 1 Gram(s) Oral four times a day    MEDICATIONS  (PRN):  acetaminophen   Oral Liquid .. 750 milliGRAM(s) Oral every 6 hours PRN Mild Pain (1 - 3)        ALLERGIES:  Haldol (Other)  No Known Allergies  benzodiazepines (Other)      LABS:                         labs pending           < from: CT Abdomen and Pelvis w/ IV Cont (11.18.23 @ 19:41) >    IMPRESSION:    Extensive decubitus ulcer involving the right posterior gluteal and   pelvic region with extension to bone. There is bony erosion of the   ischium compatible with osteomyelitis.    Mild bladder wall thickening and a few foci of intraluminal air,   correlate for recent instrumentation and/or infection.    Left lower lobe consolidation along with tree-in-bud/nodular opacities in   the left upper lobe concerning for pneumonia.    --- End of Report ---        < end of copied text >

## 2023-11-26 NOTE — CHART NOTE - NSCHARTNOTEFT_GEN_A_CORE
EVENT: Asked by RN to see patient for heart rate "sustained at 130" and oral temp of 99.1 and O2 sat 81%"  Pt seen, chart reviewed  Pt seen, spouse at bedside, head of bed up. HR on bedside monitor 140's - 155. . Requested that nursing team check oral temp which yeilded 102.3. He is well contracted BUE towards his chest and BLE upwards into his abd, with marked spasity      MEDICATIONS  (STANDING):  acetaminophen   Oral Liquid .. 750 milliGRAM(s) Enteral Tube once  acetylcysteine 20%  Inhalation 3 milliLiter(s) Inhalation every 6 hours  chlorhexidine 2% Cloths 1 Application(s) Topical <User Schedule>  Dakins Solution - 1/4 Strength 1 Application(s) Topical two times a day  enoxaparin Injectable 40 milliGRAM(s) SubCutaneous every 24 hours  ipratropium    for Nebulization 500 MICROGram(s) Nebulizer every 6 hours  levalbuterol Inhalation 0.63 milliGRAM(s) Inhalation every 6 hours  OLANZapine 5 milliGRAM(s) Oral <User Schedule>  pantoprazole    Tablet 40 milliGRAM(s) Oral before breakfast  piperacillin/tazobactam IVPB.. 3.375 Gram(s) IV Intermittent every 8 hours  QUEtiapine 100 milliGRAM(s) Oral <User Schedule>  senna Syrup 10 milliLiter(s) Oral at bedtime  sucralfate 1 Gram(s) Oral four times a day    MEDICATIONS  (PRN):  acetaminophen   Oral Liquid .. 750 milliGRAM(s) Oral every 6 hours PRN Mild Pain (1 - 3)      VITALS:  Vital Signs Last 24 Hrs  T(C): 37.1 (26 Nov 2023 04:45), Max: 37.2 (26 Nov 2023 00:30)  T(F): 98.7 (26 Nov 2023 04:45), Max: 99 (26 Nov 2023 00:30)  HR: 114 (26 Nov 2023 04:45) (88 - 124)  BP: 109/68 (26 Nov 2023 04:45) (95/60 - 113/68)  BP(mean): --  RR: 22 (26 Nov 2023 04:45) (20 - 24)  SpO2: 96% (26 Nov 2023 04:45) (95% - 100%)    Parameters below as of 26 Nov 2023 04:45  Patient On (Oxygen Delivery Method): nasal cannula w/ humidification  O2 Flow (L/min): 50  O2 Concentration (%): 90          LABS:                        11.6   7.70  )-----------( 236      ( 24 Nov 2023 07:00 )             36.8     11-24    142  |  108  |  14  ----------------------------<  145<H>  3.3<L>   |  27  |  0.41<L>    Ca    8.5      24 Nov 2023 06:59    Urinalysis Basic - ( 24 Nov 2023 06:59 )    Color: x / Appearance: x / SG: x / pH: x  Gluc: 145 mg/dL / Ketone: x  / Bili: x / Urobili: x   Blood: x / Protein: x / Nitrite: x   Leuk Esterase: x / RBC: x / WBC x   Sq Epi: x / Non Sq Epi: x / Bacteria: x      Patient is a 78 year old Man with hx of dementia, non verbal. contracted, s/p peg. presented with sepsis, CT with sacral decubitus ulcers, OM erosion, pneumonia, blader wall thickening  currently being treated for sepsis likely 2/2 sacral decub and aspiration pna. His HR has been <120 throughout the night and afebrile with last assessment; now with fever, tachycardia, hypoxic    Plan  suction pt now ( yielded improvement with O2 sat increased to 89-91)  continue O2 via HFNC  RN reports that she had dosed patient with PRN tylenol ~15 minutes prior so will wait for effect  ideally I would like to give a small dose of metoprolol but rechecking his BP yielded 100 systolic so would hold off on this - there has been discussion on initiating BB but his borderline soft BP impeded this  cold compress  d/w plan with spouse at bedside and informed I'd like to give a small dose of morphine which should help the HR somewhat plus any pain he likely has from his ulcer but she objects "I don't like how it does him" she replied  she added that once the fever gets better the heart rate will go down (a true statement)  pt has been on vanco but was dc'd on 11/22  I will give vanco 500mg IV x1 now,   continue current dosing of zosyn  blood culture collected 11/18 and 11/20 both with no growth after 5 days  will hold TF for now (wife requesting) can resume later today.  will monitor for now

## 2023-11-26 NOTE — PROGRESS NOTE ADULT - ASSESSMENT
sacral decub  PEG tube  sepsis    CT noted  ID following  wound care f/u  cultures noted  cont tube feeds as tolerated   aspiration precautions   will follow

## 2023-11-26 NOTE — CONSULT NOTE ADULT - SUBJECTIVE AND OBJECTIVE BOX
CHIEF COMPLAINT:    HISTORY OF PRESENT ILLNESS:  79 yo M PMHx of severe dementia, PEG tube presents with ulcer that has worsened over last month. Patient was seen by house calls doctor 2 weeks ago, placed on antibiotics but started mounting fevers in the last few days. It was noticed by his GI doctor (Dr. Moraes) that his ulcer was getting worse, so he sent him in. Patient is nonverbal at baseline. Wife has noticed no new cough, runny nose.  heart rate "sustained at 130"       PAST MEDICAL & SURGICAL HISTORY:  Dementia      Pneumonia      Acute renal failure (ARF)  ARF 1.5 YEAR AGO      Dysphagia  peg      Arthritis      GERD (gastroesophageal reflux disease)      Dyspepsia      History of hand surgery      PEG adjustment, replacement, or removal      Status post insertion of percutaneous endoscopic gastrostomy (PEG) tube  PEG replacement on 02/29/16              MEDICATIONS:  enoxaparin Injectable 40 milliGRAM(s) SubCutaneous every 24 hours    piperacillin/tazobactam IVPB.. 3.375 Gram(s) IV Intermittent every 8 hours    acetylcysteine 20%  Inhalation 3 milliLiter(s) Inhalation every 6 hours  ipratropium    for Nebulization 500 MICROGram(s) Nebulizer every 6 hours  levalbuterol Inhalation 0.63 milliGRAM(s) Inhalation every 6 hours    acetaminophen   Oral Liquid .. 750 milliGRAM(s) Oral every 6 hours PRN  acetaminophen   Oral Liquid .. 750 milliGRAM(s) Enteral Tube once  OLANZapine 5 milliGRAM(s) Oral <User Schedule>  QUEtiapine 100 milliGRAM(s) Oral <User Schedule>    pantoprazole    Tablet 40 milliGRAM(s) Oral before breakfast  senna Syrup 10 milliLiter(s) Oral at bedtime  sucralfate 1 Gram(s) Oral four times a day      chlorhexidine 2% Cloths 1 Application(s) Topical <User Schedule>  Dakins Solution - 1/4 Strength 1 Application(s) Topical two times a day      FAMILY HISTORY:  FH: breast cancer  Mother        SOCIAL HISTORY:    [ ] Non-smoker  [ ] Smoker  [ ] Alcohol    Allergies    No Known Allergies    Intolerances    Haldol (Other)  benzodiazepines (Other)  	    REVIEW OF SYSTEMS:    [ ] All others negative	  [XX ] Unable to obtain    PHYSICAL EXAM:  T(C): 37.6 (11-26-23 @ 06:22), Max: 39.1 (11-26-23 @ 05:28)  HR: 108 (11-26-23 @ 06:22) (88 - 151)  BP: 95/64 (11-26-23 @ 06:22) (95/60 - 109/68)  RR: 22 (11-26-23 @ 06:22) (20 - 28)  SpO2: 98% (11-26-23 @ 06:22) (89% - 100%)  Wt(kg): --  I&O's Summary    25 Nov 2023 07:01  -  26 Nov 2023 07:00  --------------------------------------------------------  IN: 700 mL / OUT: 0 mL / NET: 700 mL        Appearance: NAD nonverbal	  HEENT:   Normal oral mucosa, PERRL, EOMI	  Lymphatic: No lymphadenopathy  Cardiovascular: Normal tachy S1 S2, No JVD, No murmurs, No edema  Respiratory: decreased bs   Psychiatry: A & O x 0  Gastrointestinal:  Soft, Non-tender, + PEG   Skin: No rashes, No ecchymoses, No cyanosis	  Neurologic: Non-focal  Extremities: contracted   Vascular: Peripheral pulses palpable 2+ bilaterally  +sacral decub     TELEMETRY: 	    ECG:  	Sinus tach non specific  stt changes   RADIOLOGY:  < from: CT Abdomen and Pelvis w/ IV Cont (11.18.23 @ 19:41) >    ACC: [id number] EXAM:  CT ABDOMEN AND PELVIS IC   ORDERED BY: BENITO YAO     PROCEDURE DATE:  11/18/2023          INTERPRETATION:  CLINICAL INFORMATION: Evaluate sacral ulcer.    COMPARISON: CT abdomen and pelvis 6/29/2019.    CONTRAST/COMPLICATIONS:  IV Contrast: Omnipaque 350  90 cc administered   10 cc discarded  Oral Contrast: NONE  Complications: None reported at time of study completion    PROCEDURE:  CT of the Abdomen and Pelvis was performed.  Sagittal and coronal reformats were performed.    FINDINGS:  LOWER CHEST: Aortic valvular calcifications. Left lower lobe   consolidation. Tree-in-bud/nodular opacities in the left upper lobe.    LIVER: Within normal limits.  BILE DUCTS: Normal caliber.  GALLBLADDER: Cholelithiasis.  SPLEEN: Within normal limits.  PANCREAS: Atrophic.  ADRENALS: Within normal limits.  KIDNEYS/URETERS: Subcentimeter foci in the right kidney which are too   small to characterize. No hydronephrosis.    BLADDER: Mild bladder wall thickening with a few fociof free air in the   bladder lumen, evaluate for recent instrumentation and/or infection.  REPRODUCTIVE ORGANS: Normal size prostate.    BOWEL: No bowel obstruction. Appendix is not visualized. Percutaneous   gastrostomy tube in the stomach.  PERITONEUM: Small volume ascites.  VESSELS: Atherosclerotic changes.  RETROPERITONEUM/LYMPH NODES: No lymphadenopathy.  ABDOMINAL WALL: Extensive decubitus ulcer involving the right posterior   pelvis and gluteal region with extension down to bone and erosion of the   ischium (3-157).  BONES: Erosion of the right ischium secondary to extensive decubitus   ulcer. Degenerative changes.    IMPRESSION:    Extensive decubitus ulcer involving the right posterior gluteal and   pelvic region with extension to bone. There is bony erosion of the   ischium compatible with osteomyelitis.    Mild bladder wall thickening and a few foci of intraluminal air,   correlate for recent instrumentation and/or infection.    Left lower lobe consolidation along with tree-in-bud/nodular opacities in   the left upper lobe concerning for pneumonia.    --- End of Report ---           IDALIA RAND DO; Resident Radiologist  This document has been electronically signed.  MATILDE ZARATE MD; Attending Radiologist  This documenthas been electronically signed. Nov 19 2023  5:00AM    < end of copied text >    OTHER: 	  	  LABS:	 	    CARDIAC MARKERS:                    proBNP:   Lipid Profile:   HgA1c:   TSH:

## 2023-11-27 DIAGNOSIS — Z71.89 OTHER SPECIFIED COUNSELING: ICD-10-CM

## 2023-11-27 DIAGNOSIS — Z51.5 ENCOUNTER FOR PALLIATIVE CARE: ICD-10-CM

## 2023-11-27 LAB
ALBUMIN SERPL ELPH-MCNC: 2.6 G/DL — LOW (ref 3.3–5)
ALBUMIN SERPL ELPH-MCNC: 2.6 G/DL — LOW (ref 3.3–5)
ALP SERPL-CCNC: 136 U/L — HIGH (ref 40–120)
ALP SERPL-CCNC: 136 U/L — HIGH (ref 40–120)
ALT FLD-CCNC: 15 U/L — SIGNIFICANT CHANGE UP (ref 10–45)
ALT FLD-CCNC: 15 U/L — SIGNIFICANT CHANGE UP (ref 10–45)
ANION GAP SERPL CALC-SCNC: 8 MMOL/L — SIGNIFICANT CHANGE UP (ref 5–17)
ANION GAP SERPL CALC-SCNC: 8 MMOL/L — SIGNIFICANT CHANGE UP (ref 5–17)
AST SERPL-CCNC: 15 U/L — SIGNIFICANT CHANGE UP (ref 10–40)
AST SERPL-CCNC: 15 U/L — SIGNIFICANT CHANGE UP (ref 10–40)
BASOPHILS # BLD AUTO: 0.02 K/UL — SIGNIFICANT CHANGE UP (ref 0–0.2)
BASOPHILS # BLD AUTO: 0.02 K/UL — SIGNIFICANT CHANGE UP (ref 0–0.2)
BASOPHILS NFR BLD AUTO: 0.2 % — SIGNIFICANT CHANGE UP (ref 0–2)
BASOPHILS NFR BLD AUTO: 0.2 % — SIGNIFICANT CHANGE UP (ref 0–2)
BILIRUB SERPL-MCNC: 0.6 MG/DL — SIGNIFICANT CHANGE UP (ref 0.2–1.2)
BILIRUB SERPL-MCNC: 0.6 MG/DL — SIGNIFICANT CHANGE UP (ref 0.2–1.2)
BUN SERPL-MCNC: 23 MG/DL — SIGNIFICANT CHANGE UP (ref 7–23)
BUN SERPL-MCNC: 23 MG/DL — SIGNIFICANT CHANGE UP (ref 7–23)
CALCIUM SERPL-MCNC: 8.7 MG/DL — SIGNIFICANT CHANGE UP (ref 8.4–10.5)
CALCIUM SERPL-MCNC: 8.7 MG/DL — SIGNIFICANT CHANGE UP (ref 8.4–10.5)
CHLORIDE SERPL-SCNC: 108 MMOL/L — SIGNIFICANT CHANGE UP (ref 96–108)
CHLORIDE SERPL-SCNC: 108 MMOL/L — SIGNIFICANT CHANGE UP (ref 96–108)
CO2 SERPL-SCNC: 28 MMOL/L — SIGNIFICANT CHANGE UP (ref 22–31)
CO2 SERPL-SCNC: 28 MMOL/L — SIGNIFICANT CHANGE UP (ref 22–31)
CREAT SERPL-MCNC: 0.43 MG/DL — LOW (ref 0.5–1.3)
CREAT SERPL-MCNC: 0.43 MG/DL — LOW (ref 0.5–1.3)
EGFR: 110 ML/MIN/1.73M2 — SIGNIFICANT CHANGE UP
EGFR: 110 ML/MIN/1.73M2 — SIGNIFICANT CHANGE UP
EOSINOPHIL # BLD AUTO: 0.19 K/UL — SIGNIFICANT CHANGE UP (ref 0–0.5)
EOSINOPHIL # BLD AUTO: 0.19 K/UL — SIGNIFICANT CHANGE UP (ref 0–0.5)
EOSINOPHIL NFR BLD AUTO: 1.8 % — SIGNIFICANT CHANGE UP (ref 0–6)
EOSINOPHIL NFR BLD AUTO: 1.8 % — SIGNIFICANT CHANGE UP (ref 0–6)
GLUCOSE SERPL-MCNC: 204 MG/DL — HIGH (ref 70–99)
GLUCOSE SERPL-MCNC: 204 MG/DL — HIGH (ref 70–99)
HCT VFR BLD CALC: 36 % — LOW (ref 39–50)
HCT VFR BLD CALC: 36 % — LOW (ref 39–50)
HGB BLD-MCNC: 11.1 G/DL — LOW (ref 13–17)
HGB BLD-MCNC: 11.1 G/DL — LOW (ref 13–17)
IMM GRANULOCYTES NFR BLD AUTO: 0.7 % — SIGNIFICANT CHANGE UP (ref 0–0.9)
IMM GRANULOCYTES NFR BLD AUTO: 0.7 % — SIGNIFICANT CHANGE UP (ref 0–0.9)
LYMPHOCYTES # BLD AUTO: 1.04 K/UL — SIGNIFICANT CHANGE UP (ref 1–3.3)
LYMPHOCYTES # BLD AUTO: 1.04 K/UL — SIGNIFICANT CHANGE UP (ref 1–3.3)
LYMPHOCYTES # BLD AUTO: 9.7 % — LOW (ref 13–44)
LYMPHOCYTES # BLD AUTO: 9.7 % — LOW (ref 13–44)
MCHC RBC-ENTMCNC: 30.6 PG — SIGNIFICANT CHANGE UP (ref 27–34)
MCHC RBC-ENTMCNC: 30.6 PG — SIGNIFICANT CHANGE UP (ref 27–34)
MCHC RBC-ENTMCNC: 30.8 GM/DL — LOW (ref 32–36)
MCHC RBC-ENTMCNC: 30.8 GM/DL — LOW (ref 32–36)
MCV RBC AUTO: 99.2 FL — SIGNIFICANT CHANGE UP (ref 80–100)
MCV RBC AUTO: 99.2 FL — SIGNIFICANT CHANGE UP (ref 80–100)
MONOCYTES # BLD AUTO: 0.56 K/UL — SIGNIFICANT CHANGE UP (ref 0–0.9)
MONOCYTES # BLD AUTO: 0.56 K/UL — SIGNIFICANT CHANGE UP (ref 0–0.9)
MONOCYTES NFR BLD AUTO: 5.2 % — SIGNIFICANT CHANGE UP (ref 2–14)
MONOCYTES NFR BLD AUTO: 5.2 % — SIGNIFICANT CHANGE UP (ref 2–14)
NEUTROPHILS # BLD AUTO: 8.88 K/UL — HIGH (ref 1.8–7.4)
NEUTROPHILS # BLD AUTO: 8.88 K/UL — HIGH (ref 1.8–7.4)
NEUTROPHILS NFR BLD AUTO: 82.4 % — HIGH (ref 43–77)
NEUTROPHILS NFR BLD AUTO: 82.4 % — HIGH (ref 43–77)
NRBC # BLD: 0 /100 WBCS — SIGNIFICANT CHANGE UP (ref 0–0)
NRBC # BLD: 0 /100 WBCS — SIGNIFICANT CHANGE UP (ref 0–0)
PLATELET # BLD AUTO: 221 K/UL — SIGNIFICANT CHANGE UP (ref 150–400)
PLATELET # BLD AUTO: 221 K/UL — SIGNIFICANT CHANGE UP (ref 150–400)
POTASSIUM SERPL-MCNC: 4.3 MMOL/L — SIGNIFICANT CHANGE UP (ref 3.5–5.3)
POTASSIUM SERPL-MCNC: 4.3 MMOL/L — SIGNIFICANT CHANGE UP (ref 3.5–5.3)
POTASSIUM SERPL-SCNC: 4.3 MMOL/L — SIGNIFICANT CHANGE UP (ref 3.5–5.3)
POTASSIUM SERPL-SCNC: 4.3 MMOL/L — SIGNIFICANT CHANGE UP (ref 3.5–5.3)
PROT SERPL-MCNC: 7 G/DL — SIGNIFICANT CHANGE UP (ref 6–8.3)
PROT SERPL-MCNC: 7 G/DL — SIGNIFICANT CHANGE UP (ref 6–8.3)
RAPID RVP RESULT: SIGNIFICANT CHANGE UP
RAPID RVP RESULT: SIGNIFICANT CHANGE UP
RBC # BLD: 3.63 M/UL — LOW (ref 4.2–5.8)
RBC # BLD: 3.63 M/UL — LOW (ref 4.2–5.8)
RBC # FLD: 14.2 % — SIGNIFICANT CHANGE UP (ref 10.3–14.5)
RBC # FLD: 14.2 % — SIGNIFICANT CHANGE UP (ref 10.3–14.5)
SARS-COV-2 RNA SPEC QL NAA+PROBE: SIGNIFICANT CHANGE UP
SARS-COV-2 RNA SPEC QL NAA+PROBE: SIGNIFICANT CHANGE UP
SODIUM SERPL-SCNC: 144 MMOL/L — SIGNIFICANT CHANGE UP (ref 135–145)
SODIUM SERPL-SCNC: 144 MMOL/L — SIGNIFICANT CHANGE UP (ref 135–145)
WBC # BLD: 10.76 K/UL — HIGH (ref 3.8–10.5)
WBC # BLD: 10.76 K/UL — HIGH (ref 3.8–10.5)
WBC # FLD AUTO: 10.76 K/UL — HIGH (ref 3.8–10.5)
WBC # FLD AUTO: 10.76 K/UL — HIGH (ref 3.8–10.5)

## 2023-11-27 PROCEDURE — 99497 ADVNCD CARE PLAN 30 MIN: CPT | Mod: 25

## 2023-11-27 PROCEDURE — 71275 CT ANGIOGRAPHY CHEST: CPT | Mod: 26

## 2023-11-27 PROCEDURE — 99223 1ST HOSP IP/OBS HIGH 75: CPT

## 2023-11-27 PROCEDURE — 93306 TTE W/DOPPLER COMPLETE: CPT | Mod: 26

## 2023-11-27 PROCEDURE — 99232 SBSQ HOSP IP/OBS MODERATE 35: CPT

## 2023-11-27 PROCEDURE — 93970 EXTREMITY STUDY: CPT | Mod: 26

## 2023-11-27 RX ORDER — ACETAMINOPHEN 500 MG
750 TABLET ORAL ONCE
Refills: 0 | Status: COMPLETED | OUTPATIENT
Start: 2023-11-27 | End: 2023-11-27

## 2023-11-27 RX ORDER — SODIUM CHLORIDE 9 MG/ML
1000 INJECTION INTRAMUSCULAR; INTRAVENOUS; SUBCUTANEOUS
Refills: 0 | Status: DISCONTINUED | OUTPATIENT
Start: 2023-11-27 | End: 2023-11-28

## 2023-11-27 RX ADMIN — Medication 1 APPLICATION(S): at 05:22

## 2023-11-27 RX ADMIN — SODIUM CHLORIDE 50 MILLILITER(S): 9 INJECTION INTRAMUSCULAR; INTRAVENOUS; SUBCUTANEOUS at 14:27

## 2023-11-27 RX ADMIN — Medication 500 MICROGRAM(S): at 05:21

## 2023-11-27 RX ADMIN — Medication 1 GRAM(S): at 23:44

## 2023-11-27 RX ADMIN — Medication 1 APPLICATION(S): at 17:37

## 2023-11-27 RX ADMIN — SENNA PLUS 10 MILLILITER(S): 8.6 TABLET ORAL at 21:51

## 2023-11-27 RX ADMIN — Medication 3 MILLILITER(S): at 11:31

## 2023-11-27 RX ADMIN — LEVALBUTEROL 0.63 MILLIGRAM(S): 1.25 SOLUTION, CONCENTRATE RESPIRATORY (INHALATION) at 05:23

## 2023-11-27 RX ADMIN — Medication 500 MICROGRAM(S): at 11:31

## 2023-11-27 RX ADMIN — MEROPENEM 100 MILLIGRAM(S): 1 INJECTION INTRAVENOUS at 05:21

## 2023-11-27 RX ADMIN — Medication 1 GRAM(S): at 05:22

## 2023-11-27 RX ADMIN — LEVALBUTEROL 0.63 MILLIGRAM(S): 1.25 SOLUTION, CONCENTRATE RESPIRATORY (INHALATION) at 17:36

## 2023-11-27 RX ADMIN — ENOXAPARIN SODIUM 40 MILLIGRAM(S): 100 INJECTION SUBCUTANEOUS at 21:50

## 2023-11-27 RX ADMIN — LEVALBUTEROL 0.63 MILLIGRAM(S): 1.25 SOLUTION, CONCENTRATE RESPIRATORY (INHALATION) at 23:45

## 2023-11-27 RX ADMIN — Medication 500 MICROGRAM(S): at 17:36

## 2023-11-27 RX ADMIN — Medication 500 MICROGRAM(S): at 23:45

## 2023-11-27 RX ADMIN — LEVALBUTEROL 0.63 MILLIGRAM(S): 1.25 SOLUTION, CONCENTRATE RESPIRATORY (INHALATION) at 11:31

## 2023-11-27 RX ADMIN — MEROPENEM 100 MILLIGRAM(S): 1 INJECTION INTRAVENOUS at 21:50

## 2023-11-27 RX ADMIN — QUETIAPINE FUMARATE 100 MILLIGRAM(S): 200 TABLET, FILM COATED ORAL at 21:51

## 2023-11-27 RX ADMIN — Medication 1 GRAM(S): at 11:31

## 2023-11-27 RX ADMIN — CHLORHEXIDINE GLUCONATE 1 APPLICATION(S): 213 SOLUTION TOPICAL at 05:22

## 2023-11-27 RX ADMIN — PANTOPRAZOLE SODIUM 40 MILLIGRAM(S): 20 TABLET, DELAYED RELEASE ORAL at 05:22

## 2023-11-27 RX ADMIN — Medication 1 GRAM(S): at 17:36

## 2023-11-27 RX ADMIN — Medication 3 MILLILITER(S): at 05:23

## 2023-11-27 RX ADMIN — Medication 300 MILLIGRAM(S): at 13:27

## 2023-11-27 RX ADMIN — MEROPENEM 100 MILLIGRAM(S): 1 INJECTION INTRAVENOUS at 13:15

## 2023-11-27 NOTE — PROGRESS NOTE ADULT - SUBJECTIVE AND OBJECTIVE BOX
SHARONA LARKIN  78y Male  MRN:[id number]    Patient is a 78y old  Male who presents with a chief complaint of sepsis      HPI:  79 yo M PMHx of severe dementia, PEG tube presents with ulcer that has worsened over last month. Patient was seen by house calls doctor 2 weeks ago, placed on antibiotics but started mounting fevers in the last few days. It was noticed by his GI doctor (Dr. Moraes) that his ulcer was getting worse, so he sent him in. Patient is nonverbal at baseline. Wife has noticed no new cough, runny nose. (19 Nov 2023 13:02)      Patient seen and evaluated at bedside. No acute events overnight except as noted.    Interval HPI: febrile o/n    PAST MEDICAL & SURGICAL HISTORY:  Dementia      Pneumonia      Acute renal failure (ARF)  ARF 1.5 YEAR AGO      Dysphagia  peg      Arthritis      GERD (gastroesophageal reflux disease)      Dyspepsia      History of hand surgery      PEG adjustment, replacement, or removal      Status post insertion of percutaneous endoscopic gastrostomy (PEG) tube  PEG replacement on 02/29/16          REVIEW OF SYSTEMS:  as per hpi     VITALS:   Vital Signs Last 24 Hrs  T(C): 37.2 (27 Nov 2023 08:06), Max: 38.4 (26 Nov 2023 22:34)  T(F): 98.9 (27 Nov 2023 08:06), Max: 101.1 (26 Nov 2023 22:34)  HR: 111 (27 Nov 2023 08:06) (83 - 120)  BP: 99/61 (27 Nov 2023 08:06) (94/57 - 103/62)  BP(mean): --  RR: 18 (27 Nov 2023 08:06) (18 - 22)  SpO2: 93% (27 Nov 2023 08:06) (93% - 100%)    Parameters below as of 27 Nov 2023 08:06  Patient On (Oxygen Delivery Method): nasal cannula, high flow  O2 Flow (L/min): 50  O2 Concentration (%): 85        PHYSICAL EXAM:  GENERAL: NAD,  comfortable   HEAD:  Atraumatic, Normocephalic  EYES: EOMI, PERRLA, conjunctiva and sclera clear  NECK: Supple, No JVD  CHEST/LUNG: Clear to auscultation bilaterally; No wheeze  HEART: S1, S2; No murmurs, rubs, or gallops  ABDOMEN: Soft, Nontender, Nondistended; Bowel sounds present +peg  EXTREMITIES:  2+ Peripheral Pulses, contracted   PSYCH: Normal affect  NEUROLOGY: non verbal  SKIN: +sacral decub    Consultant(s) Notes Reviewed:  [x ] YES  [ ] NO  Care Discussed with Consultants/Other Providers [ x] YES  [ ] NO    MEDS:   MEDICATIONS  (STANDING):  acetaminophen   Oral Liquid .. 750 milliGRAM(s) Enteral Tube once  chlorhexidine 2% Cloths 1 Application(s) Topical <User Schedule>  Dakins Solution - 1/4 Strength 1 Application(s) Topical two times a day  enoxaparin Injectable 40 milliGRAM(s) SubCutaneous every 24 hours  ipratropium    for Nebulization 500 MICROGram(s) Nebulizer every 6 hours  levalbuterol Inhalation 0.63 milliGRAM(s) Inhalation every 6 hours  meropenem  IVPB 1000 milliGRAM(s) IV Intermittent every 8 hours  OLANZapine 5 milliGRAM(s) Oral <User Schedule>  pantoprazole    Tablet 40 milliGRAM(s) Oral before breakfast  QUEtiapine 100 milliGRAM(s) Oral <User Schedule>  senna Syrup 10 milliLiter(s) Oral at bedtime  sucralfate 1 Gram(s) Oral four times a day      ALLERGIES:  Haldol (Other)  No Known Allergies  benzodiazepines (Other)      LABS:                                    11.1   10.76 )-----------( 221      ( 27 Nov 2023 11:25 )             36.0   11-27    144  |  108  |  23  ----------------------------<  204<H>  4.3   |  28  |  0.43<L>    Ca    8.7      27 Nov 2023 11:25  Mg     2.1     11-26    TPro  7.0  /  Alb  2.6<L>  /  TBili  0.6  /  DBili  x   /  AST  15  /  ALT  15  /  AlkPhos  136<H>  11-27           < from: CT Abdomen and Pelvis w/ IV Cont (11.18.23 @ 19:41) >    IMPRESSION:    Extensive decubitus ulcer involving the right posterior gluteal and   pelvic region with extension to bone. There is bony erosion of the   ischium compatible with osteomyelitis.    Mild bladder wall thickening and a few foci of intraluminal air,   correlate for recent instrumentation and/or infection.    Left lower lobe consolidation along with tree-in-bud/nodular opacities in   the left upper lobe concerning for pneumonia.    --- End of Report ---        < end of copied text >

## 2023-11-27 NOTE — PROGRESS NOTE ADULT - SUBJECTIVE AND OBJECTIVE BOX
Date of Service: 11-27-23 @ 13:39    Patient is a 78y old  Male who presents with a chief complaint of Osteomyelitis     (20 Nov 2023 11:10)      Any change in ROS:   Seen this AM - sats 98% on HFNC 50L/80%  Lowered to 50L/70% with sats maintaining mid 90s  No acute distress     MEDICATIONS  (STANDING):  acetaminophen   IVPB .. 750 milliGRAM(s) IV Intermittent once  acetaminophen   Oral Liquid .. 750 milliGRAM(s) Enteral Tube once  chlorhexidine 2% Cloths 1 Application(s) Topical <User Schedule>  Dakins Solution - 1/4 Strength 1 Application(s) Topical two times a day  enoxaparin Injectable 40 milliGRAM(s) SubCutaneous every 24 hours  ipratropium    for Nebulization 500 MICROGram(s) Nebulizer every 6 hours  levalbuterol Inhalation 0.63 milliGRAM(s) Inhalation every 6 hours  meropenem  IVPB 1000 milliGRAM(s) IV Intermittent every 8 hours  OLANZapine 5 milliGRAM(s) Oral <User Schedule>  pantoprazole    Tablet 40 milliGRAM(s) Oral before breakfast  QUEtiapine 100 milliGRAM(s) Oral <User Schedule>  senna Syrup 10 milliLiter(s) Oral at bedtime  sodium chloride 0.9%. 1000 milliLiter(s) (50 mL/Hr) IV Continuous <Continuous>  sucralfate 1 Gram(s) Oral four times a day    MEDICATIONS  (PRN):  acetaminophen   Oral Liquid .. 750 milliGRAM(s) Oral every 6 hours PRN Mild Pain (1 - 3)    Vital Signs Last 24 Hrs  T(C): 38 (27 Nov 2023 12:55), Max: 38.4 (26 Nov 2023 22:34)  T(F): 100.4 (27 Nov 2023 12:55), Max: 101.1 (26 Nov 2023 22:34)  HR: 117 (27 Nov 2023 12:55) (83 - 120)  BP: 113/68 (27 Nov 2023 12:55) (94/57 - 113/68)  BP(mean): --  RR: 18 (27 Nov 2023 12:55) (18 - 22)  SpO2: 100% (27 Nov 2023 12:55) (93% - 100%)    Parameters below as of 27 Nov 2023 12:55  Patient On (Oxygen Delivery Method): nasal cannula, high flow  O2 Flow (L/min): 50  O2 Concentration (%): 85    I&O's Summary    26 Nov 2023 07:01  -  27 Nov 2023 07:00  --------------------------------------------------------  IN: 700 mL / OUT: 150 mL / NET: 550 mL    Physical Exam:   GENERAL: NAD  HEENT: CANDY  ENMT: No tonsillar erythema, exudates, or enlargement  NECK: Supple, No JVD  CHEST/LUNG: few scattered rhonchi   CVS: Regular rate and rhythm; tachy   GI: : Soft, Nontender, Nondistended  NERVOUS SYSTEM:  Lethargic   EXTREMITIES:  2+ Peripheral Pulses, No clubbing, cyanosis, or edema  SKIN: No rashes or lesions  PSYCH: Appropriate    Labs:                              11.1   10.76 )-----------( 221      ( 27 Nov 2023 11:25 )             36.0                         10.6   16.79 )-----------( 202      ( 26 Nov 2023 12:34 )             33.2                         11.6   7.70  )-----------( 236      ( 24 Nov 2023 07:00 )             36.8     11-27    144  |  108  |  23  ----------------------------<  204<H>  4.3   |  28  |  0.43<L>  11-26    141  |  109<H>  |  18  ----------------------------<  162<H>  4.3   |  24  |  0.46<L>  11-24    142  |  108  |  14  ----------------------------<  145<H>  3.3<L>   |  27  |  0.41<L>    Ca    8.7      27 Nov 2023 11:25  Ca    8.3<L>      26 Nov 2023 12:34  Mg     2.1     11-26    TPro  7.0  /  Alb  2.6<L>  /  TBili  0.6  /  DBili  x   /  AST  15  /  ALT  15  /  AlkPhos  136<H>  11-27      LIVER FUNCTIONS - ( 27 Nov 2023 11:25 )  Alb: 2.6 g/dL / Pro: 7.0 g/dL / ALK PHOS: 136 U/L / ALT: 15 U/L / AST: 15 U/L / GGT: x             Urinalysis Basic - ( 27 Nov 2023 11:25 )    Color: x / Appearance: x / SG: x / pH: x  Gluc: 204 mg/dL / Ketone: x  / Bili: x / Urobili: x   Blood: x / Protein: x / Nitrite: x   Leuk Esterase: x / RBC: x / WBC x   Sq Epi: x / Non Sq Epi: x / Bacteria: x      D-Dimer Assay, Quantitative: 748 ng/mL DDU (11-23 @ 13:52)        RECENT CULTURES:  11-20 @ 22:02 .Blood Blood-Peripheral       No growth at 5 days    Studies  Chest X-RAY  < from: Xray Chest 1 View- PORTABLE-Urgent (Xray Chest 1 View- PORTABLE-Urgent .) (11.25.23 @ 15:44) >    INTERPRETATION:  CLINICAL INDICATION: Hypoxia.    EXAM: Frontal radiograph of the chest.    COMPARISON: Chest radiograph from6/29/2019.    FINDINGS:  Evaluation limited by patient rotation. Left base incompletely imaged.  Hazy ill-defined patchy opacity in the left lower lung, seen on prior   imaging.  Clear remaining visualized lungs. Sharp right CP angle. No pneumothorax.  The heart is normal in size  The visualized osseous structures demonstrate no acute pathology.    IMPRESSION:  Patchy opacity in left lower lung, seen on prior imaging, possible   infection/pneumonia.    < end of copied text >    CT SCAN Chest   < from: CT Abdomen and Pelvis w/ IV Cont (11.18.23 @ 19:41) >  FINDINGS:  LOWER CHEST: Aortic valvular calcifications. Left lower lobe   consolidation. Tree-in-bud/nodular opacities in the left upper lobe.    LIVER: Within normal limits.  BILE DUCTS: Normal caliber.  GALLBLADDER: Cholelithiasis.  SPLEEN: Within normal limits.  PANCREAS: Atrophic.  ADRENALS: Within normal limits.  KIDNEYS/URETERS: Subcentimeter foci in the right kidney which are too   small to characterize. No hydronephrosis.    BLADDER: Mild bladder wall thickening with a few fociof free air in the   bladder lumen, evaluate for recent instrumentation and/or infection.  REPRODUCTIVE ORGANS: Normal size prostate.    BOWEL: No bowel obstruction. Appendix is not visualized. Percutaneous   gastrostomy tube in the stomach.  PERITONEUM: Small volume ascites.  VESSELS: Atherosclerotic changes.  RETROPERITONEUM/LYMPH NODES: No lymphadenopathy.  ABDOMINAL WALL: Extensive decubitus ulcer involving the right posterior   pelvis and gluteal region with extension down to bone and erosion of the   ischium (3-157).  BONES: Erosion of the right ischium secondary to extensive decubitus   ulcer. Degenerative changes.    IMPRESSION:    Extensive decubitus ulcer involving the right posterior gluteal and   pelvic region with extension to bone. There is bony erosion of the   ischium compatible with osteomyelitis.    Mild bladder wall thickening and a few foci of intraluminal air,   correlate for recent instrumentation and/or infection.    Left lower lobe consolidation along with tree-in-bud/nodular opacities in   the left upper lobe concerning for pneumonia.    < end of copied text >    < from: VA Duplex Lower Ext Vein Scan, Bilat (11.21.23 @ 17:25) >    FINDINGS:    RIGHT:  Normal compressibility of the RIGHT common femoral, femoral and popliteal   veins.  Doppler examination shows normal spontaneous and phasic flow.  The patient's legs are contracted; the RIGHT calf veins could not be   visualized.    LEFT:  Normal compressibility of the LEFT common femoral, femoral and popliteal   veins.  Doppler examination shows normal spontaneous and phasic flow.  The patient's legs are contracted; the LEFT calf veins could not be   visualized.    IMPRESSION:  No evidence of above-the-knee deep venous thrombosis in either lower   extremity.  The patient's legs are contracted; the calf veins could not be visualized.        --- End of Report ---    < end of copied text >

## 2023-11-27 NOTE — CONSULT NOTE ADULT - PROBLEM SELECTOR RECOMMENDATION 4
-S/p PEG in 2016  -Aspiration precautions  -Oral care.
aspiration precautions
- HCP reportedly wife Brenda.   - Code status: DNR/I, MOLST completed  - GOC: continuing medical care and work up, treat reversible etiologies with hopes that pt can be weaned off HFNC and return home. Further conversations will take place pending clinical course

## 2023-11-27 NOTE — CONSULT NOTE ADULT - CONSULT REQUESTED DATE/TIME
26-Nov-2023 09:31
20-Nov-2023 13:03
20-Nov-2023 11:50
20-Nov-2023 15:47
27-Nov-2023
21-Nov-2023 13:01

## 2023-11-27 NOTE — CONSULT NOTE ADULT - ASSESSMENT
78M with severe dementia, PEG tube presents with ulcer that has worsened over last month, pt is non-verbal and bed bound at baseline. Hospital course c/b acute hypoxic respiratory failure in setting likely ongoing aspiration and PNA, requiring HFNC. Geriatrics and Palliative Medicine Team is consulted for GOC.

## 2023-11-27 NOTE — PROGRESS NOTE ADULT - ASSESSMENT
A/P:79 yo M PMHx of severe dementia, PEG tube presents with ulcer that has worsened over last month. Patient was seen by house calls doctor 2 weeks ago, placed on antibiotics but started mounting fevers in the last few days. It was noticed by his GI doctor (Dr. Moraes) that his ulcer was getting worse.    Wound Consult requested to assist w/ management of  - Stage 4 pressure injury right ischium  - Deep tissue pressure injury sacral region  - Deep tissue pressure injury of left ischium      Recommendations:   -Stage 4 pressure injury      Pack with Dakins BID and PRN soiling      consider Wound PT for VAC eval  -Sacrum/ Lt Ischium- Cavilon QD  -   Continue turning and positioning w/ offloading assistive devices as per protocol  -   Continue w/ attends under pads and Pericare w/ condom cath maintenance as per protocol  Moisturize intact skin w/ SWEEN cream BID  Nutrition Consult for optimization in pt w/ Increased nutritional needs  -   Encourage high quality protein, carline/ MVI & Vit C to promote wound healing  Pt will need Group 2 mattress on hospital bed upon discharge home  Care as per medicine, will follow w/ you  Upon discharge f/[facility] as outpatient at [facility] [address] [place] [address] [phone]  s/w RN and d/w attng & team   Kristina Conway PA-C, CWS [id number]  Nights/ Weekends/ Holidays please call:  General Surgery Consult pager ([phone]) for emergencies  I spent 35minutes face to face w/ this pt of which more than 50% of the time was spent counseling & coordinating care of this pt.

## 2023-11-27 NOTE — CONSULT NOTE ADULT - PROBLEM SELECTOR RECOMMENDATION 3
CT A/P with extensive decubitus ulcer involving the right posterior gluteal and pelvic region with extension to bone, bony erosion of the ischium compatible with osteomyelitis  -ABX per ID.
Wound care
In setting of PNA, likely ongoing aspiration  - pulmonary following  - CTA chest pending to eval for PE  - abx per ID  - wean o2 as tolerated

## 2023-11-27 NOTE — PROGRESS NOTE ADULT - PROBLEM SELECTOR PLAN 2
CT A/P with LLL consolidation & SHEILA TIB opacities  -F/u CTA chest to further assess lung parenchyma when able to tolerate 100% NRB for transport   -RVP negative  -MRSA PCR negative  -BC with NGTD  -Urine legionella negative  -Still spiking temps, ABX changed to Meropenem  -ID f/u.

## 2023-11-27 NOTE — PROGRESS NOTE ADULT - ASSESSMENT
79 yo male h/o dementia, non verbal. contracted, s/p peg. here with sepsis likely 2/2 sacral decub and aspiration pna    sepsis  respiratory failure   sacral decub  pna - possible aspiration   cont abx as per id  f/u cult  wound care f/u  id consult noted  pulm consulted  cont supp o2 via high flow. taper down as able   f/u CTA chest as ordered  abx changed to meropenem     dementia  cont home meds  supportive care    dysphagia s/p peg  feeds as per gi/nutrition          dvt ppx      d/w wife bedside  DNR    palliative eval for goc    Advanced care planning was discussed with patient and family.  Advanced care planning forms were reviewed and discussed as appropriate.  Differential diagnosis and plan of care discussed with patient after the evaluation.   Pain assessed and judicious use of narcotics when appropriate was discussed.  Importance of Fall prevention discussed.  Counseling on Smoking and Alcohol cessation was offered when appropriate.  Counseling on Diet, exercise, and medication compliance was done.       Approx 75 minutes spent. 79 yo male h/o dementia, non verbal. contracted, s/p peg. here with sepsis likely 2/2 sacral decub and aspiration pna    sepsis  respiratory failure   sacral decub  pna - possible aspiration   cont abx as per id  f/u cult  wound care f/u  id consult noted  pulm consulted  cont supp o2 via high flow. taper down as able   f/u CTA chest as ordered  abx changed to meropenem     dementia  cont home meds  supportive care    dysphagia s/p peg  feeds as per gi/nutrition          dvt ppx      d/w family bedside  DNR    palliative eval for goc    Advanced care planning was discussed with patient and family.  Advanced care planning forms were reviewed and discussed as appropriate.  Differential diagnosis and plan of care discussed with patient after the evaluation.   Pain assessed and judicious use of narcotics when appropriate was discussed.  Importance of Fall prevention discussed.  Counseling on Smoking and Alcohol cessation was offered when appropriate.  Counseling on Diet, exercise, and medication compliance was done.       Approx 75 minutes spent.

## 2023-11-27 NOTE — CONSULT NOTE ADULT - SUBJECTIVE AND OBJECTIVE BOX
HPI:  78M with severe dementia, PEG tube presents with ulcer that has worsened over last month, pt is non-verbal and bed bound at baseline. Hospital course c/b acute hypoxic respiratory failure in setting likely ongoing aspiration and PNA, requiring HFNC. Geriatrics and Palliative Medicine Team is consulted for GOC.     Patient seen this afternoon, awake and alert but non-verbal, does not follow commands. Dtr Ling at bedside, Please see GOC section below for discussion details.     PERTINENT PM/SXH:   Dementia    Pneumonia    Acute renal failure (ARF)    Dysphagia    Arthritis    GERD (gastroesophageal reflux disease)    Dyspepsia      History of hand surgery    PEG adjustment, replacement, or removal    Status post insertion of percutaneous endoscopic gastrostomy (PEG) tube      FAMILY HISTORY:  FH: breast cancer  Mother      Family Hx substance abuse [ ]yes [ ]no  ITEMS NOT CHECKED ARE NOT PRESENT    SOCIAL HISTORY:   Significant other/partner[ x]  Children[x ]  Sikhism/Spirituality:  Substance hx:  [ ]   Tobacco hx:  [ ]   Alcohol hx: [ ]   Home Opioid hx:  [ ] I-Stop Reference No:  Living Situation: [x ]Home  [ ]Long term care  [ ]Rehab [ ]Other    ADVANCE DIRECTIVES:    DNR/MOLST  [ ]  Living Will  [ ]   DECISION MAKER(s):  [ ] Health Care Proxy(s)  [ x] Surrogate(s)  [ ] Guardian           Name(s): Phone Number(s): wife Brenda    BASELINE (I)ADL(s) (prior to admission):  [place]: [ ]Total  [ ] Moderate [x ]Dependent    Allergies    No Known Allergies    Intolerances    Haldol (Other)  benzodiazepines (Other)  MEDICATIONS  (STANDING):  acetaminophen   Oral Liquid .. 750 milliGRAM(s) Enteral Tube once  chlorhexidine 2% Cloths 1 Application(s) Topical <User Schedule>  Dakins Solution - 1/4 Strength 1 Application(s) Topical two times a day  enoxaparin Injectable 40 milliGRAM(s) SubCutaneous every 24 hours  ipratropium    for Nebulization 500 MICROGram(s) Nebulizer every 6 hours  levalbuterol Inhalation 0.63 milliGRAM(s) Inhalation every 6 hours  meropenem  IVPB 1000 milliGRAM(s) IV Intermittent every 8 hours  OLANZapine 5 milliGRAM(s) Oral <User Schedule>  pantoprazole    Tablet 40 milliGRAM(s) Oral before breakfast  QUEtiapine 100 milliGRAM(s) Oral <User Schedule>  senna Syrup 10 milliLiter(s) Oral at bedtime  sodium chloride 0.9%. 1000 milliLiter(s) (50 mL/Hr) IV Continuous <Continuous>  sucralfate 1 Gram(s) Oral four times a day    MEDICATIONS  (PRN):  acetaminophen   Oral Liquid .. 750 milliGRAM(s) Oral every 6 hours PRN Mild Pain (1 - 3)    PRESENT SYMPTOMS: [x ]Unable to self-report  [ ] CPOT [ x] PAINADs [x ] RDOS  Source if other than patient:  [ ]Family   [ ]Team     PCSSQ[Palliative Care Spiritual Screening Question]   Severity (0-10):  Score of 4 or > indicate consideration of Chaplaincy referral.  Chaplaincy Referral: [ ] yes [ ] refused [ ] following [x ] Deferred     Caregiver [place]? : [ ] yes [x ] no [ ] Deferred [ ] Declined             Social work referral [ ] Patient & Family Centered Care Referral [ ]     Anticipatory Grief present?:  [ ] yes [x ] no  [ ] Deferred                  Social work referral [ ] Chaplaincy Referral[ ]      Other Symptoms:  [ x]All other review of systems negative     Palliative Performance Status Version 2:     30    %    http://npcrc.org/files/news/palliative_performance_scale_ppsv2.pdf    PHYSICAL EXAM:  Vital Signs Last 24 Hrs  T(C): 36.8 (27 Nov 2023 14:37), Max: 38.4 (26 Nov 2023 22:34)  T(F): 98.3 (27 Nov 2023 14:37), Max: 101.1 (26 Nov 2023 22:34)  HR: 117 (27 Nov 2023 12:55) (83 - 120)  BP: 113/68 (27 Nov 2023 12:55) (94/57 - 113/68)  BP(mean): --  RR: 18 (27 Nov 2023 12:55) (18 - 22)  SpO2: 100% (27 Nov 2023 12:55) (93% - 100%)    Parameters below as of 27 Nov 2023 12:55  Patient On (Oxygen Delivery Method): nasal cannula, high flow  O2 Flow (L/min): 50  O2 Concentration (%): 85 I&O's Summary    26 Nov 2023 07:01  -  27 Nov 2023 07:00  --------------------------------------------------------  IN: 700 mL / OUT: 150 mL / NET: 550 mL      GENERAL: [ ]Cachexia    [x ]Alert  [ ]Oriented x   [ ]Lethargic  [ ]Unarousable  [ ]Verbal  [x ]Non-Verbal  Behavioral:   [ ] Anxiety  [ ] Delirium [ ] Agitation [ ] Other  HEENT:  [ ]Normal   [ ]Dry mouth   [ ]ET Tube/Trach  [ ]Oral lesions [x] Non-rebreather mask   PULMONARY:   [ ]Clear [ ]Tachypnea  [ ]Audible excessive secretions   [ ]Rhonchi        [ ]Right [ ]Left [ ]Bilateral  [ ]Crackles        [ ]Right [ ]Left [ ]Bilateral  [ ]Wheezing     [ ]Right [ ]Left [ ]Bilateral  [x ]Diminished breath sounds [ ]right [ ]left [ x]bilateral  CARDIOVASCULAR:    [ ]Regular [ ]Irregular [x ]Tachy  [ ]Usman [ ]Murmur [ ]Other  GASTROINTESTINAL:  [ x]Soft  [ ]Distended   [x ]+BS  [ ]Non tender [ ]Tender  [ ]Other [x ]PEG [ ]OGT/ NGT  Last BM:  GENITOURINARY:  [ ]Normal [ x] Incontinent   [ ]Oliguria/Anuria   [ ][place]  MUSCULOSKELETAL:   [ ]Normal   [ ]Weakness  [ x]Bed/Wheelchair bound [ ]Edema  NEUROLOGIC:   [ ]No focal deficits  [x ]Cognitive impairment  [ ]Dysphagia [ ]Dysarthria [ ]Paresis [ ]Other   SKIN:   [ ]Normal  [ ]Rash  [ ]Other  [x ]Pressure ulcer(s)       Present on admission [ x]y [ ]n    CRITICAL CARE:  [ ] Shock Present  [ ]Septic [ ]Cardiogenic [ ]Neurologic [ ]Hypovolemic  [ ]  Vasopressors [ ]  Inotropes   [ x]Respiratory failure present [ ]Mechanical ventilation [ ]Non-invasive ventilatory support [ x]High flow    [ x]Acute  [ ]Chronic [x ]Hypoxic  [ ]Hypercarbic [ ]Other  [ ]Other organ failure     LABS:                        11.1   10.76 )-----------( 221      ( 27 Nov 2023 11:25 )             36.0   11-27    144  |  108  |  23  ----------------------------<  204<H>  4.3   |  28  |  0.43<L>    Ca    8.7      27 Nov 2023 11:25  Mg     2.1     11-26    TPro  7.0  /  Alb  2.6<L>  /  TBili  0.6  /  DBili  x   /  AST  15  /  ALT  15  /  AlkPhos  136<H>  11-27      Urinalysis Basic - ( 27 Nov 2023 11:25 )    Color: x / Appearance: x / SG: x / pH: x  Gluc: 204 mg/dL / Ketone: x  / Bili: x / Urobili: x   Blood: x / Protein: x / Nitrite: x   Leuk Esterase: x / RBC: x / WBC x   Sq Epi: x / Non Sq Epi: x / Bacteria: x      RADIOLOGY & ADDITIONAL STUDIES:     < from: Xray Chest 1 View- PORTABLE-Urgent (Xray Chest 1 View- PORTABLE-Urgent .) (11.25.23 @ 15:44) >  IMPRESSION:  Patchy opacity in left lower lung, seen on prior imaging, possible   infection/pneumonia.    --- End of Report ---    < end of copied text >  < from: CT Abdomen and Pelvis w/ IV Cont (11.18.23 @ 19:41) >  IMPRESSION:    Extensive decubitus ulcer involving the right posterior gluteal and   pelvic region with extension to bone. There is bony erosion of the   ischium compatible with osteomyelitis.    Mild bladder wall thickening and a few foci of intraluminal air,   correlate for recent instrumentation and/or infection.    Left lower lobe consolidation along with tree-in-bud/nodular opacities in   the left upper lobe concerning for pneumonia.    --- End of Report ---    < end of copied text >      PROTEIN CALORIE MALNUTRITION PRESENT: [ ]mild [ ]moderate [ ]severe [ ]underweight [ ]morbid obesity  https://www.andeal.org/vault/2440/web/files/ONC/Table_Clinical%20Characteristics%20to%20Document%20Malnutrition-White%20JV%20et%20al%202012.pdf    Height (cm): 170.2 (11-18-23 @ 17:39)  Weight (kg): 49.9 (11-18-23 @ 17:39)  BMI (kg/m2): 17.2 (11-18-23 @ 17:39)    [ ]PPSV2 < or = to 30% [ ]significant weight loss  [ ]poor nutritional intake  [ ]anasarca[ ]Artificial Nutrition      Other REFERRALS:  [ ]Hospice  [ ]Child Life  [ ]Social Work  [ ]Case management [ ]Holistic Therapy

## 2023-11-27 NOTE — PROGRESS NOTE ADULT - ASSESSMENT
79 yo M PMHx of severe dementia, s/p PEG tube, GERD, arthritis presents with ulcer that has worsened over last month  No fever, has leukocytosis  Brought in for ? fevers and worsening wound; chronically bed bound  UA neg, UCX neg  RVP neg  CT with sacral decubitus ulcers, OM erosion, pneumonia, blader wall thickening  BCX NGTD  Possible pneumonia  Had RRT 11/20, vancomycin readded; suspect due to re-aspiration but unclear  Further fevers overnight noted, patient generally unchanged, still hypoxic, HFNC  Overall, Abnormal finding on imaging, pneumonia  - Meropenem 1g q 8  - Given lack of improvement would check CT Chest (defer to team if plan to rule out PE)  - Check sputum culture if producing sputum  - Wound care eval to sacral wound  - Uncertain benefit to prolonged treatment course for sacral wound  - O2 status improving    Guarded/GOC per team    Roni Luis MD  Contact on TEAMS messaging from 9am - 5pm  From 5pm-9am, on weekends, or if no response call [phone] 79 yo M PMHx of severe dementia, s/p PEG tube, GERD, arthritis presents with ulcer that has worsened over last month  No fever, has leukocytosis  Brought in for ? fevers and worsening wound; chronically bed bound  UA neg, UCX neg  RVP neg  CT with sacral decubitus ulcers, OM erosion, pneumonia, blader wall thickening  BCX NGTD  Possible pneumonia  Had RRT 11/20, vancomycin readded; suspect due to re-aspiration but unclear  Further fevers overnight noted, patient generally unchanged, still hypoxic, HFNC  Overall, Abnormal finding on imaging, pneumonia  - Meropenem 1g q 8  - Given lack of improvement would check CT Chest (defer to team if plan to rule out PE)  - Check sputum culture if producing sputum  - Wound care eval to sacral wound  - Uncertain benefit to prolonged treatment course for sacral wound  - O2 status improving  - F/U BCXs    Guarded/GOC per team    Roni Luis MD  Contact on TEAMS messaging from 9am - 5pm  From 5pm-9am, on weekends, or if no response call [phone]

## 2023-11-27 NOTE — PROGRESS NOTE ADULT - SUBJECTIVE AND OBJECTIVE BOX
Subjective: Patient seen and examined. No new events except as noted.   remains tachycardic     REVIEW OF SYSTEMS:  unable to obtain     MEDICATIONS:  MEDICATIONS  (STANDING):  acetaminophen   Oral Liquid .. 750 milliGRAM(s) Enteral Tube once  acetylcysteine 20%  Inhalation 3 milliLiter(s) Inhalation every 6 hours  chlorhexidine 2% Cloths 1 Application(s) Topical <User Schedule>  Dakins Solution - 1/4 Strength 1 Application(s) Topical two times a day  enoxaparin Injectable 40 milliGRAM(s) SubCutaneous every 24 hours  ipratropium    for Nebulization 500 MICROGram(s) Nebulizer every 6 hours  levalbuterol Inhalation 0.63 milliGRAM(s) Inhalation every 6 hours  meropenem  IVPB 1000 milliGRAM(s) IV Intermittent every 8 hours  OLANZapine 5 milliGRAM(s) Oral <User Schedule>  pantoprazole    Tablet 40 milliGRAM(s) Oral before breakfast  QUEtiapine 100 milliGRAM(s) Oral <User Schedule>  senna Syrup 10 milliLiter(s) Oral at bedtime  sucralfate 1 Gram(s) Oral four times a day      PHYSICAL EXAM:  T(C): 37.2 (11-27-23 @ 08:06), Max: 38.4 (11-26-23 @ 22:34)  HR: 111 (11-27-23 @ 08:06) (83 - 120)  BP: 99/61 (11-27-23 @ 08:06) (94/57 - 103/62)  RR: 18 (11-27-23 @ 08:06) (18 - 22)  SpO2: 93% (11-27-23 @ 08:06) (93% - 100%)  Wt(kg): --  I&O's Summary    26 Nov 2023 07:01  -  27 Nov 2023 07:00  --------------------------------------------------------  IN: 700 mL / OUT: 150 mL / NET: 550 mL          Appearance: NAD nonverbal	  HEENT:   Normal oral mucosa, PERRL, EOMI	  Lymphatic: No lymphadenopathy  Cardiovascular: Normal tachy S1 S2, No JVD, No murmurs, No edema  Respiratory: decreased bs   Psychiatry: A & O x 0  Gastrointestinal:  Soft, Non-tender, + PEG   Skin: No rashes, No ecchymoses, No cyanosis	  Neurologic: Non-focal  Extremities: contracted   Vascular: Peripheral pulses palpable 2+ bilaterally  +sacral decub       LABS:    CARDIAC MARKERS:                                10.6   16.79 )-----------( 202      ( 26 Nov 2023 12:34 )             33.2     11-26    141  |  109<H>  |  18  ----------------------------<  162<H>  4.3   |  24  |  0.46<L>    Ca    8.3<L>      26 Nov 2023 12:34  Mg     2.1     11-26          TELEMETRY: 	    ECG:  	  RADIOLOGY: < from: Xray Chest 1 View- PORTABLE-Urgent (Xray Chest 1 View- PORTABLE-Urgent .) (11.25.23 @ 15:44) >    ACC: [id number] EXAM:  XR CHEST PORTABLE URGENT 1V   ORDERED BY:  HOSEA PRINCE     PROCEDURE DATE:  11/25/2023          INTERPRETATION:  CLINICAL INDICATION: Hypoxia.    EXAM: Frontal radiograph of the chest.    COMPARISON: Chest radiograph from6/29/2019.    FINDINGS:  Evaluation limited by patient rotation. Left base incompletely imaged.  Hazy ill-defined patchy opacity in the left lower lung, seen on prior   imaging.  Clear remaining visualized lungs. Sharp right CP angle. No pneumothorax.  The heart is normal in size  The visualized osseous structures demonstrate no acute pathology.    IMPRESSION:  Patchy opacity in left lower lung, seen on prior imaging, possible   infection/pneumonia.    --- End of Report ---          PARAG LYNCH MD; Resident Radiologist    < end of copied text >    DIAGNOSTIC TESTING:  [ ] Echocardiogram:  [ ]  Catheterization:  [ ] Stress Test:    OTHER:

## 2023-11-27 NOTE — CONSULT NOTE ADULT - PROBLEM SELECTOR RECOMMENDATION 5
- Geriatrics and Palliative Medicine Team will continue to follow. Further discussion with pt's wife tomorrow    An MD Yousuf  GAP Team Consults  Please call if we can be of assistance, [phone]
By hx  -Nonverbal at baseline per wife  -Supportive care.

## 2023-11-27 NOTE — PROGRESS NOTE ADULT - SUBJECTIVE AND OBJECTIVE BOX
CC: F/U for PNA    Saw/spoke to patient. No fevers, no chills. No new complaints.    Allergies  No Known Allergies    ANTIMICROBIALS:  meropenem  IVPB 1000 every 8 hours    PE:    Vital Signs Last 24 Hrs  T(C): 36.8 (27 Nov 2023 14:37), Max: 38.4 (26 Nov 2023 22:34)  T(F): 98.3 (27 Nov 2023 14:37), Max: 101.1 (26 Nov 2023 22:34)  HR: 117 (27 Nov 2023 12:55) (83 - 120)  BP: 113/68 (27 Nov 2023 12:55) (94/57 - 113/68)  RR: 18 (27 Nov 2023 15:21) (18 - 22)  SpO2: 96% (27 Nov 2023 15:21) (93% - 100%)    Gen: AOx1, NAD, non-toxic  CV: Nontachycardic  Resp: Breathing comfortably, HFNC  Abd: Soft, nontender  IV/Skin: No thrombophlebitis    LABS:                        11.1   10.76 )-----------( 221      ( 27 Nov 2023 11:25 )             36.0     11-27    144  |  108  |  23  ----------------------------<  204<H>  4.3   |  28  |  0.43<L>    Ca    8.7      27 Nov 2023 11:25  Mg     2.1     11-26    TPro  7.0  /  Alb  2.6<L>  /  TBili  0.6  /  DBili  x   /  AST  15  /  ALT  15  /  AlkPhos  136<H>  11-27    Urinalysis Basic - ( 27 Nov 2023 11:25 )    Color: x / Appearance: x / SG: x / pH: x  Gluc: 204 mg/dL / Ketone: x  / Bili: x / Urobili: x   Blood: x / Protein: x / Nitrite: x   Leuk Esterase: x / RBC: x / WBC x   Sq Epi: x / Non Sq Epi: x / Bacteria: x    MICROBIOLOGY:    .Blood Blood-Peripheral  11-20-23   No growth at 5 days  --  --    Clean Catch Clean Catch (Midstream)  11-18-23   No growth  --  --    .Blood Blood-Peripheral  11-18-23   No growth at 5 days  --  --    .Blood Blood-Peripheral  11-18-23   No growth at 5 days  --  --    Rapid RVP Result: NotDetec (11-27 @ 09:25)    RADIOLOGY:    11/25 XR:    FINDINGS:  Evaluation limited by patient rotation. Left base incompletely imaged.  Hazy ill-defined patchy opacity in the left lower lung, seen on prior   imaging.  Clear remaining visualized lungs. Sharp right CP angle. No pneumothorax.  The heart is normal in size  The visualized osseous structures demonstrate no acute pathology.    IMPRESSION:  Patchy opacity in left lower lung, seen on prior imaging, possible   infection/pneumonia.

## 2023-11-27 NOTE — PROGRESS NOTE ADULT - SUBJECTIVE AND OBJECTIVE BOX
[facility]  Nathaniel Barron PA-C  [address] [place] [address]  [phone]      INTERVAL HPI/OVERNIGHT EVENTS:  pt seen and examined, events noted  remains febrile and tachycardic     MEDICATIONS  (STANDING):  chlorhexidine 2% Cloths 1 Application(s) Topical <User Schedule>  Dakins Solution - 1/2 Strength 1 Application(s) Topical daily  Dakins Solution - 1/4 Strength 1 Application(s) Topical two times a day  enoxaparin Injectable 40 milliGRAM(s) SubCutaneous every 24 hours  guaiFENesin  milliGRAM(s) Oral every 12 hours  OLANZapine 5 milliGRAM(s) Oral <User Schedule>  pantoprazole    Tablet 40 milliGRAM(s) Oral before breakfast  piperacillin/tazobactam IVPB.. 3.375 Gram(s) IV Intermittent every 8 hours  polyethylene glycol 3350 17 Gram(s) Oral daily  QUEtiapine 100 milliGRAM(s) Oral <User Schedule>  senna 2 Tablet(s) Oral at bedtime  sucralfate 1 Gram(s) Oral four times a day  vancomycin  IVPB 500 milliGRAM(s) IV Intermittent every 12 hours  vancomycin  IVPB        MEDICATIONS  (PRN):  acetaminophen   Oral Liquid .. 750 milliGRAM(s) Oral every 6 hours PRN Mild Pain (1 - 3)      Allergies    No Known Allergies    Intolerances    Haldol (Other)  benzodiazepines (Other)      ROS:   unable to obtain       PHYSICAL EXAM:   Vital Signs Last 24 Hrs  T(C): 37.2 (27 Nov 2023 08:06), Max: 38.4 (26 Nov 2023 22:34)  T(F): 98.9 (27 Nov 2023 08:06), Max: 101.1 (26 Nov 2023 22:34)  HR: 111 (27 Nov 2023 08:06) (83 - 120)  BP: 99/61 (27 Nov 2023 08:06) (94/57 - 103/62)  BP(mean): --  RR: 18 (27 Nov 2023 08:06) (18 - 22)  SpO2: 93% (27 Nov 2023 08:06) (93% - 100%)    Parameters below as of 27 Nov 2023 08:06  Patient On (Oxygen Delivery Method): nasal cannula, high flow  O2 Flow (L/min): 50  O2 Concentration (%): 85        Daily     Daily     nad  confused  frail  non toxic  soft, nt, +PEG  no edema        LABS:                        10.6   16.79 )-----------( 202      ( 26 Nov 2023 12:34 )             33.2   11-26    141  |  109<H>  |  18  ----------------------------<  162<H>  4.3   |  24  |  0.46<L>    Ca    8.3<L>      26 Nov 2023 12:34  Mg     2.1     11-26      Urinalysis Basic - ( 20 Nov 2023 22:01 )    Color: x / Appearance: x / SG: x / pH: x  Gluc: 118 mg/dL / Ketone: x  / Bili: x / Urobili: x   Blood: x / Protein: x / Nitrite: x   Leuk Esterase: x / RBC: x / WBC x   Sq Epi: x / Non Sq Epi: x / Bacteria: x        RADIOLOGY & ADDITIONAL TESTS:

## 2023-11-27 NOTE — CONSULT NOTE ADULT - CONVERSATION DETAILS
Conversation held with pt's dtr Ling at bedside today. Ling shared that pt has had progressive dementia for many years. 12 years ago, he had a hernia that needed to be operated on, unfortunately he had a reaction to diazepam and ended up with a prolonged hospital course. Ever since then he has been bedbound and non-verbal. He is very well cared for by his wife Brenda at home, with assistance from [facility] aids 12 hours/day 7 days/week. He has a son and dtr who are both supportive to their parents.     Ling states her mother, Brenda, was previously established to be pt's HCP, and she makes all of his medical decisions. They have had conversations about his quality of life, which they recognize has not been great, due to ongoing bedbound and non-verbal status. However, they take as good of care of him as they can. Ling confirms that family has recently decided to change his code status to DNR and DNI to avoid unnecessary trauma and suffering at the end. Ling states they want pt to be comfortable, but not sure when to make that transition. I advised that if pt continues to deteriorate, or not make progress, despite ongoing aggressive medical care, it may be a sign to make that transition.     Further conversation to be held with pt's wife who will be visiting tomorrow.

## 2023-11-27 NOTE — PROGRESS NOTE ADULT - PROBLEM SELECTOR PLAN 1
S/p RRT 11/20 for hypoxia requiring HFNC  -Increase in O2 requirements 11/21 from HFNC 50L/80% to 100% Bipap. Weaned from Bipap to HFNC 50L/100%  -Suspect aspiration event, family at bedside reports multiple aspiration events in the past  -CT A/P with LLL consolidation, SHEILA tree in bud opacities likely PNA. Continue ABX per ID  -S/p Mucomyst 20% q6h  -Chest PT q6h  -ABG with no CO2 retention  -O2 requirements have been fluctuating. Seen on HFNC 50L/80% this AM with sats 98%  -Lowered to 50L/70%. Keep sats >90% with O2. Continue to wean O2 as tolerated.   -Bipap d/c'd, pt has not needed the past few nights   -CXR 11/25 with LLL infiltrate, but does not explain persistent high O2 requirements   -Pt has been unable to transport to CT scan due to high O2 requirements. Suggest CTA chest to r/o PE, evaluate lung parenchyma when able to tolerate 100% NRB for transport  -Check echo   -LE duplex 11/21 neg for DVT but limited study. Repeat ordered. S/p RRT 11/20 for hypoxia requiring HFNC  -Increase in O2 requirements 11/21 from HFNC 50L/80% to 100% Bipap. Weaned from Bipap to HFNC 50L/100%  -Suspect aspiration event, family at bedside reports multiple aspiration events in the past  -CT A/P with LLL consolidation, SHEILA tree in bud opacities likely PNA. Continue ABX per ID  -S/p Mucomyst 20% q6h  -Chest PT q6h  -ABG with no CO2 retention  -O2 requirements have been fluctuating. Seen on HFNC 50L/80% this AM with sats 98%  -Lowered to 50L/70%. Keep sats >90% with O2. Continue to wean O2 as tolerated.   -Bipap d/c'd, pt has not needed the past few nights   -CXR 11/25 with LLL infiltrate, but does not explain persistent high O2 requirements   -Pt has been unable to transport to CT scan due to high O2 requirements. Suggest CTA chest to r/o PE, evaluate lung parenchyma when able to tolerate 100% NRB for transport  -D/w ACP to trial patient on NRB again today  -Check echo   -LE duplex 11/21 neg for DVT but limited study. Repeat ordered.

## 2023-11-27 NOTE — CONSULT NOTE ADULT - PROBLEM SELECTOR RECOMMENDATION 9
would check CTA chest if possible to rule out PE  check TTE
Diagnosed many years ago, pt is non-verbal and bed-bound at baseline, g-tube in place, with recent development of contractures and sacral decub ulcer  - delirium precautions  - supportive care
S/p RRT 11/20 for hypoxia requiring HFNC  -Check CTA chest to r/o PE, evaluate lung parenchyma. Suspect aspiration event with lower suspicion for PE, but favor ruling out given bedbound status. Pt can transport to CT on 100% NRB  -CT A/P with LLL consolidation, SHEILA tree in bud opacities likely PNA. Continue ABX per ID  -Check LE duplex to r/o DVT  -Start Duoneb q6h  -Start Mucomyst 20% q6h x3 days (give with Duoneb)  -Chest PT q6h  -Keep sats >90% with O2 PRN (currently HFNC 50L/80%). Wean O2 as tolerated  -ABG acceptable, can likely monitor off Bipap.

## 2023-11-27 NOTE — PROGRESS NOTE ADULT - SUBJECTIVE AND OBJECTIVE BOX
[facility]-- WOUND TEAM -- FOLLOW UP NOTE  --------------------------------------------------------------------------------    24 hour events/subjective:          Diet:  Diet, NPO with Tube Feed:   Tube Feeding Modality: Gastrostomy  Jevity 1.5 David (JEVITY1.5RTH)  Total Volume for 24 Hours (mL): 1200  Continuous  Starting Tube Feed Rate mL per Hour: 30  Increase Tube Feed Rate by (mL): 10     Every 6 hours  Until Goal Tube Feed Rate (mL per Hour): 50  Tube Feed Duration (in Hours): 24  Tube Feed Start Time: 14:00  Supplement Feeding Modality:  Gastrostomy (11-23-23 @ 13:37)      ROS: General/ SKIN/ MSK/ Neuro/ GI see HPI  all other systems negative  pt unable to offer    ALLERGIES & MEDICATIONS  --------------------------------------------------------------------------------  Allergies    No Known Allergies    Intolerances    Haldol (Other)  benzodiazepines (Other)        STANDING INPATIENT MEDICATIONS    acetaminophen   IVPB .. 750 milliGRAM(s) IV Intermittent once  acetaminophen   Oral Liquid .. 750 milliGRAM(s) Enteral Tube once  chlorhexidine 2% Cloths 1 Application(s) Topical <User Schedule>  Dakins Solution - 1/4 Strength 1 Application(s) Topical two times a day  enoxaparin Injectable 40 milliGRAM(s) SubCutaneous every 24 hours  ipratropium    for Nebulization 500 MICROGram(s) Nebulizer every 6 hours  levalbuterol Inhalation 0.63 milliGRAM(s) Inhalation every 6 hours  meropenem  IVPB 1000 milliGRAM(s) IV Intermittent every 8 hours  OLANZapine 5 milliGRAM(s) Oral <User Schedule>  pantoprazole    Tablet 40 milliGRAM(s) Oral before breakfast  QUEtiapine 100 milliGRAM(s) Oral <User Schedule>  senna Syrup 10 milliLiter(s) Oral at bedtime  sodium chloride 0.9%. 1000 milliLiter(s) IV Continuous <Continuous>  sucralfate 1 Gram(s) Oral four times a day      PRN INPATIENT MEDICATION  acetaminophen   Oral Liquid .. 750 milliGRAM(s) Oral every 6 hours PRN        VITALS/PHYSICAL EXAM  --------------------------------------------------------------------------------  T(C): 38 (11-27-23 @ 12:55), Max: 38.4 (11-26-23 @ 22:34)  HR: 117 (11-27-23 @ 12:55) (83 - 120)  BP: 113/68 (11-27-23 @ 12:55) (94/57 - 113/68)  RR: 18 (11-27-23 @ 12:55) (18 - 22)  SpO2: 100% (11-27-23 @ 12:55) (93% - 100%)  Wt(kg): --        11-26-23 @ 07:01  -  11-27-23 @ 07:00  --------------------------------------------------------  IN: 700 mL / OUT: 150 mL / NET: 550 mL            LABS/ CULTURES/ RADIOLOGY:              11.1   10.76 >-----------<  221      [11-27-23 @ 11:25]              36.0     144  |  108  |  23  ----------------------------<  204      [11-27-23 @ 11:25]  4.3   |  28  |  0.43        Ca     8.7     [11-27-23 @ 11:25]      Mg     2.1     [11-26-23 @ 12:34]    TPro  7.0  /  Alb  2.6  /  TBili  0.6  /  DBili  x   /  AST  15  /  ALT  15  /  AlkPhos  136  [11-27-23 @ 11:25]              CAPILLARY BLOOD GLUCOSE                      Culture - Blood (collected 11-20-23 @ 22:02)  Source: .Blood Blood-Peripheral  Final Report (11-26-23 @ 03:00):    No growth at 5 days    Culture - Blood (collected 11-20-23 @ 22:02)  Source: .Blood Blood-Peripheral  Final Report (11-26-23 @ 03:00):    No growth at 5 days           [facility]-- WOUND TEAM -- FOLLOW UP NOTE  --------------------------------------------------------------------------------    24 hour events/subjective:    afebrile  tolerating TF w/o vomiting  tolerating dressing changes  no odor or excess drainage  daughter at bedside- all questions answered to her & her     mom's (on phone) expressed understanding and satisfaction      Diet:  Diet, NPO with Tube Feed:   Tube Feeding Modality: Gastrostomy  Jevity 1.5 David (JEVITY1.5RTH)  Total Volume for 24 Hours (mL): 1200  Continuous  Starting Tube Feed Rate mL per Hour: 30  Increase Tube Feed Rate by (mL): 10     Every 6 hours  Until Goal Tube Feed Rate (mL per Hour): 50  Tube Feed Duration (in Hours): 24  Tube Feed Start Time: 14:00  Supplement Feeding Modality:  Gastrostomy (11-23-23 @ 13:37)      ROS: pt unable to offer    ALLERGIES & MEDICATIONS  --------------------------------------------------------------------------------    No Known Allergies    Intolerances  Haldol (Other)  benzodiazepines (Other)    STANDING INPATIENT MEDICATIONS  acetaminophen   IVPB .. 750 milliGRAM(s) IV Intermittent once  acetaminophen   Oral Liquid .. 750 milliGRAM(s) Enteral Tube once  chlorhexidine 2% Cloths 1 Application(s) Topical <User Schedule>  Dakins Solution - 1/4 Strength 1 Application(s) Topical two times a day  enoxaparin Injectable 40 milliGRAM(s) SubCutaneous every 24 hours  ipratropium    for Nebulization 500 MICROGram(s) Nebulizer every 6 hours  levalbuterol Inhalation 0.63 milliGRAM(s) Inhalation every 6 hours  meropenem  IVPB 1000 milliGRAM(s) IV Intermittent every 8 hours  OLANZapine 5 milliGRAM(s) Oral <User Schedule>  pantoprazole    Tablet 40 milliGRAM(s) Oral before breakfast  QUEtiapine 100 milliGRAM(s) Oral <User Schedule>  senna Syrup 10 milliLiter(s) Oral at bedtime  sodium chloride 0.9%. 1000 milliLiter(s) IV Continuous <Continuous>  sucralfate 1 Gram(s) Oral four times a day      PRN INPATIENT MEDICATION  acetaminophen Oral Liquid 750 milliGRAM(s) Oral every 6 hours PRN        VITALS/PHYSICAL EXAM  --------------------------------------------------------------------------------  T(C): 38 (11-27-23 @ 12:55), Max: 38.4 (11-26-23 @ 22:34)  HR: 117 (11-27-23 @ 12:55) (83 - 120)  BP: 113/68 (11-27-23 @ 12:55) (94/57 - 113/68)  RR: 18 (11-27-23 @ 12:55) (18 - 22)  SpO2: 100% (11-27-23 @ 12:55) (93% - 100%)  Wt(kg): --      NAD, ALert, cachectic, frail, HiFlow O2  Envella  HEENT:  NC/AT, EOMI, Sclera clear, mucosa moist, throat clear  Respiratory:  nonlabored w/ equal chest rise  Gastrointestinal:  (+)PEG, resolved dermatitis around PEG  : (+) condom cath  Neurology:  nonverbal, unable to follow commands  Psych: calm  Musculoskeletal:  limited stiff / contracted  Vascular: BLE equally warm/  no cyanosis, clubbing, edema nor acute ischemia                BLE DP pulses palpable  Skin:  thin, dry, pale, frail  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  Lt hip resolved blanchable erythema  sacral region DTI       4cm x 3cm         Maroon/ Purplish colored skin w/o drainage   Left ischium DTI fading       2x2cm        purple/maroon coloration of skin w/o drainage   no induration, no fluctuance, no crepitus, odor, blistering, warmth, induration, fluctuance, nor crepitus.      LABS/ CULTURES/ RADIOLOGY:              11.1   10.76 >-----------<  221      [11-27-23 @ 11:25]              36.0     144  |  108  |  23  ----------------------------<  204      [11-27-23 @ 11:25]  4.3   |  28  |  0.43        Ca     8.7     [11-27-23 @ 11:25]      Mg     2.1     [11-26-23 @ 12:34]    TPro  7.0  /  Alb  2.6  /  TBili  0.6  /  DBili  x   /  AST  15  /  ALT  15  /  AlkPhos  136  [11-27-23 @ 11:25]      Culture - Blood (collected 11-20-23 @ 22:02)  Source: .Blood Blood-Peripheral  Final Report (11-26-23 @ 03:00):    No growth at 5 days    Culture - Blood (collected 11-20-23 @ 22:02)  Source: .Blood Blood-Peripheral  Final Report (11-26-23 @ 03:00):    No growth at 5 days

## 2023-11-28 LAB
ANION GAP SERPL CALC-SCNC: 8 MMOL/L — SIGNIFICANT CHANGE UP (ref 5–17)
ANION GAP SERPL CALC-SCNC: 8 MMOL/L — SIGNIFICANT CHANGE UP (ref 5–17)
BASE EXCESS BLDV CALC-SCNC: 3 MMOL/L — SIGNIFICANT CHANGE UP (ref -2–3)
BASE EXCESS BLDV CALC-SCNC: 3 MMOL/L — SIGNIFICANT CHANGE UP (ref -2–3)
BASOPHILS # BLD AUTO: 0.02 K/UL — SIGNIFICANT CHANGE UP (ref 0–0.2)
BASOPHILS # BLD AUTO: 0.02 K/UL — SIGNIFICANT CHANGE UP (ref 0–0.2)
BASOPHILS NFR BLD AUTO: 0.2 % — SIGNIFICANT CHANGE UP (ref 0–2)
BASOPHILS NFR BLD AUTO: 0.2 % — SIGNIFICANT CHANGE UP (ref 0–2)
BUN SERPL-MCNC: 20 MG/DL — SIGNIFICANT CHANGE UP (ref 7–23)
BUN SERPL-MCNC: 20 MG/DL — SIGNIFICANT CHANGE UP (ref 7–23)
CALCIUM SERPL-MCNC: 8.5 MG/DL — SIGNIFICANT CHANGE UP (ref 8.4–10.5)
CALCIUM SERPL-MCNC: 8.5 MG/DL — SIGNIFICANT CHANGE UP (ref 8.4–10.5)
CHLORIDE SERPL-SCNC: 112 MMOL/L — HIGH (ref 96–108)
CHLORIDE SERPL-SCNC: 112 MMOL/L — HIGH (ref 96–108)
CO2 BLDV-SCNC: 30 MMOL/L — HIGH (ref 22–26)
CO2 BLDV-SCNC: 30 MMOL/L — HIGH (ref 22–26)
CO2 SERPL-SCNC: 27 MMOL/L — SIGNIFICANT CHANGE UP (ref 22–31)
CO2 SERPL-SCNC: 27 MMOL/L — SIGNIFICANT CHANGE UP (ref 22–31)
CREAT SERPL-MCNC: 0.36 MG/DL — LOW (ref 0.5–1.3)
CREAT SERPL-MCNC: 0.36 MG/DL — LOW (ref 0.5–1.3)
EGFR: 116 ML/MIN/1.73M2 — SIGNIFICANT CHANGE UP
EGFR: 116 ML/MIN/1.73M2 — SIGNIFICANT CHANGE UP
EOSINOPHIL # BLD AUTO: 0.17 K/UL — SIGNIFICANT CHANGE UP (ref 0–0.5)
EOSINOPHIL # BLD AUTO: 0.17 K/UL — SIGNIFICANT CHANGE UP (ref 0–0.5)
EOSINOPHIL NFR BLD AUTO: 1.9 % — SIGNIFICANT CHANGE UP (ref 0–6)
EOSINOPHIL NFR BLD AUTO: 1.9 % — SIGNIFICANT CHANGE UP (ref 0–6)
GAS PNL BLDV: SIGNIFICANT CHANGE UP
GAS PNL BLDV: SIGNIFICANT CHANGE UP
GLUCOSE SERPL-MCNC: 176 MG/DL — HIGH (ref 70–99)
GLUCOSE SERPL-MCNC: 176 MG/DL — HIGH (ref 70–99)
HCO3 BLDV-SCNC: 28 MMOL/L — SIGNIFICANT CHANGE UP (ref 22–29)
HCO3 BLDV-SCNC: 28 MMOL/L — SIGNIFICANT CHANGE UP (ref 22–29)
HCT VFR BLD CALC: 33.4 % — LOW (ref 39–50)
HCT VFR BLD CALC: 33.4 % — LOW (ref 39–50)
HGB BLD-MCNC: 10.6 G/DL — LOW (ref 13–17)
HGB BLD-MCNC: 10.6 G/DL — LOW (ref 13–17)
IMM GRANULOCYTES NFR BLD AUTO: 0.5 % — SIGNIFICANT CHANGE UP (ref 0–0.9)
IMM GRANULOCYTES NFR BLD AUTO: 0.5 % — SIGNIFICANT CHANGE UP (ref 0–0.9)
LYMPHOCYTES # BLD AUTO: 1.12 K/UL — SIGNIFICANT CHANGE UP (ref 1–3.3)
LYMPHOCYTES # BLD AUTO: 1.12 K/UL — SIGNIFICANT CHANGE UP (ref 1–3.3)
LYMPHOCYTES # BLD AUTO: 12.7 % — LOW (ref 13–44)
LYMPHOCYTES # BLD AUTO: 12.7 % — LOW (ref 13–44)
MCHC RBC-ENTMCNC: 31.4 PG — SIGNIFICANT CHANGE UP (ref 27–34)
MCHC RBC-ENTMCNC: 31.4 PG — SIGNIFICANT CHANGE UP (ref 27–34)
MCHC RBC-ENTMCNC: 31.7 GM/DL — LOW (ref 32–36)
MCHC RBC-ENTMCNC: 31.7 GM/DL — LOW (ref 32–36)
MCV RBC AUTO: 98.8 FL — SIGNIFICANT CHANGE UP (ref 80–100)
MCV RBC AUTO: 98.8 FL — SIGNIFICANT CHANGE UP (ref 80–100)
MONOCYTES # BLD AUTO: 0.69 K/UL — SIGNIFICANT CHANGE UP (ref 0–0.9)
MONOCYTES # BLD AUTO: 0.69 K/UL — SIGNIFICANT CHANGE UP (ref 0–0.9)
MONOCYTES NFR BLD AUTO: 7.8 % — SIGNIFICANT CHANGE UP (ref 2–14)
MONOCYTES NFR BLD AUTO: 7.8 % — SIGNIFICANT CHANGE UP (ref 2–14)
NEUTROPHILS # BLD AUTO: 6.81 K/UL — SIGNIFICANT CHANGE UP (ref 1.8–7.4)
NEUTROPHILS # BLD AUTO: 6.81 K/UL — SIGNIFICANT CHANGE UP (ref 1.8–7.4)
NEUTROPHILS NFR BLD AUTO: 76.9 % — SIGNIFICANT CHANGE UP (ref 43–77)
NEUTROPHILS NFR BLD AUTO: 76.9 % — SIGNIFICANT CHANGE UP (ref 43–77)
NRBC # BLD: 0 /100 WBCS — SIGNIFICANT CHANGE UP (ref 0–0)
NRBC # BLD: 0 /100 WBCS — SIGNIFICANT CHANGE UP (ref 0–0)
PCO2 BLDV: 46 MMHG — SIGNIFICANT CHANGE UP (ref 42–55)
PCO2 BLDV: 46 MMHG — SIGNIFICANT CHANGE UP (ref 42–55)
PH BLDV: 7.4 — SIGNIFICANT CHANGE UP (ref 7.32–7.43)
PH BLDV: 7.4 — SIGNIFICANT CHANGE UP (ref 7.32–7.43)
PLATELET # BLD AUTO: 221 K/UL — SIGNIFICANT CHANGE UP (ref 150–400)
PLATELET # BLD AUTO: 221 K/UL — SIGNIFICANT CHANGE UP (ref 150–400)
PO2 BLDV: 103 MMHG — HIGH (ref 25–45)
PO2 BLDV: 103 MMHG — HIGH (ref 25–45)
POTASSIUM SERPL-MCNC: 3.9 MMOL/L — SIGNIFICANT CHANGE UP (ref 3.5–5.3)
POTASSIUM SERPL-MCNC: 3.9 MMOL/L — SIGNIFICANT CHANGE UP (ref 3.5–5.3)
POTASSIUM SERPL-SCNC: 3.9 MMOL/L — SIGNIFICANT CHANGE UP (ref 3.5–5.3)
POTASSIUM SERPL-SCNC: 3.9 MMOL/L — SIGNIFICANT CHANGE UP (ref 3.5–5.3)
RBC # BLD: 3.38 M/UL — LOW (ref 4.2–5.8)
RBC # BLD: 3.38 M/UL — LOW (ref 4.2–5.8)
RBC # FLD: 14.1 % — SIGNIFICANT CHANGE UP (ref 10.3–14.5)
RBC # FLD: 14.1 % — SIGNIFICANT CHANGE UP (ref 10.3–14.5)
SAO2 % BLDV: 98.6 % — HIGH (ref 67–88)
SAO2 % BLDV: 98.6 % — HIGH (ref 67–88)
SODIUM SERPL-SCNC: 147 MMOL/L — HIGH (ref 135–145)
SODIUM SERPL-SCNC: 147 MMOL/L — HIGH (ref 135–145)
WBC # BLD: 8.85 K/UL — SIGNIFICANT CHANGE UP (ref 3.8–10.5)
WBC # BLD: 8.85 K/UL — SIGNIFICANT CHANGE UP (ref 3.8–10.5)
WBC # FLD AUTO: 8.85 K/UL — SIGNIFICANT CHANGE UP (ref 3.8–10.5)
WBC # FLD AUTO: 8.85 K/UL — SIGNIFICANT CHANGE UP (ref 3.8–10.5)

## 2023-11-28 PROCEDURE — 99233 SBSQ HOSP IP/OBS HIGH 50: CPT

## 2023-11-28 PROCEDURE — 99232 SBSQ HOSP IP/OBS MODERATE 35: CPT

## 2023-11-28 RX ORDER — SODIUM CHLORIDE 9 MG/ML
4 INJECTION INTRAMUSCULAR; INTRAVENOUS; SUBCUTANEOUS EVERY 6 HOURS
Refills: 0 | Status: DISCONTINUED | OUTPATIENT
Start: 2023-11-28 | End: 2023-12-04

## 2023-11-28 RX ORDER — ACETYLCYSTEINE 200 MG/ML
3 VIAL (ML) MISCELLANEOUS EVERY 6 HOURS
Refills: 0 | Status: COMPLETED | OUTPATIENT
Start: 2023-11-28 | End: 2023-12-01

## 2023-11-28 RX ORDER — ACETAMINOPHEN 500 MG
750 TABLET ORAL EVERY 6 HOURS
Refills: 0 | Status: DISCONTINUED | OUTPATIENT
Start: 2023-11-28 | End: 2023-12-14

## 2023-11-28 RX ADMIN — PANTOPRAZOLE SODIUM 40 MILLIGRAM(S): 20 TABLET, DELAYED RELEASE ORAL at 05:26

## 2023-11-28 RX ADMIN — LEVALBUTEROL 0.63 MILLIGRAM(S): 1.25 SOLUTION, CONCENTRATE RESPIRATORY (INHALATION) at 13:08

## 2023-11-28 RX ADMIN — Medication 750 MILLIGRAM(S): at 18:00

## 2023-11-28 RX ADMIN — Medication 1 GRAM(S): at 05:26

## 2023-11-28 RX ADMIN — MEROPENEM 100 MILLIGRAM(S): 1 INJECTION INTRAVENOUS at 05:26

## 2023-11-28 RX ADMIN — Medication 750 MILLIGRAM(S): at 04:09

## 2023-11-28 RX ADMIN — Medication 750 MILLIGRAM(S): at 19:00

## 2023-11-28 RX ADMIN — SENNA PLUS 10 MILLILITER(S): 8.6 TABLET ORAL at 21:57

## 2023-11-28 RX ADMIN — LEVALBUTEROL 0.63 MILLIGRAM(S): 1.25 SOLUTION, CONCENTRATE RESPIRATORY (INHALATION) at 20:33

## 2023-11-28 RX ADMIN — ENOXAPARIN SODIUM 40 MILLIGRAM(S): 100 INJECTION SUBCUTANEOUS at 21:58

## 2023-11-28 RX ADMIN — SODIUM CHLORIDE 4 MILLILITER(S): 9 INJECTION INTRAMUSCULAR; INTRAVENOUS; SUBCUTANEOUS at 20:33

## 2023-11-28 RX ADMIN — QUETIAPINE FUMARATE 100 MILLIGRAM(S): 200 TABLET, FILM COATED ORAL at 21:57

## 2023-11-28 RX ADMIN — LEVALBUTEROL 0.63 MILLIGRAM(S): 1.25 SOLUTION, CONCENTRATE RESPIRATORY (INHALATION) at 05:27

## 2023-11-28 RX ADMIN — Medication 3 MILLILITER(S): at 20:33

## 2023-11-28 RX ADMIN — Medication 500 MICROGRAM(S): at 05:27

## 2023-11-28 RX ADMIN — Medication 1 GRAM(S): at 12:50

## 2023-11-28 RX ADMIN — Medication 500 MICROGRAM(S): at 14:07

## 2023-11-28 RX ADMIN — Medication 750 MILLIGRAM(S): at 03:40

## 2023-11-28 RX ADMIN — Medication 1 APPLICATION(S): at 05:26

## 2023-11-28 RX ADMIN — CHLORHEXIDINE GLUCONATE 1 APPLICATION(S): 213 SOLUTION TOPICAL at 05:25

## 2023-11-28 RX ADMIN — Medication 500 MICROGRAM(S): at 20:33

## 2023-11-28 RX ADMIN — Medication 1 GRAM(S): at 17:59

## 2023-11-28 RX ADMIN — MEROPENEM 100 MILLIGRAM(S): 1 INJECTION INTRAVENOUS at 14:35

## 2023-11-28 RX ADMIN — MEROPENEM 100 MILLIGRAM(S): 1 INJECTION INTRAVENOUS at 21:57

## 2023-11-28 NOTE — PROGRESS NOTE ADULT - PROBLEM SELECTOR PLAN 1
Diagnosed many years ago, pt is non-verbal and bed-bound at baseline, g-tube in place, with recent development of contractures and sacral decub ulcer  - delirium precautions  - supportive care.

## 2023-11-28 NOTE — PHYSICAL THERAPY INITIAL EVALUATION ADULT - PERTINENT HX OF CURRENT PROBLEM, REHAB EVAL
5 Pt is a 77 y/o male admitted fevers. PMH: ARF, dementia, dyspepsia, dysphagia, GERD. Pt was seen by house doctor and placed on antibiotics x2 weeks but started mounting fevers in the last few days. It was noticed by his GI doctor that his ulcer was getting worse so he was sent to [facility] ED.     CT abdomen demonstrates Extensive decubitus ulcer involving the right posterior gluteal and pelvic region with extension to bone. There is bony erosion of the ischium compatible with osteomyelitis. Mild bladder wall thickening and a few foci of intraluminal air, correlate for recent instrumentation and/or infection. Left lower lobe consolidation along with tree-in-bud/nodular opacities in the left upper lobe concerning for pneumonia. BLE ultrasound (-). CXR demonstrates Patchy opacity in left lower lung, seen on prior imaging, possible infection/pneumonia. CTA demonstrates No pulmonary embolism detected. Occlusive secretions within the left mainstem bronchus with associated complete left lung collapse; consider superimposed infection in the appropriate clinical scenario. Small left pleural effusion.    Hospital course: Had RRT 11/20, vancomycin readded; suspect due to re-aspiration but unclear  Further fevers overnight noted, patient generally unchanged, still hypoxic, HFNC.

## 2023-11-28 NOTE — PROGRESS NOTE ADULT - PROBLEM SELECTOR PLAN 4
- HCP reportedly wife Brenda.   - Code status: DNR/I, MOLST completed  - GOC: continuing medical care and work up, treat reversible etiologies with hopes that pt can safely transition to nasal cannula and return home. Further conversations will take place pending clinical course.

## 2023-11-28 NOTE — PROGRESS NOTE ADULT - SUBJECTIVE AND OBJECTIVE BOX
CC: F/U for SOB    Saw/spoke to patient. No fevers, no chills. No new complaints.    Allergies  No Known Allergies    ANTIMICROBIALS:  meropenem  IVPB 1000 every 8 hours    PE:    Vital Signs Last 24 Hrs  T(C): 36.3 (28 Nov 2023 13:25), Max: 37.3 (28 Nov 2023 05:55)  T(F): 97.4 (28 Nov 2023 13:25), Max: 99.1 (28 Nov 2023 05:55)  HR: 95 (28 Nov 2023 15:40) (89 - 109)  BP: 123/77 (28 Nov 2023 13:25) (99/59 - 123/77)  RR: 22 (28 Nov 2023 15:40) (18 - 22)  SpO2: 98% (28 Nov 2023 15:40) (94% - 100%)    Gen: AOx3, NAD, non-toxic  CV: Nontachycardic  Resp: Breathing comfortably, RA  Abd: Soft, nontender  IV/Skin: No thrombophlebitis    LABS:                        10.6   8.85  )-----------( 221      ( 28 Nov 2023 10:57 )             33.4     11-28    147<H>  |  112<H>  |  20  ----------------------------<  176<H>  3.9   |  27  |  0.36<L>    Ca    8.5      28 Nov 2023 10:57    TPro  7.0  /  Alb  2.6<L>  /  TBili  0.6  /  DBili  x   /  AST  15  /  ALT  15  /  AlkPhos  136<H>  11-27    Urinalysis Basic - ( 28 Nov 2023 10:57 )    Color: x / Appearance: x / SG: x / pH: x  Gluc: 176 mg/dL / Ketone: x  / Bili: x / Urobili: x   Blood: x / Protein: x / Nitrite: x   Leuk Esterase: x / RBC: x / WBC x   Sq Epi: x / Non Sq Epi: x / Bacteria: x    MICROBIOLOGY:    .Blood Blood-Peripheral  11-27-23   No growth at 24 hours  --  --    .Blood Blood-Peripheral  11-20-23   No growth at 5 days  --  --    Clean Catch Clean Catch (Midstream)  11-18-23   No growth  --  --    .Blood Blood-Peripheral  11-18-23   No growth at 5 days  --  --    .Blood Blood-Peripheral  11-18-23   No growth at 5 days  --  --    Rapid RVP Result: Ravitec (11-27 @ 09:25)    RADIOLOGY:    11/27 CT    IMPRESSION:.    No pulmonary embolism detected.    Occlusive secretions within the left mainstem bronchus with associated   complete left lung collapse; consider superimposed infection in the   appropriate clinical scenario.    Small left pleural effusion.

## 2023-11-28 NOTE — PROGRESS NOTE ADULT - SUBJECTIVE AND OBJECTIVE BOX
SHARONA LARKIN  78y Male  MRN:[id number]    Patient is a 78y old  Male who presents with a chief complaint of sepsis      HPI:  79 yo M PMHx of severe dementia, PEG tube presents with ulcer that has worsened over last month. Patient was seen by house calls doctor 2 weeks ago, placed on antibiotics but started mounting fevers in the last few days. It was noticed by his GI doctor (Dr. Moraes) that his ulcer was getting worse, so he sent him in. Patient is nonverbal at baseline. Wife has noticed no new cough, runny nose. (19 Nov 2023 13:02)      Patient seen and evaluated at bedside. No acute events overnight except as noted.    Interval HPI: no acute events o/n     PAST MEDICAL & SURGICAL HISTORY:  Dementia      Pneumonia      Acute renal failure (ARF)  ARF 1.5 YEAR AGO      Dysphagia  peg      Arthritis      GERD (gastroesophageal reflux disease)      Dyspepsia      History of hand surgery      PEG adjustment, replacement, or removal      Status post insertion of percutaneous endoscopic gastrostomy (PEG) tube  PEG replacement on 02/29/16          REVIEW OF SYSTEMS:  as per hpi     VITALS:   MEDICATIONS  (STANDING):  acetylcysteine 20%  Inhalation 3 milliLiter(s) Inhalation every 6 hours  chlorhexidine 2% Cloths 1 Application(s) Topical <User Schedule>  Dakins Solution - 1/4 Strength 1 Application(s) Topical two times a day  enoxaparin Injectable 40 milliGRAM(s) SubCutaneous every 24 hours  ipratropium    for Nebulization 500 MICROGram(s) Nebulizer every 6 hours  levalbuterol Inhalation 0.63 milliGRAM(s) Inhalation every 6 hours  meropenem  IVPB 1000 milliGRAM(s) IV Intermittent every 8 hours  OLANZapine 5 milliGRAM(s) Oral <User Schedule>  pantoprazole    Tablet 40 milliGRAM(s) Oral before breakfast  QUEtiapine 100 milliGRAM(s) Oral <User Schedule>  senna Syrup 10 milliLiter(s) Oral at bedtime  sodium chloride 3%  Inhalation 4 milliLiter(s) Inhalation every 6 hours  sucralfate 1 Gram(s) Oral four times a day    MEDICATIONS  (PRN):  acetaminophen   Oral Liquid .. 750 milliGRAM(s) Oral every 6 hours PRN Temp greater or equal to 38C (100.4F), Mild Pain (1 - 3)        PHYSICAL EXAM:  GENERAL: NAD,  comfortable   HEAD:  Atraumatic, Normocephalic  EYES: EOMI, PERRLA, conjunctiva and sclera clear  NECK: Supple, No JVD  CHEST/LUNG: Clear to auscultation bilaterally; No wheeze  HEART: S1, S2; No murmurs, rubs, or gallops  ABDOMEN: Soft, Nontender, Nondistended; Bowel sounds present +peg  EXTREMITIES:  2+ Peripheral Pulses, contracted   PSYCH: Normal affect  NEUROLOGY: non verbal  SKIN: +sacral decub    Consultant(s) Notes Reviewed:  [x ] YES  [ ] NO  Care Discussed with Consultants/Other Providers [ x] YES  [ ] NO    MEDS:   MEDICATIONS  (STANDING):  acetylcysteine 20%  Inhalation 3 milliLiter(s) Inhalation every 6 hours  chlorhexidine 2% Cloths 1 Application(s) Topical <User Schedule>  Dakins Solution - 1/4 Strength 1 Application(s) Topical two times a day  enoxaparin Injectable 40 milliGRAM(s) SubCutaneous every 24 hours  ipratropium    for Nebulization 500 MICROGram(s) Nebulizer every 6 hours  levalbuterol Inhalation 0.63 milliGRAM(s) Inhalation every 6 hours  meropenem  IVPB 1000 milliGRAM(s) IV Intermittent every 8 hours  OLANZapine 5 milliGRAM(s) Oral <User Schedule>  pantoprazole    Tablet 40 milliGRAM(s) Oral before breakfast  QUEtiapine 100 milliGRAM(s) Oral <User Schedule>  senna Syrup 10 milliLiter(s) Oral at bedtime  sodium chloride 3%  Inhalation 4 milliLiter(s) Inhalation every 6 hours  sucralfate 1 Gram(s) Oral four times a day    MEDICATIONS  (PRN):  acetaminophen   Oral Liquid .. 750 milliGRAM(s) Oral every 6 hours PRN Temp greater or equal to 38C (100.4F), Mild Pain (1 - 3)        ALLERGIES:  Haldol (Other)  No Known Allergies  benzodiazepines (Other)      LABS:                                              10.6   8.85  )-----------( 221      ( 28 Nov 2023 10:57 )             33.4   11-28    147<H>  |  112<H>  |  20  ----------------------------<  176<H>  3.9   |  27  |  0.36<L>    Ca    8.5      28 Nov 2023 10:57    TPro  7.0  /  Alb  2.6<L>  /  TBili  0.6  /  DBili  x   /  AST  15  /  ALT  15  /  AlkPhos  136<H>  11-27      < from: CT Angio Chest PE Protocol w/ IV Cont (11.27.23 @ 22:44) >  IMPRESSION:.    No pulmonary embolism detected.    Occlusive secretions within the left mainstem bronchus with associated   complete left lung collapse; consider superimposed infection in the   appropriate clinical scenario.    Small leftpleural effusion.    --- End of Report ---      < end of copied text >       < from: CT Abdomen and Pelvis w/ IV Cont (11.18.23 @ 19:41) >    IMPRESSION:    Extensive decubitus ulcer involving the right posterior gluteal and   pelvic region with extension to bone. There is bony erosion of the   ischium compatible with osteomyelitis.    Mild bladder wall thickening and a few foci of intraluminal air,   correlate for recent instrumentation and/or infection.    Left lower lobe consolidation along with tree-in-bud/nodular opacities in   the left upper lobe concerning for pneumonia.    --- End of Report ---        < end of copied text >

## 2023-11-28 NOTE — PROGRESS NOTE ADULT - PROBLEM SELECTOR PLAN 1
S/p RRT 11/20 for hypoxia requiring HFNC  -Increase in O2 requirements 11/21 from HFNC 50L/80% to 100% Bipap. Weaned from Bipap to HFNC 50L/100%  -Suspect aspiration event, family at bedside reports multiple aspiration events in the past  -CT A/P with LLL consolidation, SHEILA tree in bud opacities likely PNA. Continue ABX per ID  -ABG with no CO2 retention  -CXR 11/25 with LLL infiltrate, but does not explain persistent high O2 requirements   -CTA chest 11/27 negative for PE. Notes extensive L atelectasis/mucus plugging with small L effusion  -Start Mucomyst 20% 3ml q6h x3 days  -Start Bipap 12/8/70% for atelectasis. D/w RN, ACP, and RT to place patient on Bipap this AM. Can give a rest on HFNC this afternoon/evening and then go back on Bipap overnight  -Keep sats >90% with O2 (currently HFNC 50L/70%)   -Chest vest q8h to assist with secretion mobilization when off Bipap  -Suction PRN   -CXR in AM S/p RRT 11/20 for hypoxia requiring HFNC  -Increase in O2 requirements 11/21 from HFNC 50L/80% to 100% Bipap. Weaned from Bipap to HFNC 50L/100%  -Suspect aspiration event, family at bedside reports multiple aspiration events in the past  -CT A/P with LLL consolidation, SHEILA tree in bud opacities likely PNA. Continue ABX per ID  -ABG with no CO2 retention  -CXR 11/25 with LLL infiltrate, but does not explain persistent high O2 requirements   -CTA chest 11/27 negative for PE. Notes extensive L atelectasis/mucus plugging with small L effusion  -Start Mucomyst 20% 3ml q6h x3 days  -Start Bipap 12/8/70% for atelectasis. D/w RN, ACP, and RT to place patient on Bipap this AM. Can give a rest on HFNC this afternoon/evening and then go back on Bipap overnight  -Keep sats >90% with O2 (currently HFNC 50L/70%)   -Chest vest q8h to assist with secretion mobilization when off Bipap  -Suction PRN   -CXR in AM  -Overall prognosis guarded. Palliative care f/u.

## 2023-11-28 NOTE — PROGRESS NOTE ADULT - SUBJECTIVE AND OBJECTIVE BOX
Date of Service: 11-28-23 @ 10:34    Patient is a 78y old  Male who presents with a chief complaint of Osteomyelitis     (20 Nov 2023 11:10)      Any change in ROS:   O2 sat 98% on HFNC 50L/70%  Unable to participate in ROS    MEDICATIONS  (STANDING):  acetaminophen   Oral Liquid .. 750 milliGRAM(s) Enteral Tube once  acetylcysteine 20%  Inhalation 3 milliLiter(s) Inhalation every 6 hours  chlorhexidine 2% Cloths 1 Application(s) Topical <User Schedule>  Dakins Solution - 1/4 Strength 1 Application(s) Topical two times a day  enoxaparin Injectable 40 milliGRAM(s) SubCutaneous every 24 hours  ipratropium    for Nebulization 500 MICROGram(s) Nebulizer every 6 hours  levalbuterol Inhalation 0.63 milliGRAM(s) Inhalation every 6 hours  meropenem  IVPB 1000 milliGRAM(s) IV Intermittent every 8 hours  OLANZapine 5 milliGRAM(s) Oral <User Schedule>  pantoprazole    Tablet 40 milliGRAM(s) Oral before breakfast  QUEtiapine 100 milliGRAM(s) Oral <User Schedule>  senna Syrup 10 milliLiter(s) Oral at bedtime  sodium chloride 0.9%. 1000 milliLiter(s) (50 mL/Hr) IV Continuous <Continuous>  sucralfate 1 Gram(s) Oral four times a day    MEDICATIONS  (PRN):  acetaminophen   Oral Liquid .. 750 milliGRAM(s) Oral every 6 hours PRN Mild Pain (1 - 3)    Vital Signs Last 24 Hrs  T(C): 36.3 (28 Nov 2023 08:41), Max: 38 (27 Nov 2023 12:55)  T(F): 97.4 (28 Nov 2023 08:41), Max: 100.4 (27 Nov 2023 12:55)  HR: 90 (28 Nov 2023 08:41) (90 - 117)  BP: 103/61 (28 Nov 2023 08:41) (99/59 - 113/68)  BP(mean): --  RR: 18 (28 Nov 2023 08:41) (18 - 18)  SpO2: 99% (28 Nov 2023 08:41) (94% - 100%)    Parameters below as of 28 Nov 2023 08:41  Patient On (Oxygen Delivery Method): nasal cannula, high flow  O2 Flow (L/min): 50  O2 Concentration (%): 70    I&O's Summary    27 Nov 2023 07:01  -  28 Nov 2023 07:00  --------------------------------------------------------  IN: 550 mL / OUT: 150 mL / NET: 400 mL          Physical Exam:   GENERAL: NAD  HEENT: CANDY  ENMT: No tonsillar erythema, exudates, or enlargement  NECK: Supple, No JVD  CHEST/LUNG: Decreased BS L   CVS: Regular rate and rhythm  GI: : Soft, Nontender, Nondistended  NERVOUS SYSTEM:  Lethargic   EXTREMITIES:  2+ Peripheral Pulses, No clubbing, cyanosis, or edema  SKIN: No rashes or lesions    Labs:                              11.1   10.76 )-----------( 221      ( 27 Nov 2023 11:25 )             36.0                         10.6   16.79 )-----------( 202      ( 26 Nov 2023 12:34 )             33.2     11-27    144  |  108  |  23  ----------------------------<  204<H>  4.3   |  28  |  0.43<L>  11-26    141  |  109<H>  |  18  ----------------------------<  162<H>  4.3   |  24  |  0.46<L>    Ca    8.7      27 Nov 2023 11:25  Ca    8.3<L>      26 Nov 2023 12:34  Mg     2.1     11-26    TPro  7.0  /  Alb  2.6<L>  /  TBili  0.6  /  DBili  x   /  AST  15  /  ALT  15  /  AlkPhos  136<H>  11-27    CAPILLARY BLOOD GLUCOSE          LIVER FUNCTIONS - ( 27 Nov 2023 11:25 )  Alb: 2.6 g/dL / Pro: 7.0 g/dL / ALK PHOS: 136 U/L / ALT: 15 U/L / AST: 15 U/L / GGT: x             Urinalysis Basic - ( 27 Nov 2023 11:25 )    Color: x / Appearance: x / SG: x / pH: x  Gluc: 204 mg/dL / Ketone: x  / Bili: x / Urobili: x   Blood: x / Protein: x / Nitrite: x   Leuk Esterase: x / RBC: x / WBC x   Sq Epi: x / Non Sq Epi: x / Bacteria: x      D-Dimer Assay, Quantitative: 748 ng/mL DDU (11-23 @ 13:52)    Studies  < from: CT Angio Chest PE Protocol w/ IV Cont (11.27.23 @ 22:44) >  FINDINGS:    PULMONARY ARTERIES: No pulmonary embolism detected. Please note that a   few of the segmental and subsegmental pulmonary branches are limited in   assessment secondary to motion.    MEDIASTINUM: Normal heart size. No pericardial effusion. Thoracic aorta   normal caliber.  Few mildly enlarged mediastinal nodes, likely reactive;   reference prevascular 1.8 x 1 cm node (image 210, series 8).    AIRWAYS, LUNGS, PLEURA: Central airway secretions occluding the left   mainstem bronchus. Complete left lung collapse. Small left pleural   effusion. Probable atelectasis at the right base.    IMAGED ABDOMEN: Unremarkable.    SOFT TISSUES: Unremarkable.    BONES: Unremarkable.      IMPRESSION:.    No pulmonary embolism detected.    Occlusive secretions within the left mainstem bronchus with associated   complete left lung collapse; consider superimposed infection in the   appropriate clinical scenario.    Small leftpleural effusion.    --- End of Report ---      < end of copied text >                 Date of Service: 11-28-23 @ 10:34    Patient is a 78y old  Male who presents with a chief complaint of Osteomyelitis     (20 Nov 2023 11:10)      Any change in ROS:   O2 sat 98% on HFNC 50L/70%  Unable to participate in ROS    MEDICATIONS  (STANDING):  acetaminophen   Oral Liquid .. 750 milliGRAM(s) Enteral Tube once  acetylcysteine 20%  Inhalation 3 milliLiter(s) Inhalation every 6 hours  chlorhexidine 2% Cloths 1 Application(s) Topical <User Schedule>  Dakins Solution - 1/4 Strength 1 Application(s) Topical two times a day  enoxaparin Injectable 40 milliGRAM(s) SubCutaneous every 24 hours  ipratropium    for Nebulization 500 MICROGram(s) Nebulizer every 6 hours  levalbuterol Inhalation 0.63 milliGRAM(s) Inhalation every 6 hours  meropenem  IVPB 1000 milliGRAM(s) IV Intermittent every 8 hours  OLANZapine 5 milliGRAM(s) Oral <User Schedule>  pantoprazole    Tablet 40 milliGRAM(s) Oral before breakfast  QUEtiapine 100 milliGRAM(s) Oral <User Schedule>  senna Syrup 10 milliLiter(s) Oral at bedtime  sodium chloride 0.9%. 1000 milliLiter(s) (50 mL/Hr) IV Continuous <Continuous>  sucralfate 1 Gram(s) Oral four times a day    MEDICATIONS  (PRN):  acetaminophen   Oral Liquid .. 750 milliGRAM(s) Oral every 6 hours PRN Mild Pain (1 - 3)    Vital Signs Last 24 Hrs  T(C): 36.3 (28 Nov 2023 08:41), Max: 38 (27 Nov 2023 12:55)  T(F): 97.4 (28 Nov 2023 08:41), Max: 100.4 (27 Nov 2023 12:55)  HR: 90 (28 Nov 2023 08:41) (90 - 117)  BP: 103/61 (28 Nov 2023 08:41) (99/59 - 113/68)  BP(mean): --  RR: 18 (28 Nov 2023 08:41) (18 - 18)  SpO2: 99% (28 Nov 2023 08:41) (94% - 100%)    Parameters below as of 28 Nov 2023 08:41  Patient On (Oxygen Delivery Method): nasal cannula, high flow  O2 Flow (L/min): 50  O2 Concentration (%): 70    I&O's Summary    27 Nov 2023 07:01  -  28 Nov 2023 07:00  --------------------------------------------------------  IN: 550 mL / OUT: 150 mL / NET: 400 mL          Physical Exam:   GENERAL: NAD  HEENT: CANDY  ENMT: No tonsillar erythema, exudates, or enlargement  NECK: Supple, No JVD  CHEST/LUNG: Decreased BS L   CVS: Regular rate and rhythm  GI: : Soft, Nontender, Nondistended  NERVOUS SYSTEM:  Lethargic   EXTREMITIES:  2+ Peripheral Pulses, No clubbing, cyanosis, or edema  SKIN: No rashes or lesions    Labs:                              11.1   10.76 )-----------( 221      ( 27 Nov 2023 11:25 )             36.0                         10.6   16.79 )-----------( 202      ( 26 Nov 2023 12:34 )             33.2     11-27    144  |  108  |  23  ----------------------------<  204<H>  4.3   |  28  |  0.43<L>  11-26    141  |  109<H>  |  18  ----------------------------<  162<H>  4.3   |  24  |  0.46<L>    Ca    8.7      27 Nov 2023 11:25  Ca    8.3<L>      26 Nov 2023 12:34  Mg     2.1     11-26    TPro  7.0  /  Alb  2.6<L>  /  TBili  0.6  /  DBili  x   /  AST  15  /  ALT  15  /  AlkPhos  136<H>  11-27    CAPILLARY BLOOD GLUCOSE          LIVER FUNCTIONS - ( 27 Nov 2023 11:25 )  Alb: 2.6 g/dL / Pro: 7.0 g/dL / ALK PHOS: 136 U/L / ALT: 15 U/L / AST: 15 U/L / GGT: x             Urinalysis Basic - ( 27 Nov 2023 11:25 )    Color: x / Appearance: x / SG: x / pH: x  Gluc: 204 mg/dL / Ketone: x  / Bili: x / Urobili: x   Blood: x / Protein: x / Nitrite: x   Leuk Esterase: x / RBC: x / WBC x   Sq Epi: x / Non Sq Epi: x / Bacteria: x      D-Dimer Assay, Quantitative: 748 ng/mL DDU (11-23 @ 13:52)    Studies  < from: CT Angio Chest PE Protocol w/ IV Cont (11.27.23 @ 22:44) >  FINDINGS:    PULMONARY ARTERIES: No pulmonary embolism detected. Please note that a   few of the segmental and subsegmental pulmonary branches are limited in   assessment secondary to motion.    MEDIASTINUM: Normal heart size. No pericardial effusion. Thoracic aorta   normal caliber.  Few mildly enlarged mediastinal nodes, likely reactive;   reference prevascular 1.8 x 1 cm node (image 210, series 8).    AIRWAYS, LUNGS, PLEURA: Central airway secretions occluding the left   mainstem bronchus. Complete left lung collapse. Small left pleural   effusion. Probable atelectasis at the right base.    IMAGED ABDOMEN: Unremarkable.    SOFT TISSUES: Unremarkable.    BONES: Unremarkable.      IMPRESSION:.    No pulmonary embolism detected.    Occlusive secretions within the left mainstem bronchus with associated   complete left lung collapse; consider superimposed infection in the   appropriate clinical scenario.    Small left pleural effusion.    --- End of Report ---      < end of copied text >

## 2023-11-28 NOTE — PROGRESS NOTE ADULT - ASSESSMENT
79 yo M PMHx of severe dementia, s/p PEG tube, GERD, arthritis presents with ulcer that has worsened over last month  No fever, has leukocytosis  Brought in for ? fevers and worsening wound; chronically bed bound  UA neg, UCX neg  RVP neg  CT with sacral decubitus ulcers, OM erosion, pneumonia, blader wall thickening  BCX NGTD  Possible pneumonia  Had RRT 11/20, vancomycin readded; suspect due to re-aspiration but unclear  Further fevers overnight noted, patient generally unchanged, still hypoxic, HFNC  CT with secretions, left lung collapse, superinfection?  Overall, Abnormal finding on imaging, pneumonia  - Meropenem 1g q 8, complete 5 days then discontinue  - Check sputum culture if producing sputum  - Wound care eval to sacral wound  - Noninfectious considerations SOB per pulmonary team  - O2 status improving    Guarded/GOC per team    My colleagues will be covering this patient starting on 11/29/23, I will return 11/30/23. Please call [phone] or on call fellow with any questions or change in status.     Roni Luis MD  Contact on TEAMS messaging from 9am - 5pm  From 5pm-9am, on weekends, or if no response call [phone]

## 2023-11-28 NOTE — PROGRESS NOTE ADULT - PROBLEM SELECTOR PLAN 5
- Geriatrics and Palliative Medicine Team will continue to follow. Rapport building with wife today.   - Chaplaincy referral placed for spiritual support as wife is highly neil filled.   - Further conversation to take place pending clinical trajectory    Lauren To MD  [facility] Team Consults  Please call if we can be of assistance, [phone]

## 2023-11-28 NOTE — PROGRESS NOTE ADULT - ASSESSMENT
79 yo male h/o dementia, non verbal. contracted, s/p peg. here with sepsis likely 2/2 sacral decub and aspiration pna    sepsis  respiratory failure   sacral decub  pna - possible aspiration   cont abx as per id  f/u cult  wound care f/u  id consult noted  pulm consulted  cont supp o2 via high flow. taper down as able   CTA chest noted  pulm f/u   abx changed to meropenem. id f/u    dementia  cont home meds  supportive care    dysphagia s/p peg  feeds as per gi/nutrition          dvt ppx      d/w family bedside  DNR    palliative eval for c    Advanced care planning was discussed with patient and family.  Advanced care planning forms were reviewed and discussed as appropriate.  Differential diagnosis and plan of care discussed with patient after the evaluation.   Pain assessed and judicious use of narcotics when appropriate was discussed.  Importance of Fall prevention discussed.  Counseling on Smoking and Alcohol cessation was offered when appropriate.  Counseling on Diet, exercise, and medication compliance was done.       Approx 75 minutes spent.

## 2023-11-28 NOTE — PROGRESS NOTE ADULT - PROBLEM SELECTOR PLAN 2
CT A/P with LLL consolidation & SHEILA TIB opacities  -CTA chest with L lung mucus plugging/atelectasis, possible post obstuctive PNA   -RVP negative  -MRSA PCR negative  -BC with NGTD  -Urine legionella negative  -Still spiking temps, ABX changed to Meropenem  -ID f/u.

## 2023-11-28 NOTE — PROGRESS NOTE ADULT - SUBJECTIVE AND OBJECTIVE BOX
[facility]  Nathaniel Barron PA-C  [address] [place], [place] [id number] [phone]      INTERVAL HPI/OVERNIGHT EVENTS:  pt seen and examined, events noted  remains on HFNC    MEDICATIONS  (STANDING):  chlorhexidine 2% Cloths 1 Application(s) Topical <User Schedule>  Dakins Solution - 1/2 Strength 1 Application(s) Topical daily  Dakins Solution - 1/4 Strength 1 Application(s) Topical two times a day  enoxaparin Injectable 40 milliGRAM(s) SubCutaneous every 24 hours  guaiFENesin  milliGRAM(s) Oral every 12 hours  OLANZapine 5 milliGRAM(s) Oral <User Schedule>  pantoprazole    Tablet 40 milliGRAM(s) Oral before breakfast  piperacillin/tazobactam IVPB.. 3.375 Gram(s) IV Intermittent every 8 hours  polyethylene glycol 3350 17 Gram(s) Oral daily  QUEtiapine 100 milliGRAM(s) Oral <User Schedule>  senna 2 Tablet(s) Oral at bedtime  sucralfate 1 Gram(s) Oral four times a day  vancomycin  IVPB 500 milliGRAM(s) IV Intermittent every 12 hours  vancomycin  IVPB        MEDICATIONS  (PRN):  acetaminophen   Oral Liquid .. 750 milliGRAM(s) Oral every 6 hours PRN Mild Pain (1 - 3)      Allergies    No Known Allergies    Intolerances    Haldol (Other)  benzodiazepines (Other)      ROS:   unable to obtain       PHYSICAL EXAM:   Vital Signs Last 24 Hrs  T(C): 36.3 (28 Nov 2023 08:41), Max: 38 (27 Nov 2023 12:55)  T(F): 97.4 (28 Nov 2023 08:41), Max: 100.4 (27 Nov 2023 12:55)  HR: 89 (28 Nov 2023 11:45) (89 - 117)  BP: 103/61 (28 Nov 2023 08:41) (99/59 - 113/68)  BP(mean): --  RR: 20 (28 Nov 2023 11:45) (18 - 20)  SpO2: 98% (28 Nov 2023 11:45) (94% - 100%)    Parameters below as of 28 Nov 2023 11:45  Patient On (Oxygen Delivery Method): BiPAP/CPAP          Daily     Daily     nad  confused  frail  non toxic  soft, nt, +PEG  no edema        LABS:                                   10.6   8.85  )-----------( 221      ( 28 Nov 2023 10:57 )             33.4   11-28    147<H>  |  112<H>  |  20  ----------------------------<  176<H>  3.9   |  27  |  0.36<L>    Ca    8.5      28 Nov 2023 10:57  Mg     2.1     11-26    TPro  7.0  /  Alb  2.6<L>  /  TBili  0.6  /  DBili  x   /  AST  15  /  ALT  15  /  AlkPhos  136<H>  11-27      Urinalysis Basic - ( 20 Nov 2023 22:01 )    Color: x / Appearance: x / SG: x / pH: x  Gluc: 118 mg/dL / Ketone: x  / Bili: x / Urobili: x   Blood: x / Protein: x / Nitrite: x   Leuk Esterase: x / RBC: x / WBC x   Sq Epi: x / Non Sq Epi: x / Bacteria: x        RADIOLOGY & ADDITIONAL TESTS:

## 2023-11-28 NOTE — PROGRESS NOTE ADULT - PROBLEM SELECTOR PLAN 3
In setting of PNA, likely ongoing aspiration, and mucus plugging/lung collapse on CTA chest 11/27  - pulmonary following, chest therapy/mucolytics/nebs/bipap  - abx per ID  - wean o2 as tolerated

## 2023-11-28 NOTE — PROGRESS NOTE ADULT - SUBJECTIVE AND OBJECTIVE BOX
SUBJECTIVE AND OBJECTIVE  Indication for Geriatrics and Palliative Care Services/INTERVAL HPI: GOC    OVERNIGHT EVENTS: f/u visit to patient today, wife Brenda visiting at bedside. She is aware of the CT results and that pt is placed on bipap along with mucolytics/nebs/chest therapy to treat mucus plugging and lung collapse. 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 The couple has a son, dtr and 5 grandchildren who are close and supportive. Wife described pt as a "really good and strong man." Throughout conversation, Brenda is highly neil filled and reliant on God for support. She confirms pt's code status of DNR/I, but wishes for continued medical care for eventual recovery and discharge to home. Advised that I will continue to follow for support and guidance, pending how pt is doing. A further conversation with pt's wife, dtr and son will be held if pt does not make recovery in the near future.     Case discussed in detail with the pulmonary team as well.     DNR on chart:DNI  DNI      Allergies    No Known Allergies    Intolerances    Haldol (Other)  benzodiazepines (Other)  MEDICATIONS  (STANDING):  acetaminophen   Oral Liquid .. 750 milliGRAM(s) Enteral Tube once  acetylcysteine 20%  Inhalation 3 milliLiter(s) Inhalation every 6 hours  chlorhexidine 2% Cloths 1 Application(s) Topical <User Schedule>  Dakins Solution - 1/4 Strength 1 Application(s) Topical two times a day  enoxaparin Injectable 40 milliGRAM(s) SubCutaneous every 24 hours  ipratropium    for Nebulization 500 MICROGram(s) Nebulizer every 6 hours  levalbuterol Inhalation 0.63 milliGRAM(s) Inhalation every 6 hours  meropenem  IVPB 1000 milliGRAM(s) IV Intermittent every 8 hours  OLANZapine 5 milliGRAM(s) Oral <User Schedule>  pantoprazole    Tablet 40 milliGRAM(s) Oral before breakfast  QUEtiapine 100 milliGRAM(s) Oral <User Schedule>  senna Syrup 10 milliLiter(s) Oral at bedtime  sodium chloride 0.9%. 1000 milliLiter(s) (50 mL/Hr) IV Continuous <Continuous>  sucralfate 1 Gram(s) Oral four times a day    MEDICATIONS  (PRN):  acetaminophen   Oral Liquid .. 750 milliGRAM(s) Oral every 6 hours PRN Mild Pain (1 - 3)      ITEMS UNCHECKED ARE NOT PRESENT    PRESENT SYMPTOMS: [x ]Unable to self-report - see [ ] CPOT [ x] PAINADS 0 [ x] RDOS 2  Source if other than patient:  [ ]Family   [ ]Team     Pain:  [ ]yes [ ]no  QOL impact -   Location -                    Aggravating factors -  Quality -  Radiation -  Timing-  Severity (0-10 scale):  Minimal acceptable level (0-10 scale):     Dyspnea:                           [ ]Mild [ ]Moderate [ ]Severe  Anxiety:                             [ ]Mild [ ]Moderate [ ]Severe  Fatigue:                             [ ]Mild [ ]Moderate [ ]Severe  Nausea:                             [ ]Mild [ ]Moderate [ ]Severe  Loss of appetite:              [ ]Mild [ ]Moderate [ ]Severe  Constipation:                    [ ]Mild [ ]Moderate [ ]Severe    PCSSQ[Palliative Care Spiritual Screening Question]   Severity (0-10):  Score of 4 or > indicate consideration of Chaplaincy referral.  Chaplaincy Referral: [ x] yes [ ] refused [ ] following [ ] Deferred     Caregiver [place]? : [ ] yes [x ] no [ ] Deferred [ ] Declined             Social work referral [ ] Patient & Family Centered Care Referral [ ]     Anticipatory Grief present?:  [ ] yes [x ] no  [ ] Deferred                  Social work referral [ ] Chaplaincy Referral[ ]    Other Symptoms:  [x ]All other review of systems negative     Palliative Performance Status Version 2:     30    %      http://npcrc.org/files/news/palliative_performance_scale_ppsv2.pdf    PHYSICAL EXAM:  Vital Signs Last 24 Hrs  T(C): 36.3 (28 Nov 2023 08:41), Max: 37.3 (28 Nov 2023 05:55)  T(F): 97.4 (28 Nov 2023 08:41), Max: 99.1 (28 Nov 2023 05:55)  HR: 89 (28 Nov 2023 11:45) (89 - 109)  BP: 103/61 (28 Nov 2023 08:41) (99/59 - 104/61)  BP(mean): --  RR: 20 (28 Nov 2023 11:45) (18 - 20)  SpO2: 98% (28 Nov 2023 11:45) (94% - 99%)    Parameters below as of 28 Nov 2023 11:45  Patient On (Oxygen Delivery Method): BiPAP/CPAP     I&O's Summary    27 Nov 2023 07:01  -  28 Nov 2023 07:00  --------------------------------------------------------  IN: 550 mL / OUT: 150 mL / NET: 400 mL       GENERAL: [ ]Cachexia    [x ]Alert  [ ]Oriented x   [ ]Lethargic  [ ]Unarousable  [ ]Verbal  [x ]Non-Verbal  Behavioral:   [ ]Anxiety  [ ]Delirium [ ]Agitation [ ]Other  HEENT:  [ ]Normal   [ ]Dry mouth   [ ]ET Tube/Trach  [ ]Oral lesions [x] bipap mask  PULMONARY:   [ ]Clear [ ]Tachypnea  [ ]Audible excessive secretions   [ ]Rhonchi        [ ]Right [ ]Left [ ]Bilateral  [ ]Crackles        [ ]Right [ ]Left [ ]Bilateral  [ ]Wheezing     [ ]Right [ ]Left [ ]Bilateral  [ x]Diminished BS [ ] Right [x ]Left [ ]Bilateral  CARDIOVASCULAR:    [ ]Regular [ ]Irregular [ x]Tachy  [ ]Usman [ ]Murmur [ ]Other  GASTROINTESTINAL:  [ x]Soft  [ ]Distended   [ ]+BS  [ ]Non tender [ ]Tender  [ ]Other [x ]PEG [ ]OGT/ NGT   Last BM:   GENITOURINARY:  [ ]Normal [x ]Incontinent   [ ]Oliguria/Anuria   [ ]Fleming  MUSCULOSKELETAL:   [ ]Normal   [ ]Weakness  [x ]Bed/Wheelchair bound [ ]Edema  NEUROLOGIC:   [ ]No focal deficits  [ x] Cognitive impairment  [ ] Dysphagia [ ]Dysarthria [ ] Paresis [ ]Other   SKIN:   [ ]Normal  [ ]Rash  [ ]Other  [x ]Pressure ulcer(s) [ x]y [ ]n present on admission    CRITICAL CARE:  [ ]Shock Present  [ ]Septic [ ]Cardiogenic [ ]Neurologic [ ]Hypovolemic  [ ]Vasopressors [ ]Inotropes  [ x]Respiratory failure present [ ]Mechanical Ventilation [x ]Non-invasive ventilatory support [ ]High-Flow   [x ]Acute  [ ]Chronic [ x]Hypoxic  [ ]Hypercarbic [ ]Other  [ ]Other organ failure     LABS:                        10.6   8.85  )-----------( 221      ( 28 Nov 2023 10:57 )             33.4   11-28    147<H>  |  112<H>  |  20  ----------------------------<  176<H>  3.9   |  27  |  0.36<L>    Ca    8.5      28 Nov 2023 10:57    TPro  7.0  /  Alb  2.6<L>  /  TBili  0.6  /  DBili  x   /  AST  15  /  ALT  15  /  AlkPhos  136<H>  11-27      Urinalysis Basic - ( 28 Nov 2023 10:57 )    Color: x / Appearance: x / SG: x / pH: x  Gluc: 176 mg/dL / Ketone: x  / Bili: x / Urobili: x   Blood: x / Protein: x / Nitrite: x   Leuk Esterase: x / RBC: x / WBC x   Sq Epi: x / Non Sq Epi: x / Bacteria: x      RADIOLOGY & ADDITIONAL STUDIES:     < from: CT Angio Chest PE Protocol w/ IV Cont (11.27.23 @ 22:44) >  IMPRESSION:.    No pulmonary embolism detected.    Occlusive secretions within the left mainstem bronchus with associated   complete left lung collapse; consider superimposed infection in the   appropriate clinical scenario.    Small leftpleural effusion.    --- End of Report ---    < end of copied text >      Protein Calorie Malnutrition Present: [ ]mild [ ]moderate [ ]severe [ ]underweight [ ]morbid obesity  https://www.andeal.org/vault/2440/web/files/ONC/Table_Clinical%20Characteristics%20to%20Document%20Malnutrition-White%20JV%20et%20al%202012.pdf    Height (cm): 170.2 (11-18-23 @ 17:39)  Weight (kg): 49.9 (11-18-23 @ 17:39)  BMI (kg/m2): 17.2 (11-18-23 @ 17:39)    [ ]PPSV2 < or = 30%  [ ]significant weight loss [ ]poor nutritional intake [ ]anasarca[ ]Artificial Nutrition    Other REFERRALS:  [ ]Hospice  [ ]Child Life  [ ]Social Work  [ ]Case management [ ]Holistic Therapy

## 2023-11-28 NOTE — PHYSICAL THERAPY INITIAL EVALUATION ADULT - ADDITIONAL COMMENTS
Pt lives with his wife in a PH + 3-4 steps to enter. Pt has a hospital bed, wheelchair +nation cushion, +mary lift. Pt has HHA 12hrs/day x7 days/week.

## 2023-11-29 LAB
ALBUMIN SERPL ELPH-MCNC: 2.1 G/DL — LOW (ref 3.3–5)
ALBUMIN SERPL ELPH-MCNC: 2.1 G/DL — LOW (ref 3.3–5)
ALP SERPL-CCNC: 124 U/L — HIGH (ref 40–120)
ALP SERPL-CCNC: 124 U/L — HIGH (ref 40–120)
ALT FLD-CCNC: 14 U/L — SIGNIFICANT CHANGE UP (ref 10–45)
ALT FLD-CCNC: 14 U/L — SIGNIFICANT CHANGE UP (ref 10–45)
ANION GAP SERPL CALC-SCNC: 8 MMOL/L — SIGNIFICANT CHANGE UP (ref 5–17)
ANION GAP SERPL CALC-SCNC: 8 MMOL/L — SIGNIFICANT CHANGE UP (ref 5–17)
AST SERPL-CCNC: 17 U/L — SIGNIFICANT CHANGE UP (ref 10–40)
AST SERPL-CCNC: 17 U/L — SIGNIFICANT CHANGE UP (ref 10–40)
BILIRUB SERPL-MCNC: 0.5 MG/DL — SIGNIFICANT CHANGE UP (ref 0.2–1.2)
BILIRUB SERPL-MCNC: 0.5 MG/DL — SIGNIFICANT CHANGE UP (ref 0.2–1.2)
BUN SERPL-MCNC: 21 MG/DL — SIGNIFICANT CHANGE UP (ref 7–23)
BUN SERPL-MCNC: 21 MG/DL — SIGNIFICANT CHANGE UP (ref 7–23)
CALCIUM SERPL-MCNC: 8.7 MG/DL — SIGNIFICANT CHANGE UP (ref 8.4–10.5)
CALCIUM SERPL-MCNC: 8.7 MG/DL — SIGNIFICANT CHANGE UP (ref 8.4–10.5)
CHLORIDE SERPL-SCNC: 111 MMOL/L — HIGH (ref 96–108)
CHLORIDE SERPL-SCNC: 111 MMOL/L — HIGH (ref 96–108)
CO2 SERPL-SCNC: 27 MMOL/L — SIGNIFICANT CHANGE UP (ref 22–31)
CO2 SERPL-SCNC: 27 MMOL/L — SIGNIFICANT CHANGE UP (ref 22–31)
CREAT SERPL-MCNC: 0.36 MG/DL — LOW (ref 0.5–1.3)
CREAT SERPL-MCNC: 0.36 MG/DL — LOW (ref 0.5–1.3)
EGFR: 116 ML/MIN/1.73M2 — SIGNIFICANT CHANGE UP
EGFR: 116 ML/MIN/1.73M2 — SIGNIFICANT CHANGE UP
GLUCOSE SERPL-MCNC: 144 MG/DL — HIGH (ref 70–99)
GLUCOSE SERPL-MCNC: 144 MG/DL — HIGH (ref 70–99)
HCT VFR BLD CALC: 35.3 % — LOW (ref 39–50)
HCT VFR BLD CALC: 35.3 % — LOW (ref 39–50)
HGB BLD-MCNC: 10.8 G/DL — LOW (ref 13–17)
HGB BLD-MCNC: 10.8 G/DL — LOW (ref 13–17)
MAGNESIUM SERPL-MCNC: 2.2 MG/DL — SIGNIFICANT CHANGE UP (ref 1.6–2.6)
MAGNESIUM SERPL-MCNC: 2.2 MG/DL — SIGNIFICANT CHANGE UP (ref 1.6–2.6)
MCHC RBC-ENTMCNC: 30.6 GM/DL — LOW (ref 32–36)
MCHC RBC-ENTMCNC: 30.6 GM/DL — LOW (ref 32–36)
MCHC RBC-ENTMCNC: 31.1 PG — SIGNIFICANT CHANGE UP (ref 27–34)
MCHC RBC-ENTMCNC: 31.1 PG — SIGNIFICANT CHANGE UP (ref 27–34)
MCV RBC AUTO: 101.7 FL — HIGH (ref 80–100)
MCV RBC AUTO: 101.7 FL — HIGH (ref 80–100)
NRBC # BLD: 0 /100 WBCS — SIGNIFICANT CHANGE UP (ref 0–0)
NRBC # BLD: 0 /100 WBCS — SIGNIFICANT CHANGE UP (ref 0–0)
PHOSPHATE SERPL-MCNC: 1.5 MG/DL — LOW (ref 2.5–4.5)
PHOSPHATE SERPL-MCNC: 1.5 MG/DL — LOW (ref 2.5–4.5)
PLATELET # BLD AUTO: 218 K/UL — SIGNIFICANT CHANGE UP (ref 150–400)
PLATELET # BLD AUTO: 218 K/UL — SIGNIFICANT CHANGE UP (ref 150–400)
POTASSIUM SERPL-MCNC: 4.4 MMOL/L — SIGNIFICANT CHANGE UP (ref 3.5–5.3)
POTASSIUM SERPL-MCNC: 4.4 MMOL/L — SIGNIFICANT CHANGE UP (ref 3.5–5.3)
POTASSIUM SERPL-SCNC: 4.4 MMOL/L — SIGNIFICANT CHANGE UP (ref 3.5–5.3)
POTASSIUM SERPL-SCNC: 4.4 MMOL/L — SIGNIFICANT CHANGE UP (ref 3.5–5.3)
PROT SERPL-MCNC: 6.5 G/DL — SIGNIFICANT CHANGE UP (ref 6–8.3)
PROT SERPL-MCNC: 6.5 G/DL — SIGNIFICANT CHANGE UP (ref 6–8.3)
RBC # BLD: 3.47 M/UL — LOW (ref 4.2–5.8)
RBC # BLD: 3.47 M/UL — LOW (ref 4.2–5.8)
RBC # FLD: 14.2 % — SIGNIFICANT CHANGE UP (ref 10.3–14.5)
RBC # FLD: 14.2 % — SIGNIFICANT CHANGE UP (ref 10.3–14.5)
SODIUM SERPL-SCNC: 146 MMOL/L — HIGH (ref 135–145)
SODIUM SERPL-SCNC: 146 MMOL/L — HIGH (ref 135–145)
WBC # BLD: 8.66 K/UL — SIGNIFICANT CHANGE UP (ref 3.8–10.5)
WBC # BLD: 8.66 K/UL — SIGNIFICANT CHANGE UP (ref 3.8–10.5)
WBC # FLD AUTO: 8.66 K/UL — SIGNIFICANT CHANGE UP (ref 3.8–10.5)
WBC # FLD AUTO: 8.66 K/UL — SIGNIFICANT CHANGE UP (ref 3.8–10.5)

## 2023-11-29 PROCEDURE — 71045 X-RAY EXAM CHEST 1 VIEW: CPT | Mod: 26

## 2023-11-29 PROCEDURE — 99233 SBSQ HOSP IP/OBS HIGH 50: CPT

## 2023-11-29 RX ORDER — POTASSIUM PHOSPHATE, MONOBASIC POTASSIUM PHOSPHATE, DIBASIC 236; 224 MG/ML; MG/ML
30 INJECTION, SOLUTION INTRAVENOUS ONCE
Refills: 0 | Status: DISCONTINUED | OUTPATIENT
Start: 2023-11-29 | End: 2023-11-29

## 2023-11-29 RX ORDER — POTASSIUM PHOSPHATE, MONOBASIC POTASSIUM PHOSPHATE, DIBASIC 236; 224 MG/ML; MG/ML
30 INJECTION, SOLUTION INTRAVENOUS ONCE
Refills: 0 | Status: COMPLETED | OUTPATIENT
Start: 2023-11-29 | End: 2023-11-29

## 2023-11-29 RX ADMIN — Medication 750 MILLIGRAM(S): at 11:30

## 2023-11-29 RX ADMIN — CHLORHEXIDINE GLUCONATE 1 APPLICATION(S): 213 SOLUTION TOPICAL at 11:30

## 2023-11-29 RX ADMIN — Medication 1 GRAM(S): at 17:42

## 2023-11-29 RX ADMIN — MEROPENEM 100 MILLIGRAM(S): 1 INJECTION INTRAVENOUS at 06:20

## 2023-11-29 RX ADMIN — Medication 500 MICROGRAM(S): at 11:31

## 2023-11-29 RX ADMIN — LEVALBUTEROL 0.63 MILLIGRAM(S): 1.25 SOLUTION, CONCENTRATE RESPIRATORY (INHALATION) at 00:42

## 2023-11-29 RX ADMIN — Medication 3 MILLILITER(S): at 11:31

## 2023-11-29 RX ADMIN — Medication 3 MILLILITER(S): at 06:20

## 2023-11-29 RX ADMIN — Medication 750 MILLIGRAM(S): at 22:27

## 2023-11-29 RX ADMIN — MEROPENEM 100 MILLIGRAM(S): 1 INJECTION INTRAVENOUS at 22:28

## 2023-11-29 RX ADMIN — ENOXAPARIN SODIUM 40 MILLIGRAM(S): 100 INJECTION SUBCUTANEOUS at 22:27

## 2023-11-29 RX ADMIN — LEVALBUTEROL 0.63 MILLIGRAM(S): 1.25 SOLUTION, CONCENTRATE RESPIRATORY (INHALATION) at 17:44

## 2023-11-29 RX ADMIN — Medication 1 GRAM(S): at 06:19

## 2023-11-29 RX ADMIN — Medication 750 MILLIGRAM(S): at 22:57

## 2023-11-29 RX ADMIN — Medication 3 MILLILITER(S): at 00:43

## 2023-11-29 RX ADMIN — Medication 500 MICROGRAM(S): at 06:19

## 2023-11-29 RX ADMIN — SODIUM CHLORIDE 4 MILLILITER(S): 9 INJECTION INTRAMUSCULAR; INTRAVENOUS; SUBCUTANEOUS at 00:47

## 2023-11-29 RX ADMIN — SODIUM CHLORIDE 4 MILLILITER(S): 9 INJECTION INTRAMUSCULAR; INTRAVENOUS; SUBCUTANEOUS at 13:46

## 2023-11-29 RX ADMIN — SENNA PLUS 10 MILLILITER(S): 8.6 TABLET ORAL at 22:27

## 2023-11-29 RX ADMIN — POTASSIUM PHOSPHATE, MONOBASIC POTASSIUM PHOSPHATE, DIBASIC 83.33 MILLIMOLE(S): 236; 224 INJECTION, SOLUTION INTRAVENOUS at 11:32

## 2023-11-29 RX ADMIN — Medication 500 MICROGRAM(S): at 17:44

## 2023-11-29 RX ADMIN — QUETIAPINE FUMARATE 100 MILLIGRAM(S): 200 TABLET, FILM COATED ORAL at 22:27

## 2023-11-29 RX ADMIN — Medication 1 GRAM(S): at 11:30

## 2023-11-29 RX ADMIN — Medication 500 MICROGRAM(S): at 00:47

## 2023-11-29 RX ADMIN — Medication 3 MILLILITER(S): at 17:45

## 2023-11-29 RX ADMIN — Medication 1 GRAM(S): at 00:48

## 2023-11-29 RX ADMIN — MEROPENEM 100 MILLIGRAM(S): 1 INJECTION INTRAVENOUS at 13:46

## 2023-11-29 RX ADMIN — OLANZAPINE 5 MILLIGRAM(S): 15 TABLET, FILM COATED ORAL at 22:26

## 2023-11-29 RX ADMIN — SODIUM CHLORIDE 4 MILLILITER(S): 9 INJECTION INTRAMUSCULAR; INTRAVENOUS; SUBCUTANEOUS at 17:45

## 2023-11-29 RX ADMIN — SODIUM CHLORIDE 4 MILLILITER(S): 9 INJECTION INTRAMUSCULAR; INTRAVENOUS; SUBCUTANEOUS at 06:20

## 2023-11-29 RX ADMIN — Medication 750 MILLIGRAM(S): at 12:30

## 2023-11-29 RX ADMIN — LEVALBUTEROL 0.63 MILLIGRAM(S): 1.25 SOLUTION, CONCENTRATE RESPIRATORY (INHALATION) at 06:20

## 2023-11-29 RX ADMIN — LEVALBUTEROL 0.63 MILLIGRAM(S): 1.25 SOLUTION, CONCENTRATE RESPIRATORY (INHALATION) at 11:31

## 2023-11-29 NOTE — PROVIDER CONTACT NOTE (MEDICATION) - ASSESSMENT
Pt's spouse refused Olanzapine medication and expressed that the patient hasn't been taking this medication for a while now. Education provided.
RN did not give protonix medication because it's a non crushable pill and patient is NPO with PEG tube feeds.

## 2023-11-29 NOTE — PROGRESS NOTE ADULT - SUBJECTIVE AND OBJECTIVE BOX
SHARONA LARKIN  78y Male  MRN:[id number]    Patient is a 78y old  Male who presents with a chief complaint of sepsis      HPI:  77 yo M PMHx of severe dementia, PEG tube presents with ulcer that has worsened over last month. Patient was seen by house calls doctor 2 weeks ago, placed on antibiotics but started mounting fevers in the last few days. It was noticed by his GI doctor (Dr. Moraes) that his ulcer was getting worse, so he sent him in. Patient is nonverbal at baseline. Wife has noticed no new cough, runny nose. (19 Nov 2023 13:02)      Patient seen and evaluated at bedside. No acute events overnight except as noted.    Interval HPI: no acute events o/n     PAST MEDICAL & SURGICAL HISTORY:  Dementia      Pneumonia      Acute renal failure (ARF)  ARF 1.5 YEAR AGO      Dysphagia  peg      Arthritis      GERD (gastroesophageal reflux disease)      Dyspepsia      History of hand surgery      PEG adjustment, replacement, or removal      Status post insertion of percutaneous endoscopic gastrostomy (PEG) tube  PEG replacement on 02/29/16          REVIEW OF SYSTEMS:  as per hpi     VITALS:   Vital Signs Last 24 Hrs  T(C): 36.4 (29 Nov 2023 14:04), Max: 37.8 (29 Nov 2023 11:00)  T(F): 97.6 (29 Nov 2023 14:04), Max: 100 (29 Nov 2023 11:00)  HR: 88 (29 Nov 2023 15:05) (77 - 105)  BP: 120/75 (29 Nov 2023 14:04) (104/58 - 125/78)  BP(mean): --  RR: 18 (29 Nov 2023 15:05) (18 - 20)  SpO2: 99% (29 Nov 2023 15:05) (97% - 99%)    Parameters below as of 29 Nov 2023 15:05  Patient On (Oxygen Delivery Method): nasal cannula, high flow  O2 Flow (L/min): 50  O2 Concentration (%): 70    PHYSICAL EXAM:  GENERAL: NAD,  comfortable   HEAD:  Atraumatic, Normocephalic  EYES: EOMI, PERRLA, conjunctiva and sclera clear  NECK: Supple, No JVD  CHEST/LUNG: Clear to auscultation bilaterally; No wheeze  HEART: S1, S2; No murmurs, rubs, or gallops  ABDOMEN: Soft, Nontender, Nondistended; Bowel sounds present +peg  EXTREMITIES:  2+ Peripheral Pulses, contracted   PSYCH: Normal affect  NEUROLOGY: non verbal  SKIN: +sacral decub    Consultant(s) Notes Reviewed:  [x ] YES  [ ] NO  Care Discussed with Consultants/Other Providers [ x] YES  [ ] NO    MEDS:   MEDICATIONS  (STANDING):  acetylcysteine 20%  Inhalation 3 milliLiter(s) Inhalation every 6 hours  chlorhexidine 2% Cloths 1 Application(s) Topical <User Schedule>  Dakins Solution - 1/4 Strength 1 Application(s) Topical two times a day  enoxaparin Injectable 40 milliGRAM(s) SubCutaneous every 24 hours  ipratropium    for Nebulization 500 MICROGram(s) Nebulizer every 6 hours  levalbuterol Inhalation 0.63 milliGRAM(s) Inhalation every 6 hours  meropenem  IVPB 1000 milliGRAM(s) IV Intermittent every 8 hours  OLANZapine 5 milliGRAM(s) Oral <User Schedule>  pantoprazole    Tablet 40 milliGRAM(s) Oral before breakfast  QUEtiapine 100 milliGRAM(s) Oral <User Schedule>  senna Syrup 10 milliLiter(s) Oral at bedtime  sodium chloride 3%  Inhalation 4 milliLiter(s) Inhalation every 6 hours  sucralfate 1 Gram(s) Oral four times a day    MEDICATIONS  (PRN):  acetaminophen   Oral Liquid .. 750 milliGRAM(s) Oral every 6 hours PRN Temp greater or equal to 38C (100.4F), Mild Pain (1 - 3)        ALLERGIES:  Haldol (Other)  No Known Allergies  benzodiazepines (Other)      LABS:                                                                  10.8   8.66  )-----------( 218      ( 29 Nov 2023 07:46 )             35.3   11-29    146<H>  |  111<H>  |  21  ----------------------------<  144<H>  4.4   |  27  |  0.36<L>    Ca    8.7      29 Nov 2023 07:46  Phos  1.5     11-29  Mg     2.2     11-29    TPro  6.5  /  Alb  2.1<L>  /  TBili  0.5  /  DBili  x   /  AST  17  /  ALT  14  /  AlkPhos  124<H>  11-29        < from: CT Angio Chest PE Protocol w/ IV Cont (11.27.23 @ 22:44) >  IMPRESSION:.    No pulmonary embolism detected.    Occlusive secretions within the left mainstem bronchus with associated   complete left lung collapse; consider superimposed infection in the   appropriate clinical scenario.    Small leftpleural effusion.    --- End of Report ---      < end of copied text >       < from: CT Abdomen and Pelvis w/ IV Cont (11.18.23 @ 19:41) >    IMPRESSION:    Extensive decubitus ulcer involving the right posterior gluteal and   pelvic region with extension to bone. There is bony erosion of the   ischium compatible with osteomyelitis.    Mild bladder wall thickening and a few foci of intraluminal air,   correlate for recent instrumentation and/or infection.    Left lower lobe consolidation along with tree-in-bud/nodular opacities in   the left upper lobe concerning for pneumonia.    --- End of Report ---        < end of copied text >

## 2023-11-29 NOTE — PROVIDER CONTACT NOTE (OTHER) - ACTION/TREATMENT ORDERED:
Jamari DIXON notified and came to assess patient at bedside. Provider said that the pt can continue with high flow nasal cannula since patient vitals improved. Respiratory therapist aware.

## 2023-11-29 NOTE — PROGRESS NOTE ADULT - PROBLEM SELECTOR PLAN 1
S/p RRT 11/20 for hypoxia requiring HFNC  -Increase in O2 requirements 11/21 from HFNC 50L/80% to 100% Bipap. Weaned from Bipap to HFNC 50L/100%  -Suspect aspiration event, family at bedside reports multiple aspiration events in the past  -CT A/P with LLL consolidation, SHEILA tree in bud opacities likely PNA. Continue ABX per ID  -ABG with no CO2 retention  -CXR 11/25 with LLL infiltrate, but does not explain persistent high O2 requirements   -CTA chest 11/27 negative for PE. Notes extensive L atelectasis/mucus plugging with small L effusion  -Started on Bipap 12/8 to assist with atelectasis but pt unable to tolerate  -CXR this AM with no improvement in L lung atelectasis  -Continue Mucomyst 20% 3ml q6h x3 days  -Continue 3% sodium chloride nebs q6h  -Chest vest q8h to assist with secretion mobilization when off Bipap  -Suction PRN  -Suggest continue Bipap 12/8/70% if pt able to tolerate. Continue HFNC (currently 50L/70%) when off Bipap. Wean O2 as tolerated  -D/w patient's wife & daughter, bronchoscopy does not align with GOC. Pt DNR/DNI  -Palliative care f/u. Overall prognosis guarded.   -CXR in AM.

## 2023-11-29 NOTE — PROVIDER CONTACT NOTE (MEDICATION) - REASON
Pt's spouse refused Olanzapine medication and expressed that the patient hasn't been taking this medication for a while now. Education provided.
rectal temp elevated
RN did not give protonix medication because it's a non crushable pill and patient is NPO with PEG tube feeds.

## 2023-11-29 NOTE — PROGRESS NOTE ADULT - SUBJECTIVE AND OBJECTIVE BOX
Date of Service: 11-29-23 @ 15:37    Patient is a 78y old  Male who presents with a chief complaint of Osteomyelitis     (20 Nov 2023 11:10)      Any change in ROS:   Unable to tolerate Bipap 2nd to secretions and tachypnea  Reportedly desaturated overnight on Bipap as well so not used   O2 sats stable on HFNC 50L/70%  Wife at bedside    MEDICATIONS  (STANDING):  acetylcysteine 20%  Inhalation 3 milliLiter(s) Inhalation every 6 hours  chlorhexidine 2% Cloths 1 Application(s) Topical <User Schedule>  Dakins Solution - 1/4 Strength 1 Application(s) Topical two times a day  enoxaparin Injectable 40 milliGRAM(s) SubCutaneous every 24 hours  ipratropium    for Nebulization 500 MICROGram(s) Nebulizer every 6 hours  levalbuterol Inhalation 0.63 milliGRAM(s) Inhalation every 6 hours  meropenem  IVPB 1000 milliGRAM(s) IV Intermittent every 8 hours  OLANZapine 5 milliGRAM(s) Oral <User Schedule>  pantoprazole    Tablet 40 milliGRAM(s) Oral before breakfast  QUEtiapine 100 milliGRAM(s) Oral <User Schedule>  senna Syrup 10 milliLiter(s) Oral at bedtime  sodium chloride 3%  Inhalation 4 milliLiter(s) Inhalation every 6 hours  sucralfate 1 Gram(s) Oral four times a day    MEDICATIONS  (PRN):  acetaminophen   Oral Liquid .. 750 milliGRAM(s) Oral every 6 hours PRN Temp greater or equal to 38C (100.4F), Mild Pain (1 - 3)    Vital Signs Last 24 Hrs  T(C): 36.4 (29 Nov 2023 14:04), Max: 37.8 (29 Nov 2023 11:00)  T(F): 97.6 (29 Nov 2023 14:04), Max: 100 (29 Nov 2023 11:00)  HR: 88 (29 Nov 2023 15:05) (77 - 105)  BP: 120/75 (29 Nov 2023 14:04) (104/58 - 125/78)  BP(mean): --  RR: 18 (29 Nov 2023 15:05) (18 - 22)  SpO2: 99% (29 Nov 2023 15:05) (97% - 100%)    Parameters below as of 29 Nov 2023 15:05  Patient On (Oxygen Delivery Method): nasal cannula, high flow  O2 Flow (L/min): 50  O2 Concentration (%): 70    I&O's Summary    28 Nov 2023 07:01  -  29 Nov 2023 07:00  --------------------------------------------------------  IN: 100 mL / OUT: 0 mL / NET: 100 mL      Physical Exam:   GENERAL: NAD  HEENT: CANDY  ENMT: No tonsillar erythema, exudates, or enlargement  NECK: Supple, No JVD  CHEST/LUNG: Decreased BS L  CVS: Regular rate and rhythm  GI: : Soft, Nontender, Nondistended  NERVOUS SYSTEM:  Lethargic  EXTREMITIES:  2+ Peripheral Pulses, No clubbing, cyanosis, or edema  SKIN: No rashes or lesions  PSYCH: Appropriate    Labs:  28                            10.8   8.66  )-----------( 218      ( 29 Nov 2023 07:46 )             35.3                         10.6   8.85  )-----------( 221      ( 28 Nov 2023 10:57 )             33.4                         11.1   10.76 )-----------( 221      ( 27 Nov 2023 11:25 )             36.0                         10.6   16.79 )-----------( 202      ( 26 Nov 2023 12:34 )             33.2     11-29    146<H>  |  111<H>  |  21  ----------------------------<  144<H>  4.4   |  27  |  0.36<L>  11-28    147<H>  |  112<H>  |  20  ----------------------------<  176<H>  3.9   |  27  |  0.36<L>  11-27    144  |  108  |  23  ----------------------------<  204<H>  4.3   |  28  |  0.43<L>  11-26    141  |  109<H>  |  18  ----------------------------<  162<H>  4.3   |  24  |  0.46<L>    Ca    8.7      29 Nov 2023 07:46  Ca    8.5      28 Nov 2023 10:57  Phos  1.5     11-29  Mg     2.2     11-29    TPro  6.5  /  Alb  2.1<L>  /  TBili  0.5  /  DBili  x   /  AST  17  /  ALT  14  /  AlkPhos  124<H>  11-29  TPro  7.0  /  Alb  2.6<L>  /  TBili  0.6  /  DBili  x   /  AST  15  /  ALT  15  /  AlkPhos  136<H>  11-27    LIVER FUNCTIONS - ( 29 Nov 2023 07:46 )  Alb: 2.1 g/dL / Pro: 6.5 g/dL / ALK PHOS: 124 U/L / ALT: 14 U/L / AST: 17 U/L / GGT: x             Urinalysis Basic - ( 29 Nov 2023 07:46 )    Color: x / Appearance: x / SG: x / pH: x  Gluc: 144 mg/dL / Ketone: x  / Bili: x / Urobili: x   Blood: x / Protein: x / Nitrite: x   Leuk Esterase: x / RBC: x / WBC x   Sq Epi: x / Non Sq Epi: x / Bacteria: x      D-Dimer Assay, Quantitative: 748 ng/mL DDU (11-23 @ 13:52)      RECENT CULTURES:  11-27 @ 11:27 .Blood Blood-Peripheral     No growth at 48 Hours      Studies  CXR 11/29 L lung atelectasis    < from: CT Angio Chest PE Protocol w/ IV Cont (11.27.23 @ 22:44) >  FINDINGS:    PULMONARY ARTERIES: No pulmonary embolism detected. Please note that a   few of the segmental and subsegmental pulmonary branches are limited in   assessment secondary to motion.    MEDIASTINUM: Normal heart size. No pericardial effusion. Thoracic aorta   normal caliber.  Few mildly enlarged mediastinal nodes, likely reactive;   reference prevascular 1.8 x 1 cm node (image 210, series 8).    AIRWAYS, LUNGS, PLEURA: Central airway secretions occluding the left   mainstem bronchus. Complete left lung collapse. Small left pleural   effusion. Probable atelectasis at the right base.    IMAGED ABDOMEN: Unremarkable.    SOFT TISSUES: Unremarkable.    BONES: Unremarkable.      IMPRESSION:.    No pulmonary embolism detected.    Occlusive secretions within the left mainstem bronchus with associated   complete left lung collapse; consider superimposed infection in the   appropriate clinical scenario.    Small leftpleural effusion.    --- End of Report ---      < end of copied text >

## 2023-11-29 NOTE — PROGRESS NOTE ADULT - PROBLEM SELECTOR PLAN 3
In setting of PNA, likely ongoing aspiration, and mucus plugging/lung collapse on CTA chest 11/27  - pulmonary following, chest therapy/mucolytics/nebs/HFNC  - abx per ID  - wean o2 as tolerated

## 2023-11-29 NOTE — PROGRESS NOTE ADULT - PROBLEM SELECTOR PLAN 4
- HCP reportedly wife Brenda.   - Code status: DNR/I, MOLST completed  - GOC: family to privately discuss possible transition to PCU for full comfort measures as next step

## 2023-11-29 NOTE — PROVIDER CONTACT NOTE (OTHER) - REASON
Pt was put on BIPAP by respiratory therapist, pt oxyge Pt was put on BIPAP by respiratory therapist, pt oxygen saturation at 80s%, patient is tachycardic 100-110s. Pt spouse at bedside and expressed concern, spouse said that this is the first time this has happened with the BIPAP. Pt was not tachycardic before and saturation was % on the high flow nasal cannula.

## 2023-11-29 NOTE — PROVIDER CONTACT NOTE (OTHER) - RECOMMENDATIONS
Provider to RN free water
Jamari DIXON notified.
contact provider
tylenol given as ord, ice packs applied and mouth suction for secretions & contact provider
NP to assess pt at bedside. Respiratory to assess O2 settings.
ice packs applied & contact provider
contact provider
DESTINY Bernal informed

## 2023-11-29 NOTE — PROGRESS NOTE ADULT - SUBJECTIVE AND OBJECTIVE BOX
[facility]  Nathaniel Barron PA-C  [address] [place] [address]  [phone]      INTERVAL HPI/OVERNIGHT EVENTS:  pt seen and examined, events noted  remains on HFNC    MEDICATIONS  (STANDING):  chlorhexidine 2% Cloths 1 Application(s) Topical <User Schedule>  Dakins Solution - 1/2 Strength 1 Application(s) Topical daily  Dakins Solution - 1/4 Strength 1 Application(s) Topical two times a day  enoxaparin Injectable 40 milliGRAM(s) SubCutaneous every 24 hours  guaiFENesin  milliGRAM(s) Oral every 12 hours  OLANZapine 5 milliGRAM(s) Oral <User Schedule>  pantoprazole    Tablet 40 milliGRAM(s) Oral before breakfast  piperacillin/tazobactam IVPB.. 3.375 Gram(s) IV Intermittent every 8 hours  polyethylene glycol 3350 17 Gram(s) Oral daily  QUEtiapine 100 milliGRAM(s) Oral <User Schedule>  senna 2 Tablet(s) Oral at bedtime  sucralfate 1 Gram(s) Oral four times a day  vancomycin  IVPB 500 milliGRAM(s) IV Intermittent every 12 hours  vancomycin  IVPB        MEDICATIONS  (PRN):  acetaminophen   Oral Liquid .. 750 milliGRAM(s) Oral every 6 hours PRN Mild Pain (1 - 3)      Allergies    No Known Allergies    Intolerances    Haldol (Other)  benzodiazepines (Other)      ROS:   unable to obtain       PHYSICAL EXAM:   Vital Signs Last 24 Hrs  T(C): 37.8 (29 Nov 2023 11:00), Max: 37.8 (29 Nov 2023 11:00)  T(F): 100 (29 Nov 2023 11:00), Max: 100 (29 Nov 2023 11:00)  HR: 80 (29 Nov 2023 09:40) (77 - 105)  BP: 104/58 (29 Nov 2023 08:16) (104/58 - 125/78)  BP(mean): --  RR: 18 (29 Nov 2023 09:40) (18 - 22)  SpO2: 99% (29 Nov 2023 09:40) (97% - 100%)    Parameters below as of 29 Nov 2023 09:40  Patient On (Oxygen Delivery Method): nasal cannula, high flow  O2 Flow (L/min): 50  O2 Concentration (%): 70      Daily     Daily     nad  confused  frail  non toxic  soft, nt, +PEG  no edema        LABS:  =                      10.8   8.66  )-----------( 218      ( 29 Nov 2023 07:46 )             35.3   11-29    146<H>  |  111<H>  |  21  ----------------------------<  144<H>  4.4   |  27  |  0.36<L>    Ca    8.7      29 Nov 2023 07:46  Phos  1.5     11-29  Mg     2.2     11-29    TPro  6.5  /  Alb  2.1<L>  /  TBili  0.5  /  DBili  x   /  AST  17  /  ALT  14  /  AlkPhos  124<H>  11-29    TPro  7.0  /  Alb  2.6<L>  /  TBili  0.6  /  DBili  x   /  AST  15  /  ALT  15  /  AlkPhos  136<H>  11-27      Urinalysis Basic - ( 20 Nov 2023 22:01 )    Color: x / Appearance: x / SG: x / pH: x  Gluc: 118 mg/dL / Ketone: x  / Bili: x / Urobili: x   Blood: x / Protein: x / Nitrite: x   Leuk Esterase: x / RBC: x / WBC x   Sq Epi: x / Non Sq Epi: x / Bacteria: x        RADIOLOGY & ADDITIONAL TESTS:

## 2023-11-29 NOTE — PROGRESS NOTE ADULT - SUBJECTIVE AND OBJECTIVE BOX
SUBJECTIVE AND OBJECTIVE  Indication for Geriatrics and Palliative Care Services/INTERVAL HPI: GOC    OVERNIGHT EVENTS: f/u visit to patient, wife Brenda visiting at bedside. Note that pt was removed from BIPAP for c/f aspiration in setting of copious secretions. Advised wife on his overall poor prognosis and that continued medical interventions are unlikely to change his overall outcome, but cause suffering. Wife asked that I speak with their son and I was able to do so. I advised that we bring pt to the Palliative Care Unit where he can be continued on oxygen support, but with de-escalation of interventions (such as lab checks and aggressive chest therapy) that cause suffering. Should he develop signs/symptoms of distress, he will be medicated for them. The goal is for a peaceful transition to the afterlife. Wife is tearful but expressed understanding, family wishes to privately discuss before a final decision. Much time was spent afterwards doing life review and providing emotional support to Brenda.     DNR on chart:DNI  DNI      Allergies    No Known Allergies    Intolerances    Haldol (Other)  benzodiazepines (Other)    MEDICATIONS  (STANDING):  acetylcysteine 20%  Inhalation 3 milliLiter(s) Inhalation every 6 hours  chlorhexidine 2% Cloths 1 Application(s) Topical <User Schedule>  Dakins Solution - 1/4 Strength 1 Application(s) Topical two times a day  enoxaparin Injectable 40 milliGRAM(s) SubCutaneous every 24 hours  ipratropium    for Nebulization 500 MICROGram(s) Nebulizer every 6 hours  levalbuterol Inhalation 0.63 milliGRAM(s) Inhalation every 6 hours  meropenem  IVPB 1000 milliGRAM(s) IV Intermittent every 8 hours  OLANZapine 5 milliGRAM(s) Oral <User Schedule>  pantoprazole    Tablet 40 milliGRAM(s) Oral before breakfast  QUEtiapine 100 milliGRAM(s) Oral <User Schedule>  senna Syrup 10 milliLiter(s) Oral at bedtime  sodium chloride 3%  Inhalation 4 milliLiter(s) Inhalation every 6 hours  sucralfate 1 Gram(s) Oral four times a day    MEDICATIONS  (PRN):  acetaminophen   Oral Liquid .. 750 milliGRAM(s) Oral every 6 hours PRN Temp greater or equal to 38C (100.4F), Mild Pain (1 - 3)      ITEMS UNCHECKED ARE NOT PRESENT    PRESENT SYMPTOMS: [x ]Unable to self-report - see [ ] CPOT [ x] PAINADS 0 [ x] RDOS 2  Source if other than patient:  [ ]Family   [ ]Team     PCSSQ[Palliative Care Spiritual Screening Question]   Severity (0-10):  Score of 4 or > indicate consideration of Chaplaincy referral.  Chaplaincy Referral: [ x] yes [ ] refused [ ] following [ ] Deferred     Caregiver [place]? : [ ] yes [x ] no [ ] Deferred [ ] Declined             Social work referral [ ] Patient & Family Centered Care Referral [ ]     Anticipatory Grief present?:  [ ] yes [x ] no  [ ] Deferred                  Social work referral [ ] Chaplaincy Referral[ ]    Other Symptoms:  [x ]All other review of systems negative     Palliative Performance Status Version 2:     30    %      http://npcrc.org/files/news/palliative_performance_scale_ppsv2.pdf    PHYSICAL EXAM:  Vital Signs Last 24 Hrs  T(C): 36.4 (29 Nov 2023 14:04), Max: 37.8 (29 Nov 2023 11:00)  T(F): 97.6 (29 Nov 2023 14:04), Max: 100 (29 Nov 2023 11:00)  HR: 88 (29 Nov 2023 15:05) (77 - 105)  BP: 120/75 (29 Nov 2023 14:04) (104/58 - 125/78)  BP(mean): --  RR: 18 (29 Nov 2023 15:05) (18 - 20)  SpO2: 99% (29 Nov 2023 15:05) (97% - 100%)    Parameters below as of 29 Nov 2023 15:05  Patient On (Oxygen Delivery Method): nasal cannula, high flow  O2 Flow (L/min): 50  O2 Concentration (%): 70     GENERAL: [ ]Cachexia    [x ]Alert  [ ]Oriented x   [ ]Lethargic  [ ]Unarousable  [ ]Verbal  [x ]Non-Verbal  Behavioral:   [ ]Anxiety  [ ]Delirium [ ]Agitation [ ]Other  HEENT:  [ ]Normal   [ ]Dry mouth   [ ]ET Tube/Trach  [ ]Oral lesions [x] bipap mask  PULMONARY:   [ ]Clear [ ]Tachypnea  [ ]Audible excessive secretions   [ ]Rhonchi        [ ]Right [ ]Left [ ]Bilateral  [ ]Crackles        [ ]Right [ ]Left [ ]Bilateral  [ ]Wheezing     [ ]Right [ ]Left [ ]Bilateral  [ x]Diminished BS [ ] Right [x ]Left [ ]Bilateral  CARDIOVASCULAR:    [ ]Regular [ ]Irregular [ x]Tachy  [ ]Usman [ ]Murmur [ ]Other  GASTROINTESTINAL:  [ x]Soft  [ ]Distended   [ ]+BS  [ ]Non tender [ ]Tender  [ ]Other [x ]PEG [ ]OGT/ NGT   Last BM:   GENITOURINARY:  [ ]Normal [x ]Incontinent   [ ]Oliguria/Anuria   [ ]Fleming  MUSCULOSKELETAL:   [ ]Normal   [ ]Weakness  [x ]Bed/Wheelchair bound [ ]Edema  NEUROLOGIC:   [ ]No focal deficits  [ x] Cognitive impairment  [ ] Dysphagia [ ]Dysarthria [ ] Paresis [ ]Other   SKIN:   [ ]Normal  [ ]Rash  [ ]Other  [x ]Pressure ulcer(s) [ x]y [ ]n present on admission    CRITICAL CARE:  [ ]Shock Present  [ ]Septic [ ]Cardiogenic [ ]Neurologic [ ]Hypovolemic  [ ]Vasopressors [ ]Inotropes  [ x]Respiratory failure present [ ]Mechanical Ventilation [x ]Non-invasive ventilatory support [ ]High-Flow   [x ]Acute  [ ]Chronic [ x]Hypoxic  [ ]Hypercarbic [ ]Other  [ ]Other organ failure     LABS:                          10.8   8.66  )-----------( 218      ( 29 Nov 2023 07:46 )             35.3     11-29    146<H>  |  111<H>  |  21  ----------------------------<  144<H>  4.4   |  27  |  0.36<L>    Ca    8.7      29 Nov 2023 07:46  Phos  1.5     11-29  Mg     2.2     11-29    TPro  6.5  /  Alb  2.1<L>  /  TBili  0.5  /  DBili  x   /  AST  17  /  ALT  14  /  AlkPhos  124<H>  11-29      RADIOLOGY & ADDITIONAL STUDIES:     < from: CT Angio Chest PE Protocol w/ IV Cont (11.27.23 @ 22:44) >  IMPRESSION:.    No pulmonary embolism detected.    Occlusive secretions within the left mainstem bronchus with associated   complete left lung collapse; consider superimposed infection in the   appropriate clinical scenario.    Small leftpleural effusion.    --- End of Report ---    < end of copied text >      Protein Calorie Malnutrition Present: [ ]mild [ ]moderate [ ]severe [ ]underweight [ ]morbid obesity  https://www.andeal.org/vault/0660/web/files/ONC/Table_Clinical%20Characteristics%20to%20Document%20Malnutrition-White%20JV%20et%20al%202012.pdf    Height (cm): 170.2 (11-18-23 @ 17:39)  Weight (kg): 49.9 (11-18-23 @ 17:39)  BMI (kg/m2): 17.2 (11-18-23 @ 17:39)    [ ]PPSV2 < or = 30%  [ ]significant weight loss [ ]poor nutritional intake [ ]anasarca[ ]Artificial Nutrition    Other REFERRALS:  [ ]Hospice  [ ]Child Life  [ ]Social Work  [ ]Case management [ ]Holistic Therapy

## 2023-11-29 NOTE — PROGRESS NOTE ADULT - ASSESSMENT
77 yo male h/o dementia, non verbal. contracted, s/p peg. here with sepsis likely 2/2 sacral decub and aspiration pna    sepsis  respiratory failure   sacral decub  pna - possible aspiration   cont abx as per id  f/u cult  wound care f/u  id consult noted  pulm consulted  cont supp o2 via high flow. taper down as able   CTA chest noted  pulm f/u   bipap as able  abx changed to meropenem. id f/u    dementia  cont home meds  supportive care    dysphagia s/p peg  feeds as per gi/nutrition     hyperNa  free water via peg ordered  trend bmp       dvt ppx      d/w family bedside  DNR    palliative eval for [facility]    Advanced care planning was discussed with patient and family.  Advanced care planning forms were reviewed and discussed as appropriate.  Differential diagnosis and plan of care discussed with patient after the evaluation.   Pain assessed and judicious use of narcotics when appropriate was discussed.  Importance of Fall prevention discussed.  Counseling on Smoking and Alcohol cessation was offered when appropriate.  Counseling on Diet, exercise, and medication compliance was done.       Approx 75 minutes spent.

## 2023-11-29 NOTE — PROGRESS NOTE ADULT - PROBLEM SELECTOR PLAN 5
- Geriatrics and Palliative Medicine Team will f/u again tomorrow on family decision for possible PCU transfer   - Chaplaincy referral placed for spiritual support as wife is highly neil filled.     An MD Yousuf  GAP Team Consults  Please call if we can be of assistance, [phone]

## 2023-11-30 LAB
ANION GAP SERPL CALC-SCNC: 7 MMOL/L — SIGNIFICANT CHANGE UP (ref 5–17)
ANION GAP SERPL CALC-SCNC: 7 MMOL/L — SIGNIFICANT CHANGE UP (ref 5–17)
BUN SERPL-MCNC: 17 MG/DL — SIGNIFICANT CHANGE UP (ref 7–23)
BUN SERPL-MCNC: 17 MG/DL — SIGNIFICANT CHANGE UP (ref 7–23)
CALCIUM SERPL-MCNC: 8.6 MG/DL — SIGNIFICANT CHANGE UP (ref 8.4–10.5)
CALCIUM SERPL-MCNC: 8.6 MG/DL — SIGNIFICANT CHANGE UP (ref 8.4–10.5)
CHLORIDE SERPL-SCNC: 108 MMOL/L — SIGNIFICANT CHANGE UP (ref 96–108)
CHLORIDE SERPL-SCNC: 108 MMOL/L — SIGNIFICANT CHANGE UP (ref 96–108)
CO2 SERPL-SCNC: 28 MMOL/L — SIGNIFICANT CHANGE UP (ref 22–31)
CO2 SERPL-SCNC: 28 MMOL/L — SIGNIFICANT CHANGE UP (ref 22–31)
CREAT SERPL-MCNC: 0.32 MG/DL — LOW (ref 0.5–1.3)
CREAT SERPL-MCNC: 0.32 MG/DL — LOW (ref 0.5–1.3)
EGFR: 120 ML/MIN/1.73M2 — SIGNIFICANT CHANGE UP
EGFR: 120 ML/MIN/1.73M2 — SIGNIFICANT CHANGE UP
GLUCOSE SERPL-MCNC: 186 MG/DL — HIGH (ref 70–99)
GLUCOSE SERPL-MCNC: 186 MG/DL — HIGH (ref 70–99)
HCT VFR BLD CALC: 33.9 % — LOW (ref 39–50)
HCT VFR BLD CALC: 33.9 % — LOW (ref 39–50)
HGB BLD-MCNC: 10.4 G/DL — LOW (ref 13–17)
HGB BLD-MCNC: 10.4 G/DL — LOW (ref 13–17)
MCHC RBC-ENTMCNC: 30.7 GM/DL — LOW (ref 32–36)
MCHC RBC-ENTMCNC: 30.7 GM/DL — LOW (ref 32–36)
MCHC RBC-ENTMCNC: 30.9 PG — SIGNIFICANT CHANGE UP (ref 27–34)
MCHC RBC-ENTMCNC: 30.9 PG — SIGNIFICANT CHANGE UP (ref 27–34)
MCV RBC AUTO: 100.6 FL — HIGH (ref 80–100)
MCV RBC AUTO: 100.6 FL — HIGH (ref 80–100)
NRBC # BLD: 0 /100 WBCS — SIGNIFICANT CHANGE UP (ref 0–0)
NRBC # BLD: 0 /100 WBCS — SIGNIFICANT CHANGE UP (ref 0–0)
PHOSPHATE SERPL-MCNC: 2 MG/DL — LOW (ref 2.5–4.5)
PHOSPHATE SERPL-MCNC: 2 MG/DL — LOW (ref 2.5–4.5)
PLATELET # BLD AUTO: 235 K/UL — SIGNIFICANT CHANGE UP (ref 150–400)
PLATELET # BLD AUTO: 235 K/UL — SIGNIFICANT CHANGE UP (ref 150–400)
POTASSIUM SERPL-MCNC: 3.9 MMOL/L — SIGNIFICANT CHANGE UP (ref 3.5–5.3)
POTASSIUM SERPL-MCNC: 3.9 MMOL/L — SIGNIFICANT CHANGE UP (ref 3.5–5.3)
POTASSIUM SERPL-SCNC: 3.9 MMOL/L — SIGNIFICANT CHANGE UP (ref 3.5–5.3)
POTASSIUM SERPL-SCNC: 3.9 MMOL/L — SIGNIFICANT CHANGE UP (ref 3.5–5.3)
RBC # BLD: 3.37 M/UL — LOW (ref 4.2–5.8)
RBC # BLD: 3.37 M/UL — LOW (ref 4.2–5.8)
RBC # FLD: 14.2 % — SIGNIFICANT CHANGE UP (ref 10.3–14.5)
RBC # FLD: 14.2 % — SIGNIFICANT CHANGE UP (ref 10.3–14.5)
SODIUM SERPL-SCNC: 143 MMOL/L — SIGNIFICANT CHANGE UP (ref 135–145)
SODIUM SERPL-SCNC: 143 MMOL/L — SIGNIFICANT CHANGE UP (ref 135–145)
WBC # BLD: 9.71 K/UL — SIGNIFICANT CHANGE UP (ref 3.8–10.5)
WBC # BLD: 9.71 K/UL — SIGNIFICANT CHANGE UP (ref 3.8–10.5)
WBC # FLD AUTO: 9.71 K/UL — SIGNIFICANT CHANGE UP (ref 3.8–10.5)
WBC # FLD AUTO: 9.71 K/UL — SIGNIFICANT CHANGE UP (ref 3.8–10.5)

## 2023-11-30 PROCEDURE — 71045 X-RAY EXAM CHEST 1 VIEW: CPT | Mod: 26

## 2023-11-30 PROCEDURE — 99232 SBSQ HOSP IP/OBS MODERATE 35: CPT

## 2023-11-30 RX ADMIN — ENOXAPARIN SODIUM 40 MILLIGRAM(S): 100 INJECTION SUBCUTANEOUS at 23:39

## 2023-11-30 RX ADMIN — OLANZAPINE 5 MILLIGRAM(S): 15 TABLET, FILM COATED ORAL at 21:28

## 2023-11-30 RX ADMIN — Medication 500 MICROGRAM(S): at 14:00

## 2023-11-30 RX ADMIN — LEVALBUTEROL 0.63 MILLIGRAM(S): 1.25 SOLUTION, CONCENTRATE RESPIRATORY (INHALATION) at 13:27

## 2023-11-30 RX ADMIN — Medication 750 MILLIGRAM(S): at 16:46

## 2023-11-30 RX ADMIN — Medication 750 MILLIGRAM(S): at 23:39

## 2023-11-30 RX ADMIN — Medication 750 MILLIGRAM(S): at 06:33

## 2023-11-30 RX ADMIN — LEVALBUTEROL 0.63 MILLIGRAM(S): 1.25 SOLUTION, CONCENTRATE RESPIRATORY (INHALATION) at 00:40

## 2023-11-30 RX ADMIN — SODIUM CHLORIDE 4 MILLILITER(S): 9 INJECTION INTRAMUSCULAR; INTRAVENOUS; SUBCUTANEOUS at 13:27

## 2023-11-30 RX ADMIN — SODIUM CHLORIDE 4 MILLILITER(S): 9 INJECTION INTRAMUSCULAR; INTRAVENOUS; SUBCUTANEOUS at 00:35

## 2023-11-30 RX ADMIN — MEROPENEM 100 MILLIGRAM(S): 1 INJECTION INTRAVENOUS at 13:28

## 2023-11-30 RX ADMIN — Medication 3 MILLILITER(S): at 23:40

## 2023-11-30 RX ADMIN — CHLORHEXIDINE GLUCONATE 1 APPLICATION(S): 213 SOLUTION TOPICAL at 06:34

## 2023-11-30 RX ADMIN — MEROPENEM 100 MILLIGRAM(S): 1 INJECTION INTRAVENOUS at 06:32

## 2023-11-30 RX ADMIN — Medication 1 GRAM(S): at 00:40

## 2023-11-30 RX ADMIN — MEROPENEM 100 MILLIGRAM(S): 1 INJECTION INTRAVENOUS at 21:27

## 2023-11-30 RX ADMIN — Medication 3 MILLILITER(S): at 00:36

## 2023-11-30 RX ADMIN — Medication 1 GRAM(S): at 23:39

## 2023-11-30 RX ADMIN — Medication 750 MILLIGRAM(S): at 06:57

## 2023-11-30 RX ADMIN — Medication 1 ENEMA: at 11:41

## 2023-11-30 RX ADMIN — SODIUM CHLORIDE 4 MILLILITER(S): 9 INJECTION INTRAMUSCULAR; INTRAVENOUS; SUBCUTANEOUS at 18:31

## 2023-11-30 RX ADMIN — Medication 750 MILLIGRAM(S): at 17:46

## 2023-11-30 RX ADMIN — SODIUM CHLORIDE 4 MILLILITER(S): 9 INJECTION INTRAMUSCULAR; INTRAVENOUS; SUBCUTANEOUS at 06:32

## 2023-11-30 RX ADMIN — Medication 1 GRAM(S): at 06:31

## 2023-11-30 RX ADMIN — LEVALBUTEROL 0.63 MILLIGRAM(S): 1.25 SOLUTION, CONCENTRATE RESPIRATORY (INHALATION) at 23:40

## 2023-11-30 RX ADMIN — LEVALBUTEROL 0.63 MILLIGRAM(S): 1.25 SOLUTION, CONCENTRATE RESPIRATORY (INHALATION) at 06:32

## 2023-11-30 RX ADMIN — QUETIAPINE FUMARATE 100 MILLIGRAM(S): 200 TABLET, FILM COATED ORAL at 21:28

## 2023-11-30 RX ADMIN — Medication 3 MILLILITER(S): at 06:33

## 2023-11-30 RX ADMIN — Medication 500 MICROGRAM(S): at 23:40

## 2023-11-30 RX ADMIN — SODIUM CHLORIDE 4 MILLILITER(S): 9 INJECTION INTRAMUSCULAR; INTRAVENOUS; SUBCUTANEOUS at 23:40

## 2023-11-30 RX ADMIN — Medication 500 MICROGRAM(S): at 18:31

## 2023-11-30 RX ADMIN — Medication 1 GRAM(S): at 13:26

## 2023-11-30 RX ADMIN — Medication 3 MILLILITER(S): at 13:27

## 2023-11-30 RX ADMIN — Medication 500 MICROGRAM(S): at 00:40

## 2023-11-30 RX ADMIN — PANTOPRAZOLE SODIUM 40 MILLIGRAM(S): 20 TABLET, DELAYED RELEASE ORAL at 06:31

## 2023-11-30 RX ADMIN — LEVALBUTEROL 0.63 MILLIGRAM(S): 1.25 SOLUTION, CONCENTRATE RESPIRATORY (INHALATION) at 18:32

## 2023-11-30 RX ADMIN — Medication 1 GRAM(S): at 18:31

## 2023-11-30 RX ADMIN — SENNA PLUS 10 MILLILITER(S): 8.6 TABLET ORAL at 21:27

## 2023-11-30 RX ADMIN — Medication 3 MILLILITER(S): at 18:32

## 2023-11-30 RX ADMIN — Medication 500 MICROGRAM(S): at 06:32

## 2023-11-30 NOTE — PROGRESS NOTE ADULT - SUBJECTIVE AND OBJECTIVE BOX
[facility]  Nathaniel Barron PA-C  [address] [place] [address]  [phone]      INTERVAL HPI/OVERNIGHT EVENTS:  pt seen and examined, events noted  remains on HFNC    MEDICATIONS  (STANDING):  chlorhexidine 2% Cloths 1 Application(s) Topical <User Schedule>  Dakins Solution - 1/2 Strength 1 Application(s) Topical daily  Dakins Solution - 1/4 Strength 1 Application(s) Topical two times a day  enoxaparin Injectable 40 milliGRAM(s) SubCutaneous every 24 hours  guaiFENesin  milliGRAM(s) Oral every 12 hours  OLANZapine 5 milliGRAM(s) Oral <User Schedule>  pantoprazole    Tablet 40 milliGRAM(s) Oral before breakfast  piperacillin/tazobactam IVPB.. 3.375 Gram(s) IV Intermittent every 8 hours  polyethylene glycol 3350 17 Gram(s) Oral daily  QUEtiapine 100 milliGRAM(s) Oral <User Schedule>  senna 2 Tablet(s) Oral at bedtime  sucralfate 1 Gram(s) Oral four times a day  vancomycin  IVPB 500 milliGRAM(s) IV Intermittent every 12 hours  vancomycin  IVPB        MEDICATIONS  (PRN):  acetaminophen   Oral Liquid .. 750 milliGRAM(s) Oral every 6 hours PRN Mild Pain (1 - 3)      Allergies    No Known Allergies    Intolerances    Haldol (Other)  benzodiazepines (Other)      ROS:   unable to obtain       PHYSICAL EXAM:   Vital Signs Last 24 Hrs  T(C): 37.4 (30 Nov 2023 08:17), Max: 37.4 (30 Nov 2023 08:17)  T(F): 99.4 (30 Nov 2023 08:17), Max: 99.4 (30 Nov 2023 08:17)  HR: 102 (30 Nov 2023 08:17) (85 - 106)  BP: 112/68 (30 Nov 2023 08:17) (105/67 - 120/75)  BP(mean): --  RR: 18 (30 Nov 2023 08:17) (18 - 19)  SpO2: 98% (30 Nov 2023 08:17) (95% - 100%)    Parameters below as of 30 Nov 2023 08:17  Patient On (Oxygen Delivery Method): nasal cannula, high flow          Daily     Daily     nad  confused  frail  non toxic  soft, nt, +PEG  no edema        LABS:                        10.4   9.71  )-----------( 235      ( 30 Nov 2023 10:32 )             33.9   11-30    143  |  108  |  17  ----------------------------<  186<H>  3.9   |  28  |  0.32<L>    Ca    8.6      30 Nov 2023 10:32  Phos  2.0     11-30  Mg     2.2     11-29    TPro  6.5  /  Alb  2.1<L>  /  TBili  0.5  /  DBili  x   /  AST  17  /  ALT  14  /  AlkPhos  124<H>  11-29      Urinalysis Basic - ( 20 Nov 2023 22:01 )    Color: x / Appearance: x / SG: x / pH: x  Gluc: 118 mg/dL / Ketone: x  / Bili: x / Urobili: x   Blood: x / Protein: x / Nitrite: x   Leuk Esterase: x / RBC: x / WBC x   Sq Epi: x / Non Sq Epi: x / Bacteria: x        RADIOLOGY & ADDITIONAL TESTS:

## 2023-11-30 NOTE — PROGRESS NOTE ADULT - ASSESSMENT
79 yo M PMHx of severe dementia, s/p PEG tube, GERD, arthritis presents with ulcer that has worsened over last month  No fever, has leukocytosis  Brought in for ? fevers and worsening wound; chronically bed bound  UA neg, UCX neg  RVP neg  CT with sacral decubitus ulcers, OM erosion, pneumonia, blader wall thickening  BCX NGTD  Had RRT 11/20, vancomycin readded; suspect due to re-aspiration but unclear  CT with secretions, left lung collapse, superinfection?  Still hypoxic, HFNC  Less likely ongoing infectious causes SOB, suspect due to left lung collapse--complete course abx as below  Overall, Abnormal finding on imaging, pneumonia  - Meropenem 1g q 8, complete 5-7 days then discontinue  - Wound care to sacral wound, doubt benefit prolonged IV abx treatment course sacral wound without surgical intervention/debridement given longstanding nature sacral wound  - Noninfectious considerations SOB per pulmonary team  - O2 status improving    Guarded/GOC per team    Signing off. Please call with further questions or change in status.    Roni Luis MD  Contact on TEAMS messaging from 9am - 5pm  From 5pm-9am, on weekends, or if no response call [phone]

## 2023-11-30 NOTE — PROGRESS NOTE ADULT - ASSESSMENT
sacral decub  PEG tube  sepsis    CT noted  ID following  wound care f/u  cultures noted  cont tube feeds as tolerated   aspiration precautions   palliative following for GOC  will follow

## 2023-11-30 NOTE — PROGRESS NOTE ADULT - ASSESSMENT
77 yo M PMHx of severe dementia, PEG tube presents with ulcer that has worsened over last month. Patient was seen by house calls doctor 2 weeks ago, placed on antibiotics but started mounting fevers in the last few days. It was noticed by his GI doctor (Dr. Moraes) that his ulcer was getting worse, so he sent him in. Patient is nonverbal at baseline. Wife has noticed no new cough, runny nose.  heart rate "sustained at 130"

## 2023-11-30 NOTE — PROGRESS NOTE ADULT - PROBLEM SELECTOR PLAN 1
S/p RRT 11/20 for hypoxia requiring HFNC  -Increase in O2 requirements 11/21 from HFNC 50L/80% to 100% Bipap. Weaned from Bipap to HFNC 50L/100%  -Suspect aspiration event, family at bedside reports multiple aspiration events in the past  -CT A/P with LLL consolidation, SHEILA tree in bud opacities likely PNA. Continue ABX per ID  -ABG with no CO2 retention  -CXR 11/25 with LLL infiltrate  -CTA chest 11/27 negative for PE. Notes extensive L atelectasis/mucus plugging with small L effusion  -Started on Bipap 12/8 to assist with atelectasis but pt has been unable to tolerate  -Unable to tolerate chest vest as well per RT   -CXR this AM with no improvement in L lung atelectasis  -Continue Mucomyst 20% 3ml q6h x3 days, 3% sodium chloride nebs q6h  -Suction PRN  -Despite CT chest findings O2 requirements slowly improving - continue HFNC (currently 50L/60%). Wean O2 as tolerated   -Patient's family does not wish to pursue bronchoscopy as it does not align with GOC. Pt DNR/DNI  -Discussed at length with patient's wife, states she and her children are discussing possible next steps including transfer to PCU/palliative care. She overall wishes to focus on patient's comfort and not causing him any more pain.   -Palliative care f/u. Overall prognosis guarded.

## 2023-11-30 NOTE — PROGRESS NOTE ADULT - SUBJECTIVE AND OBJECTIVE BOX
Date of Service: 11-30-23 @ 10:30    Patient is a 78y old  Male who presents with a chief complaint of Osteomyelitis     (20 Nov 2023 11:10)    Any change in ROS:   O2 sats 98% on HFNC 50L/60%  Unable to tolerate Bipap or chest vest per RT  Breathing currently nonlabored  D/w patient's wife at bedside     MEDICATIONS  (STANDING):  acetylcysteine 20%  Inhalation 3 milliLiter(s) Inhalation every 6 hours  chlorhexidine 2% Cloths 1 Application(s) Topical <User Schedule>  Dakins Solution - 1/4 Strength 1 Application(s) Topical two times a day  enoxaparin Injectable 40 milliGRAM(s) SubCutaneous every 24 hours  ipratropium    for Nebulization 500 MICROGram(s) Nebulizer every 6 hours  levalbuterol Inhalation 0.63 milliGRAM(s) Inhalation every 6 hours  meropenem  IVPB 1000 milliGRAM(s) IV Intermittent every 8 hours  OLANZapine 5 milliGRAM(s) Oral <User Schedule>  pantoprazole    Tablet 40 milliGRAM(s) Oral before breakfast  QUEtiapine 100 milliGRAM(s) Oral <User Schedule>  saline laxative (FLEET) Rectal Enema 1 Enema Rectal once  senna Syrup 10 milliLiter(s) Oral at bedtime  sodium chloride 3%  Inhalation 4 milliLiter(s) Inhalation every 6 hours  sucralfate 1 Gram(s) Oral four times a day    MEDICATIONS  (PRN):  acetaminophen   Oral Liquid .. 750 milliGRAM(s) Oral every 6 hours PRN Temp greater or equal to 38C (100.4F), Mild Pain (1 - 3)    Vital Signs Last 24 Hrs  T(C): 37.4 (30 Nov 2023 08:17), Max: 37.8 (29 Nov 2023 11:00)  T(F): 99.4 (30 Nov 2023 08:17), Max: 100 (29 Nov 2023 11:00)  HR: 102 (30 Nov 2023 08:17) (85 - 106)  BP: 112/68 (30 Nov 2023 08:17) (105/67 - 120/75)  BP(mean): --  RR: 18 (30 Nov 2023 08:17) (18 - 19)  SpO2: 98% (30 Nov 2023 08:17) (95% - 100%)    Parameters below as of 30 Nov 2023 08:17  Patient On (Oxygen Delivery Method): nasal cannula, high flow      I&O's Summary    29 Nov 2023 07:01  -  30 Nov 2023 07:00  --------------------------------------------------------  IN: 400 mL / OUT: 0 mL / NET: 400 mL      Physical Exam:   GENERAL: NAD  HEENT: CANDY  ENMT: No tonsillar erythema, exudates, or enlargement  NECK: Supple, No JVD  CHEST/LUNG: Decreased L, scattered rhonchi   CVS: Regular rate and rhythm  GI: : Soft, Nontender, Nondistended  NERVOUS SYSTEM: Lethargic   EXTREMITIES:  2+ Peripheral Pulses, No clubbing, cyanosis, or edema  SKIN: No rashes or lesions  PSYCH: Appropriate    Labs:  28                            10.8   8.66  )-----------( 218      ( 29 Nov 2023 07:46 )             35.3                         10.6   8.85  )-----------( 221      ( 28 Nov 2023 10:57 )             33.4                         11.1   10.76 )-----------( 221      ( 27 Nov 2023 11:25 )             36.0                         10.6   16.79 )-----------( 202      ( 26 Nov 2023 12:34 )             33.2     11-29    146<H>  |  111<H>  |  21  ----------------------------<  144<H>  4.4   |  27  |  0.36<L>  11-28    147<H>  |  112<H>  |  20  ----------------------------<  176<H>  3.9   |  27  |  0.36<L>  11-27    144  |  108  |  23  ----------------------------<  204<H>  4.3   |  28  |  0.43<L>  11-26    141  |  109<H>  |  18  ----------------------------<  162<H>  4.3   |  24  |  0.46<L>    Ca    8.7      29 Nov 2023 07:46  Ca    8.5      28 Nov 2023 10:57  Phos  1.5     11-29  Mg     2.2     11-29    TPro  6.5  /  Alb  2.1<L>  /  TBili  0.5  /  DBili  x   /  AST  17  /  ALT  14  /  AlkPhos  124<H>  11-29  TPro  7.0  /  Alb  2.6<L>  /  TBili  0.6  /  DBili  x   /  AST  15  /  ALT  15  /  AlkPhos  136<H>  11-27      LIVER FUNCTIONS - ( 29 Nov 2023 07:46 )  Alb: 2.1 g/dL / Pro: 6.5 g/dL / ALK PHOS: 124 U/L / ALT: 14 U/L / AST: 17 U/L / GGT: x             Urinalysis Basic - ( 29 Nov 2023 07:46 )    Color: x / Appearance: x / SG: x / pH: x  Gluc: 144 mg/dL / Ketone: x  / Bili: x / Urobili: x   Blood: x / Protein: x / Nitrite: x   Leuk Esterase: x / RBC: x / WBC x   Sq Epi: x / Non Sq Epi: x / Bacteria: x      D-Dimer Assay, Quantitative: 748 ng/mL DDU (11-23 @ 13:52)        RECENT CULTURES:  11-27 @ 11:27 .Blood Blood-Peripheral          No growth at 48 Hours    Studies  Chest X-RAY  < from: CT Angio Chest PE Protocol w/ IV Cont (11.27.23 @ 22:44) >  FINDINGS:    PULMONARY ARTERIES: No pulmonary embolism detected. Please note that a   few of the segmental and subsegmental pulmonary branches are limited in   assessment secondary to motion.    MEDIASTINUM: Normal heart size. No pericardial effusion. Thoracic aorta   normal caliber.  Few mildly enlarged mediastinal nodes, likely reactive;   reference prevascular 1.8 x 1 cm node (image 210, series 8).    AIRWAYS, LUNGS, PLEURA: Central airway secretions occluding the left   mainstem bronchus. Complete left lung collapse. Small left pleural   effusion. Probable atelectasis at the right base.    IMAGED ABDOMEN: Unremarkable.    SOFT TISSUES: Unremarkable.    BONES: Unremarkable.      IMPRESSION:.    No pulmonary embolism detected.    Occlusive secretions within the left mainstem bronchus with associated   complete left lung collapse; consider superimposed infection in the   appropriate clinical scenario.    Small leftpleural effusion.    --- End of Report ---    < end of copied text >

## 2023-11-30 NOTE — PROGRESS NOTE ADULT - SUBJECTIVE AND OBJECTIVE BOX
CC: F/U for PNA    Saw/spoke to patient. No fevers, no chills. No new complaints.    Allergies  No Known Allergies    ANTIMICROBIALS:  meropenem  IVPB 1000 every 8 hours    PE:    Vital Signs Last 24 Hrs  T(C): 36.9 (30 Nov 2023 14:22), Max: 37.4 (30 Nov 2023 08:17)  T(F): 98.4 (30 Nov 2023 14:22), Max: 99.4 (30 Nov 2023 08:17)  HR: 89 (30 Nov 2023 14:22) (85 - 106)  BP: 115/71 (30 Nov 2023 14:22) (105/67 - 118/78)  RR: 18 (30 Nov 2023 14:22) (18 - 18)  SpO2: 95% (30 Nov 2023 14:22) (94% - 100%)    Gen: AOx3, NAD, non-toxic  CV: Nontachycardic  Resp: Breathing comfortably, RA  Abd: Soft, nontender  IV/Skin: No thrombophlebitis    LABS:                        10.4   9.71  )-----------( 235      ( 30 Nov 2023 10:32 )             33.9     11-30    143  |  108  |  17  ----------------------------<  186<H>  3.9   |  28  |  0.32<L>    Ca    8.6      30 Nov 2023 10:32  Phos  2.0     11-30  Mg     2.2     11-29    TPro  6.5  /  Alb  2.1<L>  /  TBili  0.5  /  DBili  x   /  AST  17  /  ALT  14  /  AlkPhos  124<H>  11-29    Urinalysis Basic - ( 30 Nov 2023 10:32 )    Color: x / Appearance: x / SG: x / pH: x  Gluc: 186 mg/dL / Ketone: x  / Bili: x / Urobili: x   Blood: x / Protein: x / Nitrite: x   Leuk Esterase: x / RBC: x / WBC x   Sq Epi: x / Non Sq Epi: x / Bacteria: x    MICROBIOLOGY:    .Blood Blood-Peripheral  11-27-23   No growth at 72 Hours  --  --    .Blood Blood-Peripheral  11-20-23   No growth at 5 days  --  --    Clean Catch Clean Catch (Midstream)  11-18-23   No growth  --  --    .Blood Blood-Peripheral  11-18-23   No growth at 5 days  --  --    .Blood Blood-Peripheral  11-18-23   No growth at 5 days  --  --    Rapid RVP Result: NotDetec (11-27 @ 09:25)    RADIOLOGY:    11/30 XR:    Frontal expiratory view of the chest shows similar right lung which is   clear and hyperinflated. Left chest whiteout is similar with what appears   to be occlusion of the left bronchus approximately 2.5 cm from its   origin. There is no evidence of pneumothorax or right pleural effusion.    IMPRESSION:  Similar left chest whiteout with occluded bronchus.

## 2023-11-30 NOTE — PROGRESS NOTE ADULT - ASSESSMENT
77 yo male h/o dementia, non verbal. contracted, s/p peg. here with sepsis likely 2/2 sacral decub and aspiration pna    sepsis  respiratory failure   sacral decub  pna - possible aspiration   cont abx as per id  f/u cult  wound care f/u  id consult noted  pulm consulted  cont supp o2 via high flow. taper down as able   CTA chest noted  pulm f/u   bipap as able  abx changed to meropenem. id f/u apprec    dementia  cont home meds  supportive care    dysphagia s/p peg  feeds as per gi/nutrition     hyperNa  free water via peg ordered  trend bmp       dvt ppx      d/w family bedside  DNR    palliative eval for goc  d/w family bedside  long term prognosis poor     Advanced care planning was discussed with patient and family.  Advanced care planning forms were reviewed and discussed as appropriate.  Differential diagnosis and plan of care discussed with patient after the evaluation.   Pain assessed and judicious use of narcotics when appropriate was discussed.  Importance of Fall prevention discussed.  Counseling on Smoking and Alcohol cessation was offered when appropriate.  Counseling on Diet, exercise, and medication compliance was done.       Approx 75 minutes spent.

## 2023-11-30 NOTE — PROGRESS NOTE ADULT - SUBJECTIVE AND OBJECTIVE BOX
SHARONA LARKIN  78y Male  MRN:[id number]    Patient is a 78y old  Male who presents with a chief complaint of sepsis      HPI:  79 yo M PMHx of severe dementia, PEG tube presents with ulcer that has worsened over last month. Patient was seen by house calls doctor 2 weeks ago, placed on antibiotics but started mounting fevers in the last few days. It was noticed by his GI doctor (Dr. Moraes) that his ulcer was getting worse, so he sent him in. Patient is nonverbal at baseline. Wife has noticed no new cough, runny nose. (19 Nov 2023 13:02)      Patient seen and evaluated at bedside. No acute events overnight except as noted.    Interval HPI: no acute events o/n     PAST MEDICAL & SURGICAL HISTORY:  Dementia      Pneumonia      Acute renal failure (ARF)  ARF 1.5 YEAR AGO      Dysphagia  peg      Arthritis      GERD (gastroesophageal reflux disease)      Dyspepsia      History of hand surgery      PEG adjustment, replacement, or removal      Status post insertion of percutaneous endoscopic gastrostomy (PEG) tube  PEG replacement on 02/29/16          REVIEW OF SYSTEMS:  as per hpi     VITALS:   Vital Signs Last 24 Hrs  T(C): 37.4 (30 Nov 2023 08:17), Max: 37.4 (30 Nov 2023 08:17)  T(F): 99.4 (30 Nov 2023 08:17), Max: 99.4 (30 Nov 2023 08:17)  HR: 90 (30 Nov 2023 10:25) (85 - 106)  BP: 112/68 (30 Nov 2023 08:17) (105/67 - 120/75)  BP(mean): --  RR: 18 (30 Nov 2023 08:17) (18 - 19)  SpO2: 94% (30 Nov 2023 10:25) (94% - 100%)    Parameters below as of 30 Nov 2023 08:17  Patient On (Oxygen Delivery Method): nasal cannula, high flow        PHYSICAL EXAM:  GENERAL: NAD,  comfortable   HEAD:  Atraumatic, Normocephalic  EYES: EOMI, PERRLA, conjunctiva and sclera clear  NECK: Supple, No JVD  CHEST/LUNG: Clear to auscultation bilaterally; No wheeze  HEART: S1, S2; No murmurs, rubs, or gallops  ABDOMEN: Soft, Nontender, Nondistended; Bowel sounds present +peg  EXTREMITIES:  2+ Peripheral Pulses, contracted   PSYCH: Normal affect  NEUROLOGY: non verbal  SKIN: +sacral decub    Consultant(s) Notes Reviewed:  [x ] YES  [ ] NO  Care Discussed with Consultants/Other Providers [ x] YES  [ ] NO    MEDS:   MEDICATIONS  (STANDING):  acetylcysteine 20%  Inhalation 3 milliLiter(s) Inhalation every 6 hours  chlorhexidine 2% Cloths 1 Application(s) Topical <User Schedule>  Dakins Solution - 1/4 Strength 1 Application(s) Topical two times a day  enoxaparin Injectable 40 milliGRAM(s) SubCutaneous every 24 hours  ipratropium    for Nebulization 500 MICROGram(s) Nebulizer every 6 hours  levalbuterol Inhalation 0.63 milliGRAM(s) Inhalation every 6 hours  meropenem  IVPB 1000 milliGRAM(s) IV Intermittent every 8 hours  OLANZapine 5 milliGRAM(s) Oral <User Schedule>  pantoprazole    Tablet 40 milliGRAM(s) Oral before breakfast  QUEtiapine 100 milliGRAM(s) Oral <User Schedule>  senna Syrup 10 milliLiter(s) Oral at bedtime  sodium chloride 3%  Inhalation 4 milliLiter(s) Inhalation every 6 hours  sucralfate 1 Gram(s) Oral four times a day    MEDICATIONS  (PRN):  acetaminophen   Oral Liquid .. 750 milliGRAM(s) Oral every 6 hours PRN Temp greater or equal to 38C (100.4F), Mild Pain (1 - 3)        ALLERGIES:  Haldol (Other)  No Known Allergies  benzodiazepines (Other)      LABS:                                                                    10.4   9.71  )-----------( 235      ( 30 Nov 2023 10:32 )             33.9   11-30    143  |  108  |  17  ----------------------------<  186<H>  3.9   |  28  |  0.32<L>    Ca    8.6      30 Nov 2023 10:32  Phos  2.0     11-30  Mg     2.2     11-29    TPro  6.5  /  Alb  2.1<L>  /  TBili  0.5  /  DBili  x   /  AST  17  /  ALT  14  /  AlkPhos  124<H>  11-29          < from: CT Angio Chest PE Protocol w/ IV Cont (11.27.23 @ 22:44) >  IMPRESSION:.    No pulmonary embolism detected.    Occlusive secretions within the left mainstem bronchus with associated   complete left lung collapse; consider superimposed infection in the   appropriate clinical scenario.    Small leftpleural effusion.    --- End of Report ---      < end of copied text >       < from: CT Abdomen and Pelvis w/ IV Cont (11.18.23 @ 19:41) >    IMPRESSION:    Extensive decubitus ulcer involving the right posterior gluteal and   pelvic region with extension to bone. There is bony erosion of the   ischium compatible with osteomyelitis.    Mild bladder wall thickening and a few foci of intraluminal air,   correlate for recent instrumentation and/or infection.    Left lower lobe consolidation along with tree-in-bud/nodular opacities in   the left upper lobe concerning for pneumonia.    --- End of Report ---        < end of copied text >

## 2023-11-30 NOTE — CHART NOTE - NSCHARTNOTEFT_GEN_A_CORE
F/u visit with pt from Geriatrics and Palliative Medicine Team, wife at bedside. She states the whole family was able to come to visit with pt last night, and pt was very happy to see the grandchildren. Wife remains undecided about moving pt to PCU and wants to discuss with her son and dtr one more time. I spoke with dtr Ling and answered her questions about the plan of care in the PCU. Family asking for more time to privately discuss. Wife has my contact information should they arrive to a decision. We will follow up again tomorrow.    Lauren To MD  [facility] Team Consults  Please call if we can be of assistance, [phone]

## 2023-11-30 NOTE — PROGRESS NOTE ADULT - SUBJECTIVE AND OBJECTIVE BOX
Subjective: Patient seen and examined. No new events except as noted.     REVIEW OF SYSTEMS:    Unable to obtain     MEDICATIONS:  MEDICATIONS  (STANDING):  acetylcysteine 20%  Inhalation 3 milliLiter(s) Inhalation every 6 hours  chlorhexidine 2% Cloths 1 Application(s) Topical <User Schedule>  Dakins Solution - 1/4 Strength 1 Application(s) Topical two times a day  enoxaparin Injectable 40 milliGRAM(s) SubCutaneous every 24 hours  ipratropium    for Nebulization 500 MICROGram(s) Nebulizer every 6 hours  levalbuterol Inhalation 0.63 milliGRAM(s) Inhalation every 6 hours  meropenem  IVPB 1000 milliGRAM(s) IV Intermittent every 8 hours  OLANZapine 5 milliGRAM(s) Oral <User Schedule>  pantoprazole    Tablet 40 milliGRAM(s) Oral before breakfast  QUEtiapine 100 milliGRAM(s) Oral <User Schedule>  senna Syrup 10 milliLiter(s) Oral at bedtime  sodium chloride 3%  Inhalation 4 milliLiter(s) Inhalation every 6 hours  sucralfate 1 Gram(s) Oral four times a day      PHYSICAL EXAM:  T(C): 37.4 (11-30-23 @ 08:17), Max: 37.8 (11-29-23 @ 11:00)  HR: 102 (11-30-23 @ 08:17) (80 - 106)  BP: 112/68 (11-30-23 @ 08:17) (105/67 - 120/75)  RR: 18 (11-30-23 @ 08:17) (18 - 19)  SpO2: 98% (11-30-23 @ 08:17) (95% - 100%)  Wt(kg): --  I&O's Summary    29 Nov 2023 07:01  -  30 Nov 2023 07:00  --------------------------------------------------------  IN: 400 mL / OUT: 0 mL / NET: 400 mL          Appearance: NAD nonverbal	  HEENT:   Normal oral mucosa, PERRL, EOMI	  Lymphatic: No lymphadenopathy  Cardiovascular: Normal tachy S1 S2, No JVD, No murmurs, No edema  Respiratory: decreased bs   Psychiatry: A & O x 0  Gastrointestinal:  Soft, Non-tender, + PEG   Skin: No rashes, No ecchymoses, No cyanosis	  Neurologic: Non-focal  Extremities: contracted   Vascular: Peripheral pulses palpable 2+ bilaterally  +sacral decub         LABS:    CARDIAC MARKERS:                                10.8   8.66  )-----------( 218      ( 29 Nov 2023 07:46 )             35.3     11-29    146<H>  |  111<H>  |  21  ----------------------------<  144<H>  4.4   |  27  |  0.36<L>    Ca    8.7      29 Nov 2023 07:46  Phos  1.5     11-29  Mg     2.2     11-29    TPro  6.5  /  Alb  2.1<L>  /  TBili  0.5  /  DBili  x   /  AST  17  /  ALT  14  /  AlkPhos  124<H>  11-29    proBNP:   Lipid Profile:   HgA1c:   TSH:             TELEMETRY: 	    ECG:  	  RADIOLOGY: < from: CT Angio Chest PE Protocol w/ IV Cont (11.27.23 @ 22:44) >    ACC: [id number] EXAM:  CT ANGIO CHEST PULM ART [facility]   ORDERED BY: SERGIO LITTLEJOHN     PROCEDURE DATE:  11/27/2023          INTERPRETATION:  HISTORY: Fevers. Hypoxia.    EXAMINATION: CTA CHEST was performed for evaluation of the pulmonary   arteries. Multiplanar reformatted images and MIPS were acquired.  CONTRAST/COMPLICATIONS:  IV Contrast: Omnipaque 350  58 cc administered   42 cc discarded  Oral Contrast: NONE  Complications: None reported at time of study completion    COMPARISON: 11/18/2023 CT abdomen. 6/29/2019 CT chest. 11/25/2023 chest   radiograph.    FINDINGS:    PULMONARY ARTERIES: No pulmonary embolism detected. Please note that a   few of the segmental and subsegmental pulmonary branches are limited in   assessment secondary to motion.    MEDIASTINUM: Normal heart size. No pericardial effusion. Thoracic aorta   normal caliber.  Few mildly enlarged mediastinal nodes, likely reactive;   reference prevascular 1.8 x 1 cm node (image 210, series 8).    AIRWAYS, LUNGS, PLEURA: Central airway secretions occluding the left   mainstem bronchus. Complete left lung collapse. Small left pleural   effusion. Probable atelectasis at the right base.    IMAGED ABDOMEN: Unremarkable.    SOFT TISSUES: Unremarkable.    BONES: Unremarkable.      IMPRESSION:.    No pulmonary embolism detected.    Occlusive secretions within the left mainstem bronchus with associated   complete left lung collapse; consider superimposed infection in the   appropriate clinical scenario.    Small leftpleural effusion.    --- End of Report ---      < end of copied text >    DIAGNOSTIC TESTING:  [ ] Echocardiogram:  < from: TTE W or WO Ultrasound Enhancing Agent (11.27.23 @ 15:45) >    TRANSTHORACIC ECHOCARDIOGRAM REPORT  ________________________________________________________________________________                                      _______       Pt. Name:       SHARONA LARKIN Study Date:    11/27/2023  MRN:            [id number]         YOB: 1945  Accession #:    [id number]          Age:           78 years  Account#:       [id number]       Gender:        M  Heart Rate:                        Height:        67.00 in (170.18 cm)  Rhythm:            Weight:        110.00 lb (49.90 kg)  Blood Pressure: 118/66 mmHg        BSA/BMI:       1.57 m² / 17.23 kg/m²  ________________________________________________________________________________________  Referring Physician:    [id number] Elias Dunn  Interpreting Physician: Brandi Riley MD  Primary Sonographer:    Maria Ines [facility]    CPT:               ECHO TTE WO CON COMP W DOPP - 37273.m  Indication(s):     Acute respiratory failure with hypoxia - J96.01  Procedure:         Transthoracic echocardiogram with 2-D, M-mode and complete                     spectral and color flow Doppler.  Ordering Location: [facility]  Study Information: Image quality for this study is technically difficult.    _______________________________________________________________________________________     CONCLUSIONS:      1. Technically difficult image quality.   2. Left ventricular cavity is small. Left ventricular wall thickness is normal. Left ventricular systolic function is preserved with anejection fraction of 68 % by Perez's method of disks. There are no regional wall motion abnormalities seen.   3. Normal right ventricular cavity size, wall thickness, and systolic function.   4. Mild to moderate aortic stenosis.   5. Estimated pulmonary artery systolic pressure is 37 mmHg, consistent with borderline pulmonary hypertension.   6. No pericardial effusion seen.   7. No prior echocardiogram is available for comparison.    ________________________________________________________________________________________    < end of copied text >    [ ]  Catheterization:  [ ] Stress Test:    OTHER:

## 2023-12-01 PROCEDURE — 99232 SBSQ HOSP IP/OBS MODERATE 35: CPT

## 2023-12-01 PROCEDURE — 71045 X-RAY EXAM CHEST 1 VIEW: CPT | Mod: 26

## 2023-12-01 RX ADMIN — SODIUM CHLORIDE 4 MILLILITER(S): 9 INJECTION INTRAMUSCULAR; INTRAVENOUS; SUBCUTANEOUS at 12:29

## 2023-12-01 RX ADMIN — Medication 1 GRAM(S): at 17:13

## 2023-12-01 RX ADMIN — LEVALBUTEROL 0.63 MILLIGRAM(S): 1.25 SOLUTION, CONCENTRATE RESPIRATORY (INHALATION) at 17:13

## 2023-12-01 RX ADMIN — Medication 1 GRAM(S): at 05:06

## 2023-12-01 RX ADMIN — SENNA PLUS 10 MILLILITER(S): 8.6 TABLET ORAL at 22:09

## 2023-12-01 RX ADMIN — SODIUM CHLORIDE 4 MILLILITER(S): 9 INJECTION INTRAMUSCULAR; INTRAVENOUS; SUBCUTANEOUS at 05:06

## 2023-12-01 RX ADMIN — Medication 1 GRAM(S): at 12:29

## 2023-12-01 RX ADMIN — LEVALBUTEROL 0.63 MILLIGRAM(S): 1.25 SOLUTION, CONCENTRATE RESPIRATORY (INHALATION) at 05:05

## 2023-12-01 RX ADMIN — Medication 3 MILLILITER(S): at 05:06

## 2023-12-01 RX ADMIN — Medication 500 MICROGRAM(S): at 05:05

## 2023-12-01 RX ADMIN — Medication 750 MILLIGRAM(S): at 10:19

## 2023-12-01 RX ADMIN — MEROPENEM 100 MILLIGRAM(S): 1 INJECTION INTRAVENOUS at 05:05

## 2023-12-01 RX ADMIN — SODIUM CHLORIDE 4 MILLILITER(S): 9 INJECTION INTRAMUSCULAR; INTRAVENOUS; SUBCUTANEOUS at 17:13

## 2023-12-01 RX ADMIN — CHLORHEXIDINE GLUCONATE 1 APPLICATION(S): 213 SOLUTION TOPICAL at 05:07

## 2023-12-01 RX ADMIN — QUETIAPINE FUMARATE 100 MILLIGRAM(S): 200 TABLET, FILM COATED ORAL at 22:09

## 2023-12-01 RX ADMIN — Medication 750 MILLIGRAM(S): at 09:19

## 2023-12-01 RX ADMIN — Medication 750 MILLIGRAM(S): at 00:39

## 2023-12-01 RX ADMIN — MEROPENEM 100 MILLIGRAM(S): 1 INJECTION INTRAVENOUS at 22:09

## 2023-12-01 RX ADMIN — LEVALBUTEROL 0.63 MILLIGRAM(S): 1.25 SOLUTION, CONCENTRATE RESPIRATORY (INHALATION) at 12:29

## 2023-12-01 RX ADMIN — PANTOPRAZOLE SODIUM 40 MILLIGRAM(S): 20 TABLET, DELAYED RELEASE ORAL at 05:06

## 2023-12-01 RX ADMIN — ENOXAPARIN SODIUM 40 MILLIGRAM(S): 100 INJECTION SUBCUTANEOUS at 22:09

## 2023-12-01 RX ADMIN — Medication 500 MICROGRAM(S): at 17:13

## 2023-12-01 RX ADMIN — Medication 500 MICROGRAM(S): at 12:29

## 2023-12-01 RX ADMIN — Medication 750 MILLIGRAM(S): at 19:06

## 2023-12-01 RX ADMIN — MEROPENEM 100 MILLIGRAM(S): 1 INJECTION INTRAVENOUS at 14:11

## 2023-12-01 NOTE — PROGRESS NOTE ADULT - SUBJECTIVE AND OBJECTIVE BOX
Date of Service: 12-01-23 @ 14:29    Patient is a 78y old  Male who presents with a chief complaint of Osteomyelitis     (20 Nov 2023 11:10)      Any change in ROS:   O2 sats 96% on HFNC 50L/50%  Unable to participate in ROS 2nd to mental status, breathing nonlabored  Secretions appear to be less   Wife at bedside     MEDICATIONS  (STANDING):  chlorhexidine 2% Cloths 1 Application(s) Topical <User Schedule>  Dakins Solution - 1/4 Strength 1 Application(s) Topical two times a day  enoxaparin Injectable 40 milliGRAM(s) SubCutaneous every 24 hours  ipratropium    for Nebulization 500 MICROGram(s) Nebulizer every 6 hours  levalbuterol Inhalation 0.63 milliGRAM(s) Inhalation every 6 hours  meropenem  IVPB 1000 milliGRAM(s) IV Intermittent every 8 hours  OLANZapine 5 milliGRAM(s) Oral <User Schedule>  pantoprazole    Tablet 40 milliGRAM(s) Oral before breakfast  QUEtiapine 100 milliGRAM(s) Oral <User Schedule>  senna Syrup 10 milliLiter(s) Oral at bedtime  sodium chloride 3%  Inhalation 4 milliLiter(s) Inhalation every 6 hours  sucralfate 1 Gram(s) Oral four times a day    MEDICATIONS  (PRN):  acetaminophen   Oral Liquid .. 750 milliGRAM(s) Oral every 6 hours PRN Temp greater or equal to 38C (100.4F), Mild Pain (1 - 3)    Vital Signs Last 24 Hrs  T(C): 37 (01 Dec 2023 13:41), Max: 37 (01 Dec 2023 09:50)  T(F): 98.6 (01 Dec 2023 13:41), Max: 98.6 (01 Dec 2023 09:50)  HR: 93 (01 Dec 2023 13:41) (88 - 106)  BP: 105/65 (01 Dec 2023 13:41) (105/65 - 118/71)  BP(mean): --  RR: 18 (01 Dec 2023 13:41) (18 - 19)  SpO2: 100% (01 Dec 2023 13:41) (96% - 100%)    Parameters below as of 01 Dec 2023 13:41  Patient On (Oxygen Delivery Method): nasal cannula, high flow        I&O's Summary    30 Nov 2023 07:01  -  01 Dec 2023 07:00  --------------------------------------------------------  IN: 0 mL / OUT: 200 mL / NET: -200 mL    01 Dec 2023 07:01  -  01 Dec 2023 14:29  --------------------------------------------------------  IN: 0 mL / OUT: 200 mL / NET: -200 mL      Physical Exam:   GENERAL: NAD  HEENT: CANDY  ENMT: No tonsillar erythema, exudates, or enlargement  NECK: Supple, No JVD  CHEST/LUNG: Decreased L base; few scattered rhonchi   CVS: Regular rate and rhythm  GI: : Soft, Nontender, Nondistended  NERVOUS SYSTEM:  Lethargic   EXTREMITIES:  2+ Peripheral Pulses, No clubbing, cyanosis, or edema  SKIN: No rashes or lesions    Labs:  28                            10.4   9.71  )-----------( 235      ( 30 Nov 2023 10:32 )             33.9                         10.8   8.66  )-----------( 218      ( 29 Nov 2023 07:46 )             35.3                         10.6   8.85  )-----------( 221      ( 28 Nov 2023 10:57 )             33.4     11-30    143  |  108  |  17  ----------------------------<  186<H>  3.9   |  28  |  0.32<L>  11-29    146<H>  |  111<H>  |  21  ----------------------------<  144<H>  4.4   |  27  |  0.36<L>  11-28    147<H>  |  112<H>  |  20  ----------------------------<  176<H>  3.9   |  27  |  0.36<L>    Ca    8.6      30 Nov 2023 10:32  Phos  2.0     11-30    TPro  6.5  /  Alb  2.1<L>  /  TBili  0.5  /  DBili  x   /  AST  17  /  ALT  14  /  AlkPhos  124<H>  11-29      Urinalysis Basic - ( 30 Nov 2023 10:32 )    Color: x / Appearance: x / SG: x / pH: x  Gluc: 186 mg/dL / Ketone: x  / Bili: x / Urobili: x   Blood: x / Protein: x / Nitrite: x   Leuk Esterase: x / RBC: x / WBC x   Sq Epi: x / Non Sq Epi: x / Bacteria: x    RECENT CULTURES:  11-27 @ 11:27 .Blood Blood-Peripheral       No growth at 72 Hours      Studies  Chest X-RAY  CXR 12/1 increased aeration L lung

## 2023-12-01 NOTE — PROGRESS NOTE ADULT - SUBJECTIVE AND OBJECTIVE BOX
Subjective: Patient seen and examined. No new events except as noted.     REVIEW OF SYSTEMS:    Unable to obtain       MEDICATIONS:  MEDICATIONS  (STANDING):  chlorhexidine 2% Cloths 1 Application(s) Topical <User Schedule>  Dakins Solution - 1/4 Strength 1 Application(s) Topical two times a day  enoxaparin Injectable 40 milliGRAM(s) SubCutaneous every 24 hours  ipratropium    for Nebulization 500 MICROGram(s) Nebulizer every 6 hours  levalbuterol Inhalation 0.63 milliGRAM(s) Inhalation every 6 hours  meropenem  IVPB 1000 milliGRAM(s) IV Intermittent every 8 hours  OLANZapine 5 milliGRAM(s) Oral <User Schedule>  pantoprazole    Tablet 40 milliGRAM(s) Oral before breakfast  QUEtiapine 100 milliGRAM(s) Oral <User Schedule>  senna Syrup 10 milliLiter(s) Oral at bedtime  sodium chloride 3%  Inhalation 4 milliLiter(s) Inhalation every 6 hours  sucralfate 1 Gram(s) Oral four times a day      PHYSICAL EXAM:  T(C): 37 (12-01-23 @ 09:50), Max: 37 (12-01-23 @ 09:50)  HR: 106 (12-01-23 @ 09:50) (88 - 106)  BP: 114/68 (12-01-23 @ 09:50) (111/68 - 118/71)  RR: 18 (12-01-23 @ 09:50) (18 - 19)  SpO2: 97% (12-01-23 @ 09:50) (95% - 100%)  Wt(kg): --  I&O's Summary    30 Nov 2023 07:01  -  01 Dec 2023 07:00  --------------------------------------------------------  IN: 0 mL / OUT: 200 mL / NET: -200 mL    01 Dec 2023 07:01  -  01 Dec 2023 10:38  --------------------------------------------------------  IN: 0 mL / OUT: 200 mL / NET: -200 mL            Appearance: NAD nonverbal	  HEENT:   Normal oral mucosa, PERRL, EOMI	  Lymphatic: No lymphadenopathy  Cardiovascular: Normal tachy S1 S2, No JVD, No murmurs, No edema  Respiratory: decreased bs   Psychiatry: A & O x 0  Gastrointestinal:  Soft, Non-tender, + PEG   Skin: No rashes, No ecchymoses, No cyanosis	  Neurologic: Non-focal  Extremities: contracted   Vascular: Peripheral pulses palpable 2+ bilaterally  +sacral decub         LABS:    CARDIAC MARKERS:                                10.4   9.71  )-----------( 235      ( 30 Nov 2023 10:32 )             33.9     11-30    143  |  108  |  17  ----------------------------<  186<H>  3.9   |  28  |  0.32<L>    Ca    8.6      30 Nov 2023 10:32  Phos  2.0     11-30      proBNP:   Lipid Profile:   HgA1c:   TSH:             TELEMETRY: 	    ECG:  	  RADIOLOGY:   DIAGNOSTIC TESTING:  [ ] Echocardiogram:  [ ]  Catheterization:  [ ] Stress Test:    OTHER:

## 2023-12-01 NOTE — PROGRESS NOTE ADULT - PROBLEM SELECTOR PLAN 5
- Continue with limited medical interventions as per pulm  - Wean O2 as tolerated  - Geriatrics and Palliative Medicine Team will intermittently follow for support. More conversation can be held IF pt's unable to come off HFNC in the near future     Lauren To MD  [facility] Team Consults  Please call if we can be of assistance, [phone]

## 2023-12-01 NOTE — PROGRESS NOTE ADULT - SUBJECTIVE AND OBJECTIVE BOX
SHARONA LARKIN  78y Male  MRN:[id number]    Patient is a 78y old  Male who presents with a chief complaint of sepsis      HPI:  79 yo M PMHx of severe dementia, PEG tube presents with ulcer that has worsened over last month. Patient was seen by house calls doctor 2 weeks ago, placed on antibiotics but started mounting fevers in the last few days. It was noticed by his GI doctor (Dr. Moraes) that his ulcer was getting worse, so he sent him in. Patient is nonverbal at baseline. Wife has noticed no new cough, runny nose. (19 Nov 2023 13:02)      Patient seen and evaluated at bedside. No acute events overnight except as noted.    Interval HPI: no acute events o/n     PAST MEDICAL & SURGICAL HISTORY:  Dementia      Pneumonia      Acute renal failure (ARF)  ARF 1.5 YEAR AGO      Dysphagia  peg      Arthritis      GERD (gastroesophageal reflux disease)      Dyspepsia      History of hand surgery      PEG adjustment, replacement, or removal      Status post insertion of percutaneous endoscopic gastrostomy (PEG) tube  PEG replacement on 02/29/16          REVIEW OF SYSTEMS:  as per hpi     VITALS:   Vital Signs Last 24 Hrs  T(C): 37 (01 Dec 2023 09:50), Max: 37 (01 Dec 2023 09:50)  T(F): 98.6 (01 Dec 2023 09:50), Max: 98.6 (01 Dec 2023 09:50)  HR: 92 (01 Dec 2023 10:41) (88 - 106)  BP: 114/68 (01 Dec 2023 09:50) (111/68 - 118/71)  BP(mean): --  RR: 18 (01 Dec 2023 09:50) (18 - 19)  SpO2: 98% (01 Dec 2023 10:41) (95% - 100%)    Parameters below as of 01 Dec 2023 09:50  Patient On (Oxygen Delivery Method): nasal cannula, high flow        PHYSICAL EXAM:  GENERAL: NAD,  comfortable   HEAD:  Atraumatic, Normocephalic  EYES: EOMI, PERRLA, conjunctiva and sclera clear  NECK: Supple, No JVD  CHEST/LUNG: Clear to auscultation bilaterally; No wheeze  HEART: S1, S2; No murmurs, rubs, or gallops  ABDOMEN: Soft, Nontender, Nondistended; Bowel sounds present +peg  EXTREMITIES:  2+ Peripheral Pulses, contracted   PSYCH: Normal affect  NEUROLOGY: non verbal  SKIN: +sacral decub    Consultant(s) Notes Reviewed:  [x ] YES  [ ] NO  Care Discussed with Consultants/Other Providers [ x] YES  [ ] NO    MEDS:   MEDICATIONS  (STANDING):  chlorhexidine 2% Cloths 1 Application(s) Topical <User Schedule>  Dakins Solution - 1/4 Strength 1 Application(s) Topical two times a day  enoxaparin Injectable 40 milliGRAM(s) SubCutaneous every 24 hours  ipratropium    for Nebulization 500 MICROGram(s) Nebulizer every 6 hours  levalbuterol Inhalation 0.63 milliGRAM(s) Inhalation every 6 hours  meropenem  IVPB 1000 milliGRAM(s) IV Intermittent every 8 hours  OLANZapine 5 milliGRAM(s) Oral <User Schedule>  pantoprazole    Tablet 40 milliGRAM(s) Oral before breakfast  QUEtiapine 100 milliGRAM(s) Oral <User Schedule>  senna Syrup 10 milliLiter(s) Oral at bedtime  sodium chloride 3%  Inhalation 4 milliLiter(s) Inhalation every 6 hours  sucralfate 1 Gram(s) Oral four times a day    MEDICATIONS  (PRN):  acetaminophen   Oral Liquid .. 750 milliGRAM(s) Oral every 6 hours PRN Temp greater or equal to 38C (100.4F), Mild Pain (1 - 3)      ALLERGIES:  Haldol (Other)  No Known Allergies  benzodiazepines (Other)      LABS:                                                                10.4   9.71  )-----------( 235      ( 30 Nov 2023 10:32 )             33.9   11-30    143  |  108  |  17  ----------------------------<  186<H>  3.9   |  28  |  0.32<L>    Ca    8.6      30 Nov 2023 10:32  Phos  2.0     11-30          < from: CT Angio Chest PE Protocol w/ IV Cont (11.27.23 @ 22:44) >  IMPRESSION:.    No pulmonary embolism detected.    Occlusive secretions within the left mainstem bronchus with associated   complete left lung collapse; consider superimposed infection in the   appropriate clinical scenario.    Small leftpleural effusion.    --- End of Report ---      < end of copied text >       < from: CT Abdomen and Pelvis w/ IV Cont (11.18.23 @ 19:41) >    IMPRESSION:    Extensive decubitus ulcer involving the right posterior gluteal and   pelvic region with extension to bone. There is bony erosion of the   ischium compatible with osteomyelitis.    Mild bladder wall thickening and a few foci of intraluminal air,   correlate for recent instrumentation and/or infection.    Left lower lobe consolidation along with tree-in-bud/nodular opacities in   the left upper lobe concerning for pneumonia.    --- End of Report ---        < end of copied text >

## 2023-12-01 NOTE — PROGRESS NOTE ADULT - PROBLEM SELECTOR PLAN 2
CT A/P with LLL consolidation & SHEILA TIB opacities  -CTA chest with L lung mucus plugging/atelectasis, possible post obstructive PNA   -RVP negative  -MRSA PCR negative  -BC with NGTD  -Urine legionella negative  -ABX changed to Meropenem as pt still spiking temps. Afebrile now.   -ID f/u.

## 2023-12-01 NOTE — PROGRESS NOTE ADULT - SUBJECTIVE AND OBJECTIVE BOX
SUBJECTIVE AND OBJECTIVE  Indication for Geriatrics and Palliative Care Services/INTERVAL HPI: GOC    OVERNIGHT EVENTS: f/u visit to patient, wife Brenda visiting at bedside. She states pt is having a good day today, he is more awake and interactive. She was able to suction a big phlegm from the patient today. She continues to wish for pt to remain on the floor, however with less intensive treatments to avoid pt suffering. She hopes that he will stabilize enough to go back home.     Case d/w pulmonary and medicine teams, advised to continue weaning oxygen as tolerated and hopefully pt can be on NC soon. Per pulm discussion with the family, no plans for aggressive measures including bronch, bipap and vest therapy.      DNR on chart:DNI  DNI      Allergies    No Known Allergies    Intolerances    Haldol (Other)  benzodiazepines (Other)    MEDICATIONS  (STANDING):  chlorhexidine 2% Cloths 1 Application(s) Topical <User Schedule>  Dakins Solution - 1/4 Strength 1 Application(s) Topical two times a day  enoxaparin Injectable 40 milliGRAM(s) SubCutaneous every 24 hours  ipratropium    for Nebulization 500 MICROGram(s) Nebulizer every 6 hours  levalbuterol Inhalation 0.63 milliGRAM(s) Inhalation every 6 hours  meropenem  IVPB 1000 milliGRAM(s) IV Intermittent every 8 hours  OLANZapine 5 milliGRAM(s) Oral <User Schedule>  pantoprazole    Tablet 40 milliGRAM(s) Oral before breakfast  QUEtiapine 100 milliGRAM(s) Oral <User Schedule>  senna Syrup 10 milliLiter(s) Oral at bedtime  sodium chloride 3%  Inhalation 4 milliLiter(s) Inhalation every 6 hours  sucralfate 1 Gram(s) Oral four times a day    MEDICATIONS  (PRN):  acetaminophen   Oral Liquid .. 750 milliGRAM(s) Oral every 6 hours PRN Temp greater or equal to 38C (100.4F), Mild Pain (1 - 3)      ITEMS UNCHECKED ARE NOT PRESENT    PRESENT SYMPTOMS: [x ]Unable to self-report - see [ ] CPOT [ x] PAINADS 0 [ x] RDOS 2  Source if other than patient:  [ ]Family   [ ]Team     PCSSQ[Palliative Care Spiritual Screening Question]   Severity (0-10):  Score of 4 or > indicate consideration of Chaplaincy referral.  Chaplaincy Referral: [ x] yes [ ] refused [ ] following [ ] Deferred     Caregiver Durand? : [ ] yes [x ] no [ ] Deferred [ ] Declined             Social work referral [ ] Patient & Family Centered Care Referral [ ]     Anticipatory Grief present?:  [ ] yes [x ] no  [ ] Deferred                  Social work referral [ ] Chaplaincy Referral[ ]    Other Symptoms:  [x ]All other review of systems negative     Palliative Performance Status Version 2:     30    %      [email or web address]/[email or web address]    PHYSICAL EXAM:  Vital Signs Last 24 Hrs  T(C): 37 (01 Dec 2023 09:50), Max: 37 (01 Dec 2023 09:50)  T(F): 98.6 (01 Dec 2023 09:50), Max: 98.6 (01 Dec 2023 09:50)  HR: 92 (01 Dec 2023 10:41) (88 - 106)  BP: 114/68 (01 Dec 2023 09:50) (111/68 - 118/71)  BP(mean): --  RR: 18 (01 Dec 2023 09:50) (18 - 19)  SpO2: 98% (01 Dec 2023 10:41) (95% - 100%)    Parameters below as of 01 Dec 2023 09:50  Patient On (Oxygen Delivery Method): nasal cannula, high flow     GENERAL: [ ]Cachexia    [x ]Alert  [ ]Oriented x   [ ]Lethargic  [ ]Unarousable  [ ]Verbal  [x ]Non-Verbal  Behavioral:   [ ]Anxiety  [ ]Delirium [ ]Agitation [ ]Other  HEENT:  [ ]Normal   [ ]Dry mouth   [ ]ET Tube/Trach  [ ]Oral lesions [x] bipap mask  PULMONARY:   [ ]Clear [ ]Tachypnea  [ ]Audible excessive secretions   [ ]Rhonchi        [ ]Right [ ]Left [ ]Bilateral  [ ]Crackles        [ ]Right [ ]Left [ ]Bilateral  [ ]Wheezing     [ ]Right [ ]Left [ ]Bilateral  [ x]Diminished BS [ ] Right [x ]Left [ ]Bilateral  CARDIOVASCULAR:    [ ]Regular [ ]Irregular [ x]Tachy  [ ]Usman [ ]Murmur [ ]Other  GASTROINTESTINAL:  [ x]Soft  [ ]Distended   [ ]+BS  [ ]Non tender [ ]Tender  [ ]Other [x ]PEG [ ]OGT/ NGT   Last BM:   GENITOURINARY:  [ ]Normal [x ]Incontinent   [ ]Oliguria/Anuria   [ ]Fleming  MUSCULOSKELETAL:   [ ]Normal   [ ]Weakness  [x ]Bed/Wheelchair bound [ ]Edema  NEUROLOGIC:   [ ]No focal deficits  [ x] Cognitive impairment  [ ] Dysphagia [ ]Dysarthria [ ] Paresis [ ]Other   SKIN:   [ ]Normal  [ ]Rash  [ ]Other  [x ]Pressure ulcer(s) [ x]y [ ]n present on admission    CRITICAL CARE:  [ ]Shock Present  [ ]Septic [ ]Cardiogenic [ ]Neurologic [ ]Hypovolemic  [ ]Vasopressors [ ]Inotropes  [ x]Respiratory failure present [ ]Mechanical Ventilation [x ]Non-invasive ventilatory support [ ]High-Flow   [x ]Acute  [ ]Chronic [ x]Hypoxic  [ ]Hypercarbic [ ]Other  [ ]Other organ failure     LABS:                          10.4   9.71  )-----------( 235      ( 30 Nov 2023 10:32 )             33.9     11-30    143  |  108  |  17  ----------------------------<  186<H>  3.9   |  28  |  0.32<L>    Ca    8.6      30 Nov 2023 10:32  Phos  2.0     11-30      RADIOLOGY & ADDITIONAL STUDIES: < from: Xray Chest 1 View- PORTABLE-Routine (Xray Chest 1 View- PORTABLE-Routine in AM.) (11.30.23 @ 04:56) >    IMPRESSION:  Similar left chest whiteout with occluded bronchus.        Thank you for the courtesy of this referral.    --- End of Report ---    < end of copied text >      Protein Calorie Malnutrition Present: [ ]mild [ ]moderate [ ]severe [ ]underweight [ ]morbid obesity  https://www.andeal.org/vault/2440/web/files/ONC/Table_Clinical%20Characteristics%20to%20Document%20Malnutrition-White%20JV%20et%20al%202012.pdf    Height (cm): 170.2 (11-18-23 @ 17:39)  Weight (kg): 49.9 (11-18-23 @ 17:39)  BMI (kg/m2): 17.2 (11-18-23 @ 17:39)    [ ]PPSV2 < or = 30%  [ ]significant weight loss [ ]poor nutritional intake [ ]anasarca[ ]Artificial Nutrition    Other REFERRALS:  [ ]Hospice  [ ]Child Life  [ ]Social Work  [ ]Case management [ ]Holistic Therapy

## 2023-12-01 NOTE — PROGRESS NOTE ADULT - SUBJECTIVE AND OBJECTIVE BOX
[facility]  Nathaniel Barron PA-C  [address] [place] [address]  [phone]      INTERVAL HPI/OVERNIGHT EVENTS:  pt seen and examined, events noted  remains on HFNC    MEDICATIONS  (STANDING):  chlorhexidine 2% Cloths 1 Application(s) Topical <User Schedule>  Dakins Solution - 1/2 Strength 1 Application(s) Topical daily  Dakins Solution - 1/4 Strength 1 Application(s) Topical two times a day  enoxaparin Injectable 40 milliGRAM(s) SubCutaneous every 24 hours  guaiFENesin  milliGRAM(s) Oral every 12 hours  OLANZapine 5 milliGRAM(s) Oral <User Schedule>  pantoprazole    Tablet 40 milliGRAM(s) Oral before breakfast  piperacillin/tazobactam IVPB.. 3.375 Gram(s) IV Intermittent every 8 hours  polyethylene glycol 3350 17 Gram(s) Oral daily  QUEtiapine 100 milliGRAM(s) Oral <User Schedule>  senna 2 Tablet(s) Oral at bedtime  sucralfate 1 Gram(s) Oral four times a day  vancomycin  IVPB 500 milliGRAM(s) IV Intermittent every 12 hours  vancomycin  IVPB        MEDICATIONS  (PRN):  acetaminophen   Oral Liquid .. 750 milliGRAM(s) Oral every 6 hours PRN Mild Pain (1 - 3)      Allergies    No Known Allergies    Intolerances    Haldol (Other)  benzodiazepines (Other)      ROS:   unable to obtain       PHYSICAL EXAM:   Vital Signs Last 24 Hrs  T(C): 37 (01 Dec 2023 09:50), Max: 37 (01 Dec 2023 09:50)  T(F): 98.6 (01 Dec 2023 09:50), Max: 98.6 (01 Dec 2023 09:50)  HR: 106 (01 Dec 2023 09:50) (88 - 106)  BP: 114/68 (01 Dec 2023 09:50) (111/68 - 118/71)  BP(mean): --  RR: 18 (01 Dec 2023 09:50) (18 - 19)  SpO2: 97% (01 Dec 2023 09:50) (95% - 100%)    Parameters below as of 01 Dec 2023 09:50  Patient On (Oxygen Delivery Method): nasal cannula, high flow              Daily     Daily     nad  confused  frail  non toxic  soft, nt, +PEG  no edema        LABS:                                   10.4   9.71  )-----------( 235      ( 30 Nov 2023 10:32 )             33.9   11-30    143  |  108  |  17  ----------------------------<  186<H>  3.9   |  28  |  0.32<L>    Ca    8.6      30 Nov 2023 10:32  Phos  2.0     11-30        Urinalysis Basic - ( 20 Nov 2023 22:01 )    Color: x / Appearance: x / SG: x / pH: x  Gluc: 118 mg/dL / Ketone: x  / Bili: x / Urobili: x   Blood: x / Protein: x / Nitrite: x   Leuk Esterase: x / RBC: x / WBC x   Sq Epi: x / Non Sq Epi: x / Bacteria: x        RADIOLOGY & ADDITIONAL TESTS:

## 2023-12-01 NOTE — PROGRESS NOTE ADULT - PROBLEM SELECTOR PLAN 4
- HCP reportedly wife Brenda.   - Code status: DNR/I, MOLST completed  - GOC: family wishing to continue with limited medical interventions without PCU transfer at this time, the goal is for pt to wean off HFNC to be able to transition back home

## 2023-12-01 NOTE — PROGRESS NOTE ADULT - PROBLEM SELECTOR PLAN 1
S/p RRT 11/20 for hypoxia requiring HFNC  -Increase in O2 requirements 11/21 from HFNC 50L/80% to 100% Bipap. Weaned from Bipap to HFNC 50L/100%  -Suspect aspiration event, family at bedside reports multiple aspiration events in the past  -CT A/P with LLL consolidation, SHEILA tree in bud opacities likely PNA. Continue ABX per ID  -ABG with no CO2 retention  -CXR 11/25 with LLL infiltrate  -CTA chest 11/27 negative for PE. Notes extensive L atelectasis/mucus plugging with small L effusion  -Started on Bipap 12/8 to assist with atelectasis but pt has been unable to tolerate. Unable to tolerate chest vest as well per RT   -Discussed plan of care at length with patient's wife Brenda and daughter Ling ([phone]). They expressed that at this point they are not ready to transition to comfort measures or palliative care. While they still want to treat, they would like to focus on interventions that do not cause the patient any more pain. They experienced patient's visible discomfort with Bipap as well as chest vest, and would like to discontinue these therapies at this time and focus on manual chest PT, O2 via HFNC, and nebulizers.    -Bipap and chest vest d/c'd at family's request. CXR this AM actually with improvement in L lung aeration.   -Continue Atrovent/Xopenex nebs q6h, 3% sodium chloride nebs q6h  -Suction PRN  -Chest PT  -Position patient with L lung up   -O2 requirements slowly improving - continue HFNC (currently 50L/50%). Wean O2 as tolerated. Keep sats >90%.    -Palliative care f/u. Overall prognosis guarded.

## 2023-12-01 NOTE — PROGRESS NOTE ADULT - NS ATTEND AMEND GEN_ALL_CORE FT
events noted:  was placed on bipap for ? reason not clear:  now back on 45%: high flow:  his d dimer is high : will try to decrease oxygen further  to nasal cannula
on 50% fio2:  cxr today is with complete atelectasis" could not use chest vest  : per ACP report:  wife hinders it:  Cont BD and chest pt:  and he should be lying down more on rigth side then left  : delmi acp  and wife at bedside
seems to be doing  poorly:  on 80% high flow now:  alert  : cont current treatment:  overall  prognois is not goof:  family aware
pt is doing poorly:  the left sided is completely atelectasis with zach effusion : dw wife in detail at bedside today and the daughter,  a pharmacist.  they do not want any invasive workup / bronchoscopy:  will cont conservative rx:  he is on high flow at this time
he I s not doing well ; he has whole lung atelectasis on the left side:   discussed with wife : she does not seem to be interested in bronchoscopy:  but she wanted to me talk to her daughter,  which I called:  but no response:  I gave my cell number to the wife for daughter to call me : cont conservative rx:  chest pt aggressive,  lying on rigth side:  mucomyst and a trial of bipap : there is no pe:  cont Mucomyst and hypersal nebs
he seems to be doing a little better: : on 60% high flow: : :  agree with above:"  dc acp
was on 70%  high flow: awaiting ctatoday  : dwdaugther at bedside
detailed exam

## 2023-12-01 NOTE — PROGRESS NOTE ADULT - ASSESSMENT
79 yo male h/o dementia, non verbal. contracted, s/p peg. here with sepsis likely 2/2 sacral decub and aspiration pna    sepsis  respiratory failure   sacral decub  pna - possible aspiration   cont abx as per id  f/u cult  wound care f/u  id consult noted  pulm consulted  cont supp o2 via high flow. taper down as able   CTA chest noted  pulm f/u   bipap as able  abx changed to meropenem. id f/u apprec    dementia  cont home meds  supportive care    dysphagia s/p peg  feeds as per gi/nutrition     hyperNa  free water via peg ordered  trend bmp       dvt ppx      d/w family bedside  DNR    palliative eval for goc  d/w family bedside  long term prognosis poor     Advanced care planning was discussed with patient and family.  Advanced care planning forms were reviewed and discussed as appropriate.  Differential diagnosis and plan of care discussed with patient after the evaluation.   Pain assessed and judicious use of narcotics when appropriate was discussed.  Importance of Fall prevention discussed.  Counseling on Smoking and Alcohol cessation was offered when appropriate.  Counseling on Diet, exercise, and medication compliance was done.       Approx 75 minutes spent.

## 2023-12-01 NOTE — PROGRESS NOTE ADULT - PROBLEM SELECTOR PLAN 3
In setting of PNA, likely ongoing aspiration, and mucus plugging/lung collapse on CTA chest 11/27  - pulmonary following, continue with mucolytics/nebs/HFNC  - abx per ID to complete a full treatment course   - wean o2 as tolerated

## 2023-12-02 LAB
CULTURE RESULTS: SIGNIFICANT CHANGE UP
SPECIMEN SOURCE: SIGNIFICANT CHANGE UP

## 2023-12-02 RX ADMIN — PANTOPRAZOLE SODIUM 40 MILLIGRAM(S): 20 TABLET, DELAYED RELEASE ORAL at 06:00

## 2023-12-02 RX ADMIN — Medication 500 MICROGRAM(S): at 17:51

## 2023-12-02 RX ADMIN — LEVALBUTEROL 0.63 MILLIGRAM(S): 1.25 SOLUTION, CONCENTRATE RESPIRATORY (INHALATION) at 12:45

## 2023-12-02 RX ADMIN — MEROPENEM 100 MILLIGRAM(S): 1 INJECTION INTRAVENOUS at 14:12

## 2023-12-02 RX ADMIN — Medication 1 GRAM(S): at 12:44

## 2023-12-02 RX ADMIN — CHLORHEXIDINE GLUCONATE 1 APPLICATION(S): 213 SOLUTION TOPICAL at 06:01

## 2023-12-02 RX ADMIN — Medication 500 MICROGRAM(S): at 00:42

## 2023-12-02 RX ADMIN — ENOXAPARIN SODIUM 40 MILLIGRAM(S): 100 INJECTION SUBCUTANEOUS at 22:22

## 2023-12-02 RX ADMIN — Medication 750 MILLIGRAM(S): at 21:44

## 2023-12-02 RX ADMIN — Medication 1 GRAM(S): at 00:42

## 2023-12-02 RX ADMIN — SODIUM CHLORIDE 4 MILLILITER(S): 9 INJECTION INTRAMUSCULAR; INTRAVENOUS; SUBCUTANEOUS at 05:59

## 2023-12-02 RX ADMIN — LEVALBUTEROL 0.63 MILLIGRAM(S): 1.25 SOLUTION, CONCENTRATE RESPIRATORY (INHALATION) at 17:51

## 2023-12-02 RX ADMIN — SODIUM CHLORIDE 4 MILLILITER(S): 9 INJECTION INTRAMUSCULAR; INTRAVENOUS; SUBCUTANEOUS at 17:52

## 2023-12-02 RX ADMIN — Medication 1 APPLICATION(S): at 06:00

## 2023-12-02 RX ADMIN — QUETIAPINE FUMARATE 100 MILLIGRAM(S): 200 TABLET, FILM COATED ORAL at 21:09

## 2023-12-02 RX ADMIN — Medication 1 GRAM(S): at 17:50

## 2023-12-02 RX ADMIN — Medication 500 MICROGRAM(S): at 12:45

## 2023-12-02 RX ADMIN — Medication 750 MILLIGRAM(S): at 08:52

## 2023-12-02 RX ADMIN — Medication 750 MILLIGRAM(S): at 21:09

## 2023-12-02 RX ADMIN — Medication 500 MICROGRAM(S): at 05:59

## 2023-12-02 RX ADMIN — Medication 1 GRAM(S): at 06:00

## 2023-12-02 RX ADMIN — MEROPENEM 100 MILLIGRAM(S): 1 INJECTION INTRAVENOUS at 21:09

## 2023-12-02 RX ADMIN — MEROPENEM 100 MILLIGRAM(S): 1 INJECTION INTRAVENOUS at 06:01

## 2023-12-02 RX ADMIN — Medication 500 MICROGRAM(S): at 23:26

## 2023-12-02 RX ADMIN — LEVALBUTEROL 0.63 MILLIGRAM(S): 1.25 SOLUTION, CONCENTRATE RESPIRATORY (INHALATION) at 00:53

## 2023-12-02 RX ADMIN — SODIUM CHLORIDE 4 MILLILITER(S): 9 INJECTION INTRAMUSCULAR; INTRAVENOUS; SUBCUTANEOUS at 00:53

## 2023-12-02 RX ADMIN — Medication 750 MILLIGRAM(S): at 09:22

## 2023-12-02 RX ADMIN — Medication 1 GRAM(S): at 23:26

## 2023-12-02 RX ADMIN — SODIUM CHLORIDE 4 MILLILITER(S): 9 INJECTION INTRAMUSCULAR; INTRAVENOUS; SUBCUTANEOUS at 12:45

## 2023-12-02 RX ADMIN — SENNA PLUS 10 MILLILITER(S): 8.6 TABLET ORAL at 21:09

## 2023-12-02 RX ADMIN — LEVALBUTEROL 0.63 MILLIGRAM(S): 1.25 SOLUTION, CONCENTRATE RESPIRATORY (INHALATION) at 23:26

## 2023-12-02 RX ADMIN — LEVALBUTEROL 0.63 MILLIGRAM(S): 1.25 SOLUTION, CONCENTRATE RESPIRATORY (INHALATION) at 05:59

## 2023-12-02 NOTE — PROGRESS NOTE ADULT - SUBJECTIVE AND OBJECTIVE BOX
INTERVAL HPI/OVERNIGH  No new overnight event.  No N/V/D.  Tolerating diet.  less distended having bm  breathing better      Allergies    No Known Allergies    Intolerances    Haldol (Other)  benzodiazepines (Other)        General:  No wt loss, fevers, chills, night sweats, fatigue,   Eyes:  Good vision, no reported pain  ENT:  No sore throat, pain, runny nose, dysphagia  CV:  No pain, palpitations, hypo/hypertension  Resp:  No dyspnea, cough, tachypnea, wheezing  GI:  No pain, No nausea, No vomiting, No diarrhea, No constipation, No weight loss, No fever, No pruritis, No rectal bleeding, No tarry stools, No dysphagia,  :  No pain, bleeding, incontinence, nocturia  Muscle:  No pain, weakness  Neuro:  No weakness, tingling, memory problems  Psych:  No fatigue, insomnia, mood problems, depression  Endocrine:  No polyuria, polydipsia, cold/heat intolerance  Heme:  No petechiae, ecchymosis, easy bruisability  Skin:  No rash, tattoos, scars, edema      PHYSICAL EXAM:   Vital Signs:  Vital Signs Last 24 Hrs  T(C): 36.9 (02 Dec 2023 07:20), Max: 38 (01 Dec 2023 18:58)  T(F): 98.5 (02 Dec 2023 07:20), Max: 100.4 (01 Dec 2023 18:58)  HR: 97 (02 Dec 2023 07:20) (84 - 105)  BP: 103/68 (02 Dec 2023 07:20) (97/60 - 109/64)  BP(mean): --  RR: 18 (02 Dec 2023 07:20) (18 - 18)  SpO2: 98% (02 Dec 2023 09:45) (96% - 100%)    Parameters below as of 02 Dec 2023 09:45  Patient On (Oxygen Delivery Method): nasal cannula, high flow  O2 Flow (L/min): 40  O2 Concentration (%): 40  Daily     Daily I&O's Summary    01 Dec 2023 07:01  -  02 Dec 2023 07:00  --------------------------------------------------------  IN: 0 mL / OUT: 550 mL / NET: -550 mL    02 Dec 2023 07:01  -  02 Dec 2023 11:49  --------------------------------------------------------  IN: 50 mL / OUT: 0 mL / NET: 50 mL        GENERAL:  Appears stated age, well-groomed, well-nourished, no distress  HEENT:  NC/AT,  conjunctivae clear and pink, no thyromegaly, nodules, adenopathy, no JVD, sclera -anicteric  CHEST:  Full & symmetric excursion, no increased effort, breath sounds clear  HEART:  Regular rhythm, S1, S2, no murmur/rub/S3/S4, no abdominal bruit, no edema  ABDOMEN:  Soft, non-tender, non-distended, normoactive bowel sounds,  no masses ,no hepato-splenomegaly, no signs of chronic liver disease  EXTEREMITIES:  no cyanosis,clubbing or edema  SKIN:  No rash/erythema/ecchymoses/petechiae/wounds/abscess/warm/dry  NEURO:  Alert, oriented, no asterixis, no tremor, no encephalopathy      LABS:              amylase   lipase  RADIOLOGY & ADDITIONAL TESTS:

## 2023-12-02 NOTE — PROGRESS NOTE ADULT - SUBJECTIVE AND OBJECTIVE BOX
SHARONA LARKIN  78y Male  MRN:[id number]    Patient is a 78y old  Male who presents with a chief complaint of sepsis      HPI:  79 yo M PMHx of severe dementia, PEG tube presents with ulcer that has worsened over last month. Patient was seen by house calls doctor 2 weeks ago, placed on antibiotics but started mounting fevers in the last few days. It was noticed by his GI doctor (Dr. Moraes) that his ulcer was getting worse, so he sent him in. Patient is nonverbal at baseline. Wife has noticed no new cough, runny nose. (19 Nov 2023 13:02)      Patient seen and evaluated at bedside. No acute events overnight except as noted.    Interval HPI: no acute events o/n     PAST MEDICAL & SURGICAL HISTORY:  Dementia      Pneumonia      Acute renal failure (ARF)  ARF 1.5 YEAR AGO      Dysphagia  peg      Arthritis      GERD (gastroesophageal reflux disease)      Dyspepsia      History of hand surgery      PEG adjustment, replacement, or removal      Status post insertion of percutaneous endoscopic gastrostomy (PEG) tube  PEG replacement on 02/29/16          REVIEW OF SYSTEMS:  as per hpi     VITALS:   Vital Signs Last 24 Hrs  T(C): 36.9 (02 Dec 2023 07:20), Max: 36.9 (02 Dec 2023 07:20)  T(F): 98.5 (02 Dec 2023 07:20), Max: 98.5 (02 Dec 2023 07:20)  HR: 97 (02 Dec 2023 07:20) (92 - 105)  BP: 103/68 (02 Dec 2023 07:20) (97/60 - 106/72)  BP(mean): --  RR: 18 (02 Dec 2023 07:20) (18 - 18)  SpO2: 92% (02 Dec 2023 15:57) (92% - 100%)    Parameters below as of 02 Dec 2023 15:57  Patient On (Oxygen Delivery Method): nasal cannula, high flow  O2 Flow (L/min): 50  O2 Concentration (%): 50      PHYSICAL EXAM:  GENERAL: NAD,  comfortable   HEAD:  Atraumatic, Normocephalic  EYES: EOMI, PERRLA, conjunctiva and sclera clear  NECK: Supple, No JVD  CHEST/LUNG: Clear to auscultation bilaterally; No wheeze  HEART: S1, S2; No murmurs, rubs, or gallops  ABDOMEN: Soft, Nontender, Nondistended; Bowel sounds present +peg  EXTREMITIES:  2+ Peripheral Pulses, contracted   PSYCH: Normal affect  NEUROLOGY: non verbal  SKIN: +sacral decub    Consultant(s) Notes Reviewed:  [x ] YES  [ ] NO  Care Discussed with Consultants/Other Providers [ x] YES  [ ] NO    MEDS:   MEDICATIONS  (STANDING):  chlorhexidine 2% Cloths 1 Application(s) Topical <User Schedule>  Dakins Solution - 1/4 Strength 1 Application(s) Topical two times a day  enoxaparin Injectable 40 milliGRAM(s) SubCutaneous every 24 hours  ipratropium    for Nebulization 500 MICROGram(s) Nebulizer every 6 hours  levalbuterol Inhalation 0.63 milliGRAM(s) Inhalation every 6 hours  meropenem  IVPB 1000 milliGRAM(s) IV Intermittent every 8 hours  OLANZapine 5 milliGRAM(s) Oral <User Schedule>  pantoprazole    Tablet 40 milliGRAM(s) Oral before breakfast  QUEtiapine 100 milliGRAM(s) Oral <User Schedule>  senna Syrup 10 milliLiter(s) Oral at bedtime  sodium chloride 3%  Inhalation 4 milliLiter(s) Inhalation every 6 hours  sucralfate 1 Gram(s) Oral four times a day    MEDICATIONS  (PRN):  acetaminophen   Oral Liquid .. 750 milliGRAM(s) Oral every 6 hours PRN Temp greater or equal to 38C (100.4F), Mild Pain (1 - 3)      ALLERGIES:  Haldol (Other)  No Known Allergies  benzodiazepines (Other)      LABS:                                                   < from: CT Angio Chest PE Protocol w/ IV Cont (11.27.23 @ 22:44) >  IMPRESSION:.    No pulmonary embolism detected.    Occlusive secretions within the left mainstem bronchus with associated   complete left lung collapse; consider superimposed infection in the   appropriate clinical scenario.    Small leftpleural effusion.    --- End of Report ---      < end of copied text >       < from: CT Abdomen and Pelvis w/ IV Cont (11.18.23 @ 19:41) >    IMPRESSION:    Extensive decubitus ulcer involving the right posterior gluteal and   pelvic region with extension to bone. There is bony erosion of the   ischium compatible with osteomyelitis.    Mild bladder wall thickening and a few foci of intraluminal air,   correlate for recent instrumentation and/or infection.    Left lower lobe consolidation along with tree-in-bud/nodular opacities in   the left upper lobe concerning for pneumonia.    --- End of Report ---        < end of copied text >

## 2023-12-02 NOTE — PROGRESS NOTE ADULT - ASSESSMENT
79 yo male h/o dementia, non verbal. contracted, s/p peg. here with sepsis likely 2/2 sacral decub and aspiration pna    sepsis  respiratory failure   sacral decub  pna - possible aspiration   cont abx as per id  f/u cult  wound care f/u  id consult noted  pulm consulted  cont supp o2 via high flow. taper down as able   CTA chest noted  pulm f/u   bipap as able  abx changed to meropenem. id f/u apprec    dementia  cont home meds  supportive care    dysphagia s/p peg  feeds as per gi/nutrition   s/p peg change this am by gi bedside     hyperNa  free water via peg ordered  trend bmp       dvt ppx      d/w family bedside  DNR    palliative eval for goc   d/w family bedside   family wants to take home    Advanced care planning was discussed with patient and family.  Advanced care planning forms were reviewed and discussed as appropriate.  Differential diagnosis and plan of care discussed with patient after the evaluation.   Pain assessed and judicious use of narcotics when appropriate was discussed.  Importance of Fall prevention discussed.  Counseling on Smoking and Alcohol cessation was offered when appropriate.  Counseling on Diet, exercise, and medication compliance was done.       Approx 75 minutes spent.

## 2023-12-02 NOTE — PROGRESS NOTE ADULT - SUBJECTIVE AND OBJECTIVE BOX
Subjective: Patient seen and examined. No new events except as noted.     REVIEW OF SYSTEMS:    Unable to obtain     MEDICATIONS:  MEDICATIONS  (STANDING):  chlorhexidine 2% Cloths 1 Application(s) Topical <User Schedule>  Dakins Solution - 1/4 Strength 1 Application(s) Topical two times a day  enoxaparin Injectable 40 milliGRAM(s) SubCutaneous every 24 hours  ipratropium    for Nebulization 500 MICROGram(s) Nebulizer every 6 hours  levalbuterol Inhalation 0.63 milliGRAM(s) Inhalation every 6 hours  meropenem  IVPB 1000 milliGRAM(s) IV Intermittent every 8 hours  OLANZapine 5 milliGRAM(s) Oral <User Schedule>  pantoprazole    Tablet 40 milliGRAM(s) Oral before breakfast  QUEtiapine 100 milliGRAM(s) Oral <User Schedule>  senna Syrup 10 milliLiter(s) Oral at bedtime  sodium chloride 3%  Inhalation 4 milliLiter(s) Inhalation every 6 hours  sucralfate 1 Gram(s) Oral four times a day      PHYSICAL EXAM:  T(C): 36.9 (12-02-23 @ 07:20), Max: 36.9 (12-02-23 @ 07:20)  HR: 97 (12-02-23 @ 07:20) (92 - 105)  BP: 103/68 (12-02-23 @ 07:20) (97/60 - 106/72)  RR: 18 (12-02-23 @ 07:20) (18 - 18)  SpO2: 92% (12-02-23 @ 15:57) (92% - 100%)  Wt(kg): --  I&O's Summary    01 Dec 2023 07:01  -  02 Dec 2023 07:00  --------------------------------------------------------  IN: 0 mL / OUT: 550 mL / NET: -550 mL    02 Dec 2023 07:01  -  02 Dec 2023 19:05  --------------------------------------------------------  IN: 50 mL / OUT: 200 mL / NET: -150 mL              Appearance: NAD nonverbal	  HEENT:   Normal oral mucosa, PERRL, EOMI	  Lymphatic: No lymphadenopathy  Cardiovascular: Normal tachy S1 S2, No JVD, No murmurs, No edema  Respiratory: decreased bs   Psychiatry: A & O x 0  Gastrointestinal:  Soft, Non-tender, + PEG   Skin: No rashes, No ecchymoses, No cyanosis	  Neurologic: Non-focal  Extremities: contracted   Vascular: Peripheral pulses palpable 2+ bilaterally  +sacral decub           LABS:    CARDIAC MARKERS:                  proBNP:   Lipid Profile:   HgA1c:   TSH:             TELEMETRY: 	    ECG:  	  RADIOLOGY:   DIAGNOSTIC TESTING:  [ ] Echocardiogram:  [ ]  Catheterization:  [ ] Stress Test:    OTHER:

## 2023-12-02 NOTE — PROGRESS NOTE ADULT - ASSESSMENT
sacral decub  PEG tube  sepsis    CT noted  ID following  peg changed at bedside gastric contents returned can use for feeding and meds  wound care f/u  cultures noted  cont tube feeds as tolerated   aspiration precautions   palliative following for GOC  will follow    Advanced care planning was discussed with patient and family.  Advanced care planning forms were reviewed and discussed.  Risks, benefits and alternatives of gastroenterologic procedures were discussed in detail and all questions were answered.    30 minutes spent.

## 2023-12-03 LAB
GLUCOSE BLDC GLUCOMTR-MCNC: 120 MG/DL — HIGH (ref 70–99)
GLUCOSE BLDC GLUCOMTR-MCNC: 120 MG/DL — HIGH (ref 70–99)
GLUCOSE BLDC GLUCOMTR-MCNC: 156 MG/DL — HIGH (ref 70–99)
GLUCOSE BLDC GLUCOMTR-MCNC: 156 MG/DL — HIGH (ref 70–99)
GLUCOSE BLDC GLUCOMTR-MCNC: 168 MG/DL — HIGH (ref 70–99)
GLUCOSE BLDC GLUCOMTR-MCNC: 168 MG/DL — HIGH (ref 70–99)

## 2023-12-03 RX ORDER — SUCRALFATE 1 G
1 TABLET ORAL
Refills: 0 | Status: DISCONTINUED | OUTPATIENT
Start: 2023-12-03 | End: 2023-12-04

## 2023-12-03 RX ADMIN — Medication 1 GRAM(S): at 05:08

## 2023-12-03 RX ADMIN — MEROPENEM 100 MILLIGRAM(S): 1 INJECTION INTRAVENOUS at 05:08

## 2023-12-03 RX ADMIN — LEVALBUTEROL 0.63 MILLIGRAM(S): 1.25 SOLUTION, CONCENTRATE RESPIRATORY (INHALATION) at 05:09

## 2023-12-03 RX ADMIN — LEVALBUTEROL 0.63 MILLIGRAM(S): 1.25 SOLUTION, CONCENTRATE RESPIRATORY (INHALATION) at 23:09

## 2023-12-03 RX ADMIN — Medication 500 MICROGRAM(S): at 17:16

## 2023-12-03 RX ADMIN — SENNA PLUS 10 MILLILITER(S): 8.6 TABLET ORAL at 21:47

## 2023-12-03 RX ADMIN — Medication 1 APPLICATION(S): at 05:09

## 2023-12-03 RX ADMIN — SODIUM CHLORIDE 4 MILLILITER(S): 9 INJECTION INTRAMUSCULAR; INTRAVENOUS; SUBCUTANEOUS at 05:09

## 2023-12-03 RX ADMIN — SODIUM CHLORIDE 4 MILLILITER(S): 9 INJECTION INTRAMUSCULAR; INTRAVENOUS; SUBCUTANEOUS at 23:08

## 2023-12-03 RX ADMIN — PANTOPRAZOLE SODIUM 40 MILLIGRAM(S): 20 TABLET, DELAYED RELEASE ORAL at 05:08

## 2023-12-03 RX ADMIN — ENOXAPARIN SODIUM 40 MILLIGRAM(S): 100 INJECTION SUBCUTANEOUS at 21:48

## 2023-12-03 RX ADMIN — Medication 750 MILLIGRAM(S): at 21:48

## 2023-12-03 RX ADMIN — SODIUM CHLORIDE 4 MILLILITER(S): 9 INJECTION INTRAMUSCULAR; INTRAVENOUS; SUBCUTANEOUS at 17:16

## 2023-12-03 RX ADMIN — Medication 500 MICROGRAM(S): at 11:02

## 2023-12-03 RX ADMIN — LEVALBUTEROL 0.63 MILLIGRAM(S): 1.25 SOLUTION, CONCENTRATE RESPIRATORY (INHALATION) at 11:02

## 2023-12-03 RX ADMIN — SODIUM CHLORIDE 4 MILLILITER(S): 9 INJECTION INTRAMUSCULAR; INTRAVENOUS; SUBCUTANEOUS at 00:08

## 2023-12-03 RX ADMIN — Medication 1 GRAM(S): at 11:02

## 2023-12-03 RX ADMIN — Medication 1 GRAM(S): at 17:17

## 2023-12-03 RX ADMIN — Medication 500 MICROGRAM(S): at 23:09

## 2023-12-03 RX ADMIN — LEVALBUTEROL 0.63 MILLIGRAM(S): 1.25 SOLUTION, CONCENTRATE RESPIRATORY (INHALATION) at 17:16

## 2023-12-03 RX ADMIN — QUETIAPINE FUMARATE 100 MILLIGRAM(S): 200 TABLET, FILM COATED ORAL at 21:47

## 2023-12-03 RX ADMIN — CHLORHEXIDINE GLUCONATE 1 APPLICATION(S): 213 SOLUTION TOPICAL at 05:10

## 2023-12-03 RX ADMIN — SODIUM CHLORIDE 4 MILLILITER(S): 9 INJECTION INTRAMUSCULAR; INTRAVENOUS; SUBCUTANEOUS at 11:02

## 2023-12-03 RX ADMIN — Medication 500 MICROGRAM(S): at 05:08

## 2023-12-03 NOTE — PROGRESS NOTE ADULT - SUBJECTIVE AND OBJECTIVE BOX
SHARONA LARKIN  78y Male  MRN:[id number]    Patient is a 78y old  Male who presents with a chief complaint of sepsis      HPI:  77 yo M PMHx of severe dementia, PEG tube presents with ulcer that has worsened over last month. Patient was seen by house calls doctor 2 weeks ago, placed on antibiotics but started mounting fevers in the last few days. It was noticed by his GI doctor (Dr. Moraes) that his ulcer was getting worse, so he sent him in. Patient is nonverbal at baseline. Wife has noticed no new cough, runny nose. (19 Nov 2023 13:02)      Patient seen and evaluated at bedside. No acute events overnight except as noted.    Interval HPI: no acute events o/n     PAST MEDICAL & SURGICAL HISTORY:  Dementia      Pneumonia      Acute renal failure (ARF)  ARF 1.5 YEAR AGO      Dysphagia  peg      Arthritis      GERD (gastroesophageal reflux disease)      Dyspepsia      History of hand surgery      PEG adjustment, replacement, or removal      Status post insertion of percutaneous endoscopic gastrostomy (PEG) tube  PEG replacement on 02/29/16          REVIEW OF SYSTEMS:  as per hpi     VITALS:   Vital Signs Last 24 Hrs  T(C): 36.6 (03 Dec 2023 08:33), Max: 36.9 (02 Dec 2023 21:28)  T(F): 97.9 (03 Dec 2023 08:33), Max: 98.4 (02 Dec 2023 21:28)  HR: 93 (03 Dec 2023 08:33) (87 - 104)  BP: 108/70 (03 Dec 2023 08:33) (108/70 - 119/64)  BP(mean): --  RR: 18 (03 Dec 2023 08:33) (18 - 18)  SpO2: 97% (03 Dec 2023 09:50) (92% - 98%)    Parameters below as of 03 Dec 2023 09:50  Patient On (Oxygen Delivery Method): nasal cannula, high flow  O2 Flow (L/min): 50  O2 Concentration (%): 50    PHYSICAL EXAM:  GENERAL: NAD,  comfortable   HEAD:  Atraumatic, Normocephalic  EYES: EOMI, PERRLA, conjunctiva and sclera clear  NECK: Supple, No JVD  CHEST/LUNG: Clear to auscultation bilaterally; No wheeze  HEART: S1, S2; No murmurs, rubs, or gallops  ABDOMEN: Soft, Nontender, Nondistended; Bowel sounds present +peg  EXTREMITIES:  2+ Peripheral Pulses, contracted   PSYCH: Normal affect  NEUROLOGY: non verbal  SKIN: +sacral decub    Consultant(s) Notes Reviewed:  [x ] YES  [ ] NO  Care Discussed with Consultants/Other Providers [ x] YES  [ ] NO    MEDS:   MEDICATIONS  (STANDING):  chlorhexidine 2% Cloths 1 Application(s) Topical <User Schedule>  Dakins Solution - 1/4 Strength 1 Application(s) Topical two times a day  enoxaparin Injectable 40 milliGRAM(s) SubCutaneous every 24 hours  ipratropium    for Nebulization 500 MICROGram(s) Nebulizer every 6 hours  levalbuterol Inhalation 0.63 milliGRAM(s) Inhalation every 6 hours  OLANZapine 5 milliGRAM(s) Oral <User Schedule>  pantoprazole    Tablet 40 milliGRAM(s) Oral before breakfast  QUEtiapine 100 milliGRAM(s) Oral <User Schedule>  senna Syrup 10 milliLiter(s) Oral at bedtime  sodium chloride 3%  Inhalation 4 milliLiter(s) Inhalation every 6 hours  sucralfate 1 Gram(s) Oral four times a day    MEDICATIONS  (PRN):  acetaminophen   Oral Liquid .. 750 milliGRAM(s) Oral every 6 hours PRN Temp greater or equal to 38C (100.4F), Mild Pain (1 - 3)      ALLERGIES:  Haldol (Other)  No Known Allergies  benzodiazepines (Other)      LABS:                                                   < from: CT Angio Chest PE Protocol w/ IV Cont (11.27.23 @ 22:44) >  IMPRESSION:.    No pulmonary embolism detected.    Occlusive secretions within the left mainstem bronchus with associated   complete left lung collapse; consider superimposed infection in the   appropriate clinical scenario.    Small leftpleural effusion.    --- End of Report ---      < end of copied text >       < from: CT Abdomen and Pelvis w/ IV Cont (11.18.23 @ 19:41) >    IMPRESSION:    Extensive decubitus ulcer involving the right posterior gluteal and   pelvic region with extension to bone. There is bony erosion of the   ischium compatible with osteomyelitis.    Mild bladder wall thickening and a few foci of intraluminal air,   correlate for recent instrumentation and/or infection.    Left lower lobe consolidation along with tree-in-bud/nodular opacities in   the left upper lobe concerning for pneumonia.    --- End of Report ---        < end of copied text >   SHARONA LARKIN  78y Male  MRN:[id number]    Patient is a 78y old  Male who presents with a chief complaint of sepsis      HPI:  77 yo M PMHx of severe dementia, PEG tube presents with ulcer that has worsened over last month. Patient was seen by house calls doctor 2 weeks ago, placed on antibiotics but started mounting fevers in the last few days. It was noticed by his GI doctor (Dr. Moraes) that his ulcer was getting worse, so he sent him in. Patient is nonverbal at baseline. Wife has noticed no new cough, runny nose. (19 Nov 2023 13:02)      Patient seen and evaluated at bedside. No acute events overnight except as noted.    Interval HPI: no acute events o/n     PAST MEDICAL & SURGICAL HISTORY:  Dementia      Pneumonia      Acute renal failure (ARF)  ARF 1.5 YEAR AGO      Dysphagia  peg      Arthritis      GERD (gastroesophageal reflux disease)      Dyspepsia      History of hand surgery      PEG adjustment, replacement, or removal      Status post insertion of percutaneous endoscopic gastrostomy (PEG) tube  PEG replacement on 02/29/16          REVIEW OF SYSTEMS:  as per hpi     VITALS:   Vital Signs Last 24 Hrs  T(C): 36.6 (03 Dec 2023 08:33), Max: 36.9 (02 Dec 2023 21:28)  T(F): 97.9 (03 Dec 2023 08:33), Max: 98.4 (02 Dec 2023 21:28)  HR: 93 (03 Dec 2023 08:33) (87 - 104)  BP: 108/70 (03 Dec 2023 08:33) (108/70 - 119/64)  BP(mean): --  RR: 18 (03 Dec 2023 08:33) (18 - 18)  SpO2: 97% (03 Dec 2023 09:50) (92% - 98%)    Parameters below as of 03 Dec 2023 09:50  Patient On (Oxygen Delivery Method): nasal cannula, high flow  O2 Flow (L/min): 50  O2 Concentration (%): 50    PHYSICAL EXAM:  GENERAL: NAD,  comfortable   HEAD:  Atraumatic, Normocephalic  EYES: EOMI, PERRLA, conjunctiva and sclera clear  NECK: Supple, No JVD  CHEST/LUNG: Clear to auscultation bilaterally; No wheeze  HEART: S1, S2; No murmurs, rubs, or gallops  ABDOMEN: Soft, Nontender, Nondistended; Bowel sounds present +peg  EXTREMITIES:  2+ Peripheral Pulses, contracted   PSYCH: Normal affect  NEUROLOGY: non verbal  SKIN: +sacral decub    Consultant(s) Notes Reviewed:  [x ] YES  [ ] NO  Care Discussed with Consultants/Other Providers [ x] YES  [ ] NO    MEDS:   MEDICATIONS  (STANDING):  chlorhexidine 2% Cloths 1 Application(s) Topical <User Schedule>  Dakins Solution - 1/4 Strength 1 Application(s) Topical two times a day  enoxaparin Injectable 40 milliGRAM(s) SubCutaneous every 24 hours  ipratropium    for Nebulization 500 MICROGram(s) Nebulizer every 6 hours  levalbuterol Inhalation 0.63 milliGRAM(s) Inhalation every 6 hours  OLANZapine 5 milliGRAM(s) Oral <User Schedule>  pantoprazole    Tablet 40 milliGRAM(s) Oral before breakfast  QUEtiapine 100 milliGRAM(s) Oral <User Schedule>  senna Syrup 10 milliLiter(s) Oral at bedtime  sodium chloride 3%  Inhalation 4 milliLiter(s) Inhalation every 6 hours  sucralfate 1 Gram(s) Oral four times a day    MEDICATIONS  (PRN):  acetaminophen   Oral Liquid .. 750 milliGRAM(s) Oral every 6 hours PRN Temp greater or equal to 38C (100.4F), Mild Pain (1 - 3)      ALLERGIES:  Haldol (Other)  No Known Allergies  benzodiazepines (Other)      LABS:                                                   < from: CT Angio Chest PE Protocol w/ IV Cont (11.27.23 @ 22:44) >  IMPRESSION:.    No pulmonary embolism detected.    Occlusive secretions within the left mainstem bronchus with associated   complete left lung collapse; consider superimposed infection in the   appropriate clinical scenario.    Small leftpleural effusion.    --- End of Report ---      < end of copied text >       < from: CT Abdomen and Pelvis w/ IV Cont (11.18.23 @ 19:41) >    IMPRESSION:    Extensive decubitus ulcer involving the right posterior gluteal and   pelvic region with extension to bone. There is bony erosion of the   ischium compatible with osteomyelitis.    Mild bladder wall thickening and a few foci of intraluminal air,   correlate for recent instrumentation and/or infection.    Left lower lobe consolidation along with tree-in-bud/nodular opacities in   the left upper lobe concerning for pneumonia.    --- End of Report ---        < end of copied text >   SHARONA LARKIN  78y Male  MRN:[id number]    Patient is a 78y old  Male who presents with a chief complaint of sepsis      HPI:  79 yo M PMHx of severe dementia, PEG tube presents with ulcer that has worsened over last month. Patient was seen by house calls doctor 2 weeks ago, placed on antibiotics but started mounting fevers in the last few days. It was noticed by his GI doctor (Dr. Moraes) that his ulcer was getting worse, so he sent him in. Patient is nonverbal at baseline. Wife has noticed no new cough, runny nose. (19 Nov 2023 13:02)      Patient seen and evaluated at bedside. No acute events overnight except as noted.    Interval HPI: no acute events o/n     PAST MEDICAL & SURGICAL HISTORY:  Dementia      Pneumonia      Acute renal failure (ARF)  ARF 1.5 YEAR AGO      Dysphagia  peg      Arthritis      GERD (gastroesophageal reflux disease)      Dyspepsia      History of hand surgery      PEG adjustment, replacement, or removal      Status post insertion of percutaneous endoscopic gastrostomy (PEG) tube  PEG replacement on 02/29/16          REVIEW OF SYSTEMS:  as per hpi     VITALS:   Vital Signs Last 24 Hrs  T(C): 36.6 (03 Dec 2023 08:33), Max: 36.9 (02 Dec 2023 21:28)  T(F): 97.9 (03 Dec 2023 08:33), Max: 98.4 (02 Dec 2023 21:28)  HR: 93 (03 Dec 2023 08:33) (87 - 104)  BP: 108/70 (03 Dec 2023 08:33) (108/70 - 119/64)  BP(mean): --  RR: 18 (03 Dec 2023 08:33) (18 - 18)  SpO2: 97% (03 Dec 2023 09:50) (92% - 98%)    Parameters below as of 03 Dec 2023 09:50  Patient On (Oxygen Delivery Method): nasal cannula, high flow  O2 Flow (L/min): 50  O2 Concentration (%): 50    PHYSICAL EXAM:  GENERAL: NAD,  comfortable   HEAD:  Atraumatic, Normocephalic  EYES: EOMI, PERRLA, conjunctiva and sclera clear  NECK: Supple, No JVD  CHEST/LUNG: Clear to auscultation bilaterally; No wheeze  HEART: S1, S2; No murmurs, rubs, or gallops  ABDOMEN: Soft, Nontender, Nondistended; Bowel sounds present +peg  EXTREMITIES:  2+ Peripheral Pulses, contracted   PSYCH: Normal affect  NEUROLOGY: non verbal  SKIN: +sacral decub    Consultant(s) Notes Reviewed:  [x ] YES  [ ] NO  Care Discussed with Consultants/Other Providers [ x] YES  [ ] NO    MEDS:   MEDICATIONS  (STANDING):  chlorhexidine 2% Cloths 1 Application(s) Topical <User Schedule>  Dakins Solution - 1/4 Strength 1 Application(s) Topical two times a day  enoxaparin Injectable 40 milliGRAM(s) SubCutaneous every 24 hours  ipratropium    for Nebulization 500 MICROGram(s) Nebulizer every 6 hours  levalbuterol Inhalation 0.63 milliGRAM(s) Inhalation every 6 hours  OLANZapine 5 milliGRAM(s) Oral <User Schedule>  pantoprazole    Tablet 40 milliGRAM(s) Oral before breakfast  QUEtiapine 100 milliGRAM(s) Oral <User Schedule>  senna Syrup 10 milliLiter(s) Oral at bedtime  sodium chloride 3%  Inhalation 4 milliLiter(s) Inhalation every 6 hours  sucralfate 1 Gram(s) Oral four times a day    MEDICATIONS  (PRN):  acetaminophen   Oral Liquid .. 750 milliGRAM(s) Oral every 6 hours PRN Temp greater or equal to 38C (100.4F), Mild Pain (1 - 3)      ALLERGIES:  Haldol (Other)  No Known Allergies  benzodiazepines (Other)      LABS:                                                   < from: CT Angio Chest PE Protocol w/ IV Cont (11.27.23 @ 22:44) >  IMPRESSION:.    No pulmonary embolism detected.    Occlusive secretions within the left mainstem bronchus with associated   complete left lung collapse; consider superimposed infection in the   appropriate clinical scenario.    Small leftpleural effusion.    --- End of Report ---      < end of copied text >       < from: CT Abdomen and Pelvis w/ IV Cont (11.18.23 @ 19:41) >    IMPRESSION:    Extensive decubitus ulcer involving the right posterior gluteal and   pelvic region with extension to bone. There is bony erosion of the   ischium compatible with osteomyelitis.    Mild bladder wall thickening and a few foci of intraluminal air,   correlate for recent instrumentation and/or infection.    Left lower lobe consolidation along with tree-in-bud/nodular opacities in   the left upper lobe concerning for pneumonia.    --- End of Report ---        < end of copied text >

## 2023-12-03 NOTE — PROGRESS NOTE ADULT - ASSESSMENT
79 yo male h/o dementia, non verbal. contracted, s/p peg. here with sepsis likely 2/2 sacral decub and aspiration pna    sepsis  respiratory failure   sacral decub  pna - possible aspiration   cont abx as per id  f/u cult  wound care f/u  id consult noted  pulm consulted  cont supp o2 via high flow. taper down as able   CTA chest noted  pulm f/u   bipap as able  abx changed to meropenem. id f/u apprec    dementia  cont home meds  supportive care    dysphagia s/p peg  feeds as per gi/nutrition   s/p peg change this am by gi bedside     hyperNa  free water via peg ordered  trend bmp       dvt ppx      d/w family bedside  DNR    palliative eval for goc   d/w family bedside   family wants to take home    Advanced care planning was discussed with patient and family.  Advanced care planning forms were reviewed and discussed as appropriate.  Differential diagnosis and plan of care discussed with patient after the evaluation.   Pain assessed and judicious use of narcotics when appropriate was discussed.  Importance of Fall prevention discussed.  Counseling on Smoking and Alcohol cessation was offered when appropriate.  Counseling on Diet, exercise, and medication compliance was done.       Approx 75 minutes spent. 77 yo male h/o dementia, non verbal. contracted, s/p peg. here with sepsis likely 2/2 sacral decub and aspiration pna    sepsis  respiratory failure   sacral decub  pna - possible aspiration   cont abx as per id  f/u cult  wound care f/u  id consult noted  pulm consulted  cont supp o2 via high flow. taper down as able   CTA chest noted  pulm f/u   bipap as able  abx changed to meropenem. id f/u apprec    dementia  cont home meds  supportive care    dysphagia s/p peg  feeds as per gi/nutrition   s/p peg change this am by gi bedside     hyperNa  free water via peg ordered  trend bmp       dvt ppx      d/w family bedside  DNR    palliative eval for goc   d/w family bedside   family wants to take home    Advanced care planning was discussed with patient and family.  Advanced care planning forms were reviewed and discussed as appropriate.  Differential diagnosis and plan of care discussed with patient after the evaluation.   Pain assessed and judicious use of narcotics when appropriate was discussed.  Importance of Fall prevention discussed.  Counseling on Smoking and Alcohol cessation was offered when appropriate.  Counseling on Diet, exercise, and medication compliance was done.       Approx 75 minutes spent.

## 2023-12-03 NOTE — PROGRESS NOTE ADULT - SUBJECTIVE AND OBJECTIVE BOX
Subjective: Patient seen and examined. No new events except as noted.     REVIEW OF SYSTEMS:    Unable to obtain     MEDICATIONS:  MEDICATIONS  (STANDING):  chlorhexidine 2% Cloths 1 Application(s) Topical <User Schedule>  Dakins Solution - 1/4 Strength 1 Application(s) Topical two times a day  enoxaparin Injectable 40 milliGRAM(s) SubCutaneous every 24 hours  ipratropium    for Nebulization 500 MICROGram(s) Nebulizer every 6 hours  levalbuterol Inhalation 0.63 milliGRAM(s) Inhalation every 6 hours  OLANZapine 5 milliGRAM(s) Oral <User Schedule>  pantoprazole    Tablet 40 milliGRAM(s) Oral before breakfast  QUEtiapine 100 milliGRAM(s) Oral <User Schedule>  senna Syrup 10 milliLiter(s) Oral at bedtime  sodium chloride 3%  Inhalation 4 milliLiter(s) Inhalation every 6 hours  sucralfate 1 Gram(s) Oral four times a day      PHYSICAL EXAM:  T(C): 36.6 (12-03-23 @ 08:33), Max: 36.9 (12-02-23 @ 21:28)  HR: 93 (12-03-23 @ 08:33) (87 - 104)  BP: 108/70 (12-03-23 @ 08:33) (108/70 - 119/64)  RR: 18 (12-03-23 @ 08:33) (18 - 18)  SpO2: 97% (12-03-23 @ 08:33) (92% - 98%)  Wt(kg): --  I&O's Summary    02 Dec 2023 07:01  -  03 Dec 2023 07:00  --------------------------------------------------------  IN: 50 mL / OUT: 200 mL / NET: -150 mL          Appearance: NAD nonverbal	  HEENT:   Normal oral mucosa, PERRL, EOMI	  Lymphatic: No lymphadenopathy  Cardiovascular: Normal tachy S1 S2, No JVD, No murmurs, No edema  Respiratory: decreased bs   Psychiatry: A & O x 0  Gastrointestinal:  Soft, Non-tender, + PEG   Skin: No rashes, No ecchymoses, No cyanosis	  Neurologic: Non-focal  Extremities: contracted   Vascular: Peripheral pulses palpable 2+ bilaterally  +sacral decub           LABS:    CARDIAC MARKERS:                  proBNP:   Lipid Profile:   HgA1c:   TSH:             TELEMETRY: 	    ECG:  	  RADIOLOGY:   DIAGNOSTIC TESTING:  [ ] Echocardiogram:  [ ]  Catheterization:  [ ] Stress Test:    OTHER:

## 2023-12-03 NOTE — PROGRESS NOTE ADULT - PROBLEM SELECTOR PLAN 1
S/p RRT 11/20 for hypoxia requiring HFNC  -Increase in O2 requirements 11/21 from HFNC 50L/80% to 100% Bipap. Weaned from Bipap to HFNC 50L/100%  -Suspect aspiration event, family at bedside reports multiple aspiration events in the past  -CT A/P with LLL consolidation, SHEILA tree in bud opacities likely PNA. Continue ABX per ID  -ABG with no CO2 retention  -CXR 11/25 with LLL infiltrate  -CTA chest 11/27 negative for PE. Notes extensive L atelectasis/mucus plugging with small L effusion  -Started on Bipap 12/8 to assist with atelectasis but pt has been unable to tolerate. Unable to tolerate chest vest as well per RT   -Discussed plan of care at length with patient's wife Brenda and daughter Ling ([phone]). They expressed that at this point they are not ready to transition to comfort measures or palliative care. While they still want to treat, they would like to focus on interventions that do not cause the patient any more pain. They experienced patient's visible discomfort with Bipap as well as chest vest, and would like to discontinue these therapies at this time and focus on manual chest PT, O2 via HFNC, and nebulizers.    -Bipap and chest vest d/c'd at family's request. CXR this AM actually with improvement in L lung aeration.   -Continue Atrovent/Xopenex nebs q6h, 3% sodium chloride nebs q6h  -Suction PRN  -Chest PT  -Position patient with L lung up   -O2 requirements slowly improving - continue HFNC (currently 50L/50%). Wean O2 as tolerated. Keep sats >90%.    -Palliative care f/u. Overall prognosis guarded.  -on 50% high flow today   sao2 100%:  can try to wean down oxygen S/p RRT 11/20 for hypoxia requiring HFNC  -Increase in O2 requirements 11/21 from HFNC 50L/80% to 100% Bipap. Weaned from Bipap to HFNC 50L/100%  -Suspect aspiration event, family at bedside reports multiple aspiration events in the past  -CT A/P with LLL consolidation, SHEILA tree in bud opacities likely PNA. Continue ABX per ID  -ABG with no CO2 retention  -CXR 11/25 with LLL infiltrate  -CTA chest 11/27 negative for PE. Notes extensive L atelectasis/mucus plugging with small L effusion  -Started on Bipap 12/8 to assist with atelectasis but pt has been unable to tolerate. Unable to tolerate chest vest as well per RT   -Discussed plan of care at length with patient's wife Brenda and daughter Ling ([phone]). They expressed that at this point they are not ready to transition to comfort measures or palliative care. While they still want to treat, they would like to focus on interventions that do not cause the patient any more pain. They experienced patient's visible discomfort with Bipap as well as chest vest, and would like to discontinue these therapies at this time and focus on manual chest PT, O2 via HFNC, and nebulizers.    -Bipap and chest vest d/c'd at family's request. CXR this AM actually with improvement in L lung aeration.   -Continue Atrovent/Xopenex nebs q6h, 3% sodium chloride nebs q6h  -Suction PRN  -Chest PT  -Position patient with L lung up   -O2 requirements slowly improving - continue HFNC (currently 50L/50%). Wean O2 as tolerated. Keep sats >90%.    -Palliative care f/u. Overall prognosis guarded.  -on 50% high flow today   sao2 100%:  can try to wean down oxygen S/p RRT 11/20 for hypoxia requiring HFNC  -Increase in O2 requirements 11/21 from HFNC 50L/80% to 100% Bipap. Weaned from Bipap to HFNC 50L/100%  -Suspect aspiration event, family at bedside reports multiple aspiration events in the past  -CT A/P with LLL consolidation, SHEILA tree in bud opacities likely PNA. Continue ABX per ID  -ABG with no CO2 retention  -CXR 11/25 with LLL infiltrate  -CTA chest 11/27 negative for PE. Notes extensive L atelectasis/mucus plugging with small L effusion  -Started on Bipap 12/8 to assist with atelectasis but pt has been unable to tolerate. Unable to tolerate chest vest as well per RT   -Discussed plan of care at length with patient's wife Brenda and daughter Ling ([phone]). They expressed that at this point they are not ready to transition to comfort measures or palliative care. While they still want to treat, they would like to focus on interventions that do not cause the patient any more pain. They experienced patient's visible discomfort with Bipap as well as chest vest, and would like to discontinue these therapies at this time and focus on manual chest PT, O2 via HFNC, and nebulizers.    -Bipap and chest vest d/c'd at family's request. CXR this AM actually with improvement in L lung aeration.   -Continue Atrovent/Xopenex nebs q6h, 3% sodium chloride nebs q6h  -Suction PRN  -Chest PT  -Position patient with L lung up   -O2 requirements slowly improving - continue HFNC (currently 50L/50%). Wean O2 as tolerated. Keep sats >90%.    -Palliative care f/u. Overall prognosis guarded.  -on 50% high flow today   sao2 100%:  can try to wean down oxygen

## 2023-12-03 NOTE — PROGRESS NOTE ADULT - PROBLEM SELECTOR PLAN 2
CT A/P with LLL consolidation & SHEILA TIB opacities  -CTA chest with L lung mucus plugging/atelectasis, possible post obstructive PNA   -RVP negative  -MRSA PCR negative  -BC with NGTD  -Urine legionella negative  -ABX changed to Meropenem as pt still spiking temps. Afebrile now.   -ID f/u.  -his last chest xray from 12/1 to me shows significant improvement in left lung aeration:  delmi wife

## 2023-12-03 NOTE — PROGRESS NOTE ADULT - SUBJECTIVE AND OBJECTIVE BOX
Date of Service: 12-03-23 @ 11:55    Patient is a 78y old  Male who presents with a chief complaint of Osteomyelitis     (20 Nov 2023 11:10)      Any change in ROS: seems OK: on 50% high flow:     MEDICATIONS  (STANDING):  chlorhexidine 2% Cloths 1 Application(s) Topical <User Schedule>  Dakins Solution - 1/4 Strength 1 Application(s) Topical two times a day  enoxaparin Injectable 40 milliGRAM(s) SubCutaneous every 24 hours  ipratropium    for Nebulization 500 MICROGram(s) Nebulizer every 6 hours  levalbuterol Inhalation 0.63 milliGRAM(s) Inhalation every 6 hours  OLANZapine 5 milliGRAM(s) Oral <User Schedule>  pantoprazole    Tablet 40 milliGRAM(s) Oral before breakfast  QUEtiapine 100 milliGRAM(s) Oral <User Schedule>  senna Syrup 10 milliLiter(s) Oral at bedtime  sodium chloride 3%  Inhalation 4 milliLiter(s) Inhalation every 6 hours  sucralfate 1 Gram(s) Oral four times a day    MEDICATIONS  (PRN):  acetaminophen   Oral Liquid .. 750 milliGRAM(s) Oral every 6 hours PRN Temp greater or equal to 38C (100.4F), Mild Pain (1 - 3)    Vital Signs Last 24 Hrs  T(C): 36.6 (03 Dec 2023 08:33), Max: 36.9 (02 Dec 2023 21:28)  T(F): 97.9 (03 Dec 2023 08:33), Max: 98.4 (02 Dec 2023 21:28)  HR: 93 (03 Dec 2023 08:33) (87 - 104)  BP: 108/70 (03 Dec 2023 08:33) (108/70 - 119/64)  BP(mean): --  RR: 18 (03 Dec 2023 08:33) (18 - 18)  SpO2: 97% (03 Dec 2023 09:50) (92% - 98%)    Parameters below as of 03 Dec 2023 09:50  Patient On (Oxygen Delivery Method): nasal cannula, high flow  O2 Flow (L/min): 50  O2 Concentration (%): 50    I&O's Summary    02 Dec 2023 07:01  -  03 Dec 2023 07:00  --------------------------------------------------------  IN: 50 mL / OUT: 200 mL / NET: -150 mL          Physical Exam:   GENERAL: cacehctic  HEENT: CANDY/   Atraumatic, Normocephalic  ENMT: No tonsillar erythema, exudates, or enlargement; Moist mucous membranes, Good dentition, No lesions  NECK: Supple, No JVD, Normal thyroid  CHEST/LUNG: left air entry left side  CVS: Regular rate and rhythm; No murmurs, rubs, or gallops  GI: : Soft, Nontender, Nondistended; Bowel sounds present  NERVOUS SYSTEM:  Alert & Oriented X3  EXTREMITIES: - edema  LYMPH: No lymphadenopathy noted  SKIN: No rashes or lesions  ENDOCRINOLOGY: No Thyromegaly  PSYCH:dementia    Labs:  28                            10.4   9.71  )-----------( 235      ( 30 Nov 2023 10:32 )             33.9     11-30    143  |  108  |  17  ----------------------------<  186<H>  3.9   |  28  |  0.32<L>        CAPILLARY BLOOD GLUCOSE      POCT Blood Glucose.: 120 mg/dL (03 Dec 2023 06:11)  POCT Blood Glucose.: 168 mg/dL (03 Dec 2023 00:28)              rad< from: Xray Chest 1 View- PORTABLE-Routine (Xray Chest 1 View- PORTABLE-Routine .) (12.01.23 @ 14:13) >    CLINICAL INDICATION: Assess left lung atelectasis.    TECHNIQUE: AP radiograph of the chest.    FINDINGS:  Heart is similar in size.  Right lung remains clear.  Partial reaeration of the upper and mid left lung.  Partly loculated lower left pleural effusion is present. Mid and lower   left lung haziness may be due to underlying infection in the right   clinical setting. No pneumothorax.    IMPRESSION:  Partial reaeration of the left lung.  Small loculated left pleural effusion.  Associated mid and lower left lung atelectasis.    --- End of Report ---            MARLYN AMARO MD; Attending Radiologist  This document has been electronically signed. Dec  2 2023 10:22AM    < end of copied text >      RECENT CULTURES:  11-27 @ 11:27 .Blood Blood-Peripheral                No growth at 5 days          RESPIRATORY CULTURES:          Studies  Chest X-RAY  CT SCAN Chest   Venous Dopplers: LE:   CT Abdomen  Others

## 2023-12-04 LAB
ALBUMIN SERPL ELPH-MCNC: 2.2 G/DL — LOW (ref 3.3–5)
ALBUMIN SERPL ELPH-MCNC: 2.2 G/DL — LOW (ref 3.3–5)
ALP SERPL-CCNC: 152 U/L — HIGH (ref 40–120)
ALP SERPL-CCNC: 152 U/L — HIGH (ref 40–120)
ALT FLD-CCNC: 15 U/L — SIGNIFICANT CHANGE UP (ref 10–45)
ALT FLD-CCNC: 15 U/L — SIGNIFICANT CHANGE UP (ref 10–45)
ANION GAP SERPL CALC-SCNC: 9 MMOL/L — SIGNIFICANT CHANGE UP (ref 5–17)
ANION GAP SERPL CALC-SCNC: 9 MMOL/L — SIGNIFICANT CHANGE UP (ref 5–17)
AST SERPL-CCNC: 18 U/L — SIGNIFICANT CHANGE UP (ref 10–40)
AST SERPL-CCNC: 18 U/L — SIGNIFICANT CHANGE UP (ref 10–40)
BASE EXCESS BLDV CALC-SCNC: 6.1 MMOL/L — HIGH (ref -2–3)
BASE EXCESS BLDV CALC-SCNC: 6.1 MMOL/L — HIGH (ref -2–3)
BILIRUB SERPL-MCNC: 0.3 MG/DL — SIGNIFICANT CHANGE UP (ref 0.2–1.2)
BILIRUB SERPL-MCNC: 0.3 MG/DL — SIGNIFICANT CHANGE UP (ref 0.2–1.2)
BUN SERPL-MCNC: 21 MG/DL — SIGNIFICANT CHANGE UP (ref 7–23)
BUN SERPL-MCNC: 21 MG/DL — SIGNIFICANT CHANGE UP (ref 7–23)
CALCIUM SERPL-MCNC: 8.5 MG/DL — SIGNIFICANT CHANGE UP (ref 8.4–10.5)
CALCIUM SERPL-MCNC: 8.5 MG/DL — SIGNIFICANT CHANGE UP (ref 8.4–10.5)
CHLORIDE SERPL-SCNC: 104 MMOL/L — SIGNIFICANT CHANGE UP (ref 96–108)
CHLORIDE SERPL-SCNC: 104 MMOL/L — SIGNIFICANT CHANGE UP (ref 96–108)
CO2 BLDV-SCNC: 33 MMOL/L — HIGH (ref 22–26)
CO2 BLDV-SCNC: 33 MMOL/L — HIGH (ref 22–26)
CO2 SERPL-SCNC: 28 MMOL/L — SIGNIFICANT CHANGE UP (ref 22–31)
CO2 SERPL-SCNC: 28 MMOL/L — SIGNIFICANT CHANGE UP (ref 22–31)
CREAT SERPL-MCNC: 0.36 MG/DL — LOW (ref 0.5–1.3)
CREAT SERPL-MCNC: 0.36 MG/DL — LOW (ref 0.5–1.3)
EGFR: 116 ML/MIN/1.73M2 — SIGNIFICANT CHANGE UP
EGFR: 116 ML/MIN/1.73M2 — SIGNIFICANT CHANGE UP
GLUCOSE BLDC GLUCOMTR-MCNC: 128 MG/DL — HIGH (ref 70–99)
GLUCOSE BLDC GLUCOMTR-MCNC: 128 MG/DL — HIGH (ref 70–99)
GLUCOSE BLDC GLUCOMTR-MCNC: 154 MG/DL — HIGH (ref 70–99)
GLUCOSE BLDC GLUCOMTR-MCNC: 154 MG/DL — HIGH (ref 70–99)
GLUCOSE SERPL-MCNC: 203 MG/DL — HIGH (ref 70–99)
GLUCOSE SERPL-MCNC: 203 MG/DL — HIGH (ref 70–99)
HCO3 BLDV-SCNC: 31 MMOL/L — HIGH (ref 22–29)
HCO3 BLDV-SCNC: 31 MMOL/L — HIGH (ref 22–29)
HCT VFR BLD CALC: 33.9 % — LOW (ref 39–50)
HCT VFR BLD CALC: 33.9 % — LOW (ref 39–50)
HGB BLD-MCNC: 10.6 G/DL — LOW (ref 13–17)
HGB BLD-MCNC: 10.6 G/DL — LOW (ref 13–17)
MAGNESIUM SERPL-MCNC: 2.3 MG/DL — SIGNIFICANT CHANGE UP (ref 1.6–2.6)
MAGNESIUM SERPL-MCNC: 2.3 MG/DL — SIGNIFICANT CHANGE UP (ref 1.6–2.6)
MCHC RBC-ENTMCNC: 30.5 PG — SIGNIFICANT CHANGE UP (ref 27–34)
MCHC RBC-ENTMCNC: 30.5 PG — SIGNIFICANT CHANGE UP (ref 27–34)
MCHC RBC-ENTMCNC: 31.3 GM/DL — LOW (ref 32–36)
MCHC RBC-ENTMCNC: 31.3 GM/DL — LOW (ref 32–36)
MCV RBC AUTO: 97.7 FL — SIGNIFICANT CHANGE UP (ref 80–100)
MCV RBC AUTO: 97.7 FL — SIGNIFICANT CHANGE UP (ref 80–100)
NRBC # BLD: 0 /100 WBCS — SIGNIFICANT CHANGE UP (ref 0–0)
NRBC # BLD: 0 /100 WBCS — SIGNIFICANT CHANGE UP (ref 0–0)
PCO2 BLDV: 46 MMHG — SIGNIFICANT CHANGE UP (ref 42–55)
PCO2 BLDV: 46 MMHG — SIGNIFICANT CHANGE UP (ref 42–55)
PH BLDV: 7.44 — HIGH (ref 7.32–7.43)
PH BLDV: 7.44 — HIGH (ref 7.32–7.43)
PHOSPHATE SERPL-MCNC: 2.2 MG/DL — LOW (ref 2.5–4.5)
PHOSPHATE SERPL-MCNC: 2.2 MG/DL — LOW (ref 2.5–4.5)
PLATELET # BLD AUTO: 316 K/UL — SIGNIFICANT CHANGE UP (ref 150–400)
PLATELET # BLD AUTO: 316 K/UL — SIGNIFICANT CHANGE UP (ref 150–400)
PO2 BLDV: 58 MMHG — HIGH (ref 25–45)
PO2 BLDV: 58 MMHG — HIGH (ref 25–45)
POTASSIUM SERPL-MCNC: 3.9 MMOL/L — SIGNIFICANT CHANGE UP (ref 3.5–5.3)
POTASSIUM SERPL-MCNC: 3.9 MMOL/L — SIGNIFICANT CHANGE UP (ref 3.5–5.3)
POTASSIUM SERPL-SCNC: 3.9 MMOL/L — SIGNIFICANT CHANGE UP (ref 3.5–5.3)
POTASSIUM SERPL-SCNC: 3.9 MMOL/L — SIGNIFICANT CHANGE UP (ref 3.5–5.3)
PROT SERPL-MCNC: 6.7 G/DL — SIGNIFICANT CHANGE UP (ref 6–8.3)
PROT SERPL-MCNC: 6.7 G/DL — SIGNIFICANT CHANGE UP (ref 6–8.3)
RBC # BLD: 3.47 M/UL — LOW (ref 4.2–5.8)
RBC # BLD: 3.47 M/UL — LOW (ref 4.2–5.8)
RBC # FLD: 14.7 % — HIGH (ref 10.3–14.5)
RBC # FLD: 14.7 % — HIGH (ref 10.3–14.5)
SAO2 % BLDV: 89.7 % — HIGH (ref 67–88)
SAO2 % BLDV: 89.7 % — HIGH (ref 67–88)
SODIUM SERPL-SCNC: 141 MMOL/L — SIGNIFICANT CHANGE UP (ref 135–145)
SODIUM SERPL-SCNC: 141 MMOL/L — SIGNIFICANT CHANGE UP (ref 135–145)
WBC # BLD: 8.39 K/UL — SIGNIFICANT CHANGE UP (ref 3.8–10.5)
WBC # BLD: 8.39 K/UL — SIGNIFICANT CHANGE UP (ref 3.8–10.5)
WBC # FLD AUTO: 8.39 K/UL — SIGNIFICANT CHANGE UP (ref 3.8–10.5)
WBC # FLD AUTO: 8.39 K/UL — SIGNIFICANT CHANGE UP (ref 3.8–10.5)

## 2023-12-04 PROCEDURE — 99233 SBSQ HOSP IP/OBS HIGH 50: CPT

## 2023-12-04 PROCEDURE — 99497 ADVNCD CARE PLAN 30 MIN: CPT | Mod: 25

## 2023-12-04 RX ORDER — PANTOPRAZOLE SODIUM 20 MG/1
40 TABLET, DELAYED RELEASE ORAL DAILY
Refills: 0 | Status: DISCONTINUED | OUTPATIENT
Start: 2023-12-04 | End: 2023-12-04

## 2023-12-04 RX ORDER — MORPHINE SULFATE 50 MG/1
1 CAPSULE, EXTENDED RELEASE ORAL
Refills: 0 | Status: DISCONTINUED | OUTPATIENT
Start: 2023-12-04 | End: 2023-12-04

## 2023-12-04 RX ORDER — MORPHINE SULFATE 50 MG/1
2 CAPSULE, EXTENDED RELEASE ORAL
Refills: 0 | Status: DISCONTINUED | OUTPATIENT
Start: 2023-12-04 | End: 2023-12-04

## 2023-12-04 RX ORDER — ROBINUL 0.2 MG/ML
0.4 INJECTION INTRAMUSCULAR; INTRAVENOUS EVERY 6 HOURS
Refills: 0 | Status: DISCONTINUED | OUTPATIENT
Start: 2023-12-04 | End: 2023-12-14

## 2023-12-04 RX ADMIN — Medication 1 GRAM(S): at 11:33

## 2023-12-04 RX ADMIN — Medication 500 MICROGRAM(S): at 23:14

## 2023-12-04 RX ADMIN — Medication 500 MICROGRAM(S): at 06:09

## 2023-12-04 RX ADMIN — LEVALBUTEROL 0.63 MILLIGRAM(S): 1.25 SOLUTION, CONCENTRATE RESPIRATORY (INHALATION) at 06:09

## 2023-12-04 RX ADMIN — Medication 1 APPLICATION(S): at 06:09

## 2023-12-04 RX ADMIN — Medication 1 GRAM(S): at 06:28

## 2023-12-04 RX ADMIN — ROBINUL 0.4 MILLIGRAM(S): 0.2 INJECTION INTRAMUSCULAR; INTRAVENOUS at 12:54

## 2023-12-04 RX ADMIN — LEVALBUTEROL 0.63 MILLIGRAM(S): 1.25 SOLUTION, CONCENTRATE RESPIRATORY (INHALATION) at 11:33

## 2023-12-04 RX ADMIN — CHLORHEXIDINE GLUCONATE 1 APPLICATION(S): 213 SOLUTION TOPICAL at 06:10

## 2023-12-04 RX ADMIN — MORPHINE SULFATE 1 MILLIGRAM(S): 50 CAPSULE, EXTENDED RELEASE ORAL at 12:47

## 2023-12-04 RX ADMIN — MORPHINE SULFATE 1 MILLIGRAM(S): 50 CAPSULE, EXTENDED RELEASE ORAL at 23:11

## 2023-12-04 RX ADMIN — SODIUM CHLORIDE 4 MILLILITER(S): 9 INJECTION INTRAMUSCULAR; INTRAVENOUS; SUBCUTANEOUS at 06:09

## 2023-12-04 RX ADMIN — LEVALBUTEROL 0.63 MILLIGRAM(S): 1.25 SOLUTION, CONCENTRATE RESPIRATORY (INHALATION) at 23:13

## 2023-12-04 RX ADMIN — Medication 500 MICROGRAM(S): at 11:34

## 2023-12-04 RX ADMIN — ROBINUL 0.4 MILLIGRAM(S): 0.2 INJECTION INTRAMUSCULAR; INTRAVENOUS at 18:04

## 2023-12-04 RX ADMIN — SODIUM CHLORIDE 4 MILLILITER(S): 9 INJECTION INTRAMUSCULAR; INTRAVENOUS; SUBCUTANEOUS at 11:34

## 2023-12-04 RX ADMIN — MORPHINE SULFATE 1 MILLIGRAM(S): 50 CAPSULE, EXTENDED RELEASE ORAL at 17:58

## 2023-12-04 RX ADMIN — PANTOPRAZOLE SODIUM 40 MILLIGRAM(S): 20 TABLET, DELAYED RELEASE ORAL at 11:33

## 2023-12-04 NOTE — RAPID RESPONSE TEAM SUMMARY - NSSITUATIONBACKGROUNDRRT_GEN_ALL_CORE
9 yo male h/o dementia, non verbal. contracted, s/p peg. here with sepsis likely 2/2 sacral decub and aspiration pna.  Patient being treated with zosyn for aspiration PNA, vancomycin recently discontinued. RRT called for hypoxia to the 80s.
78M with h/o dementia (non verbal, contracted, s/p peg) who presented septic 2/2 PNA vs. sacral ulcer.    RRT was called due to hypoxia. On assessment, the patient's other vital signs were normal. He was not arousable to sternal rub. He was breathing agonally and appeared to be actively dying of respiratory failure. During the RRT, his oxygen saturation improved to 100% on maximum setting high flow nasal cannula. Per team, the patient is DNR/DNI, and per prior notes palliative care had discussed transfer to the PCU 4 days ago. At the RRT, he lacked the mental status to tolerate noninvasive positive pressure ventilation. The patient's daughter was called and informed of this, and I recommended administering medication to make him more comfortable at the end of life. She stated she is en route to the hospital and will decide when she arrives.

## 2023-12-04 NOTE — PROGRESS NOTE ADULT - SUBJECTIVE AND OBJECTIVE BOX
Subjective: Patient seen and examined. No new events except as noted.     REVIEW OF SYSTEMS:    Unable to obtain       MEDICATIONS:  MEDICATIONS  (STANDING):  chlorhexidine 2% Cloths 1 Application(s) Topical <User Schedule>  Dakins Solution - 1/4 Strength 1 Application(s) Topical two times a day  ipratropium    for Nebulization 500 MICROGram(s) Nebulizer every 6 hours  levalbuterol Inhalation 0.63 milliGRAM(s) Inhalation every 6 hours      PHYSICAL EXAM:  T(C): 36.6 (12-03-23 @ 23:08), Max: 37.2 (12-03-23 @ 21:38)  HR: 98 (12-04-23 @ 12:20) (89 - 108)  BP: 144/82 (12-04-23 @ 11:46) (101/66 - 144/82)  RR: 18 (12-04-23 @ 12:20) (18 - 20)  SpO2: 98% (12-04-23 @ 12:20) (95% - 98%)  Wt(kg): --  I&O's Summary    03 Dec 2023 07:01  -  04 Dec 2023 07:00  --------------------------------------------------------  IN: 900 mL / OUT: 0 mL / NET: 900 mL            Appearance: NAD nonverbal	  HEENT:   Normal oral mucosa, PERRL, EOMI	  Lymphatic: No lymphadenopathy  Cardiovascular: Normal tachy S1 S2, No JVD, No murmurs, No edema  Respiratory: decreased bs   Psychiatry: A & O x 0  Gastrointestinal:  Soft, Non-tender, + PEG   Skin: No rashes, No ecchymoses, No cyanosis	  Neurologic: Non-focal  Extremities: contracted   Vascular: Peripheral pulses palpable 2+ bilaterally  +sacral decub           LABS:    CARDIAC MARKERS:                                10.6   8.39  )-----------( 316      ( 04 Dec 2023 10:23 )             33.9     12-04    141  |  104  |  21  ----------------------------<  203<H>  3.9   |  28  |  0.36<L>    Ca    8.5      04 Dec 2023 10:23  Phos  2.2     12-04  Mg     2.3     12-04    TPro  6.7  /  Alb  2.2<L>  /  TBili  0.3  /  DBili  x   /  AST  18  /  ALT  15  /  AlkPhos  152<H>  12-04    proBNP:   Lipid Profile:   HgA1c:   TSH:             TELEMETRY: 	    ECG:  	  RADIOLOGY:   DIAGNOSTIC TESTING:  [ ] Echocardiogram:  [ ]  Catheterization:  [ ] Stress Test:    OTHER:

## 2023-12-04 NOTE — PROGRESS NOTE ADULT - SUBJECTIVE AND OBJECTIVE BOX
SUBJECTIVE AND OBJECTIVE  Indication for Geriatrics and Palliative Care Services/INTERVAL HPI: GOC    OVERNIGHT EVENTS: f/u visit to patient, wife Brenda visiting at bedside. Prior to my visit, case d/w primary team noting that pt with rapid response this morning for acute hypoxia. Pt is now on HF and NRB. Met with Flacobrendan and Ling, Please see GOC section below for discussion details.     DNR on chart:DNI  DNI      Allergies    No Known Allergies    Intolerances    Haldol (Other)  benzodiazepines (Other)    MEDICATIONS  (STANDING):  chlorhexidine 2% Cloths 1 Application(s) Topical <User Schedule>  Dakins Solution - 1/4 Strength 1 Application(s) Topical two times a day  ipratropium    for Nebulization 500 MICROGram(s) Nebulizer every 6 hours  levalbuterol Inhalation 0.63 milliGRAM(s) Inhalation every 6 hours    MEDICATIONS  (PRN):  acetaminophen   Oral Liquid .. 750 milliGRAM(s) Oral every 6 hours PRN Temp greater or equal to 38C (100.4F), Mild Pain (1 - 3)  glycopyrrolate Injectable 0.4 milliGRAM(s) IV Push every 6 hours PRN secretions  morphine  - Injectable 2 milliGRAM(s) IV Push every 1 hour PRN Severe Pain (7 - 10)  morphine  - Injectable 1 milliGRAM(s) IV Push every 1 hour PRN dsypnea, RR>22  morphine  - Injectable 1 milliGRAM(s) IV Push every 1 hour PRN Moderate Pain (4 - 6)    ITEMS UNCHECKED ARE NOT PRESENT    PRESENT SYMPTOMS: [x ]Unable to self-report - see [ ] CPOT [ x] PAINADS  [ x] RDOS   Source if other than patient:  [ ]Family   [ ]Team     PCSSQ[Palliative Care Spiritual Screening Question]   Severity (0-10):  Score of 4 or > indicate consideration of Chaplaincy referral.  Chaplaincy Referral: [ x] yes [ ] refused [ ] following [ ] Deferred     Caregiver [place]? : [ ] yes [x ] no [ ] Deferred [ ] Declined             Social work referral [ ] Patient & Family Centered Care Referral [ ]     Anticipatory Grief present?:  [ ] yes [x ] no  [ ] Deferred                  Social work referral [ ] Chaplaincy Referral[ ]    Other Symptoms:  [x ]All other review of systems negative     Palliative Performance Status Version 2:     20    %      http://npcrc.org/files/news/palliative_performance_scale_ppsv2.pdf    PHYSICAL EXAM:  Vital Signs Last 24 Hrs  T(C): 36.6 (03 Dec 2023 23:08), Max: 37.2 (03 Dec 2023 21:38)  T(F): 97.8 (03 Dec 2023 23:08), Max: 99 (03 Dec 2023 21:38)  HR: 98 (04 Dec 2023 12:20) (89 - 108)  BP: 144/82 (04 Dec 2023 11:46) (101/66 - 144/82)  BP(mean): --  RR: 24 (04 Dec 2023 13:00) (18 - 30)  SpO2: 99% (04 Dec 2023 13:00) (58% - 99%)    Parameters below as of 04 Dec 2023 13:00  Patient On (Oxygen Delivery Method): mask, nonrebreather       GENERAL: [ ]Cachexia    [ ]Alert  [ ]Oriented x   [x ]Lethargic  [ ]Unarousable  [ ]Verbal  [x ]Non-Verbal  Behavioral:   [ ]Anxiety  [x ]Delirium [ ]Agitation [ ]Other  HEENT:  [ ]Normal   [ ]Dry mouth   [ ]ET Tube/Trach  [ ]Oral lesions [x] HFNC and NRB  PULMONARY:   [ ]Clear [ ]Tachypnea  [ ]Audible excessive secretions   [ ]Rhonchi        [ ]Right [ ]Left [ ]Bilateral  [ ]Crackles        [ ]Right [ ]Left [ ]Bilateral  [ ]Wheezing     [ ]Right [ ]Left [ ]Bilateral  [ x]Diminished BS [ ] Right [x ]Left [ ]Bilateral  CARDIOVASCULAR:    [x ]Regular [ ]Irregular [ ]Tachy  [ ]Usman [ ]Murmur [ ]Other  GASTROINTESTINAL:  [ x]Soft  [ ]Distended   [ ]+BS  [ ]Non tender [ ]Tender  [ ]Other [x ]PEG [ ]OGT/ NGT   Last BM:   GENITOURINARY:  [ ]Normal [x ]Incontinent   [ ]Oliguria/Anuria   [ ]Fleming  MUSCULOSKELETAL:   [ ]Normal   [ ]Weakness  [x ]Bed/Wheelchair bound [ ]Edema  NEUROLOGIC:   [ ]No focal deficits  [ x] Cognitive impairment  [ ] Dysphagia [ ]Dysarthria [ ] Paresis [ ]Other   SKIN:   [ ]Normal  [ ]Rash  [ ]Other  [x ]Pressure ulcer(s) [ x]y [ ]n present on admission    CRITICAL CARE:  [ ]Shock Present  [ ]Septic [ ]Cardiogenic [ ]Neurologic [ ]Hypovolemic  [ ]Vasopressors [ ]Inotropes  [ x]Respiratory failure present [ ]Mechanical Ventilation [x ]Non-invasive ventilatory support [ ]High-Flow   [x ]Acute  [ ]Chronic [ x]Hypoxic  [ ]Hypercarbic [ ]Other  [ ]Other organ failure     LABS:                          10.6   8.39  )-----------( 316      ( 04 Dec 2023 10:23 )             33.9     12-04    141  |  104  |  21  ----------------------------<  203<H>  3.9   |  28  |  0.36<L>    Ca    8.5      04 Dec 2023 10:23  Phos  2.2     12-04  Mg     2.3     12-04    TPro  6.7  /  Alb  2.2<L>  /  TBili  0.3  /  DBili  x   /  AST  18  /  ALT  15  /  AlkPhos  152<H>  12-04      RADIOLOGY & ADDITIONAL STUDIES: < from: Xray Chest 1 View- PORTABLE-Routine (Xray Chest 1 View- PORTABLE-Routine in AM.) (11.30.23 @ 04:56) >    IMPRESSION:  Similar left chest whiteout with occluded bronchus.        Thank you for the courtesy of this referral.    --- End of Report ---    < end of copied text >      Protein Calorie Malnutrition Present: [ ]mild [ ]moderate [ ]severe [ ]underweight [ ]morbid obesity  https://www.andeal.org/vault/2440/web/files/ONC/Table_Clinical%20Characteristics%20to%20Document%20Malnutrition-White%20JV%20et%20al%202012.pdf    Height (cm): 170.2 (11-18-23 @ 17:39)  Weight (kg): 49.9 (11-18-23 @ 17:39)  BMI (kg/m2): 17.2 (11-18-23 @ 17:39)    [ ]PPSV2 < or = 30%  [ ]significant weight loss [ ]poor nutritional intake [ ]anasarca[ ]Artificial Nutrition    Other REFERRALS:  [ ]Hospice  [ ]Child Life  [ ]Social Work  [ ]Case management [ ]Holistic Therapy    SUBJECTIVE AND OBJECTIVE  Indication for Geriatrics and Palliative Care Services/INTERVAL HPI: GOC    OVERNIGHT EVENTS: f/u visit to patient, wife Brenda visiting at bedside. Prior to my visit, case d/w primary team noting that pt with rapid response this morning for acute hypoxia. Pt is now on HF and NRB. Met with Flacobrendan and Ling, Please see GOC section below for discussion details.     DNR on chart:DNI  DNI      Allergies    No Known Allergies    Intolerances    Haldol (Other)  benzodiazepines (Other)    MEDICATIONS  (STANDING):  chlorhexidine 2% Cloths 1 Application(s) Topical <User Schedule>  Dakins Solution - 1/4 Strength 1 Application(s) Topical two times a day  ipratropium    for Nebulization 500 MICROGram(s) Nebulizer every 6 hours  levalbuterol Inhalation 0.63 milliGRAM(s) Inhalation every 6 hours    MEDICATIONS  (PRN):  acetaminophen   Oral Liquid .. 750 milliGRAM(s) Oral every 6 hours PRN Temp greater or equal to 38C (100.4F), Mild Pain (1 - 3)  glycopyrrolate Injectable 0.4 milliGRAM(s) IV Push every 6 hours PRN secretions  morphine  - Injectable 2 milliGRAM(s) IV Push every 1 hour PRN Severe Pain (7 - 10)  morphine  - Injectable 1 milliGRAM(s) IV Push every 1 hour PRN dsypnea, RR>22  morphine  - Injectable 1 milliGRAM(s) IV Push every 1 hour PRN Moderate Pain (4 - 6)    ITEMS UNCHECKED ARE NOT PRESENT    PRESENT SYMPTOMS: [x ]Unable to self-report - see [ ] CPOT [ x] PAINADS  [ x] RDOS   Source if other than patient:  [ ]Family   [ ]Team     PCSSQ[Palliative Care Spiritual Screening Question]   Severity (0-10):  Score of 4 or > indicate consideration of Chaplaincy referral.  Chaplaincy Referral: [ x] yes [ ] refused [ ] following [ ] Deferred     Caregiver [place]? : [ ] yes [x ] no [ ] Deferred [ ] Declined             Social work referral [ ] Patient & Family Centered Care Referral [ ]     Anticipatory Grief present?:  [ ] yes [x ] no  [ ] Deferred                  Social work referral [ ] Chaplaincy Referral[ ]    Other Symptoms:  [x ]All other review of systems negative     Palliative Performance Status Version 2:     20    %      http://npcrc.org/files/news/palliative_performance_scale_ppsv2.pdf    PHYSICAL EXAM:  Vital Signs Last 24 Hrs  T(C): 36.6 (03 Dec 2023 23:08), Max: 37.2 (03 Dec 2023 21:38)  T(F): 97.8 (03 Dec 2023 23:08), Max: 99 (03 Dec 2023 21:38)  HR: 98 (04 Dec 2023 12:20) (89 - 108)  BP: 144/82 (04 Dec 2023 11:46) (101/66 - 144/82)  BP(mean): --  RR: 24 (04 Dec 2023 13:00) (18 - 30)  SpO2: 99% (04 Dec 2023 13:00) (58% - 99%)    Parameters below as of 04 Dec 2023 13:00  Patient On (Oxygen Delivery Method): mask, nonrebreather       GENERAL: [ ]Cachexia    [ ]Alert  [ ]Oriented x   [x ]Lethargic  [ ]Unarousable  [ ]Verbal  [x ]Non-Verbal  Behavioral:   [ ]Anxiety  [x ]Delirium [ ]Agitation [ ]Other  HEENT:  [ ]Normal   [ ]Dry mouth   [ ]ET Tube/Trach  [ ]Oral lesions [x] HFNC and NRB  PULMONARY:   [ ]Clear [ ]Tachypnea  [ ]Audible excessive secretions   [ ]Rhonchi        [ ]Right [ ]Left [ ]Bilateral  [ ]Crackles        [ ]Right [ ]Left [ ]Bilateral  [ ]Wheezing     [ ]Right [ ]Left [ ]Bilateral  [ x]Diminished BS [ ] Right [x ]Left [ ]Bilateral  CARDIOVASCULAR:    [x ]Regular [ ]Irregular [ ]Tachy  [ ]Usman [ ]Murmur [ ]Other  GASTROINTESTINAL:  [ x]Soft  [ ]Distended   [ ]+BS  [ ]Non tender [ ]Tender  [ ]Other [x ]PEG [ ]OGT/ NGT   Last BM:   GENITOURINARY:  [ ]Normal [x ]Incontinent   [ ]Oliguria/Anuria   [ ]Fleming  MUSCULOSKELETAL:   [ ]Normal   [ ]Weakness  [x ]Bed/Wheelchair bound [ ]Edema  NEUROLOGIC:   [ ]No focal deficits  [ x] Cognitive impairment  [ ] Dysphagia [ ]Dysarthria [ ] Paresis [ ]Other   SKIN:   [ ]Normal  [ ]Rash  [ ]Other  [x ]Pressure ulcer(s) [ x]y [ ]n present on admission    CRITICAL CARE:  [ ]Shock Present  [ ]Septic [ ]Cardiogenic [ ]Neurologic [ ]Hypovolemic  [ ]Vasopressors [ ]Inotropes  [ x]Respiratory failure present [ ]Mechanical Ventilation [x ]Non-invasive ventilatory support [ ]High-Flow   [x ]Acute  [ ]Chronic [ x]Hypoxic  [ ]Hypercarbic [ ]Other  [ ]Other organ failure     LABS:                          10.6   8.39  )-----------( 316      ( 04 Dec 2023 10:23 )             33.9     12-04    141  |  104  |  21  ----------------------------<  203<H>  3.9   |  28  |  0.36<L>    Ca    8.5      04 Dec 2023 10:23  Phos  2.2     12-04  Mg     2.3     12-04    TPro  6.7  /  Alb  2.2<L>  /  TBili  0.3  /  DBili  x   /  AST  18  /  ALT  15  /  AlkPhos  152<H>  12-04      RADIOLOGY & ADDITIONAL STUDIES: < from: Xray Chest 1 View- PORTABLE-Routine (Xray Chest 1 View- PORTABLE-Routine in AM.) (11.30.23 @ 04:56) >    IMPRESSION:  Similar left chest whiteout with occluded bronchus.        Thank you for the courtesy of this referral.    --- End of Report ---    < end of copied text >      Protein Calorie Malnutrition Present: [ ]mild [ ]moderate [ ]severe [ ]underweight [ ]morbid obesity  https://www.andeal.org/vault/2440/web/files/ONC/Table_Clinical%20Characteristics%20to%20Document%20Malnutrition-White%20JV%20et%20al%202012.pdf    Height (cm): 170.2 (11-18-23 @ 17:39)  Weight (kg): 49.9 (11-18-23 @ 17:39)  BMI (kg/m2): 17.2 (11-18-23 @ 17:39)    [ ]PPSV2 < or = 30%  [ ]significant weight loss [ ]poor nutritional intake [ ]anasarca[ ]Artificial Nutrition    Other REFERRALS:  [ ]Hospice  [ ]Child Life  [ ]Social Work  [ ]Case management [ ]Holistic Therapy

## 2023-12-04 NOTE — PROGRESS NOTE ADULT - PROBLEM SELECTOR PLAN 5
- Lab checks, fingersticks, non-essential oral meds and TFs discontinued to optimize comfort  - Continue supplemental oxygen with HFNC as tolerated  - PRN meds ordered as above  - Family continues to decline PCU transfer, team will follow and address with family pending pt course     Lauren To MD  [facility] Team Consults  Please call if we can be of assistance, [phone] - Lab checks, fingersticks, non-essential oral meds and TFs discontinued to optimize comfort  - Continue supplemental oxygen with HFNC as tolerated  - PRN meds ordered as above  - Family continues to decline PCU transfer, team will follow and address with family pending pt course     Lauren To MD  [facility] Team Consults  Please call if we can be of assistance, [phone]

## 2023-12-04 NOTE — PROGRESS NOTE ADULT - SUBJECTIVE AND OBJECTIVE BOX
INTERVAL HPI/OVERNIGH  seen and examined , no new events  tolerating TF No N/V/D          MEDICATIONS  (STANDING):  chlorhexidine 2% Cloths 1 Application(s) Topical <User Schedule>  Dakins Solution - 1/4 Strength 1 Application(s) Topical two times a day  enoxaparin Injectable 40 milliGRAM(s) SubCutaneous every 24 hours  ipratropium    for Nebulization 500 MICROGram(s) Nebulizer every 6 hours  levalbuterol Inhalation 0.63 milliGRAM(s) Inhalation every 6 hours  OLANZapine 5 milliGRAM(s) Oral <User Schedule>  pantoprazole    Tablet 40 milliGRAM(s) Oral before breakfast  QUEtiapine 100 milliGRAM(s) Oral <User Schedule>  senna Syrup 10 milliLiter(s) Oral at bedtime  sodium chloride 3%  Inhalation 4 milliLiter(s) Inhalation every 6 hours  sucralfate 1 Gram(s) Oral four times a day    MEDICATIONS  (PRN):  acetaminophen   Oral Liquid .. 750 milliGRAM(s) Oral every 6 hours PRN Temp greater or equal to 38C (100.4F), Mild Pain (1 - 3)      Allergies    No Known Allergies    Intolerances    Haldol (Other)  benzodiazepines (Other)        PHYSICAL EXAM  Vital Signs Last 24 Hrs  T(C): 36.6 (03 Dec 2023 23:08), Max: 37.2 (03 Dec 2023 21:38)  T(F): 97.8 (03 Dec 2023 23:08), Max: 99 (03 Dec 2023 21:38)  HR: 98 (04 Dec 2023 12:20) (89 - 108)  BP: 144/82 (04 Dec 2023 11:46) (101/66 - 144/82)  BP(mean): --  RR: 18 (04 Dec 2023 12:20) (18 - 20)  SpO2: 98% (04 Dec 2023 12:20) (95% - 98%)    Parameters below as of 04 Dec 2023 12:20  Patient On (Oxygen Delivery Method): nasal cannula, high flow  O2 Flow (L/min): 60  O2 Concentration (%): 100        GENERAL:  Appears stated age, well-groomed, well-nourished, no distress  HEENT:  NC/AT,  conjunctivae clear and pink, no thyromegaly, nodules, adenopathy, no JVD, sclera -anicteric  CHEST:  Full & symmetric excursion, no increased effort, breath sounds clear  HEART:  Regular rhythm, S1, S2, no murmur/rub/S3/S4, no abdominal bruit, no edema  ABDOMEN:  Soft, non-tender, non-distended, normoactive bowel sounds,  no masses ,no hepato-splenomegaly, no signs of chronic liver disease  EXTEREMITIES:  no cyanosis,clubbing or edema  SKIN:  No rash/erythema/ecchymoses/petechiae/wounds/abscess/warm/dry  NEURO:  Alert, oriented, no asterixis, no tremor, no encephalopathy      LABS:                          10.6   8.39  )-----------( 316      ( 04 Dec 2023 10:23 )             33.9   12-04    141  |  104  |  21  ----------------------------<  203<H>  3.9   |  28  |  0.36<L>    Ca    8.5      04 Dec 2023 10:23  Phos  2.2     12-04  Mg     2.3     12-04    TPro  6.7  /  Alb  2.2<L>  /  TBili  0.3  /  DBili  x   /  AST  18  /  ALT  15  /  AlkPhos  152<H>  12-04

## 2023-12-04 NOTE — PROGRESS NOTE ADULT - SUBJECTIVE AND OBJECTIVE BOX
SHARONA LARKIN  78y Male  MRN:[id number]    Patient is a 78y old  Male who presents with a chief complaint of sepsis      HPI:  77 yo M PMHx of severe dementia, PEG tube presents with ulcer that has worsened over last month. Patient was seen by house calls doctor 2 weeks ago, placed on antibiotics but started mounting fevers in the last few days. It was noticed by his GI doctor (Dr. Moraes) that his ulcer was getting worse, so he sent him in. Patient is nonverbal at baseline. Wife has noticed no new cough, runny nose. (19 Nov 2023 13:02)      Patient seen and evaluated at bedside. No acute events overnight except as noted.    Interval HPI: no acute events o/n     PAST MEDICAL & SURGICAL HISTORY:  Dementia      Pneumonia      Acute renal failure (ARF)  ARF 1.5 YEAR AGO      Dysphagia  peg      Arthritis      GERD (gastroesophageal reflux disease)      Dyspepsia      History of hand surgery      PEG adjustment, replacement, or removal      Status post insertion of percutaneous endoscopic gastrostomy (PEG) tube  PEG replacement on 02/29/16          REVIEW OF SYSTEMS:  as per hpi     VITALS:   Vital Signs Last 24 Hrs  T(C): 36.6 (03 Dec 2023 23:08), Max: 37.2 (03 Dec 2023 21:38)  T(F): 97.8 (03 Dec 2023 23:08), Max: 99 (03 Dec 2023 21:38)  HR: 89 (04 Dec 2023 09:46) (89 - 108)  BP: 144/82 (04 Dec 2023 11:46) (101/66 - 144/82)  BP(mean): --  RR: 18 (04 Dec 2023 09:46) (18 - 20)  SpO2: 98% (04 Dec 2023 09:46) (95% - 98%)    Parameters below as of 04 Dec 2023 09:46  Patient On (Oxygen Delivery Method): nasal cannula, high flow  O2 Flow (L/min): 50  O2 Concentration (%): 50    PHYSICAL EXAM:  GENERAL: NAD,  comfortable   HEAD:  Atraumatic, Normocephalic  EYES: EOMI, PERRLA, conjunctiva and sclera clear  NECK: Supple, No JVD  CHEST/LUNG: Clear to auscultation bilaterally; No wheeze  HEART: S1, S2; No murmurs, rubs, or gallops  ABDOMEN: Soft, Nontender, Nondistended; Bowel sounds present +peg  EXTREMITIES:  2+ Peripheral Pulses, contracted   PSYCH: Normal affect  NEUROLOGY: non verbal  SKIN: +sacral decub    Consultant(s) Notes Reviewed:  [x ] YES  [ ] NO  Care Discussed with Consultants/Other Providers [ x] YES  [ ] NO    MEDS:   MEDICATIONS  (STANDING):  chlorhexidine 2% Cloths 1 Application(s) Topical <User Schedule>  Dakins Solution - 1/4 Strength 1 Application(s) Topical two times a day  enoxaparin Injectable 40 milliGRAM(s) SubCutaneous every 24 hours  ipratropium    for Nebulization 500 MICROGram(s) Nebulizer every 6 hours  levalbuterol Inhalation 0.63 milliGRAM(s) Inhalation every 6 hours  OLANZapine 5 milliGRAM(s) Oral <User Schedule>  pantoprazole  Injectable 40 milliGRAM(s) IV Push daily  QUEtiapine 100 milliGRAM(s) Oral <User Schedule>  senna Syrup 10 milliLiter(s) Oral at bedtime  sodium chloride 3%  Inhalation 4 milliLiter(s) Inhalation every 6 hours  sucralfate suspension 1 Gram(s) Oral four times a day    MEDICATIONS  (PRN):  acetaminophen   Oral Liquid .. 750 milliGRAM(s) Oral every 6 hours PRN Temp greater or equal to 38C (100.4F), Mild Pain (1 - 3)      ALLERGIES:  Haldol (Other)  No Known Allergies  benzodiazepines (Other)      LABS:                                                                     10.6   8.39  )-----------( 316      ( 04 Dec 2023 10:23 )             33.9   12-04    141  |  104  |  21  ----------------------------<  203<H>  3.9   |  28  |  0.36<L>    Ca    8.5      04 Dec 2023 10:23  Phos  2.2     12-04  Mg     2.3     12-04    TPro  6.7  /  Alb  2.2<L>  /  TBili  0.3  /  DBili  x   /  AST  18  /  ALT  15  /  AlkPhos  152<H>  12-04      < from: CT Angio Chest PE Protocol w/ IV Cont (11.27.23 @ 22:44) >  IMPRESSION:.    No pulmonary embolism detected.    Occlusive secretions within the left mainstem bronchus with associated   complete left lung collapse; consider superimposed infection in the   appropriate clinical scenario.    Small leftpleural effusion.    --- End of Report ---      < end of copied text >       < from: CT Abdomen and Pelvis w/ IV Cont (11.18.23 @ 19:41) >    IMPRESSION:    Extensive decubitus ulcer involving the right posterior gluteal and   pelvic region with extension to bone. There is bony erosion of the   ischium compatible with osteomyelitis.    Mild bladder wall thickening and a few foci of intraluminal air,   correlate for recent instrumentation and/or infection.    Left lower lobe consolidation along with tree-in-bud/nodular opacities in   the left upper lobe concerning for pneumonia.    --- End of Report ---        < end of copied text >   SHARONA LARKIN  78y Male  MRN:[id number]    Patient is a 78y old  Male who presents with a chief complaint of sepsis      HPI:  79 yo M PMHx of severe dementia, PEG tube presents with ulcer that has worsened over last month. Patient was seen by house calls doctor 2 weeks ago, placed on antibiotics but started mounting fevers in the last few days. It was noticed by his GI doctor (Dr. Moraes) that his ulcer was getting worse, so he sent him in. Patient is nonverbal at baseline. Wife has noticed no new cough, runny nose. (19 Nov 2023 13:02)      Patient seen and evaluated at bedside. No acute events overnight except as noted.    Interval HPI: no acute events o/n     PAST MEDICAL & SURGICAL HISTORY:  Dementia      Pneumonia      Acute renal failure (ARF)  ARF 1.5 YEAR AGO      Dysphagia  peg      Arthritis      GERD (gastroesophageal reflux disease)      Dyspepsia      History of hand surgery      PEG adjustment, replacement, or removal      Status post insertion of percutaneous endoscopic gastrostomy (PEG) tube  PEG replacement on 02/29/16          REVIEW OF SYSTEMS:  as per hpi     VITALS:   Vital Signs Last 24 Hrs  T(C): 36.6 (03 Dec 2023 23:08), Max: 37.2 (03 Dec 2023 21:38)  T(F): 97.8 (03 Dec 2023 23:08), Max: 99 (03 Dec 2023 21:38)  HR: 89 (04 Dec 2023 09:46) (89 - 108)  BP: 144/82 (04 Dec 2023 11:46) (101/66 - 144/82)  BP(mean): --  RR: 18 (04 Dec 2023 09:46) (18 - 20)  SpO2: 98% (04 Dec 2023 09:46) (95% - 98%)    Parameters below as of 04 Dec 2023 09:46  Patient On (Oxygen Delivery Method): nasal cannula, high flow  O2 Flow (L/min): 50  O2 Concentration (%): 50    PHYSICAL EXAM:  GENERAL: NAD,  comfortable   HEAD:  Atraumatic, Normocephalic  EYES: EOMI, PERRLA, conjunctiva and sclera clear  NECK: Supple, No JVD  CHEST/LUNG: Clear to auscultation bilaterally; No wheeze  HEART: S1, S2; No murmurs, rubs, or gallops  ABDOMEN: Soft, Nontender, Nondistended; Bowel sounds present +peg  EXTREMITIES:  2+ Peripheral Pulses, contracted   PSYCH: Normal affect  NEUROLOGY: non verbal  SKIN: +sacral decub    Consultant(s) Notes Reviewed:  [x ] YES  [ ] NO  Care Discussed with Consultants/Other Providers [ x] YES  [ ] NO    MEDS:   MEDICATIONS  (STANDING):  chlorhexidine 2% Cloths 1 Application(s) Topical <User Schedule>  Dakins Solution - 1/4 Strength 1 Application(s) Topical two times a day  enoxaparin Injectable 40 milliGRAM(s) SubCutaneous every 24 hours  ipratropium    for Nebulization 500 MICROGram(s) Nebulizer every 6 hours  levalbuterol Inhalation 0.63 milliGRAM(s) Inhalation every 6 hours  OLANZapine 5 milliGRAM(s) Oral <User Schedule>  pantoprazole  Injectable 40 milliGRAM(s) IV Push daily  QUEtiapine 100 milliGRAM(s) Oral <User Schedule>  senna Syrup 10 milliLiter(s) Oral at bedtime  sodium chloride 3%  Inhalation 4 milliLiter(s) Inhalation every 6 hours  sucralfate suspension 1 Gram(s) Oral four times a day    MEDICATIONS  (PRN):  acetaminophen   Oral Liquid .. 750 milliGRAM(s) Oral every 6 hours PRN Temp greater or equal to 38C (100.4F), Mild Pain (1 - 3)      ALLERGIES:  Haldol (Other)  No Known Allergies  benzodiazepines (Other)      LABS:                                                                     10.6   8.39  )-----------( 316      ( 04 Dec 2023 10:23 )             33.9   12-04    141  |  104  |  21  ----------------------------<  203<H>  3.9   |  28  |  0.36<L>    Ca    8.5      04 Dec 2023 10:23  Phos  2.2     12-04  Mg     2.3     12-04    TPro  6.7  /  Alb  2.2<L>  /  TBili  0.3  /  DBili  x   /  AST  18  /  ALT  15  /  AlkPhos  152<H>  12-04      < from: CT Angio Chest PE Protocol w/ IV Cont (11.27.23 @ 22:44) >  IMPRESSION:.    No pulmonary embolism detected.    Occlusive secretions within the left mainstem bronchus with associated   complete left lung collapse; consider superimposed infection in the   appropriate clinical scenario.    Small leftpleural effusion.    --- End of Report ---      < end of copied text >       < from: CT Abdomen and Pelvis w/ IV Cont (11.18.23 @ 19:41) >    IMPRESSION:    Extensive decubitus ulcer involving the right posterior gluteal and   pelvic region with extension to bone. There is bony erosion of the   ischium compatible with osteomyelitis.    Mild bladder wall thickening and a few foci of intraluminal air,   correlate for recent instrumentation and/or infection.    Left lower lobe consolidation along with tree-in-bud/nodular opacities in   the left upper lobe concerning for pneumonia.    --- End of Report ---        < end of copied text >

## 2023-12-04 NOTE — PROGRESS NOTE ADULT - SUBJECTIVE AND OBJECTIVE BOX
Date of Service: 12-04-23 @ 16:53    Patient is a 78y old  Male who presents with a chief complaint of Osteomyelitis     (20 Nov 2023 11:10)      Any change in ROS: ptis doing very poorly:  more hypoxic:  rrt noted:  on high flow 100% and NRB:  not very responsive    MEDICATIONS  (STANDING):  chlorhexidine 2% Cloths 1 Application(s) Topical <User Schedule>  Dakins Solution - 1/4 Strength 1 Application(s) Topical two times a day  ipratropium    for Nebulization 500 MICROGram(s) Nebulizer every 6 hours  levalbuterol Inhalation 0.63 milliGRAM(s) Inhalation every 6 hours    MEDICATIONS  (PRN):  acetaminophen   Oral Liquid .. 750 milliGRAM(s) Oral every 6 hours PRN Temp greater or equal to 38C (100.4F), Mild Pain (1 - 3)  glycopyrrolate Injectable 0.4 milliGRAM(s) IV Push every 6 hours PRN secretions  morphine  - Injectable 2 milliGRAM(s) IV Push every 1 hour PRN Severe Pain (7 - 10)  morphine  - Injectable 1 milliGRAM(s) IV Push every 1 hour PRN dsypnea, RR>22  morphine  - Injectable 1 milliGRAM(s) IV Push every 1 hour PRN Moderate Pain (4 - 6)    Vital Signs Last 24 Hrs  T(C): 36.4 (04 Dec 2023 11:45), Max: 37.2 (03 Dec 2023 21:38)  T(F): 97.5 (04 Dec 2023 11:45), Max: 99 (03 Dec 2023 21:38)  HR: 98 (04 Dec 2023 12:20) (89 - 140)  BP: 144/82 (04 Dec 2023 11:46) (101/66 - 151/86)  BP(mean): --  RR: 24 (04 Dec 2023 13:00) (18 - 30)  SpO2: 99% (04 Dec 2023 13:00) (58% - 99%)    Parameters below as of 04 Dec 2023 13:00  Patient On (Oxygen Delivery Method): mask, nonrebreather        I&O's Summary    03 Dec 2023 07:01  -  04 Dec 2023 07:00  --------------------------------------------------------  IN: 900 mL / OUT: 0 mL / NET: 900 mL          Physical Exam:   GENERAL: cacechtic contracted  HEENT: CANDY/   Atraumatic, Normocephalic  ENMT: No tonsillar erythema, exudates, or enlargement; Moist mucous membranes, Good dentition, No lesions  NECK: Supple, No JVD, Normal thyroid  CHEST/LUNG: Clear to auscultaion, ; No rales, rhonchi, wheezing, or rubs  CVS: Regular rate and rhythm; No murmurs, rubs, or gallops  GI: : Soft, Nontender, Nondistended; Bowel sounds present  NERVOUS SYSTEM:  nto very responsive  EXTREMITIES: contracted ext  LYMPH: No lymphadenopathy noted  SKIN: No rashes or lesions  ENDOCRINOLOGY: No Thyromegaly  PSYCH: dmentia    Labs:  31, 28                            10.6   8.39  )-----------( 316      ( 04 Dec 2023 10:23 )             33.9     12-04    141  |  104  |  21  ----------------------------<  203<H>  3.9   |  28  |  0.36<L>    Ca    8.5      04 Dec 2023 10:23  Phos  2.2     12-04  Mg     2.3     12-04    TPro  6.7  /  Alb  2.2<L>  /  TBili  0.3  /  DBili  x   /  AST  18  /  ALT  15  /  AlkPhos  152<H>  12-04    CAPILLARY BLOOD GLUCOSE      POCT Blood Glucose.: 154 mg/dL (04 Dec 2023 11:43)  POCT Blood Glucose.: 128 mg/dL (04 Dec 2023 05:51)  POCT Blood Glucose.: 156 mg/dL (03 Dec 2023 23:57)      LIVER FUNCTIONS - ( 04 Dec 2023 10:23 )  Alb: 2.2 g/dL / Pro: 6.7 g/dL / ALK PHOS: 152 U/L / ALT: 15 U/L / AST: 18 U/L / GGT: x             Urinalysis Basic - ( 04 Dec 2023 10:23 )    Color: x / Appearance: x / SG: x / pH: x  Gluc: 203 mg/dL / Ketone: x  / Bili: x / Urobili: x   Blood: x / Protein: x / Nitrite: x   Leuk Esterase: x / RBC: x / WBC x   Sq Epi: x / Non Sq Epi: x / Bacteria: x    < from: Xray Chest 1 View- PORTABLE-Routine (Xray Chest 1 View- PORTABLE-Routine .) (12.01.23 @ 14:13) >    ACC: [id number] EXAM:  XR CHEST PORTABLE ROUTINE 1V   ORDERED BY: BETH WRIGHT     PROCEDURE DATE:  12/01/2023          INTERPRETATION:  DATE OF STUDY: 12/1/23    PRIOR: 11/30/23    CLINICAL INDICATION: Assess left lung atelectasis.    TECHNIQUE: AP radiograph of the chest.    FINDINGS:  Heart is similar in size.  Right lung remains clear.  Partial reaeration of the upper and mid left lung.  Partly loculated lower left pleural effusion is present. Mid and lower   left lung haziness may be due to underlying infection in the right   clinical setting. No pneumothorax.    IMPRESSION:  Partial reaeration of the left lung.  Small loculated left pleural effusion.  Associated mid and lower left lung atelectasis.    --- End of Report ---            MARLYN AMARO MD; Attending Radiologist  This document has been electronically signed. Dec  2 2023 10:22AM    < end of copied text >          RECENT CULTURES:        RESPIRATORY CULTURES:          Studies  Chest X-RAY  CT SCAN Chest   Venous Dopplers: LE:   CT Abdomen  Others               Date of Service: 12-04-23 @ 16:53    Patient is a 78y old  Male who presents with a chief complaint of Osteomyelitis     (20 Nov 2023 11:10)      Any change in ROS: ptis doing very poorly:  more hypoxic:  rrt noted:  on high flow 100% and NRB:  not very responsive    MEDICATIONS  (STANDING):  chlorhexidine 2% Cloths 1 Application(s) Topical <User Schedule>  Dakins Solution - 1/4 Strength 1 Application(s) Topical two times a day  ipratropium    for Nebulization 500 MICROGram(s) Nebulizer every 6 hours  levalbuterol Inhalation 0.63 milliGRAM(s) Inhalation every 6 hours    MEDICATIONS  (PRN):  acetaminophen   Oral Liquid .. 750 milliGRAM(s) Oral every 6 hours PRN Temp greater or equal to 38C (100.4F), Mild Pain (1 - 3)  glycopyrrolate Injectable 0.4 milliGRAM(s) IV Push every 6 hours PRN secretions  morphine  - Injectable 2 milliGRAM(s) IV Push every 1 hour PRN Severe Pain (7 - 10)  morphine  - Injectable 1 milliGRAM(s) IV Push every 1 hour PRN dsypnea, RR>22  morphine  - Injectable 1 milliGRAM(s) IV Push every 1 hour PRN Moderate Pain (4 - 6)    Vital Signs Last 24 Hrs  T(C): 36.4 (04 Dec 2023 11:45), Max: 37.2 (03 Dec 2023 21:38)  T(F): 97.5 (04 Dec 2023 11:45), Max: 99 (03 Dec 2023 21:38)  HR: 98 (04 Dec 2023 12:20) (89 - 140)  BP: 144/82 (04 Dec 2023 11:46) (101/66 - 151/86)  BP(mean): --  RR: 24 (04 Dec 2023 13:00) (18 - 30)  SpO2: 99% (04 Dec 2023 13:00) (58% - 99%)    Parameters below as of 04 Dec 2023 13:00  Patient On (Oxygen Delivery Method): mask, nonrebreather        I&O's Summary    03 Dec 2023 07:01  -  04 Dec 2023 07:00  --------------------------------------------------------  IN: 900 mL / OUT: 0 mL / NET: 900 mL          Physical Exam:   GENERAL: cacechtic contracted  HEENT: CANDY/   Atraumatic, Normocephalic  ENMT: No tonsillar erythema, exudates, or enlargement; Moist mucous membranes, Good dentition, No lesions  NECK: Supple, No JVD, Normal thyroid  CHEST/LUNG: Clear to auscultaion, ; No rales, rhonchi, wheezing, or rubs  CVS: Regular rate and rhythm; No murmurs, rubs, or gallops  GI: : Soft, Nontender, Nondistended; Bowel sounds present  NERVOUS SYSTEM:  nto very responsive  EXTREMITIES: contracted ext  LYMPH: No lymphadenopathy noted  SKIN: No rashes or lesions  ENDOCRINOLOGY: No Thyromegaly  PSYCH: dmentia    Labs:  31, 28                            10.6   8.39  )-----------( 316      ( 04 Dec 2023 10:23 )             33.9     12-04    141  |  104  |  21  ----------------------------<  203<H>  3.9   |  28  |  0.36<L>    Ca    8.5      04 Dec 2023 10:23  Phos  2.2     12-04  Mg     2.3     12-04    TPro  6.7  /  Alb  2.2<L>  /  TBili  0.3  /  DBili  x   /  AST  18  /  ALT  15  /  AlkPhos  152<H>  12-04    CAPILLARY BLOOD GLUCOSE      POCT Blood Glucose.: 154 mg/dL (04 Dec 2023 11:43)  POCT Blood Glucose.: 128 mg/dL (04 Dec 2023 05:51)  POCT Blood Glucose.: 156 mg/dL (03 Dec 2023 23:57)      LIVER FUNCTIONS - ( 04 Dec 2023 10:23 )  Alb: 2.2 g/dL / Pro: 6.7 g/dL / ALK PHOS: 152 U/L / ALT: 15 U/L / AST: 18 U/L / GGT: x             Urinalysis Basic - ( 04 Dec 2023 10:23 )    Color: x / Appearance: x / SG: x / pH: x  Gluc: 203 mg/dL / Ketone: x  / Bili: x / Urobili: x   Blood: x / Protein: x / Nitrite: x   Leuk Esterase: x / RBC: x / WBC x   Sq Epi: x / Non Sq Epi: x / Bacteria: x    < from: Xray Chest 1 View- PORTABLE-Routine (Xray Chest 1 View- PORTABLE-Routine .) (12.01.23 @ 14:13) >    ACC: [id number] EXAM:  XR CHEST PORTABLE ROUTINE 1V   ORDERED BY: BETH WRIGHT     PROCEDURE DATE:  12/01/2023          INTERPRETATION:  DATE OF STUDY: 12/1/23    PRIOR: 11/30/23    CLINICAL INDICATION: Assess left lung atelectasis.    TECHNIQUE: AP radiograph of the chest.    FINDINGS:  Heart is similar in size.  Right lung remains clear.  Partial reaeration of the upper and mid left lung.  Partly loculated lower left pleural effusion is present. Mid and lower   left lung haziness may be due to underlying infection in the right   clinical setting. No pneumothorax.    IMPRESSION:  Partial reaeration of the left lung.  Small loculated left pleural effusion.  Associated mid and lower left lung atelectasis.    --- End of Report ---            MARLYN AMARO MD; Attending Radiologist  This document has been electronically signed. Dec  2 2023 10:22AM    < end of copied text >          RECENT CULTURES:        RESPIRATORY CULTURES:          Studies  Chest X-RAY  CT SCAN Chest   Venous Dopplers: LE:   CT Abdomen  Others

## 2023-12-04 NOTE — PROGRESS NOTE ADULT - PROBLEM SELECTOR PLAN 4
- HCP reportedly wife Brenda.   - Code status: DNR/I, MOLST completed  - GOC: transition to full comfort measures at this time

## 2023-12-04 NOTE — PROGRESS NOTE ADULT - ASSESSMENT
77 yo male h/o dementia, non verbal. contracted, s/p peg. here with sepsis likely 2/2 sacral decub and aspiration pna    sepsis  respiratory failure   sacral decub  pna - possible aspiration    abx as per id - now complete  f/u cult  wound care f/u  id consult noted  pulm consulted  cont supp o2 via high flow. taper down as able   CTA chest noted  pulm f/u   bipap as able  abx changed to meropenem. id f/u apprec - now complete    dementia  cont home meds  supportive care    dysphagia s/p peg  feeds as per gi/nutrition   s/p peg change this am by gi bedside     hyperNa  free water via peg ordered  trend bmp       dvt ppx      d/w family bedside  DNR    palliative eval for goc   d/w family bedside   family wants to take home    Advanced care planning was discussed with patient and family.  Advanced care planning forms were reviewed and discussed as appropriate.  Differential diagnosis and plan of care discussed with patient after the evaluation.   Pain assessed and judicious use of narcotics when appropriate was discussed.  Importance of Fall prevention discussed.  Counseling on Smoking and Alcohol cessation was offered when appropriate.  Counseling on Diet, exercise, and medication compliance was done.       Approx 75 minutes spent. 79 yo male h/o dementia, non verbal. contracted, s/p peg. here with sepsis likely 2/2 sacral decub and aspiration pna    sepsis  respiratory failure   sacral decub  pna - possible aspiration    abx as per id - now complete  f/u cult  wound care f/u  id consult noted  pulm consulted  cont supp o2 via high flow. taper down as able   CTA chest noted  pulm f/u   bipap as able  abx changed to meropenem. id f/u apprec - now complete    dementia  cont home meds  supportive care    dysphagia s/p peg  feeds as per gi/nutrition   s/p peg change this am by gi bedside     hyperNa  free water via peg ordered  trend bmp       dvt ppx      d/w family bedside  DNR    palliative eval for goc   d/w family bedside   family wants to take home    Advanced care planning was discussed with patient and family.  Advanced care planning forms were reviewed and discussed as appropriate.  Differential diagnosis and plan of care discussed with patient after the evaluation.   Pain assessed and judicious use of narcotics when appropriate was discussed.  Importance of Fall prevention discussed.  Counseling on Smoking and Alcohol cessation was offered when appropriate.  Counseling on Diet, exercise, and medication compliance was done.       Approx 75 minutes spent.

## 2023-12-04 NOTE — PROGRESS NOTE ADULT - PROBLEM SELECTOR PLAN 1
S/p RRT 11/20 for hypoxia requiring HFNC  -Increase in O2 requirements 11/21 from HFNC 50L/80% to 100% Bipap. Weaned from Bipap to HFNC 50L/100%  -Suspect aspiration event, family at bedside reports multiple aspiration events in the past  -CT A/P with LLL consolidation, SHEILA tree in bud opacities likely PNA. Continue ABX per ID  -ABG with no CO2 retention  -CXR 11/25 with LLL infiltrate  -CTA chest 11/27 negative for PE. Notes extensive L atelectasis/mucus plugging with small L effusion  -Started on Bipap 12/8 to assist with atelectasis but pt has been unable to tolerate. Unable to tolerate chest vest as well per RT   -Discussed plan of care at length with patient's wife Brenda and daughter Ling ([phone]). They expressed that at this point they are not ready to transition to comfort measures or palliative care. While they still want to treat, they would like to focus on interventions that do not cause the patient any more pain. They experienced patient's visible discomfort with Bipap as well as chest vest, and would like to discontinue these therapies at this time and focus on manual chest PT, O2 via HFNC, and nebulizers.    -Bipap and chest vest d/c'd at family's request. CXR this AM actually with improvement in L lung aeration.   -Continue Atrovent/Xopenex nebs q6h, 3% sodium chloride nebs q6h  -Suction PRN  -Chest PT  -Position patient with L lung up   -O2 requirements slowly improving - continue HFNC (currently 50L/50%). Wean O2 as tolerated. Keep sats >90%.    -Palliative care f/u. Overall prognosis guarded.  -on 50% high flow today   sao2 100%:  can try to wean down oxygen  -now on 100% ; doing very poorly:  now for palliative care:  family agreed: S/p RRT 11/20 for hypoxia requiring HFNC  -Increase in O2 requirements 11/21 from HFNC 50L/80% to 100% Bipap. Weaned from Bipap to HFNC 50L/100%  -Suspect aspiration event, family at bedside reports multiple aspiration events in the past  -CT A/P with LLL consolidation, SHEILA tree in bud opacities likely PNA. Continue ABX per ID  -ABG with no CO2 retention  -CXR 11/25 with LLL infiltrate  -CTA chest 11/27 negative for PE. Notes extensive L atelectasis/mucus plugging with small L effusion  -Started on Bipap 12/8 to assist with atelectasis but pt has been unable to tolerate. Unable to tolerate chest vest as well per RT   -Discussed plan of care at length with patient's wife Brenda and daughter Ling ([phone]). They expressed that at this point they are not ready to transition to comfort measures or palliative care. While they still want to treat, they would like to focus on interventions that do not cause the patient any more pain. They experienced patient's visible discomfort with Bipap as well as chest vest, and would like to discontinue these therapies at this time and focus on manual chest PT, O2 via HFNC, and nebulizers.    -Bipap and chest vest d/c'd at family's request. CXR this AM actually with improvement in L lung aeration.   -Continue Atrovent/Xopenex nebs q6h, 3% sodium chloride nebs q6h  -Suction PRN  -Chest PT  -Position patient with L lung up   -O2 requirements slowly improving - continue HFNC (currently 50L/50%). Wean O2 as tolerated. Keep sats >90%.    -Palliative care f/u. Overall prognosis guarded.  -on 50% high flow today   sao2 100%:  can try to wean down oxygen  -now on 100% ; doing very poorly:  now for palliative care:  family agreed:

## 2023-12-04 NOTE — RAPID RESPONSE TEAM SUMMARY - NSOTHERSPECIFYRRT2_GEN_ALL_CORE
Discuss with the family making him more comfortable at end of life, as he is dying from an irreversible condition.

## 2023-12-04 NOTE — CHART NOTE - NSCHARTNOTEFT_GEN_A_CORE
Informed by vero ambrizer of RRT called for hypoxia. Per discussion with RN, after pt was seen by PT for wound vac drsg change, pt became hypoxic to 70s.  Pt seen with RRT at bedside; non-rebreather placed,  his oxygen saturation improved to 100% on maximum setting high flow nasal cannula  60L/100%. Re- called palliative to define GOC with pt's family.  Pt 's wife and daughter is amendable to making pt comfort care.  Discussed with Dr. To who agrees with plan. Palliative adjusted medications accordingly.   Stephanie CABRERA

## 2023-12-04 NOTE — PROGRESS NOTE ADULT - PROBLEM SELECTOR PLAN 3
In setting of PNA, likely ongoing aspiration, and mucus plugging/lung collapse on CTA chest 11/27  - pulmonary following, pt was on treatment with mucolytics/nebs/HFNC  - s/p abx with a full treatment course   - Given acute deterioration, pt is now full comfort measures with plan to continue supplemental oxygen for support without escalation otherwise  - IV morphine 1-2 mg q1 prn mod-severe pain, IV morphine 1 mg q1 prn dyspnea, IV glycopyrrolate 0.4 mg q6 prn secretions

## 2023-12-04 NOTE — PROGRESS NOTE ADULT - CONVERSATION DETAILS
Discussed with Brenda and Ling, both of whom recognize that pt is doing poorly and at end of life. They agree to me ordering PRN morphine for dyspnea and signs of pain. They agree to discontinuation of lab checks, fingersticks, and tube feeds to optimize comfort. They would like pt to remain on the floor for comfort measures at this time. Advised the PCU is currently full, but we can rediscuss transfer to the PCU pending pt's course.

## 2023-12-05 PROCEDURE — 99233 SBSQ HOSP IP/OBS HIGH 50: CPT

## 2023-12-05 RX ORDER — SODIUM CHLORIDE 9 MG/ML
1000 INJECTION, SOLUTION INTRAVENOUS
Refills: 0 | Status: DISCONTINUED | OUTPATIENT
Start: 2023-12-05 | End: 2023-12-13

## 2023-12-05 RX ADMIN — Medication 500 MICROGRAM(S): at 17:16

## 2023-12-05 RX ADMIN — Medication 500 MICROGRAM(S): at 05:33

## 2023-12-05 RX ADMIN — Medication 750 MILLIGRAM(S): at 10:54

## 2023-12-05 RX ADMIN — Medication 1 APPLICATION(S): at 05:34

## 2023-12-05 RX ADMIN — LEVALBUTEROL 0.63 MILLIGRAM(S): 1.25 SOLUTION, CONCENTRATE RESPIRATORY (INHALATION) at 05:33

## 2023-12-05 RX ADMIN — LEVALBUTEROL 0.63 MILLIGRAM(S): 1.25 SOLUTION, CONCENTRATE RESPIRATORY (INHALATION) at 12:35

## 2023-12-05 RX ADMIN — CHLORHEXIDINE GLUCONATE 1 APPLICATION(S): 213 SOLUTION TOPICAL at 05:33

## 2023-12-05 RX ADMIN — Medication 750 MILLIGRAM(S): at 17:15

## 2023-12-05 RX ADMIN — Medication 500 MICROGRAM(S): at 12:35

## 2023-12-05 RX ADMIN — Medication 750 MILLIGRAM(S): at 12:33

## 2023-12-05 RX ADMIN — Medication 750 MILLIGRAM(S): at 22:41

## 2023-12-05 RX ADMIN — Medication 750 MILLIGRAM(S): at 02:43

## 2023-12-05 RX ADMIN — LEVALBUTEROL 0.63 MILLIGRAM(S): 1.25 SOLUTION, CONCENTRATE RESPIRATORY (INHALATION) at 17:16

## 2023-12-05 RX ADMIN — SODIUM CHLORIDE 50 MILLILITER(S): 9 INJECTION, SOLUTION INTRAVENOUS at 22:07

## 2023-12-05 RX ADMIN — Medication 750 MILLIGRAM(S): at 18:50

## 2023-12-05 NOTE — PROGRESS NOTE ADULT - PROBLEM SELECTOR PLAN 3
CT A/P with extensive decubitus ulcer involving the right posterior gluteal and pelvic region with extension to bone, bony erosion of the ischium compatible with osteomyelitis  -ABX per ID.  -now off antibtiocs

## 2023-12-05 NOTE — PROGRESS NOTE ADULT - SUBJECTIVE AND OBJECTIVE BOX
SHARONA LARKIN  78y Male  MRN:[id number]    Patient is a 78y old  Male who presents with a chief complaint of sepsis      HPI:  77 yo M PMHx of severe dementia, PEG tube presents with ulcer that has worsened over last month. Patient was seen by house calls doctor 2 weeks ago, placed on antibiotics but started mounting fevers in the last few days. It was noticed by his GI doctor (Dr. Moraes) that his ulcer was getting worse, so he sent him in. Patient is nonverbal at baseline. Wife has noticed no new cough, runny nose. (19 Nov 2023 13:02)      Patient seen and evaluated at bedside. No acute events overnight except as noted.    Interval HPI: RRT for hypoxia. now comfort care      PAST MEDICAL & SURGICAL HISTORY:  Dementia      Pneumonia      Acute renal failure (ARF)  ARF 1.5 YEAR AGO      Dysphagia  peg      Arthritis      GERD (gastroesophageal reflux disease)      Dyspepsia      History of hand surgery      PEG adjustment, replacement, or removal      Status post insertion of percutaneous endoscopic gastrostomy (PEG) tube  PEG replacement on 02/29/16          REVIEW OF SYSTEMS:  as per hpi     VITALS:   Vital Signs Last 24 Hrs  T(C): 36.8 (05 Dec 2023 08:23), Max: 37.1 (04 Dec 2023 23:12)  T(F): 98.3 (05 Dec 2023 08:23), Max: 98.8 (04 Dec 2023 23:12)  HR: 107 (05 Dec 2023 08:23) (95 - 122)  BP: 115/64 (05 Dec 2023 08:23) (115/64 - 137/88)  BP(mean): --  RR: 19 (05 Dec 2023 11:20) (18 - 28)  SpO2: 94% (05 Dec 2023 11:20) (94% - 100%)    Parameters below as of 05 Dec 2023 11:20  Patient On (Oxygen Delivery Method): nasal cannula, high flow  O2 Flow (L/min): 60  O2 Concentration (%): 100    PHYSICAL EXAM:  GENERAL: NAD,  comfortable   HEAD:  Atraumatic, Normocephalic  EYES: EOMI, PERRLA, conjunctiva and sclera clear  NECK: Supple, No JVD  CHEST/LUNG: Clear to auscultation bilaterally; No wheeze  HEART: S1, S2; No murmurs, rubs, or gallops  ABDOMEN: Soft, Nontender, Nondistended; Bowel sounds present +peg  EXTREMITIES:  2+ Peripheral Pulses, contracted   PSYCH: Normal affect  NEUROLOGY: non verbal  SKIN: +sacral decub    Consultant(s) Notes Reviewed:  [x ] YES  [ ] NO  Care Discussed with Consultants/Other Providers [ x] YES  [ ] NO    MEDS:   MEDICATIONS  (STANDING):  chlorhexidine 2% Cloths 1 Application(s) Topical <User Schedule>  Dakins Solution - 1/4 Strength 1 Application(s) Topical two times a day  ipratropium    for Nebulization 500 MICROGram(s) Nebulizer every 6 hours  levalbuterol Inhalation 0.63 milliGRAM(s) Inhalation every 6 hours    MEDICATIONS  (PRN):  acetaminophen   Oral Liquid .. 750 milliGRAM(s) Oral every 6 hours PRN Temp greater or equal to 38C (100.4F), Mild Pain (1 - 3)  glycopyrrolate Injectable 0.4 milliGRAM(s) IV Push every 6 hours PRN secretions  morphine  - Injectable 1 milliGRAM(s) IV Push every 1 hour PRN dsypnea, RR>22  morphine  - Injectable 1 milliGRAM(s) IV Push every 1 hour PRN Moderate Pain (4 - 6)  morphine  - Injectable 2 milliGRAM(s) IV Push every 1 hour PRN Severe Pain (7 - 10)      ALLERGIES:  Haldol (Other)  No Known Allergies  benzodiazepines (Other)      LABS:                                                    10.6   8.39  )-----------( 316      ( 04 Dec 2023 10:23 )             33.9   12-04    141  |  104  |  21  ----------------------------<  203<H>  3.9   |  28  |  0.36<L>    Ca    8.5      04 Dec 2023 10:23  Phos  2.2     12-04  Mg     2.3     12-04    TPro  6.7  /  Alb  2.2<L>  /  TBili  0.3  /  DBili  x   /  AST  18  /  ALT  15  /  AlkPhos  152<H>  12-04      < from: CT Angio Chest PE Protocol w/ IV Cont (11.27.23 @ 22:44) >  IMPRESSION:.    No pulmonary embolism detected.    Occlusive secretions within the left mainstem bronchus with associated   complete left lung collapse; consider superimposed infection in the   appropriate clinical scenario.    Small leftpleural effusion.    --- End of Report ---      < end of copied text >       < from: CT Abdomen and Pelvis w/ IV Cont (11.18.23 @ 19:41) >    IMPRESSION:    Extensive decubitus ulcer involving the right posterior gluteal and   pelvic region with extension to bone. There is bony erosion of the   ischium compatible with osteomyelitis.    Mild bladder wall thickening and a few foci of intraluminal air,   correlate for recent instrumentation and/or infection.    Left lower lobe consolidation along with tree-in-bud/nodular opacities in   the left upper lobe concerning for pneumonia.    --- End of Report ---        < end of copied text >   SHARONA LARKIN  78y Male  MRN:[id number]    Patient is a 78y old  Male who presents with a chief complaint of sepsis      HPI:  79 yo M PMHx of severe dementia, PEG tube presents with ulcer that has worsened over last month. Patient was seen by house calls doctor 2 weeks ago, placed on antibiotics but started mounting fevers in the last few days. It was noticed by his GI doctor (Dr. Moraes) that his ulcer was getting worse, so he sent him in. Patient is nonverbal at baseline. Wife has noticed no new cough, runny nose. (19 Nov 2023 13:02)      Patient seen and evaluated at bedside. No acute events overnight except as noted.    Interval HPI: RRT for hypoxia. now comfort care      PAST MEDICAL & SURGICAL HISTORY:  Dementia      Pneumonia      Acute renal failure (ARF)  ARF 1.5 YEAR AGO      Dysphagia  peg      Arthritis      GERD (gastroesophageal reflux disease)      Dyspepsia      History of hand surgery      PEG adjustment, replacement, or removal      Status post insertion of percutaneous endoscopic gastrostomy (PEG) tube  PEG replacement on 02/29/16          REVIEW OF SYSTEMS:  as per hpi     VITALS:   Vital Signs Last 24 Hrs  T(C): 36.8 (05 Dec 2023 08:23), Max: 37.1 (04 Dec 2023 23:12)  T(F): 98.3 (05 Dec 2023 08:23), Max: 98.8 (04 Dec 2023 23:12)  HR: 107 (05 Dec 2023 08:23) (95 - 122)  BP: 115/64 (05 Dec 2023 08:23) (115/64 - 137/88)  BP(mean): --  RR: 19 (05 Dec 2023 11:20) (18 - 28)  SpO2: 94% (05 Dec 2023 11:20) (94% - 100%)    Parameters below as of 05 Dec 2023 11:20  Patient On (Oxygen Delivery Method): nasal cannula, high flow  O2 Flow (L/min): 60  O2 Concentration (%): 100    PHYSICAL EXAM:  GENERAL: NAD,  comfortable   HEAD:  Atraumatic, Normocephalic  EYES: EOMI, PERRLA, conjunctiva and sclera clear  NECK: Supple, No JVD  CHEST/LUNG: Clear to auscultation bilaterally; No wheeze  HEART: S1, S2; No murmurs, rubs, or gallops  ABDOMEN: Soft, Nontender, Nondistended; Bowel sounds present +peg  EXTREMITIES:  2+ Peripheral Pulses, contracted   PSYCH: Normal affect  NEUROLOGY: non verbal  SKIN: +sacral decub    Consultant(s) Notes Reviewed:  [x ] YES  [ ] NO  Care Discussed with Consultants/Other Providers [ x] YES  [ ] NO    MEDS:   MEDICATIONS  (STANDING):  chlorhexidine 2% Cloths 1 Application(s) Topical <User Schedule>  Dakins Solution - 1/4 Strength 1 Application(s) Topical two times a day  ipratropium    for Nebulization 500 MICROGram(s) Nebulizer every 6 hours  levalbuterol Inhalation 0.63 milliGRAM(s) Inhalation every 6 hours    MEDICATIONS  (PRN):  acetaminophen   Oral Liquid .. 750 milliGRAM(s) Oral every 6 hours PRN Temp greater or equal to 38C (100.4F), Mild Pain (1 - 3)  glycopyrrolate Injectable 0.4 milliGRAM(s) IV Push every 6 hours PRN secretions  morphine  - Injectable 1 milliGRAM(s) IV Push every 1 hour PRN dsypnea, RR>22  morphine  - Injectable 1 milliGRAM(s) IV Push every 1 hour PRN Moderate Pain (4 - 6)  morphine  - Injectable 2 milliGRAM(s) IV Push every 1 hour PRN Severe Pain (7 - 10)      ALLERGIES:  Haldol (Other)  No Known Allergies  benzodiazepines (Other)      LABS:                                                    10.6   8.39  )-----------( 316      ( 04 Dec 2023 10:23 )             33.9   12-04    141  |  104  |  21  ----------------------------<  203<H>  3.9   |  28  |  0.36<L>    Ca    8.5      04 Dec 2023 10:23  Phos  2.2     12-04  Mg     2.3     12-04    TPro  6.7  /  Alb  2.2<L>  /  TBili  0.3  /  DBili  x   /  AST  18  /  ALT  15  /  AlkPhos  152<H>  12-04      < from: CT Angio Chest PE Protocol w/ IV Cont (11.27.23 @ 22:44) >  IMPRESSION:.    No pulmonary embolism detected.    Occlusive secretions within the left mainstem bronchus with associated   complete left lung collapse; consider superimposed infection in the   appropriate clinical scenario.    Small leftpleural effusion.    --- End of Report ---      < end of copied text >       < from: CT Abdomen and Pelvis w/ IV Cont (11.18.23 @ 19:41) >    IMPRESSION:    Extensive decubitus ulcer involving the right posterior gluteal and   pelvic region with extension to bone. There is bony erosion of the   ischium compatible with osteomyelitis.    Mild bladder wall thickening and a few foci of intraluminal air,   correlate for recent instrumentation and/or infection.    Left lower lobe consolidation along with tree-in-bud/nodular opacities in   the left upper lobe concerning for pneumonia.    --- End of Report ---        < end of copied text >

## 2023-12-05 NOTE — PROGRESS NOTE ADULT - SUBJECTIVE AND OBJECTIVE BOX
Subjective: Patient seen and examined. No new events except as noted.   s/p RRT for hypoxia  currently on NC HF   tachycardic   started on PRN morphine       REVIEW OF SYSTEMS:  Unable to obtain      MEDICATIONS:  MEDICATIONS  (STANDING):  chlorhexidine 2% Cloths 1 Application(s) Topical <User Schedule>  Dakins Solution - 1/4 Strength 1 Application(s) Topical two times a day  ipratropium    for Nebulization 500 MICROGram(s) Nebulizer every 6 hours  levalbuterol Inhalation 0.63 milliGRAM(s) Inhalation every 6 hours      PHYSICAL EXAM:  T(C): 36.8 (12-05-23 @ 08:23), Max: 37.1 (12-04-23 @ 23:12)  HR: 107 (12-05-23 @ 08:23) (95 - 140)  BP: 115/64 (12-05-23 @ 08:23) (115/64 - 151/86)  RR: 18 (12-05-23 @ 08:23) (18 - 30)  SpO2: 94% (12-05-23 @ 08:23) (58% - 100%)  Wt(kg): --  I&O's Summary    05 Dec 2023 07:01  -  05 Dec 2023 10:06  --------------------------------------------------------  IN: 500 mL / OUT: 800 mL / NET: -300 mL          Appearance: NAD nonverbal	  HEENT:   Normal oral mucosa, PERRL, EOMI	  Lymphatic: No lymphadenopathy  Cardiovascular: Normal tachy S1 S2, No JVD, No murmurs, No edema  Respiratory: decreased bs   Psychiatry: A & O x 0  Gastrointestinal:  Soft, Non-tender, + PEG   Skin: No rashes, No ecchymoses, No cyanosis	  Neurologic: Non-focal  Extremities: contracted   Vascular: Peripheral pulses palpable 2+ bilaterally  +sacral decub           LABS:    CARDIAC MARKERS:                                10.6   8.39  )-----------( 316      ( 04 Dec 2023 10:23 )             33.9     12-04    141  |  104  |  21  ----------------------------<  203<H>  3.9   |  28  |  0.36<L>    Ca    8.5      04 Dec 2023 10:23  Phos  2.2     12-04  Mg     2.3     12-04    TPro  6.7  /  Alb  2.2<L>  /  TBili  0.3  /  DBili  x   /  AST  18  /  ALT  15  /  AlkPhos  152<H>  12-04    proBNP:   Lipid Profile:   HgA1c:   TSH:             TELEMETRY: 	    ECG:  	  RADIOLOGY:   DIAGNOSTIC TESTING:  [ ] Echocardiogram:  [ ]  Catheterization:  [ ] Stress Test:    OTHER:

## 2023-12-05 NOTE — PROGRESS NOTE ADULT - PROBLEM SELECTOR PLAN 1
Diagnosed many years ago, pt is non-verbal and bed-bound at baseline, g-tube in place, with recent development of contractures and sacral decub ulcer  - delirium precautions  - supportive care

## 2023-12-05 NOTE — PROGRESS NOTE ADULT - ASSESSMENT
79 yo male h/o dementia, non verbal. contracted, s/p peg. here with sepsis likely 2/2 sacral decub and aspiration pna    sepsis  respiratory failure   sacral decub  pna - possible aspiration    abx as per id - now complete  f/u cult  wound care f/u  id consult noted  pulm consulted  cont supp o2 via high flow. taper down as able   CTA chest noted  pulm f/u   bipap as able  abx changed to meropenem. id f/u apprec - now complete  s/p RRT for hypoxia. now comfort care     dementia  cont home meds  supportive care    dysphagia s/p peg  feeds now on hold  s/p peg change  by gi bedside     hyperNa  free water via peg ordered  trend bmp       dvt ppx      d/w family bedside  DNR  comfort care  palliative f/u  ?tx to pcu    Advanced care planning was discussed with patient and family.  Advanced care planning forms were reviewed and discussed as appropriate.  Differential diagnosis and plan of care discussed with patient after the evaluation.   Pain assessed and judicious use of narcotics when appropriate was discussed.  Importance of Fall prevention discussed.  Counseling on Smoking and Alcohol cessation was offered when appropriate.  Counseling on Diet, exercise, and medication compliance was done.       Approx 75 minutes spent. 77 yo male h/o dementia, non verbal. contracted, s/p peg. here with sepsis likely 2/2 sacral decub and aspiration pna    sepsis  respiratory failure   sacral decub  pna - possible aspiration    abx as per id - now complete  f/u cult  wound care f/u  id consult noted  pulm consulted  cont supp o2 via high flow. taper down as able   CTA chest noted  pulm f/u   bipap as able  abx changed to meropenem. id f/u apprec - now complete  s/p RRT for hypoxia. now comfort care     dementia  cont home meds  supportive care    dysphagia s/p peg  feeds now on hold  s/p peg change  by gi bedside     hyperNa  free water via peg ordered  trend bmp       dvt ppx      d/w family bedside  DNR  comfort care  palliative f/u  ?tx to pcu    Advanced care planning was discussed with patient and family.  Advanced care planning forms were reviewed and discussed as appropriate.  Differential diagnosis and plan of care discussed with patient after the evaluation.   Pain assessed and judicious use of narcotics when appropriate was discussed.  Importance of Fall prevention discussed.  Counseling on Smoking and Alcohol cessation was offered when appropriate.  Counseling on Diet, exercise, and medication compliance was done.       Approx 75 minutes spent.

## 2023-12-05 NOTE — PROGRESS NOTE ADULT - ASSESSMENT
79 yo M PMHx of severe dementia, PEG tube presents with ulcer that has worsened over last month. Patient was seen by house calls doctor 2 weeks ago, placed on antibiotics but started mounting fevers in the last few days. It was noticed by his GI doctor (Dr. Moraes) that his ulcer was getting worse, so he sent him in. Patient is nonverbal at baseline. Wife has noticed no new cough, runny nose.  heart rate "sustained at 130"    77 yo M PMHx of severe dementia, PEG tube presents with ulcer that has worsened over last month. Patient was seen by house calls doctor 2 weeks ago, placed on antibiotics but started mounting fevers in the last few days. It was noticed by his GI doctor (Dr. Moraes) that his ulcer was getting worse, so he sent him in. Patient is nonverbal at baseline. Wife has noticed no new cough, runny nose.  heart rate "sustained at 130"

## 2023-12-05 NOTE — PROGRESS NOTE ADULT - SUBJECTIVE AND OBJECTIVE BOX
INTERVAL HPI/OVERNIGH  seen and examined , no new events  tolerating TF No N/V/D          MEDICATIONS  (STANDING):  chlorhexidine 2% Cloths 1 Application(s) Topical <User Schedule>  Dakins Solution - 1/4 Strength 1 Application(s) Topical two times a day  enoxaparin Injectable 40 milliGRAM(s) SubCutaneous every 24 hours  ipratropium    for Nebulization 500 MICROGram(s) Nebulizer every 6 hours  levalbuterol Inhalation 0.63 milliGRAM(s) Inhalation every 6 hours  OLANZapine 5 milliGRAM(s) Oral <User Schedule>  pantoprazole    Tablet 40 milliGRAM(s) Oral before breakfast  QUEtiapine 100 milliGRAM(s) Oral <User Schedule>  senna Syrup 10 milliLiter(s) Oral at bedtime  sodium chloride 3%  Inhalation 4 milliLiter(s) Inhalation every 6 hours  sucralfate 1 Gram(s) Oral four times a day    MEDICATIONS  (PRN):  acetaminophen   Oral Liquid .. 750 milliGRAM(s) Oral every 6 hours PRN Temp greater or equal to 38C (100.4F), Mild Pain (1 - 3)      Allergies    No Known Allergies    Intolerances    Haldol (Other)  benzodiazepines (Other)        PHYSICAL EXAM  Vital Signs Last 24 Hrs  T(C): 36.8 (05 Dec 2023 08:23), Max: 37.1 (04 Dec 2023 23:12)  T(F): 98.3 (05 Dec 2023 08:23), Max: 98.8 (04 Dec 2023 23:12)  HR: 107 (05 Dec 2023 08:23) (95 - 122)  BP: 115/64 (05 Dec 2023 08:23) (115/64 - 137/88)  BP(mean): --  RR: 19 (05 Dec 2023 11:20) (18 - 28)  SpO2: 94% (05 Dec 2023 11:20) (94% - 100%)    Parameters below as of 05 Dec 2023 11:20  Patient On (Oxygen Delivery Method): nasal cannula, high flow  O2 Flow (L/min): 60  O2 Concentration (%): 100      GENERAL:  Appears stated age, well-groomed, well-nourished, no distress  HEENT:  NC/AT,  conjunctivae clear and pink, no thyromegaly, nodules, adenopathy, no JVD, sclera -anicteric  CHEST:  Full & symmetric excursion, no increased effort, breath sounds clear  HEART:  Regular rhythm, S1, S2, no murmur/rub/S3/S4, no abdominal bruit, no edema  ABDOMEN:  Soft, non-tender, non-distended, normoactive bowel sounds,  no masses ,no hepato-splenomegaly, no signs of chronic liver disease  EXTEREMITIES:  no cyanosis,clubbing or edema  SKIN:  No rash/erythema/ecchymoses/petechiae/wounds/abscess/warm/dry  NEURO:  Alert, oriented, no asterixis, no tremor, no encephalopathy      LABS:                        10.6   8.39  )-----------( 316      ( 04 Dec 2023 10:23 )             33.9   12-04    141  |  104  |  21  ----------------------------<  203<H>  3.9   |  28  |  0.36<L>    Ca    8.5      04 Dec 2023 10:23  Phos  2.2     12-04  Mg     2.3     12-04    TPro  6.7  /  Alb  2.2<L>  /  TBili  0.3  /  DBili  x   /  AST  18  /  ALT  15  /  AlkPhos  152<H>  12-04

## 2023-12-05 NOTE — PROGRESS NOTE ADULT - SUBJECTIVE AND OBJECTIVE BOX
Date of Service: 12-05-23 @ 16:19    Patient is a 78y old  Male who presents with a chief complaint of Osteomyelitis     (20 Nov 2023 11:10)      Any change in ROS: seems to be doing  poorly:  on 90% high flow:  pt wife at bedside    MEDICATIONS  (STANDING):  chlorhexidine 2% Cloths 1 Application(s) Topical <User Schedule>  Dakins Solution - 1/4 Strength 1 Application(s) Topical two times a day  ipratropium    for Nebulization 500 MICROGram(s) Nebulizer every 6 hours  levalbuterol Inhalation 0.63 milliGRAM(s) Inhalation every 6 hours    MEDICATIONS  (PRN):  acetaminophen   Oral Liquid .. 750 milliGRAM(s) Oral every 6 hours PRN Temp greater or equal to 38C (100.4F), Mild Pain (1 - 3)  glycopyrrolate Injectable 0.4 milliGRAM(s) IV Push every 6 hours PRN secretions  morphine  - Injectable 2 milliGRAM(s) IV Push every 1 hour PRN Severe Pain (7 - 10)  morphine  - Injectable 1 milliGRAM(s) IV Push every 1 hour PRN dsypnea, RR>22  morphine  - Injectable 1 milliGRAM(s) IV Push every 1 hour PRN Moderate Pain (4 - 6)    Vital Signs Last 24 Hrs  T(C): 36.8 (05 Dec 2023 08:23), Max: 37.1 (04 Dec 2023 23:12)  T(F): 98.3 (05 Dec 2023 08:23), Max: 98.8 (04 Dec 2023 23:12)  HR: 107 (05 Dec 2023 08:23) (95 - 122)  BP: 115/64 (05 Dec 2023 08:23) (115/64 - 137/88)  BP(mean): --  RR: 19 (05 Dec 2023 11:20) (18 - 28)  SpO2: 94% (05 Dec 2023 11:20) (94% - 100%)    Parameters below as of 05 Dec 2023 11:20  Patient On (Oxygen Delivery Method): nasal cannula, high flow  O2 Flow (L/min): 60  O2 Concentration (%): 100    I&O's Summary    05 Dec 2023 07:01  -  05 Dec 2023 16:19  --------------------------------------------------------  IN: 500 mL / OUT: 800 mL / NET: -300 mL          Physical Exam:   GENERAL: cacechtic  HEENT: CANDY/   Atraumatic, Normocephalic  ENMT: No tonsillar erythema, exudates, or enlargement; Moist mucous membranes, Good dentition, No lesions  NECK: Supple, No JVD, Normal thyroid  CHEST/LUNG: poor air entrry left side:  poor resp excursion  CVS: Regular rate and rhythm; No murmurs, rubs, or gallops  GI: : Soft, Nontender, Nondistended; Bowel sounds present  NERVOUS SYSTEM: unresponsive on high flow  EXTREMITIES:  2+ Peripheral Pulses, No clubbing, cyanosis, or edema  LYMPH: No lymphadenopathy noted  SKIN: No rashes or lesions  ENDOCRINOLOGY: No Thyromegaly  PSYCH: unreaponsiv    Labs:  31                            10.6   8.39  )-----------( 316      ( 04 Dec 2023 10:23 )             33.9     12-04    141  |  104  |  21  ----------------------------<  203<H>  3.9   |  28  |  0.36<L>    Ca    8.5      04 Dec 2023 10:23  Phos  2.2     12-04  Mg     2.3     12-04    TPro  6.7  /  Alb  2.2<L>  /  TBili  0.3  /  DBili  x   /  AST  18  /  ALT  15  /  AlkPhos  152<H>  12-04    CAPILLARY BLOOD GLUCOSE          LIVER FUNCTIONS - ( 04 Dec 2023 10:23 )  Alb: 2.2 g/dL / Pro: 6.7 g/dL / ALK PHOS: 152 U/L / ALT: 15 U/L / AST: 18 U/L / GGT: x             Urinalysis Basic - ( 04 Dec 2023 10:23 )    Color: x / Appearance: x / SG: x / pH: x  Gluc: 203 mg/dL / Ketone: x  / Bili: x / Urobili: x   Blood: x / Protein: x / Nitrite: x   Leuk Esterase: x / RBC: x / WBC x   Sq Epi: x / Non Sq Epi: x / Bacteria: x    rad< from: Xray Chest 1 View- PORTABLE-Routine (Xray Chest 1 View- PORTABLE-Routine .) (12.01.23 @ 14:13) >  ACC: [id number] EXAM:  XR CHEST PORTABLE ROUTINE 1V   ORDERED BY: BETH WRIGHT     PROCEDURE DATE:  12/01/2023          INTERPRETATION:  DATE OF STUDY: 12/1/23    PRIOR: 11/30/23    CLINICAL INDICATION: Assess left lung atelectasis.    TECHNIQUE: AP radiograph of the chest.    FINDINGS:  Heart is similar in size.  Right lung remains clear.  Partial reaeration of the upper and mid left lung.  Partly loculated lower left pleural effusion is present. Mid and lower   left lung haziness may be due to underlying infection in the right   clinical setting. No pneumothorax.    IMPRESSION:  Partial reaeration of the left lung.  Small loculated left pleural effusion.  Associated mid and lower left lung atelectasis.    --- End of Report ---            MARLYN AMARO MD; Attending Radiologist  This document has been electronically signed. Dec  2 2023 10:22AM    < end of copied text >          RECENT CULTURES:        RESPIRATORY CULTURES:          Studies  Chest X-RAY  CT SCAN Chest   Venous Dopplers: LE:   CT Abdomen  Others               Date of Service: 12-05-23 @ 16:19    Patient is a 78y old  Male who presents with a chief complaint of Osteomyelitis     (20 Nov 2023 11:10)      Any change in ROS: seems to be doing  poorly:  on 90% high flow:  pt wife at bedside    MEDICATIONS  (STANDING):  chlorhexidine 2% Cloths 1 Application(s) Topical <User Schedule>  Dakins Solution - 1/4 Strength 1 Application(s) Topical two times a day  ipratropium    for Nebulization 500 MICROGram(s) Nebulizer every 6 hours  levalbuterol Inhalation 0.63 milliGRAM(s) Inhalation every 6 hours    MEDICATIONS  (PRN):  acetaminophen   Oral Liquid .. 750 milliGRAM(s) Oral every 6 hours PRN Temp greater or equal to 38C (100.4F), Mild Pain (1 - 3)  glycopyrrolate Injectable 0.4 milliGRAM(s) IV Push every 6 hours PRN secretions  morphine  - Injectable 2 milliGRAM(s) IV Push every 1 hour PRN Severe Pain (7 - 10)  morphine  - Injectable 1 milliGRAM(s) IV Push every 1 hour PRN dsypnea, RR>22  morphine  - Injectable 1 milliGRAM(s) IV Push every 1 hour PRN Moderate Pain (4 - 6)    Vital Signs Last 24 Hrs  T(C): 36.8 (05 Dec 2023 08:23), Max: 37.1 (04 Dec 2023 23:12)  T(F): 98.3 (05 Dec 2023 08:23), Max: 98.8 (04 Dec 2023 23:12)  HR: 107 (05 Dec 2023 08:23) (95 - 122)  BP: 115/64 (05 Dec 2023 08:23) (115/64 - 137/88)  BP(mean): --  RR: 19 (05 Dec 2023 11:20) (18 - 28)  SpO2: 94% (05 Dec 2023 11:20) (94% - 100%)    Parameters below as of 05 Dec 2023 11:20  Patient On (Oxygen Delivery Method): nasal cannula, high flow  O2 Flow (L/min): 60  O2 Concentration (%): 100    I&O's Summary    05 Dec 2023 07:01  -  05 Dec 2023 16:19  --------------------------------------------------------  IN: 500 mL / OUT: 800 mL / NET: -300 mL          Physical Exam:   GENERAL: cacechtic  HEENT: CANDY/   Atraumatic, Normocephalic  ENMT: No tonsillar erythema, exudates, or enlargement; Moist mucous membranes, Good dentition, No lesions  NECK: Supple, No JVD, Normal thyroid  CHEST/LUNG: poor air entrry left side:  poor resp excursion  CVS: Regular rate and rhythm; No murmurs, rubs, or gallops  GI: : Soft, Nontender, Nondistended; Bowel sounds present  NERVOUS SYSTEM: unresponsive on high flow  EXTREMITIES:  2+ Peripheral Pulses, No clubbing, cyanosis, or edema  LYMPH: No lymphadenopathy noted  SKIN: No rashes or lesions  ENDOCRINOLOGY: No Thyromegaly  PSYCH: unreaponsiv    Labs:  31                            10.6   8.39  )-----------( 316      ( 04 Dec 2023 10:23 )             33.9     12-04    141  |  104  |  21  ----------------------------<  203<H>  3.9   |  28  |  0.36<L>    Ca    8.5      04 Dec 2023 10:23  Phos  2.2     12-04  Mg     2.3     12-04    TPro  6.7  /  Alb  2.2<L>  /  TBili  0.3  /  DBili  x   /  AST  18  /  ALT  15  /  AlkPhos  152<H>  12-04    CAPILLARY BLOOD GLUCOSE          LIVER FUNCTIONS - ( 04 Dec 2023 10:23 )  Alb: 2.2 g/dL / Pro: 6.7 g/dL / ALK PHOS: 152 U/L / ALT: 15 U/L / AST: 18 U/L / GGT: x             Urinalysis Basic - ( 04 Dec 2023 10:23 )    Color: x / Appearance: x / SG: x / pH: x  Gluc: 203 mg/dL / Ketone: x  / Bili: x / Urobili: x   Blood: x / Protein: x / Nitrite: x   Leuk Esterase: x / RBC: x / WBC x   Sq Epi: x / Non Sq Epi: x / Bacteria: x    rad< from: Xray Chest 1 View- PORTABLE-Routine (Xray Chest 1 View- PORTABLE-Routine .) (12.01.23 @ 14:13) >  ACC: [id number] EXAM:  XR CHEST PORTABLE ROUTINE 1V   ORDERED BY: BETH WRIGHT     PROCEDURE DATE:  12/01/2023          INTERPRETATION:  DATE OF STUDY: 12/1/23    PRIOR: 11/30/23    CLINICAL INDICATION: Assess left lung atelectasis.    TECHNIQUE: AP radiograph of the chest.    FINDINGS:  Heart is similar in size.  Right lung remains clear.  Partial reaeration of the upper and mid left lung.  Partly loculated lower left pleural effusion is present. Mid and lower   left lung haziness may be due to underlying infection in the right   clinical setting. No pneumothorax.    IMPRESSION:  Partial reaeration of the left lung.  Small loculated left pleural effusion.  Associated mid and lower left lung atelectasis.    --- End of Report ---            MARLYN AMARO MD; Attending Radiologist  This document has been electronically signed. Dec  2 2023 10:22AM    < end of copied text >          RECENT CULTURES:        RESPIRATORY CULTURES:          Studies  Chest X-RAY  CT SCAN Chest   Venous Dopplers: LE:   CT Abdomen  Others

## 2023-12-05 NOTE — PROGRESS NOTE ADULT - PROBLEM SELECTOR PLAN 2
CT A/P with LLL consolidation & SHEILA TIB opacities  -CTA chest with L lung mucus plugging/atelectasis, possible post obstructive PNA   -RVP negative  -MRSA PCR negative  -BC with NGTD  -Urine legionella negative  -ABX changed to Meropenem as pt still spiking temps. Afebrile now.   -ID f/u.  -his last chest xray from 12/1 to me shows significant improvement in left lung aeration:  dw wife  -no new chest xray : GOC has changed

## 2023-12-05 NOTE — PROGRESS NOTE ADULT - SUBJECTIVE AND OBJECTIVE BOX
SUBJECTIVE AND OBJECTIVE  Indication for Geriatrics and Palliative Care Services/INTERVAL HPI: GOC    OVERNIGHT EVENTS: f/u visit to patient, wife Brenda visiting at bedside. Note that pt was given robinol x2 and IV morphine 1 mg x3 over the past 24 hours. Pt is lethargic and tachypneic. Much education provided to the wife on continued need for medical management of his respiratory distress with opioids. Wife is very resistant to morphine use and keeps stating pt received tylenol. Discussed tylenol does not work for respiratory distress or air hunger, which is what the pt is demonstrating. I spoke with dtr Ling who agreed and will d/w her mother to allow more PRN use of morphine.     Discussed transferring pt to the PCU but family continues to adamantly refuse, even though much counseling was provided on the benefits of such transfer.     DNR on chart:DNI  DNI      Allergies    No Known Allergies    Intolerances    Haldol (Other)  benzodiazepines (Other)    MEDICATIONS  (STANDING):  chlorhexidine 2% Cloths 1 Application(s) Topical <User Schedule>  Dakins Solution - 1/4 Strength 1 Application(s) Topical two times a day  ipratropium    for Nebulization 500 MICROGram(s) Nebulizer every 6 hours  levalbuterol Inhalation 0.63 milliGRAM(s) Inhalation every 6 hours    MEDICATIONS  (PRN):  acetaminophen   Oral Liquid .. 750 milliGRAM(s) Oral every 6 hours PRN Temp greater or equal to 38C (100.4F), Mild Pain (1 - 3)  glycopyrrolate Injectable 0.4 milliGRAM(s) IV Push every 6 hours PRN secretions  morphine  - Injectable 2 milliGRAM(s) IV Push every 1 hour PRN Severe Pain (7 - 10)  morphine  - Injectable 1 milliGRAM(s) IV Push every 1 hour PRN dsypnea, RR>22  morphine  - Injectable 1 milliGRAM(s) IV Push every 1 hour PRN Moderate Pain (4 - 6)    ITEMS UNCHECKED ARE NOT PRESENT    PRESENT SYMPTOMS: [x ]Unable to self-report - see [ ] CPOT [ x] PAINADS  [ x] RDOS   Source if other than patient:  [ ]Family   [ ]Team     PCSSQ[Palliative Care Spiritual Screening Question]   Severity (0-10):  Score of 4 or > indicate consideration of Chaplaincy referral.  Chaplaincy Referral: [ x] yes [ ] refused [ ] following [ ] Deferred     Caregiver [place]? : [ ] yes [x ] no [ ] Deferred [ ] Declined             Social work referral [ ] Patient & Family Centered Care Referral [ ]     Anticipatory Grief present?:  [ ] yes [x ] no  [ ] Deferred                  Social work referral [ ] Chaplaincy Referral[ ]    Other Symptoms:  [x ]All other review of systems negative     Palliative Performance Status Version 2:     10    %      [email or web address]    PHYSICAL EXAM:  Vital Signs Last 24 Hrs  T(C): 36.8 (05 Dec 2023 08:23), Max: 37.1 (04 Dec 2023 23:12)  T(F): 98.3 (05 Dec 2023 08:23), Max: 98.8 (04 Dec 2023 23:12)  HR: 107 (05 Dec 2023 08:23) (95 - 122)  BP: 115/64 (05 Dec 2023 08:23) (115/64 - 137/88)  BP(mean): --  RR: 19 (05 Dec 2023 11:20) (18 - 28)  SpO2: 94% (05 Dec 2023 11:20) (94% - 100%)    Parameters below as of 05 Dec 2023 11:20  Patient On (Oxygen Delivery Method): nasal cannula, high flow  O2 Flow (L/min): 60  O2 Concentration (%): 100     GENERAL: [ ]Cachexia    [ ]Alert  [ ]Oriented x   [x ]Lethargic  [ ]Unarousable  [ ]Verbal  [x ]Non-Verbal  Behavioral:   [ ]Anxiety  [x ]Delirium [ ]Agitation [ ]Other  HEENT:  [ ]Normal   [ ]Dry mouth   [ ]ET Tube/Trach  [ ]Oral lesions [x] HFNC and NRB  PULMONARY:   [ ]Clear [ ]Tachypnea  [ ]Audible excessive secretions   [ ]Rhonchi        [ ]Right [ ]Left [ ]Bilateral  [ ]Crackles        [ ]Right [ ]Left [ ]Bilateral  [ ]Wheezing     [ ]Right [ ]Left [ ]Bilateral  [ x]Diminished BS [ ] Right [x ]Left [ ]Bilateral  CARDIOVASCULAR:    [x ]Regular [ ]Irregular [ ]Tachy  [ ]Usman [ ]Murmur [ ]Other  GASTROINTESTINAL:  [ x]Soft  [ ]Distended   [ ]+BS  [ ]Non tender [ ]Tender  [ ]Other [x ]PEG [ ]OGT/ NGT   Last BM:   GENITOURINARY:  [ ]Normal [x ]Incontinent   [ ]Oliguria/Anuria   [ ]Fleming  MUSCULOSKELETAL:   [ ]Normal   [ ]Weakness  [x ]Bed/Wheelchair bound [ ]Edema  NEUROLOGIC:   [ ]No focal deficits  [ x] Cognitive impairment  [ ] Dysphagia [ ]Dysarthria [ ] Paresis [ ]Other   SKIN:   [ ]Normal  [ ]Rash  [ ]Other  [x ]Pressure ulcer(s) [ x]y [ ]n present on admission    CRITICAL CARE:  [ ]Shock Present  [ ]Septic [ ]Cardiogenic [ ]Neurologic [ ]Hypovolemic  [ ]Vasopressors [ ]Inotropes  [ x]Respiratory failure present [ ]Mechanical Ventilation [x ]Non-invasive ventilatory support [ ]High-Flow   [x ]Acute  [ ]Chronic [ x]Hypoxic  [ ]Hypercarbic [ ]Other  [ ]Other organ failure     LABS:                          10.6   8.39  )-----------( 316      ( 04 Dec 2023 10:23 )             33.9     12-04    141  |  104  |  21  ----------------------------<  203<H>  3.9   |  28  |  0.36<L>    Ca    8.5      04 Dec 2023 10:23  Phos  2.2     12-04  Mg     2.3     12-04    TPro  6.7  /  Alb  2.2<L>  /  TBili  0.3  /  DBili  x   /  AST  18  /  ALT  15  /  AlkPhos  152<H>  12-04      RADIOLOGY & ADDITIONAL STUDIES: < from: Xray Chest 1 View- PORTABLE-Routine (Xray Chest 1 View- PORTABLE-Routine in AM.) (11.30.23 @ 04:56) >    IMPRESSION:  Similar left chest whiteout with occluded bronchus.        Thank you for the courtesy of this referral.    --- End of Report ---    < end of copied text >      Protein Calorie Malnutrition Present: [ ]mild [ ]moderate [ ]severe [ ]underweight [ ]morbid obesity  https://www.andeal.org/vault/4040/web/files/ONC/Table_Clinical%20Characteristics%20to%20Document%20Malnutrition-White%20JV%20et%20al%202012.pdf    Height (cm): 170.2 (11-18-23 @ 17:39)  Weight (kg): 49.9 (11-18-23 @ 17:39)  BMI (kg/m2): 17.2 (11-18-23 @ 17:39)    [ ]PPSV2 < or = 30%  [ ]significant weight loss [ ]poor nutritional intake [ ]anasarca[ ]Artificial Nutrition    Other REFERRALS:  [ ]Hospice  [ ]Child Life  [ ]Social Work  [ ]Case management [ ]Holistic Therapy    SUBJECTIVE AND OBJECTIVE  Indication for Geriatrics and Palliative Care Services/INTERVAL HPI: GOC    OVERNIGHT EVENTS: f/u visit to patient, wife Brenda visiting at bedside. Note that pt was given robinol x2 and IV morphine 1 mg x3 over the past 24 hours. Pt is lethargic and tachypneic. Much education provided to the wife on continued need for medical management of his respiratory distress with opioids. Wife is very resistant to morphine use and keeps stating pt received tylenol. Discussed tylenol does not work for respiratory distress or air hunger, which is what the pt is demonstrating. I spoke with dtr Ling who agreed and will d/w her mother to allow more PRN use of morphine.     Discussed transferring pt to the PCU but family continues to adamantly refuse, even though much counseling was provided on the benefits of such transfer.     DNR on chart:DNI  DNI      Allergies    No Known Allergies    Intolerances    Haldol (Other)  benzodiazepines (Other)    MEDICATIONS  (STANDING):  chlorhexidine 2% Cloths 1 Application(s) Topical <User Schedule>  Dakins Solution - 1/4 Strength 1 Application(s) Topical two times a day  ipratropium    for Nebulization 500 MICROGram(s) Nebulizer every 6 hours  levalbuterol Inhalation 0.63 milliGRAM(s) Inhalation every 6 hours    MEDICATIONS  (PRN):  acetaminophen   Oral Liquid .. 750 milliGRAM(s) Oral every 6 hours PRN Temp greater or equal to 38C (100.4F), Mild Pain (1 - 3)  glycopyrrolate Injectable 0.4 milliGRAM(s) IV Push every 6 hours PRN secretions  morphine  - Injectable 2 milliGRAM(s) IV Push every 1 hour PRN Severe Pain (7 - 10)  morphine  - Injectable 1 milliGRAM(s) IV Push every 1 hour PRN dsypnea, RR>22  morphine  - Injectable 1 milliGRAM(s) IV Push every 1 hour PRN Moderate Pain (4 - 6)    ITEMS UNCHECKED ARE NOT PRESENT    PRESENT SYMPTOMS: [x ]Unable to self-report - see [ ] CPOT [ x] PAINADS  [ x] RDOS   Source if other than patient:  [ ]Family   [ ]Team     PCSSQ[Palliative Care Spiritual Screening Question]   Severity (0-10):  Score of 4 or > indicate consideration of Chaplaincy referral.  Chaplaincy Referral: [ x] yes [ ] refused [ ] following [ ] Deferred     Caregiver [place]? : [ ] yes [x ] no [ ] Deferred [ ] Declined             Social work referral [ ] Patient & Family Centered Care Referral [ ]     Anticipatory Grief present?:  [ ] yes [x ] no  [ ] Deferred                  Social work referral [ ] Chaplaincy Referral[ ]    Other Symptoms:  [x ]All other review of systems negative     Palliative Performance Status Version 2:     10    %      [email or web address]_[email or web address]    PHYSICAL EXAM:  Vital Signs Last 24 Hrs  T(C): 36.8 (05 Dec 2023 08:23), Max: 37.1 (04 Dec 2023 23:12)  T(F): 98.3 (05 Dec 2023 08:23), Max: 98.8 (04 Dec 2023 23:12)  HR: 107 (05 Dec 2023 08:23) (95 - 122)  BP: 115/64 (05 Dec 2023 08:23) (115/64 - 137/88)  BP(mean): --  RR: 19 (05 Dec 2023 11:20) (18 - 28)  SpO2: 94% (05 Dec 2023 11:20) (94% - 100%)    Parameters below as of 05 Dec 2023 11:20  Patient On (Oxygen Delivery Method): nasal cannula, high flow  O2 Flow (L/min): 60  O2 Concentration (%): 100     GENERAL: [ ]Cachexia    [ ]Alert  [ ]Oriented x   [x ]Lethargic  [ ]Unarousable  [ ]Verbal  [x ]Non-Verbal  Behavioral:   [ ]Anxiety  [x ]Delirium [ ]Agitation [ ]Other  HEENT:  [ ]Normal   [ ]Dry mouth   [ ]ET Tube/Trach  [ ]Oral lesions [x] HFNC and NRB  PULMONARY:   [ ]Clear [ ]Tachypnea  [ ]Audible excessive secretions   [ ]Rhonchi        [ ]Right [ ]Left [ ]Bilateral  [ ]Crackles        [ ]Right [ ]Left [ ]Bilateral  [ ]Wheezing     [ ]Right [ ]Left [ ]Bilateral  [ x]Diminished BS [ ] Right [x ]Left [ ]Bilateral  CARDIOVASCULAR:    [x ]Regular [ ]Irregular [ ]Tachy  [ ]Usman [ ]Murmur [ ]Other  GASTROINTESTINAL:  [ x]Soft  [ ]Distended   [ ]+BS  [ ]Non tender [ ]Tender  [ ]Other [x ]PEG [ ]OGT/ NGT   Last BM:   GENITOURINARY:  [ ]Normal [x ]Incontinent   [ ]Oliguria/Anuria   [ ]Fleming  MUSCULOSKELETAL:   [ ]Normal   [ ]Weakness  [x ]Bed/Wheelchair bound [ ]Edema  NEUROLOGIC:   [ ]No focal deficits  [ x] Cognitive impairment  [ ] Dysphagia [ ]Dysarthria [ ] Paresis [ ]Other   SKIN:   [ ]Normal  [ ]Rash  [ ]Other  [x ]Pressure ulcer(s) [ x]y [ ]n present on admission    CRITICAL CARE:  [ ]Shock Present  [ ]Septic [ ]Cardiogenic [ ]Neurologic [ ]Hypovolemic  [ ]Vasopressors [ ]Inotropes  [ x]Respiratory failure present [ ]Mechanical Ventilation [x ]Non-invasive ventilatory support [ ]High-Flow   [x ]Acute  [ ]Chronic [ x]Hypoxic  [ ]Hypercarbic [ ]Other  [ ]Other organ failure     LABS:                          10.6   8.39  )-----------( 316      ( 04 Dec 2023 10:23 )             33.9     12-04    141  |  104  |  21  ----------------------------<  203<H>  3.9   |  28  |  0.36<L>    Ca    8.5      04 Dec 2023 10:23  Phos  2.2     12-04  Mg     2.3     12-04    TPro  6.7  /  Alb  2.2<L>  /  TBili  0.3  /  DBili  x   /  AST  18  /  ALT  15  /  AlkPhos  152<H>  12-04      RADIOLOGY & ADDITIONAL STUDIES: < from: Xray Chest 1 View- PORTABLE-Routine (Xray Chest 1 View- PORTABLE-Routine in AM.) (11.30.23 @ 04:56) >    IMPRESSION:  Similar left chest whiteout with occluded bronchus.        Thank you for the courtesy of this referral.    --- End of Report ---    < end of copied text >      Protein Calorie Malnutrition Present: [ ]mild [ ]moderate [ ]severe [ ]underweight [ ]morbid obesity  https://www.andeal.org/vault/0180/web/files/ONC/Table_Clinical%20Characteristics%20to%20Document%20Malnutrition-White%20JV%20et%20al%202012.pdf    Height (cm): 170.2 (11-18-23 @ 17:39)  Weight (kg): 49.9 (11-18-23 @ 17:39)  BMI (kg/m2): 17.2 (11-18-23 @ 17:39)    [ ]PPSV2 < or = 30%  [ ]significant weight loss [ ]poor nutritional intake [ ]anasarca[ ]Artificial Nutrition    Other REFERRALS:  [ ]Hospice  [ ]Child Life  [ ]Social Work  [ ]Case management [ ]Holistic Therapy

## 2023-12-05 NOTE — PROGRESS NOTE ADULT - PROBLEM SELECTOR PLAN 1
S/p RRT 11/20 for hypoxia requiring HFNC  -Increase in O2 requirements 11/21 from HFNC 50L/80% to 100% Bipap. Weaned from Bipap to HFNC 50L/100%  -Suspect aspiration event, family at bedside reports multiple aspiration events in the past  -CT A/P with LLL consolidation, SHEILA tree in bud opacities likely PNA. Continue ABX per ID  -ABG with no CO2 retention  -CXR 11/25 with LLL infiltrate  -CTA chest 11/27 negative for PE. Notes extensive L atelectasis/mucus plugging with small L effusion  -Started on Bipap 12/8 to assist with atelectasis but pt has been unable to tolerate. Unable to tolerate chest vest as well per RT   -Discussed plan of care at length with patient's wife Brenda and daughter Ling ([phone]). They expressed that at this point they are not ready to transition to comfort measures or palliative care. While they still want to treat, they would like to focus on interventions that do not cause the patient any more pain. They experienced patient's visible discomfort with Bipap as well as chest vest, and would like to discontinue these therapies at this time and focus on manual chest PT, O2 via HFNC, and nebulizers.    -Bipap and chest vest d/c'd at family's request. CXR this AM actually with improvement in L lung aeration.   -Continue Atrovent/Xopenex nebs q6h, 3% sodium chloride nebs q6h  -Suction PRN  -Chest PT  -Position patient with L lung up   -O2 requirements slowly improving - continue HFNC (currently 50L/50%). Wean O2 as tolerated. Keep sats >90%.    -Palliative care f/u. Overall prognosis guarded.  -on 50% high flow today   sao2 100%:  can try to wean down oxygen  -now on 100% ; doing very poorly:  now for palliative care:  family agreed:  -doing very poorly today S/p RRT 11/20 for hypoxia requiring HFNC  -Increase in O2 requirements 11/21 from HFNC 50L/80% to 100% Bipap. Weaned from Bipap to HFNC 50L/100%  -Suspect aspiration event, family at bedside reports multiple aspiration events in the past  -CT A/P with LLL consolidation, SHEILA tree in bud opacities likely PNA. Continue ABX per ID  -ABG with no CO2 retention  -CXR 11/25 with LLL infiltrate  -CTA chest 11/27 negative for PE. Notes extensive L atelectasis/mucus plugging with small L effusion  -Started on Bipap 12/8 to assist with atelectasis but pt has been unable to tolerate. Unable to tolerate chest vest as well per RT   -Discussed plan of care at length with patient's wife Brenda and daughter Ling ([phone]). They expressed that at this point they are not ready to transition to comfort measures or palliative care. While they still want to treat, they would like to focus on interventions that do not cause the patient any more pain. They experienced patient's visible discomfort with Bipap as well as chest vest, and would like to discontinue these therapies at this time and focus on manual chest PT, O2 via HFNC, and nebulizers.    -Bipap and chest vest d/c'd at family's request. CXR this AM actually with improvement in L lung aeration.   -Continue Atrovent/Xopenex nebs q6h, 3% sodium chloride nebs q6h  -Suction PRN  -Chest PT  -Position patient with L lung up   -O2 requirements slowly improving - continue HFNC (currently 50L/50%). Wean O2 as tolerated. Keep sats >90%.    -Palliative care f/u. Overall prognosis guarded.  -on 50% high flow today   sao2 100%:  can try to wean down oxygen  -now on 100% ; doing very poorly:  now for palliative care:  family agreed:  -doing very poorly today

## 2023-12-05 NOTE — PROGRESS NOTE ADULT - PROBLEM SELECTOR PLAN 5
- Lab checks, fingersticks, non-essential oral meds and TFs discontinued to optimize comfort  - Continue supplemental oxygen with HFNC as tolerated  - PRN meds ordered as above, much counseling provided today to wife as she is resistant in morphine use to treat his respiratory distress  - Family continues to adamantly decline PCU transfer    Lauren To MD  [facility] Team Consults  Please call if we can be of assistance, [phone] - Lab checks, fingersticks, non-essential oral meds and TFs discontinued to optimize comfort  - Continue supplemental oxygen with HFNC as tolerated  - PRN meds ordered as above, much counseling provided today to wife as she is resistant in morphine use to treat his respiratory distress  - Family continues to adamantly decline PCU transfer    Lauren To MD  [facility] Team Consults  Please call if we can be of assistance, [phone]

## 2023-12-06 PROCEDURE — 99233 SBSQ HOSP IP/OBS HIGH 50: CPT

## 2023-12-06 PROCEDURE — 99232 SBSQ HOSP IP/OBS MODERATE 35: CPT | Mod: 25

## 2023-12-06 PROCEDURE — 97605 NEG PRS WND THER DME<=50SQCM: CPT

## 2023-12-06 RX ORDER — QUETIAPINE FUMARATE 200 MG/1
100 TABLET, FILM COATED ORAL
Refills: 0 | Status: DISCONTINUED | OUTPATIENT
Start: 2023-12-06 | End: 2023-12-14

## 2023-12-06 RX ORDER — OLANZAPINE 15 MG/1
5 TABLET, FILM COATED ORAL AT BEDTIME
Refills: 0 | Status: DISCONTINUED | OUTPATIENT
Start: 2023-12-06 | End: 2023-12-12

## 2023-12-06 RX ADMIN — Medication 750 MILLIGRAM(S): at 10:00

## 2023-12-06 RX ADMIN — LEVALBUTEROL 0.63 MILLIGRAM(S): 1.25 SOLUTION, CONCENTRATE RESPIRATORY (INHALATION) at 23:41

## 2023-12-06 RX ADMIN — Medication 1 APPLICATION(S): at 05:59

## 2023-12-06 RX ADMIN — Medication 500 MICROGRAM(S): at 11:59

## 2023-12-06 RX ADMIN — Medication 500 MICROGRAM(S): at 05:57

## 2023-12-06 RX ADMIN — CHLORHEXIDINE GLUCONATE 1 APPLICATION(S): 213 SOLUTION TOPICAL at 05:59

## 2023-12-06 RX ADMIN — LEVALBUTEROL 0.63 MILLIGRAM(S): 1.25 SOLUTION, CONCENTRATE RESPIRATORY (INHALATION) at 05:57

## 2023-12-06 RX ADMIN — Medication 750 MILLIGRAM(S): at 08:43

## 2023-12-06 RX ADMIN — Medication 750 MILLIGRAM(S): at 21:48

## 2023-12-06 RX ADMIN — Medication 750 MILLIGRAM(S): at 20:48

## 2023-12-06 RX ADMIN — LEVALBUTEROL 0.63 MILLIGRAM(S): 1.25 SOLUTION, CONCENTRATE RESPIRATORY (INHALATION) at 17:42

## 2023-12-06 RX ADMIN — QUETIAPINE FUMARATE 100 MILLIGRAM(S): 200 TABLET, FILM COATED ORAL at 20:48

## 2023-12-06 RX ADMIN — Medication 500 MICROGRAM(S): at 01:02

## 2023-12-06 RX ADMIN — Medication 500 MICROGRAM(S): at 17:42

## 2023-12-06 RX ADMIN — OLANZAPINE 5 MILLIGRAM(S): 15 TABLET, FILM COATED ORAL at 20:48

## 2023-12-06 RX ADMIN — LEVALBUTEROL 0.63 MILLIGRAM(S): 1.25 SOLUTION, CONCENTRATE RESPIRATORY (INHALATION) at 11:59

## 2023-12-06 NOTE — PROGRESS NOTE ADULT - ASSESSMENT
79 yo male h/o dementia, non verbal. contracted, s/p peg. here with sepsis likely 2/2 sacral decub and aspiration pna    sepsis  respiratory failure   sacral decub  pna - possible aspiration    abx as per id - now complete  f/u cult  wound care f/u  id consult noted  pulm consulted  cont supp o2 via high flow. taper down as able   CTA chest noted  pulm f/u   bipap as able  abx changed to meropenem. id f/u apprec - now complete  s/p RRT for hypoxia. now comfort care     dementia  cont home meds  supportive care    dysphagia s/p peg  feeds now on hold  s/p peg change  by gi bedside     hyperNa  free water via peg ordered          dvt ppx      d/w family bedside  DNR  comfort care  palliative f/u  family wants to take pt home  will attempt to wean off  high flow. can go home with supp o2    Advanced care planning was discussed with patient and family.  Advanced care planning forms were reviewed and discussed as appropriate.  Differential diagnosis and plan of care discussed with patient after the evaluation.   Pain assessed and judicious use of narcotics when appropriate was discussed.  Importance of Fall prevention discussed.  Counseling on Smoking and Alcohol cessation was offered when appropriate.  Counseling on Diet, exercise, and medication compliance was done.       Approx 75 minutes spent.

## 2023-12-06 NOTE — PROGRESS NOTE ADULT - ASSESSMENT
A/P:79 yo M PMHx of severe dementia, PEG tube presents with ulcer that has worsened over last month. Patient was seen by house calls doctor 2 weeks ago, placed on antibiotics but started mounting fevers in the last few days. It was noticed by his GI doctor (Dr. Moraes) that his ulcer was getting worse.    Wound Consult requested to assist w/ management of  - Stage 4 pressure injury right ischium  - Deep tissue pressure injury sacral region  - Deep tissue pressure injury of left ischium      Recommendations:   -Stage 4 pressure injury      continue w/ VAC as per protocol w/ Wound PT       dermatitis- allergic vs moisture will place VAC drape elsewhere and monitor  -Sacrum/ Lt Ischium- Cavilon QD  -   Continue turning and positioning w/ offloading assistive devices as per protocol  -   Continue w/ attends under pads and Pericare w/ condom cath maintenance as per protocol  Moisturize intact skin w/ SWEEN cream BID  Nutrition Consult for optimization in pt w/ Increased nutritional needs  -   Encourage high quality protein, carline/ MVI & Vit C to promote wound healing  Pt will need Group 2 mattress on hospital bed upon discharge home  Care as per medicine, will follow w/ you  Upon discharge f/u as outpatient at [facility] [address] [facility] [phone]  s/w RN and d/w attng & team   Kristina Conway PA-C, CWS [id number]  Nights/ Weekends/ Holidays please call:  General Surgery Consult pager [phone]) for emergencies  I spent 35minutes face to face w/ this pt of which more than 50% of the time was spent counseling & coordinating care of this pt.        A/P:77 yo M PMHx of severe dementia, PEG tube presents with ulcer that has worsened over last month. Patient was seen by house calls doctor 2 weeks ago, placed on antibiotics but started mounting fevers in the last few days. It was noticed by his GI doctor (Dr. Moraes) that his ulcer was getting worse.    Wound Consult requested to assist w/ management of  - Stage 4 pressure injury right ischium  - Deep tissue pressure injury sacral region  - Deep tissue pressure injury of left ischium      Recommendations:   -Stage 4 pressure injury      continue w/ VAC as per protocol w/ Wound PT       dermatitis- allergic vs moisture will place VAC drape elsewhere and monitor  -Sacrum/ Lt Ischium- Cavilon QD  -   Continue turning and positioning w/ offloading assistive devices as per protocol  -   Continue w/ attends under pads and Pericare w/ condom cath maintenance as per protocol  Moisturize intact skin w/ SWEEN cream BID  Nutrition Consult for optimization in pt w/ Increased nutritional needs  -   Encourage high quality protein, carline/ MVI & Vit C to promote wound healing  Pt will need Group 2 mattress on hospital bed upon discharge home  Care as per medicine, will follow w/ you  Upon discharge f/u as outpatient at Wound Center [address] [facility] [phone]  s/w RN and d/w attng & team   Kristina Conway PA-C, CWS [id number]  Nights/ Weekends/ Holidays please call:  General Surgery Consult pager ([phone]) for emergencies  I spent 35minutes face to face w/ this pt of which more than 50% of the time was spent counseling & coordinating care of this pt.

## 2023-12-06 NOTE — PROGRESS NOTE ADULT - SUBJECTIVE AND OBJECTIVE BOX
SUBJECTIVE AND OBJECTIVE  Indication for Geriatrics and Palliative Care Services/INTERVAL HPI: GOC    OVERNIGHT EVENTS: f/u visit to patient, wife Brenda visiting at bedside.     DNR on chart:DNI  DNI      Allergies    No Known Allergies    Intolerances    Haldol (Other)  benzodiazepines (Other)    MEDICATIONS  (STANDING):  chlorhexidine 2% Cloths 1 Application(s) Topical <User Schedule>  Dakins Solution - 1/4 Strength 1 Application(s) Topical two times a day  ipratropium    for Nebulization 500 MICROGram(s) Nebulizer every 6 hours  levalbuterol Inhalation 0.63 milliGRAM(s) Inhalation every 6 hours    MEDICATIONS  (PRN):  acetaminophen   Oral Liquid .. 750 milliGRAM(s) Oral every 6 hours PRN Temp greater or equal to 38C (100.4F), Mild Pain (1 - 3)  glycopyrrolate Injectable 0.4 milliGRAM(s) IV Push every 6 hours PRN secretions  morphine  - Injectable 2 milliGRAM(s) IV Push every 1 hour PRN Severe Pain (7 - 10)  morphine  - Injectable 1 milliGRAM(s) IV Push every 1 hour PRN dsypnea, RR>22  morphine  - Injectable 1 milliGRAM(s) IV Push every 1 hour PRN Moderate Pain (4 - 6)    ITEMS UNCHECKED ARE NOT PRESENT    PRESENT SYMPTOMS: [x ]Unable to self-report - see [ ] CPOT [ x] PAINADS  [ x] RDOS   Source if other than patient:  [ ]Family   [ ]Team     PCSSQ[Palliative Care Spiritual Screening Question]   Severity (0-10):  Score of 4 or > indicate consideration of Chaplaincy referral.  Chaplaincy Referral: [ x] yes [ ] refused [ ] following [ ] Deferred     Caregiver [place]? : [ ] yes [x ] no [ ] Deferred [ ] Declined             Social work referral [ ] Patient & Family Centered Care Referral [ ]     Anticipatory Grief present?:  [ ] yes [x ] no  [ ] Deferred                  Social work referral [ ] Chaplaincy Referral[ ]    Other Symptoms:  [x ]All other review of systems negative     Palliative Performance Status Version 2:     10    %      [email or web address]    PHYSICAL EXAM:  Vital Signs Last 24 Hrs  T(C): 36.8 (05 Dec 2023 08:23), Max: 37.1 (04 Dec 2023 23:12)  T(F): 98.3 (05 Dec 2023 08:23), Max: 98.8 (04 Dec 2023 23:12)  HR: 107 (05 Dec 2023 08:23) (95 - 122)  BP: 115/64 (05 Dec 2023 08:23) (115/64 - 137/88)  BP(mean): --  RR: 19 (05 Dec 2023 11:20) (18 - 28)  SpO2: 94% (05 Dec 2023 11:20) (94% - 100%)    Parameters below as of 05 Dec 2023 11:20  Patient On (Oxygen Delivery Method): nasal cannula, high flow  O2 Flow (L/min): 60  O2 Concentration (%): 100     GENERAL: [ ]Cachexia    [ ]Alert  [ ]Oriented x   [x ]Lethargic  [ ]Unarousable  [ ]Verbal  [x ]Non-Verbal  Behavioral:   [ ]Anxiety  [x ]Delirium [ ]Agitation [ ]Other  HEENT:  [ ]Normal   [ ]Dry mouth   [ ]ET Tube/Trach  [ ]Oral lesions [x] HFNC and NRB  PULMONARY:   [ ]Clear [ ]Tachypnea  [ ]Audible excessive secretions   [ ]Rhonchi        [ ]Right [ ]Left [ ]Bilateral  [ ]Crackles        [ ]Right [ ]Left [ ]Bilateral  [ ]Wheezing     [ ]Right [ ]Left [ ]Bilateral  [ x]Diminished BS [ ] Right [x ]Left [ ]Bilateral  CARDIOVASCULAR:    [x ]Regular [ ]Irregular [ ]Tachy  [ ]Usman [ ]Murmur [ ]Other  GASTROINTESTINAL:  [ x]Soft  [ ]Distended   [ ]+BS  [ ]Non tender [ ]Tender  [ ]Other [x ]PEG [ ]OGT/ NGT   Last BM:   GENITOURINARY:  [ ]Normal [x ]Incontinent   [ ]Oliguria/Anuria   [ ]Fleming  MUSCULOSKELETAL:   [ ]Normal   [ ]Weakness  [x ]Bed/Wheelchair bound [ ]Edema  NEUROLOGIC:   [ ]No focal deficits  [ x] Cognitive impairment  [ ] Dysphagia [ ]Dysarthria [ ] Paresis [ ]Other   SKIN:   [ ]Normal  [ ]Rash  [ ]Other  [x ]Pressure ulcer(s) [ x]y [ ]n present on admission    CRITICAL CARE:  [ ]Shock Present  [ ]Septic [ ]Cardiogenic [ ]Neurologic [ ]Hypovolemic  [ ]Vasopressors [ ]Inotropes  [ x]Respiratory failure present [ ]Mechanical Ventilation [x ]Non-invasive ventilatory support [ ]High-Flow   [x ]Acute  [ ]Chronic [ x]Hypoxic  [ ]Hypercarbic [ ]Other  [ ]Other organ failure     LABS:                          10.6   8.39  )-----------( 316      ( 04 Dec 2023 10:23 )             33.9     12-04    141  |  104  |  21  ----------------------------<  203<H>  3.9   |  28  |  0.36<L>    Ca    8.5      04 Dec 2023 10:23  Phos  2.2     12-04  Mg     2.3     12-04    TPro  6.7  /  Alb  2.2<L>  /  TBili  0.3  /  DBili  x   /  AST  18  /  ALT  15  /  AlkPhos  152<H>  12-04      RADIOLOGY & ADDITIONAL STUDIES: < from: Xray Chest 1 View- PORTABLE-Routine (Xray Chest 1 View- PORTABLE-Routine in AM.) (11.30.23 @ 04:56) >    IMPRESSION:  Similar left chest whiteout with occluded bronchus.        Thank you for the courtesy of this referral.    --- End of Report ---    < end of copied text >      Protein Calorie Malnutrition Present: [ ]mild [ ]moderate [ ]severe [ ]underweight [ ]morbid obesity  https://www.andeal.org/vault/2440/web/files/ONC/Table_Clinical%20Characteristics%20to%20Document%20Malnutrition-White%20JV%20et%20al%202012.pdf    Height (cm): 170.2 (11-18-23 @ 17:39)  Weight (kg): 49.9 (11-18-23 @ 17:39)  BMI (kg/m2): 17.2 (11-18-23 @ 17:39)    [ ]PPSV2 < or = 30%  [ ]significant weight loss [ ]poor nutritional intake [ ]anasarca[ ]Artificial Nutrition    Other REFERRALS:  [ ]Hospice  [ ]Child Life  [ ]Social Work  [ ]Case management [ ]Holistic Therapy    SUBJECTIVE AND OBJECTIVE  Indication for Geriatrics and Palliative Care Services/INTERVAL HPI: GOC    OVERNIGHT EVENTS: f/u visit to patient, wife Brenda visiting at bedside.     DNR on chart:DNI  DNI      Allergies    No Known Allergies    Intolerances    Haldol (Other)  benzodiazepines (Other)    MEDICATIONS  (STANDING):  chlorhexidine 2% Cloths 1 Application(s) Topical <User Schedule>  Dakins Solution - 1/4 Strength 1 Application(s) Topical two times a day  ipratropium    for Nebulization 500 MICROGram(s) Nebulizer every 6 hours  levalbuterol Inhalation 0.63 milliGRAM(s) Inhalation every 6 hours    MEDICATIONS  (PRN):  acetaminophen   Oral Liquid .. 750 milliGRAM(s) Oral every 6 hours PRN Temp greater or equal to 38C (100.4F), Mild Pain (1 - 3)  glycopyrrolate Injectable 0.4 milliGRAM(s) IV Push every 6 hours PRN secretions  morphine  - Injectable 2 milliGRAM(s) IV Push every 1 hour PRN Severe Pain (7 - 10)  morphine  - Injectable 1 milliGRAM(s) IV Push every 1 hour PRN dsypnea, RR>22  morphine  - Injectable 1 milliGRAM(s) IV Push every 1 hour PRN Moderate Pain (4 - 6)    ITEMS UNCHECKED ARE NOT PRESENT    PRESENT SYMPTOMS: [x ]Unable to self-report - see [ ] CPOT [ x] PAINADS  [ x] RDOS   Source if other than patient:  [ ]Family   [ ]Team     PCSSQ[Palliative Care Spiritual Screening Question]   Severity (0-10):  Score of 4 or > indicate consideration of Chaplaincy referral.  Chaplaincy Referral: [ x] yes [ ] refused [ ] following [ ] Deferred     Caregiver [place]? : [ ] yes [x ] no [ ] Deferred [ ] Declined             Social work referral [ ] Patient & Family Centered Care Referral [ ]     Anticipatory Grief present?:  [ ] yes [x ] no  [ ] Deferred                  Social work referral [ ] Chaplaincy Referral[ ]    Other Symptoms:  [x ]All other review of systems negative     Palliative Performance Status Version 2:     10    %      [email or web address]    PHYSICAL EXAM:  Vital Signs Last 24 Hrs  T(C): 36.8 (05 Dec 2023 08:23), Max: 37.1 (04 Dec 2023 23:12)  T(F): 98.3 (05 Dec 2023 08:23), Max: 98.8 (04 Dec 2023 23:12)  HR: 107 (05 Dec 2023 08:23) (95 - 122)  BP: 115/64 (05 Dec 2023 08:23) (115/64 - 137/88)  BP(mean): --  RR: 19 (05 Dec 2023 11:20) (18 - 28)  SpO2: 94% (05 Dec 2023 11:20) (94% - 100%)    Parameters below as of 05 Dec 2023 11:20  Patient On (Oxygen Delivery Method): nasal cannula, high flow  O2 Flow (L/min): 60  O2 Concentration (%): 100     GENERAL: [ ]Cachexia    [ ]Alert  [ ]Oriented x   [x ]Lethargic  [ ]Unarousable  [ ]Verbal  [x ]Non-Verbal  Behavioral:   [ ]Anxiety  [x ]Delirium [ ]Agitation [ ]Other  HEENT:  [ ]Normal   [ ]Dry mouth   [ ]ET Tube/Trach  [ ]Oral lesions [x] HFNC and NRB  PULMONARY:   [ ]Clear [ ]Tachypnea  [ ]Audible excessive secretions   [ ]Rhonchi        [ ]Right [ ]Left [ ]Bilateral  [ ]Crackles        [ ]Right [ ]Left [ ]Bilateral  [ ]Wheezing     [ ]Right [ ]Left [ ]Bilateral  [ x]Diminished BS [ ] Right [x ]Left [ ]Bilateral  CARDIOVASCULAR:    [x ]Regular [ ]Irregular [ ]Tachy  [ ]Usman [ ]Murmur [ ]Other  GASTROINTESTINAL:  [ x]Soft  [ ]Distended   [ ]+BS  [ ]Non tender [ ]Tender  [ ]Other [x ]PEG [ ]OGT/ NGT   Last BM:   GENITOURINARY:  [ ]Normal [x ]Incontinent   [ ]Oliguria/Anuria   [ ]Fleming  MUSCULOSKELETAL:   [ ]Normal   [ ]Weakness  [x ]Bed/Wheelchair bound [ ]Edema  NEUROLOGIC:   [ ]No focal deficits  [ x] Cognitive impairment  [ ] Dysphagia [ ]Dysarthria [ ] Paresis [ ]Other   SKIN:   [ ]Normal  [ ]Rash  [ ]Other  [x ]Pressure ulcer(s) [ x]y [ ]n present on admission    CRITICAL CARE:  [ ]Shock Present  [ ]Septic [ ]Cardiogenic [ ]Neurologic [ ]Hypovolemic  [ ]Vasopressors [ ]Inotropes  [ x]Respiratory failure present [ ]Mechanical Ventilation [x ]Non-invasive ventilatory support [ ]High-Flow   [x ]Acute  [ ]Chronic [ x]Hypoxic  [ ]Hypercarbic [ ]Other  [ ]Other organ failure     LABS:                          10.6   8.39  )-----------( 316      ( 04 Dec 2023 10:23 )             33.9     12-04    141  |  104  |  21  ----------------------------<  203<H>  3.9   |  28  |  0.36<L>    Ca    8.5      04 Dec 2023 10:23  Phos  2.2     12-04  Mg     2.3     12-04    TPro  6.7  /  Alb  2.2<L>  /  TBili  0.3  /  DBili  x   /  AST  18  /  ALT  15  /  AlkPhos  152<H>  12-04      RADIOLOGY & ADDITIONAL STUDIES: < from: Xray Chest 1 View- PORTABLE-Routine (Xray Chest 1 View- PORTABLE-Routine in AM.) (11.30.23 @ 04:56) >    IMPRESSION:  Similar left chest whiteout with occluded bronchus.        Thank you for the courtesy of this referral.    --- End of Report ---    < end of copied text >      Protein Calorie Malnutrition Present: [ ]mild [ ]moderate [ ]severe [ ]underweight [ ]morbid obesity  https://www.andeal.org/vault/2440/web/files/ONC/Table_Clinical%20Characteristics%20to%20Document%20Malnutrition-White%20JV%20et%20al%202012.pdf    Height (cm): 170.2 (11-18-23 @ 17:39)  Weight (kg): 49.9 (11-18-23 @ 17:39)  BMI (kg/m2): 17.2 (11-18-23 @ 17:39)    [ ]PPSV2 < or = 30%  [ ]significant weight loss [ ]poor nutritional intake [ ]anasarca[ ]Artificial Nutrition    Other REFERRALS:  [ ]Hospice  [ ]Child Life  [ ]Social Work  [ ]Case management [ ]Holistic Therapy    SUBJECTIVE AND OBJECTIVE  Indication for Geriatrics and Palliative Care Services/INTERVAL HPI: GOC    OVERNIGHT EVENTS: f/u visit to patient, wife Brenda visiting at bedside. Pt is lethargic with eyes closed. She thinks pt is doing well, and I noted that his HFNC was weaned down since yesterday. I spoke with pt's son on the phone at wife request, the son was very clear in stating that the family understands pt is at end of life, their biggest wish is to bring pt home at this time. Son states they have all the medical equipments and skills at home to care for the patient, as family has done so for the past 12 years. There are physician members in the family that can also provide advice on comfort measures once at home. We discussed the role of home hospice as a source of support, however son wants to hold off on making a referral until pt is actually home. We discussed the barriers in pt going home, including weaning of HFNC with intermittent episodes of deterioration that sets him back from any progress he makes. Son understands but wants to continue working towards going home. Finally, he asked for pt's home seroquel to be added back, which I relayed to the team.    In conclusion, pt should be continued on limited medical interventions with goal of going home, where family will transition pt to hospice care. I encourage the team to continue with counseling the family on the risks of interventions such as TFs/IVFs which can increase respiratory secretions, risk of aspiration, which would outweigh its benefit. Geriatrics and Palliative Medicine Team remains available for support should family or team requires.     DNR on chart:DNI  DNI      Allergies    No Known Allergies    Intolerances    Haldol (Other)  benzodiazepines (Other)    MEDICATIONS  (STANDING):  chlorhexidine 2% Cloths 1 Application(s) Topical <User Schedule>  Dakins Solution - 1/4 Strength 1 Application(s) Topical two times a day  dextrose 5% + sodium chloride 0.9%. 1000 milliLiter(s) (50 mL/Hr) IV Continuous <Continuous>  ipratropium    for Nebulization 500 MICROGram(s) Nebulizer every 6 hours  levalbuterol Inhalation 0.63 milliGRAM(s) Inhalation every 6 hours  OLANZapine 5 milliGRAM(s) Oral at bedtime  QUEtiapine 100 milliGRAM(s) Oral <User Schedule>    MEDICATIONS  (PRN):  acetaminophen   Oral Liquid .. 750 milliGRAM(s) Oral every 6 hours PRN Temp greater or equal to 38C (100.4F), Mild Pain (1 - 3)  glycopyrrolate Injectable 0.4 milliGRAM(s) IV Push every 6 hours PRN secretions  morphine  - Injectable 2 milliGRAM(s) IV Push every 1 hour PRN Severe Pain (7 - 10)  morphine  - Injectable 1 milliGRAM(s) IV Push every 1 hour PRN dsypnea, RR>22  morphine  - Injectable 1 milliGRAM(s) IV Push every 1 hour PRN Moderate Pain (4 - 6)      ITEMS UNCHECKED ARE NOT PRESENT    PRESENT SYMPTOMS: [x ]Unable to self-report - see [ ] CPOT [ x] PAINADS  [ x] RDOS   Source if other than patient:  [ ]Family   [ ]Team     PCSSQ[Palliative Care Spiritual Screening Question]   Severity (0-10):  Score of 4 or > indicate consideration of Chaplaincy referral.  Chaplaincy Referral: [ x] yes [ ] refused [ ] following [ ] Deferred     Caregiver [place]? : [ ] yes [x ] no [ ] Deferred [ ] Declined             Social work referral [ ] Patient & Family Centered Care Referral [ ]     Anticipatory Grief present?:  [ ] yes [x ] no  [ ] Deferred                  Social work referral [ ] Chaplaincy Referral[ ]    Other Symptoms:  [x ]All other review of systems negative     Palliative Performance Status Version 2:     10    %      [email or web address]    PHYSICAL EXAM:  Vital Signs Last 24 Hrs  T(C): 36.8 (05 Dec 2023 08:23), Max: 37.1 (04 Dec 2023 23:12)  T(F): 98.3 (05 Dec 2023 08:23), Max: 98.8 (04 Dec 2023 23:12)  HR: 107 (05 Dec 2023 08:23) (95 - 122)  BP: 115/64 (05 Dec 2023 08:23) (115/64 - 137/88)  BP(mean): --  RR: 19 (05 Dec 2023 11:20) (18 - 28)  SpO2: 94% (05 Dec 2023 11:20) (94% - 100%)    Parameters below as of 05 Dec 2023 11:20  Patient On (Oxygen Delivery Method): nasal cannula, high flow  O2 Flow (L/min): 60  O2 Concentration (%): 100     GENERAL: [ ]Cachexia    [ ]Alert  [ ]Oriented x   [x ]Lethargic  [ ]Unarousable  [ ]Verbal  [x ]Non-Verbal  Behavioral:   [ ]Anxiety  [x ]Delirium [ ]Agitation [ ]Other  HEENT:  [ ]Normal   [ ]Dry mouth   [ ]ET Tube/Trach  [ ]Oral lesions [x] HFNC and NRB  PULMONARY:   [ ]Clear [ ]Tachypnea  [ ]Audible excessive secretions   [ ]Rhonchi        [ ]Right [ ]Left [ ]Bilateral  [ ]Crackles        [ ]Right [ ]Left [ ]Bilateral  [ ]Wheezing     [ ]Right [ ]Left [ ]Bilateral  [ x]Diminished BS [ ] Right [x ]Left [ ]Bilateral  CARDIOVASCULAR:    [x ]Regular [ ]Irregular [ ]Tachy  [ ]Usman [ ]Murmur [ ]Other  GASTROINTESTINAL:  [ x]Soft  [ ]Distended   [ ]+BS  [ ]Non tender [ ]Tender  [ ]Other [x ]PEG [ ]OGT/ NGT   Last BM:   GENITOURINARY:  [ ]Normal [x ]Incontinent   [ ]Oliguria/Anuria   [ ]Fleming  MUSCULOSKELETAL:   [ ]Normal   [ ]Weakness  [x ]Bed/Wheelchair bound [ ]Edema  NEUROLOGIC:   [ ]No focal deficits  [ x] Cognitive impairment  [ ] Dysphagia [ ]Dysarthria [ ] Paresis [ ]Other   SKIN:   [ ]Normal  [ ]Rash  [ ]Other  [x ]Pressure ulcer(s) [ x]y [ ]n present on admission    CRITICAL CARE:  [ ]Shock Present  [ ]Septic [ ]Cardiogenic [ ]Neurologic [ ]Hypovolemic  [ ]Vasopressors [ ]Inotropes  [ x]Respiratory failure present [ ]Mechanical Ventilation [x ]Non-invasive ventilatory support [ ]High-Flow   [x ]Acute  [ ]Chronic [ x]Hypoxic  [ ]Hypercarbic [ ]Other  [ ]Other organ failure     LABS:                          10.6   8.39  )-----------( 316      ( 04 Dec 2023 10:23 )             33.9     12-04    141  |  104  |  21  ----------------------------<  203<H>  3.9   |  28  |  0.36<L>    Ca    8.5      04 Dec 2023 10:23  Phos  2.2     12-04  Mg     2.3     12-04    TPro  6.7  /  Alb  2.2<L>  /  TBili  0.3  /  DBili  x   /  AST  18  /  ALT  15  /  AlkPhos  152<H>  12-04      RADIOLOGY & ADDITIONAL STUDIES: < from: Xray Chest 1 View- PORTABLE-Routine (Xray Chest 1 View- PORTABLE-Routine in AM.) (11.30.23 @ 04:56) >    IMPRESSION:  Similar left chest whiteout with occluded bronchus.        Thank you for the courtesy of this referral.    --- End of Report ---    < end of copied text >      Protein Calorie Malnutrition Present: [ ]mild [ ]moderate [ ]severe [ ]underweight [ ]morbid obesity  https://www.andeal.org/vault/2440/web/files/ONC/Table_Clinical%20Characteristics%20to%20Document%20Malnutrition-White%20JV%20et%20al%202012.pdf    Height (cm): 170.2 (11-18-23 @ 17:39)  Weight (kg): 49.9 (11-18-23 @ 17:39)  BMI (kg/m2): 17.2 (11-18-23 @ 17:39)    [ ]PPSV2 < or = 30%  [ ]significant weight loss [ ]poor nutritional intake [ ]anasarca[ ]Artificial Nutrition    Other REFERRALS:  [ ]Hospice  [ ]Child Life  [ ]Social Work  [ ]Case management [ ]Holistic Therapy    SUBJECTIVE AND OBJECTIVE  Indication for Geriatrics and Palliative Care Services/INTERVAL HPI: GOC    OVERNIGHT EVENTS: f/u visit to patient, wife Brenda visiting at bedside. Pt is lethargic with eyes closed. She thinks pt is doing well, and I noted that his HFNC was weaned down since yesterday. I spoke with pt's son on the phone at wife request, the son was very clear in stating that the family understands pt is at end of life, their biggest wish is to bring pt home at this time. Son states they have all the medical equipments and skills at home to care for the patient, as family has done so for the past 12 years. There are physician members in the family that can also provide advice on comfort measures once at home. We discussed the role of home hospice as a source of support, however son wants to hold off on making a referral until pt is actually home. We discussed the barriers in pt going home, including weaning of HFNC with intermittent episodes of deterioration that sets him back from any progress he makes. Son understands but wants to continue working towards going home. Finally, he asked for pt's home seroquel to be added back, which I relayed to the team.    In conclusion, pt should be continued on limited medical interventions with goal of going home, where family will transition pt to hospice care. I encourage the team to continue with counseling the family on the risks of interventions such as TFs/IVFs which can increase respiratory secretions, risk of aspiration, which would outweigh its benefit. Geriatrics and Palliative Medicine Team remains available for support should family or team requires.     DNR on chart:DNI  DNI      Allergies    No Known Allergies    Intolerances    Haldol (Other)  benzodiazepines (Other)    MEDICATIONS  (STANDING):  chlorhexidine 2% Cloths 1 Application(s) Topical <User Schedule>  Dakins Solution - 1/4 Strength 1 Application(s) Topical two times a day  dextrose 5% + sodium chloride 0.9%. 1000 milliLiter(s) (50 mL/Hr) IV Continuous <Continuous>  ipratropium    for Nebulization 500 MICROGram(s) Nebulizer every 6 hours  levalbuterol Inhalation 0.63 milliGRAM(s) Inhalation every 6 hours  OLANZapine 5 milliGRAM(s) Oral at bedtime  QUEtiapine 100 milliGRAM(s) Oral <User Schedule>    MEDICATIONS  (PRN):  acetaminophen   Oral Liquid .. 750 milliGRAM(s) Oral every 6 hours PRN Temp greater or equal to 38C (100.4F), Mild Pain (1 - 3)  glycopyrrolate Injectable 0.4 milliGRAM(s) IV Push every 6 hours PRN secretions  morphine  - Injectable 2 milliGRAM(s) IV Push every 1 hour PRN Severe Pain (7 - 10)  morphine  - Injectable 1 milliGRAM(s) IV Push every 1 hour PRN dsypnea, RR>22  morphine  - Injectable 1 milliGRAM(s) IV Push every 1 hour PRN Moderate Pain (4 - 6)      ITEMS UNCHECKED ARE NOT PRESENT    PRESENT SYMPTOMS: [x ]Unable to self-report - see [ ] CPOT [ x] PAINADS  [ x] RDOS   Source if other than patient:  [ ]Family   [ ]Team     PCSSQ[Palliative Care Spiritual Screening Question]   Severity (0-10):  Score of 4 or > indicate consideration of Chaplaincy referral.  Chaplaincy Referral: [ x] yes [ ] refused [ ] following [ ] Deferred     Caregiver [place]? : [ ] yes [x ] no [ ] Deferred [ ] Declined             Social work referral [ ] Patient & Family Centered Care Referral [ ]     Anticipatory Grief present?:  [ ] yes [x ] no  [ ] Deferred                  Social work referral [ ] Chaplaincy Referral[ ]    Other Symptoms:  [x ]All other review of systems negative     Palliative Performance Status Version 2:     10    %      [email or web address]    PHYSICAL EXAM:  Vital Signs Last 24 Hrs  T(C): 36.8 (05 Dec 2023 08:23), Max: 37.1 (04 Dec 2023 23:12)  T(F): 98.3 (05 Dec 2023 08:23), Max: 98.8 (04 Dec 2023 23:12)  HR: 107 (05 Dec 2023 08:23) (95 - 122)  BP: 115/64 (05 Dec 2023 08:23) (115/64 - 137/88)  BP(mean): --  RR: 19 (05 Dec 2023 11:20) (18 - 28)  SpO2: 94% (05 Dec 2023 11:20) (94% - 100%)    Parameters below as of 05 Dec 2023 11:20  Patient On (Oxygen Delivery Method): nasal cannula, high flow  O2 Flow (L/min): 60  O2 Concentration (%): 100     GENERAL: [ ]Cachexia    [ ]Alert  [ ]Oriented x   [x ]Lethargic  [ ]Unarousable  [ ]Verbal  [x ]Non-Verbal  Behavioral:   [ ]Anxiety  [x ]Delirium [ ]Agitation [ ]Other  HEENT:  [ ]Normal   [ ]Dry mouth   [ ]ET Tube/Trach  [ ]Oral lesions [x] HFNC and NRB  PULMONARY:   [ ]Clear [ ]Tachypnea  [ ]Audible excessive secretions   [ ]Rhonchi        [ ]Right [ ]Left [ ]Bilateral  [ ]Crackles        [ ]Right [ ]Left [ ]Bilateral  [ ]Wheezing     [ ]Right [ ]Left [ ]Bilateral  [ x]Diminished BS [ ] Right [x ]Left [ ]Bilateral  CARDIOVASCULAR:    [x ]Regular [ ]Irregular [ ]Tachy  [ ]Usman [ ]Murmur [ ]Other  GASTROINTESTINAL:  [ x]Soft  [ ]Distended   [ ]+BS  [ ]Non tender [ ]Tender  [ ]Other [x ]PEG [ ]OGT/ NGT   Last BM:   GENITOURINARY:  [ ]Normal [x ]Incontinent   [ ]Oliguria/Anuria   [ ]Fleming  MUSCULOSKELETAL:   [ ]Normal   [ ]Weakness  [x ]Bed/Wheelchair bound [ ]Edema  NEUROLOGIC:   [ ]No focal deficits  [ x] Cognitive impairment  [ ] Dysphagia [ ]Dysarthria [ ] Paresis [ ]Other   SKIN:   [ ]Normal  [ ]Rash  [ ]Other  [x ]Pressure ulcer(s) [ x]y [ ]n present on admission    CRITICAL CARE:  [ ]Shock Present  [ ]Septic [ ]Cardiogenic [ ]Neurologic [ ]Hypovolemic  [ ]Vasopressors [ ]Inotropes  [ x]Respiratory failure present [ ]Mechanical Ventilation [x ]Non-invasive ventilatory support [ ]High-Flow   [x ]Acute  [ ]Chronic [ x]Hypoxic  [ ]Hypercarbic [ ]Other  [ ]Other organ failure     LABS:                          10.6   8.39  )-----------( 316      ( 04 Dec 2023 10:23 )             33.9     12-04    141  |  104  |  21  ----------------------------<  203<H>  3.9   |  28  |  0.36<L>    Ca    8.5      04 Dec 2023 10:23  Phos  2.2     12-04  Mg     2.3     12-04    TPro  6.7  /  Alb  2.2<L>  /  TBili  0.3  /  DBili  x   /  AST  18  /  ALT  15  /  AlkPhos  152<H>  12-04      RADIOLOGY & ADDITIONAL STUDIES: < from: Xray Chest 1 View- PORTABLE-Routine (Xray Chest 1 View- PORTABLE-Routine in AM.) (11.30.23 @ 04:56) >    IMPRESSION:  Similar left chest whiteout with occluded bronchus.        Thank you for the courtesy of this referral.    --- End of Report ---    < end of copied text >      Protein Calorie Malnutrition Present: [ ]mild [ ]moderate [ ]severe [ ]underweight [ ]morbid obesity  https://www.andeal.org/vault/2440/web/files/ONC/Table_Clinical%20Characteristics%20to%20Document%20Malnutrition-White%20JV%20et%20al%202012.pdf    Height (cm): 170.2 (11-18-23 @ 17:39)  Weight (kg): 49.9 (11-18-23 @ 17:39)  BMI (kg/m2): 17.2 (11-18-23 @ 17:39)    [ ]PPSV2 < or = 30%  [ ]significant weight loss [ ]poor nutritional intake [ ]anasarca[ ]Artificial Nutrition    Other REFERRALS:  [ ]Hospice  [ ]Child Life  [ ]Social Work  [ ]Case management [ ]Holistic Therapy    SUBJECTIVE AND OBJECTIVE  Indication for Geriatrics and Palliative Care Services/INTERVAL HPI: GOC    OVERNIGHT EVENTS: f/u visit to patient, wife Brenda visiting at bedside. Pt is lethargic with eyes closed. She thinks pt is doing well. I spoke with pt's son on the phone at wife request, the son was very clear in stating that the family understands pt is at end of life, their biggest wish is to bring pt home at this time. Son states they have all the medical equipments and skills at home to care for the patient, as family has done so for the past 12 years. There are physicians in the family who will provide advice on comfort measures once pt returns to home. We discussed the role of home hospice as a source of support, however son wants to hold off on making a referral until pt is actually home. We discussed the barriers in pt going home, noting weaning of HFNC with intermittent episodes of deterioration that sets him back from any progress he makes. Son understands but wants to continue working towards going home. Finally, he asked for pt's home seroquel to be added back, which I relayed to the team.    In conclusion, pt should be continued on limited medical interventions with goal of going home, where family will transition pt to hospice care. I encourage the team to continue with counseling the family on the risks of interventions such as TFs/IVFs which can increase respiratory secretions, risk of aspiration, which would outweigh its benefit. Geriatrics and Palliative Medicine Team will be in the periphery but remain available for support should family or team requires.     DNR on chart:DNI  DNI      Allergies    No Known Allergies    Intolerances    Haldol (Other)  benzodiazepines (Other)    MEDICATIONS  (STANDING):  chlorhexidine 2% Cloths 1 Application(s) Topical <User Schedule>  Dakins Solution - 1/4 Strength 1 Application(s) Topical two times a day  dextrose 5% + sodium chloride 0.9%. 1000 milliLiter(s) (50 mL/Hr) IV Continuous <Continuous>  ipratropium    for Nebulization 500 MICROGram(s) Nebulizer every 6 hours  levalbuterol Inhalation 0.63 milliGRAM(s) Inhalation every 6 hours  OLANZapine 5 milliGRAM(s) Oral at bedtime  QUEtiapine 100 milliGRAM(s) Oral <User Schedule>    MEDICATIONS  (PRN):  acetaminophen   Oral Liquid .. 750 milliGRAM(s) Oral every 6 hours PRN Temp greater or equal to 38C (100.4F), Mild Pain (1 - 3)  glycopyrrolate Injectable 0.4 milliGRAM(s) IV Push every 6 hours PRN secretions  morphine  - Injectable 2 milliGRAM(s) IV Push every 1 hour PRN Severe Pain (7 - 10)  morphine  - Injectable 1 milliGRAM(s) IV Push every 1 hour PRN dsypnea, RR>22  morphine  - Injectable 1 milliGRAM(s) IV Push every 1 hour PRN Moderate Pain (4 - 6)      ITEMS UNCHECKED ARE NOT PRESENT    PRESENT SYMPTOMS: [x ]Unable to self-report - see [ ] CPOT [ x] PAINADS  [ x] RDOS   Source if other than patient:  [ ]Family   [ ]Team     PCSSQ[Palliative Care Spiritual Screening Question]   Severity (0-10):  Score of 4 or > indicate consideration of Chaplaincy referral.  Chaplaincy Referral: [ x] yes [ ] refused [ ] following [ ] Deferred     Caregiver [place]? : [ ] yes [x ] no [ ] Deferred [ ] Declined             Social work referral [ ] Patient & Family Centered Care Referral [ ]     Anticipatory Grief present?:  [ ] yes [x ] no  [ ] Deferred                  Social work referral [ ] Chaplaincy Referral[ ]    Other Symptoms:  [x ]All other review of systems negative     Palliative Performance Status Version 2:     10    %      http://npcrc.org/files/news/palliative_performance_scale_ppsv2.pdf    PHYSICAL EXAM:  Vital Signs Last 24 Hrs  T(C): 36.8 (05 Dec 2023 08:23), Max: 37.1 (04 Dec 2023 23:12)  T(F): 98.3 (05 Dec 2023 08:23), Max: 98.8 (04 Dec 2023 23:12)  HR: 107 (05 Dec 2023 08:23) (95 - 122)  BP: 115/64 (05 Dec 2023 08:23) (115/64 - 137/88)  BP(mean): --  RR: 19 (05 Dec 2023 11:20) (18 - 28)  SpO2: 94% (05 Dec 2023 11:20) (94% - 100%)    Parameters below as of 05 Dec 2023 11:20  Patient On (Oxygen Delivery Method): nasal cannula, high flow  O2 Flow (L/min): 60  O2 Concentration (%): 100     GENERAL: [ ]Cachexia    [ ]Alert  [ ]Oriented x   [x ]Lethargic  [ ]Unarousable  [ ]Verbal  [x ]Non-Verbal  Behavioral:   [ ]Anxiety  [x ]Delirium [ ]Agitation [ ]Other  HEENT:  [ ]Normal   [ ]Dry mouth   [ ]ET Tube/Trach  [ ]Oral lesions [x] HFNC and NRB  PULMONARY:   [ ]Clear [ ]Tachypnea  [ ]Audible excessive secretions   [ ]Rhonchi        [ ]Right [ ]Left [ ]Bilateral  [ ]Crackles        [ ]Right [ ]Left [ ]Bilateral  [ ]Wheezing     [ ]Right [ ]Left [ ]Bilateral  [ x]Diminished BS [ ] Right [x ]Left [ ]Bilateral  CARDIOVASCULAR:    [x ]Regular [ ]Irregular [ ]Tachy  [ ]Usman [ ]Murmur [ ]Other  GASTROINTESTINAL:  [ x]Soft  [ ]Distended   [ ]+BS  [ ]Non tender [ ]Tender  [ ]Other [x ]PEG [ ]OGT/ NGT   Last BM:   GENITOURINARY:  [ ]Normal [x ]Incontinent   [ ]Oliguria/Anuria   [ ]Fleming  MUSCULOSKELETAL:   [ ]Normal   [ ]Weakness  [x ]Bed/Wheelchair bound [ ]Edema  NEUROLOGIC:   [ ]No focal deficits  [ x] Cognitive impairment  [ ] Dysphagia [ ]Dysarthria [ ] Paresis [ ]Other   SKIN:   [ ]Normal  [ ]Rash  [ ]Other  [x ]Pressure ulcer(s) [ x]y [ ]n present on admission    CRITICAL CARE:  [ ]Shock Present  [ ]Septic [ ]Cardiogenic [ ]Neurologic [ ]Hypovolemic  [ ]Vasopressors [ ]Inotropes  [ x]Respiratory failure present [ ]Mechanical Ventilation [x ]Non-invasive ventilatory support [ ]High-Flow   [x ]Acute  [ ]Chronic [ x]Hypoxic  [ ]Hypercarbic [ ]Other  [ ]Other organ failure     LABS:    none      RADIOLOGY & ADDITIONAL STUDIES: < from: Xray Chest 1 View- PORTABLE-Routine (Xray Chest 1 View- PORTABLE-Routine in AM.) (11.30.23 @ 04:56) >    IMPRESSION:  Similar left chest whiteout with occluded bronchus.        Thank you for the courtesy of this referral.    --- End of Report ---    < end of copied text >      Protein Calorie Malnutrition Present: [ ]mild [ ]moderate [ ]severe [ ]underweight [ ]morbid obesity  https://www.andeal.org/vault/2440/web/files/ONC/Table_Clinical%20Characteristics%20to%20Document%20Malnutrition-White%20JV%20et%20al%202012.pdf    Height (cm): 170.2 (11-18-23 @ 17:39)  Weight (kg): 49.9 (11-18-23 @ 17:39)  BMI (kg/m2): 17.2 (11-18-23 @ 17:39)    [ ]PPSV2 < or = 30%  [ ]significant weight loss [ ]poor nutritional intake [ ]anasarca[ ]Artificial Nutrition    Other REFERRALS:  [ ]Hospice  [ ]Child Life  [ ]Social Work  [ ]Case management [ ]Holistic Therapy    SUBJECTIVE AND OBJECTIVE  Indication for Geriatrics and Palliative Care Services/INTERVAL HPI: GOC    OVERNIGHT EVENTS: f/u visit to patient, wife Brenda visiting at bedside. Pt is lethargic with eyes closed. She thinks pt is doing well. I spoke with pt's son on the phone at wife request, the son was very clear in stating that the family understands pt is at end of life, their biggest wish is to bring pt home at this time. Son states they have all the medical equipments and skills at home to care for the patient, as family has done so for the past 12 years. There are physicians in the family who will provide advice on comfort measures once pt returns to home. We discussed the role of home hospice as a source of support, however son wants to hold off on making a referral until pt is actually home. We discussed the barriers in pt going home, noting weaning of HFNC with intermittent episodes of deterioration that sets him back from any progress he makes. Son understands but wants to continue working towards going home. Finally, he asked for pt's home seroquel to be added back, which I relayed to the team.    In conclusion, pt should be continued on limited medical interventions with goal of going home, where family will transition pt to hospice care. I encourage the team to continue with counseling the family on the risks of interventions such as TFs/IVFs which can increase respiratory secretions, risk of aspiration, which would outweigh its benefit. Geriatrics and Palliative Medicine Team will be in the periphery but remain available for support should family or team requires.     DNR on chart:DNI  DNI      Allergies    No Known Allergies    Intolerances    Haldol (Other)  benzodiazepines (Other)    MEDICATIONS  (STANDING):  chlorhexidine 2% Cloths 1 Application(s) Topical <User Schedule>  Dakins Solution - 1/4 Strength 1 Application(s) Topical two times a day  dextrose 5% + sodium chloride 0.9%. 1000 milliLiter(s) (50 mL/Hr) IV Continuous <Continuous>  ipratropium    for Nebulization 500 MICROGram(s) Nebulizer every 6 hours  levalbuterol Inhalation 0.63 milliGRAM(s) Inhalation every 6 hours  OLANZapine 5 milliGRAM(s) Oral at bedtime  QUEtiapine 100 milliGRAM(s) Oral <User Schedule>    MEDICATIONS  (PRN):  acetaminophen   Oral Liquid .. 750 milliGRAM(s) Oral every 6 hours PRN Temp greater or equal to 38C (100.4F), Mild Pain (1 - 3)  glycopyrrolate Injectable 0.4 milliGRAM(s) IV Push every 6 hours PRN secretions  morphine  - Injectable 2 milliGRAM(s) IV Push every 1 hour PRN Severe Pain (7 - 10)  morphine  - Injectable 1 milliGRAM(s) IV Push every 1 hour PRN dsypnea, RR>22  morphine  - Injectable 1 milliGRAM(s) IV Push every 1 hour PRN Moderate Pain (4 - 6)      ITEMS UNCHECKED ARE NOT PRESENT    PRESENT SYMPTOMS: [x ]Unable to self-report - see [ ] CPOT [ x] PAINADS  [ x] RDOS   Source if other than patient:  [ ]Family   [ ]Team     PCSSQ[Palliative Care Spiritual Screening Question]   Severity (0-10):  Score of 4 or > indicate consideration of Chaplaincy referral.  Chaplaincy Referral: [ x] yes [ ] refused [ ] following [ ] Deferred     Caregiver [place]? : [ ] yes [x ] no [ ] Deferred [ ] Declined             Social work referral [ ] Patient & Family Centered Care Referral [ ]     Anticipatory Grief present?:  [ ] yes [x ] no  [ ] Deferred                  Social work referral [ ] Chaplaincy Referral[ ]    Other Symptoms:  [x ]All other review of systems negative     Palliative Performance Status Version 2:     10    %      http://npcrc.org/files/news/palliative_performance_scale_ppsv2.pdf    PHYSICAL EXAM:  Vital Signs Last 24 Hrs  T(C): 36.8 (05 Dec 2023 08:23), Max: 37.1 (04 Dec 2023 23:12)  T(F): 98.3 (05 Dec 2023 08:23), Max: 98.8 (04 Dec 2023 23:12)  HR: 107 (05 Dec 2023 08:23) (95 - 122)  BP: 115/64 (05 Dec 2023 08:23) (115/64 - 137/88)  BP(mean): --  RR: 19 (05 Dec 2023 11:20) (18 - 28)  SpO2: 94% (05 Dec 2023 11:20) (94% - 100%)    Parameters below as of 05 Dec 2023 11:20  Patient On (Oxygen Delivery Method): nasal cannula, high flow  O2 Flow (L/min): 60  O2 Concentration (%): 100     GENERAL: [ ]Cachexia    [ ]Alert  [ ]Oriented x   [x ]Lethargic  [ ]Unarousable  [ ]Verbal  [x ]Non-Verbal  Behavioral:   [ ]Anxiety  [x ]Delirium [ ]Agitation [ ]Other  HEENT:  [ ]Normal   [ ]Dry mouth   [ ]ET Tube/Trach  [ ]Oral lesions [x] HFNC and NRB  PULMONARY:   [ ]Clear [ ]Tachypnea  [ ]Audible excessive secretions   [ ]Rhonchi        [ ]Right [ ]Left [ ]Bilateral  [ ]Crackles        [ ]Right [ ]Left [ ]Bilateral  [ ]Wheezing     [ ]Right [ ]Left [ ]Bilateral  [ x]Diminished BS [ ] Right [x ]Left [ ]Bilateral  CARDIOVASCULAR:    [x ]Regular [ ]Irregular [ ]Tachy  [ ]Usman [ ]Murmur [ ]Other  GASTROINTESTINAL:  [ x]Soft  [ ]Distended   [ ]+BS  [ ]Non tender [ ]Tender  [ ]Other [x ]PEG [ ]OGT/ NGT   Last BM:   GENITOURINARY:  [ ]Normal [x ]Incontinent   [ ]Oliguria/Anuria   [ ]Fleming  MUSCULOSKELETAL:   [ ]Normal   [ ]Weakness  [x ]Bed/Wheelchair bound [ ]Edema  NEUROLOGIC:   [ ]No focal deficits  [ x] Cognitive impairment  [ ] Dysphagia [ ]Dysarthria [ ] Paresis [ ]Other   SKIN:   [ ]Normal  [ ]Rash  [ ]Other  [x ]Pressure ulcer(s) [ x]y [ ]n present on admission    CRITICAL CARE:  [ ]Shock Present  [ ]Septic [ ]Cardiogenic [ ]Neurologic [ ]Hypovolemic  [ ]Vasopressors [ ]Inotropes  [ x]Respiratory failure present [ ]Mechanical Ventilation [x ]Non-invasive ventilatory support [ ]High-Flow   [x ]Acute  [ ]Chronic [ x]Hypoxic  [ ]Hypercarbic [ ]Other  [ ]Other organ failure     LABS:    none      RADIOLOGY & ADDITIONAL STUDIES: < from: Xray Chest 1 View- PORTABLE-Routine (Xray Chest 1 View- PORTABLE-Routine in AM.) (11.30.23 @ 04:56) >    IMPRESSION:  Similar left chest whiteout with occluded bronchus.        Thank you for the courtesy of this referral.    --- End of Report ---    < end of copied text >      Protein Calorie Malnutrition Present: [ ]mild [ ]moderate [ ]severe [ ]underweight [ ]morbid obesity  https://www.andeal.org/vault/2440/web/files/ONC/Table_Clinical%20Characteristics%20to%20Document%20Malnutrition-White%20JV%20et%20al%202012.pdf    Height (cm): 170.2 (11-18-23 @ 17:39)  Weight (kg): 49.9 (11-18-23 @ 17:39)  BMI (kg/m2): 17.2 (11-18-23 @ 17:39)    [ ]PPSV2 < or = 30%  [ ]significant weight loss [ ]poor nutritional intake [ ]anasarca[ ]Artificial Nutrition    Other REFERRALS:  [ ]Hospice  [ ]Child Life  [ ]Social Work  [ ]Case management [ ]Holistic Therapy

## 2023-12-06 NOTE — PROGRESS NOTE ADULT - TIME BILLING
Symptom assessment and management, supportive counseling, coordination of care
as above
as above
Symptom assessment and management, supportive counseling, coordination of care
as above
Symptom assessment and management, supportive counseling, coordination of care
Symptom assessment and management, supportive counseling, coordination of care
as above
Symptom assessment and management, supportive counseling, coordination of care
Symptom assessment and management, supportive counseling, coordination of care

## 2023-12-06 NOTE — PROGRESS NOTE ADULT - PROBLEM SELECTOR PLAN 4
- HCP reportedly wife Brenda.   - Code status: DNR/I, MOLST completed  - GOC: transition to full comfort measures at this time - HCP reportedly wife Brenda.   - Code status: DNR/I, MOLST completed

## 2023-12-06 NOTE — PROGRESS NOTE ADULT - SUBJECTIVE AND OBJECTIVE BOX
SHARONA LARKIN  78y Male  MRN:[id number]    Patient is a 78y old  Male who presents with a chief complaint of sepsis      HPI:  79 yo M PMHx of severe dementia, PEG tube presents with ulcer that has worsened over last month. Patient was seen by house calls doctor 2 weeks ago, placed on antibiotics but started mounting fevers in the last few days. It was noticed by his GI doctor (Dr. Moraes) that his ulcer was getting worse, so he sent him in. Patient is nonverbal at baseline. Wife has noticed no new cough, runny nose. (19 Nov 2023 13:02)      Patient seen and evaluated at bedside. No acute events overnight except as noted.    Interval HPI: no acute events o/n       PAST MEDICAL & SURGICAL HISTORY:  Dementia      Pneumonia      Acute renal failure (ARF)  ARF 1.5 YEAR AGO      Dysphagia  peg      Arthritis      GERD (gastroesophageal reflux disease)      Dyspepsia      History of hand surgery      PEG adjustment, replacement, or removal      Status post insertion of percutaneous endoscopic gastrostomy (PEG) tube  PEG replacement on 02/29/16          REVIEW OF SYSTEMS:  as per hpi     VITALS:   Vital Signs Last 24 Hrs  T(C): 36.5 (06 Dec 2023 08:27), Max: 37.2 (05 Dec 2023 23:33)  T(F): 97.7 (06 Dec 2023 08:27), Max: 98.9 (05 Dec 2023 23:33)  HR: 99 (06 Dec 2023 09:52) (99 - 128)  BP: 109/73 (06 Dec 2023 08:27) (101/66 - 109/73)  BP(mean): --  RR: 18 (06 Dec 2023 09:52) (18 - 20)  SpO2: 97% (06 Dec 2023 09:52) (93% - 99%)    Parameters below as of 06 Dec 2023 09:52  Patient On (Oxygen Delivery Method): nasal cannula, high flow, @31C  O2 Flow (L/min): 50  O2 Concentration (%): 40      PHYSICAL EXAM:  GENERAL: NAD,  comfortable   HEAD:  Atraumatic, Normocephalic  EYES: EOMI, PERRLA, conjunctiva and sclera clear  NECK: Supple, No JVD  CHEST/LUNG: Clear to auscultation bilaterally; No wheeze  HEART: S1, S2; No murmurs, rubs, or gallops  ABDOMEN: Soft, Nontender, Nondistended; Bowel sounds present +peg  EXTREMITIES:  2+ Peripheral Pulses, contracted   PSYCH: Normal affect  NEUROLOGY: non verbal  SKIN: +sacral decub    Consultant(s) Notes Reviewed:  [x ] YES  [ ] NO  Care Discussed with Consultants/Other Providers [ x] YES  [ ] NO    MEDS:   MEDICATIONS  (STANDING):  chlorhexidine 2% Cloths 1 Application(s) Topical <User Schedule>  Dakins Solution - 1/4 Strength 1 Application(s) Topical two times a day  dextrose 5% + sodium chloride 0.9%. 1000 milliLiter(s) (50 mL/Hr) IV Continuous <Continuous>  ipratropium    for Nebulization 500 MICROGram(s) Nebulizer every 6 hours  levalbuterol Inhalation 0.63 milliGRAM(s) Inhalation every 6 hours  OLANZapine 5 milliGRAM(s) Oral at bedtime  QUEtiapine 100 milliGRAM(s) Oral <User Schedule>    MEDICATIONS  (PRN):  acetaminophen   Oral Liquid .. 750 milliGRAM(s) Oral every 6 hours PRN Temp greater or equal to 38C (100.4F), Mild Pain (1 - 3)  glycopyrrolate Injectable 0.4 milliGRAM(s) IV Push every 6 hours PRN secretions  morphine  - Injectable 1 milliGRAM(s) IV Push every 1 hour PRN dsypnea, RR>22  morphine  - Injectable 1 milliGRAM(s) IV Push every 1 hour PRN Moderate Pain (4 - 6)  morphine  - Injectable 2 milliGRAM(s) IV Push every 1 hour PRN Severe Pain (7 - 10)      ALLERGIES:  Haldol (Other)  No Known Allergies  benzodiazepines (Other)      LABS:                  < from: CT Angio Chest PE Protocol w/ IV Cont (11.27.23 @ 22:44) >  IMPRESSION:.    No pulmonary embolism detected.    Occlusive secretions within the left mainstem bronchus with associated   complete left lung collapse; consider superimposed infection in the   appropriate clinical scenario.    Small leftpleural effusion.    --- End of Report ---      < end of copied text >       < from: CT Abdomen and Pelvis w/ IV Cont (11.18.23 @ 19:41) >    IMPRESSION:    Extensive decubitus ulcer involving the right posterior gluteal and   pelvic region with extension to bone. There is bony erosion of the   ischium compatible with osteomyelitis.    Mild bladder wall thickening and a few foci of intraluminal air,   correlate for recent instrumentation and/or infection.    Left lower lobe consolidation along with tree-in-bud/nodular opacities in   the left upper lobe concerning for pneumonia.    --- End of Report ---        < end of copied text >   SHARONA LARKIN  78y Male  MRN:[id number]    Patient is a 78y old  Male who presents with a chief complaint of sepsis      HPI:  79 yo M PMHx of severe dementia, PEG tube presents with ulcer that has worsened over last month. Patient was seen by house calls doctor 2 weeks ago, placed on antibiotics but started mounting fevers in the last few days. It was noticed by his GI doctor (Dr. Moraes) that his ulcer was getting worse, so he sent him in. Patient is nonverbal at baseline. Wife has noticed no new cough, runny nose. (19 Nov 2023 13:02)      Patient seen and evaluated at bedside. No acute events overnight except as noted.    Interval HPI: no acute events o/n       PAST MEDICAL & SURGICAL HISTORY:  Dementia      Pneumonia      Acute renal failure (ARF)  ARF 1.5 YEAR AGO      Dysphagia  peg      Arthritis      GERD (gastroesophageal reflux disease)      Dyspepsia      History of hand surgery      PEG adjustment, replacement, or removal      Status post insertion of percutaneous endoscopic gastrostomy (PEG) tube  PEG replacement on 02/29/16          REVIEW OF SYSTEMS:  as per hpi     VITALS:   Vital Signs Last 24 Hrs  T(C): 36.5 (06 Dec 2023 08:27), Max: 37.2 (05 Dec 2023 23:33)  T(F): 97.7 (06 Dec 2023 08:27), Max: 98.9 (05 Dec 2023 23:33)  HR: 99 (06 Dec 2023 09:52) (99 - 128)  BP: 109/73 (06 Dec 2023 08:27) (101/66 - 109/73)  BP(mean): --  RR: 18 (06 Dec 2023 09:52) (18 - 20)  SpO2: 97% (06 Dec 2023 09:52) (93% - 99%)    Parameters below as of 06 Dec 2023 09:52  Patient On (Oxygen Delivery Method): nasal cannula, high flow, @31C  O2 Flow (L/min): 50  O2 Concentration (%): 40      PHYSICAL EXAM:  GENERAL: NAD,  comfortable   HEAD:  Atraumatic, Normocephalic  EYES: EOMI, PERRLA, conjunctiva and sclera clear  NECK: Supple, No JVD  CHEST/LUNG: Clear to auscultation bilaterally; No wheeze  HEART: S1, S2; No murmurs, rubs, or gallops  ABDOMEN: Soft, Nontender, Nondistended; Bowel sounds present +peg  EXTREMITIES:  2+ Peripheral Pulses, contracted   PSYCH: Normal affect  NEUROLOGY: non verbal  SKIN: +sacral decub    Consultant(s) Notes Reviewed:  [x ] YES  [ ] NO  Care Discussed with Consultants/Other Providers [ x] YES  [ ] NO    MEDS:   MEDICATIONS  (STANDING):  chlorhexidine 2% Cloths 1 Application(s) Topical <User Schedule>  Dakins Solution - 1/4 Strength 1 Application(s) Topical two times a day  dextrose 5% + sodium chloride 0.9%. 1000 milliLiter(s) (50 mL/Hr) IV Continuous <Continuous>  ipratropium    for Nebulization 500 MICROGram(s) Nebulizer every 6 hours  levalbuterol Inhalation 0.63 milliGRAM(s) Inhalation every 6 hours  OLANZapine 5 milliGRAM(s) Oral at bedtime  QUEtiapine 100 milliGRAM(s) Oral <User Schedule>    MEDICATIONS  (PRN):  acetaminophen   Oral Liquid .. 750 milliGRAM(s) Oral every 6 hours PRN Temp greater or equal to 38C (100.4F), Mild Pain (1 - 3)  glycopyrrolate Injectable 0.4 milliGRAM(s) IV Push every 6 hours PRN secretions  morphine  - Injectable 1 milliGRAM(s) IV Push every 1 hour PRN dsypnea, RR>22  morphine  - Injectable 1 milliGRAM(s) IV Push every 1 hour PRN Moderate Pain (4 - 6)  morphine  - Injectable 2 milliGRAM(s) IV Push every 1 hour PRN Severe Pain (7 - 10)      ALLERGIES:  Haldol (Other)  No Known Allergies  benzodiazepines (Other)      LABS:                  < from: CT Angio Chest PE Protocol w/ IV Cont (11.27.23 @ 22:44) >  IMPRESSION:.    No pulmonary embolism detected.    Occlusive secretions within the left mainstem bronchus with associated   complete left lung collapse; consider superimposed infection in the   appropriate clinical scenario.    Small leftpleural effusion.    --- End of Report ---      < end of copied text >       < from: CT Abdomen and Pelvis w/ IV Cont (11.18.23 @ 19:41) >    IMPRESSION:    Extensive decubitus ulcer involving the right posterior gluteal and   pelvic region with extension to bone. There is bony erosion of the   ischium compatible with osteomyelitis.    Mild bladder wall thickening and a few foci of intraluminal air,   correlate for recent instrumentation and/or infection.    Left lower lobe consolidation along with tree-in-bud/nodular opacities in   the left upper lobe concerning for pneumonia.    --- End of Report ---        < end of copied text >

## 2023-12-06 NOTE — PROGRESS NOTE ADULT - PROBLEM SELECTOR PLAN 5
- Lab checks, fingersticks, non-essential oral meds and TFs discontinued to optimize comfort  - Continue supplemental oxygen with HFNC as tolerated  - PRN meds ordered as above, much counseling provided today to wife as she is resistant in morphine use to treat his respiratory distress  - Family continues to adamantly decline PCU transfer    Lauren To MD  [facility] Team Consults  Please call if we can be of assistance, [phone] - Lab checks, fingersticks, non-essential oral meds and TFs discontinued to optimize comfort  - Continue supplemental oxygen with HFNC as tolerated  - PRN meds ordered as above, much counseling provided today to wife as she is resistant in morphine use to treat his respiratory distress  - Family continues to adamantly decline PCU transfer    Lauren To MD  [facility] Team Consults  Please call if we can be of assistance, [phone] - D/w primary team to re-order home seroquel and zyprexa QHS  - Family is clear in stating that the goal is to bring the patient home, despite being advised of the various barriers that pt faces in achieving this goal  - Defer weaning of HFNC to NC per respiratory and pulm  - Geriatrics and Palliative Medicine Team will follow peripherally, please don't hesitate to call us back if further assistance is needed     An MD Yousuf  [facility] Team Consults  Please call if we can be of assistance, [phone] - D/w primary team to re-order home seroquel and zyprexa QHS  - Family is clear in stating that the goal is to bring the patient home, despite being advised of the various barriers that pt faces in achieving this goal  - Defer weaning of HFNC to NC per respiratory and pulm  - Geriatrics and Palliative Medicine Team will follow peripherally, please don't hesitate to call us back if further assistance is needed     An MD Yousuf  [facility] Team Consults  Please call if we can be of assistance, [phone]

## 2023-12-06 NOTE — PROGRESS NOTE ADULT - SUBJECTIVE AND OBJECTIVE BOX
[facility]-- WOUND TEAM -- FOLLOW UP NOTE  --------------------------------------------------------------------------------    24 hour events/subjective:    afebrile  tolerating dressing changes  no odor or excess drainage  wife at bedside- all questions answered to  her      expressed understanding and satisfaction  ongoing [facility]      ROS: pt unable to offer    ALLERGIES & MEDICATIONS  --------------------------------------------------------------------------------    No Known Allergies    Intolerances  Haldol (Other)  benzodiazepines (Other)        STANDING INPATIENT MEDICATIONS  chlorhexidine 2% Cloths 1 Application(s) Topical <User Schedule>  Dakins Solution - 1/4 Strength 1 Application(s) Topical two times a day  dextrose 5% + sodium chloride 0.9%. 1000 milliLiter(s) IV Continuous <Continuous>  ipratropium    for Nebulization 500 MICROGram(s) Nebulizer every 6 hours  levalbuterol Inhalation 0.63 milliGRAM(s) Inhalation every 6 hours  OLANZapine 5 milliGRAM(s) Oral at bedtime  QUEtiapine 100 milliGRAM(s) Oral <User Schedule>      PRN INPATIENT MEDICATION  acetaminophen   Oral Liquid .. 750 milliGRAM(s) Oral every 6 hours PRN  glycopyrrolate Injectable 0.4 milliGRAM(s) IV Push every 6 hours PRN  morphine  - Injectable 1 milliGRAM(s) IV Push every 1 hour PRN  morphine  - Injectable 2 milliGRAM(s) IV Push every 1 hour PRN  morphine  - Injectable 1 milliGRAM(s) IV Push every 1 hour PRN        VITALS/PHYSICAL EXAM  --------------------------------------------------------------------------------  T(C): 36.4 (12-06-23 @ 16:46), Max: 37.2 (12-05-23 @ 23:33)  HR: 90 (12-06-23 @ 16:46) (88 - 128)  BP: 99/65 (12-06-23 @ 16:46) (99/65 - 109/73)  RR: 18 (12-06-23 @ 16:46) (18 - 20)  SpO2: 97% (12-06-23 @ 16:46) (93% - 99%)  Wt(kg): --    NAD, ALert, cachectic, frail, HiFlow O2  Envella  HEENT:  NC/AT, EOMI, Sclera clear, mucosa moist, throat clear  Respiratory:  nonlabored w/ equal chest rise  Gastrointestinal:  (+)PEG, resolved dermatitis around PEG  : (+) condom cath  Neurology:  nonverbal, unable to follow commands  Psych: calm  Musculoskeletal:  limited stiff / contracted  Vascular: BLE equally warm/  no cyanosis, clubbing, edema nor acute ischemia                BLE DP pulses palpable  Skin:  thin, dry, pale, frail  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  Lt hip resolved blanchable erythema  sacral region DTI       4cm x 3cm         denuded w/ maroon skin w/o drainage   Left ischium DTI fading       2x2cm        purple/maroon coloration of skin w/o drainage   no induration, no fluctuance, no crepitus, odor, blistering, warmth, induration, fluctuance, nor crepitus.                       [facility]-- WOUND TEAM -- FOLLOW UP NOTE  --------------------------------------------------------------------------------    24 hour events/subjective:    afebrile  tolerating dressing changes  no odor or excess drainage  wife at bedside- all questions answered to  her      expressed understanding and satisfaction  ongoing [facility]      ROS: pt unable to offer    ALLERGIES & MEDICATIONS  --------------------------------------------------------------------------------    No Known Allergies    Intolerances  Haldol (Other)  benzodiazepines (Other)        STANDING INPATIENT MEDICATIONS  chlorhexidine 2% Cloths 1 Application(s) Topical <User Schedule>  Dakins Solution - 1/4 Strength 1 Application(s) Topical two times a day  dextrose 5% + sodium chloride 0.9%. 1000 milliLiter(s) IV Continuous <Continuous>  ipratropium    for Nebulization 500 MICROGram(s) Nebulizer every 6 hours  levalbuterol Inhalation 0.63 milliGRAM(s) Inhalation every 6 hours  OLANZapine 5 milliGRAM(s) Oral at bedtime  QUEtiapine 100 milliGRAM(s) Oral <User Schedule>      PRN INPATIENT MEDICATION  acetaminophen   Oral Liquid .. 750 milliGRAM(s) Oral every 6 hours PRN  glycopyrrolate Injectable 0.4 milliGRAM(s) IV Push every 6 hours PRN  morphine  - Injectable 1 milliGRAM(s) IV Push every 1 hour PRN  morphine  - Injectable 2 milliGRAM(s) IV Push every 1 hour PRN  morphine  - Injectable 1 milliGRAM(s) IV Push every 1 hour PRN        VITALS/PHYSICAL EXAM  --------------------------------------------------------------------------------  T(C): 36.4 (12-06-23 @ 16:46), Max: 37.2 (12-05-23 @ 23:33)  HR: 90 (12-06-23 @ 16:46) (88 - 128)  BP: 99/65 (12-06-23 @ 16:46) (99/65 - 109/73)  RR: 18 (12-06-23 @ 16:46) (18 - 20)  SpO2: 97% (12-06-23 @ 16:46) (93% - 99%)  Wt(kg): --    NAD, ALert, cachectic, frail, HiFlow O2  Envella  HEENT:  NC/AT, EOMI, Sclera clear, mucosa moist, throat clear  Respiratory:  nonlabored w/ equal chest rise  Gastrointestinal:  (+)PEG, resolved dermatitis around PEG  : (+) condom cath  Neurology:  nonverbal, unable to follow commands  Psych: calm  Musculoskeletal:  limited stiff / contracted  Vascular: BLE equally warm/  no cyanosis, clubbing, edema nor acute ischemia                BLE DP pulses palpable  Skin:  thin, dry, pale, frail  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  Lt hip resolved blanchable erythema  sacral region DTI       4cm x 3cm         denuded w/ maroon skin w/o drainage   Left ischium DTI fading       2x2cm        purple/maroon coloration of skin w/o drainage   no induration, no fluctuance, no crepitus, odor, blistering, warmth, induration, fluctuance, nor crepitus.

## 2023-12-06 NOTE — PROGRESS NOTE ADULT - PROBLEM SELECTOR PLAN 3
In setting of PNA, likely ongoing aspiration, and mucus plugging/lung collapse on CTA chest 11/27  - pulmonary following, pt was on treatment with mucolytics/nebs/HFNC  - s/p abx with a full treatment course   - Given acute deterioration, pt is now full comfort measures with plan to continue supplemental oxygen for support without escalation otherwise  - IV morphine 1-2 mg q1 prn mod-severe pain, IV morphine 1 mg q1 prn dyspnea, IV glycopyrrolate 0.4 mg q6 prn secretions In setting of PNA, likely ongoing aspiration, and mucus plugging/lung collapse on CTA chest 11/27  - pulmonary following, pt was on treatment with mucolytics/nebs/HFNC  - s/p abx with a full treatment course, now abx is restarted  - Family is wishing for limited interventions with weaning of HFNC to NC in order to bring the pt home, where he will be full comfort measures  - Continue IV morphine 1-2 mg q1 prn mod-severe pain, IV morphine 1 mg q1 prn dyspnea, IV glycopyrrolate 0.4 mg q6 prn secretions

## 2023-12-07 RX ADMIN — Medication 500 MICROGRAM(S): at 00:45

## 2023-12-07 RX ADMIN — LEVALBUTEROL 0.63 MILLIGRAM(S): 1.25 SOLUTION, CONCENTRATE RESPIRATORY (INHALATION) at 23:16

## 2023-12-07 RX ADMIN — Medication 750 MILLIGRAM(S): at 23:15

## 2023-12-07 RX ADMIN — Medication 500 MICROGRAM(S): at 11:40

## 2023-12-07 RX ADMIN — Medication 750 MILLIGRAM(S): at 08:10

## 2023-12-07 RX ADMIN — SODIUM CHLORIDE 50 MILLILITER(S): 9 INJECTION, SOLUTION INTRAVENOUS at 23:15

## 2023-12-07 RX ADMIN — LEVALBUTEROL 0.63 MILLIGRAM(S): 1.25 SOLUTION, CONCENTRATE RESPIRATORY (INHALATION) at 11:40

## 2023-12-07 RX ADMIN — Medication 500 MICROGRAM(S): at 17:33

## 2023-12-07 RX ADMIN — Medication 500 MICROGRAM(S): at 05:36

## 2023-12-07 RX ADMIN — CHLORHEXIDINE GLUCONATE 1 APPLICATION(S): 213 SOLUTION TOPICAL at 09:15

## 2023-12-07 RX ADMIN — LEVALBUTEROL 0.63 MILLIGRAM(S): 1.25 SOLUTION, CONCENTRATE RESPIRATORY (INHALATION) at 17:33

## 2023-12-07 RX ADMIN — Medication 750 MILLIGRAM(S): at 07:55

## 2023-12-07 RX ADMIN — Medication 500 MICROGRAM(S): at 23:16

## 2023-12-07 RX ADMIN — QUETIAPINE FUMARATE 100 MILLIGRAM(S): 200 TABLET, FILM COATED ORAL at 22:47

## 2023-12-07 RX ADMIN — LEVALBUTEROL 0.63 MILLIGRAM(S): 1.25 SOLUTION, CONCENTRATE RESPIRATORY (INHALATION) at 05:10

## 2023-12-07 NOTE — PROGRESS NOTE ADULT - SUBJECTIVE AND OBJECTIVE BOX
SHARONA LARKIN  78y Male  MRN:[id number]    Patient is a 78y old  Male who presents with a chief complaint of sepsis      HPI:  79 yo M PMHx of severe dementia, PEG tube presents with ulcer that has worsened over last month. Patient was seen by house calls doctor 2 weeks ago, placed on antibiotics but started mounting fevers in the last few days. It was noticed by his GI doctor (Dr. Moraes) that his ulcer was getting worse, so he sent him in. Patient is nonverbal at baseline. Wife has noticed no new cough, runny nose. (19 Nov 2023 13:02)      Patient seen and evaluated at bedside. No acute events overnight except as noted.    Interval HPI: no acute events o/n       PAST MEDICAL & SURGICAL HISTORY:  Dementia      Pneumonia      Acute renal failure (ARF)  ARF 1.5 YEAR AGO      Dysphagia  peg      Arthritis      GERD (gastroesophageal reflux disease)      Dyspepsia      History of hand surgery      PEG adjustment, replacement, or removal      Status post insertion of percutaneous endoscopic gastrostomy (PEG) tube  PEG replacement on 02/29/16          REVIEW OF SYSTEMS:  as per hpi     VITALS:   Vital Signs Last 24 Hrs  T(C): 36.3 (07 Dec 2023 08:33), Max: 36.4 (06 Dec 2023 16:46)  T(F): 97.3 (07 Dec 2023 08:33), Max: 97.5 (06 Dec 2023 16:46)  HR: 102 (07 Dec 2023 08:33) (80 - 116)  BP: 95/64 (07 Dec 2023 08:33) (95/64 - 102/63)  BP(mean): --  RR: 20 (07 Dec 2023 08:33) (17 - 20)  SpO2: 92% (07 Dec 2023 08:33) (92% - 99%)    Parameters below as of 07 Dec 2023 08:33  Patient On (Oxygen Delivery Method): nasal cannula, high flow  O2 Flow (L/min): 50  O2 Concentration (%): 40    PHYSICAL EXAM:  GENERAL: NAD,  comfortable   HEAD:  Atraumatic, Normocephalic  EYES: EOMI, PERRLA, conjunctiva and sclera clear  NECK: Supple, No JVD  CHEST/LUNG: Clear to auscultation bilaterally; No wheeze  HEART: S1, S2; No murmurs, rubs, or gallops  ABDOMEN: Soft, Nontender, Nondistended; Bowel sounds present +peg  EXTREMITIES:  2+ Peripheral Pulses, contracted   PSYCH: Normal affect  NEUROLOGY: non verbal  SKIN: +sacral decub    Consultant(s) Notes Reviewed:  [x ] YES  [ ] NO  Care Discussed with Consultants/Other Providers [ x] YES  [ ] NO    MEDS:   MEDICATIONS  (STANDING):  chlorhexidine 2% Cloths 1 Application(s) Topical <User Schedule>  dextrose 5% + sodium chloride 0.9%. 1000 milliLiter(s) (50 mL/Hr) IV Continuous <Continuous>  ipratropium    for Nebulization 500 MICROGram(s) Nebulizer every 6 hours  levalbuterol Inhalation 0.63 milliGRAM(s) Inhalation every 6 hours  OLANZapine 5 milliGRAM(s) Oral at bedtime  QUEtiapine 100 milliGRAM(s) Oral <User Schedule>    MEDICATIONS  (PRN):  acetaminophen   Oral Liquid .. 750 milliGRAM(s) Oral every 6 hours PRN Temp greater or equal to 38C (100.4F), Mild Pain (1 - 3)  glycopyrrolate Injectable 0.4 milliGRAM(s) IV Push every 6 hours PRN secretions  morphine  - Injectable 1 milliGRAM(s) IV Push every 1 hour PRN Moderate Pain (4 - 6)  morphine  - Injectable 2 milliGRAM(s) IV Push every 1 hour PRN Severe Pain (7 - 10)  morphine  - Injectable 1 milliGRAM(s) IV Push every 1 hour PRN dsypnea, RR>22      ALLERGIES:  Haldol (Other)  No Known Allergies  benzodiazepines (Other)      LABS:                  < from: CT Angio Chest PE Protocol w/ IV Cont (11.27.23 @ 22:44) >  IMPRESSION:.    No pulmonary embolism detected.    Occlusive secretions within the left mainstem bronchus with associated   complete left lung collapse; consider superimposed infection in the   appropriate clinical scenario.    Small leftpleural effusion.    --- End of Report ---      < end of copied text >       < from: CT Abdomen and Pelvis w/ IV Cont (11.18.23 @ 19:41) >    IMPRESSION:    Extensive decubitus ulcer involving the right posterior gluteal and   pelvic region with extension to bone. There is bony erosion of the   ischium compatible with osteomyelitis.    Mild bladder wall thickening and a few foci of intraluminal air,   correlate for recent instrumentation and/or infection.    Left lower lobe consolidation along with tree-in-bud/nodular opacities in   the left upper lobe concerning for pneumonia.    --- End of Report ---        < end of copied text >   SHARONA LARKIN  78y Male  MRN:[id number]    Patient is a 78y old  Male who presents with a chief complaint of sepsis      HPI:  77 yo M PMHx of severe dementia, PEG tube presents with ulcer that has worsened over last month. Patient was seen by house calls doctor 2 weeks ago, placed on antibiotics but started mounting fevers in the last few days. It was noticed by his GI doctor (Dr. Moraes) that his ulcer was getting worse, so he sent him in. Patient is nonverbal at baseline. Wife has noticed no new cough, runny nose. (19 Nov 2023 13:02)      Patient seen and evaluated at bedside. No acute events overnight except as noted.    Interval HPI: no acute events o/n       PAST MEDICAL & SURGICAL HISTORY:  Dementia      Pneumonia      Acute renal failure (ARF)  ARF 1.5 YEAR AGO      Dysphagia  peg      Arthritis      GERD (gastroesophageal reflux disease)      Dyspepsia      History of hand surgery      PEG adjustment, replacement, or removal      Status post insertion of percutaneous endoscopic gastrostomy (PEG) tube  PEG replacement on 02/29/16          REVIEW OF SYSTEMS:  as per hpi     VITALS:   Vital Signs Last 24 Hrs  T(C): 36.3 (07 Dec 2023 08:33), Max: 36.4 (06 Dec 2023 16:46)  T(F): 97.3 (07 Dec 2023 08:33), Max: 97.5 (06 Dec 2023 16:46)  HR: 102 (07 Dec 2023 08:33) (80 - 116)  BP: 95/64 (07 Dec 2023 08:33) (95/64 - 102/63)  BP(mean): --  RR: 20 (07 Dec 2023 08:33) (17 - 20)  SpO2: 92% (07 Dec 2023 08:33) (92% - 99%)    Parameters below as of 07 Dec 2023 08:33  Patient On (Oxygen Delivery Method): nasal cannula, high flow  O2 Flow (L/min): 50  O2 Concentration (%): 40    PHYSICAL EXAM:  GENERAL: NAD,  comfortable   HEAD:  Atraumatic, Normocephalic  EYES: EOMI, PERRLA, conjunctiva and sclera clear  NECK: Supple, No JVD  CHEST/LUNG: Clear to auscultation bilaterally; No wheeze  HEART: S1, S2; No murmurs, rubs, or gallops  ABDOMEN: Soft, Nontender, Nondistended; Bowel sounds present +peg  EXTREMITIES:  2+ Peripheral Pulses, contracted   PSYCH: Normal affect  NEUROLOGY: non verbal  SKIN: +sacral decub    Consultant(s) Notes Reviewed:  [x ] YES  [ ] NO  Care Discussed with Consultants/Other Providers [ x] YES  [ ] NO    MEDS:   MEDICATIONS  (STANDING):  chlorhexidine 2% Cloths 1 Application(s) Topical <User Schedule>  dextrose 5% + sodium chloride 0.9%. 1000 milliLiter(s) (50 mL/Hr) IV Continuous <Continuous>  ipratropium    for Nebulization 500 MICROGram(s) Nebulizer every 6 hours  levalbuterol Inhalation 0.63 milliGRAM(s) Inhalation every 6 hours  OLANZapine 5 milliGRAM(s) Oral at bedtime  QUEtiapine 100 milliGRAM(s) Oral <User Schedule>    MEDICATIONS  (PRN):  acetaminophen   Oral Liquid .. 750 milliGRAM(s) Oral every 6 hours PRN Temp greater or equal to 38C (100.4F), Mild Pain (1 - 3)  glycopyrrolate Injectable 0.4 milliGRAM(s) IV Push every 6 hours PRN secretions  morphine  - Injectable 1 milliGRAM(s) IV Push every 1 hour PRN Moderate Pain (4 - 6)  morphine  - Injectable 2 milliGRAM(s) IV Push every 1 hour PRN Severe Pain (7 - 10)  morphine  - Injectable 1 milliGRAM(s) IV Push every 1 hour PRN dsypnea, RR>22      ALLERGIES:  Haldol (Other)  No Known Allergies  benzodiazepines (Other)      LABS:                  < from: CT Angio Chest PE Protocol w/ IV Cont (11.27.23 @ 22:44) >  IMPRESSION:.    No pulmonary embolism detected.    Occlusive secretions within the left mainstem bronchus with associated   complete left lung collapse; consider superimposed infection in the   appropriate clinical scenario.    Small leftpleural effusion.    --- End of Report ---      < end of copied text >       < from: CT Abdomen and Pelvis w/ IV Cont (11.18.23 @ 19:41) >    IMPRESSION:    Extensive decubitus ulcer involving the right posterior gluteal and   pelvic region with extension to bone. There is bony erosion of the   ischium compatible with osteomyelitis.    Mild bladder wall thickening and a few foci of intraluminal air,   correlate for recent instrumentation and/or infection.    Left lower lobe consolidation along with tree-in-bud/nodular opacities in   the left upper lobe concerning for pneumonia.    --- End of Report ---        < end of copied text >

## 2023-12-07 NOTE — PROGRESS NOTE ADULT - PROBLEM SELECTOR PLAN 1
S/p RRT 11/20 for hypoxia requiring HFNC  -Increase in O2 requirements 11/21 from HFNC 50L/80% to 100% Bipap. Weaned from Bipap to HFNC 50L/100%  -Suspect aspiration event, family at bedside reports multiple aspiration events in the past  -CT A/P with LLL consolidation, SHEILA tree in bud opacities likely PNA. Continue ABX per ID  -ABG with no CO2 retention  -CXR 11/25 with LLL infiltrate  -CTA chest 11/27 negative for PE. Notes extensive L atelectasis/mucus plugging with small L effusion  -Started on Bipap 12/8 to assist with atelectasis but pt has been unable to tolerate. Unable to tolerate chest vest as well per RT   -Discussed plan of care at length with patient's wife Brenda and daughter Ling ([phone]). They expressed that at this point they are not ready to transition to comfort measures or palliative care. While they still want to treat, they would like to focus on interventions that do not cause the patient any more pain. They experienced patient's visible discomfort with Bipap as well as chest vest, and would like to discontinue these therapies at this time and focus on manual chest PT, O2 via HFNC, and nebulizers.    -Bipap and chest vest d/c'd at family's request. CXR this AM actually with improvement in L lung aeration.   -Continue Atrovent/Xopenex nebs q6h, 3% sodium chloride nebs q6h  -Suction PRN  -Chest PT  -Position patient with L lung up   -O2 requirements slowly improving - continue HFNC (currently 50L/50%). Wean O2 as tolerated. Keep sats >90%.    -Palliative care f/u. Overall prognosis guarded.  -on 50% high flow today   sao2 100%:  can try to wean down oxygen  -now on 100% ; doing very poorly:  now for palliative care:  family agreed:  -doing very poorly today  12/7:  seems OK: no cough : on 40% high flow: try to change to nasal cannula S/p RRT 11/20 for hypoxia requiring HFNC  -Increase in O2 requirements 11/21 from HFNC 50L/80% to 100% Bipap. Weaned from Bipap to HFNC 50L/100%  -Suspect aspiration event, family at bedside reports multiple aspiration events in the past  -CT A/P with LLL consolidation, SHEILA tree in bud opacities likely PNA. Continue ABX per ID  -ABG with no CO2 retention  -CXR 11/25 with LLL infiltrate  -CTA chest 11/27 negative for PE. Notes extensive L atelectasis/mucus plugging with small L effusion  -Started on Bipap 12/8 to assist with atelectasis but pt has been unable to tolerate. Unable to tolerate chest vest as well per RT   -Discussed plan of care at length with patient's wife Brenda and daughter Ling ([phone]). They expressed that at this point they are not ready to transition to comfort measures or palliative care. While they still want to treat, they would like to focus on interventions that do not cause the patient any more pain. They experienced patient's visible discomfort with Bipap as well as chest vest, and would like to discontinue these therapies at this time and focus on manual chest PT, O2 via HFNC, and nebulizers.    -Bipap and chest vest d/c'd at family's request. CXR this AM actually with improvement in L lung aeration.   -Continue Atrovent/Xopenex nebs q6h, 3% sodium chloride nebs q6h  -Suction PRN  -Chest PT  -Position patient with L lung up   -O2 requirements slowly improving - continue HFNC (currently 50L/50%). Wean O2 as tolerated. Keep sats >90%.    -Palliative care f/u. Overall prognosis guarded.  -on 50% high flow today   sao2 100%:  can try to wean down oxygen  -now on 100% ; doing very poorly:  now for palliative care:  family agreed:  -doing very poorly today  12/7:  seems OK: no cough : on 40% high flow: try to change to nasal cannula

## 2023-12-07 NOTE — PROGRESS NOTE ADULT - PROBLEM SELECTOR PLAN 2
CT A/P with LLL consolidation & SHEILA TIB opacities  -CTA chest with L lung mucus plugging/atelectasis, possible post obstructive PNA   -RVP negative  -MRSA PCR negative  -BC with NGTD  -Urine legionella negative  -ABX changed to Meropenem as pt still spiking temps. Afebrile now.   -ID f/u.  -his last chest xray from 12/1 to me shows significant improvement in left lung aeration:  dw wife  -no new chest xray : GOC has changed  -dw wife she doesnto want any aggressive interventions at [facility] CT A/P with LLL consolidation & SHEILA TIB opacities  -CTA chest with L lung mucus plugging/atelectasis, possible post obstructive PNA   -RVP negative  -MRSA PCR negative  -BC with NGTD  -Urine legionella negative  -ABX changed to Meropenem as pt still spiking temps. Afebrile now.   -ID f/u.  -his last chest xray from 12/1 to me shows significant improvement in left lung aeration:  dw wife  -no new chest xray : GOC has changed  -dw wife she doesnto want any aggressive interventions at [facility]

## 2023-12-07 NOTE — PROGRESS NOTE ADULT - SUBJECTIVE AND OBJECTIVE BOX
INTERVAL HPI/OVERNIGH  seen and examined , no new events  tolerating TF No N/V/D          MEDICATIONS  (STANDING):  chlorhexidine 2% Cloths 1 Application(s) Topical <User Schedule>  Dakins Solution - 1/4 Strength 1 Application(s) Topical two times a day  enoxaparin Injectable 40 milliGRAM(s) SubCutaneous every 24 hours  ipratropium    for Nebulization 500 MICROGram(s) Nebulizer every 6 hours  levalbuterol Inhalation 0.63 milliGRAM(s) Inhalation every 6 hours  OLANZapine 5 milliGRAM(s) Oral <User Schedule>  pantoprazole    Tablet 40 milliGRAM(s) Oral before breakfast  QUEtiapine 100 milliGRAM(s) Oral <User Schedule>  senna Syrup 10 milliLiter(s) Oral at bedtime  sodium chloride 3%  Inhalation 4 milliLiter(s) Inhalation every 6 hours  sucralfate 1 Gram(s) Oral four times a day    MEDICATIONS  (PRN):  acetaminophen   Oral Liquid .. 750 milliGRAM(s) Oral every 6 hours PRN Temp greater or equal to 38C (100.4F), Mild Pain (1 - 3)      Allergies    No Known Allergies    Intolerances    Haldol (Other)  benzodiazepines (Other)        PHYSICAL EXAM  Vital Signs Last 24 Hrs  T(C): 36.3 (07 Dec 2023 08:33), Max: 36.4 (06 Dec 2023 16:46)  T(F): 97.3 (07 Dec 2023 08:33), Max: 97.5 (06 Dec 2023 16:46)  HR: 102 (07 Dec 2023 08:33) (85 - 116)  BP: 95/64 (07 Dec 2023 08:33) (95/64 - 102/63)  BP(mean): --  RR: 20 (07 Dec 2023 08:33) (17 - 20)  SpO2: 92% (07 Dec 2023 08:33) (92% - 99%)    Parameters below as of 07 Dec 2023 08:33  Patient On (Oxygen Delivery Method): nasal cannula, high flow  O2 Flow (L/min): 50  O2 Concentration (%): 40          GENERAL:  Appears stated age, well-groomed, well-nourished, no distress  HEENT:  NC/AT,  conjunctivae clear and pink, no thyromegaly, nodules, adenopathy, no JVD, sclera -anicteric  CHEST:  Full & symmetric excursion, no increased effort, breath sounds clear  HEART:  Regular rhythm, S1, S2, no murmur/rub/S3/S4, no abdominal bruit, no edema  ABDOMEN:  Soft, non-tender, non-distended, normoactive bowel sounds,  no masses ,no hepato-splenomegaly, no signs of chronic liver disease  EXTEREMITIES:  no cyanosis,clubbing or edema  SKIN:  No rash/erythema/ecchymoses/petechiae/wounds/abscess/warm/dry  NEURO:  Alert, oriented, no asterixis, no tremor, no encephalopathy      LABS:

## 2023-12-07 NOTE — PROGRESS NOTE ADULT - ASSESSMENT
79 yo male h/o dementia, non verbal. contracted, s/p peg. here with sepsis likely 2/2 sacral decub and aspiration pna    sepsis  respiratory failure   sacral decub  pna - possible aspiration    abx as per id - now complete  f/u cult  wound care f/u  id consult noted  pulm consulted  cont supp o2 via high flow. taper down as able   CTA chest noted  pulm f/u   bipap as able  abx changed to meropenem. id f/u apprec - now complete  s/p RRT for hypoxia. n     dementia  cont home meds  supportive care    dysphagia s/p peg  feeds now on hold  s/p peg change  by gi bedside   free water via peg     hyperNa  free water via peg ordered   no further labs as per family       dvt ppx      d/w family bedside  DNR  comfort care  palliative f/u  family wants to take pt home  will attempt to wean off  high flow. can go home with supp o2    Advanced care planning was discussed with patient and family.  Advanced care planning forms were reviewed and discussed as appropriate.  Differential diagnosis and plan of care discussed with patient after the evaluation.   Pain assessed and judicious use of narcotics when appropriate was discussed.  Importance of Fall prevention discussed.  Counseling on Smoking and Alcohol cessation was offered when appropriate.  Counseling on Diet, exercise, and medication compliance was done.       Approx 75 minutes spent. 77 yo male h/o dementia, non verbal. contracted, s/p peg. here with sepsis likely 2/2 sacral decub and aspiration pna    sepsis  respiratory failure   sacral decub  pna - possible aspiration    abx as per id - now complete  f/u cult  wound care f/u  id consult noted  pulm consulted  cont supp o2 via high flow. taper down as able   CTA chest noted  pulm f/u   bipap as able  abx changed to meropenem. id f/u apprec - now complete  s/p RRT for hypoxia. n     dementia  cont home meds  supportive care    dysphagia s/p peg  feeds now on hold  s/p peg change  by gi bedside   free water via peg     hyperNa  free water via peg ordered   no further labs as per family       dvt ppx      d/w family bedside  DNR  comfort care  palliative f/u  family wants to take pt home  will attempt to wean off  high flow. can go home with supp o2    Advanced care planning was discussed with patient and family.  Advanced care planning forms were reviewed and discussed as appropriate.  Differential diagnosis and plan of care discussed with patient after the evaluation.   Pain assessed and judicious use of narcotics when appropriate was discussed.  Importance of Fall prevention discussed.  Counseling on Smoking and Alcohol cessation was offered when appropriate.  Counseling on Diet, exercise, and medication compliance was done.       Approx 75 minutes spent.

## 2023-12-07 NOTE — PROGRESS NOTE ADULT - SUBJECTIVE AND OBJECTIVE BOX
Date of Service: 12-07-23 @ 15:44    Patient is a 78y old  Male who presents with a chief complaint of Osteomyelitis     (20 Nov 2023 11:10)      Any change in ROS: seems to be doing ok : no sob:  no cough : no phlegm  : wife at bedside:  on 40% high flow     MEDICATIONS  (STANDING):  chlorhexidine 2% Cloths 1 Application(s) Topical <User Schedule>  dextrose 5% + sodium chloride 0.9%. 1000 milliLiter(s) (50 mL/Hr) IV Continuous <Continuous>  ipratropium    for Nebulization 500 MICROGram(s) Nebulizer every 6 hours  levalbuterol Inhalation 0.63 milliGRAM(s) Inhalation every 6 hours  OLANZapine 5 milliGRAM(s) Oral at bedtime  QUEtiapine 100 milliGRAM(s) Oral <User Schedule>    MEDICATIONS  (PRN):  acetaminophen   Oral Liquid .. 750 milliGRAM(s) Oral every 6 hours PRN Temp greater or equal to 38C (100.4F), Mild Pain (1 - 3)  glycopyrrolate Injectable 0.4 milliGRAM(s) IV Push every 6 hours PRN secretions  morphine  - Injectable 2 milliGRAM(s) IV Push every 1 hour PRN Severe Pain (7 - 10)  morphine  - Injectable 1 milliGRAM(s) IV Push every 1 hour PRN dsypnea, RR>22  morphine  - Injectable 1 milliGRAM(s) IV Push every 1 hour PRN Moderate Pain (4 - 6)    Vital Signs Last 24 Hrs  T(C): 36.3 (07 Dec 2023 08:33), Max: 36.4 (06 Dec 2023 16:46)  T(F): 97.3 (07 Dec 2023 08:33), Max: 97.5 (06 Dec 2023 16:46)  HR: 84 (07 Dec 2023 13:05) (80 - 116)  BP: 95/64 (07 Dec 2023 08:33) (95/64 - 102/63)  BP(mean): --  RR: 20 (07 Dec 2023 13:05) (17 - 20)  SpO2: 96% (07 Dec 2023 13:05) (92% - 99%)    Parameters below as of 07 Dec 2023 13:05  Patient On (Oxygen Delivery Method): nasal cannula, high flow  O2 Flow (L/min): 50  O2 Concentration (%): 40    I&O's Summary    06 Dec 2023 07:01  -  07 Dec 2023 07:00  --------------------------------------------------------  IN: 1000 mL / OUT: 300 mL / NET: 700 mL          Physical Exam:   GENERAL: NAD, well-groomed, well-developed  HEENT: CANDY/   Atraumatic, Normocephalic  ENMT: No tonsillar erythema, exudates, or enlargement; Moist mucous membranes, Good dentition, No lesions  NECK: Supple, No JVD, Normal thyroid  CHEST/LUNG: Clear to auscultaion- no wheezing  CVS: Regular rate and rhythm; No murmurs, rubs, or gallops  GI: : Soft, Nontender, Nondistended; Bowel sounds present  NERVOUS SYSTEM:  awake:  unresponsive  EXTREMITIES:  contracted ext  LYMPH: No lymphadenopathy noted  SKIN: No rashes or lesions  ENDOCRINOLOGY: No Thyromegaly  PSYCH: calm     Labs:  31                            10.6   8.39  )-----------( 316      ( 04 Dec 2023 10:23 )             33.9     12-04    141  |  104  |  21  ----------------------------<  203<H>  3.9   |  28  |  0.36<L>      TPro  6.7  /  Alb  2.2<L>  /  TBili  0.3  /  DBili  x   /  AST  18  /  ALT  15  /  AlkPhos  152<H>  12-04    CAPILLARY BLOOD GLUCOSE                rad< from: Xray Chest 1 View- PORTABLE-Routine (Xray Chest 1 View- PORTABLE-Routine .) (12.01.23 @ 14:13) >    CLINICAL INDICATION: Assess left lung atelectasis.    TECHNIQUE: AP radiograph of the chest.    FINDINGS:  Heart is similar in size.  Right lung remains clear.  Partial reaeration of the upper and mid left lung.  Partly loculated lower left pleural effusion is present. Mid and lower   left lung haziness may be due to underlying infection in the right   clinical setting. No pneumothorax.    IMPRESSION:  Partial reaeration of the left lung.  Small loculated left pleural effusion.  Associated mid and lower left lung atelectasis.    --- End of Report ---            MARLYN AMARO MD; Attending Radiologist  This document has been electronically signed. Dec  2 2023 10:22AM    < end of copied text >        RECENT CULTURES:        RESPIRATORY CULTURES:          Studies  Chest X-RAY  CT SCAN Chest   Venous Dopplers: LE:   CT Abdomen  Others

## 2023-12-08 PROCEDURE — 97605 NEG PRS WND THER DME<=50SQCM: CPT

## 2023-12-08 PROCEDURE — 99232 SBSQ HOSP IP/OBS MODERATE 35: CPT | Mod: 25

## 2023-12-08 RX ORDER — NYSTATIN CREAM 100000 [USP'U]/G
1 CREAM TOPICAL ONCE
Refills: 0 | Status: DISCONTINUED | OUTPATIENT
Start: 2023-12-08 | End: 2023-12-08

## 2023-12-08 RX ORDER — NYSTATIN CREAM 100000 [USP'U]/G
1 CREAM TOPICAL
Refills: 0 | Status: DISCONTINUED | OUTPATIENT
Start: 2023-12-08 | End: 2023-12-14

## 2023-12-08 RX ADMIN — SODIUM CHLORIDE 50 MILLILITER(S): 9 INJECTION, SOLUTION INTRAVENOUS at 07:03

## 2023-12-08 RX ADMIN — NYSTATIN CREAM 1 APPLICATION(S): 100000 CREAM TOPICAL at 18:14

## 2023-12-08 RX ADMIN — Medication 750 MILLIGRAM(S): at 06:37

## 2023-12-08 RX ADMIN — LEVALBUTEROL 0.63 MILLIGRAM(S): 1.25 SOLUTION, CONCENTRATE RESPIRATORY (INHALATION) at 18:39

## 2023-12-08 RX ADMIN — Medication 500 MICROGRAM(S): at 23:57

## 2023-12-08 RX ADMIN — Medication 750 MILLIGRAM(S): at 20:43

## 2023-12-08 RX ADMIN — Medication 500 MICROGRAM(S): at 11:34

## 2023-12-08 RX ADMIN — LEVALBUTEROL 0.63 MILLIGRAM(S): 1.25 SOLUTION, CONCENTRATE RESPIRATORY (INHALATION) at 04:27

## 2023-12-08 RX ADMIN — Medication 500 MICROGRAM(S): at 18:38

## 2023-12-08 RX ADMIN — QUETIAPINE FUMARATE 100 MILLIGRAM(S): 200 TABLET, FILM COATED ORAL at 20:44

## 2023-12-08 RX ADMIN — CHLORHEXIDINE GLUCONATE 1 APPLICATION(S): 213 SOLUTION TOPICAL at 04:47

## 2023-12-08 RX ADMIN — Medication 750 MILLIGRAM(S): at 00:15

## 2023-12-08 RX ADMIN — Medication 750 MILLIGRAM(S): at 21:43

## 2023-12-08 RX ADMIN — Medication 750 MILLIGRAM(S): at 12:01

## 2023-12-08 RX ADMIN — Medication 500 MICROGRAM(S): at 04:32

## 2023-12-08 RX ADMIN — LEVALBUTEROL 0.63 MILLIGRAM(S): 1.25 SOLUTION, CONCENTRATE RESPIRATORY (INHALATION) at 11:34

## 2023-12-08 RX ADMIN — LEVALBUTEROL 0.63 MILLIGRAM(S): 1.25 SOLUTION, CONCENTRATE RESPIRATORY (INHALATION) at 23:57

## 2023-12-08 NOTE — PROGRESS NOTE ADULT - PROBLEM SELECTOR PLAN 1
S/p RRT 11/20 for hypoxia requiring HFNC  -Increase in O2 requirements 11/21 from HFNC 50L/80% to 100% Bipap. Weaned from Bipap to HFNC 50L/100%  -Suspect aspiration event, family at bedside reports multiple aspiration events in the past  -CT A/P with LLL consolidation, SHEILA tree in bud opacities likely PNA. Continue ABX per ID  -ABG with no CO2 retention  -CXR 11/25 with LLL infiltrate  -CTA chest 11/27 negative for PE. Notes extensive L atelectasis/mucus plugging with small L effusion  -Started on Bipap 12/8 to assist with atelectasis but pt has been unable to tolerate. Unable to tolerate chest vest as well per RT   -Discussed plan of care at length with patient's wife Brenda and daughter Ling ([phone]). They expressed that at this point they are not ready to transition to comfort measures or palliative care. While they still want to treat, they would like to focus on interventions that do not cause the patient any more pain. They experienced patient's visible discomfort with Bipap as well as chest vest, and would like to discontinue these therapies at this time and focus on manual chest PT, O2 via HFNC, and nebulizers.    -Bipap and chest vest d/c'd at family's request. CXR this AM actually with improvement in L lung aeration.   -Continue Atrovent/Xopenex nebs q6h, 3% sodium chloride nebs q6h  -Suction PRN  -Chest PT  -Position patient with L lung up   -O2 requirements slowly improving - continue HFNC (currently 50L/50%). Wean O2 as tolerated. Keep sats >90%.    -Palliative care f/u. Overall prognosis guarded.  -on 50% high flow today   sao2 100%:  can try to wean down oxygen  -now on 100% ; doing very poorly:  now for palliative care:  family agreed:  -doing very poorly today  12/7:  seems OK: no cough : on 40% high flow: try to change to nasal cannula-12/8: on 40% high flow:  can change to nasal cannula S/p RRT 11/20 for hypoxia requiring HFNC  -Increase in O2 requirements 11/21 from HFNC 50L/80% to 100% Bipap. Weaned from Bipap to HFNC 50L/100%  -Suspect aspiration event, family at bedside reports multiple aspiration events in the past  -CT A/P with LLL consolidation, SHEILA tree in bud opacities likely PNA. Continue ABX per ID  -ABG with no CO2 retention  -CXR 11/25 with LLL infiltrate  -CTA chest 11/27 negative for PE. Notes extensive L atelectasis/mucus plugging with small L effusion  -Started on Bipap 12/8 to assist with atelectasis but pt has been unable to tolerate. Unable to tolerate chest vest as well per RT   -Discussed plan of care at length with patient's wife Brenda and daughter Ling ([phone]). They expressed that at this point they are not ready to transition to comfort measures or palliative care. While they still want to treat, they would like to focus on interventions that do not cause the patient any more pain. They experienced patient's visible discomfort with Bipap as well as chest vest, and would like to discontinue these therapies at this time and focus on manual chest PT, O2 via HFNC, and nebulizers.    -Bipap and chest vest d/c'd at family's request. CXR this AM actually with improvement in L lung aeration.   -Continue Atrovent/Xopenex nebs q6h, 3% sodium chloride nebs q6h  -Suction PRN  -Chest PT  -Position patient with L lung up   -O2 requirements slowly improving - continue HFNC (currently 50L/50%). Wean O2 as tolerated. Keep sats >90%.    -Palliative care f/u. Overall prognosis guarded.  -on 50% high flow today   sao2 100%:  can try to wean down oxygen  -now on 100% ; doing very poorly:  now for palliative care:  family agreed:  -doing very poorly today  12/7:  seems OK: no cough : on 40% high flow: try to change to nasal cannula-12/8: on 40% high flow:  can change to nasal cannula

## 2023-12-08 NOTE — PROGRESS NOTE ADULT - ASSESSMENT
79 y/o M with PMH of dementia (primarily bedbound - has been nonverbal for the past year per wife at bedside), dysphagia s/p PEG, sacral ulcer. Presents with fevers, recommended by GI Dr. Moraes to come to ER as sacral decubiti reportedly getting worse. Afebrile on triage with O2 sats 96% on room air. Labs notable for leukocytosis. RVP/COVID negative. CT A/P with extensive decubitus ulcer involving the right posterior gluteal and pelvic region with extension to bone, bony erosion of the ischium compatible with osteomyelitis. Also with LLL consolidation. Course c/b RRT 11/20 for hypoxia, now requiring HFNC. Pulmonary called to consult for hypoxia.  77 y/o M with PMH of dementia (primarily bedbound - has been nonverbal for the past year per wife at bedside), dysphagia s/p PEG, sacral ulcer. Presents with fevers, recommended by GI Dr. Moraes to come to ER as sacral decubiti reportedly getting worse. Afebrile on triage with O2 sats 96% on room air. Labs notable for leukocytosis. RVP/COVID negative. CT A/P with extensive decubitus ulcer involving the right posterior gluteal and pelvic region with extension to bone, bony erosion of the ischium compatible with osteomyelitis. Also with LLL consolidation. Course c/b RRT 11/20 for hypoxia, now requiring HFNC. Pulmonary called to consult for hypoxia.

## 2023-12-08 NOTE — PROGRESS NOTE ADULT - SUBJECTIVE AND OBJECTIVE BOX
[facility]-- WOUND TEAM -- FOLLOW UP NOTE  --------------------------------------------------------------------------------    24 hour events/subjective:    afebrile  tolerating dressing changes  no odor or excess drainage  wife at bedside- all questions answered to  her      expressed understanding and satisfaction  ongoing [facility]    Diet:      ROS: pt unable to offer    ALLERGIES & MEDICATIONS  --------------------------------------------------------------------------------    No Known Allergies    Intolerances  Haldol (Other)  benzodiazepines (Other)        STANDING INPATIENT MEDICATIONS  chlorhexidine 2% Cloths 1 Application(s) Topical <User Schedule>  dextrose 5% + sodium chloride 0.9%. 1000 milliLiter(s) IV Continuous <Continuous>  ipratropium    for Nebulization 500 MICROGram(s) Nebulizer every 6 hours  levalbuterol Inhalation 0.63 milliGRAM(s) Inhalation every 6 hours  nystatin Powder 1 Application(s) Topical once  OLANZapine 5 milliGRAM(s) Oral at bedtime  QUEtiapine 100 milliGRAM(s) Oral <User Schedule>      PRN INPATIENT MEDICATION  acetaminophen Oral Liquid 750 milliGRAM(s) Oral every 6 hours PRN  glycopyrrolate Injectable 0.4 milliGRAM(s) IV Push every 6 hours PRN  morphine  - Injectable 2 milliGRAM(s) IV Push every 1 hour PRN  morphine  - Injectable 1 milliGRAM(s) IV Push every 1 hour PRN  morphine  - Injectable 1 milliGRAM(s) IV Push every 1 hour PRN        VITALS/PHYSICAL EXAM  --------------------------------------------------------------------------------  T(C): 37.1 (12-08-23 @ 08:05), Max: 37.9 (12-08-23 @ 06:30)  HR: 122 (12-08-23 @ 08:05) (84 - 122)  BP: 96/60 (12-08-23 @ 08:05) (96/60 - 133/71)  RR: 18 (12-08-23 @ 08:05) (18 - 24)  SpO2: 99% (12-08-23 @ 08:05) (92% - 99%)  Wt(kg): --        NAD, ALert, cachectic, frail, HiFlo O2  Envella bed  HEENT:  NC/AT, EOMI, Sclera clear, mucosa moist, throat clear  Respiratory:  nonlabored w/ equal chest rise  Gastrointestinal:  (+)PEG, resolved dermatitis around PEG  : (+) condom cath  Neurology:  nonverbal, unable to follow commands  Psych: calm  Musculoskeletal:  limited stiff / contracted  Vascular: BLE equally warm/  no cyanosis, clubbing, edema nor acute ischemia                BLE DP pulses palpable  Skin:  thin, dry, pale, frail  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  Lt hip resolved blanchable erythema  sacral region DTI       4cm x 3cm         denuded w/ maroon skin w/o drainage   Left ischium DTI fading       2x2cm        purple/maroon coloration of skin w/o drainage   no induration, no fluctuance, no crepitus, odor, blistering, warmth, induration, fluctuance, nor crepitus.             [facility]-- WOUND TEAM -- FOLLOW UP NOTE  --------------------------------------------------------------------------------    24 hour events/subjective:    afebrile  tolerating dressing changes  no odor or excess drainage  wife at bedside- all questions answered to  her      expressed understanding and satisfaction  ongoing [facility]    Diet:      ROS: pt unable to offer    ALLERGIES & MEDICATIONS  --------------------------------------------------------------------------------    No Known Allergies    Intolerances  Haldol (Other)  benzodiazepines (Other)        STANDING INPATIENT MEDICATIONS  chlorhexidine 2% Cloths 1 Application(s) Topical <User Schedule>  dextrose 5% + sodium chloride 0.9%. 1000 milliLiter(s) IV Continuous <Continuous>  ipratropium    for Nebulization 500 MICROGram(s) Nebulizer every 6 hours  levalbuterol Inhalation 0.63 milliGRAM(s) Inhalation every 6 hours  nystatin Powder 1 Application(s) Topical once  OLANZapine 5 milliGRAM(s) Oral at bedtime  QUEtiapine 100 milliGRAM(s) Oral <User Schedule>      PRN INPATIENT MEDICATION  acetaminophen Oral Liquid 750 milliGRAM(s) Oral every 6 hours PRN  glycopyrrolate Injectable 0.4 milliGRAM(s) IV Push every 6 hours PRN  morphine  - Injectable 2 milliGRAM(s) IV Push every 1 hour PRN  morphine  - Injectable 1 milliGRAM(s) IV Push every 1 hour PRN  morphine  - Injectable 1 milliGRAM(s) IV Push every 1 hour PRN        VITALS/PHYSICAL EXAM  --------------------------------------------------------------------------------  T(C): 37.1 (12-08-23 @ 08:05), Max: 37.9 (12-08-23 @ 06:30)  HR: 122 (12-08-23 @ 08:05) (84 - 122)  BP: 96/60 (12-08-23 @ 08:05) (96/60 - 133/71)  RR: 18 (12-08-23 @ 08:05) (18 - 24)  SpO2: 99% (12-08-23 @ 08:05) (92% - 99%)  Wt(kg): --        NAD, ALert, cachectic, frail, HiFlo O2  Envella bed  HEENT:  NC/AT, EOMI, Sclera clear, mucosa moist, throat clear  Respiratory:  nonlabored w/ equal chest rise  Gastrointestinal:  (+)PEG, resolved dermatitis around PEG  : (+) condom cath  Neurology:  nonverbal, unable to follow commands  Psych: calm  Musculoskeletal:  limited stiff / contracted  Vascular: BLE equally warm/  no cyanosis, clubbing, edema nor acute ischemia                BLE DP pulses palpable  Skin:  thin, dry, pale, frail  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  Lt hip resolved blanchable erythema  sacral region DTI       4cm x 3cm         denuded w/ maroon skin w/o drainage   Left ischium DTI fading       2x2cm        purple/maroon coloration of skin w/o drainage   no induration, no fluctuance, no crepitus, odor, blistering, warmth, induration, fluctuance, nor crepitus.

## 2023-12-08 NOTE — PROGRESS NOTE ADULT - SUBJECTIVE AND OBJECTIVE BOX
INTERVAL HPI/OVERNIGH  seen and examined , no new events  tolerating TF No N/V/D  febrile and tachycardic           MEDICATIONS  (STANDING):  chlorhexidine 2% Cloths 1 Application(s) Topical <User Schedule>  Dakins Solution - 1/4 Strength 1 Application(s) Topical two times a day  enoxaparin Injectable 40 milliGRAM(s) SubCutaneous every 24 hours  ipratropium    for Nebulization 500 MICROGram(s) Nebulizer every 6 hours  levalbuterol Inhalation 0.63 milliGRAM(s) Inhalation every 6 hours  OLANZapine 5 milliGRAM(s) Oral <User Schedule>  pantoprazole    Tablet 40 milliGRAM(s) Oral before breakfast  QUEtiapine 100 milliGRAM(s) Oral <User Schedule>  senna Syrup 10 milliLiter(s) Oral at bedtime  sodium chloride 3%  Inhalation 4 milliLiter(s) Inhalation every 6 hours  sucralfate 1 Gram(s) Oral four times a day    MEDICATIONS  (PRN):  acetaminophen   Oral Liquid .. 750 milliGRAM(s) Oral every 6 hours PRN Temp greater or equal to 38C (100.4F), Mild Pain (1 - 3)      Allergies    No Known Allergies    Intolerances    Haldol (Other)  benzodiazepines (Other)        PHYSICAL EXAM  Vital Signs Last 24 Hrs  T(C): 37.1 (08 Dec 2023 08:05), Max: 37.9 (08 Dec 2023 06:30)  T(F): 98.7 (08 Dec 2023 08:05), Max: 100.3 (08 Dec 2023 06:30)  HR: 122 (08 Dec 2023 08:05) (84 - 122)  BP: 96/60 (08 Dec 2023 08:05) (96/60 - 133/71)  BP(mean): --  RR: 18 (08 Dec 2023 08:05) (18 - 24)  SpO2: 99% (08 Dec 2023 08:05) (92% - 99%)    Parameters below as of 08 Dec 2023 08:05  Patient On (Oxygen Delivery Method): nasal cannula, high flow  O2 Flow (L/min): 50  O2 Concentration (%): 60        nad    confused  frail  non toxic  soft, nt, +PEG  no edema      LABS:

## 2023-12-08 NOTE — PROGRESS NOTE ADULT - SUBJECTIVE AND OBJECTIVE BOX
SHARONA LARKIN  78y Male  MRN:[id number]    Patient is a 78y old  Male who presents with a chief complaint of sepsis      HPI:  79 yo M PMHx of severe dementia, PEG tube presents with ulcer that has worsened over last month. Patient was seen by house calls doctor 2 weeks ago, placed on antibiotics but started mounting fevers in the last few days. It was noticed by his GI doctor (Dr. Moraes) that his ulcer was getting worse, so he sent him in. Patient is nonverbal at baseline. Wife has noticed no new cough, runny nose. (19 Nov 2023 13:02)      Patient seen and evaluated at bedside. No acute events overnight except as noted.    Interval HPI: no acute events o/n       PAST MEDICAL & SURGICAL HISTORY:  Dementia      Pneumonia      Acute renal failure (ARF)  ARF 1.5 YEAR AGO      Dysphagia  peg      Arthritis      GERD (gastroesophageal reflux disease)      Dyspepsia      History of hand surgery      PEG adjustment, replacement, or removal      Status post insertion of percutaneous endoscopic gastrostomy (PEG) tube  PEG replacement on 02/29/16          REVIEW OF SYSTEMS:  as per hpi     VITALS:   Vital Signs Last 24 Hrs  T(C): 37.1 (08 Dec 2023 08:05), Max: 37.9 (08 Dec 2023 06:30)  T(F): 98.7 (08 Dec 2023 08:05), Max: 100.3 (08 Dec 2023 06:30)  HR: 122 (08 Dec 2023 08:05) (94 - 122)  BP: 96/60 (08 Dec 2023 08:05) (96/60 - 133/71)  BP(mean): --  RR: 18 (08 Dec 2023 08:05) (18 - 24)  SpO2: 99% (08 Dec 2023 08:05) (92% - 99%)    Parameters below as of 08 Dec 2023 08:05  Patient On (Oxygen Delivery Method): nasal cannula, high flow  O2 Flow (L/min): 50  O2 Concentration (%): 60    PHYSICAL EXAM:  GENERAL: NAD,  comfortable   HEAD:  Atraumatic, Normocephalic  EYES: EOMI, PERRLA, conjunctiva and sclera clear  NECK: Supple, No JVD  CHEST/LUNG: Clear to auscultation bilaterally; No wheeze  HEART: S1, S2; No murmurs, rubs, or gallops  ABDOMEN: Soft, Nontender, Nondistended; Bowel sounds present +peg  EXTREMITIES:  2+ Peripheral Pulses, contracted   PSYCH: Normal affect  NEUROLOGY: non verbal  SKIN: +sacral decub    Consultant(s) Notes Reviewed:  [x ] YES  [ ] NO  Care Discussed with Consultants/Other Providers [ x] YES  [ ] NO    MEDS:   MEDICATIONS  (STANDING):  chlorhexidine 2% Cloths 1 Application(s) Topical <User Schedule>  dextrose 5% + sodium chloride 0.9%. 1000 milliLiter(s) (50 mL/Hr) IV Continuous <Continuous>  ipratropium    for Nebulization 500 MICROGram(s) Nebulizer every 6 hours  levalbuterol Inhalation 0.63 milliGRAM(s) Inhalation every 6 hours  nystatin Powder 1 Application(s) Topical two times a day  OLANZapine 5 milliGRAM(s) Oral at bedtime  QUEtiapine 100 milliGRAM(s) Oral <User Schedule>    MEDICATIONS  (PRN):  acetaminophen   Oral Liquid .. 750 milliGRAM(s) Oral every 6 hours PRN Temp greater or equal to 38C (100.4F), Mild Pain (1 - 3)  glycopyrrolate Injectable 0.4 milliGRAM(s) IV Push every 6 hours PRN secretions  morphine  - Injectable 1 milliGRAM(s) IV Push every 1 hour PRN Moderate Pain (4 - 6)  morphine  - Injectable 2 milliGRAM(s) IV Push every 1 hour PRN Severe Pain (7 - 10)  morphine  - Injectable 1 milliGRAM(s) IV Push every 1 hour PRN dsypnea, RR>22        ALLERGIES:  Haldol (Other)  No Known Allergies  benzodiazepines (Other)      LABS:              no new labs    < from: CT Angio Chest PE Protocol w/ IV Cont (11.27.23 @ 22:44) >  IMPRESSION:.    No pulmonary embolism detected.    Occlusive secretions within the left mainstem bronchus with associated   complete left lung collapse; consider superimposed infection in the   appropriate clinical scenario.    Small leftpleural effusion.    --- End of Report ---      < end of copied text >       < from: CT Abdomen and Pelvis w/ IV Cont (11.18.23 @ 19:41) >    IMPRESSION:    Extensive decubitus ulcer involving the right posterior gluteal and   pelvic region with extension to bone. There is bony erosion of the   ischium compatible with osteomyelitis.    Mild bladder wall thickening and a few foci of intraluminal air,   correlate for recent instrumentation and/or infection.    Left lower lobe consolidation along with tree-in-bud/nodular opacities in   the left upper lobe concerning for pneumonia.    --- End of Report ---        < end of copied text >   SHARONA LARKIN  78y Male  MRN:[id number]    Patient is a 78y old  Male who presents with a chief complaint of sepsis      HPI:  77 yo M PMHx of severe dementia, PEG tube presents with ulcer that has worsened over last month. Patient was seen by house calls doctor 2 weeks ago, placed on antibiotics but started mounting fevers in the last few days. It was noticed by his GI doctor (Dr. Moraes) that his ulcer was getting worse, so he sent him in. Patient is nonverbal at baseline. Wife has noticed no new cough, runny nose. (19 Nov 2023 13:02)      Patient seen and evaluated at bedside. No acute events overnight except as noted.    Interval HPI: no acute events o/n       PAST MEDICAL & SURGICAL HISTORY:  Dementia      Pneumonia      Acute renal failure (ARF)  ARF 1.5 YEAR AGO      Dysphagia  peg      Arthritis      GERD (gastroesophageal reflux disease)      Dyspepsia      History of hand surgery      PEG adjustment, replacement, or removal      Status post insertion of percutaneous endoscopic gastrostomy (PEG) tube  PEG replacement on 02/29/16          REVIEW OF SYSTEMS:  as per hpi     VITALS:   Vital Signs Last 24 Hrs  T(C): 37.1 (08 Dec 2023 08:05), Max: 37.9 (08 Dec 2023 06:30)  T(F): 98.7 (08 Dec 2023 08:05), Max: 100.3 (08 Dec 2023 06:30)  HR: 122 (08 Dec 2023 08:05) (94 - 122)  BP: 96/60 (08 Dec 2023 08:05) (96/60 - 133/71)  BP(mean): --  RR: 18 (08 Dec 2023 08:05) (18 - 24)  SpO2: 99% (08 Dec 2023 08:05) (92% - 99%)    Parameters below as of 08 Dec 2023 08:05  Patient On (Oxygen Delivery Method): nasal cannula, high flow  O2 Flow (L/min): 50  O2 Concentration (%): 60    PHYSICAL EXAM:  GENERAL: NAD,  comfortable   HEAD:  Atraumatic, Normocephalic  EYES: EOMI, PERRLA, conjunctiva and sclera clear  NECK: Supple, No JVD  CHEST/LUNG: Clear to auscultation bilaterally; No wheeze  HEART: S1, S2; No murmurs, rubs, or gallops  ABDOMEN: Soft, Nontender, Nondistended; Bowel sounds present +peg  EXTREMITIES:  2+ Peripheral Pulses, contracted   PSYCH: Normal affect  NEUROLOGY: non verbal  SKIN: +sacral decub    Consultant(s) Notes Reviewed:  [x ] YES  [ ] NO  Care Discussed with Consultants/Other Providers [ x] YES  [ ] NO    MEDS:   MEDICATIONS  (STANDING):  chlorhexidine 2% Cloths 1 Application(s) Topical <User Schedule>  dextrose 5% + sodium chloride 0.9%. 1000 milliLiter(s) (50 mL/Hr) IV Continuous <Continuous>  ipratropium    for Nebulization 500 MICROGram(s) Nebulizer every 6 hours  levalbuterol Inhalation 0.63 milliGRAM(s) Inhalation every 6 hours  nystatin Powder 1 Application(s) Topical two times a day  OLANZapine 5 milliGRAM(s) Oral at bedtime  QUEtiapine 100 milliGRAM(s) Oral <User Schedule>    MEDICATIONS  (PRN):  acetaminophen   Oral Liquid .. 750 milliGRAM(s) Oral every 6 hours PRN Temp greater or equal to 38C (100.4F), Mild Pain (1 - 3)  glycopyrrolate Injectable 0.4 milliGRAM(s) IV Push every 6 hours PRN secretions  morphine  - Injectable 1 milliGRAM(s) IV Push every 1 hour PRN Moderate Pain (4 - 6)  morphine  - Injectable 2 milliGRAM(s) IV Push every 1 hour PRN Severe Pain (7 - 10)  morphine  - Injectable 1 milliGRAM(s) IV Push every 1 hour PRN dsypnea, RR>22        ALLERGIES:  Haldol (Other)  No Known Allergies  benzodiazepines (Other)      LABS:              no new labs    < from: CT Angio Chest PE Protocol w/ IV Cont (11.27.23 @ 22:44) >  IMPRESSION:.    No pulmonary embolism detected.    Occlusive secretions within the left mainstem bronchus with associated   complete left lung collapse; consider superimposed infection in the   appropriate clinical scenario.    Small leftpleural effusion.    --- End of Report ---      < end of copied text >       < from: CT Abdomen and Pelvis w/ IV Cont (11.18.23 @ 19:41) >    IMPRESSION:    Extensive decubitus ulcer involving the right posterior gluteal and   pelvic region with extension to bone. There is bony erosion of the   ischium compatible with osteomyelitis.    Mild bladder wall thickening and a few foci of intraluminal air,   correlate for recent instrumentation and/or infection.    Left lower lobe consolidation along with tree-in-bud/nodular opacities in   the left upper lobe concerning for pneumonia.    --- End of Report ---        < end of copied text >

## 2023-12-08 NOTE — PROGRESS NOTE ADULT - ASSESSMENT
A/P:79 yo M PMHx of severe dementia, PEG tube presents with ulcer that has worsened over last month. Patient was seen by house calls doctor 2 weeks ago, placed on antibiotics but started mounting fevers in the last few days. It was noticed by his GI doctor (Dr. Moraes) that his ulcer was getting worse.    Wound Consult requested to assist w/ management of  - Stage 4 pressure injury right ischium  - Deep tissue pressure injury sacral region  - Deep tissue pressure injury of left ischium  - Fungal Dermatitis      Recommendations:   -Stage 4 pressure injury      continue w/ VAC as per protocol w/ Wound PT      Fungal dermatitis- Crusting w/ Nystatin BID          will place VAC drape elsewhere and continue to monitor  -Sacrum/ Lt Ischium- Cavilon QD  -   Continue turning and positioning w/ offloading assistive devices as per protocol  -   Continue w/ attends under pads and Pericare w/ condom cath maintenance as per protocol  Moisturize intact skin w/ SWEEN cream BID  Nutrition Consult for optimization in pt w/ Increased nutritional needs  -   Encourage high quality protein, carline/ MVI & Vit C to promote wound healing  Continue w/ Air fluidized bed   Pt will need Group 2 mattress on hospital bed upon discharge home  Care as per medicine, will follow w/ you  Upon discharge f/u as outpatient at [facility] [address] [facility] [phone]  s/w RN and d/w attng & team   Kristina Conway PA-C, CWS [id number]  Nights/ Weekends/ Holidays please call:  General Surgery Consult pager ([phone]) for emergencies  I spent 35minutes face to face w/ this pt of which more than 50% of the time was spent counseling & coordinating care of this pt.        A/P:79 yo M PMHx of severe dementia, PEG tube presents with ulcer that has worsened over last month. Patient was seen by house calls doctor 2 weeks ago, placed on antibiotics but started mounting fevers in the last few days. It was noticed by his GI doctor (Dr. Moraes) that his ulcer was getting worse.    Wound Consult requested to assist w/ management of  - Stage 4 pressure injury right ischium  - Deep tissue pressure injury sacral region  - Deep tissue pressure injury of left ischium  - Fungal Dermatitis      Recommendations:   -Stage 4 pressure injury      continue w/ VAC as per protocol w/ Wound PT      Fungal dermatitis- Crusting w/ Nystatin BID          will place VAC drape elsewhere and continue to monitor  -Sacrum/ Lt Ischium- Cavilon QD  -   Continue turning and positioning w/ offloading assistive devices as per protocol  -   Continue w/ attends under pads and Pericare w/ condom cath maintenance as per protocol  Moisturize intact skin w/ SWEEN cream BID  Nutrition Consult for optimization in pt w/ Increased nutritional needs  -   Encourage high quality protein, carline/ MVI & Vit C to promote wound healing  Continue w/ Air fluidized bed   Pt will need Group 2 mattress on hospital bed upon discharge home  Care as per medicine, will follow w/ you  Upon discharge f/u as outpatient at Wound [facility] [address] [facility] [phone]  s/w RN and d/w attng & team   Kristina Conway PA-C, CWS [id number]  Nights/ Weekends/ Holidays please call:  General Surgery Consult pager ([phone]) for emergencies  I spent 35minutes face to face w/ this pt of which more than 50% of the time was spent counseling & coordinating care of this pt.

## 2023-12-08 NOTE — PROGRESS NOTE ADULT - PROBLEM SELECTOR PLAN 2
CT A/P with LLL consolidation & SHEILA TIB opacities  -CTA chest with L lung mucus plugging/atelectasis, possible post obstructive PNA   -RVP negative  -MRSA PCR negative  -BC with NGTD  -Urine legionella negative  -ABX changed to Meropenem as pt still spiking temps. Afebrile now.   -ID f/u.  -his last chest xray from 12/1 to me shows significant improvement in left lung aeration:  dw wife  -no new chest xray : GOC has changed  -dw wife she does not want any aggressive interventions at [facility] CT A/P with LLL consolidation & SHEILA TIB opacities  -CTA chest with L lung mucus plugging/atelectasis, possible post obstructive PNA   -RVP negative  -MRSA PCR negative  -BC with NGTD  -Urine legionella negative  -ABX changed to Meropenem as pt still spiking temps. Afebrile now.   -ID f/u.  -his last chest xray from 12/1 to me shows significant improvement in left lung aeration:  dw wife  -no new chest xray : GOC has changed  -dw wife she does not want any aggressive interventions at [facility]

## 2023-12-08 NOTE — PROGRESS NOTE ADULT - SUBJECTIVE AND OBJECTIVE BOX
Date of Service: 12-08-23 @ 17:09    Patient is a 78y old  Male who presents with a chief complaint of Osteomyelitis     (20 Nov 2023 11:10)      Any change in ROS: doing  ok ; no sob:  no cough on 40% high flow : unresponsive  wife at dside     MEDICATIONS  (STANDING):  chlorhexidine 2% Cloths 1 Application(s) Topical <User Schedule>  dextrose 5% + sodium chloride 0.9%. 1000 milliLiter(s) (50 mL/Hr) IV Continuous <Continuous>  ipratropium    for Nebulization 500 MICROGram(s) Nebulizer every 6 hours  levalbuterol Inhalation 0.63 milliGRAM(s) Inhalation every 6 hours  nystatin Powder 1 Application(s) Topical two times a day  OLANZapine 5 milliGRAM(s) Oral at bedtime  QUEtiapine 100 milliGRAM(s) Oral <User Schedule>    MEDICATIONS  (PRN):  acetaminophen   Oral Liquid .. 750 milliGRAM(s) Oral every 6 hours PRN Temp greater or equal to 38C (100.4F), Mild Pain (1 - 3)  glycopyrrolate Injectable 0.4 milliGRAM(s) IV Push every 6 hours PRN secretions  morphine  - Injectable 2 milliGRAM(s) IV Push every 1 hour PRN Severe Pain (7 - 10)  morphine  - Injectable 1 milliGRAM(s) IV Push every 1 hour PRN dsypnea, RR>22  morphine  - Injectable 1 milliGRAM(s) IV Push every 1 hour PRN Moderate Pain (4 - 6)    Vital Signs Last 24 Hrs  T(C): 37.1 (08 Dec 2023 15:57), Max: 37.9 (08 Dec 2023 06:30)  T(F): 98.7 (08 Dec 2023 15:57), Max: 100.3 (08 Dec 2023 06:30)  HR: 86 (08 Dec 2023 15:57) (86 - 122)  BP: 98/64 (08 Dec 2023 15:57) (96/60 - 126/69)  BP(mean): --  RR: 18 (08 Dec 2023 15:57) (18 - 24)  SpO2: 98% (08 Dec 2023 15:57) (92% - 99%)    Parameters below as of 08 Dec 2023 15:57  Patient On (Oxygen Delivery Method): nasal cannula, high flow  O2 Flow (L/min): 50  O2 Concentration (%): 35    I&O's Summary    07 Dec 2023 07:01  -  08 Dec 2023 07:00  --------------------------------------------------------  IN: 0 mL / OUT: 400 mL / NET: -400 mL          Physical Exam:   GENERAL: NAD, well-groomed, well-developed  HEENT: CANDY/   Atraumatic, Normocephalic  ENMT: No tonsillar erythema, exudates, or enlargement; Moist mucous membranes, Good dentition, No lesions  NECK: Supple, No JVD, Normal thyroid  CHEST/LUNG: Clear to auscultaion  CVS: Regular rate and rhythm; No murmurs, rubs, or gallops  GI: : Soft, Nontender, Nondistended; Bowel sounds present  NERVOUS SYSTEM: awake: eyes open : do not respond  EXTREMITIES: contracted ext   LYMPH: No lymphadenopathy noted  SKIN: No rashes or lesions  ENDOCRINOLOGY: No Thyromegaly  PSYCH: deemntia    Labs:  31              CAPILLARY BLOOD GLUCOSE                      RECENT CULTURES:        RESPIRATORY CULTURES:          Studies  Chest X-RAY  CT SCAN Chest   Venous Dopplers: LE:   CT Abdomen  Others

## 2023-12-08 NOTE — PROGRESS NOTE ADULT - ASSESSMENT
77 yo male h/o dementia, non verbal. contracted, s/p peg. here with sepsis likely 2/2 sacral decub and aspiration pna    sepsis  respiratory failure   sacral decub  pna - possible aspiration    abx as per id - now complete  f/u cult  wound care f/u  id consult noted  pulm consulted  cont supp o2 via high flow. taper down as able   CTA chest noted  pulm f/u   bipap as able  abx changed to meropenem. id f/u apprec - now complete  s/p RRT for hypoxia. n     dementia  cont home meds  supportive care    dysphagia s/p peg  feeds now on hold  s/p peg change  by gi bedside   free water via peg     hyperNa  free water via peg ordered   no further labs as per family       dvt ppx      d/w family bedside  DNR  comfort care  palliative f/u  family wants to take pt home  will attempt to wean off  high flow. can go home with supp o2    Advanced care planning was discussed with patient and family.  Advanced care planning forms were reviewed and discussed as appropriate.  Differential diagnosis and plan of care discussed with patient after the evaluation.   Pain assessed and judicious use of narcotics when appropriate was discussed.  Importance of Fall prevention discussed.  Counseling on Smoking and Alcohol cessation was offered when appropriate.  Counseling on Diet, exercise, and medication compliance was done.       Approx 75 minutes spent. 79 yo male h/o dementia, non verbal. contracted, s/p peg. here with sepsis likely 2/2 sacral decub and aspiration pna    sepsis  respiratory failure   sacral decub  pna - possible aspiration    abx as per id - now complete  f/u cult  wound care f/u  id consult noted  pulm consulted  cont supp o2 via high flow. taper down as able   CTA chest noted  pulm f/u   bipap as able  abx changed to meropenem. id f/u apprec - now complete  s/p RRT for hypoxia. n     dementia  cont home meds  supportive care    dysphagia s/p peg  feeds now on hold  s/p peg change  by gi bedside   free water via peg     hyperNa  free water via peg ordered   no further labs as per family       dvt ppx      d/w family bedside  DNR  comfort care  palliative f/u  family wants to take pt home  will attempt to wean off  high flow. can go home with supp o2    Advanced care planning was discussed with patient and family.  Advanced care planning forms were reviewed and discussed as appropriate.  Differential diagnosis and plan of care discussed with patient after the evaluation.   Pain assessed and judicious use of narcotics when appropriate was discussed.  Importance of Fall prevention discussed.  Counseling on Smoking and Alcohol cessation was offered when appropriate.  Counseling on Diet, exercise, and medication compliance was done.       Approx 75 minutes spent.

## 2023-12-09 LAB
ANION GAP SERPL CALC-SCNC: 9 MMOL/L — SIGNIFICANT CHANGE UP (ref 5–17)
ANION GAP SERPL CALC-SCNC: 9 MMOL/L — SIGNIFICANT CHANGE UP (ref 5–17)
BUN SERPL-MCNC: 16 MG/DL — SIGNIFICANT CHANGE UP (ref 7–23)
BUN SERPL-MCNC: 16 MG/DL — SIGNIFICANT CHANGE UP (ref 7–23)
CALCIUM SERPL-MCNC: 8.8 MG/DL — SIGNIFICANT CHANGE UP (ref 8.4–10.5)
CALCIUM SERPL-MCNC: 8.8 MG/DL — SIGNIFICANT CHANGE UP (ref 8.4–10.5)
CHLORIDE SERPL-SCNC: 119 MMOL/L — HIGH (ref 96–108)
CHLORIDE SERPL-SCNC: 119 MMOL/L — HIGH (ref 96–108)
CO2 SERPL-SCNC: 24 MMOL/L — SIGNIFICANT CHANGE UP (ref 22–31)
CO2 SERPL-SCNC: 24 MMOL/L — SIGNIFICANT CHANGE UP (ref 22–31)
CREAT SERPL-MCNC: 0.35 MG/DL — LOW (ref 0.5–1.3)
CREAT SERPL-MCNC: 0.35 MG/DL — LOW (ref 0.5–1.3)
EGFR: 117 ML/MIN/1.73M2 — SIGNIFICANT CHANGE UP
EGFR: 117 ML/MIN/1.73M2 — SIGNIFICANT CHANGE UP
GLUCOSE SERPL-MCNC: 163 MG/DL — HIGH (ref 70–99)
GLUCOSE SERPL-MCNC: 163 MG/DL — HIGH (ref 70–99)
HCT VFR BLD CALC: 34.9 % — LOW (ref 39–50)
HCT VFR BLD CALC: 34.9 % — LOW (ref 39–50)
HGB BLD-MCNC: 10.7 G/DL — LOW (ref 13–17)
HGB BLD-MCNC: 10.7 G/DL — LOW (ref 13–17)
MCHC RBC-ENTMCNC: 30.7 GM/DL — LOW (ref 32–36)
MCHC RBC-ENTMCNC: 30.7 GM/DL — LOW (ref 32–36)
MCHC RBC-ENTMCNC: 30.9 PG — SIGNIFICANT CHANGE UP (ref 27–34)
MCHC RBC-ENTMCNC: 30.9 PG — SIGNIFICANT CHANGE UP (ref 27–34)
MCV RBC AUTO: 100.9 FL — HIGH (ref 80–100)
MCV RBC AUTO: 100.9 FL — HIGH (ref 80–100)
NRBC # BLD: 0 /100 WBCS — SIGNIFICANT CHANGE UP (ref 0–0)
NRBC # BLD: 0 /100 WBCS — SIGNIFICANT CHANGE UP (ref 0–0)
PLATELET # BLD AUTO: 378 K/UL — SIGNIFICANT CHANGE UP (ref 150–400)
PLATELET # BLD AUTO: 378 K/UL — SIGNIFICANT CHANGE UP (ref 150–400)
POTASSIUM SERPL-MCNC: 3.6 MMOL/L — SIGNIFICANT CHANGE UP (ref 3.5–5.3)
POTASSIUM SERPL-MCNC: 3.6 MMOL/L — SIGNIFICANT CHANGE UP (ref 3.5–5.3)
POTASSIUM SERPL-SCNC: 3.6 MMOL/L — SIGNIFICANT CHANGE UP (ref 3.5–5.3)
POTASSIUM SERPL-SCNC: 3.6 MMOL/L — SIGNIFICANT CHANGE UP (ref 3.5–5.3)
RBC # BLD: 3.46 M/UL — LOW (ref 4.2–5.8)
RBC # BLD: 3.46 M/UL — LOW (ref 4.2–5.8)
RBC # FLD: 15.5 % — HIGH (ref 10.3–14.5)
RBC # FLD: 15.5 % — HIGH (ref 10.3–14.5)
SODIUM SERPL-SCNC: 152 MMOL/L — HIGH (ref 135–145)
SODIUM SERPL-SCNC: 152 MMOL/L — HIGH (ref 135–145)
WBC # BLD: 14.41 K/UL — HIGH (ref 3.8–10.5)
WBC # BLD: 14.41 K/UL — HIGH (ref 3.8–10.5)
WBC # FLD AUTO: 14.41 K/UL — HIGH (ref 3.8–10.5)
WBC # FLD AUTO: 14.41 K/UL — HIGH (ref 3.8–10.5)

## 2023-12-09 RX ADMIN — LEVALBUTEROL 0.63 MILLIGRAM(S): 1.25 SOLUTION, CONCENTRATE RESPIRATORY (INHALATION) at 23:38

## 2023-12-09 RX ADMIN — LEVALBUTEROL 0.63 MILLIGRAM(S): 1.25 SOLUTION, CONCENTRATE RESPIRATORY (INHALATION) at 05:08

## 2023-12-09 RX ADMIN — Medication 750 MILLIGRAM(S): at 06:08

## 2023-12-09 RX ADMIN — Medication 750 MILLIGRAM(S): at 11:47

## 2023-12-09 RX ADMIN — Medication 750 MILLIGRAM(S): at 21:17

## 2023-12-09 RX ADMIN — Medication 750 MILLIGRAM(S): at 22:17

## 2023-12-09 RX ADMIN — LEVALBUTEROL 0.63 MILLIGRAM(S): 1.25 SOLUTION, CONCENTRATE RESPIRATORY (INHALATION) at 11:42

## 2023-12-09 RX ADMIN — QUETIAPINE FUMARATE 100 MILLIGRAM(S): 200 TABLET, FILM COATED ORAL at 21:17

## 2023-12-09 RX ADMIN — Medication 750 MILLIGRAM(S): at 05:08

## 2023-12-09 RX ADMIN — NYSTATIN CREAM 1 APPLICATION(S): 100000 CREAM TOPICAL at 05:08

## 2023-12-09 RX ADMIN — LEVALBUTEROL 0.63 MILLIGRAM(S): 1.25 SOLUTION, CONCENTRATE RESPIRATORY (INHALATION) at 17:41

## 2023-12-09 RX ADMIN — Medication 500 MICROGRAM(S): at 05:07

## 2023-12-09 RX ADMIN — Medication 500 MICROGRAM(S): at 23:39

## 2023-12-09 RX ADMIN — Medication 750 MILLIGRAM(S): at 12:47

## 2023-12-09 RX ADMIN — Medication 500 MICROGRAM(S): at 17:41

## 2023-12-09 RX ADMIN — SODIUM CHLORIDE 50 MILLILITER(S): 9 INJECTION, SOLUTION INTRAVENOUS at 05:09

## 2023-12-09 RX ADMIN — NYSTATIN CREAM 1 APPLICATION(S): 100000 CREAM TOPICAL at 17:42

## 2023-12-09 RX ADMIN — CHLORHEXIDINE GLUCONATE 1 APPLICATION(S): 213 SOLUTION TOPICAL at 06:24

## 2023-12-09 RX ADMIN — Medication 500 MICROGRAM(S): at 11:42

## 2023-12-09 NOTE — PROGRESS NOTE ADULT - SUBJECTIVE AND OBJECTIVE BOX
SHARONA LARKIN  78y Male  MRN:[id number]    Patient is a 78y old  Male who presents with a chief complaint of sepsis      HPI:  77 yo M PMHx of severe dementia, PEG tube presents with ulcer that has worsened over last month. Patient was seen by house calls doctor 2 weeks ago, placed on antibiotics but started mounting fevers in the last few days. It was noticed by his GI doctor (Dr. Moraes) that his ulcer was getting worse, so he sent him in. Patient is nonverbal at baseline. Wife has noticed no new cough, runny nose. (19 Nov 2023 13:02)      Patient seen and evaluated at bedside. No acute events overnight except as noted.    Interval HPI: no acute events o/n       PAST MEDICAL & SURGICAL HISTORY:  Dementia      Pneumonia      Acute renal failure (ARF)  ARF 1.5 YEAR AGO      Dysphagia  peg      Arthritis      GERD (gastroesophageal reflux disease)      Dyspepsia      History of hand surgery      PEG adjustment, replacement, or removal      Status post insertion of percutaneous endoscopic gastrostomy (PEG) tube  PEG replacement on 02/29/16          REVIEW OF SYSTEMS:  as per hpi     VITALS:   Vital Signs Last 24 Hrs  T(C): 36.6 (09 Dec 2023 16:22), Max: 36.6 (09 Dec 2023 16:22)  T(F): 97.9 (09 Dec 2023 16:22), Max: 97.9 (09 Dec 2023 16:22)  HR: 94 (09 Dec 2023 16:22) (94 - 119)  BP: 121/68 (09 Dec 2023 16:22) (118/67 - 139/88)  BP(mean): --  RR: 18 (09 Dec 2023 16:22) (18 - 18)  SpO2: 98% (09 Dec 2023 16:22) (95% - 98%)    Parameters below as of 09 Dec 2023 16:22  Patient On (Oxygen Delivery Method): nasal cannula  O2 Flow (L/min): 6      PHYSICAL EXAM:  GENERAL: NAD,  comfortable   HEAD:  Atraumatic, Normocephalic  EYES: EOMI, PERRLA, conjunctiva and sclera clear  NECK: Supple, No JVD  CHEST/LUNG: Clear to auscultation bilaterally; No wheeze  HEART: S1, S2; No murmurs, rubs, or gallops  ABDOMEN: Soft, Nontender, Nondistended; Bowel sounds present +peg  EXTREMITIES:  2+ Peripheral Pulses, contracted   PSYCH: Normal affect  NEUROLOGY: non verbal  SKIN: +sacral decub    Consultant(s) Notes Reviewed:  [x ] YES  [ ] NO  Care Discussed with Consultants/Other Providers [ x] YES  [ ] NO    MEDS:   MEDICATIONS  (STANDING):  chlorhexidine 2% Cloths 1 Application(s) Topical <User Schedule>  dextrose 5% + sodium chloride 0.9%. 1000 milliLiter(s) (50 mL/Hr) IV Continuous <Continuous>  ipratropium    for Nebulization 500 MICROGram(s) Nebulizer every 6 hours  levalbuterol Inhalation 0.63 milliGRAM(s) Inhalation every 6 hours  nystatin Powder 1 Application(s) Topical two times a day  OLANZapine 5 milliGRAM(s) Oral at bedtime  QUEtiapine 100 milliGRAM(s) Oral <User Schedule>    MEDICATIONS  (PRN):  acetaminophen   Oral Liquid .. 750 milliGRAM(s) Oral every 6 hours PRN Temp greater or equal to 38C (100.4F), Mild Pain (1 - 3)  glycopyrrolate Injectable 0.4 milliGRAM(s) IV Push every 6 hours PRN secretions  morphine  - Injectable 2 milliGRAM(s) IV Push every 1 hour PRN Severe Pain (7 - 10)  morphine  - Injectable 1 milliGRAM(s) IV Push every 1 hour PRN dsypnea, RR>22  morphine  - Injectable 1 milliGRAM(s) IV Push every 1 hour PRN Moderate Pain (4 - 6)      ALLERGIES:  Haldol (Other)  No Known Allergies  benzodiazepines (Other)      LABS:              no new labs    < from: CT Angio Chest PE Protocol w/ IV Cont (11.27.23 @ 22:44) >  IMPRESSION:.    No pulmonary embolism detected.    Occlusive secretions within the left mainstem bronchus with associated   complete left lung collapse; consider superimposed infection in the   appropriate clinical scenario.    Small leftpleural effusion.    --- End of Report ---      < end of copied text >       < from: CT Abdomen and Pelvis w/ IV Cont (11.18.23 @ 19:41) >    IMPRESSION:    Extensive decubitus ulcer involving the right posterior gluteal and   pelvic region with extension to bone. There is bony erosion of the   ischium compatible with osteomyelitis.    Mild bladder wall thickening and a few foci of intraluminal air,   correlate for recent instrumentation and/or infection.    Left lower lobe consolidation along with tree-in-bud/nodular opacities in   the left upper lobe concerning for pneumonia.    --- End of Report ---        < end of copied text >   SHARONA LARKIN  78y Male  MRN:[id number]    Patient is a 78y old  Male who presents with a chief complaint of sepsis      HPI:  79 yo M PMHx of severe dementia, PEG tube presents with ulcer that has worsened over last month. Patient was seen by house calls doctor 2 weeks ago, placed on antibiotics but started mounting fevers in the last few days. It was noticed by his GI doctor (Dr. Moraes) that his ulcer was getting worse, so he sent him in. Patient is nonverbal at baseline. Wife has noticed no new cough, runny nose. (19 Nov 2023 13:02)      Patient seen and evaluated at bedside. No acute events overnight except as noted.    Interval HPI: no acute events o/n       PAST MEDICAL & SURGICAL HISTORY:  Dementia      Pneumonia      Acute renal failure (ARF)  ARF 1.5 YEAR AGO      Dysphagia  peg      Arthritis      GERD (gastroesophageal reflux disease)      Dyspepsia      History of hand surgery      PEG adjustment, replacement, or removal      Status post insertion of percutaneous endoscopic gastrostomy (PEG) tube  PEG replacement on 02/29/16          REVIEW OF SYSTEMS:  as per hpi     VITALS:   Vital Signs Last 24 Hrs  T(C): 36.6 (09 Dec 2023 16:22), Max: 36.6 (09 Dec 2023 16:22)  T(F): 97.9 (09 Dec 2023 16:22), Max: 97.9 (09 Dec 2023 16:22)  HR: 94 (09 Dec 2023 16:22) (94 - 119)  BP: 121/68 (09 Dec 2023 16:22) (118/67 - 139/88)  BP(mean): --  RR: 18 (09 Dec 2023 16:22) (18 - 18)  SpO2: 98% (09 Dec 2023 16:22) (95% - 98%)    Parameters below as of 09 Dec 2023 16:22  Patient On (Oxygen Delivery Method): nasal cannula  O2 Flow (L/min): 6      PHYSICAL EXAM:  GENERAL: NAD,  comfortable   HEAD:  Atraumatic, Normocephalic  EYES: EOMI, PERRLA, conjunctiva and sclera clear  NECK: Supple, No JVD  CHEST/LUNG: Clear to auscultation bilaterally; No wheeze  HEART: S1, S2; No murmurs, rubs, or gallops  ABDOMEN: Soft, Nontender, Nondistended; Bowel sounds present +peg  EXTREMITIES:  2+ Peripheral Pulses, contracted   PSYCH: Normal affect  NEUROLOGY: non verbal  SKIN: +sacral decub    Consultant(s) Notes Reviewed:  [x ] YES  [ ] NO  Care Discussed with Consultants/Other Providers [ x] YES  [ ] NO    MEDS:   MEDICATIONS  (STANDING):  chlorhexidine 2% Cloths 1 Application(s) Topical <User Schedule>  dextrose 5% + sodium chloride 0.9%. 1000 milliLiter(s) (50 mL/Hr) IV Continuous <Continuous>  ipratropium    for Nebulization 500 MICROGram(s) Nebulizer every 6 hours  levalbuterol Inhalation 0.63 milliGRAM(s) Inhalation every 6 hours  nystatin Powder 1 Application(s) Topical two times a day  OLANZapine 5 milliGRAM(s) Oral at bedtime  QUEtiapine 100 milliGRAM(s) Oral <User Schedule>    MEDICATIONS  (PRN):  acetaminophen   Oral Liquid .. 750 milliGRAM(s) Oral every 6 hours PRN Temp greater or equal to 38C (100.4F), Mild Pain (1 - 3)  glycopyrrolate Injectable 0.4 milliGRAM(s) IV Push every 6 hours PRN secretions  morphine  - Injectable 2 milliGRAM(s) IV Push every 1 hour PRN Severe Pain (7 - 10)  morphine  - Injectable 1 milliGRAM(s) IV Push every 1 hour PRN dsypnea, RR>22  morphine  - Injectable 1 milliGRAM(s) IV Push every 1 hour PRN Moderate Pain (4 - 6)      ALLERGIES:  Haldol (Other)  No Known Allergies  benzodiazepines (Other)      LABS:              no new labs    < from: CT Angio Chest PE Protocol w/ IV Cont (11.27.23 @ 22:44) >  IMPRESSION:.    No pulmonary embolism detected.    Occlusive secretions within the left mainstem bronchus with associated   complete left lung collapse; consider superimposed infection in the   appropriate clinical scenario.    Small leftpleural effusion.    --- End of Report ---      < end of copied text >       < from: CT Abdomen and Pelvis w/ IV Cont (11.18.23 @ 19:41) >    IMPRESSION:    Extensive decubitus ulcer involving the right posterior gluteal and   pelvic region with extension to bone. There is bony erosion of the   ischium compatible with osteomyelitis.    Mild bladder wall thickening and a few foci of intraluminal air,   correlate for recent instrumentation and/or infection.    Left lower lobe consolidation along with tree-in-bud/nodular opacities in   the left upper lobe concerning for pneumonia.    --- End of Report ---        < end of copied text >

## 2023-12-09 NOTE — PROGRESS NOTE ADULT - ASSESSMENT
79 yo male h/o dementia, non verbal. contracted, s/p peg. here with sepsis likely 2/2 sacral decub and aspiration pna    sepsis  respiratory failure   sacral decub  pna - possible aspiration    abx as per id - now complete  f/u cult  wound care f/u  id consult noted  pulm consulted  cont supp o2, now off high flow. taper down NC as able   CTA chest noted  pulm f/u   bipap as able  abx changed to meropenem. id f/u apprec - now complete  s/p RRT for hypoxia. n     dementia  cont home meds  supportive care    dysphagia s/p peg  feeds now on hold  s/p peg change  by gi bedside   free water via peg     hyperNa  free water via peg ordered   no further labs as per family       dvt ppx      d/w family bedside  DNR  comfort care  palliative f/u  family wants to take pt home  will attempt to wean down o2. can go home with supp o2    Advanced care planning was discussed with patient and family.  Advanced care planning forms were reviewed and discussed as appropriate.  Differential diagnosis and plan of care discussed with patient after the evaluation.   Pain assessed and judicious use of narcotics when appropriate was discussed.  Importance of Fall prevention discussed.  Counseling on Smoking and Alcohol cessation was offered when appropriate.  Counseling on Diet, exercise, and medication compliance was done.       Approx 75 minutes spent. 77 yo male h/o dementia, non verbal. contracted, s/p peg. here with sepsis likely 2/2 sacral decub and aspiration pna    sepsis  respiratory failure   sacral decub  pna - possible aspiration    abx as per id - now complete  f/u cult  wound care f/u  id consult noted  pulm consulted  cont supp o2, now off high flow. taper down NC as able   CTA chest noted  pulm f/u   bipap as able  abx changed to meropenem. id f/u apprec - now complete  s/p RRT for hypoxia. n     dementia  cont home meds  supportive care    dysphagia s/p peg  feeds now on hold  s/p peg change  by gi bedside   free water via peg     hyperNa  free water via peg ordered   no further labs as per family       dvt ppx      d/w family bedside  DNR  comfort care  palliative f/u  family wants to take pt home  will attempt to wean down o2. can go home with supp o2    Advanced care planning was discussed with patient and family.  Advanced care planning forms were reviewed and discussed as appropriate.  Differential diagnosis and plan of care discussed with patient after the evaluation.   Pain assessed and judicious use of narcotics when appropriate was discussed.  Importance of Fall prevention discussed.  Counseling on Smoking and Alcohol cessation was offered when appropriate.  Counseling on Diet, exercise, and medication compliance was done.       Approx 75 minutes spent.

## 2023-12-10 ENCOUNTER — TRANSCRIPTION ENCOUNTER (OUTPATIENT)
Age: 78
End: 2023-12-10

## 2023-12-10 RX ADMIN — Medication 10 MILLIGRAM(S): at 20:36

## 2023-12-10 RX ADMIN — Medication 500 MICROGRAM(S): at 17:21

## 2023-12-10 RX ADMIN — LEVALBUTEROL 0.63 MILLIGRAM(S): 1.25 SOLUTION, CONCENTRATE RESPIRATORY (INHALATION) at 11:38

## 2023-12-10 RX ADMIN — Medication 500 MICROGRAM(S): at 22:27

## 2023-12-10 RX ADMIN — Medication 750 MILLIGRAM(S): at 14:26

## 2023-12-10 RX ADMIN — Medication 750 MILLIGRAM(S): at 15:26

## 2023-12-10 RX ADMIN — SODIUM CHLORIDE 50 MILLILITER(S): 9 INJECTION, SOLUTION INTRAVENOUS at 06:20

## 2023-12-10 RX ADMIN — Medication 10 MILLIGRAM(S): at 10:01

## 2023-12-10 RX ADMIN — Medication 750 MILLIGRAM(S): at 21:40

## 2023-12-10 RX ADMIN — LEVALBUTEROL 0.63 MILLIGRAM(S): 1.25 SOLUTION, CONCENTRATE RESPIRATORY (INHALATION) at 17:21

## 2023-12-10 RX ADMIN — Medication 500 MICROGRAM(S): at 05:51

## 2023-12-10 RX ADMIN — NYSTATIN CREAM 1 APPLICATION(S): 100000 CREAM TOPICAL at 05:52

## 2023-12-10 RX ADMIN — LEVALBUTEROL 0.63 MILLIGRAM(S): 1.25 SOLUTION, CONCENTRATE RESPIRATORY (INHALATION) at 22:27

## 2023-12-10 RX ADMIN — SODIUM CHLORIDE 50 MILLILITER(S): 9 INJECTION, SOLUTION INTRAVENOUS at 20:49

## 2023-12-10 RX ADMIN — Medication 750 MILLIGRAM(S): at 05:50

## 2023-12-10 RX ADMIN — NYSTATIN CREAM 1 APPLICATION(S): 100000 CREAM TOPICAL at 18:29

## 2023-12-10 RX ADMIN — Medication 500 MICROGRAM(S): at 11:38

## 2023-12-10 RX ADMIN — Medication 750 MILLIGRAM(S): at 20:37

## 2023-12-10 RX ADMIN — LEVALBUTEROL 0.63 MILLIGRAM(S): 1.25 SOLUTION, CONCENTRATE RESPIRATORY (INHALATION) at 05:51

## 2023-12-10 RX ADMIN — CHLORHEXIDINE GLUCONATE 1 APPLICATION(S): 213 SOLUTION TOPICAL at 05:51

## 2023-12-10 RX ADMIN — QUETIAPINE FUMARATE 100 MILLIGRAM(S): 200 TABLET, FILM COATED ORAL at 20:37

## 2023-12-10 NOTE — CHART NOTE - NSCHARTNOTEFT_GEN_A_CORE
The patient is completely immobile, patient cannot make changes in body position without assistance. 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

## 2023-12-10 NOTE — DISCHARGE NOTE PROVIDER - HOSPITAL COURSE
HPI: 77 yo M PMHx of severe dementia, PEG tube presents with ulcer that has worsened over last month. Patient was seen by house calls doctor 2 weeks ago, placed on antibiotics but started mounting fevers in the last few days. It was noticed by his GI doctor (Dr. Moraes) that his ulcer was getting worse, so he sent him in. Patient is nonverbal at baseline.     Hospital Course:  Patient noted to have worsening sacral ulcer, positive for osteomyelitis of ischium. Patient also noted to have left lower lung consolidation and requiring oxygen, was on HFNC but titrated down to 4L NC as of 12/11/2023. Patient completed Meropenum IV course 11/26/2023-12/03/2023 and s/p IV Vancomycin. Wound vac in place to sacral wound.     Important Medication Changes and Reason:  *    Active or Pending Issues Requiring Follow-up:  Patient to follow-up at [facility] [address] [place] [address] when discharged, [phone].    Advanced Directives:   [ ] Full code  [X] DNR  [ ] Hospice    Discharge Diagnoses:  Sacral decubitus ulcer  Pneumonia        HPI: 77 yo M PMHx of severe dementia, PEG tube presents with ulcer that has worsened over last month. Patient was seen by house calls doctor 2 weeks ago, placed on antibiotics but started mounting fevers in the last few days. It was noticed by his GI doctor (Dr. Moraes) that his ulcer was getting worse, so he sent him in. Patient is nonverbal at baseline.     Hospital Course:  Patient noted to have worsening sacral ulcer, positive for osteomyelitis of ischium. Patient also noted to have left lower lung consolidation and requiring oxygen, was on HFNC but titrated down to 4L NC as of 12/11/2023. Patient completed Meropenum IV course 11/26/2023-12/03/2023 and s/p IV Vancomycin. Wound vac in place to sacral wound.     Important Medication Changes and Reason:  *    Active or Pending Issues Requiring Follow-up:  Patient to follow-up at [facility] [address] [place] [address] when discharged, [phone].    Advanced Directives:   [ ] Full code  [X] DNR  [ ] Hospice    Discharge Diagnoses:  Sacral decubitus ulcer  Pneumonia        HPI: 79 yo M PMHx of severe dementia, PEG tube presents with ulcer that has worsened over last month. Patient was seen by house calls doctor 2 weeks ago, placed on antibiotics but started mounting fevers in the last few days. It was noticed by his GI doctor (Dr. Moraes) that his ulcer was getting worse, so he sent him in. Patient is nonverbal at baseline.     Hospital Course:  Patient noted to have worsening sacral ulcer, positive for osteomyelitis of ischium. Patient also noted to have left lower lung consolidation and requiring oxygen, was on HFNC but titrated down to 4L NC as of 12/11/2023. Patient completed Meropenum IV course 11/26/2023-12/03/2023 and s/p IV Vancomycin. Wound vac in place to sacral wound. He was seen by GI, Card, ID, Palliative care, PT, Wound care svc, Pulm. His PEG feeding was discontinued and set to comfort measures and he is on 4L oxygen nasal cannula with setting of 95-97%. He is hemodynamically stable to be discharged home to day, spoke to Attending, wife and daughter (Ling) and CM.    Important Medication Changes and Reason:  *    Active or Pending Issues Requiring Follow-up:  Patient to follow-up at [facility] [address] [place] [address] when discharged, [phone].    Advanced Directives:   [ ] Full code  [X] DNR  [ ] Hospice    Discharge Diagnoses:  Sacral decubitus ulcer  Pneumonia        HPI: 79 yo M PMHx of severe dementia, PEG tube presents with ulcer that has worsened over last month. Patient was seen by house calls doctor 2 weeks ago, placed on antibiotics but started mounting fevers in the last few days. It was noticed by his GI doctor (Dr. Moraes) that his ulcer was getting worse, so he sent him in. Patient is nonverbal at baseline.     Hospital Course:  Patient noted to have worsening sacral ulcer, positive for osteomyelitis of ischium. Patient also noted to have left lower lung consolidation and requiring oxygen, was on HFNC but titrated down to 4L NC as of 12/11/2023. Patient completed Meropenum IV course 11/26/2023-12/03/2023 and s/p IV Vancomycin. Wound vac in place to sacral wound. He was seen by GI, Card, ID, Palliative care, PT, Wound care svc, Pulm. His PEG feeding was discontinued and set to comfort measures and he is on 4L oxygen nasal cannula with setting of 95-97%. He is hemodynamically stable to be discharged home to day, spoke to Attending, wife and daughter (Ling) and CM.    Important Medication Changes and Reason:  *    Active or Pending Issues Requiring Follow-up:  Patient to follow-up at [facility] [address] [place] [address] when discharged, [phone].    Advanced Directives:   [ ] Full code  [X] DNR  [ ] Hospice    Discharge Diagnoses:  Sacral decubitus ulcer  Pneumonia

## 2023-12-10 NOTE — DISCHARGE NOTE PROVIDER - PROVIDER TOKENS
PROVIDER:[TOKEN:[[id number]]],PROVIDER:[TOKEN:[[id number]]],PROVIDER:[TOKEN:[[id number]]] PROVIDER:[TOKEN:[[id number]]],PROVIDER:[TOKEN:[[id number]]],PROVIDER:[TOKEN:[[id number]]]

## 2023-12-10 NOTE — DISCHARGE NOTE PROVIDER - NSDCCAREPROVSEEN_GEN_ALL_CORE_FT
Mona, Tj Moon Mona, Elias Jones, Tj Greene, Nathaniel Luis, Roni Jackson, Jani Hayward, Jackson Moraes, Cullen

## 2023-12-10 NOTE — PROGRESS NOTE ADULT - SUBJECTIVE AND OBJECTIVE BOX
SHARONA LARKIN  78y Male  MRN:[id number]    Patient is a 78y old  Male who presents with a chief complaint of sepsis      HPI:  79 yo M PMHx of severe dementia, PEG tube presents with ulcer that has worsened over last month. Patient was seen by house calls doctor 2 weeks ago, placed on antibiotics but started mounting fevers in the last few days. It was noticed by his GI doctor (Dr. Moraes) that his ulcer was getting worse, so he sent him in. Patient is nonverbal at baseline. Wife has noticed no new cough, runny nose. (19 Nov 2023 13:02)      Patient seen and evaluated at bedside. No acute events overnight except as noted.    Interval HPI: no acute events o/n       PAST MEDICAL & SURGICAL HISTORY:  Dementia      Pneumonia      Acute renal failure (ARF)  ARF 1.5 YEAR AGO      Dysphagia  peg      Arthritis      GERD (gastroesophageal reflux disease)      Dyspepsia      History of hand surgery      PEG adjustment, replacement, or removal      Status post insertion of percutaneous endoscopic gastrostomy (PEG) tube  PEG replacement on 02/29/16          REVIEW OF SYSTEMS:  as per hpi     VITALS:   Vital Signs Last 24 Hrs  T(C): 37 (10 Dec 2023 08:30), Max: 37 (10 Dec 2023 08:30)  T(F): 98.6 (10 Dec 2023 08:30), Max: 98.6 (10 Dec 2023 08:30)  HR: 101 (10 Dec 2023 08:30) (94 - 117)  BP: 108/65 (10 Dec 2023 08:30) (92/58 - 121/68)  BP(mean): --  RR: 20 (10 Dec 2023 10:27) (18 - 22)  SpO2: 96% (10 Dec 2023 10:27) (96% - 100%)    Parameters below as of 10 Dec 2023 10:27  Patient On (Oxygen Delivery Method): nasal cannula  O2 Flow (L/min): 6        PHYSICAL EXAM:  GENERAL: NAD,  comfortable   HEAD:  Atraumatic, Normocephalic  EYES: EOMI, PERRLA, conjunctiva and sclera clear  NECK: Supple, No JVD  CHEST/LUNG: Clear to auscultation bilaterally; No wheeze  HEART: S1, S2; No murmurs, rubs, or gallops  ABDOMEN: Soft, Nontender, Nondistended; Bowel sounds present +peg  EXTREMITIES:  2+ Peripheral Pulses, contracted   PSYCH: Normal affect  NEUROLOGY: non verbal  SKIN: +sacral decub    Consultant(s) Notes Reviewed:  [x ] YES  [ ] NO  Care Discussed with Consultants/Other Providers [ x] YES  [ ] NO    MEDS:   MEDICATIONS  (STANDING):  chlorhexidine 2% Cloths 1 Application(s) Topical <User Schedule>  dextrose 5% + sodium chloride 0.9%. 1000 milliLiter(s) (50 mL/Hr) IV Continuous <Continuous>  ipratropium    for Nebulization 500 MICROGram(s) Nebulizer every 6 hours  levalbuterol Inhalation 0.63 milliGRAM(s) Inhalation every 6 hours  nystatin Powder 1 Application(s) Topical two times a day  OLANZapine 5 milliGRAM(s) Oral at bedtime  QUEtiapine 100 milliGRAM(s) Oral <User Schedule>    MEDICATIONS  (PRN):  acetaminophen   Oral Liquid .. 750 milliGRAM(s) Oral every 6 hours PRN Temp greater or equal to 38C (100.4F), Mild Pain (1 - 3)  glycopyrrolate Injectable 0.4 milliGRAM(s) IV Push every 6 hours PRN secretions  morphine  - Injectable 2 milliGRAM(s) IV Push every 1 hour PRN Severe Pain (7 - 10)  morphine  - Injectable 1 milliGRAM(s) IV Push every 1 hour PRN dsypnea, RR>22  morphine  - Injectable 1 milliGRAM(s) IV Push every 1 hour PRN Moderate Pain (4 - 6)      ALLERGIES:  Haldol (Other)  No Known Allergies  benzodiazepines (Other)      LABS:              no new labs    < from: CT Angio Chest PE Protocol w/ IV Cont (11.27.23 @ 22:44) >  IMPRESSION:.    No pulmonary embolism detected.    Occlusive secretions within the left mainstem bronchus with associated   complete left lung collapse; consider superimposed infection in the   appropriate clinical scenario.    Small leftpleural effusion.    --- End of Report ---      < end of copied text >       < from: CT Abdomen and Pelvis w/ IV Cont (11.18.23 @ 19:41) >    IMPRESSION:    Extensive decubitus ulcer involving the right posterior gluteal and   pelvic region with extension to bone. There is bony erosion of the   ischium compatible with osteomyelitis.    Mild bladder wall thickening and a few foci of intraluminal air,   correlate for recent instrumentation and/or infection.    Left lower lobe consolidation along with tree-in-bud/nodular opacities in   the left upper lobe concerning for pneumonia.    --- End of Report ---        < end of copied text >   SHARONA LARKIN  78y Male  MRN:[id number]    Patient is a 78y old  Male who presents with a chief complaint of sepsis      HPI:  77 yo M PMHx of severe dementia, PEG tube presents with ulcer that has worsened over last month. Patient was seen by house calls doctor 2 weeks ago, placed on antibiotics but started mounting fevers in the last few days. It was noticed by his GI doctor (Dr. Moraes) that his ulcer was getting worse, so he sent him in. Patient is nonverbal at baseline. Wife has noticed no new cough, runny nose. (19 Nov 2023 13:02)      Patient seen and evaluated at bedside. No acute events overnight except as noted.    Interval HPI: no acute events o/n       PAST MEDICAL & SURGICAL HISTORY:  Dementia      Pneumonia      Acute renal failure (ARF)  ARF 1.5 YEAR AGO      Dysphagia  peg      Arthritis      GERD (gastroesophageal reflux disease)      Dyspepsia      History of hand surgery      PEG adjustment, replacement, or removal      Status post insertion of percutaneous endoscopic gastrostomy (PEG) tube  PEG replacement on 02/29/16          REVIEW OF SYSTEMS:  as per hpi     VITALS:   Vital Signs Last 24 Hrs  T(C): 37 (10 Dec 2023 08:30), Max: 37 (10 Dec 2023 08:30)  T(F): 98.6 (10 Dec 2023 08:30), Max: 98.6 (10 Dec 2023 08:30)  HR: 101 (10 Dec 2023 08:30) (94 - 117)  BP: 108/65 (10 Dec 2023 08:30) (92/58 - 121/68)  BP(mean): --  RR: 20 (10 Dec 2023 10:27) (18 - 22)  SpO2: 96% (10 Dec 2023 10:27) (96% - 100%)    Parameters below as of 10 Dec 2023 10:27  Patient On (Oxygen Delivery Method): nasal cannula  O2 Flow (L/min): 6        PHYSICAL EXAM:  GENERAL: NAD,  comfortable   HEAD:  Atraumatic, Normocephalic  EYES: EOMI, PERRLA, conjunctiva and sclera clear  NECK: Supple, No JVD  CHEST/LUNG: Clear to auscultation bilaterally; No wheeze  HEART: S1, S2; No murmurs, rubs, or gallops  ABDOMEN: Soft, Nontender, Nondistended; Bowel sounds present +peg  EXTREMITIES:  2+ Peripheral Pulses, contracted   PSYCH: Normal affect  NEUROLOGY: non verbal  SKIN: +sacral decub    Consultant(s) Notes Reviewed:  [x ] YES  [ ] NO  Care Discussed with Consultants/Other Providers [ x] YES  [ ] NO    MEDS:   MEDICATIONS  (STANDING):  chlorhexidine 2% Cloths 1 Application(s) Topical <User Schedule>  dextrose 5% + sodium chloride 0.9%. 1000 milliLiter(s) (50 mL/Hr) IV Continuous <Continuous>  ipratropium    for Nebulization 500 MICROGram(s) Nebulizer every 6 hours  levalbuterol Inhalation 0.63 milliGRAM(s) Inhalation every 6 hours  nystatin Powder 1 Application(s) Topical two times a day  OLANZapine 5 milliGRAM(s) Oral at bedtime  QUEtiapine 100 milliGRAM(s) Oral <User Schedule>    MEDICATIONS  (PRN):  acetaminophen   Oral Liquid .. 750 milliGRAM(s) Oral every 6 hours PRN Temp greater or equal to 38C (100.4F), Mild Pain (1 - 3)  glycopyrrolate Injectable 0.4 milliGRAM(s) IV Push every 6 hours PRN secretions  morphine  - Injectable 2 milliGRAM(s) IV Push every 1 hour PRN Severe Pain (7 - 10)  morphine  - Injectable 1 milliGRAM(s) IV Push every 1 hour PRN dsypnea, RR>22  morphine  - Injectable 1 milliGRAM(s) IV Push every 1 hour PRN Moderate Pain (4 - 6)      ALLERGIES:  Haldol (Other)  No Known Allergies  benzodiazepines (Other)      LABS:              no new labs    < from: CT Angio Chest PE Protocol w/ IV Cont (11.27.23 @ 22:44) >  IMPRESSION:.    No pulmonary embolism detected.    Occlusive secretions within the left mainstem bronchus with associated   complete left lung collapse; consider superimposed infection in the   appropriate clinical scenario.    Small leftpleural effusion.    --- End of Report ---      < end of copied text >       < from: CT Abdomen and Pelvis w/ IV Cont (11.18.23 @ 19:41) >    IMPRESSION:    Extensive decubitus ulcer involving the right posterior gluteal and   pelvic region with extension to bone. There is bony erosion of the   ischium compatible with osteomyelitis.    Mild bladder wall thickening and a few foci of intraluminal air,   correlate for recent instrumentation and/or infection.    Left lower lobe consolidation along with tree-in-bud/nodular opacities in   the left upper lobe concerning for pneumonia.    --- End of Report ---        < end of copied text >

## 2023-12-10 NOTE — PROGRESS NOTE ADULT - ASSESSMENT
77 yo male h/o dementia, non verbal. contracted, s/p peg. here with sepsis likely 2/2 sacral decub and aspiration pna    sepsis  respiratory failure   sacral decub  pna - possible aspiration    abx as per id - now complete  f/u cult  wound care f/u  id consult noted  pulm consulted  cont supp o2, now off high flow. taper down NC as able   CTA chest noted  pulm f/u   bipap as able  abx changed to meropenem. id f/u apprec - now complete  s/p RRT for hypoxia. n     dementia  cont home meds  supportive care    dysphagia s/p peg  feeds now on hold  s/p peg change  by gi bedside   free water via peg     hyperNa  free water via peg ordered   no further labs as per family       dvt ppx      d/w family bedside  DNR  comfort care  palliative f/u  family wants to take pt home  will attempt to wean down o2. can go home with supp o2    Advanced care planning was discussed with patient and family.  Advanced care planning forms were reviewed and discussed as appropriate.  Differential diagnosis and plan of care discussed with patient after the evaluation.   Pain assessed and judicious use of narcotics when appropriate was discussed.  Importance of Fall prevention discussed.  Counseling on Smoking and Alcohol cessation was offered when appropriate.  Counseling on Diet, exercise, and medication compliance was done.       Approx 75 minutes spent.

## 2023-12-10 NOTE — DISCHARGE NOTE PROVIDER - CARE PROVIDER_API CALL
Tj Jones  Pulmonary Disease  [address] [place] [address]  Phone: [phone]  Fax: [phone]  Follow Up Time:     Jackson Hayward  Gastroenterology  [address] [place] [address]  Phone: [phone]  Fax: [phone]  Follow Up Time:     Jani Jackson  [address], [address]  [place] [address]  Phone: [phone]  Fax: [phone]  Follow Up Time:    Tj Jones  Pulmonary Disease  [address] [place] [address]  Phone: [phone]  Fax: [phone]  Follow Up Time:     Jackson Hayward  Gastroenterology  [address] [place] [address]  Phone: [phone]  Fax: [phone]  Follow Up Time:     Jani Jackson  [address], [address]  [place] [address]  Phone: [phone]  Fax: [phone]  Follow Up Time:

## 2023-12-10 NOTE — DISCHARGE NOTE PROVIDER - NSDCMRMEDTOKEN_GEN_ALL_CORE_FT
acetaminophen 325 mg oral tablet: 2 tab(s) orally every 6 hours, As Needed  Alternating Pressure Pad: use as directed  bisacodyl 10 mg rectal suppository: 1 suppository(ies) rectal once a day, As needed, Constipation  cefdinir 300 mg oral capsule: 1 cap(s) orally 2 times a day for 2 weeks picked up on 11-6-23  Dakins Half Strength 0.25% topical solution: Apply topically to affected area Clean wound daily  doxycycline hyclate 100 mg oral capsule: 1 cap(s) orally 2 times a day for 2 weeks picked up on 11-6-23  OLANZapine 5 mg oral tablet: 1 tab(s) orally once a day  pantoprazole 40 mg oral delayed release tablet: 1 tab(s) orally once a day  polyethylene glycol 3350 oral powder for reconstitution: 17 gram(s) orally 2 times a day  QUEtiapine 100 mg oral tablet: 1 tab(s) orally once a day  Roho cushion for wheelchair: use as directed  sucralfate 1 g oral tablet: 1 tab(s) orally 4 times a day   acetylcysteine 10% inhalation solution: 4 milliliter(s) inhaled every 12 hours  Alternating Pressure Pad: use as directed  bisacodyl 10 mg rectal suppository: 1 suppository(ies) rectal once a day As needed Constipation  Dakins Half Strength 0.25% topical solution: Apply topically to affected area Clean wound daily  nystatin 100,000 units/g topical powder: 1 Apply topically to affected area 2 times a day  pantoprazole 40 mg oral delayed release tablet: 1 tab(s) orally once a day  QUEtiapine 100 mg oral tablet: 1 tab(s) by gastrostomy tube once a day (at bedtime)  Sabinoo cushion for wheelchair: use as directed   acetylcysteine 10% inhalation solution: 4 milliliter(s) inhaled every 12 hours  bisacodyl 10 mg rectal suppository: 1 suppository(ies) rectal once a day As needed Constipation  Dakins Half Strength 0.25% topical solution: Apply topically to affected area Clean wound daily  ipratropium 500 mcg/2.5 mL inhalation solution: 2.5 milliliter(s) inhaled every 6 hours  levalbuterol 0.63 mg/3 mL inhalation solution: 3 milliliter(s) inhaled every 6 hours  nystatin 100,000 units/g topical powder: 1 Apply topically to affected area 2 times a day  omeprazole 20 mg oral delayed release tablet: 1 tab(s) by gastrostomy tube once a day  QUEtiapine 100 mg oral tablet: 1 tab(s) by gastrostomy tube once a day (at bedtime)   acetylcysteine 10% inhalation solution: 4 milliliter(s) inhaled every 12 hours  bisacodyl 10 mg rectal suppository: 1 suppository(ies) rectal once a day As needed Constipation  Dakins Half Strength 0.25% topical solution: Apply topically to affected area Clean wound daily  ipratropium 500 mcg/2.5 mL inhalation solution: 2.5 milliliter(s) inhaled every 6 hours  levalbuterol 0.63 mg/3 mL inhalation solution: 3 milliliter(s) inhaled every 6 hours  Nebulizer machine: 1  nystatin 100,000 units/g topical powder: 1 Apply topically to affected area 2 times a day  omeprazole 20 mg oral delayed release tablet: 1 tab(s) by gastrostomy tube once a day  QUEtiapine 100 mg oral tablet: 1 tab(s) by gastrostomy tube once a day (at bedtime)

## 2023-12-10 NOTE — DISCHARGE NOTE PROVIDER - NSDCCPCAREPLAN_GEN_ALL_CORE_FT
PRINCIPAL DISCHARGE DIAGNOSIS  Diagnosis: OM (osteomyelitis)  Assessment and Plan of Treatment: Patient noted to have osteomyelitis of ischium.   Patient given IV antibiotics- s/p meropenem and vancomycin.   Patient seen by ID in hospital.   Wound vac in place.   Follow-up with PCP.      SECONDARY DISCHARGE DIAGNOSES  Diagnosis: Pneumonia  Assessment and Plan of Treatment: Patient completed antibiotic course.   Patient titrated from HiFlow to 4L NC.   Patient will get oxygen at home.   Patient to follow-up with PCP.        PRINCIPAL DISCHARGE DIAGNOSIS  Diagnosis: OM (osteomyelitis)  Assessment and Plan of Treatment: Patient noted to have osteomyelitis of ischium.   Patient given IV antibiotics- s/p meropenem and vancomycin.   Patient seen by ID in hospital.   Wound vac in place.   Follow-up with PCP.      SECONDARY DISCHARGE DIAGNOSES  Diagnosis: Dysphagia  Assessment and Plan of Treatment: PEG feeding was discontinued, now on comfort measures , meds via PEG    Diagnosis: Sacral decubitus ulcer  Assessment and Plan of Treatment: wound VAC set up, will go with home care    Diagnosis: Acute respiratory failure with hypoxia  Assessment and Plan of Treatment: cont neb treatment, nc Oxygen    Diagnosis: Pneumonia  Assessment and Plan of Treatment: Patient completed antibiotic course.   Patient titrated from HiFlow to 4L NC.   Patient will get oxygen at home.   Patient to follow-up with PCP.

## 2023-12-10 NOTE — DISCHARGE NOTE PROVIDER - NSDCFUADDAPPT_GEN_ALL_CORE_FT
APPTS ARE READY TO BE MADE: [X] YES    Best Family or Patient Contact (if needed):    Additional Information about above appointments (if needed):    1:   2:   3:     Other comments or requests:    APPTS ARE READY TO BE MADE: [X] YES    Best Family or Patient Contact (if needed):    Additional Information about above appointments (if needed):    1:   2:   3:     Other comments or requests:   Patient's family member advised they passed on Friday 12/15/2023.

## 2023-12-10 NOTE — CHART NOTE - NSCHARTNOTEFT_GEN_A_CORE
Patient has a wheelchair. Roho cushion would increase patient comfort and help improve positioning. Patient has Stage 4 pressure injury right ischium per wound care team.

## 2023-12-11 RX ADMIN — Medication 500 MICROGRAM(S): at 22:22

## 2023-12-11 RX ADMIN — Medication 750 MILLIGRAM(S): at 13:07

## 2023-12-11 RX ADMIN — LEVALBUTEROL 0.63 MILLIGRAM(S): 1.25 SOLUTION, CONCENTRATE RESPIRATORY (INHALATION) at 22:22

## 2023-12-11 RX ADMIN — NYSTATIN CREAM 1 APPLICATION(S): 100000 CREAM TOPICAL at 18:02

## 2023-12-11 RX ADMIN — LEVALBUTEROL 0.63 MILLIGRAM(S): 1.25 SOLUTION, CONCENTRATE RESPIRATORY (INHALATION) at 18:00

## 2023-12-11 RX ADMIN — Medication 750 MILLIGRAM(S): at 18:59

## 2023-12-11 RX ADMIN — SODIUM CHLORIDE 50 MILLILITER(S): 9 INJECTION, SOLUTION INTRAVENOUS at 20:41

## 2023-12-11 RX ADMIN — SODIUM CHLORIDE 50 MILLILITER(S): 9 INJECTION, SOLUTION INTRAVENOUS at 02:52

## 2023-12-11 RX ADMIN — LEVALBUTEROL 0.63 MILLIGRAM(S): 1.25 SOLUTION, CONCENTRATE RESPIRATORY (INHALATION) at 13:07

## 2023-12-11 RX ADMIN — QUETIAPINE FUMARATE 100 MILLIGRAM(S): 200 TABLET, FILM COATED ORAL at 20:00

## 2023-12-11 RX ADMIN — Medication 500 MICROGRAM(S): at 18:00

## 2023-12-11 RX ADMIN — Medication 500 MICROGRAM(S): at 13:05

## 2023-12-11 RX ADMIN — Medication 750 MILLIGRAM(S): at 06:00

## 2023-12-11 RX ADMIN — Medication 750 MILLIGRAM(S): at 13:49

## 2023-12-11 RX ADMIN — CHLORHEXIDINE GLUCONATE 1 APPLICATION(S): 213 SOLUTION TOPICAL at 05:02

## 2023-12-11 RX ADMIN — NYSTATIN CREAM 1 APPLICATION(S): 100000 CREAM TOPICAL at 05:01

## 2023-12-11 RX ADMIN — LEVALBUTEROL 0.63 MILLIGRAM(S): 1.25 SOLUTION, CONCENTRATE RESPIRATORY (INHALATION) at 05:01

## 2023-12-11 RX ADMIN — Medication 750 MILLIGRAM(S): at 19:51

## 2023-12-11 RX ADMIN — Medication 750 MILLIGRAM(S): at 05:01

## 2023-12-11 RX ADMIN — Medication 500 MICROGRAM(S): at 05:01

## 2023-12-11 NOTE — PROGRESS NOTE ADULT - ASSESSMENT
77 y/o M with PMH of dementia (primarily bedbound - has been nonverbal for the past year per wife at bedside), dysphagia s/p PEG, sacral ulcer. Presents with fevers, recommended by GI Dr. Moraes to come to ER as sacral decubiti reportedly getting worse. Afebrile on triage with O2 sats 96% on room air. Labs notable for leukocytosis. RVP/COVID negative. CT A/P with extensive decubitus ulcer involving the right posterior gluteal and pelvic region with extension to bone, bony erosion of the ischium compatible with osteomyelitis. Also with LLL consolidation. Course c/b RRT 11/20 for hypoxia, now requiring HFNC. Pulmonary called to consult for hypoxia.  79 y/o M with PMH of dementia (primarily bedbound - has been nonverbal for the past year per wife at bedside), dysphagia s/p PEG, sacral ulcer. Presents with fevers, recommended by GI Dr. Moreas to come to ER as sacral decubiti reportedly getting worse. Afebrile on triage with O2 sats 96% on room air. Labs notable for leukocytosis. RVP/COVID negative. CT A/P with extensive decubitus ulcer involving the right posterior gluteal and pelvic region with extension to bone, bony erosion of the ischium compatible with osteomyelitis. Also with LLL consolidation. Course c/b RRT 11/20 for hypoxia, now requiring HFNC. Pulmonary called to consult for hypoxia.

## 2023-12-11 NOTE — PROGRESS NOTE ADULT - SUBJECTIVE AND OBJECTIVE BOX
[facility]  [facility], [place]  [address], [address]  T [phone]  F [phone]  January 20, 2023     Referring Doctor: CONSULTATION - REFERRAL  Kt Gomez MD  Primary Doctor: Kerrie Vernon DO  Date of Service: 1/20/2023    Identification:   Ms. Tiffani Melgar is a 62 year old right-handed female     Reason for Follow Up Visit:  This patient is being seen for: Office Visit. Pain is related to   Chief Complaint   Patient presents with   • Follow-up     Unsuccessful MBB        HISTORY OF PRESENT ILLNESS:  Tiffani Melgar is a 62 year old female with a PMHx (past medical history) significant for Diabetes Mellitus, Hypertension who is being seen today in the [facility] for physiatric evaluation and management. Tiffani Melgar presents with primary complaints of chronic low back and neck pain which she attributes to years of holding job positions which involved repetitive movements. Her symptoms have been slowly worsening over time. She was recently evaluated in the orthopedic spine surgery clinic and was referred to our clinic for conservative management.  I am initially addressing her lower back pain which is her more bothersome issue. She was initially seen in clinic on 12- and returns for a follow up. In the interim, she did undergo a bilateral L3, L4 medial branch, dorsal ramus of L5 block #1 on 1- which did not provide relief. She did complete her initial physical therapy session. Overall, she reports no significant changes in her level of pain.   • Location: low back: right = left, radiates down the left lower extremity until it reaches the calf (axial > radicular)    • Duration: constant  • Severity: varies from 4-10/10  • Quality: achey, \"stiff\"  • Time of day when pain is usually worse: AM  • Alleviating factors: sitting for short periods of time  • Exacerbating factors: transitioning from sitting to standing    • Significant recent change with bladder and/or bowel control - No  • Presence of weakness: both lower extremities (left lower extremity > right lower extremity)   • Presence of paresthesias: throughout left lower extremity   • Presence of spasms: intermittent in both legs and feet   • Presence of falls or lack of balance: no falls  • Presence of recent unintended weight loss or fever: none  • Functional Impairments 2nd to symptoms: transitioning in/out of her car, perform ADL's (toileting, showering), sleeping, walking, performing household chores  • Involved in a legal process regarding current symptoms - No  • Workman's compensation - No     • Is the patient on any blood thinners other than aspirin (i.e. Coumadin or Plavix) - No  • Is the patient aware of any contrast dye allergy - No           PREVIOUS/CURRENT TREATMENTS FOR CURRENT SYMPTOMS:  • Medications: tylenol 650 2 tabs bid prn, norco 5/325 qd prn, prior: ibuprofen   -Modalities:  • Physical Therapy - Yes-2016  • Chiropractic - No  • Acupuncture - No  • Massage - No  Procedures/Surgical intervention  • Epidural/spinal injection - 1--bilateral L3, L4 medial branch, dorsal ramus of L5 block #1- negative response  • Spine surgery for current symptoms - No        REVIEW OF SYSTEMS   CONSTITUTIONAL: No fever, weight changes, fatigue or drowsiness.  EYE: No blurred vision, irritation or tearing.  ENT: No soreness or difficulty swallowing .  CARDIOVASCULAR: No chest pain, occasional palpitations or syncope (being seen in cardiology and neurology clinic).   RESPIRATORY: No cough, shortness of breath or wheezing.   GASTROINTESTINAL: No constipation, diarrhea, cramping, nausea, or vomiting..   GENITOURINARY: No incontinence, urgency, or hesitancy.  MUSCULOSKELETAL: generalized weakness, multijoint pain   INTEGUMENTARY: No hypersensitivity, discoloration, or rash.  NEURO: No cognitive deficits, No sensory deficits  ENDOCRINE: No temperature intolerance,  polyuria, or polydipsia.  HEME/LYMPH: No easy bruising, bleeding tendencies, lymphadenopathy.  ALLERGY: As noted herein.   PSYCHIATRIC: No cognitive deficits, No anxiety or depression.   A complete review of systems was performed, all others negative except as noted above.     ALLERGIES:   ALLERGIES:  No Known Allergies  MEDICATIONS:  No outpatient medications have been marked as taking for the 23 encounter (Office Visit) with Zenon Dickens MD.      PAST MEDICAL HISTORY:  Past Medical History:   Diagnosis Date   • Asthma    • Diabetes mellitus (CMS/HCC)    • Hypertension      PAST SURGICAL HISTORY:     Past Surgical History:   Procedure Laterality Date   • Bunionectomy     • Removal gallbladder        FAMILY HISTORY:  No family history on file.      SOCIAL HISTORY:  Social History     Tobacco Use   • Smoking status: Former     Packs/day: 0.25     Years: 15.00     Pack years: 3.75     Types: Cigarettes     Start date:      Quit date:      Years since quittin.0   • Smokeless tobacco: Never   Vaping Use   • Vaping Use: never used   Substance Use Topics   • Alcohol use: Not Currently   • Drug use: Not Currently     She is independent with all basic (eating, bathing, grooming, toileting, dressing) and advanced activities of daily living.  She ambulates without an assistive device independently.  Living situation: The patient lives with:  self in a 1st floor apartment with adult siblings nearby.  Work status: Currently not working  Does the patient have a history of drug or alcohol abuse - No.  Social History     Tobacco Use   Smoking Status Former   • Packs/day: 0.25   • Years: 15.00   • Pack years: 3.75   • Types: Cigarettes   • Start date:    • Quit date:    • Years since quittin.0   Smokeless Tobacco Never      Social History     Substance and Sexual Activity   Alcohol Use Not Currently        PHYSICAL EXAMINATION:  VITALS:   Vitals:    23 1037   Weight: 85.2 kg (187 lb 13.3  oz)   Height: 5' 5\" (1.651 m)   PainSc: 7    PainLoc: Back   .  General:  62 year old female who appears healthy, cooperative, and in no acute distress.  HEENT: Atraumatic and normocephalic.  Skin: surgical scars on both feet, no lesions, ecchymosis  Cardiovascular:Peripheral pulses are palpable bilaterally and negative for pedal edema bilaterally.  No evidence of venous stasis or varicose veins  Pulmonary: Patient is breathing comfortably.  Abdomen: Soft, nontender and nondistended  Psychiatric: Awake, alert, oriented * 3, proper judgment and insight   Neurological: Sensation intact to light touch throughout extremities, reflexes- +2/2 for biceps, brachioradialis, patellar and achilles tendon reflex bilaterally.  Straight leg raising test on right and left: negative  Lumbar spurling maneuver on the right and the left: negative  Musculoskeletal: Upper extremities = shoulder abduction - 5/5, elbow flexion - 5/5, elbow extension- 5/5, wrist extension - 5/5, hand intrinsics - 5/5           Right lower extremity= hip flexion - 5/5, knee extension - 5/5, knee flexion - 5/5, dorsiflexion - 5/5, plantarflexion - 5/5  Left lower extremity= hip flexion - 5-/5, knee extension - 5-/5, knee flexion - 5-/5, dorsiflexion - 5/5, plantarflexion - 5/5            Lumbar ROM: Flexion: 80 degrees  Extension: 5-10 degrees             Casey's Exam: elicits low back pain on the left and the right side            Lumbar Facet Challenge: pain on extension             Lumbar Paraspinal Tenderness: bilateral L3/L4, L4/L5 paraspinal tenderness  Gait: decreased stride length and broad base     Diagnostic Studies:   The patient had the following diagnostic studies:   CT Angio Chest W Contrast     Result Date: 11/16/2022  Narrative: PROCEDURE: CT ANGIOGRAM OF THE CHEST TECHNIQUE: Computerized tomographic angiography of the chest was performed after the IV injection of iodinated nonionic contrast including image processing.  The image data was  postprocessed using 2-dimensional multiplanar reformatted (MPR and/or MIP) and 3-dimensional (MIP and/or volume rendered) techniques. Automated exposure control, adjustment of mA and/or kV according to patient size, or iterative reconstruction dose optimization techniques were utilized. CPT , 85845 HISTORY: Chest pain unspecified R07.9,  thoracic aneurysm, history of aneurysm thoracic aortic. COMPARISONS: CT chest 1/28/2022. FINDINGS: : Vascular Sinuses of Valsalva maximum 28 mm. Sinotubular junction: 29 mm x 32 mm. Maximum ascending aortic size: 34 mm. Aortic arch 38 mm. Heart and pericardium: Normal. RV:LV ratio There is redemonstration of fusiform dilatation of the proximal descending thoracic aorta. less than 1. Pulmonary vasculature: No evidence of pulmonary embolism.. Lymph nodes:  None.. Lungs:  Unremarkable.. Pleural space:  No effusion, thickening, or pneumothorax. Musculoskeletal structures:  No significant abnormality. Upper abdominal structures:  Hepatic steatosis. Prior cholecystectomy..     Impression: IMPRESSION:   1. Stable appearance of fusiform ectasia of the proximal descending thoracic aorta, not significantly changed from prior exam. The trainee atherosclerotic ulcer or aneurysm at the root. Maximum thoracic aortic diameter measures 3.8 cm. No acute aortic abnormality. ws:[id number] Electronically Signed By Bj Yuan on 2022-11-16 16:14 CTZ        PROCEDURE: MRI CERVICAL SPINE WITHOUT CONTRAST     TECHNIQUE: Magnetic resonance imaging of the cervical spine was   performed using standard pulse sequences without contrast material. CPT   77500     HISTORY: cervical radiculophathy,     COMPARISONS: Radiographs April 14, 2021.     FINDINGS:     Mild reversal the normal cervical lordosis. The vertebral body heights   are maintained. No fracture or subluxation. The cervical medullary   junction and visualized contents of the posterior fossa are   unremarkable. The cervical cord demonstrates  normal signal. The   prevertebral soft tissues are unremarkable.     C1-2: No significant abnormality .     C2-3: No significant abnormality.     C3-4: Mild generalized disc bulge without significant central canal or   neural foraminal stenosis.     C4-5: A central disc protrusion causes mild central canal stenosis with   mild mass effect on the ventral cord. AP diameter the central canal   measures 8 mm. Uncovertebral hypertrophy and facet arthropathy cause   severe LEFT and mild RIGHT neural foraminal stenosis.     C5-6: Mild generalized disc bulge without significant central canal   stenosis. Uncovertebral hypertrophy and facet arthropathy cause mild   bilateral neural foraminal stenosis.     C6-7: Mild generalized disc bulge without significant central canal or   neural foraminal stenosis.     C7-T1: No significant abnormality.     Other: None.  Exam End: 10/13/22 13:25         PROCEDURE: MRI LUMBAR SPINE WITHOUT CONTRAST     TECHNIQUE: Magnetic resonance imaging of the lumbar spine was performed   using standard pulse sequences without contrast material. CPT 28999     HISTORY: lumbar radiculopathy greater than 6 weeks,     COMPARISONS: 5/20/2021. CT 2/24/2022     FINDINGS:     5 lumbar type vertebral bodies are assumed for the purposes of this   dictation. Rudimentary disc is noted at S1-2. Mild straightening of the   lumbar lordosis. There appears to be grade 1 retrolisthesis of L5 on S1.   Multilevel degenerative endplate changes. Mild edema at L5-S1 is likely   reactive. Mild marrow edema is identified in the right L5 pedicle. Edema   in the bilateral sacral ala at S1. Multilevel disc desiccation is more   pronounced inferiorly. Congenitally short pedicles with small size of   the spinal canal. Prominence of the epidural fat may indicate epidural   lipomatosis. The conus ends at the level of L1-2.     T12-L1: Minimal posterior disc bulge. Bilateral facet arthropathy. No   significant spinal canal narrowing  there appears to be mild bilateral   neural foraminal narrowing.     L1-2: Bilateral facet arthropathy. No significant narrowing.     L2-3: Bilateral facet arthropathy. Mild spinal canal narrowing.   Significant neural foraminal narrowing.     L3-4: Mild posterior disc bulge. Right lateral annular fissure.   Bilateral lateral protrusions. Bilateral facet arthropathy. Moderate   spinal canal and mild lateral recess narrowing. Mild bilateral neural   foraminal narrowing.     L4-5: Broad-based posterior disc bulge. Small left lateral protrusion.   Probable subtle right lateral annular fissure. Bilateral facet   arthropathy. Moderate spinal canal narrowing. Mild bilateral neural   foraminal narrowing.     L5-S1: Broad-based posterior disc bulge. Probable posterior annular   fissure. Bilateral facet arthropathy. Severe spinal canal and moderate   to severe lateral recess narrowing. Moderate to severe bilateral neural   foraminal narrowing.     Other: None.  Exam End: 10/13/22 12:50         I personally reviewed all applicable diagnostic imaging related to this visit.     Labs Reviewed:  Lab Results   Component Value Date    BUN 13 10/09/2022    GLUCOSE 120 (H) 10/09/2022    CREATININE 0.81 10/09/2022       No results found for: GFRNA  Hemoglobin A1C (%)   Date Value   11/08/2020 6.2 (H)     WBC (K/mcL)   Date Value   10/07/2022 7.1     RBC (mil/mcL)   Date Value   10/07/2022 3.74 (L)     HCT (%)   Date Value   10/07/2022 35.7 (L)     HGB (g/dL)   Date Value   10/07/2022 11.7 (L)     PLT (K/mcL)   Date Value   10/07/2022 238     GOT/AST (Units/L)   Date Value   10/07/2022 26     GPT/ALT (Units/L)   Date Value   10/07/2022 65 (H)     No results found for: GGTP  Alkaline Phosphatase (Units/L)   Date Value   10/07/2022 56     Bilirubin, Total (mg/dL)   Date Value   10/07/2022 0.3           Diagnosis/Impression:  This is a 62 year old female who presents with a chronic history of low back pain referred to the left lower  extremity impairing her ability to transition in/out of her car,perform routine ADL's ( showering, toileting),  sleep, walk, and perform household chores    1. Patient did have a negative response to her first medial branch block. I do feel now that is possible that her symptoms correlate to her MRI findings of severe CCS and left sided NF and lateral recess narrowing at L5/S1. Patient will now focus on participation in her physical therapy regimen. If her symptoms do not improve, I will consider an epidural injection.     2. We also did provide contact information for patient to schedule rheumatology consultation (referral made last visit for evaluation for inflammatory conditions which may correlate to her pain in multiple joints).     3 Return to clinic in 4 weeks.     Miscellaneous:   Counseling was provided regarding her condition and plan of care and the need to be re-evaluated urgently for any new onset of bowel or bladder incontinence, weakness or severe pain.      I will follow-up with her in about four weeks to evaluate her progress; or anytime sooner for any progressive or new symptoms.     All questions were addressed with the patient and she verbalizes understanding and agreement with the plan of care as described above.  She will contact me with any additional concerns that arise prior to her  next appointment.      Medications, Therapy, and other Orders Assigned at this Clinical Visit:  No orders of the defined types were placed in this encounter.      The treatment plan was fully discussed and agreed upon with patient. All questions were answered.       Program status: Continue in [facility]    ------------------------------------------------------------------------------------------------------------          Education:  See patient instructions per AVS (after visit summary).     Date of Service: 12-11-23 @ 16:29    Patient is a 78y old  Male who presents with a chief complaint of Osteomyelitis     (20 Nov 2023 11:10)      Any change in ROS: on 3 L of oxygen : wife at bedside:  pt is alert and awake:   but overall general condition ids very poor     MEDICATIONS  (STANDING):  chlorhexidine 2% Cloths 1 Application(s) Topical <User Schedule>  dextrose 5% + sodium chloride 0.9%. 1000 milliLiter(s) (50 mL/Hr) IV Continuous <Continuous>  ipratropium    for Nebulization 500 MICROGram(s) Nebulizer every 6 hours  levalbuterol Inhalation 0.63 milliGRAM(s) Inhalation every 6 hours  nystatin Powder 1 Application(s) Topical two times a day  OLANZapine 5 milliGRAM(s) Oral at bedtime  QUEtiapine 100 milliGRAM(s) Oral <User Schedule>    MEDICATIONS  (PRN):  acetaminophen   Oral Liquid .. 750 milliGRAM(s) Oral every 6 hours PRN Temp greater or equal to 38C (100.4F), Mild Pain (1 - 3)  bisacodyl Suppository 10 milliGRAM(s) Rectal daily PRN Constipation  glycopyrrolate Injectable 0.4 milliGRAM(s) IV Push every 6 hours PRN secretions  morphine  - Injectable 2 milliGRAM(s) IV Push every 1 hour PRN Severe Pain (7 - 10)  morphine  - Injectable 1 milliGRAM(s) IV Push every 1 hour PRN dsypnea, RR>22  morphine  - Injectable 1 milliGRAM(s) IV Push every 1 hour PRN Moderate Pain (4 - 6)    Vital Signs Last 24 Hrs  T(C): 36.8 (11 Dec 2023 05:00), Max: 37.1 (11 Dec 2023 00:34)  T(F): 98.2 (11 Dec 2023 05:00), Max: 98.7 (11 Dec 2023 00:34)  HR: 100 (11 Dec 2023 05:00) (100 - 107)  BP: 127/75 (11 Dec 2023 05:00) (101/64 - 128/77)  BP(mean): --  RR: 18 (11 Dec 2023 05:00) (18 - 18)  SpO2: 99% (11 Dec 2023 05:00) (96% - 99%)    Parameters below as of 11 Dec 2023 05:00  Patient On (Oxygen Delivery Method): nasal cannula  O2 Flow (L/min): 4      I&O's Summary    10 Dec 2023 07:01  -  11 Dec 2023 07:00  --------------------------------------------------------  IN: 950 mL / OUT: 300 mL / NET: 650 mL          Physical Exam:   GENERAL: NAD, well-groomed, well-developed  HEENT: CANDY/   Atraumatic, Normocephalic  ENMT: No tonsillar erythema, exudates, or enlargement; Moist mucous membranes, Good dentition, No lesions  NECK: Supple, No JVD, Normal thyroid  CHEST/LUNG: poor air entry   CVS: Regular rate and rhythm; No murmurs, rubs, or gallops  GI: : Soft, Nontender, Nondistended; Bowel sounds present  NERVOUS SYSTEM:  Alert & awake:  demented  EXTREMITIES:  2+ Peripheral Pulses, No clubbing, cyanosis, or edema  LYMPH: No lymphadenopathy noted  SKIN: No rashes or lesions  ENDOCRINOLOGY: No Thyromegaly  PSYCH: demented    Labs:                              10.7   14.41 )-----------( 378      ( 09 Dec 2023 09:38 )             34.9     12-09    152<H>  |  119<H>  |  16  ----------------------------<  163<H>  3.6   |  24  |  0.35<L>        CAPILLARY BLOOD GLUCOSE                  rad< from: Xray Chest 1 View- PORTABLE-Routine (Xray Chest 1 View- PORTABLE-Routine .) (12.01.23 @ 14:13) >      INTERPRETATION:  DATE OF STUDY: 12/1/23    PRIOR: 11/30/23    CLINICAL INDICATION: Assess left lung atelectasis.    TECHNIQUE: AP radiograph of the chest.    FINDINGS:  Heart is similar in size.  Right lung remains clear.  Partial reaeration of the upper and mid left lung.  Partly loculated lower left pleural effusion is present. Mid and lower   left lung haziness may be due to underlying infection in the right   clinical setting. No pneumothorax.    IMPRESSION:  Partial reaeration of the left lung.  Small loculated left pleural effusion.  Associated mid and lower left lung atelectasis.    --- End of Report ---            MARLYN AMARO MD; Attending Radiologist  This document has been electronically signed. Dec  2 2023 10:22AM    < end of copied text >      RECENT CULTURES:        RESPIRATORY CULTURES:          Studies  Chest X-RAY  CT SCAN Chest   Venous Dopplers: LE:   CT Abdomen  Others

## 2023-12-11 NOTE — PROGRESS NOTE ADULT - PROBLEM SELECTOR PLAN 1
today he is on 3 L of oxygen  nasal cannula: off high flow:  general condition remains the same:  wife is at bedside:  cont conservative management:

## 2023-12-11 NOTE — PROGRESS NOTE ADULT - SUBJECTIVE AND OBJECTIVE BOX
SHARONA LARKIN  78y Male  MRN:[id number]    Patient is a 78y old  Male who presents with a chief complaint of sepsis      HPI:  77 yo M PMHx of severe dementia, PEG tube presents with ulcer that has worsened over last month. Patient was seen by house calls doctor 2 weeks ago, placed on antibiotics but started mounting fevers in the last few days. It was noticed by his GI doctor (Dr. Moraes) that his ulcer was getting worse, so he sent him in. Patient is nonverbal at baseline. Wife has noticed no new cough, runny nose. (19 Nov 2023 13:02)      Patient seen and evaluated at bedside. No acute events overnight except as noted.    Interval HPI: no acute events o/n       PAST MEDICAL & SURGICAL HISTORY:  Dementia      Pneumonia      Acute renal failure (ARF)  ARF 1.5 YEAR AGO      Dysphagia  peg      Arthritis      GERD (gastroesophageal reflux disease)      Dyspepsia      History of hand surgery      PEG adjustment, replacement, or removal      Status post insertion of percutaneous endoscopic gastrostomy (PEG) tube  PEG replacement on 02/29/16          REVIEW OF SYSTEMS:  as per hpi     VITALS:   Vital Signs Last 24 Hrs  T(C): 36.8 (11 Dec 2023 05:00), Max: 37.1 (11 Dec 2023 00:34)  T(F): 98.2 (11 Dec 2023 05:00), Max: 98.7 (11 Dec 2023 00:34)  HR: 100 (11 Dec 2023 05:00) (100 - 107)  BP: 127/75 (11 Dec 2023 05:00) (101/64 - 128/77)  BP(mean): --  RR: 18 (11 Dec 2023 05:00) (18 - 18)  SpO2: 99% (11 Dec 2023 05:00) (96% - 99%)    Parameters below as of 11 Dec 2023 05:00  Patient On (Oxygen Delivery Method): nasal cannula  O2 Flow (L/min): 4      PHYSICAL EXAM:  GENERAL: NAD,  comfortable   HEAD:  Atraumatic, Normocephalic  EYES: EOMI, PERRLA, conjunctiva and sclera clear  NECK: Supple, No JVD  CHEST/LUNG: Clear to auscultation bilaterally; No wheeze  HEART: S1, S2; No murmurs, rubs, or gallops  ABDOMEN: Soft, Nontender, Nondistended; Bowel sounds present +peg  EXTREMITIES:  2+ Peripheral Pulses, contracted   PSYCH: Normal affect  NEUROLOGY: non verbal  SKIN: +sacral decub    Consultant(s) Notes Reviewed:  [x ] YES  [ ] NO  Care Discussed with Consultants/Other Providers [ x] YES  [ ] NO    MEDS:   MEDICATIONS  (STANDING):  chlorhexidine 2% Cloths 1 Application(s) Topical <User Schedule>  dextrose 5% + sodium chloride 0.9%. 1000 milliLiter(s) (50 mL/Hr) IV Continuous <Continuous>  ipratropium    for Nebulization 500 MICROGram(s) Nebulizer every 6 hours  levalbuterol Inhalation 0.63 milliGRAM(s) Inhalation every 6 hours  nystatin Powder 1 Application(s) Topical two times a day  OLANZapine 5 milliGRAM(s) Oral at bedtime  QUEtiapine 100 milliGRAM(s) Oral <User Schedule>    MEDICATIONS  (PRN):  acetaminophen   Oral Liquid .. 750 milliGRAM(s) Oral every 6 hours PRN Temp greater or equal to 38C (100.4F), Mild Pain (1 - 3)  bisacodyl Suppository 10 milliGRAM(s) Rectal daily PRN Constipation  glycopyrrolate Injectable 0.4 milliGRAM(s) IV Push every 6 hours PRN secretions  morphine  - Injectable 2 milliGRAM(s) IV Push every 1 hour PRN Severe Pain (7 - 10)  morphine  - Injectable 1 milliGRAM(s) IV Push every 1 hour PRN Moderate Pain (4 - 6)  morphine  - Injectable 1 milliGRAM(s) IV Push every 1 hour PRN dsypnea, RR>22      ALLERGIES:  Haldol (Other)  No Known Allergies  benzodiazepines (Other)      LABS:              no new labs    < from: CT Angio Chest PE Protocol w/ IV Cont (11.27.23 @ 22:44) >  IMPRESSION:.    No pulmonary embolism detected.    Occlusive secretions within the left mainstem bronchus with associated   complete left lung collapse; consider superimposed infection in the   appropriate clinical scenario.    Small leftpleural effusion.    --- End of Report ---      < end of copied text >       < from: CT Abdomen and Pelvis w/ IV Cont (11.18.23 @ 19:41) >    IMPRESSION:    Extensive decubitus ulcer involving the right posterior gluteal and   pelvic region with extension to bone. There is bony erosion of the   ischium compatible with osteomyelitis.    Mild bladder wall thickening and a few foci of intraluminal air,   correlate for recent instrumentation and/or infection.    Left lower lobe consolidation along with tree-in-bud/nodular opacities in   the left upper lobe concerning for pneumonia.    --- End of Report ---        < end of copied text >   SHARONA LARKIN  78y Male  MRN:[id number]    Patient is a 78y old  Male who presents with a chief complaint of sepsis      HPI:  79 yo M PMHx of severe dementia, PEG tube presents with ulcer that has worsened over last month. Patient was seen by house calls doctor 2 weeks ago, placed on antibiotics but started mounting fevers in the last few days. It was noticed by his GI doctor (Dr. Moraes) that his ulcer was getting worse, so he sent him in. Patient is nonverbal at baseline. Wife has noticed no new cough, runny nose. (19 Nov 2023 13:02)      Patient seen and evaluated at bedside. No acute events overnight except as noted.    Interval HPI: no acute events o/n       PAST MEDICAL & SURGICAL HISTORY:  Dementia      Pneumonia      Acute renal failure (ARF)  ARF 1.5 YEAR AGO      Dysphagia  peg      Arthritis      GERD (gastroesophageal reflux disease)      Dyspepsia      History of hand surgery      PEG adjustment, replacement, or removal      Status post insertion of percutaneous endoscopic gastrostomy (PEG) tube  PEG replacement on 02/29/16          REVIEW OF SYSTEMS:  as per hpi     VITALS:   Vital Signs Last 24 Hrs  T(C): 36.8 (11 Dec 2023 05:00), Max: 37.1 (11 Dec 2023 00:34)  T(F): 98.2 (11 Dec 2023 05:00), Max: 98.7 (11 Dec 2023 00:34)  HR: 100 (11 Dec 2023 05:00) (100 - 107)  BP: 127/75 (11 Dec 2023 05:00) (101/64 - 128/77)  BP(mean): --  RR: 18 (11 Dec 2023 05:00) (18 - 18)  SpO2: 99% (11 Dec 2023 05:00) (96% - 99%)    Parameters below as of 11 Dec 2023 05:00  Patient On (Oxygen Delivery Method): nasal cannula  O2 Flow (L/min): 4      PHYSICAL EXAM:  GENERAL: NAD,  comfortable   HEAD:  Atraumatic, Normocephalic  EYES: EOMI, PERRLA, conjunctiva and sclera clear  NECK: Supple, No JVD  CHEST/LUNG: Clear to auscultation bilaterally; No wheeze  HEART: S1, S2; No murmurs, rubs, or gallops  ABDOMEN: Soft, Nontender, Nondistended; Bowel sounds present +peg  EXTREMITIES:  2+ Peripheral Pulses, contracted   PSYCH: Normal affect  NEUROLOGY: non verbal  SKIN: +sacral decub    Consultant(s) Notes Reviewed:  [x ] YES  [ ] NO  Care Discussed with Consultants/Other Providers [ x] YES  [ ] NO    MEDS:   MEDICATIONS  (STANDING):  chlorhexidine 2% Cloths 1 Application(s) Topical <User Schedule>  dextrose 5% + sodium chloride 0.9%. 1000 milliLiter(s) (50 mL/Hr) IV Continuous <Continuous>  ipratropium    for Nebulization 500 MICROGram(s) Nebulizer every 6 hours  levalbuterol Inhalation 0.63 milliGRAM(s) Inhalation every 6 hours  nystatin Powder 1 Application(s) Topical two times a day  OLANZapine 5 milliGRAM(s) Oral at bedtime  QUEtiapine 100 milliGRAM(s) Oral <User Schedule>    MEDICATIONS  (PRN):  acetaminophen   Oral Liquid .. 750 milliGRAM(s) Oral every 6 hours PRN Temp greater or equal to 38C (100.4F), Mild Pain (1 - 3)  bisacodyl Suppository 10 milliGRAM(s) Rectal daily PRN Constipation  glycopyrrolate Injectable 0.4 milliGRAM(s) IV Push every 6 hours PRN secretions  morphine  - Injectable 2 milliGRAM(s) IV Push every 1 hour PRN Severe Pain (7 - 10)  morphine  - Injectable 1 milliGRAM(s) IV Push every 1 hour PRN Moderate Pain (4 - 6)  morphine  - Injectable 1 milliGRAM(s) IV Push every 1 hour PRN dsypnea, RR>22      ALLERGIES:  Haldol (Other)  No Known Allergies  benzodiazepines (Other)      LABS:              no new labs    < from: CT Angio Chest PE Protocol w/ IV Cont (11.27.23 @ 22:44) >  IMPRESSION:.    No pulmonary embolism detected.    Occlusive secretions within the left mainstem bronchus with associated   complete left lung collapse; consider superimposed infection in the   appropriate clinical scenario.    Small leftpleural effusion.    --- End of Report ---      < end of copied text >       < from: CT Abdomen and Pelvis w/ IV Cont (11.18.23 @ 19:41) >    IMPRESSION:    Extensive decubitus ulcer involving the right posterior gluteal and   pelvic region with extension to bone. There is bony erosion of the   ischium compatible with osteomyelitis.    Mild bladder wall thickening and a few foci of intraluminal air,   correlate for recent instrumentation and/or infection.    Left lower lobe consolidation along with tree-in-bud/nodular opacities in   the left upper lobe concerning for pneumonia.    --- End of Report ---        < end of copied text >

## 2023-12-11 NOTE — PROGRESS NOTE ADULT - ASSESSMENT
79 yo male h/o dementia, non verbal. contracted, s/p peg. here with sepsis likely 2/2 sacral decub and aspiration pna    sepsis  respiratory failure   sacral decub  pna - possible aspiration    abx as per id - now complete  f/u cult  wound care f/u  id consult noted  pulm consulted  cont supp o2, now off high flow. taper down NC as able   CTA chest noted  pulm f/u   bipap as able  abx changed to meropenem. id f/u apprec - now complete  s/p RRT for hypoxia. n     dementia  cont home meds  supportive care    dysphagia s/p peg  feeds now on hold  s/p peg change  by gi bedside   free water via peg     hyperNa  free water via peg ordered   no further labs as per family       dvt ppx      d/w family bedside  DNR  comfort care  palliative f/u  family wants to take pt home  dc planning home with home o2      Advanced care planning was discussed with patient and family.  Advanced care planning forms were reviewed and discussed as appropriate.  Differential diagnosis and plan of care discussed with patient after the evaluation.   Pain assessed and judicious use of narcotics when appropriate was discussed.  Importance of Fall prevention discussed.  Counseling on Smoking and Alcohol cessation was offered when appropriate.  Counseling on Diet, exercise, and medication compliance was done.       Approx 75 minutes spent.

## 2023-12-11 NOTE — PROGRESS NOTE ADULT - ASSESSMENT
sacral decub  PEG tube  sepsis    CT noted  ID following  peg changed at bedside gastric contents returned can use for feeding and meds  wound care f/u  cultures noted  cont tube feeds as tolerated   aspiration precautions   palliative following for GOC

## 2023-12-11 NOTE — PROGRESS NOTE ADULT - SUBJECTIVE AND OBJECTIVE BOX
INTERVAL HPI/OVERNIGH  seen and examined , no new events  tolerating TF No N/V/D  down to 4L NC          MEDICATIONS  (STANDING):  chlorhexidine 2% Cloths 1 Application(s) Topical <User Schedule>  Dakins Solution - 1/4 Strength 1 Application(s) Topical two times a day  enoxaparin Injectable 40 milliGRAM(s) SubCutaneous every 24 hours  ipratropium    for Nebulization 500 MICROGram(s) Nebulizer every 6 hours  levalbuterol Inhalation 0.63 milliGRAM(s) Inhalation every 6 hours  OLANZapine 5 milliGRAM(s) Oral <User Schedule>  pantoprazole    Tablet 40 milliGRAM(s) Oral before breakfast  QUEtiapine 100 milliGRAM(s) Oral <User Schedule>  senna Syrup 10 milliLiter(s) Oral at bedtime  sodium chloride 3%  Inhalation 4 milliLiter(s) Inhalation every 6 hours  sucralfate 1 Gram(s) Oral four times a day    MEDICATIONS  (PRN):  acetaminophen   Oral Liquid .. 750 milliGRAM(s) Oral every 6 hours PRN Temp greater or equal to 38C (100.4F), Mild Pain (1 - 3)      Allergies    No Known Allergies    Intolerances    Haldol (Other)  benzodiazepines (Other)        PHYSICAL EXAM  Vital Signs Last 24 Hrs  T(C): 36.8 (11 Dec 2023 05:00), Max: 37.1 (11 Dec 2023 00:34)  T(F): 98.2 (11 Dec 2023 05:00), Max: 98.7 (11 Dec 2023 00:34)  HR: 100 (11 Dec 2023 05:00) (100 - 107)  BP: 127/75 (11 Dec 2023 05:00) (101/64 - 128/77)  BP(mean): --  RR: 18 (11 Dec 2023 05:00) (18 - 18)  SpO2: 99% (11 Dec 2023 05:00) (96% - 99%)    Parameters below as of 11 Dec 2023 05:00  Patient On (Oxygen Delivery Method): nasal cannula  O2 Flow (L/min): 4        nad    confused  frail  non toxic  soft, nt, +PEG  no edema      LABS:

## 2023-12-12 RX ORDER — ACETYLCYSTEINE 200 MG/ML
4 VIAL (ML) MISCELLANEOUS EVERY 12 HOURS
Refills: 0 | Status: DISCONTINUED | OUTPATIENT
Start: 2023-12-12 | End: 2023-12-14

## 2023-12-12 RX ORDER — MORPHINE SULFATE 50 MG/1
0.5 CAPSULE, EXTENDED RELEASE ORAL ONCE
Refills: 0 | Status: DISCONTINUED | OUTPATIENT
Start: 2023-12-12 | End: 2023-12-12

## 2023-12-12 RX ORDER — PANTOPRAZOLE SODIUM 20 MG/1
40 TABLET, DELAYED RELEASE ORAL DAILY
Refills: 0 | Status: DISCONTINUED | OUTPATIENT
Start: 2023-12-12 | End: 2023-12-14

## 2023-12-12 RX ADMIN — LEVALBUTEROL 0.63 MILLIGRAM(S): 1.25 SOLUTION, CONCENTRATE RESPIRATORY (INHALATION) at 13:04

## 2023-12-12 RX ADMIN — MORPHINE SULFATE 0.5 MILLIGRAM(S): 50 CAPSULE, EXTENDED RELEASE ORAL at 10:51

## 2023-12-12 RX ADMIN — CHLORHEXIDINE GLUCONATE 1 APPLICATION(S): 213 SOLUTION TOPICAL at 05:46

## 2023-12-12 RX ADMIN — Medication 500 MICROGRAM(S): at 19:22

## 2023-12-12 RX ADMIN — Medication 750 MILLIGRAM(S): at 08:17

## 2023-12-12 RX ADMIN — NYSTATIN CREAM 1 APPLICATION(S): 100000 CREAM TOPICAL at 05:46

## 2023-12-12 RX ADMIN — Medication 500 MICROGRAM(S): at 05:45

## 2023-12-12 RX ADMIN — Medication 200 MILLIGRAM(S): at 16:19

## 2023-12-12 RX ADMIN — Medication 4 MILLILITER(S): at 18:43

## 2023-12-12 RX ADMIN — Medication 750 MILLIGRAM(S): at 02:00

## 2023-12-12 RX ADMIN — LEVALBUTEROL 0.63 MILLIGRAM(S): 1.25 SOLUTION, CONCENTRATE RESPIRATORY (INHALATION) at 19:01

## 2023-12-12 RX ADMIN — ROBINUL 0.4 MILLIGRAM(S): 0.2 INJECTION INTRAMUSCULAR; INTRAVENOUS at 13:01

## 2023-12-12 RX ADMIN — LEVALBUTEROL 0.63 MILLIGRAM(S): 1.25 SOLUTION, CONCENTRATE RESPIRATORY (INHALATION) at 05:45

## 2023-12-12 RX ADMIN — Medication 200 MILLIGRAM(S): at 21:35

## 2023-12-12 RX ADMIN — Medication 750 MILLIGRAM(S): at 17:19

## 2023-12-12 RX ADMIN — Medication 500 MICROGRAM(S): at 13:05

## 2023-12-12 RX ADMIN — Medication 750 MILLIGRAM(S): at 01:54

## 2023-12-12 RX ADMIN — Medication 750 MILLIGRAM(S): at 09:17

## 2023-12-12 RX ADMIN — NYSTATIN CREAM 1 APPLICATION(S): 100000 CREAM TOPICAL at 21:35

## 2023-12-12 RX ADMIN — MORPHINE SULFATE 0.5 MILLIGRAM(S): 50 CAPSULE, EXTENDED RELEASE ORAL at 11:06

## 2023-12-12 RX ADMIN — Medication 750 MILLIGRAM(S): at 16:19

## 2023-12-12 RX ADMIN — QUETIAPINE FUMARATE 100 MILLIGRAM(S): 200 TABLET, FILM COATED ORAL at 21:35

## 2023-12-12 NOTE — PROGRESS NOTE ADULT - ASSESSMENT
79 yo male h/o dementia, non verbal. contracted, s/p peg. here with sepsis likely 2/2 sacral decub and aspiration pna    sepsis  respiratory failure   sacral decub  pna - possible aspiration    abx as per id - now complete  f/u cult  wound care f/u  id consult noted  pulm consulted  cont supp o2, now off high flow. taper down NC as able   CTA chest noted  pulm f/u   bipap as able  abx changed to meropenem. id f/u apprec - now complete  frequent suctioning     dementia  cont home meds  supportive care    dysphagia s/p peg  feeds now on hold  s/p peg change  by gi bedside   free water via peg     hyperNa  free water via peg ordered   no further labs as per family       dvt ppx      d/w family bedside  DNR  family wants to take pt home  dc planning home with home o2      Advanced care planning was discussed with patient and family.  Advanced care planning forms were reviewed and discussed as appropriate.  Differential diagnosis and plan of care discussed with patient after the evaluation.   Pain assessed and judicious use of narcotics when appropriate was discussed.  Importance of Fall prevention discussed.  Counseling on Smoking and Alcohol cessation was offered when appropriate.  Counseling on Diet, exercise, and medication compliance was done.       Approx 75 minutes spent.

## 2023-12-12 NOTE — PROGRESS NOTE ADULT - SUBJECTIVE AND OBJECTIVE BOX
INTERVAL HPI/OVERNIGH  seen and examined , no new events  tolerating TF No N/V/D  down to 4L NC          MEDICATIONS  (STANDING):  chlorhexidine 2% Cloths 1 Application(s) Topical <User Schedule>  Dakins Solution - 1/4 Strength 1 Application(s) Topical two times a day  enoxaparin Injectable 40 milliGRAM(s) SubCutaneous every 24 hours  ipratropium    for Nebulization 500 MICROGram(s) Nebulizer every 6 hours  levalbuterol Inhalation 0.63 milliGRAM(s) Inhalation every 6 hours  OLANZapine 5 milliGRAM(s) Oral <User Schedule>  pantoprazole    Tablet 40 milliGRAM(s) Oral before breakfast  QUEtiapine 100 milliGRAM(s) Oral <User Schedule>  senna Syrup 10 milliLiter(s) Oral at bedtime  sodium chloride 3%  Inhalation 4 milliLiter(s) Inhalation every 6 hours  sucralfate 1 Gram(s) Oral four times a day    MEDICATIONS  (PRN):  acetaminophen   Oral Liquid .. 750 milliGRAM(s) Oral every 6 hours PRN Temp greater or equal to 38C (100.4F), Mild Pain (1 - 3)      Allergies    No Known Allergies    Intolerances    Haldol (Other)  benzodiazepines (Other)        PHYSICAL EXAM  Vital Signs Last 24 Hrs  T(C): 36.3 (12 Dec 2023 08:43), Max: 36.5 (11 Dec 2023 23:41)  T(F): 97.4 (12 Dec 2023 08:43), Max: 97.7 (11 Dec 2023 23:41)  HR: 117 (12 Dec 2023 08:43) (104 - 117)  BP: 101/60 (12 Dec 2023 08:43) (101/60 - 109/71)  BP(mean): --  RR: 18 (12 Dec 2023 08:43) (18 - 18)  SpO2: 96% (12 Dec 2023 08:43) (96% - 98%)    Parameters below as of 12 Dec 2023 08:43  Patient On (Oxygen Delivery Method): nasal cannula  O2 Flow (L/min): 4      nad  confused  frail  non toxic  soft, nt, +PEG  no edema      LABS:

## 2023-12-12 NOTE — PROGRESS NOTE ADULT - PROBLEM SELECTOR PLAN 1
today he is on 3 L of oxygen  nasal cannula: off high flow:  general condition remains the same:  wife is at bedside:  cont conservative management:  pt is doing very poorly today on 12/12 compared to yesterday : he is gasping:   cont comfort measures:  and suctioning:  morphine prn  : grand daughter in agreement with the  managment

## 2023-12-12 NOTE — PROVIDER CONTACT NOTE (MEDICATION) - ACTION/TREATMENT ORDERED:
Jamari DIXON notified and gave the okay for scheduled olanzapine to be refused.
will follow up
administer toradol as per order
Jamari DIXON notified and aware.

## 2023-12-12 NOTE — PROVIDER CONTACT NOTE (MEDICATION) - SITUATION
Rectal temp 100.4
RN did not give protonix medication because it's a non crushable pill and patient is NPO with PEG tube feeds.
Pt grimacing slightly appears to be in pain wife at bedside is requesting morphine for comfort
Pt's spouse refused Olanzapine medication and expressed that the patient hasn't been taking this medication for a while now. Education provided.

## 2023-12-12 NOTE — PROGRESS NOTE ADULT - SUBJECTIVE AND OBJECTIVE BOX
SHARONA LARKIN  78y Male  MRN:[id number]    Patient is a 78y old  Male who presents with a chief complaint of sepsis      HPI:  77 yo M PMHx of severe dementia, PEG tube presents with ulcer that has worsened over last month. Patient was seen by house calls doctor 2 weeks ago, placed on antibiotics but started mounting fevers in the last few days. It was noticed by his GI doctor (Dr. Moraes) that his ulcer was getting worse, so he sent him in. Patient is nonverbal at baseline. Wife has noticed no new cough, runny nose. (19 Nov 2023 13:02)      Patient seen and evaluated at bedside. No acute events overnight except as noted.    Interval HPI: no acute events o/n       PAST MEDICAL & SURGICAL HISTORY:  Dementia      Pneumonia      Acute renal failure (ARF)  ARF 1.5 YEAR AGO      Dysphagia  peg      Arthritis      GERD (gastroesophageal reflux disease)      Dyspepsia      History of hand surgery      PEG adjustment, replacement, or removal      Status post insertion of percutaneous endoscopic gastrostomy (PEG) tube  PEG replacement on 02/29/16          REVIEW OF SYSTEMS:  as per hpi     VITALS:   Vital Signs Last 24 Hrs  T(C): 36.3 (12 Dec 2023 08:43), Max: 36.5 (11 Dec 2023 23:41)  T(F): 97.4 (12 Dec 2023 08:43), Max: 97.7 (11 Dec 2023 23:41)  HR: 117 (12 Dec 2023 08:43) (104 - 117)  BP: 101/60 (12 Dec 2023 08:43) (101/60 - 109/71)  BP(mean): --  RR: 18 (12 Dec 2023 08:43) (18 - 18)  SpO2: 96% (12 Dec 2023 08:43) (96% - 98%)    Parameters below as of 12 Dec 2023 08:43  Patient On (Oxygen Delivery Method): nasal cannula  O2 Flow (L/min): 4      PHYSICAL EXAM:  GENERAL: NAD,  comfortable   HEAD:  Atraumatic, Normocephalic  EYES: EOMI, PERRLA, conjunctiva and sclera clear  NECK: Supple, No JVD  CHEST/LUNG: Clear to auscultation bilaterally; No wheeze  HEART: S1, S2; No murmurs, rubs, or gallops  ABDOMEN: Soft, Nontender, Nondistended; Bowel sounds present +peg  EXTREMITIES:  2+ Peripheral Pulses, contracted   PSYCH: Normal affect  NEUROLOGY: non verbal  SKIN: +sacral decub    Consultant(s) Notes Reviewed:  [x ] YES  [ ] NO  Care Discussed with Consultants/Other Providers [ x] YES  [ ] NO    MEDS:   MEDICATIONS  (STANDING):  chlorhexidine 2% Cloths 1 Application(s) Topical <User Schedule>  dextrose 5% + sodium chloride 0.9%. 1000 milliLiter(s) (50 mL/Hr) IV Continuous <Continuous>  guaiFENesin Oral Liquid (Sugar-Free) 200 milliGRAM(s) Oral every 8 hours  ipratropium    for Nebulization 500 MICROGram(s) Nebulizer every 6 hours  levalbuterol Inhalation 0.63 milliGRAM(s) Inhalation every 6 hours  nystatin Powder 1 Application(s) Topical two times a day  pantoprazole   Suspension 40 milliGRAM(s) Oral daily  QUEtiapine 100 milliGRAM(s) Oral <User Schedule>    MEDICATIONS  (PRN):  acetaminophen   Oral Liquid .. 750 milliGRAM(s) Oral every 6 hours PRN Temp greater or equal to 38C (100.4F), Mild Pain (1 - 3)  bisacodyl Suppository 10 milliGRAM(s) Rectal daily PRN Constipation  glycopyrrolate Injectable 0.4 milliGRAM(s) IV Push every 6 hours PRN secretions      ALLERGIES:  Haldol (Other)  No Known Allergies  benzodiazepines (Other)      LABS:              no new labs    < from: CT Angio Chest PE Protocol w/ IV Cont (11.27.23 @ 22:44) >  IMPRESSION:.    No pulmonary embolism detected.    Occlusive secretions within the left mainstem bronchus with associated   complete left lung collapse; consider superimposed infection in the   appropriate clinical scenario.    Small leftpleural effusion.    --- End of Report ---      < end of copied text >       < from: CT Abdomen and Pelvis w/ IV Cont (11.18.23 @ 19:41) >    IMPRESSION:    Extensive decubitus ulcer involving the right posterior gluteal and   pelvic region with extension to bone. There is bony erosion of the   ischium compatible with osteomyelitis.    Mild bladder wall thickening and a few foci of intraluminal air,   correlate for recent instrumentation and/or infection.    Left lower lobe consolidation along with tree-in-bud/nodular opacities in   the left upper lobe concerning for pneumonia.    --- End of Report ---        < end of copied text >   SHARONA LARKIN  78y Male  MRN:[id number]    Patient is a 78y old  Male who presents with a chief complaint of sepsis      HPI:  79 yo M PMHx of severe dementia, PEG tube presents with ulcer that has worsened over last month. Patient was seen by house calls doctor 2 weeks ago, placed on antibiotics but started mounting fevers in the last few days. It was noticed by his GI doctor (Dr. Moraes) that his ulcer was getting worse, so he sent him in. Patient is nonverbal at baseline. Wife has noticed no new cough, runny nose. (19 Nov 2023 13:02)      Patient seen and evaluated at bedside. No acute events overnight except as noted.    Interval HPI: no acute events o/n       PAST MEDICAL & SURGICAL HISTORY:  Dementia      Pneumonia      Acute renal failure (ARF)  ARF 1.5 YEAR AGO      Dysphagia  peg      Arthritis      GERD (gastroesophageal reflux disease)      Dyspepsia      History of hand surgery      PEG adjustment, replacement, or removal      Status post insertion of percutaneous endoscopic gastrostomy (PEG) tube  PEG replacement on 02/29/16          REVIEW OF SYSTEMS:  as per hpi     VITALS:   Vital Signs Last 24 Hrs  T(C): 36.3 (12 Dec 2023 08:43), Max: 36.5 (11 Dec 2023 23:41)  T(F): 97.4 (12 Dec 2023 08:43), Max: 97.7 (11 Dec 2023 23:41)  HR: 117 (12 Dec 2023 08:43) (104 - 117)  BP: 101/60 (12 Dec 2023 08:43) (101/60 - 109/71)  BP(mean): --  RR: 18 (12 Dec 2023 08:43) (18 - 18)  SpO2: 96% (12 Dec 2023 08:43) (96% - 98%)    Parameters below as of 12 Dec 2023 08:43  Patient On (Oxygen Delivery Method): nasal cannula  O2 Flow (L/min): 4      PHYSICAL EXAM:  GENERAL: NAD,  comfortable   HEAD:  Atraumatic, Normocephalic  EYES: EOMI, PERRLA, conjunctiva and sclera clear  NECK: Supple, No JVD  CHEST/LUNG: Clear to auscultation bilaterally; No wheeze  HEART: S1, S2; No murmurs, rubs, or gallops  ABDOMEN: Soft, Nontender, Nondistended; Bowel sounds present +peg  EXTREMITIES:  2+ Peripheral Pulses, contracted   PSYCH: Normal affect  NEUROLOGY: non verbal  SKIN: +sacral decub    Consultant(s) Notes Reviewed:  [x ] YES  [ ] NO  Care Discussed with Consultants/Other Providers [ x] YES  [ ] NO    MEDS:   MEDICATIONS  (STANDING):  chlorhexidine 2% Cloths 1 Application(s) Topical <User Schedule>  dextrose 5% + sodium chloride 0.9%. 1000 milliLiter(s) (50 mL/Hr) IV Continuous <Continuous>  guaiFENesin Oral Liquid (Sugar-Free) 200 milliGRAM(s) Oral every 8 hours  ipratropium    for Nebulization 500 MICROGram(s) Nebulizer every 6 hours  levalbuterol Inhalation 0.63 milliGRAM(s) Inhalation every 6 hours  nystatin Powder 1 Application(s) Topical two times a day  pantoprazole   Suspension 40 milliGRAM(s) Oral daily  QUEtiapine 100 milliGRAM(s) Oral <User Schedule>    MEDICATIONS  (PRN):  acetaminophen   Oral Liquid .. 750 milliGRAM(s) Oral every 6 hours PRN Temp greater or equal to 38C (100.4F), Mild Pain (1 - 3)  bisacodyl Suppository 10 milliGRAM(s) Rectal daily PRN Constipation  glycopyrrolate Injectable 0.4 milliGRAM(s) IV Push every 6 hours PRN secretions      ALLERGIES:  Haldol (Other)  No Known Allergies  benzodiazepines (Other)      LABS:              no new labs    < from: CT Angio Chest PE Protocol w/ IV Cont (11.27.23 @ 22:44) >  IMPRESSION:.    No pulmonary embolism detected.    Occlusive secretions within the left mainstem bronchus with associated   complete left lung collapse; consider superimposed infection in the   appropriate clinical scenario.    Small leftpleural effusion.    --- End of Report ---      < end of copied text >       < from: CT Abdomen and Pelvis w/ IV Cont (11.18.23 @ 19:41) >    IMPRESSION:    Extensive decubitus ulcer involving the right posterior gluteal and   pelvic region with extension to bone. There is bony erosion of the   ischium compatible with osteomyelitis.    Mild bladder wall thickening and a few foci of intraluminal air,   correlate for recent instrumentation and/or infection.    Left lower lobe consolidation along with tree-in-bud/nodular opacities in   the left upper lobe concerning for pneumonia.    --- End of Report ---        < end of copied text >

## 2023-12-12 NOTE — PROVIDER CONTACT NOTE (MEDICATION) - BACKGROUND
next dpse of tylenol due at 11:15  Pt spiked temp and was tachycardic LN, vanco x1 administered, on zosyn.
Pt admitted 11/19 for osteomyelitis.
Pt was on morphine prior
Pt admitted 11/19 for osteomyelitis.

## 2023-12-12 NOTE — PROGRESS NOTE ADULT - SUBJECTIVE AND OBJECTIVE BOX
Date of Service: 12-12-23 @ 15:17    Patient is a 78y old  Male who presents with a chief complaint of Osteomyelitis     (20 Nov 2023 11:10)      Any change in ROS: today  pt is doing very poorly:  he gasping today  : grand daughter is at bedside:  she understands  and understands that he may not make it for long     MEDICATIONS  (STANDING):  acetylcysteine 10%  Inhalation 4 milliLiter(s) Inhalation every 12 hours  chlorhexidine 2% Cloths 1 Application(s) Topical <User Schedule>  dextrose 5% + sodium chloride 0.9%. 1000 milliLiter(s) (50 mL/Hr) IV Continuous <Continuous>  guaiFENesin Oral Liquid (Sugar-Free) 200 milliGRAM(s) Oral every 8 hours  ipratropium    for Nebulization 500 MICROGram(s) Nebulizer every 6 hours  levalbuterol Inhalation 0.63 milliGRAM(s) Inhalation every 6 hours  nystatin Powder 1 Application(s) Topical two times a day  pantoprazole   Suspension 40 milliGRAM(s) Oral daily  QUEtiapine 100 milliGRAM(s) Oral <User Schedule>    MEDICATIONS  (PRN):  acetaminophen   Oral Liquid .. 750 milliGRAM(s) Oral every 6 hours PRN Temp greater or equal to 38C (100.4F), Mild Pain (1 - 3)  bisacodyl Suppository 10 milliGRAM(s) Rectal daily PRN Constipation  glycopyrrolate Injectable 0.4 milliGRAM(s) IV Push every 6 hours PRN secretions    Vital Signs Last 24 Hrs  T(C): 36.3 (12 Dec 2023 08:43), Max: 36.5 (11 Dec 2023 23:41)  T(F): 97.4 (12 Dec 2023 08:43), Max: 97.7 (11 Dec 2023 23:41)  HR: 117 (12 Dec 2023 08:43) (104 - 117)  BP: 101/60 (12 Dec 2023 08:43) (101/60 - 109/71)  BP(mean): --  RR: 18 (12 Dec 2023 08:43) (18 - 18)  SpO2: 96% (12 Dec 2023 08:43) (96% - 98%)    Parameters below as of 12 Dec 2023 08:43  Patient On (Oxygen Delivery Method): nasal cannula  O2 Flow (L/min): 4      I&O's Summary    12 Dec 2023 07:01  -  12 Dec 2023 15:17  --------------------------------------------------------  IN: 0 mL / OUT: 200 mL / NET: -200 mL          Physical Exam:   GENERAL: caCEHCTIC  HEENT: CANDY/   Atraumatic, Normocephalic  ENMT: No tonsillar erythema, exudates, or enlargement; Moist mucous membranes, Good dentition, No lesions  NECK: Supple, No JVD, Normal thyroid  CHEST/LUNG: poor air entery both sides  CVS: Regular rate and rhythm; No murmurs, rubs, or gallops  GI: : Soft, Nontender, Nondistended; Bowel sounds present  NERVOUS SYSTEM:  awake:  gasping:   EXTREMITIES: contracted ext:   LYMPH: No lymphadenopathy noted  SKIN: No rashes or lesions  ENDOCRINOLOGY: No Thyromegaly  PSYCH: Appropriate    Labs:                              10.7   14.41 )-----------( 378      ( 09 Dec 2023 09:38 )             34.9     12-09    152<H>  |  119<H>  |  16  ----------------------------<  163<H>  3.6   |  24  |  0.35<L>        CAPILLARY BLOOD GLUCOSE          rad< from: Xray Chest 1 View- PORTABLE-Routine (Xray Chest 1 View- PORTABLE-Routine .) (12.01.23 @ 14:13) >    ACC: [id number] EXAM:  XR CHEST PORTABLE ROUTINE 1V   ORDERED BY: BETH WRIGHT     PROCEDURE DATE:  12/01/2023          INTERPRETATION:  DATE OF STUDY: 12/1/23    PRIOR: 11/30/23    CLINICAL INDICATION: Assess left lung atelectasis.    TECHNIQUE: AP radiograph of the chest.    FINDINGS:  Heart is similar in size.  Right lung remains clear.  Partial reaeration of the upper and mid left lung.  Partly loculated lower left pleural effusion is present. Mid and lower   left lung haziness may be due to underlying infection in the right   clinical setting. No pneumothorax.    IMPRESSION:  Partial reaeration of the left lung.  Small loculated left pleural effusion.  Associated mid and lower left lung atelectasis.    --- End of Report ---            MARLYN AMARO MD; Attending Radiologist  This document has been electronically signed. Dec  2 2023 10:22AM    < end of copied text >              RECENT CULTURES:        RESPIRATORY CULTURES:          Studies  Chest X-RAY  CT SCAN Chest   Venous Dopplers: LE:   CT Abdomen  Others               Date of Service: 12-12-23 @ 15:17    Patient is a 78y old  Male who presents with a chief complaint of Osteomyelitis     (20 Nov 2023 11:10)      Any change in ROS: today  pt is doing very poorly:  he gasping today  : grand daughter is at bedside:  she understands  and understands that he may not make it for long     MEDICATIONS  (STANDING):  acetylcysteine 10%  Inhalation 4 milliLiter(s) Inhalation every 12 hours  chlorhexidine 2% Cloths 1 Application(s) Topical <User Schedule>  dextrose 5% + sodium chloride 0.9%. 1000 milliLiter(s) (50 mL/Hr) IV Continuous <Continuous>  guaiFENesin Oral Liquid (Sugar-Free) 200 milliGRAM(s) Oral every 8 hours  ipratropium    for Nebulization 500 MICROGram(s) Nebulizer every 6 hours  levalbuterol Inhalation 0.63 milliGRAM(s) Inhalation every 6 hours  nystatin Powder 1 Application(s) Topical two times a day  pantoprazole   Suspension 40 milliGRAM(s) Oral daily  QUEtiapine 100 milliGRAM(s) Oral <User Schedule>    MEDICATIONS  (PRN):  acetaminophen   Oral Liquid .. 750 milliGRAM(s) Oral every 6 hours PRN Temp greater or equal to 38C (100.4F), Mild Pain (1 - 3)  bisacodyl Suppository 10 milliGRAM(s) Rectal daily PRN Constipation  glycopyrrolate Injectable 0.4 milliGRAM(s) IV Push every 6 hours PRN secretions    Vital Signs Last 24 Hrs  T(C): 36.3 (12 Dec 2023 08:43), Max: 36.5 (11 Dec 2023 23:41)  T(F): 97.4 (12 Dec 2023 08:43), Max: 97.7 (11 Dec 2023 23:41)  HR: 117 (12 Dec 2023 08:43) (104 - 117)  BP: 101/60 (12 Dec 2023 08:43) (101/60 - 109/71)  BP(mean): --  RR: 18 (12 Dec 2023 08:43) (18 - 18)  SpO2: 96% (12 Dec 2023 08:43) (96% - 98%)    Parameters below as of 12 Dec 2023 08:43  Patient On (Oxygen Delivery Method): nasal cannula  O2 Flow (L/min): 4      I&O's Summary    12 Dec 2023 07:01  -  12 Dec 2023 15:17  --------------------------------------------------------  IN: 0 mL / OUT: 200 mL / NET: -200 mL          Physical Exam:   GENERAL: caCEHCTIC  HEENT: CANDY/   Atraumatic, Normocephalic  ENMT: No tonsillar erythema, exudates, or enlargement; Moist mucous membranes, Good dentition, No lesions  NECK: Supple, No JVD, Normal thyroid  CHEST/LUNG: poor air entery both sides  CVS: Regular rate and rhythm; No murmurs, rubs, or gallops  GI: : Soft, Nontender, Nondistended; Bowel sounds present  NERVOUS SYSTEM:  awake:  gasping:   EXTREMITIES: contracted ext:   LYMPH: No lymphadenopathy noted  SKIN: No rashes or lesions  ENDOCRINOLOGY: No Thyromegaly  PSYCH: Appropriate    Labs:                              10.7   14.41 )-----------( 378      ( 09 Dec 2023 09:38 )             34.9     12-09    152<H>  |  119<H>  |  16  ----------------------------<  163<H>  3.6   |  24  |  0.35<L>        CAPILLARY BLOOD GLUCOSE          rad< from: Xray Chest 1 View- PORTABLE-Routine (Xray Chest 1 View- PORTABLE-Routine .) (12.01.23 @ 14:13) >    ACC: [id number] EXAM:  XR CHEST PORTABLE ROUTINE 1V   ORDERED BY: BETH WRIGHT     PROCEDURE DATE:  12/01/2023          INTERPRETATION:  DATE OF STUDY: 12/1/23    PRIOR: 11/30/23    CLINICAL INDICATION: Assess left lung atelectasis.    TECHNIQUE: AP radiograph of the chest.    FINDINGS:  Heart is similar in size.  Right lung remains clear.  Partial reaeration of the upper and mid left lung.  Partly loculated lower left pleural effusion is present. Mid and lower   left lung haziness may be due to underlying infection in the right   clinical setting. No pneumothorax.    IMPRESSION:  Partial reaeration of the left lung.  Small loculated left pleural effusion.  Associated mid and lower left lung atelectasis.    --- End of Report ---            MARLYN AMARO MD; Attending Radiologist  This document has been electronically signed. Dec  2 2023 10:22AM    < end of copied text >              RECENT CULTURES:        RESPIRATORY CULTURES:          Studies  Chest X-RAY  CT SCAN Chest   Venous Dopplers: LE:   CT Abdomen  Others

## 2023-12-13 ENCOUNTER — TRANSCRIPTION ENCOUNTER (OUTPATIENT)
Age: 78
End: 2023-12-13

## 2023-12-13 PROCEDURE — 99232 SBSQ HOSP IP/OBS MODERATE 35: CPT | Mod: 25

## 2023-12-13 PROCEDURE — 93010 ELECTROCARDIOGRAM REPORT: CPT

## 2023-12-13 PROCEDURE — 97605 NEG PRS WND THER DME<=50SQCM: CPT

## 2023-12-13 RX ORDER — MORPHINE SULFATE 50 MG/1
0.5 CAPSULE, EXTENDED RELEASE ORAL ONCE
Refills: 0 | Status: DISCONTINUED | OUTPATIENT
Start: 2023-12-13 | End: 2023-12-13

## 2023-12-13 RX ORDER — SODIUM CHLORIDE 9 MG/ML
1000 INJECTION, SOLUTION INTRAVENOUS
Refills: 0 | Status: DISCONTINUED | OUTPATIENT
Start: 2023-12-13 | End: 2023-12-14

## 2023-12-13 RX ORDER — MORPHINE SULFATE 50 MG/1
1 CAPSULE, EXTENDED RELEASE ORAL
Refills: 0 | Status: DISCONTINUED | OUTPATIENT
Start: 2023-12-13 | End: 2023-12-14

## 2023-12-13 RX ADMIN — LEVALBUTEROL 0.63 MILLIGRAM(S): 1.25 SOLUTION, CONCENTRATE RESPIRATORY (INHALATION) at 17:48

## 2023-12-13 RX ADMIN — MORPHINE SULFATE 0.5 MILLIGRAM(S): 50 CAPSULE, EXTENDED RELEASE ORAL at 20:25

## 2023-12-13 RX ADMIN — Medication 500 MICROGRAM(S): at 17:48

## 2023-12-13 RX ADMIN — LEVALBUTEROL 0.63 MILLIGRAM(S): 1.25 SOLUTION, CONCENTRATE RESPIRATORY (INHALATION) at 05:48

## 2023-12-13 RX ADMIN — NYSTATIN CREAM 1 APPLICATION(S): 100000 CREAM TOPICAL at 17:48

## 2023-12-13 RX ADMIN — Medication 200 MILLIGRAM(S): at 13:43

## 2023-12-13 RX ADMIN — Medication 500 MICROGRAM(S): at 05:48

## 2023-12-13 RX ADMIN — ROBINUL 0.4 MILLIGRAM(S): 0.2 INJECTION INTRAMUSCULAR; INTRAVENOUS at 20:23

## 2023-12-13 RX ADMIN — Medication 4 MILLILITER(S): at 17:48

## 2023-12-13 RX ADMIN — Medication 200 MILLIGRAM(S): at 05:49

## 2023-12-13 RX ADMIN — Medication 750 MILLIGRAM(S): at 02:06

## 2023-12-13 RX ADMIN — LEVALBUTEROL 0.63 MILLIGRAM(S): 1.25 SOLUTION, CONCENTRATE RESPIRATORY (INHALATION) at 13:43

## 2023-12-13 RX ADMIN — Medication 750 MILLIGRAM(S): at 01:36

## 2023-12-13 RX ADMIN — Medication 750 MILLIGRAM(S): at 09:26

## 2023-12-13 RX ADMIN — PANTOPRAZOLE SODIUM 40 MILLIGRAM(S): 20 TABLET, DELAYED RELEASE ORAL at 13:43

## 2023-12-13 RX ADMIN — Medication 500 MICROGRAM(S): at 13:42

## 2023-12-13 RX ADMIN — LEVALBUTEROL 0.63 MILLIGRAM(S): 1.25 SOLUTION, CONCENTRATE RESPIRATORY (INHALATION) at 00:00

## 2023-12-13 RX ADMIN — Medication 4 MILLILITER(S): at 05:49

## 2023-12-13 RX ADMIN — QUETIAPINE FUMARATE 100 MILLIGRAM(S): 200 TABLET, FILM COATED ORAL at 19:49

## 2023-12-13 RX ADMIN — NYSTATIN CREAM 1 APPLICATION(S): 100000 CREAM TOPICAL at 05:49

## 2023-12-13 RX ADMIN — Medication 750 MILLIGRAM(S): at 15:26

## 2023-12-13 RX ADMIN — Medication 500 MICROGRAM(S): at 00:00

## 2023-12-13 RX ADMIN — Medication 750 MILLIGRAM(S): at 23:54

## 2023-12-13 RX ADMIN — CHLORHEXIDINE GLUCONATE 1 APPLICATION(S): 213 SOLUTION TOPICAL at 05:48

## 2023-12-13 NOTE — DISCHARGE NOTE NURSING/CASE MANAGEMENT/SOCIAL WORK - NSDCDMENAME_GEN_ALL_CORE_FT
WOUND VAC FROM [facility]                                   OXYGEN FROM [facility]  WOUND VAC FROM [facility]                                   OXYGEN FROM [facility]

## 2023-12-13 NOTE — PROGRESS NOTE ADULT - ASSESSMENT
79 yo male h/o dementia, non verbal. contracted, s/p peg. here with sepsis likely 2/2 sacral decub and aspiration pna    sepsis  respiratory failure   sacral decub  pna - possible aspiration    abx as per id - now complete  f/u cult  wound care f/u  id consult noted  pulm consulted  cont supp o2, now off high flow. taper down NC as able   CTA chest noted  pulm f/u   bipap as able  abx changed to meropenem. id f/u apprec - now complete  frequent suctioning   VAC for sacral decub as per wound care     dementia  cont home meds  supportive care    dysphagia s/p peg  feeds now on hold  s/p peg change  by gi bedside   free water via peg     hyperNa  free water via peg ordered   no further labs as per family       dvt ppx      d/w family bedside  DNR  family wants to take pt home  dc planning home with home o2 and VAC      Advanced care planning was discussed with patient and family.  Advanced care planning forms were reviewed and discussed as appropriate.  Differential diagnosis and plan of care discussed with patient after the evaluation.   Pain assessed and judicious use of narcotics when appropriate was discussed.  Importance of Fall prevention discussed.  Counseling on Smoking and Alcohol cessation was offered when appropriate.  Counseling on Diet, exercise, and medication compliance was done.       Approx 75 minutes spent. 77 yo male h/o dementia, non verbal. contracted, s/p peg. here with sepsis likely 2/2 sacral decub and aspiration pna    sepsis  respiratory failure   sacral decub  pna - possible aspiration    abx as per id - now complete  f/u cult  wound care f/u  id consult noted  pulm consulted  cont supp o2, now off high flow. taper down NC as able   CTA chest noted  pulm f/u   bipap as able  abx changed to meropenem. id f/u apprec - now complete  frequent suctioning   VAC for sacral decub as per wound care     dementia  cont home meds  supportive care    dysphagia s/p peg  feeds now on hold  s/p peg change  by gi bedside   free water via peg     hyperNa  free water via peg ordered   no further labs as per family       dvt ppx      d/w family bedside  DNR  family wants to take pt home  dc planning home with home o2 and VAC      Advanced care planning was discussed with patient and family.  Advanced care planning forms were reviewed and discussed as appropriate.  Differential diagnosis and plan of care discussed with patient after the evaluation.   Pain assessed and judicious use of narcotics when appropriate was discussed.  Importance of Fall prevention discussed.  Counseling on Smoking and Alcohol cessation was offered when appropriate.  Counseling on Diet, exercise, and medication compliance was done.       Approx 75 minutes spent.

## 2023-12-13 NOTE — PROVIDER CONTACT NOTE (OTHER) - ASSESSMENT
BP 81/49 electronically. BP 78/50 manually. Pt remains at baseline, other VSS.
Pt nonverbal. Vital signs 99.1 temp axillary, , 95% O2 on high flow NC, RR 24 and BP 95/57. Other than increased HR and RR, no other signs of pain. No signs of chest pain or SOB.
Pt A&Ox0, nonverbal. Pt satting between 85-88% on cpap, temp of 100.6 rectally,  and /65. No s/s of chest pain. Pt RR 24, no distress or SOB noted, just increase of RR.
Pt nonverbal, 99.6 rectal temp, , O2 97% on cpap, /65, RR 24. Pt appears to be breathing heavier, no accessory muscles used, just increase in RR. other than HR, no s/s of pain. no s/s chest pain.
Pt nonverbal @ baseline. Pt febrile w temp of 101.1 and  but w all other vitals within baseline. No s/s of SOB, chest pain or distress/ pain. Oral Tylenol given through PEG around 2130 prior to fever spike.
See VS flowsheet. HR 170s SpO2: 89% on 4L, hypotensive at 90/52. Pt is afebrile at this time.
Pt A&Ox0 & nonverbal. Pt tachycardic w HR of 110 and hypoxic w O2 sat of 80% on 6L NC. Pt appears to have increase breathing w RR of 24 but no clear signs of distress noted. No s/s of chest pain or SOB. Pt afebrile @ this time.
Pt A&Ox0, non-verbal @ baseline. Pt currently on hi-flow 60% on 100L. Tachycardic in the 120's-130's.
Patient AAOx0. HFNC. Comfort measures established
Pt nonverbal @ baseline. Temp 102.3 rectally, , O2 93% on highflow NC, RR 28 and /65. Pt appears to be showing signs of resp distress by cheeks puffing & use of accessory muscles.
Pt in bed, HOB 90 degrees, on high flow, desating to 85%. Pt previously tolerating high flow well, sustaining 100% on cpox prior to  visit. Pt does not appear in distress but + nonproductive cough noted. Attempted to provide oral suction to pt, spouse and pt own  at bedside refused suction.  states pt "received a drop of communion, a little drop." And also states "are you insulting my intelligence by saying I'd give something that would kill the pt?" Pt spouse and  educated on NPO order for pt safety. NM Kai made aware and NP Shaila Reddy made aware. Respiratory to come to bedside to assess O2 settings.
Pt was put on BIPAP by respiratory therapist, pt oxygen saturation at 80s%, patient is tachycardic 100-110s. Respiratory Therapist called and changed BIPAP to high flow nasal cannula again, pt saturation improved to 98% and HR went to 98 beat per minute.

## 2023-12-13 NOTE — PROGRESS NOTE ADULT - SUBJECTIVE AND OBJECTIVE BOX
[facility]-- WOUND TEAM -- FOLLOW UP NOTE  --------------------------------------------------------------------------------    24 hour events/subjective:             ROS: pt unable to offer    ALLERGIES & MEDICATIONS  --------------------------------------------------------------------------------      No Known Allergies    Intolerances  Haldol (Other)  benzodiazepines (Other)        STANDING INPATIENT MEDICATIONS  acetylcysteine 10%  Inhalation 4 milliLiter(s) Inhalation every 12 hours  chlorhexidine 2% Cloths 1 Application(s) Topical <User Schedule>  dextrose 5% + sodium chloride 0.9%. 1000 milliLiter(s) IV Continuous <Continuous>  guaiFENesin Oral Liquid (Sugar-Free) 200 milliGRAM(s) Oral every 8 hours  ipratropium  for Nebulization 500 MICROGram(s) Nebulizer every 6 hours  levalbuterol Inhalation 0.63 milliGRAM(s) Inhalation every 6 hours  nystatin Powder 1 Application(s) Topical two times a day  pantoprazole   Suspension 40 milliGRAM(s) Oral daily  QUEtiapine 100 milliGRAM(s) Oral <User Schedule>      PRN INPATIENT MEDICATION  acetaminophen Oral Liquid 750 milliGRAM(s) Oral every 6 hours PRN  bisacodyl Suppository 10 milliGRAM(s) Rectal daily PRN  glycopyrrolate Injectable 0.4 milliGRAM(s) IV Push every 6 hours PRN        VITALS/PHYSICAL EXAM  --------------------------------------------------------------------------------  T(C): 36.4 (12-13-23 @ 08:30), Max: 36.9 (12-13-23 @ 00:00)  HR: 108 (12-13-23 @ 13:28) (102 - 111)  BP: 115/63 (12-13-23 @ 08:30) (93/54 - 115/63)  RR: 20 (12-13-23 @ 13:28) (18 - 20)  SpO2: 88% (12-13-23 @ 13:28) (88% - 100%)  Wt(kg): --                             [facility]-- WOUND TEAM -- FOLLOW UP NOTE  --------------------------------------------------------------------------------    24 hour events/subjective:             ROS: pt unable to offer    ALLERGIES & MEDICATIONS  --------------------------------------------------------------------------------      No Known Allergies    Intolerances  Haldol (Other)  benzodiazepines (Other)        STANDING INPATIENT MEDICATIONS  acetylcysteine 10%  Inhalation 4 milliLiter(s) Inhalation every 12 hours  chlorhexidine 2% Cloths 1 Application(s) Topical <User Schedule>  dextrose 5% + sodium chloride 0.9%. 1000 milliLiter(s) IV Continuous <Continuous>  guaiFENesin Oral Liquid (Sugar-Free) 200 milliGRAM(s) Oral every 8 hours  ipratropium  for Nebulization 500 MICROGram(s) Nebulizer every 6 hours  levalbuterol Inhalation 0.63 milliGRAM(s) Inhalation every 6 hours  nystatin Powder 1 Application(s) Topical two times a day  pantoprazole   Suspension 40 milliGRAM(s) Oral daily  QUEtiapine 100 milliGRAM(s) Oral <User Schedule>      PRN INPATIENT MEDICATION  acetaminophen Oral Liquid 750 milliGRAM(s) Oral every 6 hours PRN  bisacodyl Suppository 10 milliGRAM(s) Rectal daily PRN  glycopyrrolate Injectable 0.4 milliGRAM(s) IV Push every 6 hours PRN        VITALS/PHYSICAL EXAM  --------------------------------------------------------------------------------  T(C): 36.4 (12-13-23 @ 08:30), Max: 36.9 (12-13-23 @ 00:00)  HR: 108 (12-13-23 @ 13:28) (102 - 111)  BP: 115/63 (12-13-23 @ 08:30) (93/54 - 115/63)  RR: 20 (12-13-23 @ 13:28) (18 - 20)  SpO2: 88% (12-13-23 @ 13:28) (88% - 100%)  Wt(kg): --                             [facility]-- WOUND TEAM -- FOLLOW UP NOTE  --------------------------------------------------------------------------------    24 hour events/subjective:    afebrile  tolerating dressing changes  no odor or excess drainage  wife at bedside- all questions answered to  her      expressed understanding and satisfaction  ongoing GOC- considering Hospice       ROS: pt unable to offer    ALLERGIES & MEDICATIONS  --------------------------------------------------------------------------------      No Known Allergies    Intolerances  Haldol (Other)  benzodiazepines (Other)        STANDING INPATIENT MEDICATIONS  acetylcysteine 10%  Inhalation 4 milliLiter(s) Inhalation every 12 hours  chlorhexidine 2% Cloths 1 Application(s) Topical <User Schedule>  dextrose 5% + sodium chloride 0.9%. 1000 milliLiter(s) IV Continuous <Continuous>  guaiFENesin Oral Liquid (Sugar-Free) 200 milliGRAM(s) Oral every 8 hours  ipratropium  for Nebulization 500 MICROGram(s) Nebulizer every 6 hours  levalbuterol Inhalation 0.63 milliGRAM(s) Inhalation every 6 hours  nystatin Powder 1 Application(s) Topical two times a day  pantoprazole   Suspension 40 milliGRAM(s) Oral daily  QUEtiapine 100 milliGRAM(s) Oral <User Schedule>      PRN INPATIENT MEDICATION  acetaminophen Oral Liquid 750 milliGRAM(s) Oral every 6 hours PRN  bisacodyl Suppository 10 milliGRAM(s) Rectal daily PRN  glycopyrrolate Injectable 0.4 milliGRAM(s) IV Push every 6 hours PRN        VITALS/PHYSICAL EXAM  --------------------------------------------------------------------------------  T(C): 36.4 (12-13-23 @ 08:30), Max: 36.9 (12-13-23 @ 00:00)  HR: 108 (12-13-23 @ 13:28) (102 - 111)  BP: 115/63 (12-13-23 @ 08:30) (93/54 - 115/63)  RR: 20 (12-13-23 @ 13:28) (18 - 20)  SpO2: 88% (12-13-23 @ 13:28) (88% - 100%)  Wt(kg): --      NAD, ALert, cachectic, frail, HiFlo O2  Envella bed  HEENT:  NC/AT, EOMI, Sclera clear, mucosa moist, throat clear  Respiratory:  nonlabored w/ equal chest rise  Gastrointestinal:  (+)PEG, resolved dermatitis around PEG  : (+) condom cath  Neurology:  nonverbal, unable to follow commands  Psych: calm  Musculoskeletal:  limited stiff / contracted  Vascular: BLE equally warm/  no cyanosis, clubbing, edema nor acute ischemia                BLE DP pulses palpable  Skin:  thin, dry, pale, frail  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  Lt hip resolved blanchable erythema  sacral region DTI       4cm x 3cm         purple and maroon skin w/o drainage   Left ischium DTI fading       2x2cm        purple/maroon coloration of skin w/o drainage   no induration, no fluctuance, no crepitus, odor, blistering, warmth, induration, fluctuance, nor crepitus.                             [facility]-- WOUND TEAM -- FOLLOW UP NOTE  --------------------------------------------------------------------------------    24 hour events/subjective:    afebrile  tolerating dressing changes  no odor or excess drainage  wife at bedside- all questions answered to  her      expressed understanding and satisfaction  ongoing GOC- considering Hospice       ROS: pt unable to offer    ALLERGIES & MEDICATIONS  --------------------------------------------------------------------------------      No Known Allergies    Intolerances  Haldol (Other)  benzodiazepines (Other)        STANDING INPATIENT MEDICATIONS  acetylcysteine 10%  Inhalation 4 milliLiter(s) Inhalation every 12 hours  chlorhexidine 2% Cloths 1 Application(s) Topical <User Schedule>  dextrose 5% + sodium chloride 0.9%. 1000 milliLiter(s) IV Continuous <Continuous>  guaiFENesin Oral Liquid (Sugar-Free) 200 milliGRAM(s) Oral every 8 hours  ipratropium  for Nebulization 500 MICROGram(s) Nebulizer every 6 hours  levalbuterol Inhalation 0.63 milliGRAM(s) Inhalation every 6 hours  nystatin Powder 1 Application(s) Topical two times a day  pantoprazole   Suspension 40 milliGRAM(s) Oral daily  QUEtiapine 100 milliGRAM(s) Oral <User Schedule>      PRN INPATIENT MEDICATION  acetaminophen Oral Liquid 750 milliGRAM(s) Oral every 6 hours PRN  bisacodyl Suppository 10 milliGRAM(s) Rectal daily PRN  glycopyrrolate Injectable 0.4 milliGRAM(s) IV Push every 6 hours PRN        VITALS/PHYSICAL EXAM  --------------------------------------------------------------------------------  T(C): 36.4 (12-13-23 @ 08:30), Max: 36.9 (12-13-23 @ 00:00)  HR: 108 (12-13-23 @ 13:28) (102 - 111)  BP: 115/63 (12-13-23 @ 08:30) (93/54 - 115/63)  RR: 20 (12-13-23 @ 13:28) (18 - 20)  SpO2: 88% (12-13-23 @ 13:28) (88% - 100%)  Wt(kg): --      NAD, ALert, cachectic, frail, HiFlo O2  Envella bed  HEENT:  NC/AT, EOMI, Sclera clear, mucosa moist, throat clear  Respiratory:  nonlabored w/ equal chest rise  Gastrointestinal:  (+)PEG, resolved dermatitis around PEG  : (+) condom cath  Neurology:  nonverbal, unable to follow commands  Psych: calm  Musculoskeletal:  limited stiff / contracted  Vascular: BLE equally warm/  no cyanosis, clubbing, edema nor acute ischemia                BLE DP pulses palpable  Skin:  thin, dry, pale, frail  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  Lt hip resolved blanchable erythema  sacral region DTI       4cm x 3cm         purple and maroon skin w/o drainage   Left ischium DTI fading       2x2cm        purple/maroon coloration of skin w/o drainage   no induration, no fluctuance, no crepitus, odor, blistering, warmth, induration, fluctuance, nor crepitus.

## 2023-12-13 NOTE — CHART NOTE - NSCHARTNOTEFT_GEN_A_CORE
Notified by RN during sign out, pt w/ HR in 170s sustaining  Pt seen and examined at bedside, wife at bedside, pt is resting, does not seem to be in any acute distress, no increased WOB noted   Upon assessment HR sustaining in 170s, o2 saturation fluctuation between 80s-90s as well    Vital Signs Last 24 Hrs  T(C): 36.8 (13 Dec 2023 20:00), Max: 36.9 (13 Dec 2023 00:00)  T(F): 98.2 (13 Dec 2023 20:00), Max: 98.4 (13 Dec 2023 00:00)  HR: 170 (13 Dec 2023 20:00) (66 - 170)  BP: 90/52 (13 Dec 2023 20:00) (90/52 - 115/63)  BP(mean): --  RR: 18 (13 Dec 2023 20:00) (18 - 20)  SpO2: 92% (13 Dec 2023 20:00) (88% - 100%)    Parameters below as of 13 Dec 2023 20:00  Patient On (Oxygen Delivery Method): nasal cannula  O2 Flow (L/min): 5    Day ACP discussed situation with attending Dr. To --recs included:  >EKG STAT   >deep suctioning d/t similar episode in past with resolution after deep suctioning w/ RT  >Pt w/ soft BP during the day 80s to low 90s systolic, advising against lopressor at this time  >recs morphine 0.5mg IVP  >HFNC until stabilization w/ attempt to wean back down throughout the night   >will continue to closely assess and monitor overnight  >will endorse to day team    -Cora Schmitz PA-C  [id number] Notified by RN during sign out, pt w/ HR in 170s sustaining  Pt seen and examined at bedside, wife at bedside, pt is resting, does not seem to be in any acute distress, no increased WOB noted   Upon assessment HR sustaining in 170s, o2 saturation fluctuation between 80s-90s as well    Vital Signs Last 24 Hrs  T(C): 36.8 (13 Dec 2023 20:00), Max: 36.9 (13 Dec 2023 00:00)  T(F): 98.2 (13 Dec 2023 20:00), Max: 98.4 (13 Dec 2023 00:00)  HR: 170 (13 Dec 2023 20:00) (66 - 170)  BP: 90/52 (13 Dec 2023 20:00) (90/52 - 115/63)  BP(mean): --  RR: 18 (13 Dec 2023 20:00) (18 - 20)  SpO2: 92% (13 Dec 2023 20:00) (88% - 100%)    Parameters below as of 13 Dec 2023 20:00  Patient On (Oxygen Delivery Method): nasal cannula  O2 Flow (L/min): 5    Day ACP discussed situation with attending Dr. To --recs included:  >EKG STAT   >deep suctioning d/t similar episode in past with resolution after deep suctioning w/ RT  >Pt w/ soft BP during the day 80s to low 90s systolic, advising against lopressor at this time  >recs morphine 0.5mg IVP  >HFNC until stabilization w/ attempt to wean back down throughout the night   >will continue to closely assess and monitor overnight  >will endorse to day team    -Cora Schmitz PA-C  [id number] Notified by RN during sign out, pt w/ HR in 170s sustaining  Pt seen and examined at bedside, wife at bedside, pt is resting, does not seem to be in any acute distress, no increased WOB noted   Upon assessment HR sustaining in 170s, o2 saturation fluctuation between 80s-90s as well    Vital Signs Last 24 Hrs  T(C): 36.8 (13 Dec 2023 20:00), Max: 36.9 (13 Dec 2023 00:00)  T(F): 98.2 (13 Dec 2023 20:00), Max: 98.4 (13 Dec 2023 00:00)  HR: 170 (13 Dec 2023 20:00) (66 - 170)  BP: 90/52 (13 Dec 2023 20:00) (90/52 - 115/63)  BP(mean): --  RR: 18 (13 Dec 2023 20:00) (18 - 20)  SpO2: 92% (13 Dec 2023 20:00) (88% - 100%)    Parameters below as of 13 Dec 2023 20:00  Patient On (Oxygen Delivery Method): nasal cannula  O2 Flow (L/min): 5    Day ACP discussed situation with attending Dr. To --recs included:  >EKG STAT   >deep suctioning d/t similar episode in past with resolution after deep suctioning w/ RT  >Pt w/ soft BP during the day 80s to low 90s systolic, advising against lopressor at this time  >recs morphine 0.5mg IVP  >robinol and seroquel as ordered   >HFNC until stabilization w/ attempt to wean back down throughout the night   >will continue to closely assess and monitor overnight  >will endorse to day team    -Cora Schmitz PA-C  [id number] Notified by RN during sign out, pt w/ HR in 170s sustaining  Pt seen and examined at bedside, wife at bedside, pt is resting, does not seem to be in any acute distress, no increased WOB noted   Upon assessment HR sustaining in 170s, o2 saturation fluctuation between 80s-90s as well    Vital Signs Last 24 Hrs  T(C): 36.8 (13 Dec 2023 20:00), Max: 36.9 (13 Dec 2023 00:00)  T(F): 98.2 (13 Dec 2023 20:00), Max: 98.4 (13 Dec 2023 00:00)  HR: 170 (13 Dec 2023 20:00) (66 - 170)  BP: 90/52 (13 Dec 2023 20:00) (90/52 - 115/63)  BP(mean): --  RR: 18 (13 Dec 2023 20:00) (18 - 20)  SpO2: 92% (13 Dec 2023 20:00) (88% - 100%)    Parameters below as of 13 Dec 2023 20:00  Patient On (Oxygen Delivery Method): nasal cannula  O2 Flow (L/min): 5    Day ACP discussed situation with attending Dr. To --recs included:  >EKG STAT   >deep suctioning d/t similar episode in past with resolution after deep suctioning w/ RT  >Pt w/ soft BP during the day 80s to low 90s systolic, advising against lopressor at this time  >recs morphine 0.5mg IVP  >robinol and seroquel as ordered   >HFNC until stabilization w/ attempt to wean back down throughout the night   >will continue to closely assess and monitor overnight  >will endorse to day team    -Cora Schmitz PA-C  [id number] Notified by RN during sign out, pt w/ HR in 170s sustaining  Pt seen and examined at bedside with day ACP, wife at bedside, pt is resting, does not seem to be in any acute distress, no increased WOB noted   Upon assessment HR sustaining in 170s, o2 saturation fluctuation between 80s-90s as well    Vital Signs Last 24 Hrs  T(C): 36.8 (13 Dec 2023 20:00), Max: 36.9 (13 Dec 2023 00:00)  T(F): 98.2 (13 Dec 2023 20:00), Max: 98.4 (13 Dec 2023 00:00)  HR: 170 (13 Dec 2023 20:00) (66 - 170)  BP: 90/52 (13 Dec 2023 20:00) (90/52 - 115/63)  BP(mean): --  RR: 18 (13 Dec 2023 20:00) (18 - 20)  SpO2: 92% (13 Dec 2023 20:00) (88% - 100%)    Parameters below as of 13 Dec 2023 20:00  Patient On (Oxygen Delivery Method): nasal cannula  O2 Flow (L/min): 5    Day ACP discussed situation with attending Dr. To --recs included:  >EKG STAT   >deep suctioning d/t similar episode in past with resolution after deep suctioning w/ RT  >Pt w/ soft BP during the day 80s to low 90s systolic, advising against lopressor at this time  >recs morphine 0.5mg IVP  >robinol and seroquel as ordered   >HFNC until stabilization w/ attempt to wean back down throughout the night   >will continue to closely assess and monitor overnight  >will endorse to day team    -Cora Schmitz PA-C  [id number] Notified by RN during sign out, pt w/ HR in 170s sustaining  Pt seen and examined at bedside with day ACP, wife at bedside, pt is resting, does not seem to be in any acute distress, no increased WOB noted   Upon assessment HR sustaining in 170s, o2 saturation fluctuation between 80s-90s as well    Vital Signs Last 24 Hrs  T(C): 36.8 (13 Dec 2023 20:00), Max: 36.9 (13 Dec 2023 00:00)  T(F): 98.2 (13 Dec 2023 20:00), Max: 98.4 (13 Dec 2023 00:00)  HR: 170 (13 Dec 2023 20:00) (66 - 170)  BP: 90/52 (13 Dec 2023 20:00) (90/52 - 115/63)  BP(mean): --  RR: 18 (13 Dec 2023 20:00) (18 - 20)  SpO2: 92% (13 Dec 2023 20:00) (88% - 100%)    Parameters below as of 13 Dec 2023 20:00  Patient On (Oxygen Delivery Method): nasal cannula  O2 Flow (L/min): 5    Day ACP discussed situation with attending Dr. To --recs included:  >EKG STAT   >deep suctioning d/t similar episode in past with resolution after deep suctioning w/ RT  >Pt w/ soft BP during the day 80s to low 90s systolic, advising against lopressor at this time  >recs morphine 0.5mg IVP  >robinol and seroquel as ordered   >HFNC until stabilization w/ attempt to wean back down throughout the night   >will continue to closely assess and monitor overnight  >will endorse to day team    -Cora Schmitz PA-C  [id number] Notified by RN during sign out, pt w/ HR in 170s sustaining  Pt seen and examined at bedside with day ACP, wife at bedside, pt is resting, does not seem to be in any acute distress, no increased WOB noted   Upon assessment HR sustaining in 170s, o2 saturation fluctuation between 80s-90s as well    Vital Signs Last 24 Hrs  T(C): 36.8 (13 Dec 2023 20:00), Max: 36.9 (13 Dec 2023 00:00)  T(F): 98.2 (13 Dec 2023 20:00), Max: 98.4 (13 Dec 2023 00:00)  HR: 170 (13 Dec 2023 20:00) (66 - 170)  BP: 90/52 (13 Dec 2023 20:00) (90/52 - 115/63)  BP(mean): --  RR: 18 (13 Dec 2023 20:00) (18 - 20)  SpO2: 92% (13 Dec 2023 20:00) (88% - 100%)    Parameters below as of 13 Dec 2023 20:00  Patient On (Oxygen Delivery Method): nasal cannula  O2 Flow (L/min): 5    Day ACP discussed situation with attending Dr. To --recs included:  >EKG STAT   >deep suctioning d/t similar episode in past with resolution after deep suctioning w/ RT  >Pt w/ soft BP during the day 80s to low 90s systolic, advising against lopressor at this time  >recs morphine 0.5mg IVP  >robinol and seroquel as ordered   >HFNC until stabilization w/ attempt to wean back down throughout the night --wife refusing at this time   >will continue to closely assess and monitor overnight  >will endorse to day team      ADDENDUM:   Pt seen and re-assessed at bedside  s/p deep suctioning with respiratory therapy, IV morphine x1, robinol x1, pt still tachycardic in 170s w/ EKG showing SVT      -Cora Schmitz PA-C  [id number] Notified by RN during sign out, pt w/ HR in 170s sustaining  Pt seen and examined at bedside with day ACP, wife at bedside, pt is resting, does not seem to be in any acute distress, no increased WOB noted   Upon assessment HR sustaining in 170s, o2 saturation fluctuation between 80s-90s as well    Vital Signs Last 24 Hrs  T(C): 36.8 (13 Dec 2023 20:00), Max: 36.9 (13 Dec 2023 00:00)  T(F): 98.2 (13 Dec 2023 20:00), Max: 98.4 (13 Dec 2023 00:00)  HR: 170 (13 Dec 2023 20:00) (66 - 170)  BP: 90/52 (13 Dec 2023 20:00) (90/52 - 115/63)  BP(mean): --  RR: 18 (13 Dec 2023 20:00) (18 - 20)  SpO2: 92% (13 Dec 2023 20:00) (88% - 100%)    Parameters below as of 13 Dec 2023 20:00  Patient On (Oxygen Delivery Method): nasal cannula  O2 Flow (L/min): 5    Day ACP discussed situation with attending Dr. To --recs included:  >EKG STAT   >deep suctioning d/t similar episode in past with resolution after deep suctioning w/ RT  >Pt w/ soft BP during the day 80s to low 90s systolic, advising against lopressor at this time  >recs morphine 0.5mg IVP  >robinol and seroquel as ordered   >HFNC until stabilization w/ attempt to wean back down throughout the night --wife refusing at this time   >will continue to closely assess and monitor overnight  >will endorse to day team      ADDENDUM:   Pt seen and re-assessed at bedside  s/p deep suctioning with respiratory therapy, IV morphine x1, robinol x1, pt still tachycardic in 170s w/ EKG showing SVT      -Cora Schmitz PA-C  [id number] Notified by RN during sign out, pt w/ HR in 170s sustaining  Pt seen and examined at bedside with day ACP, wife at bedside, pt is resting, does not seem to be in any acute distress, no increased WOB noted   Upon assessment HR sustaining in 170s, o2 saturation fluctuation between 80s-90s as well    Vital Signs Last 24 Hrs  T(C): 36.8 (13 Dec 2023 20:00), Max: 36.9 (13 Dec 2023 00:00)  T(F): 98.2 (13 Dec 2023 20:00), Max: 98.4 (13 Dec 2023 00:00)  HR: 170 (13 Dec 2023 20:00) (66 - 170)  BP: 90/52 (13 Dec 2023 20:00) (90/52 - 115/63)  BP(mean): --  RR: 18 (13 Dec 2023 20:00) (18 - 20)  SpO2: 92% (13 Dec 2023 20:00) (88% - 100%)    Parameters below as of 13 Dec 2023 20:00  Patient On (Oxygen Delivery Method): nasal cannula  O2 Flow (L/min): 5    Day ACP discussed situation with attending Dr. To --recs included:  >EKG STAT   >deep suctioning d/t similar episode in past with resolution after deep suctioning w/ RT  >Pt w/ soft BP during the day 80s to low 90s systolic, advising against lopressor at this time  >recs morphine 0.5mg IVP  >robinol and seroquel as ordered   >HFNC until stabilization w/ attempt to wean back down throughout the night --wife refusing at this time   >will continue to closely assess and monitor overnight  >will endorse to day team      ADDENDUM:   Pt seen and re-assessed at bedside with RN   s/p deep suctioning with respiratory therapy, IV morphine x1, robinol x1, pt still tachycardic in 170s w/ EKG showing SVT, pt now w/ agonal breathing and hypotensive to 60s.   Pt wife and children at bedside(son and daughter). discussed their goals of care in regards to initiating RRT d/t hemodynamic instability  Upon a lengthy conversation with the family, they verbalized understanding of the current situation and how the patient is rapidly declining. They repeatedly stated their goal was to get the patient home and transition to home hospice, but verbalize due to the current clinical state of the patient they are in understanding he has a very poor prognosis and may not make it through the night.   Family all in agreement that their goal is to keep the patient comfortable without aggressive medical management given his current hemodynamic instability. They express understanding that calling an RRT would not change the circumstances or reverse the overall clinical decompensation of the patient and it is not in align with their mutual agreement with symptomatic management at this time.   Pt family wishes to continue morphine PRN for symptomatic treatment   Discussed above conversation and plan with attending Dr. To--agreeable to plan  Will continue to monitor     -Cora Schmitz PA-C  [id number] Notified by RN during sign out, pt w/ HR in 170s sustaining  Pt seen and examined at bedside with day ACP, wife at bedside, pt is resting, does not seem to be in any acute distress, no increased WOB noted   Upon assessment HR sustaining in 170s, o2 saturation fluctuation between 80s-90s as well    Vital Signs Last 24 Hrs  T(C): 36.8 (13 Dec 2023 20:00), Max: 36.9 (13 Dec 2023 00:00)  T(F): 98.2 (13 Dec 2023 20:00), Max: 98.4 (13 Dec 2023 00:00)  HR: 170 (13 Dec 2023 20:00) (66 - 170)  BP: 90/52 (13 Dec 2023 20:00) (90/52 - 115/63)  BP(mean): --  RR: 18 (13 Dec 2023 20:00) (18 - 20)  SpO2: 92% (13 Dec 2023 20:00) (88% - 100%)    Parameters below as of 13 Dec 2023 20:00  Patient On (Oxygen Delivery Method): nasal cannula  O2 Flow (L/min): 5    Day ACP discussed situation with attending Dr. To --recs included:  >EKG STAT   >deep suctioning d/t similar episode in past with resolution after deep suctioning w/ RT  >Pt w/ soft BP during the day 80s to low 90s systolic, advising against lopressor at this time  >recs morphine 0.5mg IVP  >robinol and seroquel as ordered   >HFNC until stabilization w/ attempt to wean back down throughout the night --wife refusing at this time   >will continue to closely assess and monitor overnight  >will endorse to day team      ADDENDUM:   Pt seen and re-assessed at bedside with RN   s/p deep suctioning with respiratory therapy, IV morphine x1, robinol x1, pt still tachycardic in 170s w/ EKG showing SVT, pt now w/ agonal breathing and hypotensive to 60s.   Pt wife and children at bedside(son and daughter). discussed their goals of care in regards to initiating RRT d/t hemodynamic instability  Upon a lengthy conversation with the family, they verbalized understanding of the current situation and how the patient is rapidly declining. They repeatedly stated their goal was to get the patient home and transition to home hospice, but verbalize due to the current clinical state of the patient they are in understanding he has a very poor prognosis and may not make it through the night.   Family all in agreement that their goal is to keep the patient comfortable without aggressive medical management given his current hemodynamic instability. They express understanding that calling an RRT would not change the circumstances or reverse the overall clinical decompensation of the patient and it is not in align with their mutual agreement with symptomatic management at this time.   Pt family wishes to continue morphine PRN for symptomatic treatment   Discussed above conversation and plan with attending Dr. To--agreeable to plan  Will continue to monitor     -Cora Schmitz PA-C  [id number] Notified by RN during sign out, pt w/ HR in 170s sustaining  Pt seen and examined at bedside with day ACP, wife at bedside, pt is resting, does not seem to be in any acute distress, no increased WOB noted   Upon assessment HR sustaining in 170s, o2 saturation fluctuation between 80s-90s as well    Vital Signs Last 24 Hrs  T(C): 36.8 (13 Dec 2023 20:00), Max: 36.9 (13 Dec 2023 00:00)  T(F): 98.2 (13 Dec 2023 20:00), Max: 98.4 (13 Dec 2023 00:00)  HR: 170 (13 Dec 2023 20:00) (66 - 170)  BP: 90/52 (13 Dec 2023 20:00) (90/52 - 115/63)  BP(mean): --  RR: 18 (13 Dec 2023 20:00) (18 - 20)  SpO2: 92% (13 Dec 2023 20:00) (88% - 100%)    Parameters below as of 13 Dec 2023 20:00  Patient On (Oxygen Delivery Method): nasal cannula  O2 Flow (L/min): 5    Day ACP discussed situation with attending Dr. To --recs included:  >EKG STAT   >deep suctioning d/t similar episode in past with resolution after deep suctioning w/ RT  >Pt w/ soft BP during the day 80s to low 90s systolic, advising against lopressor at this time  >recs morphine 0.5mg IVP  >robinol and seroquel as ordered   >HFNC until stabilization w/ attempt to wean back down throughout the night --wife refusing at this time   >will continue to closely assess and monitor overnight  >will endorse to day team      ADDENDUM:   Pt seen and re-assessed at bedside with RN   s/p deep suctioning with respiratory therapy, IV morphine x1, robinol x1, pt still tachycardic in 170s w/ EKG showing SVT, pt now w/ agonal breathing and hypotensive to 60s.   Pt wife and children at bedside(son and daughter). discussed their goals of care in regards to initiating RRT d/t hemodynamic instability  Upon a lengthy conversation with the family, they verbalized understanding of the current situation and how the patient is rapidly declining. They repeatedly stated their goal was to get the patient home and transition to home hospice, but verbalized due to the current clinical state of the patient they are in understanding he has a very poor prognosis and may not make it through the night.   Family all in agreement that their goal is to keep the patient comfortable without aggressive medical management given his current hemodynamic instability. They expressed understanding that calling an RRT would not change the circumstances or reverse the overall clinical decompensation of the patient and it is not in align with their mutual agreement with symptomatic management at this time.   Pt family wishes to continue morphine PRN for symptomatic treatment   Discussed above conversation and plan with attending Dr. To--agreeable to plan  Will continue to monitor     -Cora Schmitz PA-C  [id number] Notified by RN during sign out, pt w/ HR in 170s sustaining  Pt seen and examined at bedside with day ACP, wife at bedside, pt is resting, does not seem to be in any acute distress, no increased WOB noted   Upon assessment HR sustaining in 170s, o2 saturation fluctuation between 80s-90s as well    Vital Signs Last 24 Hrs  T(C): 36.8 (13 Dec 2023 20:00), Max: 36.9 (13 Dec 2023 00:00)  T(F): 98.2 (13 Dec 2023 20:00), Max: 98.4 (13 Dec 2023 00:00)  HR: 170 (13 Dec 2023 20:00) (66 - 170)  BP: 90/52 (13 Dec 2023 20:00) (90/52 - 115/63)  BP(mean): --  RR: 18 (13 Dec 2023 20:00) (18 - 20)  SpO2: 92% (13 Dec 2023 20:00) (88% - 100%)    Parameters below as of 13 Dec 2023 20:00  Patient On (Oxygen Delivery Method): nasal cannula  O2 Flow (L/min): 5    Day ACP discussed situation with attending Dr. To --recs included:  >EKG STAT   >deep suctioning d/t similar episode in past with resolution after deep suctioning w/ RT  >Pt w/ soft BP during the day 80s to low 90s systolic, advising against lopressor at this time  >recs morphine 0.5mg IVP  >robinol and seroquel as ordered   >HFNC until stabilization w/ attempt to wean back down throughout the night --wife refusing at this time   >will continue to closely assess and monitor overnight  >will endorse to day team      ADDENDUM:   Pt seen and re-assessed at bedside with RN   s/p deep suctioning with respiratory therapy, IV morphine x1, robinol x1, pt still tachycardic in 170s w/ EKG showing SVT, pt now w/ agonal breathing and hypotensive to 60s.   Pt wife and children at bedside(son and daughter). discussed their goals of care in regards to initiating RRT d/t hemodynamic instability  Upon a lengthy conversation with the family, they verbalized understanding of the current situation and how the patient is rapidly declining. They repeatedly stated their goal was to get the patient home and transition to home hospice, but verbalized due to the current clinical state of the patient they are in understanding he has a very poor prognosis and may not make it through the night.   Family all in agreement that their goal is to keep the patient comfortable without aggressive medical management given his current hemodynamic instability. They expressed understanding that calling an RRT would not change the circumstances or reverse the overall clinical decompensation of the patient and it is not in align with their mutual agreement with symptomatic management at this time.   Pt family wishes to continue morphine PRN for symptomatic treatment   Discussed above conversation and plan with attending Dr. To--agreeable to plan  Will continue to monitor     -Cora Schmitz PA-C  [id number]

## 2023-12-13 NOTE — PROVIDER CONTACT NOTE (OTHER) - NAME OF MD/NP/PA/DO NOTIFIED:
DESTINY Bernal
MD Roscoe Gracia
MD Kathe Byrd
NP Shaila Joanier
MD Jamari Cutler
Marga Harris
Mary Bernal
Jamari DIXON
DEBORAH Ewing
DESTINY Morrow
MD Jamari Cutler
Cora Schmitz
Aditya Katz, NP

## 2023-12-13 NOTE — PROVIDER CONTACT NOTE (OTHER) - ACTION/TREATMENT ORDERED:
PA made aware and ordered STAT EKG, morphine 0.5mg and robinol. Pt seen by respiratory and was deep suctioned and restarted on high flow. Will continue to monitor.

## 2023-12-13 NOTE — PROVIDER CONTACT NOTE (OTHER) - BACKGROUND
see h&p
Pt admitted for sepsis 2/2 worsening sacral ulcer/OM. Pt has hx of severe dementia.
see h&p
Admit dx: Sepsis 2/2 wound infection, osteomyelitis, ARF  PMH: Demtentia
see h&p
sepsis 2/2 from sacrum pressure injury and aspiration pna.
Pt admitted 11/19 for osteomyelitis.

## 2023-12-13 NOTE — DISCHARGE NOTE NURSING/CASE MANAGEMENT/SOCIAL WORK - NSSCCARECORD_GEN_ALL_CORE
Home Care Agency/Durable Medical Equipment Agency/Community [facility] Home Care Agency/Durable Medical Equipment Agency/Community [facility]

## 2023-12-13 NOTE — PROGRESS NOTE ADULT - SUBJECTIVE AND OBJECTIVE BOX
SHARONA LARKIN  78y Male  MRN:[id number]    Patient is a 78y old  Male who presents with a chief complaint of sepsis      HPI:  79 yo M PMHx of severe dementia, PEG tube presents with ulcer that has worsened over last month. Patient was seen by house calls doctor 2 weeks ago, placed on antibiotics but started mounting fevers in the last few days. It was noticed by his GI doctor (Dr. Moraes) that his ulcer was getting worse, so he sent him in. Patient is nonverbal at baseline. Wife has noticed no new cough, runny nose. (19 Nov 2023 13:02)      Patient seen and evaluated at bedside. No acute events overnight except as noted.    Interval HPI: no acute events o/n       PAST MEDICAL & SURGICAL HISTORY:  Dementia      Pneumonia      Acute renal failure (ARF)  ARF 1.5 YEAR AGO      Dysphagia  peg      Arthritis      GERD (gastroesophageal reflux disease)      Dyspepsia      History of hand surgery      PEG adjustment, replacement, or removal      Status post insertion of percutaneous endoscopic gastrostomy (PEG) tube  PEG replacement on 02/29/16          REVIEW OF SYSTEMS:  as per hpi      Vital Signs Last 24 Hrs  T(C): 36.4 (13 Dec 2023 08:30), Max: 37.1 (12 Dec 2023 16:00)  T(F): 97.5 (13 Dec 2023 08:30), Max: 98.8 (12 Dec 2023 16:00)  HR: 102 (13 Dec 2023 12:09) (102 - 111)  BP: 115/63 (13 Dec 2023 08:30) (93/54 - 115/63)  BP(mean): --  RR: 18 (13 Dec 2023 12:09) (18 - 18)  SpO2: 91% (13 Dec 2023 12:09) (90% - 100%)    Parameters below as of 13 Dec 2023 12:09  Patient On (Oxygen Delivery Method): room air        PHYSICAL EXAM:  GENERAL: NAD,  comfortable   HEAD:  Atraumatic, Normocephalic  EYES: EOMI, PERRLA, conjunctiva and sclera clear  NECK: Supple, No JVD  CHEST/LUNG: Clear to auscultation bilaterally; No wheeze  HEART: S1, S2; No murmurs, rubs, or gallops  ABDOMEN: Soft, Nontender, Nondistended; Bowel sounds present +peg  EXTREMITIES:  2+ Peripheral Pulses, contracted   PSYCH: Normal affect  NEUROLOGY: non verbal  SKIN: +sacral decub    Consultant(s) Notes Reviewed:  [x ] YES  [ ] NO  Care Discussed with Consultants/Other Providers [ x] YES  [ ] NO    MEDS:   MEDICATIONS  (STANDING):  acetylcysteine 10%  Inhalation 4 milliLiter(s) Inhalation every 12 hours  chlorhexidine 2% Cloths 1 Application(s) Topical <User Schedule>  dextrose 5% + sodium chloride 0.9%. 1000 milliLiter(s) (50 mL/Hr) IV Continuous <Continuous>  guaiFENesin Oral Liquid (Sugar-Free) 200 milliGRAM(s) Oral every 8 hours  ipratropium    for Nebulization 500 MICROGram(s) Nebulizer every 6 hours  levalbuterol Inhalation 0.63 milliGRAM(s) Inhalation every 6 hours  nystatin Powder 1 Application(s) Topical two times a day  pantoprazole   Suspension 40 milliGRAM(s) Oral daily  QUEtiapine 100 milliGRAM(s) Oral <User Schedule>    MEDICATIONS  (PRN):  acetaminophen   Oral Liquid .. 750 milliGRAM(s) Oral every 6 hours PRN Temp greater or equal to 38C (100.4F), Mild Pain (1 - 3)  bisacodyl Suppository 10 milliGRAM(s) Rectal daily PRN Constipation  glycopyrrolate Injectable 0.4 milliGRAM(s) IV Push every 6 hours PRN secretions      ALLERGIES:  Haldol (Other)  No Known Allergies  benzodiazepines (Other)      LABS:              no new labs    < from: CT Angio Chest PE Protocol w/ IV Cont (11.27.23 @ 22:44) >  IMPRESSION:.    No pulmonary embolism detected.    Occlusive secretions within the left mainstem bronchus with associated   complete left lung collapse; consider superimposed infection in the   appropriate clinical scenario.    Small leftpleural effusion.    --- End of Report ---      < end of copied text >       < from: CT Abdomen and Pelvis w/ IV Cont (11.18.23 @ 19:41) >    IMPRESSION:    Extensive decubitus ulcer involving the right posterior gluteal and   pelvic region with extension to bone. There is bony erosion of the   ischium compatible with osteomyelitis.    Mild bladder wall thickening and a few foci of intraluminal air,   correlate for recent instrumentation and/or infection.    Left lower lobe consolidation along with tree-in-bud/nodular opacities in   the left upper lobe concerning for pneumonia.    --- End of Report ---        < end of copied text >   SHARONA LARKIN  78y Male  MRN:[id number]    Patient is a 78y old  Male who presents with a chief complaint of sepsis      HPI:  77 yo M PMHx of severe dementia, PEG tube presents with ulcer that has worsened over last month. Patient was seen by house calls doctor 2 weeks ago, placed on antibiotics but started mounting fevers in the last few days. It was noticed by his GI doctor (Dr. Moraes) that his ulcer was getting worse, so he sent him in. Patient is nonverbal at baseline. Wife has noticed no new cough, runny nose. (19 Nov 2023 13:02)      Patient seen and evaluated at bedside. No acute events overnight except as noted.    Interval HPI: no acute events o/n       PAST MEDICAL & SURGICAL HISTORY:  Dementia      Pneumonia      Acute renal failure (ARF)  ARF 1.5 YEAR AGO      Dysphagia  peg      Arthritis      GERD (gastroesophageal reflux disease)      Dyspepsia      History of hand surgery      PEG adjustment, replacement, or removal      Status post insertion of percutaneous endoscopic gastrostomy (PEG) tube  PEG replacement on 02/29/16          REVIEW OF SYSTEMS:  as per hpi      Vital Signs Last 24 Hrs  T(C): 36.4 (13 Dec 2023 08:30), Max: 37.1 (12 Dec 2023 16:00)  T(F): 97.5 (13 Dec 2023 08:30), Max: 98.8 (12 Dec 2023 16:00)  HR: 102 (13 Dec 2023 12:09) (102 - 111)  BP: 115/63 (13 Dec 2023 08:30) (93/54 - 115/63)  BP(mean): --  RR: 18 (13 Dec 2023 12:09) (18 - 18)  SpO2: 91% (13 Dec 2023 12:09) (90% - 100%)    Parameters below as of 13 Dec 2023 12:09  Patient On (Oxygen Delivery Method): room air        PHYSICAL EXAM:  GENERAL: NAD,  comfortable   HEAD:  Atraumatic, Normocephalic  EYES: EOMI, PERRLA, conjunctiva and sclera clear  NECK: Supple, No JVD  CHEST/LUNG: Clear to auscultation bilaterally; No wheeze  HEART: S1, S2; No murmurs, rubs, or gallops  ABDOMEN: Soft, Nontender, Nondistended; Bowel sounds present +peg  EXTREMITIES:  2+ Peripheral Pulses, contracted   PSYCH: Normal affect  NEUROLOGY: non verbal  SKIN: +sacral decub    Consultant(s) Notes Reviewed:  [x ] YES  [ ] NO  Care Discussed with Consultants/Other Providers [ x] YES  [ ] NO    MEDS:   MEDICATIONS  (STANDING):  acetylcysteine 10%  Inhalation 4 milliLiter(s) Inhalation every 12 hours  chlorhexidine 2% Cloths 1 Application(s) Topical <User Schedule>  dextrose 5% + sodium chloride 0.9%. 1000 milliLiter(s) (50 mL/Hr) IV Continuous <Continuous>  guaiFENesin Oral Liquid (Sugar-Free) 200 milliGRAM(s) Oral every 8 hours  ipratropium    for Nebulization 500 MICROGram(s) Nebulizer every 6 hours  levalbuterol Inhalation 0.63 milliGRAM(s) Inhalation every 6 hours  nystatin Powder 1 Application(s) Topical two times a day  pantoprazole   Suspension 40 milliGRAM(s) Oral daily  QUEtiapine 100 milliGRAM(s) Oral <User Schedule>    MEDICATIONS  (PRN):  acetaminophen   Oral Liquid .. 750 milliGRAM(s) Oral every 6 hours PRN Temp greater or equal to 38C (100.4F), Mild Pain (1 - 3)  bisacodyl Suppository 10 milliGRAM(s) Rectal daily PRN Constipation  glycopyrrolate Injectable 0.4 milliGRAM(s) IV Push every 6 hours PRN secretions      ALLERGIES:  Haldol (Other)  No Known Allergies  benzodiazepines (Other)      LABS:              no new labs    < from: CT Angio Chest PE Protocol w/ IV Cont (11.27.23 @ 22:44) >  IMPRESSION:.    No pulmonary embolism detected.    Occlusive secretions within the left mainstem bronchus with associated   complete left lung collapse; consider superimposed infection in the   appropriate clinical scenario.    Small leftpleural effusion.    --- End of Report ---      < end of copied text >       < from: CT Abdomen and Pelvis w/ IV Cont (11.18.23 @ 19:41) >    IMPRESSION:    Extensive decubitus ulcer involving the right posterior gluteal and   pelvic region with extension to bone. There is bony erosion of the   ischium compatible with osteomyelitis.    Mild bladder wall thickening and a few foci of intraluminal air,   correlate for recent instrumentation and/or infection.    Left lower lobe consolidation along with tree-in-bud/nodular opacities in   the left upper lobe concerning for pneumonia.    --- End of Report ---        < end of copied text >

## 2023-12-13 NOTE — PROGRESS NOTE ADULT - SUBJECTIVE AND OBJECTIVE BOX
Date of Service: 12-13-23 @ 16:22    Patient is a 78y old  Male who presents with a chief complaint of Osteomyelitis     (20 Nov 2023 11:10)      Any change in ROS: on 4 L of oxygen : desaturates on room air:  needs home oxygen : pt is awake and wife at bedside:   no resp distress     MEDICATIONS  (STANDING):  acetylcysteine 10%  Inhalation 4 milliLiter(s) Inhalation every 12 hours  chlorhexidine 2% Cloths 1 Application(s) Topical <User Schedule>  dextrose 5% + sodium chloride 0.9%. 1000 milliLiter(s) (50 mL/Hr) IV Continuous <Continuous>  guaiFENesin Oral Liquid (Sugar-Free) 200 milliGRAM(s) Oral every 8 hours  ipratropium    for Nebulization 500 MICROGram(s) Nebulizer every 6 hours  levalbuterol Inhalation 0.63 milliGRAM(s) Inhalation every 6 hours  nystatin Powder 1 Application(s) Topical two times a day  pantoprazole   Suspension 40 milliGRAM(s) Oral daily  QUEtiapine 100 milliGRAM(s) Oral <User Schedule>    MEDICATIONS  (PRN):  acetaminophen   Oral Liquid .. 750 milliGRAM(s) Oral every 6 hours PRN Temp greater or equal to 38C (100.4F), Mild Pain (1 - 3)  bisacodyl Suppository 10 milliGRAM(s) Rectal daily PRN Constipation  glycopyrrolate Injectable 0.4 milliGRAM(s) IV Push every 6 hours PRN secretions    Vital Signs Last 24 Hrs  T(C): 36.4 (13 Dec 2023 08:30), Max: 36.9 (13 Dec 2023 00:00)  T(F): 97.5 (13 Dec 2023 08:30), Max: 98.4 (13 Dec 2023 00:00)  HR: 108 (13 Dec 2023 13:28) (102 - 111)  BP: 115/63 (13 Dec 2023 08:30) (93/54 - 115/63)  BP(mean): --  RR: 20 (13 Dec 2023 13:28) (18 - 20)  SpO2: 88% (13 Dec 2023 13:28) (88% - 100%)    Parameters below as of 13 Dec 2023 13:28  Patient On (Oxygen Delivery Method): room air        I&O's Summary    12 Dec 2023 07:01  -  13 Dec 2023 07:00  --------------------------------------------------------  IN: 850 mL / OUT: 400 mL / NET: 450 mL          Physical Exam:   GENERAL: weak appearing   HEENT: CANDY/   Atraumatic, Normocephalic  ENMT: No tonsillar erythema, exudates, or enlargement; Moist mucous membranes, Good dentition, No lesions  NECK: Supple, No JVD, Normal thyroid  CHEST/LUNG: Clear to auscultaion, ; No rales, rhonchi, wheezing, or rubs  CVS: Regular rate and rhythm; No murmurs, rubs, or gallops  GI: : Soft, Nontender, Nondistended; Bowel sounds present  NERVOUS SYSTEM:  awake; eyes open : but do not respond  EXTREMITIES: - edema  LYMPH: No lymphadenopathy noted  SKIN: No rashes or lesions  ENDOCRINOLOGY: No Thyromegaly  PSYCH: calm     Labs:                CAPILLARY BLOOD GLUCOSE                      RECENT CULTURES:        RESPIRATORY CULTURES:          Studies  Chest X-RAY  CT SCAN Chest   Venous Dopplers: LE:   CT Abdomen  Others    rad< from: Xray Chest 1 View- PORTABLE-Routine (Xray Chest 1 View- PORTABLE-Routine .) (12.01.23 @ 14:13) >  CLINICAL INDICATION: Assess left lung atelectasis.    TECHNIQUE: AP radiograph of the chest.    FINDINGS:  Heart is similar in size.  Right lung remains clear.  Partial reaeration of the upper and mid left lung.  Partly loculated lower left pleural effusion is present. Mid and lower   left lung haziness may be due to underlying infection in the right   clinical setting. No pneumothorax.    IMPRESSION:  Partial reaeration of the left lung.  Small loculated left pleural effusion.  Associated mid and lower left lung atelectasis.    --- End of Report ---            MARLYN AMARO MD; Attending Radiologist  This document has been electronically signed. Dec  2 2023 10:22AM    < end of copied text >

## 2023-12-13 NOTE — PROVIDER CONTACT NOTE (OTHER) - SITUATION
MEWS score of 7 was generated after recording patient's vitals.
Pt  & temp 102.3 rectally
Pt is NPO on tube feeds. No free water flush ordered. Pts wife states that at home she administers 600cc/24hr of free h2o.
Pt satting 80% on 6L NC
Pt 
Pt desting to 84% on high flow.
Pt tachycardic
Pt was put on BIPAP by respiratory therapist, pt oxygen saturation at 80s%, patient is tachycardic 100-110s.
Pt febrile w temp of 101.1 axillary
Pt was hypotensive.
pt desatting on cpap & febrile
Pt 
Pt is tachycardic in the 170s and desaturating on 4L via nasal cannula.

## 2023-12-13 NOTE — DISCHARGE NOTE NURSING/CASE MANAGEMENT/SOCIAL WORK - PATIENT PORTAL LINK FT
You can access the FollowMyHealth Patient Portal offered by [facility] by registering at the following website: [email or web address]. By joining [employer]’s FollowMyHealth portal, you will also be able to view your health information using other applications (apps) compatible with our system. You can access the FollowMyHealth Patient Portal offered by [facility] by registering at the following website: [email or web address]. By joining [employer]’s FollowMyHealth portal, you will also be able to view your health information using other applications (apps) compatible with our system.

## 2023-12-13 NOTE — PROGRESS NOTE ADULT - SUBJECTIVE AND OBJECTIVE BOX
INTERVAL HPI/OVERNIGHT  seen and examined , no new events            MEDICATIONS  (STANDING):  chlorhexidine 2% Cloths 1 Application(s) Topical <User Schedule>  Dakins Solution - 1/4 Strength 1 Application(s) Topical two times a day  enoxaparin Injectable 40 milliGRAM(s) SubCutaneous every 24 hours  ipratropium    for Nebulization 500 MICROGram(s) Nebulizer every 6 hours  levalbuterol Inhalation 0.63 milliGRAM(s) Inhalation every 6 hours  OLANZapine 5 milliGRAM(s) Oral <User Schedule>  pantoprazole    Tablet 40 milliGRAM(s) Oral before breakfast  QUEtiapine 100 milliGRAM(s) Oral <User Schedule>  senna Syrup 10 milliLiter(s) Oral at bedtime  sodium chloride 3%  Inhalation 4 milliLiter(s) Inhalation every 6 hours  sucralfate 1 Gram(s) Oral four times a day    MEDICATIONS  (PRN):  acetaminophen   Oral Liquid .. 750 milliGRAM(s) Oral every 6 hours PRN Temp greater or equal to 38C (100.4F), Mild Pain (1 - 3)      Allergies    No Known Allergies    Intolerances    Haldol (Other)  benzodiazepines (Other)        PHYSICAL EXAM  Vital Signs Last 24 Hrs  T(C): 36.4 (13 Dec 2023 08:30), Max: 37.1 (12 Dec 2023 16:00)  T(F): 97.5 (13 Dec 2023 08:30), Max: 98.8 (12 Dec 2023 16:00)  HR: 102 (13 Dec 2023 12:09) (102 - 111)  BP: 115/63 (13 Dec 2023 08:30) (93/54 - 115/63)  BP(mean): --  RR: 18 (13 Dec 2023 12:09) (18 - 18)  SpO2: 91% (13 Dec 2023 12:09) (90% - 100%)    Parameters below as of 13 Dec 2023 12:09  Patient On (Oxygen Delivery Method): room air          nad  confused  frail  non toxic  soft, nt, +PEG  no edema      LABS:

## 2023-12-13 NOTE — PROGRESS NOTE ADULT - PROBLEM SELECTOR PLAN 3
CT A/P with extensive decubitus ulcer involving the right posterior gluteal and pelvic region with extension to bone, bony erosion of the ischium compatible with osteomyelitis  -ABX per ID.  -now off antibiotics

## 2023-12-13 NOTE — CHART NOTE - NSCHARTNOTESELECT_GEN_ALL_CORE
Alternating Pressure Pad/Event Note
Event Note
Nutrition Services
Palliative Care/Event Note
Event Note
Wheelchair Accessories/Event Note

## 2023-12-13 NOTE — DISCHARGE NOTE NURSING/CASE MANAGEMENT/SOCIAL WORK - NSDCPEFALRISK_GEN_ALL_CORE
For information on Fall & Injury Prevention, visit: [email or web address] OR  [email or web address] OR  https://www.cdc.gov/steadi/patient.html For information on Fall & Injury Prevention, visit: [email or web address]/[email or web address]-[email or web address]-mobility OR  [email or web address]/[email or web address]-[email or web address]-to-avoid-injury OR  https://www.cdc.gov/steadi/patient.html

## 2023-12-13 NOTE — PROGRESS NOTE ADULT - ASSESSMENT
A/P:77 yo M PMHx of severe dementia, PEG tube presents with ulcer that has worsened over last month. Patient was seen by house calls doctor 2 weeks ago, placed on antibiotics but started mounting fevers in the last few days. It was noticed by his GI doctor (Dr. Moraes) that his ulcer was getting worse.    Wound Consult requested to assist w/ management of  - Stage 4 pressure injury right ischium  - Deep tissue pressure injury sacral region  - Deep tissue pressure injury of left ischium  - Fungal Dermatitis      Recommendations:   -Stage 4 pressure injury      continue w/ VAC as per protocol w/ Wound PT         if dc on Hospice can switch to Aquacel dressing QD      Fungal dermatitis- continue w/Crusting w/ Nystatin BID          will place VAC drape elsewhere and continue to monitor  -Sacrum/ Lt Ischium- Cavilon QD  -   Continue turning and positioning w/ offloading assistive devices as per protocol  -   Continue w/ attends under pads and Pericare w/ condom cath maintenance as per protocol  Moisturize intact skin w/ SWEEN cream BID  Nutrition Consult for optimization in pt w/ Increased nutritional needs  -   Encourage high quality protein, carline/ MVI & Vit C to promote wound healing  Continue w/ Air fluidized bed   Pt will need Group 2 mattress on hospital bed upon discharge home  Care as per medicine, will follow w/ you  Upon discharge f/u as outpatient at W[facility] [address] [facility] [phone]  s/w RN and d/w attng & team   Kristina Conway PA-C, CWS [id number]  Nights/ Weekends/ Holidays please call:  General Surgery Consult pager ([phone]) for emergencies  I spent 35minutes face to face w/ this pt of which more than 50% of the time was spent counseling & coordinating care of this pt.      A/P:79 yo M PMHx of severe dementia, PEG tube presents with ulcer that has worsened over last month. Patient was seen by house calls doctor 2 weeks ago, placed on antibiotics but started mounting fevers in the last few days. It was noticed by his GI doctor (Dr. Moraes) that his ulcer was getting worse.    Wound Consult requested to assist w/ management of  - Stage 4 pressure injury right ischium  - Deep tissue pressure injury sacral region  - Deep tissue pressure injury of left ischium  - Fungal Dermatitis      Recommendations:   -Stage 4 pressure injury      continue w/ VAC as per protocol w/ Wound PT         if dc on Hospice can switch to Aquacel dressing QD      Fungal dermatitis- continue w/Crusting w/ Nystatin BID          will place VAC drape elsewhere and continue to monitor  -Sacrum/ Lt Ischium- Cavilon QD  -   Continue turning and positioning w/ offloading assistive devices as per protocol  -   Continue w/ attends under pads and Pericare w/ condom cath maintenance as per protocol  Moisturize intact skin w/ SWEEN cream BID  Nutrition Consult for optimization in pt w/ Increased nutritional needs  -   Encourage high quality protein, carline/ MVI & Vit C to promote wound healing  Continue w/ Air fluidized bed   Pt will need Group 2 mattress on hospital bed upon discharge home  Care as per medicine, will follow w/ you  Upon discharge f/u as outpatient at Wound Center [address] [facility] [phone]  s/w RN and d/w attng & team   Kristina Conway PA-C, CWS [id number]  Nights/ Weekends/ Holidays please call:  General Surgery Consult pager ([phone]) for emergencies  I spent 35minutes face to face w/ this pt of which more than 50% of the time was spent counseling & coordinating care of this pt.

## 2023-12-13 NOTE — PROGRESS NOTE ADULT - PROBLEM SELECTOR PLAN 1
today he is on 3 L of oxygen  nasal cannula: off high flow:  general condition remains the same:  wife is at bedside:  cont conservative management:  pt is doing very poorly today on 12/12 compared to yesterday : he is gasping:   cont comfort measures:  and suctioning:  morphine prn  : grand daughter in agreement with the  management:  he needs home oxygen : as he desats on room air:  no change in his clinical condition:

## 2023-12-14 VITALS
TEMPERATURE: 98 F | DIASTOLIC BLOOD PRESSURE: 69 MMHG | OXYGEN SATURATION: 96 % | HEART RATE: 102 BPM | SYSTOLIC BLOOD PRESSURE: 113 MMHG | RESPIRATION RATE: 20 BRPM

## 2023-12-14 PROCEDURE — 80053 COMPREHEN METABOLIC PANEL: CPT

## 2023-12-14 PROCEDURE — 36600 WITHDRAWAL OF ARTERIAL BLOOD: CPT

## 2023-12-14 PROCEDURE — 96375 TX/PRO/DX INJ NEW DRUG ADDON: CPT

## 2023-12-14 PROCEDURE — 97605 NEG PRS WND THER DME<=50SQCM: CPT

## 2023-12-14 PROCEDURE — 84132 ASSAY OF SERUM POTASSIUM: CPT

## 2023-12-14 PROCEDURE — 87640 STAPH A DNA AMP PROBE: CPT

## 2023-12-14 PROCEDURE — 85652 RBC SED RATE AUTOMATED: CPT

## 2023-12-14 PROCEDURE — 87641 MR-STAPH DNA AMP PROBE: CPT

## 2023-12-14 PROCEDURE — 36415 COLL VENOUS BLD VENIPUNCTURE: CPT

## 2023-12-14 PROCEDURE — 86140 C-REACTIVE PROTEIN: CPT

## 2023-12-14 PROCEDURE — 82330 ASSAY OF CALCIUM: CPT

## 2023-12-14 PROCEDURE — 85379 FIBRIN DEGRADATION QUANT: CPT

## 2023-12-14 PROCEDURE — 97164 PT RE-EVAL EST PLAN CARE: CPT

## 2023-12-14 PROCEDURE — 99285 EMERGENCY DEPT VISIT HI MDM: CPT | Mod: 25

## 2023-12-14 PROCEDURE — 82962 GLUCOSE BLOOD TEST: CPT

## 2023-12-14 PROCEDURE — 97606 NEG PRS WND THER DME>50 SQCM: CPT

## 2023-12-14 PROCEDURE — 83735 ASSAY OF MAGNESIUM: CPT

## 2023-12-14 PROCEDURE — 0225U NFCT DS DNA&RNA 21 SARSCOV2: CPT

## 2023-12-14 PROCEDURE — 85027 COMPLETE CBC AUTOMATED: CPT

## 2023-12-14 PROCEDURE — 86850 RBC ANTIBODY SCREEN: CPT

## 2023-12-14 PROCEDURE — 81001 URINALYSIS AUTO W/SCOPE: CPT

## 2023-12-14 PROCEDURE — 84100 ASSAY OF PHOSPHORUS: CPT

## 2023-12-14 PROCEDURE — 94640 AIRWAY INHALATION TREATMENT: CPT

## 2023-12-14 PROCEDURE — 80202 ASSAY OF VANCOMYCIN: CPT

## 2023-12-14 PROCEDURE — 82435 ASSAY OF BLOOD CHLORIDE: CPT

## 2023-12-14 PROCEDURE — 71045 X-RAY EXAM CHEST 1 VIEW: CPT

## 2023-12-14 PROCEDURE — 87086 URINE CULTURE/COLONY COUNT: CPT

## 2023-12-14 PROCEDURE — 74177 CT ABD & PELVIS W/CONTRAST: CPT | Mod: MA

## 2023-12-14 PROCEDURE — 82947 ASSAY GLUCOSE BLOOD QUANT: CPT

## 2023-12-14 PROCEDURE — 31720 CLEARANCE OF AIRWAYS: CPT

## 2023-12-14 PROCEDURE — 82803 BLOOD GASES ANY COMBINATION: CPT

## 2023-12-14 PROCEDURE — 96374 THER/PROPH/DIAG INJ IV PUSH: CPT

## 2023-12-14 PROCEDURE — 93306 TTE W/DOPPLER COMPLETE: CPT

## 2023-12-14 PROCEDURE — 87040 BLOOD CULTURE FOR BACTERIA: CPT

## 2023-12-14 PROCEDURE — 80048 BASIC METABOLIC PNL TOTAL CA: CPT

## 2023-12-14 PROCEDURE — 93970 EXTREMITY STUDY: CPT

## 2023-12-14 PROCEDURE — 85025 COMPLETE CBC W/AUTO DIFF WBC: CPT

## 2023-12-14 PROCEDURE — 93005 ELECTROCARDIOGRAM TRACING: CPT

## 2023-12-14 PROCEDURE — 94660 CPAP INITIATION&MGMT: CPT

## 2023-12-14 PROCEDURE — 86900 BLOOD TYPING SEROLOGIC ABO: CPT

## 2023-12-14 PROCEDURE — 84295 ASSAY OF SERUM SODIUM: CPT

## 2023-12-14 PROCEDURE — 85014 HEMATOCRIT: CPT

## 2023-12-14 PROCEDURE — 85018 HEMOGLOBIN: CPT

## 2023-12-14 PROCEDURE — 83605 ASSAY OF LACTIC ACID: CPT

## 2023-12-14 PROCEDURE — 87449 NOS EACH ORGANISM AG IA: CPT

## 2023-12-14 PROCEDURE — 71275 CT ANGIOGRAPHY CHEST: CPT

## 2023-12-14 PROCEDURE — 86901 BLOOD TYPING SEROLOGIC RH(D): CPT

## 2023-12-14 RX ORDER — NYSTATIN CREAM 100000 [USP'U]/G
1 CREAM TOPICAL
Qty: 0 | Refills: 0 | DISCHARGE
Start: 2023-12-14

## 2023-12-14 RX ORDER — ACETYLCYSTEINE 200 MG/ML
4 VIAL (ML) MISCELLANEOUS
Qty: 0 | Refills: 0 | DISCHARGE
Start: 2023-12-14

## 2023-12-14 RX ORDER — ACETAMINOPHEN 500 MG
2 TABLET ORAL
Qty: 0 | Refills: 0 | DISCHARGE

## 2023-12-14 RX ORDER — CEFDINIR 250 MG/5ML
1 POWDER, FOR SUSPENSION ORAL
Refills: 0 | DISCHARGE

## 2023-12-14 RX ORDER — OMEPRAZOLE 10 MG/1
1 CAPSULE, DELAYED RELEASE ORAL
Qty: 0 | Refills: 0 | DISCHARGE

## 2023-12-14 RX ORDER — SUCRALFATE 1 G
1 TABLET ORAL
Refills: 0 | DISCHARGE

## 2023-12-14 RX ORDER — PANTOPRAZOLE SODIUM 20 MG/1
1 TABLET, DELAYED RELEASE ORAL
Refills: 0 | DISCHARGE

## 2023-12-14 RX ORDER — IPRATROPIUM BROMIDE 0.2 MG/ML
2.5 SOLUTION, NON-ORAL INHALATION
Qty: 140 | Refills: 0
Start: 2023-12-14 | End: 2023-12-27

## 2023-12-14 RX ORDER — QUETIAPINE FUMARATE 200 MG/1
1 TABLET, FILM COATED ORAL
Qty: 0 | Refills: 0 | DISCHARGE
Start: 2023-12-14

## 2023-12-14 RX ORDER — LEVALBUTEROL 1.25 MG/.5ML
3 SOLUTION, CONCENTRATE RESPIRATORY (INHALATION)
Qty: 168 | Refills: 0
Start: 2023-12-14 | End: 2023-12-27

## 2023-12-14 RX ADMIN — MORPHINE SULFATE 0.5 MILLIGRAM(S): 50 CAPSULE, EXTENDED RELEASE ORAL at 00:48

## 2023-12-14 RX ADMIN — NYSTATIN CREAM 1 APPLICATION(S): 100000 CREAM TOPICAL at 06:03

## 2023-12-14 RX ADMIN — LEVALBUTEROL 0.63 MILLIGRAM(S): 1.25 SOLUTION, CONCENTRATE RESPIRATORY (INHALATION) at 00:43

## 2023-12-14 RX ADMIN — Medication 750 MILLIGRAM(S): at 07:03

## 2023-12-14 RX ADMIN — Medication 500 MICROGRAM(S): at 17:28

## 2023-12-14 RX ADMIN — Medication 4 MILLILITER(S): at 17:28

## 2023-12-14 RX ADMIN — Medication 750 MILLIGRAM(S): at 12:43

## 2023-12-14 RX ADMIN — Medication 750 MILLIGRAM(S): at 00:48

## 2023-12-14 RX ADMIN — Medication 750 MILLIGRAM(S): at 06:05

## 2023-12-14 RX ADMIN — NYSTATIN CREAM 1 APPLICATION(S): 100000 CREAM TOPICAL at 17:28

## 2023-12-14 RX ADMIN — LEVALBUTEROL 0.63 MILLIGRAM(S): 1.25 SOLUTION, CONCENTRATE RESPIRATORY (INHALATION) at 17:43

## 2023-12-14 RX ADMIN — Medication 500 MICROGRAM(S): at 00:43

## 2023-12-14 RX ADMIN — Medication 200 MILLIGRAM(S): at 06:05

## 2023-12-14 RX ADMIN — PANTOPRAZOLE SODIUM 40 MILLIGRAM(S): 20 TABLET, DELAYED RELEASE ORAL at 11:10

## 2023-12-14 RX ADMIN — CHLORHEXIDINE GLUCONATE 1 APPLICATION(S): 213 SOLUTION TOPICAL at 06:04

## 2023-12-14 RX ADMIN — Medication 750 MILLIGRAM(S): at 13:13

## 2023-12-14 NOTE — PROGRESS NOTE ADULT - PROBLEM SELECTOR PROBLEM 4
Dysphagia
Dysphagia
Advance care planning
Dementia
Dysphagia
Dementia
Dysphagia
Dementia
Dysphagia
Dementia
Advance care planning
Advance care planning
Dysphagia
Dysphagia
Dementia
Dysphagia
Advance care planning
Dementia
Dysphagia
Advance care planning
Dementia
Advance care planning

## 2023-12-14 NOTE — PROGRESS NOTE ADULT - SUBJECTIVE AND OBJECTIVE BOX
SHARONA LARKIN  78y Male  MRN:[id number]    Patient is a 78y old  Male who presents with a chief complaint of sepsis      HPI:  77 yo M PMHx of severe dementia, PEG tube presents with ulcer that has worsened over last month. Patient was seen by house calls doctor 2 weeks ago, placed on antibiotics but started mounting fevers in the last few days. It was noticed by his GI doctor (Dr. Moraes) that his ulcer was getting worse, so he sent him in. Patient is nonverbal at baseline. Wife has noticed no new cough, runny nose. (19 Nov 2023 13:02)      Patient seen and evaluated at bedside. No acute events overnight except as noted.    Interval HPI: overnight events noted        PAST MEDICAL & SURGICAL HISTORY:  Dementia      Pneumonia      Acute renal failure (ARF)  ARF 1.5 YEAR AGO      Dysphagia  peg      Arthritis      GERD (gastroesophageal reflux disease)      Dyspepsia      History of hand surgery      PEG adjustment, replacement, or removal      Status post insertion of percutaneous endoscopic gastrostomy (PEG) tube  PEG replacement on 02/29/16          REVIEW OF SYSTEMS:  as per hpi      Vital Signs Last 24 Hrs  T(C): 36.4 (14 Dec 2023 08:37), Max: 36.9 (14 Dec 2023 00:44)  T(F): 97.5 (14 Dec 2023 08:37), Max: 98.4 (14 Dec 2023 00:44)  HR: 107 (14 Dec 2023 08:37) (66 - 170)  BP: 97/61 (14 Dec 2023 08:37) (82/56 - 107/54)  BP(mean): --  RR: 22 (14 Dec 2023 08:37) (18 - 22)  SpO2: 96% (14 Dec 2023 08:37) (92% - 96%)    Parameters below as of 14 Dec 2023 08:37  Patient On (Oxygen Delivery Method): nasal cannula  O2 Flow (L/min): 6        PHYSICAL EXAM:  GENERAL: NAD,  comfortable   HEAD:  Atraumatic, Normocephalic  EYES: EOMI, PERRLA, conjunctiva and sclera clear  NECK: Supple, No JVD  CHEST/LUNG: Clear to auscultation bilaterally; No wheeze  HEART: S1, S2; No murmurs, rubs, or gallops  ABDOMEN: Soft, Nontender, Nondistended; Bowel sounds present +peg  EXTREMITIES:  2+ Peripheral Pulses, contracted   PSYCH: Normal affect  NEUROLOGY: non verbal  SKIN: +sacral decub    Consultant(s) Notes Reviewed:  [x ] YES  [ ] NO  Care Discussed with Consultants/Other Providers [ x] YES  [ ] NO    MEDS:   MEDICATIONS  (STANDING):  acetylcysteine 10%  Inhalation 4 milliLiter(s) Inhalation every 12 hours  chlorhexidine 2% Cloths 1 Application(s) Topical <User Schedule>  dextrose 5% + sodium chloride 0.9%. 1000 milliLiter(s) (60 mL/Hr) IV Continuous <Continuous>  guaiFENesin Oral Liquid (Sugar-Free) 200 milliGRAM(s) Oral every 8 hours  ipratropium    for Nebulization 500 MICROGram(s) Nebulizer every 6 hours  levalbuterol Inhalation 0.63 milliGRAM(s) Inhalation every 6 hours  nystatin Powder 1 Application(s) Topical two times a day  pantoprazole   Suspension 40 milliGRAM(s) Oral daily  QUEtiapine 100 milliGRAM(s) Oral <User Schedule>    MEDICATIONS  (PRN):  acetaminophen   Oral Liquid .. 750 milliGRAM(s) Oral every 6 hours PRN Temp greater or equal to 38C (100.4F), Mild Pain (1 - 3)  bisacodyl Suppository 10 milliGRAM(s) Rectal daily PRN Constipation  glycopyrrolate Injectable 0.4 milliGRAM(s) IV Push every 6 hours PRN secretions  morphine  - Injectable 1 milliGRAM(s) IV Push every 1 hour PRN dsypnea, RR>22        ALLERGIES:  Haldol (Other)  No Known Allergies  benzodiazepines (Other)      LABS:              no new labs    < from: CT Angio Chest PE Protocol w/ IV Cont (11.27.23 @ 22:44) >  IMPRESSION:.    No pulmonary embolism detected.    Occlusive secretions within the left mainstem bronchus with associated   complete left lung collapse; consider superimposed infection in the   appropriate clinical scenario.    Small leftpleural effusion.    --- End of Report ---      < end of copied text >       < from: CT Abdomen and Pelvis w/ IV Cont (11.18.23 @ 19:41) >    IMPRESSION:    Extensive decubitus ulcer involving the right posterior gluteal and   pelvic region with extension to bone. There is bony erosion of the   ischium compatible with osteomyelitis.    Mild bladder wall thickening and a few foci of intraluminal air,   correlate for recent instrumentation and/or infection.    Left lower lobe consolidation along with tree-in-bud/nodular opacities in   the left upper lobe concerning for pneumonia.    --- End of Report ---        < end of copied text >   SHARONA LARKIN  78y Male  MRN:[id number]    Patient is a 78y old  Male who presents with a chief complaint of sepsis      HPI:  79 yo M PMHx of severe dementia, PEG tube presents with ulcer that has worsened over last month. Patient was seen by house calls doctor 2 weeks ago, placed on antibiotics but started mounting fevers in the last few days. It was noticed by his GI doctor (Dr. Moraes) that his ulcer was getting worse, so he sent him in. Patient is nonverbal at baseline. Wife has noticed no new cough, runny nose. (19 Nov 2023 13:02)      Patient seen and evaluated at bedside. No acute events overnight except as noted.    Interval HPI: overnight events noted        PAST MEDICAL & SURGICAL HISTORY:  Dementia      Pneumonia      Acute renal failure (ARF)  ARF 1.5 YEAR AGO      Dysphagia  peg      Arthritis      GERD (gastroesophageal reflux disease)      Dyspepsia      History of hand surgery      PEG adjustment, replacement, or removal      Status post insertion of percutaneous endoscopic gastrostomy (PEG) tube  PEG replacement on 02/29/16          REVIEW OF SYSTEMS:  as per hpi      Vital Signs Last 24 Hrs  T(C): 36.4 (14 Dec 2023 08:37), Max: 36.9 (14 Dec 2023 00:44)  T(F): 97.5 (14 Dec 2023 08:37), Max: 98.4 (14 Dec 2023 00:44)  HR: 107 (14 Dec 2023 08:37) (66 - 170)  BP: 97/61 (14 Dec 2023 08:37) (82/56 - 107/54)  BP(mean): --  RR: 22 (14 Dec 2023 08:37) (18 - 22)  SpO2: 96% (14 Dec 2023 08:37) (92% - 96%)    Parameters below as of 14 Dec 2023 08:37  Patient On (Oxygen Delivery Method): nasal cannula  O2 Flow (L/min): 6        PHYSICAL EXAM:  GENERAL: NAD,  comfortable   HEAD:  Atraumatic, Normocephalic  EYES: EOMI, PERRLA, conjunctiva and sclera clear  NECK: Supple, No JVD  CHEST/LUNG: Clear to auscultation bilaterally; No wheeze  HEART: S1, S2; No murmurs, rubs, or gallops  ABDOMEN: Soft, Nontender, Nondistended; Bowel sounds present +peg  EXTREMITIES:  2+ Peripheral Pulses, contracted   PSYCH: Normal affect  NEUROLOGY: non verbal  SKIN: +sacral decub    Consultant(s) Notes Reviewed:  [x ] YES  [ ] NO  Care Discussed with Consultants/Other Providers [ x] YES  [ ] NO    MEDS:   MEDICATIONS  (STANDING):  acetylcysteine 10%  Inhalation 4 milliLiter(s) Inhalation every 12 hours  chlorhexidine 2% Cloths 1 Application(s) Topical <User Schedule>  dextrose 5% + sodium chloride 0.9%. 1000 milliLiter(s) (60 mL/Hr) IV Continuous <Continuous>  guaiFENesin Oral Liquid (Sugar-Free) 200 milliGRAM(s) Oral every 8 hours  ipratropium    for Nebulization 500 MICROGram(s) Nebulizer every 6 hours  levalbuterol Inhalation 0.63 milliGRAM(s) Inhalation every 6 hours  nystatin Powder 1 Application(s) Topical two times a day  pantoprazole   Suspension 40 milliGRAM(s) Oral daily  QUEtiapine 100 milliGRAM(s) Oral <User Schedule>    MEDICATIONS  (PRN):  acetaminophen   Oral Liquid .. 750 milliGRAM(s) Oral every 6 hours PRN Temp greater or equal to 38C (100.4F), Mild Pain (1 - 3)  bisacodyl Suppository 10 milliGRAM(s) Rectal daily PRN Constipation  glycopyrrolate Injectable 0.4 milliGRAM(s) IV Push every 6 hours PRN secretions  morphine  - Injectable 1 milliGRAM(s) IV Push every 1 hour PRN dsypnea, RR>22        ALLERGIES:  Haldol (Other)  No Known Allergies  benzodiazepines (Other)      LABS:              no new labs    < from: CT Angio Chest PE Protocol w/ IV Cont (11.27.23 @ 22:44) >  IMPRESSION:.    No pulmonary embolism detected.    Occlusive secretions within the left mainstem bronchus with associated   complete left lung collapse; consider superimposed infection in the   appropriate clinical scenario.    Small leftpleural effusion.    --- End of Report ---      < end of copied text >       < from: CT Abdomen and Pelvis w/ IV Cont (11.18.23 @ 19:41) >    IMPRESSION:    Extensive decubitus ulcer involving the right posterior gluteal and   pelvic region with extension to bone. There is bony erosion of the   ischium compatible with osteomyelitis.    Mild bladder wall thickening and a few foci of intraluminal air,   correlate for recent instrumentation and/or infection.    Left lower lobe consolidation along with tree-in-bud/nodular opacities in   the left upper lobe concerning for pneumonia.    --- End of Report ---        < end of copied text >

## 2023-12-14 NOTE — PROGRESS NOTE ADULT - PROBLEM SELECTOR PLAN 1
today he is on 3 L of oxygen  nasal cannula: off high flow:  general condition remains the same:  wife is at bedside:  cont conservative management:  pt is doing very poorly today on 12/12 compared to yesterday : he is gasping:   cont comfort measures:  and suctioning:  morphine prn  : grand daughter in agreement with the  management:  he needs home oxygen : as he desats on room air:  no change in his clinical condition:  -he remains in same condition:  for dc today

## 2023-12-14 NOTE — PROGRESS NOTE ADULT - PROBLEM SELECTOR PROBLEM 2
Acute respiratory failure with hypoxia
Pneumonia
Sacral decubitus ulcer
Pneumonia
Pneumonia
Sacral decubitus ulcer
Sacral decubitus ulcer
Acute respiratory failure with hypoxia
Acute respiratory failure with hypoxia
Pneumonia
Acute respiratory failure with hypoxia
Acute respiratory failure with hypoxia
Pneumonia
Acute respiratory failure with hypoxia
Pneumonia
Pneumonia
Sacral decubitus ulcer
Pneumonia
Acute respiratory failure with hypoxia
Pneumonia
Sacral decubitus ulcer
Pneumonia
Sacral decubitus ulcer

## 2023-12-14 NOTE — PROGRESS NOTE ADULT - ASSESSMENT
77 y/o M with PMH of dementia (primarily bedbound - has been nonverbal for the past year per wife at bedside), dysphagia s/p PEG, sacral ulcer. Presents with fevers, recommended by GI Dr. Moraes to come to ER as sacral decubiti reportedly getting worse. Afebrile on triage with O2 sats 96% on room air. Labs notable for leukocytosis. RVP/COVID negative. CT A/P with extensive decubitus ulcer involving the right posterior gluteal and pelvic region with extension to bone, bony erosion of the ischium compatible with osteomyelitis. Also with LLL consolidation. Course c/b RRT 11/20 for hypoxia, now requiring HFNC. Pulmonary called to consult for hypoxia.  79 y/o M with PMH of dementia (primarily bedbound - has been nonverbal for the past year per wife at bedside), dysphagia s/p PEG, sacral ulcer. Presents with fevers, recommended by GI Dr. Moraes to come to ER as sacral decubiti reportedly getting worse. Afebrile on triage with O2 sats 96% on room air. Labs notable for leukocytosis. RVP/COVID negative. CT A/P with extensive decubitus ulcer involving the right posterior gluteal and pelvic region with extension to bone, bony erosion of the ischium compatible with osteomyelitis. Also with LLL consolidation. Course c/b RRT 11/20 for hypoxia, now requiring HFNC. Pulmonary called to consult for hypoxia.

## 2023-12-14 NOTE — PROGRESS NOTE ADULT - PROBLEM SELECTOR PROBLEM 3
Sacral decubitus ulcer
Acute respiratory failure with hypoxia
Sacral decubitus ulcer
Acute respiratory failure with hypoxia
Sacral decubitus ulcer
Acute respiratory failure with hypoxia
Sacral decubitus ulcer
Acute respiratory failure with hypoxia
Sacral decubitus ulcer
Acute respiratory failure with hypoxia
Sacral decubitus ulcer
Sacral decubitus ulcer
Acute respiratory failure with hypoxia

## 2023-12-14 NOTE — PROGRESS NOTE ADULT - PROBLEM SELECTOR PROBLEM 5
Dementia
Dementia
Palliative care encounter
Dementia
Palliative care encounter
Dementia
Palliative care encounter
Palliative care encounter
Dementia
Palliative care encounter
Dementia
Palliative care encounter

## 2023-12-14 NOTE — PROGRESS NOTE ADULT - SUBJECTIVE AND OBJECTIVE BOX
INTERVAL HPI/OVERNIGHT  seen and examined , events noted      MEDICATIONS  (STANDING):  chlorhexidine 2% Cloths 1 Application(s) Topical <User Schedule>  Dakins Solution - 1/4 Strength 1 Application(s) Topical two times a day  enoxaparin Injectable 40 milliGRAM(s) SubCutaneous every 24 hours  ipratropium    for Nebulization 500 MICROGram(s) Nebulizer every 6 hours  levalbuterol Inhalation 0.63 milliGRAM(s) Inhalation every 6 hours  OLANZapine 5 milliGRAM(s) Oral <User Schedule>  pantoprazole    Tablet 40 milliGRAM(s) Oral before breakfast  QUEtiapine 100 milliGRAM(s) Oral <User Schedule>  senna Syrup 10 milliLiter(s) Oral at bedtime  sodium chloride 3%  Inhalation 4 milliLiter(s) Inhalation every 6 hours  sucralfate 1 Gram(s) Oral four times a day    MEDICATIONS  (PRN):  acetaminophen   Oral Liquid .. 750 milliGRAM(s) Oral every 6 hours PRN Temp greater or equal to 38C (100.4F), Mild Pain (1 - 3)      Allergies    No Known Allergies    Intolerances    Haldol (Other)  benzodiazepines (Other)        PHYSICAL EXAM  Vital Signs Last 24 Hrs  T(C): 36.4 (14 Dec 2023 08:37), Max: 36.9 (14 Dec 2023 00:44)  T(F): 97.5 (14 Dec 2023 08:37), Max: 98.4 (14 Dec 2023 00:44)  HR: 107 (14 Dec 2023 08:37) (66 - 170)  BP: 97/61 (14 Dec 2023 08:37) (82/56 - 107/54)  BP(mean): --  RR: 22 (14 Dec 2023 08:37) (18 - 22)  SpO2: 96% (14 Dec 2023 08:37) (88% - 96%)    Parameters below as of 14 Dec 2023 08:37  Patient On (Oxygen Delivery Method): nasal cannula  O2 Flow (L/min): 6      nad  confused  frail  non toxic  soft, nt, +PEG  no edema      LABS:

## 2023-12-14 NOTE — PROGRESS NOTE ADULT - NUTRITIONAL ASSESSMENT
This patient has been assessed with a concern for Malnutrition and has been determined to have a diagnosis/diagnoses of Severe protein-calorie malnutrition and Underweight (BMI < 19).  
This patient has been assessed with a concern for Malnutrition and has been determined to have a diagnosis/diagnoses of Severe protein-calorie malnutrition and Underweight (BMI < 19).  
This patient has been assessed with a concern for Malnutrition and has been determined to have a diagnosis/diagnoses of Severe protein-calorie malnutrition and Underweight (BMI < 19).    The following pending diet order is being considered for treatment of Severe protein-calorie malnutrition and Underweight (BMI < 19):  Diet NPO with Tube Feed-  Tube Feeding Modality: Gastrostomy  Jevity 1.5 David (JEVITY1.5RTH)  Total Volume for 24 Hours (mL): 1440  Bolus  Total Volume of Bolus (mL):  240  Total # of Feeds: 5  Tube Feed Frequency: Every 4 hours   Tube Feed Start Time: 00:00  Bolus Feed Rate (mL per Hour): 240   Bolus Feed Duration (in Hours): 24  Bolus Feed Instructions:  Provide 240 ml 5x/day &  fluid flushes deferred to team.  Entered: Nov 20 2023  3:17PM  
This patient has been assessed with a concern for Malnutrition and has been determined to have a diagnosis/diagnoses of Severe protein-calorie malnutrition and Underweight (BMI < 19).    This patient is being managed with:   Diet NPO with Tube Feed-  Tube Feeding Modality: Gastrostomy  Jevity 1.5 David (JEVITY1.5RTH)  Total Volume for 24 Hours (mL): 1200  Continuous  Starting Tube Feed Rate {mL per Hour}: 30  Increase Tube Feed Rate by (mL): 10     Every 6 hours  Until Goal Tube Feed Rate (mL per Hour): 50  Tube Feed Duration (in Hours): 24  Tube Feed Start Time: 14:00  Free Water Flush  Bolus   Total Volume per Flush (mL): 250   Frequency: Every 8 Hours   Total Daily Volume of Flush (mL): 750  Supplement Feeding Modality:  Gastrostomy  Entered: Nov 28 2023  6:11PM  
This patient has been assessed with a concern for Malnutrition and has been determined to have a diagnosis/diagnoses of Severe protein-calorie malnutrition and Underweight (BMI < 19).  
This patient has been assessed with a concern for Malnutrition and has been determined to have a diagnosis/diagnoses of Severe protein-calorie malnutrition and Underweight (BMI < 19).    The following pending diet order is being considered for treatment of Severe protein-calorie malnutrition and Underweight (BMI < 19):  Diet NPO with Tube Feed-  Tube Feeding Modality: Gastrostomy  Jevity 1.5 David (JEVITY1.5RTH)  Total Volume for 24 Hours (mL): 1440  Bolus  Total Volume of Bolus (mL):  240  Total # of Feeds: 5  Tube Feed Frequency: Every 4 hours   Tube Feed Start Time: 00:00  Bolus Feed Rate (mL per Hour): 240   Bolus Feed Duration (in Hours): 24  Bolus Feed Instructions:  Provide 240 ml 5x/day &  fluid flushes deferred to team.  Entered: Nov 20 2023  3:17PM  
This patient has been assessed with a concern for Malnutrition and has been determined to have a diagnosis/diagnoses of Severe protein-calorie malnutrition and Underweight (BMI < 19).    This patient is being managed with:   Diet NPO with Tube Feed-  Tube Feeding Modality: Gastrostomy  Jevity 1.5 David (JEVITY1.5RTH)  Total Volume for 24 Hours (mL): 1200  Continuous  Starting Tube Feed Rate {mL per Hour}: 30  Increase Tube Feed Rate by (mL): 10     Every 6 hours  Until Goal Tube Feed Rate (mL per Hour): 50  Tube Feed Duration (in Hours): 24  Tube Feed Start Time: 14:00  Free Water Flush  Bolus   Total Volume per Flush (mL): 250   Frequency: Every 8 Hours   Total Daily Volume of Flush (mL): 750  Supplement Feeding Modality:  Gastrostomy  Entered: Nov 28 2023  6:11PM  
This patient has been assessed with a concern for Malnutrition and has been determined to have a diagnosis/diagnoses of Severe protein-calorie malnutrition and Underweight (BMI < 19).    This patient is being managed with:   Diet NPO with Tube Feed-  Tube Feeding Modality: Gastrostomy  Jevity 1.5 David (JEVITY1.5RTH)  Total Volume for 24 Hours (mL): 1200  Continuous  Starting Tube Feed Rate {mL per Hour}: 30  Increase Tube Feed Rate by (mL): 10     Every 6 hours  Until Goal Tube Feed Rate (mL per Hour): 50  Tube Feed Duration (in Hours): 24  Tube Feed Start Time: 14:00  Free Water Flush  Bolus   Total Volume per Flush (mL): 250   Frequency: Every 8 Hours   Total Daily Volume of Flush (mL): 750  Supplement Feeding Modality:  Gastrostomy  Entered: Nov 28 2023  6:11PM  
This patient has been assessed with a concern for Malnutrition and has been determined to have a diagnosis/diagnoses of Severe protein-calorie malnutrition and Underweight (BMI < 19).    This patient is being managed with:   Diet NPO with Tube Feed-  Tube Feeding Modality: Gastrostomy  Jevity 1.5 David (JEVITY1.5RTH)  Total Volume for 24 Hours (mL): 1200  Continuous  Starting Tube Feed Rate {mL per Hour}: 30  Increase Tube Feed Rate by (mL): 10     Every 6 hours  Until Goal Tube Feed Rate (mL per Hour): 50  Tube Feed Duration (in Hours): 24  Tube Feed Start Time: 14:00  Supplement Feeding Modality:  Gastrostomy  Entered: Nov 23 2023  1:37PM  
This patient has been assessed with a concern for Malnutrition and has been determined to have a diagnosis/diagnoses of Severe protein-calorie malnutrition and Underweight (BMI < 19).    This patient is being managed with:   Diet NPO with Tube Feed-  Tube Feeding Modality: Gastrostomy  Jevity 1.5 David (JEVITY1.5RTH)  Total Volume for 24 Hours (mL): 400  Continuous  Starting Tube Feed Rate {mL per Hour}: 50  Until Goal Tube Feed Rate (mL per Hour): 50  Tube Feed Duration (in Hours): 8  Tube Feed Start Time: 00:30  Supplement Feeding Modality:  Gastrostomy  Entered: Nov 23 2023 12:13AM  
This patient has been assessed with a concern for Malnutrition and has been determined to have a diagnosis/diagnoses of Severe protein-calorie malnutrition and Underweight (BMI < 19).  
This patient has been assessed with a concern for Malnutrition and has been determined to have a diagnosis/diagnoses of Severe protein-calorie malnutrition and Underweight (BMI < 19).    This patient is being managed with:   Diet NPO with Tube Feed-  Tube Feeding Modality: Gastrostomy  Jevity 1.5 David (JEVITY1.5RTH)  Total Volume for 24 Hours (mL): 1200  Continuous  Starting Tube Feed Rate {mL per Hour}: 30  Increase Tube Feed Rate by (mL): 10     Every 6 hours  Until Goal Tube Feed Rate (mL per Hour): 50  Tube Feed Duration (in Hours): 24  Tube Feed Start Time: 14:00  Free Water Flush  Bolus   Total Volume per Flush (mL): 250   Frequency: Every 8 Hours   Total Daily Volume of Flush (mL): 750  Supplement Feeding Modality:  Gastrostomy  Entered: Nov 28 2023  6:11PM  
This patient has been assessed with a concern for Malnutrition and has been determined to have a diagnosis/diagnoses of Severe protein-calorie malnutrition and Underweight (BMI < 19).    This patient is being managed with:   Diet NPO with Tube Feed-  Tube Feeding Modality: Gastrostomy  Jevity 1.5 David (JEVITY1.5RTH)  Total Volume for 24 Hours (mL): 1200  Continuous  Starting Tube Feed Rate {mL per Hour}: 30  Increase Tube Feed Rate by (mL): 10     Every 6 hours  Until Goal Tube Feed Rate (mL per Hour): 50  Tube Feed Duration (in Hours): 24  Tube Feed Start Time: 14:00  Supplement Feeding Modality:  Gastrostomy  Entered: Nov 23 2023  1:37PM  
This patient has been assessed with a concern for Malnutrition and has been determined to have a diagnosis/diagnoses of Severe protein-calorie malnutrition and Underweight (BMI < 19).  
This patient has been assessed with a concern for Malnutrition and has been determined to have a diagnosis/diagnoses of Severe protein-calorie malnutrition and Underweight (BMI < 19).    This patient is being managed with:   Diet NPO with Tube Feed-  Tube Feeding Modality: Gastrostomy  Jevity 1.5 David (JEVITY1.5RTH)  Total Volume for 24 Hours (mL): 1200  Continuous  Starting Tube Feed Rate {mL per Hour}: 30  Increase Tube Feed Rate by (mL): 10     Every 6 hours  Until Goal Tube Feed Rate (mL per Hour): 50  Tube Feed Duration (in Hours): 24  Tube Feed Start Time: 14:00  Free Water Flush  Bolus   Total Volume per Flush (mL): 250   Frequency: Every 8 Hours   Total Daily Volume of Flush (mL): 750  Supplement Feeding Modality:  Gastrostomy  Entered: Nov 28 2023  6:11PM  
This patient has been assessed with a concern for Malnutrition and has been determined to have a diagnosis/diagnoses of Severe protein-calorie malnutrition and Underweight (BMI < 19).  
This patient has been assessed with a concern for Malnutrition and has been determined to have a diagnosis/diagnoses of Severe protein-calorie malnutrition and Underweight (BMI < 19).  
This patient has been assessed with a concern for Malnutrition and has been determined to have a diagnosis/diagnoses of Severe protein-calorie malnutrition and Underweight (BMI < 19).    This patient is being managed with:   Diet NPO with Tube Feed-  Tube Feeding Modality: Gastrostomy  Jevity 1.5 David (JEVITY1.5RTH)  Total Volume for 24 Hours (mL): 1200  Continuous  Starting Tube Feed Rate {mL per Hour}: 30  Increase Tube Feed Rate by (mL): 10     Every 6 hours  Until Goal Tube Feed Rate (mL per Hour): 50  Tube Feed Duration (in Hours): 24  Tube Feed Start Time: 14:00  Free Water Flush  Bolus   Total Volume per Flush (mL): 250   Frequency: Every 8 Hours   Total Daily Volume of Flush (mL): 750  Supplement Feeding Modality:  Gastrostomy  Entered: Nov 28 2023  6:11PM

## 2023-12-14 NOTE — PROGRESS NOTE ADULT - ASSESSMENT
79 yo male h/o dementia, non verbal. contracted, s/p peg. here with sepsis likely 2/2 sacral decub and aspiration pna    sepsis  respiratory failure   sacral decub  pna - possible aspiration    abx as per id - now complete  f/u cult  wound care f/u  id consult noted  pulm consulted  cont supp o2, now off high flow. taper down NC as able   CTA chest noted  pulm f/u   bipap as able  abx changed to meropenem. id f/u apprec - now complete  frequent suctioning   VAC for sacral decub as per wound care     dementia  cont home meds  supportive care    dysphagia s/p peg  feeds now on hold  s/p peg change  by gi bedside   free water via peg     hyperNa  free water via peg ordered   no further labs as per family       dvt ppx      d/w family bedside  DNR/I  family wants to take pt home  decline home hospice   dc planning home with home o2 and VAC    d/w wife bedside  pt prognosis poor  pt currently with agonal breathing.   pt actively dying  wife aware and wants pt to be home  pt at high risk for fatality       Advanced care planning was discussed with patient and family.  Advanced care planning forms were reviewed and discussed as appropriate.  Differential diagnosis and plan of care discussed with patient after the evaluation.   Pain assessed and judicious use of narcotics when appropriate was discussed.  Importance of Fall prevention discussed.  Counseling on Smoking and Alcohol cessation was offered when appropriate.  Counseling on Diet, exercise, and medication compliance was done.       Approx 75 minutes spent. 77 yo male h/o dementia, non verbal. contracted, s/p peg. here with sepsis likely 2/2 sacral decub and aspiration pna    sepsis  respiratory failure   sacral decub  pna - possible aspiration    abx as per id - now complete  f/u cult  wound care f/u  id consult noted  pulm consulted  cont supp o2, now off high flow. taper down NC as able   CTA chest noted  pulm f/u   bipap as able  abx changed to meropenem. id f/u apprec - now complete  frequent suctioning   VAC for sacral decub as per wound care     dementia  cont home meds  supportive care    dysphagia s/p peg  feeds now on hold  s/p peg change  by gi bedside   free water via peg     hyperNa  free water via peg ordered   no further labs as per family       dvt ppx      d/w family bedside  DNR/I  family wants to take pt home  decline home hospice   dc planning home with home o2 and VAC    d/w wife bedside  pt prognosis poor  pt currently with agonal breathing.   pt actively dying  wife aware and wants pt to be home  pt at high risk for fatality       Advanced care planning was discussed with patient and family.  Advanced care planning forms were reviewed and discussed as appropriate.  Differential diagnosis and plan of care discussed with patient after the evaluation.   Pain assessed and judicious use of narcotics when appropriate was discussed.  Importance of Fall prevention discussed.  Counseling on Smoking and Alcohol cessation was offered when appropriate.  Counseling on Diet, exercise, and medication compliance was done.       Approx 75 minutes spent.

## 2023-12-14 NOTE — PROGRESS NOTE ADULT - PROVIDER SPECIALTY LIST ADULT
Gastroenterology
Infectious Disease
Internal Medicine
Pulmonology
Pulmonology
Wound Care
Gastroenterology
Infectious Disease
Internal Medicine
Internal Medicine
Gastroenterology
Infectious Disease
Internal Medicine
Palliative Care
Internal Medicine
Palliative Care
Gastroenterology
Internal Medicine
Wound Care
Internal Medicine
Palliative Care
Pulmonology
Pulmonology
Internal Medicine
Pulmonology
Pulmonology
Cardiology
Pulmonology
Cardiology
Pulmonology
Pulmonology
Cardiology
Pulmonology
Cardiology
Palliative Care
Cardiology
Palliative Care
Pulmonology
Pulmonology
Cardiology
Cardiology
Pulmonology
Pulmonology
Palliative Care

## 2023-12-14 NOTE — PROGRESS NOTE ADULT - PROBLEM SELECTOR PROBLEM 1
Acute respiratory failure with hypoxia
Acute respiratory failure with hypoxia
Dementia
Acute respiratory failure with hypoxia
Dementia
Acute respiratory failure with hypoxia
Dementia
Acute respiratory failure with hypoxia
Acute respiratory failure with hypoxia
Dementia
Acute respiratory failure with hypoxia
Tachycardia
Tachycardia
Acute respiratory failure with hypoxia
Tachycardia
Tachycardia
Acute respiratory failure with hypoxia
Tachycardia
Acute respiratory failure with hypoxia
Tachycardia
Acute respiratory failure with hypoxia
Dementia
Dementia
Tachycardia

## 2023-12-14 NOTE — PROGRESS NOTE ADULT - SUBJECTIVE AND OBJECTIVE BOX
Date of Service: 12-14-23 @ 15:33    Patient is a 78y old  Male who presents with a chief complaint of Osteomyelitis     (20 Nov 2023 11:10)      Any change in ROS: seems to be same for dc today  : wife at bedside : on 3 L     MEDICATIONS  (STANDING):  acetylcysteine 10%  Inhalation 4 milliLiter(s) Inhalation every 12 hours  chlorhexidine 2% Cloths 1 Application(s) Topical <User Schedule>  dextrose 5% + sodium chloride 0.9%. 1000 milliLiter(s) (60 mL/Hr) IV Continuous <Continuous>  guaiFENesin Oral Liquid (Sugar-Free) 200 milliGRAM(s) Oral every 8 hours  ipratropium    for Nebulization 500 MICROGram(s) Nebulizer every 6 hours  levalbuterol Inhalation 0.63 milliGRAM(s) Inhalation every 6 hours  nystatin Powder 1 Application(s) Topical two times a day  pantoprazole   Suspension 40 milliGRAM(s) Oral daily  QUEtiapine 100 milliGRAM(s) Oral <User Schedule>    MEDICATIONS  (PRN):  acetaminophen   Oral Liquid .. 750 milliGRAM(s) Oral every 6 hours PRN Temp greater or equal to 38C (100.4F), Mild Pain (1 - 3)  bisacodyl Suppository 10 milliGRAM(s) Rectal daily PRN Constipation  glycopyrrolate Injectable 0.4 milliGRAM(s) IV Push every 6 hours PRN secretions  morphine  - Injectable 1 milliGRAM(s) IV Push every 1 hour PRN dsypnea, RR>22    Vital Signs Last 24 Hrs  T(C): 36.4 (14 Dec 2023 08:37), Max: 36.9 (14 Dec 2023 00:44)  T(F): 97.5 (14 Dec 2023 08:37), Max: 98.4 (14 Dec 2023 00:44)  HR: 107 (14 Dec 2023 08:37) (66 - 170)  BP: 97/61 (14 Dec 2023 08:37) (82/56 - 107/54)  BP(mean): --  RR: 22 (14 Dec 2023 08:37) (18 - 22)  SpO2: 96% (14 Dec 2023 08:37) (92% - 96%)    Parameters below as of 14 Dec 2023 08:37  Patient On (Oxygen Delivery Method): nasal cannula  O2 Flow (L/min): 6      I&O's Summary    13 Dec 2023 07:01  -  14 Dec 2023 07:00  --------------------------------------------------------  IN: 970 mL / OUT: 300 mL / NET: 670 mL          Physical Exam:   GENERAL: cachectic   HEENT: CANDY/   Atraumatic, Normocephalic  ENMT: No tonsillar erythema, exudates, or enlargement; Moist mucous membranes, Good dentition, No lesions  NECK: Supple, No JVD, Normal thyroid  CHEST/LUNG: Clear to auscultaion  CVS: Regular rate and rhythm; No murmurs, rubs, or gallops  GI: : Soft, Nontender, Nondistended; Bowel sounds present  NERVOUS SYSTEM:  awake but demented  EXTREMITIES:  - edema  LYMPH: No lymphadenopathy noted  SKIN: No rashes or lesions  ENDOCRINOLOGY: No Thyromegaly  PSYCH: demented    Labs:                CAPILLARY BLOOD GLUCOSE                      RECENT CULTURES:        RESPIRATORY CULTURES:          Studies  Chest X-RAY  CT SCAN Chest   Venous Dopplers: LE:   CT Abdomen  Others

## 2024-05-31 NOTE — RAPID RESPONSE TEAM SUMMARY - NSOTHERINTERVENTIONSRRT_GEN_ALL_CORE
Pt was returned back to supine CGA and positioned for comfort. He was left with L LE elevated on pillow for increased comfort, bed alarm active, guards present, and all needs within reach. RN made aware.    Progression toward goals:  []          Improving appropriately and progressing toward goals  [x]          Improving slowly and progressing toward goals  []          Not making progress toward goals and plan of care will be adjusted     PLAN:  Patient continues to benefit from skilled intervention to address the above impairments.  Continue treatment per established plan of care.    Further Equipment Recommendations for Discharge: W/C 18 inches, BSC    AMPAC: AM-PAC Inpatient Daily Activity Raw Score: 16      Current research shows that an AM-PAC score of 17 or less is not associated with a discharge to the patient's home setting. Based on an AM-PAC score and their current ADL deficits; it is recommended that the patient have 3-5 sessions per week of Occupational Therapy at d/c to increase the patient's independence.        This AMPAC score should be considered in conjunction with interdisciplinary team recommendations to determine the most appropriate discharge setting. Patient's social support, diagnosis, medical stability, and prior level of function should also be taken into consideration.     SUBJECTIVE:   Patient stated, \"My foot is killing me.\"    OBJECTIVE DATA SUMMARY:   Cognitive/Behavioral Status:  Orientation  Overall Orientation Status: Within Normal Limits  Orientation Level: Oriented X4  Cognition  Overall Cognitive Status: WNL    Functional Mobility and Transfers for ADLs:   Bed Mobility:  Bed Mobility Training  Supine to Sit: Contact-guard assistance  Sit to Supine: Contact-guard assistance  Scooting: Contact-guard assistance   Transfers:  Transfer Training  Transfer Training: Yes  Stand Pivot Transfers: Minimum assistance;Assist X2;Moderate assistance (squat pivot; min x 2 to w/c, mod x 2 to bed  Raised high flow nasal cannula settings due to safety)    Balance:  Balance  Sitting: Intact      Pain:  Intensity Pre-treatment: 10/10   Intensity Post-treatment: 10/10  Scale: Numeric Rating Scale  Location: Left Foot  Quality: Aching and Throbbing  Intervention(s): Nurse notified and Repositioning       Activity Tolerance:    Activity Tolerance: Patient limited by pain  Please refer to the flowsheet for vital signs taken during this treatment.  After treatment:   []  Patient left in no apparent distress sitting up in chair  [x]  Patient left in no apparent distress in bed  [x]  Call bell left within reach  [x]  Nursing notified  [x]  Guards present  [x]  Bed alarm activated    COMMUNICATION/EDUCATION:   Patient Education  Education Given To: Patient  Education Provided: Role of Therapy;Plan of Care;Transfer Training;Fall Prevention Strategies;Energy Conservation  Education Method: Teach Back;Verbal;Demonstration  Barriers to Learning: None  Education Outcome: Continued education needed      Thank you for this referral.  RIGO Humphrey  Minutes: 23
